# Patient Record
Sex: FEMALE | Race: WHITE | NOT HISPANIC OR LATINO | URBAN - METROPOLITAN AREA
[De-identification: names, ages, dates, MRNs, and addresses within clinical notes are randomized per-mention and may not be internally consistent; named-entity substitution may affect disease eponyms.]

---

## 2020-10-16 ENCOUNTER — EMERGENCY (EMERGENCY)
Facility: HOSPITAL | Age: 25
LOS: 1 days | Discharge: HOME | End: 2020-10-16
Attending: EMERGENCY MEDICINE | Admitting: EMERGENCY MEDICINE
Payer: MEDICAID

## 2020-10-16 VITALS
SYSTOLIC BLOOD PRESSURE: 121 MMHG | DIASTOLIC BLOOD PRESSURE: 78 MMHG | OXYGEN SATURATION: 96 % | HEART RATE: 118 BPM | RESPIRATION RATE: 18 BRPM | TEMPERATURE: 98 F

## 2020-10-16 DIAGNOSIS — U07.1 COVID-19: ICD-10-CM

## 2020-10-16 DIAGNOSIS — R45.851 SUICIDAL IDEATIONS: ICD-10-CM

## 2020-10-16 LAB — PCP SPEC-MCNC: SIGNIFICANT CHANGE UP

## 2020-10-16 PROCEDURE — 99285 EMERGENCY DEPT VISIT HI MDM: CPT

## 2020-10-16 PROCEDURE — 93010 ELECTROCARDIOGRAM REPORT: CPT

## 2020-10-16 RX ORDER — ONDANSETRON 8 MG/1
4 TABLET, FILM COATED ORAL ONCE
Refills: 0 | Status: COMPLETED | OUTPATIENT
Start: 2020-10-16 | End: 2020-10-16

## 2020-10-16 RX ORDER — SODIUM CHLORIDE 9 MG/ML
1000 INJECTION INTRAMUSCULAR; INTRAVENOUS; SUBCUTANEOUS ONCE
Refills: 0 | Status: COMPLETED | OUTPATIENT
Start: 2020-10-16 | End: 2020-10-16

## 2020-10-16 RX ORDER — KETOROLAC TROMETHAMINE 30 MG/ML
30 SYRINGE (ML) INJECTION ONCE
Refills: 0 | Status: DISCONTINUED | OUTPATIENT
Start: 2020-10-16 | End: 2020-10-16

## 2020-10-16 RX ADMIN — SODIUM CHLORIDE 1000 MILLILITER(S): 9 INJECTION INTRAMUSCULAR; INTRAVENOUS; SUBCUTANEOUS at 23:59

## 2020-10-16 NOTE — ED ADULT TRIAGE NOTE - CHIEF COMPLAINT QUOTE
pt. c/o hallucinations, suicidal thoughts and attempt. as per pt. she tried to jump under train. pt. also c/o abd pain.

## 2020-10-17 VITALS
DIASTOLIC BLOOD PRESSURE: 76 MMHG | RESPIRATION RATE: 18 BRPM | TEMPERATURE: 98 F | OXYGEN SATURATION: 98 % | SYSTOLIC BLOOD PRESSURE: 110 MMHG | HEART RATE: 73 BPM

## 2020-10-17 DIAGNOSIS — F25.0 SCHIZOAFFECTIVE DISORDER, BIPOLAR TYPE: ICD-10-CM

## 2020-10-17 LAB
ALBUMIN SERPL ELPH-MCNC: 4.2 G/DL — SIGNIFICANT CHANGE UP (ref 3.3–5)
ALP SERPL-CCNC: 97 U/L — SIGNIFICANT CHANGE UP (ref 40–120)
ALT FLD-CCNC: 28 U/L — SIGNIFICANT CHANGE UP (ref 10–45)
AMPHET UR-MCNC: NEGATIVE — SIGNIFICANT CHANGE UP
ANION GAP SERPL CALC-SCNC: 13 MMOL/L — SIGNIFICANT CHANGE UP (ref 5–17)
APAP SERPL-MCNC: <5 UG/ML — LOW (ref 10–30)
APPEARANCE UR: ABNORMAL
APTT BLD: 28.2 SEC — SIGNIFICANT CHANGE UP (ref 27.5–35.5)
AST SERPL-CCNC: 27 U/L — SIGNIFICANT CHANGE UP (ref 10–40)
BACTERIA # UR AUTO: PRESENT /HPF
BARBITURATES UR SCN-MCNC: NEGATIVE — SIGNIFICANT CHANGE UP
BASOPHILS # BLD AUTO: 0.02 K/UL — SIGNIFICANT CHANGE UP (ref 0–0.2)
BASOPHILS NFR BLD AUTO: 0.2 % — SIGNIFICANT CHANGE UP (ref 0–2)
BENZODIAZ UR-MCNC: NEGATIVE — SIGNIFICANT CHANGE UP
BILIRUB SERPL-MCNC: 0.2 MG/DL — SIGNIFICANT CHANGE UP (ref 0.2–1.2)
BILIRUB UR-MCNC: NEGATIVE — SIGNIFICANT CHANGE UP
BUN SERPL-MCNC: 18 MG/DL — SIGNIFICANT CHANGE UP (ref 7–23)
CALCIUM SERPL-MCNC: 9.3 MG/DL — SIGNIFICANT CHANGE UP (ref 8.4–10.5)
CHLORIDE SERPL-SCNC: 107 MMOL/L — SIGNIFICANT CHANGE UP (ref 96–108)
CO2 SERPL-SCNC: 22 MMOL/L — SIGNIFICANT CHANGE UP (ref 22–31)
COCAINE METAB.OTHER UR-MCNC: NEGATIVE — SIGNIFICANT CHANGE UP
COLOR SPEC: YELLOW — SIGNIFICANT CHANGE UP
CREAT SERPL-MCNC: 0.76 MG/DL — SIGNIFICANT CHANGE UP (ref 0.5–1.3)
DIFF PNL FLD: ABNORMAL
EOSINOPHIL # BLD AUTO: 0.03 K/UL — SIGNIFICANT CHANGE UP (ref 0–0.5)
EOSINOPHIL NFR BLD AUTO: 0.3 % — SIGNIFICANT CHANGE UP (ref 0–6)
EPI CELLS # UR: ABNORMAL /HPF (ref 0–5)
ETHANOL SERPL-MCNC: <10 MG/DL — SIGNIFICANT CHANGE UP (ref 0–10)
GLUCOSE SERPL-MCNC: 93 MG/DL — SIGNIFICANT CHANGE UP (ref 70–99)
GLUCOSE UR QL: NEGATIVE — SIGNIFICANT CHANGE UP
HCG SERPL-ACNC: <0 MIU/ML — SIGNIFICANT CHANGE UP
HCT VFR BLD CALC: 41.2 % — SIGNIFICANT CHANGE UP (ref 34.5–45)
HGB BLD-MCNC: 12.6 G/DL — SIGNIFICANT CHANGE UP (ref 11.5–15.5)
IMM GRANULOCYTES NFR BLD AUTO: 0.2 % — SIGNIFICANT CHANGE UP (ref 0–1.5)
INR BLD: 0.99 — SIGNIFICANT CHANGE UP (ref 0.88–1.16)
KETONES UR-MCNC: NEGATIVE — SIGNIFICANT CHANGE UP
LEUKOCYTE ESTERASE UR-ACNC: NEGATIVE — SIGNIFICANT CHANGE UP
LIDOCAIN IGE QN: 27 U/L — SIGNIFICANT CHANGE UP (ref 7–60)
LYMPHOCYTES # BLD AUTO: 2.42 K/UL — SIGNIFICANT CHANGE UP (ref 1–3.3)
LYMPHOCYTES # BLD AUTO: 26.6 % — SIGNIFICANT CHANGE UP (ref 13–44)
MCHC RBC-ENTMCNC: 25.2 PG — LOW (ref 27–34)
MCHC RBC-ENTMCNC: 30.6 GM/DL — LOW (ref 32–36)
MCV RBC AUTO: 82.4 FL — SIGNIFICANT CHANGE UP (ref 80–100)
METHADONE UR-MCNC: NEGATIVE — SIGNIFICANT CHANGE UP
MONOCYTES # BLD AUTO: 0.61 K/UL — SIGNIFICANT CHANGE UP (ref 0–0.9)
MONOCYTES NFR BLD AUTO: 6.7 % — SIGNIFICANT CHANGE UP (ref 2–14)
NEUTROPHILS # BLD AUTO: 6.01 K/UL — SIGNIFICANT CHANGE UP (ref 1.8–7.4)
NEUTROPHILS NFR BLD AUTO: 66 % — SIGNIFICANT CHANGE UP (ref 43–77)
NITRITE UR-MCNC: NEGATIVE — SIGNIFICANT CHANGE UP
NRBC # BLD: 0 /100 WBCS — SIGNIFICANT CHANGE UP (ref 0–0)
OPIATES UR-MCNC: NEGATIVE — SIGNIFICANT CHANGE UP
PCP UR-MCNC: NEGATIVE — SIGNIFICANT CHANGE UP
PH UR: 5.5 — SIGNIFICANT CHANGE UP (ref 5–8)
PLATELET # BLD AUTO: 272 K/UL — SIGNIFICANT CHANGE UP (ref 150–400)
POTASSIUM SERPL-MCNC: 4.3 MMOL/L — SIGNIFICANT CHANGE UP (ref 3.5–5.3)
POTASSIUM SERPL-SCNC: 4.3 MMOL/L — SIGNIFICANT CHANGE UP (ref 3.5–5.3)
PROT SERPL-MCNC: 7.7 G/DL — SIGNIFICANT CHANGE UP (ref 6–8.3)
PROT UR-MCNC: ABNORMAL MG/DL
PROTHROM AB SERPL-ACNC: 11.9 SEC — SIGNIFICANT CHANGE UP (ref 10.6–13.6)
RBC # BLD: 5 M/UL — SIGNIFICANT CHANGE UP (ref 3.8–5.2)
RBC # FLD: 15.2 % — HIGH (ref 10.3–14.5)
RBC CASTS # UR COMP ASSIST: ABNORMAL /HPF
SALICYLATES SERPL-MCNC: <0.3 MG/DL — LOW (ref 2.8–20)
SARS-COV-2 RNA SPEC QL NAA+PROBE: DETECTED
SODIUM SERPL-SCNC: 142 MMOL/L — SIGNIFICANT CHANGE UP (ref 135–145)
SP GR SPEC: >=1.03 — SIGNIFICANT CHANGE UP (ref 1–1.03)
THC UR QL: NEGATIVE — SIGNIFICANT CHANGE UP
UROBILINOGEN FLD QL: 0.2 E.U./DL — SIGNIFICANT CHANGE UP
WBC # BLD: 9.11 K/UL — SIGNIFICANT CHANGE UP (ref 3.8–10.5)
WBC # FLD AUTO: 9.11 K/UL — SIGNIFICANT CHANGE UP (ref 3.8–10.5)
WBC UR QL: < 5 /HPF — SIGNIFICANT CHANGE UP

## 2020-10-17 PROCEDURE — 76705 ECHO EXAM OF ABDOMEN: CPT | Mod: 26

## 2020-10-17 PROCEDURE — U0003: CPT

## 2020-10-17 PROCEDURE — 85610 PROTHROMBIN TIME: CPT

## 2020-10-17 PROCEDURE — 80307 DRUG TEST PRSMV CHEM ANLYZR: CPT

## 2020-10-17 PROCEDURE — 85730 THROMBOPLASTIN TIME PARTIAL: CPT

## 2020-10-17 PROCEDURE — 84702 CHORIONIC GONADOTROPIN TEST: CPT

## 2020-10-17 PROCEDURE — 83690 ASSAY OF LIPASE: CPT

## 2020-10-17 PROCEDURE — 96361 HYDRATE IV INFUSION ADD-ON: CPT

## 2020-10-17 PROCEDURE — 76705 ECHO EXAM OF ABDOMEN: CPT

## 2020-10-17 PROCEDURE — 74176 CT ABD & PELVIS W/O CONTRAST: CPT

## 2020-10-17 PROCEDURE — 85025 COMPLETE CBC W/AUTO DIFF WBC: CPT

## 2020-10-17 PROCEDURE — 90792 PSYCH DIAG EVAL W/MED SRVCS: CPT | Mod: 95

## 2020-10-17 PROCEDURE — 74176 CT ABD & PELVIS W/O CONTRAST: CPT | Mod: 26

## 2020-10-17 PROCEDURE — 81001 URINALYSIS AUTO W/SCOPE: CPT

## 2020-10-17 PROCEDURE — 96375 TX/PRO/DX INJ NEW DRUG ADDON: CPT

## 2020-10-17 PROCEDURE — 96374 THER/PROPH/DIAG INJ IV PUSH: CPT

## 2020-10-17 PROCEDURE — 36415 COLL VENOUS BLD VENIPUNCTURE: CPT

## 2020-10-17 PROCEDURE — 93005 ELECTROCARDIOGRAM TRACING: CPT

## 2020-10-17 PROCEDURE — 80053 COMPREHEN METABOLIC PANEL: CPT

## 2020-10-17 PROCEDURE — 99285 EMERGENCY DEPT VISIT HI MDM: CPT | Mod: 25

## 2020-10-17 RX ORDER — SERTRALINE 25 MG/1
150 TABLET, FILM COATED ORAL ONCE
Refills: 0 | Status: COMPLETED | OUTPATIENT
Start: 2020-10-17 | End: 2020-10-17

## 2020-10-17 RX ORDER — ARIPIPRAZOLE 15 MG/1
5 TABLET ORAL ONCE
Refills: 0 | Status: COMPLETED | OUTPATIENT
Start: 2020-10-17 | End: 2020-10-17

## 2020-10-17 RX ADMIN — SERTRALINE 150 MILLIGRAM(S): 25 TABLET, FILM COATED ORAL at 06:00

## 2020-10-17 RX ADMIN — ONDANSETRON 4 MILLIGRAM(S): 8 TABLET, FILM COATED ORAL at 00:01

## 2020-10-17 RX ADMIN — ARIPIPRAZOLE 5 MILLIGRAM(S): 15 TABLET ORAL at 06:00

## 2020-10-17 RX ADMIN — Medication 30 MILLIGRAM(S): at 01:28

## 2020-10-17 RX ADMIN — Medication 30 MILLIGRAM(S): at 00:01

## 2020-10-17 RX ADMIN — SODIUM CHLORIDE 1000 MILLILITER(S): 9 INJECTION INTRAMUSCULAR; INTRAVENOUS; SUBCUTANEOUS at 01:00

## 2020-10-17 NOTE — ED ADULT NURSE NOTE - OBJECTIVE STATEMENT
presents to ED w/ c/o 3-4 days constant RUQ abdominal pain associated w/ n/v/d, denies fevers/chills. pt also presenting w/ SI/AH/VH with + command hallucinations. pt reports significant hx of SI w/ attempts in the past including today prior to arrival. pt states she wanted to jump in the train tracks and attempted to do so, so a bystander brought her in. pt reports compliant w/ medications. denies HI

## 2020-10-17 NOTE — ED ADULT NURSE REASSESSMENT NOTE - NS ED NURSE REASSESS COMMENT FT1
Per case management, pt is waiting for bed to be ready at receiving facility. Pt sleeping between interactions. Pt updated on reason for wait, pt presents calm at this time. 1-1 monitoring continued.

## 2020-10-17 NOTE — ED BEHAVIORAL HEALTH NOTE - BEHAVIORAL HEALTH NOTE
===================  PRE-HOSPITAL COURSE  ===================  SOURCE:  RN and triage documentation.   DETAILS:  Patient presented self to ED; chief complaint of SA via jumping in front of train. Bystander brought her to hospital.      ============  ED COURSE   ============  SOURCE:  RN and triage documentation.   ARRIVAL:  Patient was cooperative with triage process and allowed for gowning/wanding without incident. Patient presents with fair hygiene/grooming.   BELONGINGS: No notable belongings; items stowed with security.   BEHAVIOR: Patient has been cooperative while in ED and has provided blood/urine for routine labs without incident. Patient presently endorsing SI and attempt today via jumping in front of train, as well as AH/VH of shadows telling her to hurt herself. Denies HI. Patient presents as flat.  Patient's speech is of normal rate/volume; patient is AOx4 and presents with a logical thought process. Patient has been resting in hospital bed and has received fluids.    TREATMENT:  Patient has received Zofran 4mg and Toradol 30mg IV push for abdominal pain and nausea.   VISITORS:  Patient is accompanied by mother while in ED.     Collateral contact called for Aparna, grandmother, [phone], Uncle Gerald picked up the phone. Somewhat reliable historian, unable to endorse much information. Stated grandmother was asleep and he could speak to provide some information. Endorses patient lives with himself and grandmother; patient requires some assistance in grooming/hygiene needs as she doesn't keep up with it on her own. Collateral endorses normal eating/sleeping patterns and doesn't note any recent changes. Collateral endorses patient had just gotten out of [facility] a week ago for suicidal ideation; states she has been in and out of hospitals and has been symptomatic for the past ten years. Collateral endorses she does have a psychiatrist and therapist, and that she is taking medication. Unable to endorse name of providers or name/dosages of medications. Collateral unable to endorse name of diagnosis. Collateral endorses patient has expressed SI in past when she goes to hospital, however not to him directly. Denies any known SA. Denies HI/AH/VH or any history of violence. Collateral denies medical issues, endorses allergies to pollen, denies legal issues, access to guns, family history, or developmental disabilities. Collateral also denies substance abuse. Collateral requests to be updated in regards to patient's well being.     COVID Exposure Screen- collateral (i.e. third-party, chart review, belongings, etc; include EMS and ED staff)     1.        *In the past 14 days, has the patient been around anyone with a positive COVID-19 test?*  (  ) Yes   ( X) No   (  ) Unknown- Reason (e.g. collateral uncertain, refusing to answer, etc.):  ______  IF YES PROCEED TO QUESTION #2. IF NO or UNKNOWN, PLEASE SKIP TO QUESTION #7  2.        Was the patient within 6 feet of them for at least 15 minutes? (  ) Yes   (  ) No   (  ) Unknown- Reason: ______   3.        Did the patient provide care for them? (  ) Yes   (  ) No   (  ) Unknown- Reason: ______   4.        Did the patient have direct physical contact with them (touched, hugged, or kissed them)? (  ) Yes   (  ) No    (  ) Unknown- Reason: ______   5.        Did the patient share eating or drinking utensils with them? (  ) Yes   (  ) No    (  ) Unknown- Reason: ______   6.        Have they sneezed, coughed, or somehow got respiratory droplets on the patient? (  ) Yes   (  ) No    (  ) Unknown- Reason: ______   7.        *Has the patient been out of [place] within the past 14 days?*  (  ) Yes   ( X) No   (  ) Unknown- Reason (e.g. collateral uncertain, refusing to answer, etc.): _______  IF YES PLEASE ANSWER THE FOLLOWING QUESTIONS:  8.        Which state/country has the patient been to? ______   9.        Was the patient there over 24 hours? (  ) Yes   (  ) No    (  ) Unknown- Reason: ______   10.     What date did the patient return to [facility]? ______

## 2020-10-17 NOTE — ED PROVIDER NOTE - OBJECTIVE STATEMENT
25F hx asthma, depression, walked in by a bystander.  pt states she has been feeling depressed lately and tried to jump in front of a train.  the bystander brought her here. pt states she also has been having right sided abd pain for the past 2 days.  +nausea and vomiting. some diarrhea. decreased appetite. no fevers. no recent travel. no sick contacts. pt states takes abilify and zoloft.  states has been having trouble sleeping. states she just didn't feel like she could go on.

## 2020-10-17 NOTE — ED BEHAVIORAL HEALTH ASSESSMENT NOTE - RISK ASSESSMENT
although pt is not actively suicidal now, is not abusing substances, is in outpatient tx; the pt is at elevated risk given her family h/o mental illness, her chronic mental illness, numerous inpatient admissions, multiple SA, acute medical illness, stressors at home. High Acute Suicide Risk

## 2020-10-17 NOTE — ED BEHAVIORAL HEALTH ASSESSMENT NOTE - PSYCHIATRIC ISSUES AND PLAN (INCLUDE STANDING AND PRN MEDICATION)
continue home medications. low risk violence. for anxiety can use benadryl 25mg or ativan 1-2mg IM/PO q6h PRN violence

## 2020-10-17 NOTE — ED BEHAVIORAL HEALTH ASSESSMENT NOTE - DETAILS
mother- schizoaffective. no h/o suicide in [employer] was physically bullied see HPI fatigue mild sob nausea/diarrhea to kill herself spoke with admitting- will be holding until unit is staffed self

## 2020-10-17 NOTE — ED BEHAVIORAL HEALTH ASSESSMENT NOTE - PAST PSYCHOTROPIC MEDICATION
klonopin, wellbutrin, geodon, prolixin, abilify, prozac, clonidine, lithium, cymbalta, vyvanse, risperdal, depakote, trazodone, clozaril (2019)

## 2020-10-17 NOTE — ED ADULT NURSE NOTE - NS ED NURSE LEVEL OF CONSCIOUSNESS ORIENTATION
Oriented - self; Oriented - place; Oriented - time/Hallucination - visual/Hallucination - audio/Ideation - suicidal

## 2020-10-17 NOTE — ED BEHAVIORAL HEALTH ASSESSMENT NOTE - OTHER PAST PSYCHIATRIC HISTORY (INCLUDE DETAILS REGARDING ONSET, COURSE OF ILLNESS, INPATIENT/OUTPATIENT TREATMENT)
psychiatrist- charmaine Ugarte; therapist Katelin q2 weeks   last admitted few months ago [facility]- medication non-compliance  16yo first admission; admitted about 15x total; usually for depression   dx schizoaffective disorder

## 2020-10-17 NOTE — ED BEHAVIORAL HEALTH ASSESSMENT NOTE - ADDITIONAL DETAILS ALL
SA- 4 OD on psych medications, last was 6 pills 4-5 months ago; ingested air freshener years ago. no h/o SIB see HPI. in the past she had SA- 4 OD on psych medications, last was 6 pills 4-5 months ago; ingested air freshener years ago. no h/o SIB

## 2020-10-17 NOTE — ED PROVIDER NOTE - PROGRESS NOTE DETAILS
no acute path on imaging. covid came back positive. pt states her grandparents are also sick and she lost her sense of taste. no resp distress, no hypoxia.  pt evaluated by telepsych - pt will be voluntary admit to [facility] Signed out to [place] pending psych transfer - pt with + covid no dyspnea - pending transfer to [facility].  Awaiting attending physician name.

## 2020-10-17 NOTE — ED BEHAVIORAL HEALTH ASSESSMENT NOTE - SUMMARY
24yo single black female, no dependents, domiciled with her 24yo brother, uncle, grandparents in an apartment, unemployed, completed associates degree, pphx of schizoaffective disorder, in tx at [facility] with psychiatrist and therapist, about 15 prior inpatient admissions, last several months ago, 2 prior SA via OD with last being several months ago, no h/o SIB, no violence hx, no substance abuse, h/o physical bullying, mother has a h/o schizophrenia, BIB passerby after pt was trying to jump onto the train tracks. pt found to be covid + while in the ER.  pt presenting with worsening mood and si in the context of acute medical illness, psychosocial stressor (fear of abandonment if her ill grandparents die), and chronic significant mental illness (previously on clozaril). given high lethality of her suicidal behavior this evening with reports of CAH, the pt will require admission for safety and stabilization.

## 2020-10-17 NOTE — ED BEHAVIORAL HEALTH ASSESSMENT NOTE - HPI (INCLUDE ILLNESS QUALITY, SEVERITY, DURATION, TIMING, CONTEXT, MODIFYING FACTORS, ASSOCIATED SIGNS AND SYMPTOMS)
26yo brother, uncle, grandparents in an apartment    I was having a little suicidal ideation and they swabbed     some n/d, SOB and" taste buds off" for a few days.   grandparents have been sick but never tested 24yo 24yo brother, uncle, grandparents in an apartment    I was having a little suicidal ideation and they swabbed     some n/d, SOB and" taste buds off" for a few days.   grandparents have been sick but never tested 24yo single black female, no dependents, domiciled with her 26yo brother, uncle, grandparents in an apartment, unemployed, completed associates degree, pphx of schizoaffective disorder, in tx at [facility] with psychiatrist and therapist, about 15 prior inpatient admissions, last several months ago, 2 prior SA via OD with last being several months ago, no h/o SIB, no violence hx, no substance abuse, h/o physical bullying, mother has a h/o schizophrenia, BIB passerby after pt was trying to jump onto the train tracks. pt found to be covid + while in the ER.     the pt states that over the past few days she has been feeling increasingly depressed but also physically ill at the same time. she reported having mild SOB and woke up from sleep with some cough, and her "taste buds have been off."  her grandparents who have raised her since birth (father  when pt was an infant and mother estranged because of her schizophrenia) have also been ill. her grandmother has not been eating and her grandfather has been spiking fevers. pt was very afraid that they were going to die so she left home to take a walk and was struggling with racing worried thoughts. she ended up at the train station and she suddenly felt that overwhelmed that they would die and leave her all alone. she was at the station for 30-40 mins and was trying to jump into the tracks. she went to the edge x3 but couldn't do it and instead rode the tain back and forth. another passenger asked her how she was doing and then walked her to the ER. pt still presenting depressed and worried but glad that she was able to accept the help. she is not currently actively suicidal. she endorses several days of poor sleep and appetite and fatigue. she does have chronic AH for years but this evening they were command in nature and telling her to jump off the tracks. she also had the same VH that she experiences during time of stress which is an image of her  dog. no other psychotic symptoms elicited. no current manic symptoms of decreased need for sleep, increased goal directed activity or grandiosity. no HI or aggressive thoughts. pt reports compliance with medications and denies substance abuse. she last saw her therapist a few days ago. pt is still worried about her grandparent's health.        COVID Exposure Screen- Patient  1.	*In the past 14 days, have you been around anyone with a positive COVID-19 test?*   (  ) Yes   ( x ) No   (  ) Unknown- Reason (e.g. patient uncertain, sedated, refusing to answer, etc.):  ______however pt must have because she is covid +  IF YES PROCEED TO QUESTION #2. IF NO or UNKNOWN, PLEASE SKIP TO QUESTION #7   7.	*Have you been out of [place] within the past 14 days?*  (  ) Yes   ( x ) No   (  ) Unknown- Reason (e.g. patient uncertain, sedated, refusing to answer, etc.): _______

## 2020-10-17 NOTE — ED PROVIDER NOTE - CLINICAL SUMMARY MEDICAL DECISION MAKING FREE TEXT BOX
c/o feeling depressed, thought to jump in front of train. also c/o right sided abd pain, n/v/d  -check labs, ekg  -ivf, zofran, toradol  -US  -1:1, psych c/s

## 2020-10-17 NOTE — ED BEHAVIORAL HEALTH ASSESSMENT NOTE - DESCRIPTION
asthma- controlled living with grandparents for years, dad  when pt was a parents, mother estranged, h/o associates degree, unemployed Vital Signs Last 24 Hrs  T(C): 36.6 (17 Oct 2020 06:08), Max: 36.8 (16 Oct 2020 22:39)  T(F): 97.9 (17 Oct 2020 06:08), Max: 98.3 (16 Oct 2020 22:39)  HR: 78 (17 Oct 2020 06:08) (72 - 118)  BP: 100/66 (17 Oct 2020 06:08) (100/65 - 121/78)  BP(mean): --  RR: 16 (17 Oct 2020 06:08) (16 - 18)  SpO2: 98% (17 Oct 2020 06:08) (96% - 98%)

## 2021-03-25 NOTE — ED BEHAVIORAL HEALTH ASSESSMENT NOTE - KNOWN PSYCHIATRIC ADMISSION WITHIN THE PAST 30 DAYS
Changes In Treatment Protocol: 3/23/21 new calib today. Decreased by 10%.  increase by 15mj each treatment for 2 treatments a weeks follow up in 3 months Total Body Time: 1:57 Protocol For Photochemotherapy: Triamcinolone Ointment And Nbuvb: The patient received Photochemotherapy: Triamcinolone and NBUVB (triamcinolone ointment applied to all lesions prior to phototherapy). Protocol For Bath Puva: The patient received Bath PUVA. Protocol For Photochemotherapy: Tar And Broad Band Uvb (Goeckerman Treatment): The patient received Photochemotherapy: Tar and Broad Band UVB (Goeckerman treatment). Protocol For Uva: The patient received UVA. Protocol For Photochemotherapy: Petrolatum And Nbuvb: The patient received Photochemotherapy: Petrolatum and NBUVB (petrolatum applied to all lesions prior to phototherapy). Total Treatment Time: 1:47 Protocol For Photochemotherapy For Severe Photoresponsive Dermatoses: Petrolatum And Broad Band Uvb: The patient received Photochemotherapyfor severe photoresponsive dermatoses: Petrolatum and Broad Band UVB requiring at least 4 to 8 hours of care under direct physician supervision. Name Of Supervising Technician: [facility] LPN Post-Care Instructions: I reviewed with the patient in detail post-care instructions. Patient is to wear sun protection. Patients may expect sunburn like redness, discomfort and scabbing. Protocol For Photochemotherapy: Petrolatum And Broad Band Uvb: The patient received Photochemotherapy: Petrolatum and Broad Band UVB. Irradiance (Optional- Include Units): 4.7 Protocol For Protocol For Photochemotherapy For Severe Photoresponsive Dermatoses: Bath Puva: The patient received Photochemotherapy for severe photoresponsive dermatoses: Bath PUVA requiring at least 4 to 8 hours of care under direct physician supervision. Protocol For Uva1: The patient received UVA1. Protocol For Photochemotherapy: Baby Oil And Nbuvb: The patient received Photochemotherapy: Baby Oil and NBUVB (baby oil applied to all lesions prior to phototherapy). Skin Type: III Protocol For Puva: The patient received PUVA. Protocol For Photochemotherapy For Severe Photoresponsive Dermatoses: Tar And Nbuvb (Goeckerman Treatment): The patient received Photochemotherapy for severe photoresponsive dermatoses: Tar and NBUVB (Goeckerman treatment) requiring at least 4 to 8 hours of care under direct physician supervision. Protocol For Photochemotherapy: Mineral Oil And Broad Band Uvb: The patient received Photochemotherapy: Mineral Oil and Broad Band UVB. Protocol For Photochemotherapy For Severe Photoresponsive Dermatoses: Petrolatum And Nbuvb: The patient received Photochemotherapy for severe photoresponsive dermatoses: Petrolatum and NBUVB requiring at least 4 to 8 hours of care under direct physician supervision. Treatment Number: [id number] [facility] Odalys Broussard Consent: The risks were reviewed with the patient including but not limited to: burn, pigmentary changes, pain, blistering, scabbing, redness, increased risk of skin cancers, and the remote possibility of scarring. Protocol For Nbuvb: The patient received NBUVB. Protocol For Photochemotherapy For Severe Photoresponsive Dermatoses: Puva: The patient received Photochemotherapy for severe photoresponsive dermatoses: PUVA requiring at least 4 to 8 hours of care under direct physician supervision. Detail Level: Generalized Protocol For Broad Band Uvb: The patient received Broad Band UVB. Render Post-Care In The Note: no Protocol: NBUVB Protocol For Photochemotherapy: Tar And Nbuvb (Goeckerman Treatment): The patient received Photochemotherapy: Tar and NBUVB (Goeckerman treatment). Protocol For Photochemotherapy: Mineral Oil And Nbuvb: The patient received Photochemotherapy: Mineral Oil and NBUVB (mineral oil applied to all lesions prior to phototherapy). Comments On Previous Treatment: Tolerated well Total Body Energy: [address] Protocol For Photochemotherapy For Severe Photoresponsive Dermatoses: Tar And Broad Band Uvb (Goeckerman Treatment): The patient received Photochemotherapy for severe photoresponsive dermatoses: Tar and Broad Band UVB (Goeckerman treatment) requiring at least 4 to 8 hours of care under direct physician supervision. Protocol For Nb Uva: The patient received NB UVA. No

## 2021-05-02 VITALS
WEIGHT: 289.03 LBS | TEMPERATURE: 98 F | DIASTOLIC BLOOD PRESSURE: 84 MMHG | HEART RATE: 98 BPM | SYSTOLIC BLOOD PRESSURE: 139 MMHG | HEIGHT: 62 IN | RESPIRATION RATE: 16 BRPM

## 2021-05-02 LAB
ALBUMIN SERPL ELPH-MCNC: 3.8 G/DL — SIGNIFICANT CHANGE UP (ref 3.3–5)
ALP SERPL-CCNC: 116 U/L — SIGNIFICANT CHANGE UP (ref 40–120)
ALT FLD-CCNC: 10 U/L — SIGNIFICANT CHANGE UP (ref 10–45)
ANION GAP SERPL CALC-SCNC: 12 MMOL/L — SIGNIFICANT CHANGE UP (ref 5–17)
APAP SERPL-MCNC: <5 UG/ML — LOW (ref 10–30)
AST SERPL-CCNC: 14 U/L — SIGNIFICANT CHANGE UP (ref 10–40)
BASOPHILS # BLD AUTO: 0.02 K/UL — SIGNIFICANT CHANGE UP (ref 0–0.2)
BASOPHILS NFR BLD AUTO: 0.2 % — SIGNIFICANT CHANGE UP (ref 0–2)
BILIRUB SERPL-MCNC: <0.2 MG/DL — SIGNIFICANT CHANGE UP (ref 0.2–1.2)
BUN SERPL-MCNC: 15 MG/DL — SIGNIFICANT CHANGE UP (ref 7–23)
CALCIUM SERPL-MCNC: 9.2 MG/DL — SIGNIFICANT CHANGE UP (ref 8.4–10.5)
CHLORIDE SERPL-SCNC: 104 MMOL/L — SIGNIFICANT CHANGE UP (ref 96–108)
CO2 SERPL-SCNC: 22 MMOL/L — SIGNIFICANT CHANGE UP (ref 22–31)
CREAT SERPL-MCNC: 0.82 MG/DL — SIGNIFICANT CHANGE UP (ref 0.5–1.3)
EOSINOPHIL # BLD AUTO: 0.26 K/UL — SIGNIFICANT CHANGE UP (ref 0–0.5)
EOSINOPHIL NFR BLD AUTO: 2 % — SIGNIFICANT CHANGE UP (ref 0–6)
ETHANOL SERPL-MCNC: <10 MG/DL — SIGNIFICANT CHANGE UP (ref 0–10)
GLUCOSE SERPL-MCNC: 94 MG/DL — SIGNIFICANT CHANGE UP (ref 70–99)
HCT VFR BLD CALC: 38.2 % — SIGNIFICANT CHANGE UP (ref 34.5–45)
HGB BLD-MCNC: 11.7 G/DL — SIGNIFICANT CHANGE UP (ref 11.5–15.5)
IMM GRANULOCYTES NFR BLD AUTO: 0.5 % — SIGNIFICANT CHANGE UP (ref 0–1.5)
LITHIUM SERPL-MCNC: 0.64 MMOL/L — SIGNIFICANT CHANGE UP (ref 0.6–1.2)
LYMPHOCYTES # BLD AUTO: 26.8 % — SIGNIFICANT CHANGE UP (ref 13–44)
LYMPHOCYTES # BLD AUTO: 3.41 K/UL — HIGH (ref 1–3.3)
MCHC RBC-ENTMCNC: 25.9 PG — LOW (ref 27–34)
MCHC RBC-ENTMCNC: 30.6 GM/DL — LOW (ref 32–36)
MCV RBC AUTO: 84.7 FL — SIGNIFICANT CHANGE UP (ref 80–100)
MONOCYTES # BLD AUTO: 0.63 K/UL — SIGNIFICANT CHANGE UP (ref 0–0.9)
MONOCYTES NFR BLD AUTO: 4.9 % — SIGNIFICANT CHANGE UP (ref 2–14)
NEUTROPHILS # BLD AUTO: 8.34 K/UL — HIGH (ref 1.8–7.4)
NEUTROPHILS NFR BLD AUTO: 65.6 % — SIGNIFICANT CHANGE UP (ref 43–77)
NRBC # BLD: 0 /100 WBCS — SIGNIFICANT CHANGE UP (ref 0–0)
PLATELET # BLD AUTO: 284 K/UL — SIGNIFICANT CHANGE UP (ref 150–400)
POTASSIUM SERPL-MCNC: 3.6 MMOL/L — SIGNIFICANT CHANGE UP (ref 3.5–5.3)
POTASSIUM SERPL-SCNC: 3.6 MMOL/L — SIGNIFICANT CHANGE UP (ref 3.5–5.3)
PROT SERPL-MCNC: 7.7 G/DL — SIGNIFICANT CHANGE UP (ref 6–8.3)
RBC # BLD: 4.51 M/UL — SIGNIFICANT CHANGE UP (ref 3.8–5.2)
RBC # FLD: 15.4 % — HIGH (ref 10.3–14.5)
SALICYLATES SERPL-MCNC: <0.3 MG/DL — LOW (ref 2.8–20)
SODIUM SERPL-SCNC: 138 MMOL/L — SIGNIFICANT CHANGE UP (ref 135–145)
WBC # BLD: 12.73 K/UL — HIGH (ref 3.8–10.5)
WBC # FLD AUTO: 12.73 K/UL — HIGH (ref 3.8–10.5)

## 2021-05-02 PROCEDURE — 99285 EMERGENCY DEPT VISIT HI MDM: CPT | Mod: 25

## 2021-05-02 PROCEDURE — 93010 ELECTROCARDIOGRAM REPORT: CPT

## 2021-05-02 PROCEDURE — 90792 PSYCH DIAG EVAL W/MED SRVCS: CPT

## 2021-05-02 RX ORDER — CLOZAPINE 150 MG/1
200 TABLET, ORALLY DISINTEGRATING ORAL AT BEDTIME
Refills: 0 | Status: DISCONTINUED | OUTPATIENT
Start: 2021-05-02 | End: 2021-05-02

## 2021-05-02 RX ORDER — CLOZAPINE 150 MG/1
200 TABLET, ORALLY DISINTEGRATING ORAL ONCE
Refills: 0 | Status: COMPLETED | OUTPATIENT
Start: 2021-05-02 | End: 2021-05-02

## 2021-05-02 RX ORDER — LITHIUM CARBONATE 300 MG/1
900 TABLET, EXTENDED RELEASE ORAL
Refills: 0 | Status: DISCONTINUED | OUTPATIENT
Start: 2021-05-02 | End: 2021-05-03

## 2021-05-02 NOTE — ED ADULT NURSE REASSESSMENT NOTE - NS ED NURSE REASSESS COMMENT FT1
Pt sleeping in stretcher. Pt arousable to voice and denies complaints at this time. Pending serum HCG result prior to medication administration.

## 2021-05-02 NOTE — ED BEHAVIORAL HEALTH ASSESSMENT NOTE - SUMMARY
24yo single woman, no dependents, domiciled at [facility], unemployed, had 1 year of college at [employer] where she studied music studies past hx of asthma, HTN, GERD, and hypothyroidism and schizoaffective disorder, in tx  with [facility] for the last 3 wks on clozaril (200 mg?), lithium 450 mg q12h, prozac 20 mg and abilify ESQUEDA (dose unknown which she got last week). She comes with worsening SI and CAH telling her to scratch herself and bump her head. The voices are a male and female voice that do not provide ongoing commentary or talk to each other but do put her down constantly. She has been taking her meds faithfully but lithium level 0.64. She has had about 16 prior inpatient admissions, last was in Oct. to Dec. 2020 at [facility]. She had COVID around that time, was horrified she was going to infect her grandparents and kill them, and was told by passersby at [facility] to come to hospital as she was going up to edge of [employer]. She has had 2 prior SA via OD of lithium and vistaril which she says was probably 6-7 pills and another reported suicide attempt by ingesting half a bottle of air freshener spray. No violence hx, no substance abuse, h/o physical bullying, mother has a h/o schizophrenia. She was raised by her grandparents who pt gives us permission to contact- Bacilio Calix- [phone]. Pt feels her meds have not been working well. Her move from grandparents home into [facility] was relatively recent (several ,months ag0) but she denies this being a factor in increasing depression.Calm in ED. Does not appear internally preoccupied but does look dysphoric and constricted. Spoke with [facility] who reported she has been there since 3/11/21. One issues is that she is supposed to take lithium 600 mg tid but she always misses the afternoon dose because she usually spends the whole day with her grandparents. They say she is on prozac 40 mg daily, lithium 600 mg tid , metoprolol 12.5 mg daily, omeprazole 20 mg daily, clozapine 200 mg qhs, synthroid 25mcg daily, colace 300 mg qhs, and abilify injection 662 mg (they had no record of when it was last given).    1) Admit to inpatient psych unit on voluntary status. Follow up COVID and COVID antibodies.    2)Would start lithium carbonate 900 mg q12h, clozaril 200 mg qhs, prozac 40 mg daily, metoprolol 12.5 mg daily, omeprazole 20 mg daily, colace 300 mg qhs, and synthroid 25 mcg daily.     3)Would obtain collateral info from grandparents and [place].    4)Ativan 1 and prolixin 5 mg q6h po/im/iv prn agitation

## 2021-05-02 NOTE — ED BEHAVIORAL HEALTH ASSESSMENT NOTE - HPI (INCLUDE ILLNESS QUALITY, SEVERITY, DURATION, TIMING, CONTEXT, MODIFYING FACTORS, ASSOCIATED SIGNS AND SYMPTOMS)
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 She comes with worsening SI and CAH telling her to scratch herself and bump her head. The voices are a male and female voice that do not provide ongoing commentary or talk to each other but do put her down constantly. She has been taking her meds faithfully but lithium level 0.64. She has had about 16 prior inpatient admissions, last was in Oct. to Dec. 2020 at [facility]. She had COVID around that time, was horrified she was going to infect her grandparents and kill them, and was [facility] at [facility] to come to hospital as she was going up to edge of [employer]. She has had 2 prior SA via OD of lithium and vistaril which she says was probably 6-7 pills and another reported suicide attempt by ingesting half a bottle of air freshener spray. No violence hx, no substance abuse, h/o physical bullying, mother has a h/o schizophrenia. She was raised by her grandparents who pt gives us permission to contact- Bacilio Calix- [phone]. Pt feels her meds have not been working well. Her move from grandparents home into [facility] was relatively recent (several ,months ag0) but she denies this being a factor in increasing depression.

## 2021-05-02 NOTE — ED BEHAVIORAL HEALTH ASSESSMENT NOTE - OTHER PAST PSYCHIATRIC HISTORY (INCLUDE DETAILS REGARDING ONSET, COURSE OF ILLNESS, INPATIENT/OUTPATIENT TREATMENT)
psychiatrist- charmaine Ugarte; therapist Katelin q2 weeks   last admitted few months ago [facility]- medication non-compliance  18yo first admission; admitted about 15x total; usually for depression   dx schizoaffective disorder

## 2021-05-02 NOTE — ED PROVIDER NOTE - OBJECTIVE STATEMENT
25F PMH schizoaffective, multiple prior psych admissions, hx SA (OD), p/w  COVID+ October 2020. Last [facility] ED visit at that time, admitted to psych facility. 25F PMH schizoaffective, multiple prior psych admissions, hx SA (OD), p/w SI. Pt has several days of feeling depressed, paranoid, increasing thoughts of wanting to harm herself - she has these thoughts when she walks by any knife. Symptoms worsening so came to ED. States she was last admitted to [facility] Dec 2020 and feels like she may need to go back. COVID+ October 2020. Last [facility] ED visit at that time, admitted to psych facility. Pt on lithium, prozac, abilify, clozaril - states she is adherent. Denies  any actual attempt at self harm at least w/i last few months.   Denies f/c, HA, SOB/CP, URI symptoms, vision changes, weakness/numbness, abd pain, urinary complaints. Denies SI, toxic ingestions.

## 2021-05-02 NOTE — ED PROVIDER NOTE - PROGRESS NOTE DETAILS
[place]: evaluated by psych, voluntary admit, no beds available.   will give evening meds: lithium 900mg BID, clozaril 200mg qhs, prozac 20mg qam (abilify monthly injections, last got dose ~1w ago). Director - pt received from Dr Avila.  Pt sleeping. VS, labs, imaging reviewed.  Pt pending psych dispo (no beds overnight). Director - pt w/o events overnight, cont to rest comfortably. Pt signed out to Dr Mast and KIRSTIN Gutierrez pending [facility] placement. hazel: pt received at sign out from magi parr/ director as pending psych placement [facility] is now open, pt to be admitted

## 2021-05-02 NOTE — ED BEHAVIORAL HEALTH ASSESSMENT NOTE - CURRENT MEDICATION
prozac 40 mg daily, lithium 600 mg tid , metoprolol 12.5 mg daily, omeprazole 20 mg daily, clozapine 200 mg qhs, synthroid 25mcg daily, colace 300 mg qhs, and abilify injection 662 mg (they had no record of when it was last given).

## 2021-05-02 NOTE — ED ADULT NURSE NOTE - NSIMPLEMENTINTERV_GEN_ALL_ED
Implemented All Universal Safety Interventions:  [place] to call system. Call bell, personal items and telephone within reach. Instruct patient to call for assistance. Room bathroom lighting operational. Non-slip footwear when patient is off stretcher. Physically safe environment: no spills, clutter or unnecessary equipment. Stretcher in lowest position, wheels locked, appropriate side rails in place.

## 2021-05-02 NOTE — ED BEHAVIORAL HEALTH ASSESSMENT NOTE - ADDITIONAL DETAILS ALL
see HPI. in the past she had SA- 2 OD on psych medications, last was 6 pills 4-5 months ago; ingested air freshener years ago. no h/o SIB
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
## 2021-05-02 NOTE — ED BEHAVIORAL HEALTH ASSESSMENT NOTE - PSYCHIATRIC ISSUES AND PLAN (INCLUDE STANDING AND PRN MEDICATION)
continue home medications. low risk violence. ativan 1-2mg  and prolixin 5 mg PO/IM q6h PRN violence

## 2021-05-02 NOTE — ED BEHAVIORAL HEALTH ASSESSMENT NOTE - NSPRESENTSXS_PSY_ALL_CORE
Depressed mood/Anhedonia/Psychosis/Command hallucinations to hurt self/Refusal or inability to complete safety plan

## 2021-05-02 NOTE — ED BEHAVIORAL HEALTH ASSESSMENT NOTE - RISK ASSESSMENT
High Acute Suicide Risk Pt actively suicidal, has no future-oriented thinking, CAH to harm herself, hx of multiple attempts (of low lethality), on clozapine and lithium and with [facility], just moved into psychiatric residence, can't safety plan

## 2021-05-02 NOTE — ED BEHAVIORAL HEALTH ASSESSMENT NOTE - PAST PSYCHOTROPIC MEDICATION
klonopin, wellbutrin, geodon, prolixin, abilify, prozac, clonidine, lithium, cymbalta, vyvanse, risperdal, depakote, trazodone, clozaril (2019), zoloft, vistaril

## 2021-05-02 NOTE — ED ADULT NURSE NOTE - DRUG PRE-SCREENING (DAST -1)
Physical Therapy  Cash Based Dry Needling Session    PATIENT DEMOGRAPHIC INFORMATION   Patient Name: Lizette Stearns  Date: 6/10/2019  YOB: 1975  Dry Needling Signed Consent: Yes    DESCRIPTION OF IMPAIRMENT   Diagnosis: right hip/buttock pain  Date of discharge from Physical Therapy to address this diagnosis: current patient  Goal of ongoing cash based services for this diagnosis: reduce pain and radicular symptoms    SUBJECTIVE   Patient presents today reporting: overall improved with pain and mobility    PHYSICAL EXAMINATION    Observation:   Right  glutes still mildly restricted along sacral border  Tightness noted in deep hip rotators, lateral gastroc and lateral hamstrings    TREATMENT   Dry Needling:  Patient provided a handout explaining intermuscular mobilization.  Patient has read the handout and has provided verbal agreement to proceed with the intervention.    Size of needle(s) used: 40, 50 and 60mm; Needle count prior to treatment: 10; Needle count after treatment: 10;  Needling location(s): right lateral gastroc, lateral hamstring, deep hip rotators, glut med and lateral sacral border; Duration of treatment: 25 minutes.       Patient has been made aware of the cash based cost associated with each of the above procedures: Yes    BRIEF ASSESSMENT AND FUTURE RECOMMENDATIONS    Response to treatment: excellent, improved mobility and no pain    Based on the above recommendation is to: Continue Cash Based Service    Total time spent with patient: 25 minutes  
Statement Selected

## 2021-05-02 NOTE — ED BEHAVIORAL HEALTH ASSESSMENT NOTE - DESCRIPTION
Just moved to [facility] in March, living with grandparents for years, dad  when pt was a parents, mother estranged, h/o associates degree, unemployed asthma- controlled, HTN, hypothyroidism, GERD, Constipation Calm in ED. Does not appear internally preoccupied but does look dysphoric and constricted. Spoke with Katy Pfeiffer who reported she has been there since 3/11/21. One issues is that she is supposed to take lithium 600 mg tid but she always misses the afternoon dose because she usually spends the whole day with her grandparents. They say she is on prozac 40 mg daily, lithium 600 mg tid , metoprolol 12.5 mg daily, omeprazole 20 mg daily, clozapine 200 mg qhs, synthroid 25mcg daily, colace 300 mg qhs, and abilify injection 662 mg (they had no record of when it was last given).

## 2021-05-02 NOTE — ED PROVIDER NOTE - CLINICAL SUMMARY MEDICAL DECISION MAKING FREE TEXT BOX
25F PMH schizoaffective, multiple prior psych admissions, hx SA (OD), p/w SI. Pt has several days of feeling depressed, paranoid, increasing thoughts of wanting to harm herself - she has these thoughts when she walks by any knife. Symptoms worsening so came to ED. States she was last admitted to [facility] Dec 2020 and feels like she may need to go back. COVID+ October 2020. Last [facility] ED visit at that time, admitted to psych facility. Pt on lithium, prozac, abilify, clozaril - states she is adherent. Denies  any actual attempt at self harm at least w/i last few months. Vitals wnl, exam as above.   WBC 12.7, other labs grossly wnl. Urine/COVID pending.   ddx: Likely related to chronic psych.  medically cleared, psych consulted. Will reassess.

## 2021-05-02 NOTE — ED ADULT NURSE NOTE - OBJECTIVE STATEMENT
25 y F pmhx depression, schizoaffective d/o, asthma presents to ED co SI/ AH. Pt says she's never attempted SI in the past but has a plan to hit her head into something. Pt also endorses auditory hallucinations and states, "they're telling me the world is better off without me and that i'm ugly and other self deprecating things." DEnies tactile hallucinations. Pt says she is compliant with her home medications and was last hospitalized at [facility] in december 2020. Patient placed on constant observation. Security at bedside. Pt wanded. Pt changed into gown. All belongings secured with security. ED tech at bedside. All ligature risks removed. All safety precautions maintained. EKG completed, labs collected. 25 y F pmhx depression, schizoaffective d/o, asthma presents to ED co SI/ AH. Pt says she's attempted SI once in the past, currently has a plan to hit her head into something. Pt also endorses auditory hallucinations and states, "they're telling me the world is better off without me and that i'm ugly and other self deprecating things." DEnies tactile hallucinations. Pt says she is compliant with her home medications and was last hospitalized at [facility] in december 2020. Patient placed on constant observation. Security at bedside. Pt wanded. Pt changed into gown. All belongings secured with security. ED tech at bedside. All ligature risks removed. All safety precautions maintained. EKG completed, labs collected. 25 y F pmhx depression, schizoaffective d/o, asthma presents to ED co SI/ AH. Pt says she's attempted SI once in the past, currently has a plan to hit her head into something. Pt also endorses auditory hallucinations and states, "they're telling me the world is better off without me and that i'm ugly and other self deprecating things." DEnies tactile hallucinations. Pt says she is compliant with her home medications and was last hospitalized at [facility] in december 2020. Patient placed on constant observation. Security at bedside. Pt wanded. Pt changed into gown. All belongings secured with security. ED tech at bedside. All ligature risks removed. All safety precautions maintained. EKG completed, labs collected.    Pt received Moderna COVID19 vaccine in january

## 2021-05-03 ENCOUNTER — INPATIENT (INPATIENT)
Facility: HOSPITAL | Age: 26
LOS: 2 days | Discharge: HOME | DRG: 885 | End: 2021-05-06
Attending: PSYCHIATRY & NEUROLOGY | Admitting: PSYCHIATRY & NEUROLOGY
Payer: MEDICAID

## 2021-05-03 DIAGNOSIS — R32 UNSPECIFIED URINARY INCONTINENCE: ICD-10-CM

## 2021-05-03 DIAGNOSIS — F25.9 SCHIZOAFFECTIVE DISORDER, UNSPECIFIED: ICD-10-CM

## 2021-05-03 DIAGNOSIS — Z91.5 PERSONAL HISTORY OF SELF-HARM: ICD-10-CM

## 2021-05-03 DIAGNOSIS — F60.0 PARANOID PERSONALITY DISORDER: ICD-10-CM

## 2021-05-03 DIAGNOSIS — K21.9 GASTRO-ESOPHAGEAL REFLUX DISEASE WITHOUT ESOPHAGITIS: ICD-10-CM

## 2021-05-03 DIAGNOSIS — J45.909 UNSPECIFIED ASTHMA, UNCOMPLICATED: ICD-10-CM

## 2021-05-03 DIAGNOSIS — R45.851 SUICIDAL IDEATIONS: ICD-10-CM

## 2021-05-03 DIAGNOSIS — E66.9 OBESITY, UNSPECIFIED: ICD-10-CM

## 2021-05-03 DIAGNOSIS — K59.00 CONSTIPATION, UNSPECIFIED: ICD-10-CM

## 2021-05-03 DIAGNOSIS — F32.9 MAJOR DEPRESSIVE DISORDER, SINGLE EPISODE, UNSPECIFIED: ICD-10-CM

## 2021-05-03 DIAGNOSIS — E03.9 HYPOTHYROIDISM, UNSPECIFIED: ICD-10-CM

## 2021-05-03 DIAGNOSIS — I10 ESSENTIAL (PRIMARY) HYPERTENSION: ICD-10-CM

## 2021-05-03 DIAGNOSIS — Z86.16 PERSONAL HISTORY OF COVID-19: ICD-10-CM

## 2021-05-03 DIAGNOSIS — Z56.0 UNEMPLOYMENT, UNSPECIFIED: ICD-10-CM

## 2021-05-03 LAB
APPEARANCE UR: CLEAR — SIGNIFICANT CHANGE UP
BILIRUB UR-MCNC: NEGATIVE — SIGNIFICANT CHANGE UP
COLOR SPEC: YELLOW — SIGNIFICANT CHANGE UP
COVID-19 SPIKE DOMAIN AB INTERP: POSITIVE
COVID-19 SPIKE DOMAIN ANTIBODY RESULT: >250 U/ML — HIGH
DIFF PNL FLD: NEGATIVE — SIGNIFICANT CHANGE UP
GLUCOSE UR QL: NEGATIVE — SIGNIFICANT CHANGE UP
HCG SERPL-ACNC: <0 MIU/ML — SIGNIFICANT CHANGE UP
KETONES UR-MCNC: NEGATIVE — SIGNIFICANT CHANGE UP
LEUKOCYTE ESTERASE UR-ACNC: NEGATIVE — SIGNIFICANT CHANGE UP
NITRITE UR-MCNC: NEGATIVE — SIGNIFICANT CHANGE UP
PCP SPEC-MCNC: SIGNIFICANT CHANGE UP
PH UR: 6.5 — SIGNIFICANT CHANGE UP (ref 5–8)
PROT UR-MCNC: NEGATIVE MG/DL — SIGNIFICANT CHANGE UP
SARS-COV-2 IGG+IGM SERPL QL IA: >250 U/ML — HIGH
SARS-COV-2 IGG+IGM SERPL QL IA: POSITIVE
SARS-COV-2 RNA SPEC QL NAA+PROBE: SIGNIFICANT CHANGE UP
SP GR SPEC: 1.01 — SIGNIFICANT CHANGE UP (ref 1–1.03)
UROBILINOGEN FLD QL: 0.2 E.U./DL — SIGNIFICANT CHANGE UP

## 2021-05-03 PROCEDURE — 99284 EMERGENCY DEPT VISIT MOD MDM: CPT

## 2021-05-03 RX ORDER — CLOZAPINE 150 MG/1
200 TABLET, ORALLY DISINTEGRATING ORAL AT BEDTIME
Refills: 0 | Status: DISCONTINUED | OUTPATIENT
Start: 2021-05-03 | End: 2021-05-06

## 2021-05-03 RX ORDER — FLUOXETINE HCL 10 MG
40 CAPSULE ORAL DAILY
Refills: 0 | Status: DISCONTINUED | OUTPATIENT
Start: 2021-05-03 | End: 2021-05-06

## 2021-05-03 RX ORDER — MAGNESIUM HYDROXIDE 400 MG/1
30 TABLET, CHEWABLE ORAL DAILY
Refills: 0 | Status: DISCONTINUED | OUTPATIENT
Start: 2021-05-03 | End: 2021-05-06

## 2021-05-03 RX ORDER — DOCUSATE SODIUM 100 MG
300 CAPSULE ORAL AT BEDTIME
Refills: 0 | Status: DISCONTINUED | OUTPATIENT
Start: 2021-05-03 | End: 2021-05-06

## 2021-05-03 RX ORDER — LITHIUM CARBONATE 300 MG/1
900 TABLET, EXTENDED RELEASE ORAL
Refills: 0 | Status: DISCONTINUED | OUTPATIENT
Start: 2021-05-03 | End: 2021-05-06

## 2021-05-03 RX ORDER — METOPROLOL TARTRATE 50 MG
12.5 TABLET ORAL DAILY
Refills: 0 | Status: DISCONTINUED | OUTPATIENT
Start: 2021-05-03 | End: 2021-05-06

## 2021-05-03 RX ORDER — PANTOPRAZOLE SODIUM 20 MG/1
40 TABLET, DELAYED RELEASE ORAL
Refills: 0 | Status: DISCONTINUED | OUTPATIENT
Start: 2021-05-03 | End: 2021-05-06

## 2021-05-03 RX ORDER — ALBUTEROL 90 UG/1
2 AEROSOL, METERED ORAL EVERY 6 HOURS
Refills: 0 | Status: DISCONTINUED | OUTPATIENT
Start: 2021-05-03 | End: 2021-05-06

## 2021-05-03 RX ORDER — METOPROLOL TARTRATE 50 MG
12.5 TABLET ORAL DAILY
Refills: 0 | Status: DISCONTINUED | OUTPATIENT
Start: 2021-05-03 | End: 2021-05-03

## 2021-05-03 RX ORDER — LEVOTHYROXINE SODIUM 125 MCG
25 TABLET ORAL DAILY
Refills: 0 | Status: DISCONTINUED | OUTPATIENT
Start: 2021-05-03 | End: 2021-05-06

## 2021-05-03 RX ORDER — ACETAMINOPHEN 500 MG
650 TABLET ORAL EVERY 6 HOURS
Refills: 0 | Status: DISCONTINUED | OUTPATIENT
Start: 2021-05-03 | End: 2021-05-06

## 2021-05-03 RX ADMIN — CLOZAPINE 200 MILLIGRAM(S): 150 TABLET, ORALLY DISINTEGRATING ORAL at 00:39

## 2021-05-03 RX ADMIN — CLOZAPINE 200 MILLIGRAM(S): 150 TABLET, ORALLY DISINTEGRATING ORAL at 23:13

## 2021-05-03 RX ADMIN — LITHIUM CARBONATE 900 MILLIGRAM(S): 300 TABLET, EXTENDED RELEASE ORAL at 13:52

## 2021-05-03 RX ADMIN — LITHIUM CARBONATE 900 MILLIGRAM(S): 300 TABLET, EXTENDED RELEASE ORAL at 01:33

## 2021-05-03 RX ADMIN — LITHIUM CARBONATE 900 MILLIGRAM(S): 300 TABLET, EXTENDED RELEASE ORAL at 23:13

## 2021-05-03 SDOH — ECONOMIC STABILITY - INCOME SECURITY: UNEMPLOYMENT, UNSPECIFIED: Z56.0

## 2021-05-03 NOTE — ED ADULT NURSE REASSESSMENT NOTE - NS ED NURSE REASSESS COMMENT FT1
Pt awake, sitting up, ate hot lunch. Pending availability of psych bed. VS WNL. 1-1 monitoring maintained.

## 2021-05-03 NOTE — ED ADULT NURSE REASSESSMENT NOTE - NS ED NURSE REASSESS COMMENT FT1
received pt from previous nurse Esvin. ps is sleeping in the stretcher, on 1:1 for safety. will continue monitoring.

## 2021-05-03 NOTE — ED BEHAVIORAL HEALTH NOTE - BEHAVIORAL HEALTH NOTE
Psychiatry ED Reassessment    CC: "I'm not doing well"    Per initial consult note "24yo single woman, no dependents, domiciled at [facility], unemployed, had 1 year of college at [employer] where she studied music studies past hx of asthma, HTN, GERD, and hypothyroidism and schizoaffective disorder, in tx  with [facility] for the last 3 wks on clozaril (200 mg?), lithium 450 mg q12h, prozac 20 mg and abilify ESQUEDA (dose unknown which she got last week). She comes with worsening SI and CAH telling her to scratch herself and bump her head. The voices are a male and female voice that do not provide ongoing commentary or talk to each other but do put her down constantly. She has been taking her meds faithfully but lithium level 0.64." Pt with many past psychiatric admissions and suicide attempts, last admission to [facility] 12/2020 per pt, ?more recent to [facility], recent move from grandparents home to [facility] residence in March 2021. Pt assessed to be appropriate for voluntary psychiatric admission on initial evaluation by psychiatry, with concern for dysphoric affect, decline in function over, missed midday doses of prescribed Lithium. Pt calm overnight, adherent with medications - received QHS Clozaril, Lithium 900mg. Seen this morning for re-evaluation.    On assessment, pt found asleep, easily awakened, c/o ongoing low mood and just waking up from a distressing nightmare. She endorsed ongoing AH of multiple voices making "derogatory" comments about her, not forthcoming re: details, with commands to "bang her head" that she is able to resist. She endorses vague thoughts of suicide without intent or plan. She remains interested in voluntary psychiatric admission. No other acute complaints voiced.     Outpt medications as per [facility] residence: prozac 40 mg daily, lithium 600 mg tid , metoprolol 12.5 mg daily, omeprazole 20 mg daily, clozapine 200 mg qhs, synthroid 25mcg daily, colace 300 mg qhs, and abilify injection 662 mg    MSE: Young woman dressed in hospital attire, appears stated age, obese, lying in hospital bed in ED. Good eye contact, fairly related. No psychomotor agitation/retardation. No tics/tremors appreciated. Speech clear, normal r/r/v. Mood depressed, affect congruent. Thought process linear, thought content somewhat impoverished with +derogatory AH, commands to bang head but not commands to end life, does not appear to be responding to internal stimuli. +SI without intent or plan. No HI. normal associations. Insight and judgment fair.    LABS:   CBC with ANC 8.34  Li level 0.62  EKG: NSR with QTc 480  Utox not resulted  COVID PCR neg, Ab pending    ASSESSMENT: 24yo woman, single, domiciled at [facility], with PPH of schizoaffective d/o, PMH hypothyroidism, GERD, HTN, asthma, [facility], on Clozaril, Abilify ESQUEDA (last ~1wk ago), Lithium, Prozac, approx 15 past psychiatric admissions and multiple past suicide attempts who presents with escalating depression, SI and distressing CAH to harm herself in context of incomplete adherence with outpt lithium, recent move out of grandparents home into [facility] residence, unclear if other acute stressors. Acutely worsening mood and psychotic sx causing significant distress and posing acute risk of harm to self; pt remains unable to engage in safety planning. Remains appropriate for voluntary psychiatric admission.    RECOMMENDATIONS:  1) Admit to inpatient psych unit on voluntary status. Follow up COVID antibodies.   Continue 1:1 observation for safety in ED.    2) Continue lithium carbonate 900 mg q12h, clozaril 200 mg qhs, prozac 40 mg daily, metoprolol 12.5 mg daily, omeprazole 20 mg daily, colace 300 mg qhs, and synthroid 25 mcg daily.     3)Pending collateral info from grandparents and [facility].    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 She comes with worsening SI and CAH telling her to scratch herself and bump her head. The voices are a male and female voice that do not provide ongoing commentary or talk to each other but do put her down constantly. She has been taking her meds faithfully but lithium level 0.64." Pt with many past psychiatric admissions and suicide attempts, last admission to [facility] 12/2020 per pt, ?more recent to [facility], recent move from grandparents home to [facility] residence in March 2021. Pt assessed to be appropriate for voluntary psychiatric admission on initial evaluation by psychiatry, with concern for dysphoric affect, decline in function over, missed midday doses of prescribed Lithium. Pt calm overnight, adherent with medications - received QHS Clozaril, Lithium 900mg. Seen this morning for re-evaluation.    On assessment, pt found asleep, easily awakened, c/o ongoing low mood and just waking up from a distressing nightmare. She endorsed ongoing AH of multiple voices making "derogatory" comments about her, not forthcoming re: details, with commands to "bang her head" that she is able to resist. She endorses vague thoughts of suicide without intent or plan. She remains interested in voluntary psychiatric admission. No other acute complaints voiced.     Outpt medications as per [facility] residence: prozac 40 mg daily, lithium 600 mg tid , metoprolol 12.5 mg daily, omeprazole 20 mg daily, clozapine 200 mg qhs, synthroid 25mcg daily, colace 300 mg qhs, and abilify injection 662 mg    MSE: Young woman dressed in hospital attire, appears stated age, obese, lying in hospital bed in ED. Good eye contact, fairly related. No psychomotor agitation/retardation. No tics/tremors appreciated. Speech clear, normal r/r/v. Mood depressed, affect congruent. Thought process linear, thought content somewhat impoverished with +derogatory AH, commands to bang head but not commands to end life, does not appear to be responding to internal stimuli. +SI without intent or plan. No HI. normal associations. Insight and judgment fair.    LABS:   CBC with ANC 8.34  Li level 0.62  EKG: NSR with QTc 480  Utox not resulted  Hcg neg  COVID PCR neg, Ab pending    ASSESSMENT: 24yo woman, single, domiciled at [facility], with PPH of schizoaffective d/o, PMH hypothyroidism, GERD, HTN, asthma, [facility], on Clozaril, Abilify ESQUEDA (last ~1wk ago), Lithium, Prozac, approx 15 past psychiatric admissions and multiple past suicide attempts who presents with escalating depression, SI and distressing CAH to harm herself in context of incomplete adherence with outpt lithium, recent move out of grandparents home into [facility] residence, unclear if other acute stressors. Acutely worsening mood and psychotic sx causing significant distress and posing acute risk of harm to self; pt remains unable to engage in safety planning. Remains appropriate for voluntary psychiatric admission.    RECOMMENDATIONS:  1) Admit to inpatient psych unit on voluntary status. Follow up COVID antibodies.   Continue 1:1 observation for safety in ED.    2) Continue lithium carbonate 900 mg q12h, clozaril 200 mg qhs, prozac 40 mg daily, metoprolol 12.5 mg daily, omeprazole 20 mg daily, colace 300 mg qhs, and synthroid 25 mcg daily.     3)Pending collateral info from grandparents and [facility].    4)Ativan 1mg and prolixin 5 mg q6h po/im/iv prn agitation    ADDENDUM 230PM: late entry - [facility] not yet accepting psychiatric admissions. D/w SW need to search for accepting facility for transfer.

## 2021-05-04 DIAGNOSIS — F25.9 SCHIZOAFFECTIVE DISORDER, UNSPECIFIED: ICD-10-CM

## 2021-05-04 LAB
A1C WITH ESTIMATED AVERAGE GLUCOSE RESULT: 5.4 % — SIGNIFICANT CHANGE UP (ref 4–5.6)
ALBUMIN SERPL ELPH-MCNC: 3.4 G/DL — SIGNIFICANT CHANGE UP (ref 3.3–5)
ALP SERPL-CCNC: 105 U/L — SIGNIFICANT CHANGE UP (ref 40–120)
ALT FLD-CCNC: 8 U/L — LOW (ref 10–45)
ANION GAP SERPL CALC-SCNC: 9 MMOL/L — SIGNIFICANT CHANGE UP (ref 5–17)
AST SERPL-CCNC: 12 U/L — SIGNIFICANT CHANGE UP (ref 10–40)
BILIRUB SERPL-MCNC: 0.2 MG/DL — SIGNIFICANT CHANGE UP (ref 0.2–1.2)
BUN SERPL-MCNC: 10 MG/DL — SIGNIFICANT CHANGE UP (ref 7–23)
CALCIUM SERPL-MCNC: 9.1 MG/DL — SIGNIFICANT CHANGE UP (ref 8.4–10.5)
CHLORIDE SERPL-SCNC: 105 MMOL/L — SIGNIFICANT CHANGE UP (ref 96–108)
CHOLEST SERPL-MCNC: 147 MG/DL — SIGNIFICANT CHANGE UP
CO2 SERPL-SCNC: 23 MMOL/L — SIGNIFICANT CHANGE UP (ref 22–31)
CREAT SERPL-MCNC: 0.67 MG/DL — SIGNIFICANT CHANGE UP (ref 0.5–1.3)
ESTIMATED AVERAGE GLUCOSE: 108 MG/DL — SIGNIFICANT CHANGE UP (ref 68–114)
GLUCOSE SERPL-MCNC: 89 MG/DL — SIGNIFICANT CHANGE UP (ref 70–99)
HCT VFR BLD CALC: 40.4 % — SIGNIFICANT CHANGE UP (ref 34.5–45)
HDLC SERPL-MCNC: 58 MG/DL — SIGNIFICANT CHANGE UP
HGB BLD-MCNC: 12 G/DL — SIGNIFICANT CHANGE UP (ref 11.5–15.5)
LIPID PNL WITH DIRECT LDL SERPL: 71 MG/DL — SIGNIFICANT CHANGE UP
LITHIUM SERPL-MCNC: 0.92 MMOL/L — SIGNIFICANT CHANGE UP (ref 0.6–1.2)
MCHC RBC-ENTMCNC: 25.6 PG — LOW (ref 27–34)
MCHC RBC-ENTMCNC: 29.7 GM/DL — LOW (ref 32–36)
MCV RBC AUTO: 86.1 FL — SIGNIFICANT CHANGE UP (ref 80–100)
NON HDL CHOLESTEROL: 89 MG/DL — SIGNIFICANT CHANGE UP
NRBC # BLD: 0 /100 WBCS — SIGNIFICANT CHANGE UP (ref 0–0)
PLATELET # BLD AUTO: 259 K/UL — SIGNIFICANT CHANGE UP (ref 150–400)
POTASSIUM SERPL-MCNC: 4.2 MMOL/L — SIGNIFICANT CHANGE UP (ref 3.5–5.3)
POTASSIUM SERPL-SCNC: 4.2 MMOL/L — SIGNIFICANT CHANGE UP (ref 3.5–5.3)
PROT SERPL-MCNC: 7 G/DL — SIGNIFICANT CHANGE UP (ref 6–8.3)
RBC # BLD: 4.69 M/UL — SIGNIFICANT CHANGE UP (ref 3.8–5.2)
RBC # FLD: 15.6 % — HIGH (ref 10.3–14.5)
SODIUM SERPL-SCNC: 137 MMOL/L — SIGNIFICANT CHANGE UP (ref 135–145)
TRIGL SERPL-MCNC: 89 MG/DL — SIGNIFICANT CHANGE UP
TSH SERPL-MCNC: 2.12 UIU/ML — SIGNIFICANT CHANGE UP (ref 0.27–4.2)
WBC # BLD: 8.91 K/UL — SIGNIFICANT CHANGE UP (ref 3.8–10.5)
WBC # FLD AUTO: 8.91 K/UL — SIGNIFICANT CHANGE UP (ref 3.8–10.5)

## 2021-05-04 PROCEDURE — 99223 1ST HOSP IP/OBS HIGH 75: CPT

## 2021-05-04 RX ADMIN — LITHIUM CARBONATE 900 MILLIGRAM(S): 300 TABLET, EXTENDED RELEASE ORAL at 22:00

## 2021-05-04 RX ADMIN — Medication 12.5 MILLIGRAM(S): at 10:16

## 2021-05-04 RX ADMIN — Medication 40 MILLIGRAM(S): at 10:16

## 2021-05-04 RX ADMIN — LITHIUM CARBONATE 900 MILLIGRAM(S): 300 TABLET, EXTENDED RELEASE ORAL at 10:16

## 2021-05-04 RX ADMIN — Medication 25 MICROGRAM(S): at 08:30

## 2021-05-04 RX ADMIN — PANTOPRAZOLE SODIUM 40 MILLIGRAM(S): 20 TABLET, DELAYED RELEASE ORAL at 10:16

## 2021-05-04 RX ADMIN — Medication 300 MILLIGRAM(S): at 22:00

## 2021-05-04 RX ADMIN — CLOZAPINE 200 MILLIGRAM(S): 150 TABLET, ORALLY DISINTEGRATING ORAL at 22:00
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
## 2021-05-04 NOTE — BEHAVIORAL HEALTH ASSESSMENT NOTE - SUICIDE RISK FACTORS
Hopelessness or despair/Current mood episode/Command hallucinations/Mood Disorder current/past/Psychotic disorder current/past/Family History of psychiatric diagnoses requiring hospitalization
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
## 2021-05-04 NOTE — BEHAVIORAL HEALTH ASSESSMENT NOTE - NSBHADMITCOUNSELOTHER_PSY_A_CORE FT
Discussed her psychiatric history, her theories about what might have been making her more depressed and suicidal, her medical issues, how she feels about her new ACT Team and her new residence.

## 2021-05-04 NOTE — BEHAVIORAL HEALTH ASSESSMENT NOTE - OTHER PAST PSYCHIATRIC HISTORY (INCLUDE DETAILS REGARDING ONSET, COURSE OF ILLNESS, INPATIENT/OUTPATIENT TREATMENT)
about 16 past psych hospitalizations including at [facility], [facility], and [facility]. Several past suicide attempts of low lethality (OD on 6-7 pills, trying to ingest air freshener). 1 state hospitalization. Currently [facility] and says she has had [facility] Team once in past.

## 2021-05-04 NOTE — BEHAVIORAL HEALTH ASSESSMENT NOTE - RISK ASSESSMENT
Pt recently actively suicidal, has no future-oriented thinking, CAH to harm herself, hx of multiple attempts (of low lethality), on clozapine and lithium and with [facility] Team, just moved into psychiatric residence, can't safety plan High Acute Suicide Risk

## 2021-05-04 NOTE — BEHAVIORAL HEALTH ASSESSMENT NOTE - NSBHADMITIPSTRENGTH_PSY_A_CORE
Has supportive interpersonal relationships with family, friends or peers/Has access to housing/residential stability/Knowledge of medications
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
## 2021-05-05 PROCEDURE — 99233 SBSQ HOSP IP/OBS HIGH 50: CPT

## 2021-05-05 RX ADMIN — Medication 40 MILLIGRAM(S): at 09:42

## 2021-05-05 RX ADMIN — Medication 12.5 MILLIGRAM(S): at 09:42

## 2021-05-05 RX ADMIN — LITHIUM CARBONATE 900 MILLIGRAM(S): 300 TABLET, EXTENDED RELEASE ORAL at 09:42

## 2021-05-05 RX ADMIN — CLOZAPINE 200 MILLIGRAM(S): 150 TABLET, ORALLY DISINTEGRATING ORAL at 21:59

## 2021-05-05 RX ADMIN — LITHIUM CARBONATE 900 MILLIGRAM(S): 300 TABLET, EXTENDED RELEASE ORAL at 21:59

## 2021-05-05 RX ADMIN — PANTOPRAZOLE SODIUM 40 MILLIGRAM(S): 20 TABLET, DELAYED RELEASE ORAL at 07:11

## 2021-05-05 RX ADMIN — Medication 300 MILLIGRAM(S): at 21:59

## 2021-05-05 RX ADMIN — Medication 25 MICROGRAM(S): at 07:11

## 2021-05-06 VITALS
OXYGEN SATURATION: 98 % | RESPIRATION RATE: 20 BRPM | HEART RATE: 101 BPM | SYSTOLIC BLOOD PRESSURE: 113 MMHG | TEMPERATURE: 98 F | DIASTOLIC BLOOD PRESSURE: 75 MMHG

## 2021-05-06 LAB
ANION GAP SERPL CALC-SCNC: 12 MMOL/L — SIGNIFICANT CHANGE UP (ref 5–17)
BASOPHILS # BLD AUTO: 0.06 K/UL — SIGNIFICANT CHANGE UP (ref 0–0.2)
BASOPHILS NFR BLD AUTO: 0.4 % — SIGNIFICANT CHANGE UP (ref 0–2)
BUN SERPL-MCNC: 17 MG/DL — SIGNIFICANT CHANGE UP (ref 7–23)
CALCIUM SERPL-MCNC: 9.8 MG/DL — SIGNIFICANT CHANGE UP (ref 8.4–10.5)
CHLORIDE SERPL-SCNC: 104 MMOL/L — SIGNIFICANT CHANGE UP (ref 96–108)
CO2 SERPL-SCNC: 20 MMOL/L — LOW (ref 22–31)
CREAT SERPL-MCNC: 0.75 MG/DL — SIGNIFICANT CHANGE UP (ref 0.5–1.3)
EOSINOPHIL # BLD AUTO: 0.29 K/UL — SIGNIFICANT CHANGE UP (ref 0–0.5)
EOSINOPHIL NFR BLD AUTO: 2.1 % — SIGNIFICANT CHANGE UP (ref 0–6)
GLUCOSE SERPL-MCNC: 86 MG/DL — SIGNIFICANT CHANGE UP (ref 70–99)
HCT VFR BLD CALC: 46.1 % — HIGH (ref 34.5–45)
HGB BLD-MCNC: 13.7 G/DL — SIGNIFICANT CHANGE UP (ref 11.5–15.5)
IMM GRANULOCYTES NFR BLD AUTO: 0.6 % — SIGNIFICANT CHANGE UP (ref 0–1.5)
LITHIUM SERPL-MCNC: 0.87 MMOL/L — SIGNIFICANT CHANGE UP (ref 0.6–1.2)
LYMPHOCYTES # BLD AUTO: 19.4 % — SIGNIFICANT CHANGE UP (ref 13–44)
LYMPHOCYTES # BLD AUTO: 2.69 K/UL — SIGNIFICANT CHANGE UP (ref 1–3.3)
MCHC RBC-ENTMCNC: 25.3 PG — LOW (ref 27–34)
MCHC RBC-ENTMCNC: 29.7 GM/DL — LOW (ref 32–36)
MCV RBC AUTO: 85.2 FL — SIGNIFICANT CHANGE UP (ref 80–100)
MONOCYTES # BLD AUTO: 0.85 K/UL — SIGNIFICANT CHANGE UP (ref 0–0.9)
MONOCYTES NFR BLD AUTO: 6.1 % — SIGNIFICANT CHANGE UP (ref 2–14)
NEUTROPHILS # BLD AUTO: 9.89 K/UL — HIGH (ref 1.8–7.4)
NEUTROPHILS NFR BLD AUTO: 71.4 % — SIGNIFICANT CHANGE UP (ref 43–77)
NRBC # BLD: 0 /100 WBCS — SIGNIFICANT CHANGE UP (ref 0–0)
PLATELET # BLD AUTO: 292 K/UL — SIGNIFICANT CHANGE UP (ref 150–400)
POTASSIUM SERPL-MCNC: 4.4 MMOL/L — SIGNIFICANT CHANGE UP (ref 3.5–5.3)
POTASSIUM SERPL-SCNC: 4.4 MMOL/L — SIGNIFICANT CHANGE UP (ref 3.5–5.3)
RBC # BLD: 5.41 M/UL — HIGH (ref 3.8–5.2)
RBC # FLD: 15.8 % — HIGH (ref 10.3–14.5)
SODIUM SERPL-SCNC: 136 MMOL/L — SIGNIFICANT CHANGE UP (ref 135–145)
WBC # BLD: 13.87 K/UL — HIGH (ref 3.8–10.5)
WBC # FLD AUTO: 13.87 K/UL — HIGH (ref 3.8–10.5)

## 2021-05-06 PROCEDURE — 99239 HOSP IP/OBS DSCHRG MGMT >30: CPT

## 2021-05-06 RX ORDER — LEVOTHYROXINE SODIUM 125 MCG
1 TABLET ORAL
Qty: 30 | Refills: 0
Start: 2021-05-06 | End: 2021-06-04

## 2021-05-06 RX ORDER — FLUOXETINE HCL 10 MG
1 CAPSULE ORAL
Qty: 30 | Refills: 0
Start: 2021-05-06 | End: 2021-06-04

## 2021-05-06 RX ORDER — PANTOPRAZOLE SODIUM 20 MG/1
1 TABLET, DELAYED RELEASE ORAL
Qty: 30 | Refills: 0
Start: 2021-05-06 | End: 2021-06-04

## 2021-05-06 RX ORDER — LITHIUM CARBONATE 300 MG/1
3 TABLET, EXTENDED RELEASE ORAL
Qty: 180 | Refills: 0
Start: 2021-05-06 | End: 2021-06-04

## 2021-05-06 RX ORDER — CLOZAPINE 150 MG/1
1 TABLET, ORALLY DISINTEGRATING ORAL
Qty: 7 | Refills: 0
Start: 2021-05-06 | End: 2021-05-12

## 2021-05-06 RX ORDER — METOPROLOL TARTRATE 50 MG
0.5 TABLET ORAL
Qty: 15 | Refills: 0
Start: 2021-05-06 | End: 2021-06-04

## 2021-05-06 RX ORDER — DOCUSATE SODIUM 100 MG
3 CAPSULE ORAL
Qty: 90 | Refills: 0
Start: 2021-05-06 | End: 2021-06-04

## 2021-05-06 RX ADMIN — Medication 25 MICROGRAM(S): at 07:09

## 2021-05-06 RX ADMIN — PANTOPRAZOLE SODIUM 40 MILLIGRAM(S): 20 TABLET, DELAYED RELEASE ORAL at 07:09

## 2021-05-06 RX ADMIN — LITHIUM CARBONATE 900 MILLIGRAM(S): 300 TABLET, EXTENDED RELEASE ORAL at 09:47

## 2021-05-06 RX ADMIN — Medication 40 MILLIGRAM(S): at 09:47

## 2021-05-06 RX ADMIN — Medication 12.5 MILLIGRAM(S): at 09:47
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
## 2021-05-06 NOTE — PROGRESS NOTE BEHAVIORAL HEALTH - NSBHCHARTREVIEWLAB_PSY_A_CORE FT
TSH wnl, l;ipid profile wnl, JhxH1P-2.4, WBC down to 8.91 from 12.
TSH wnl, l;ipid profile wnl, JvaD3R-0.4, WBC  up to 13 after being down to 8.91 from 12.

## 2021-05-06 NOTE — PROGRESS NOTE BEHAVIORAL HEALTH - NSBHFUPINTERVALHXFT_PSY_A_CORE
Pt doing well and looking forward to discharge. Returning to [facility] and to see ACT Team today. lithium level 0.87.
Pt no longer having SI. Reports she is feeling at baseline and requesting discharge. Team has contacted both ACT Team and pt's grandparents. Grandparents did report that in past year, schizophrenic mother had tried to reconnect with pt which stirred up feelings.
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
## 2021-05-06 NOTE — PROGRESS NOTE BEHAVIORAL HEALTH - NSBHADMITCOUNSEL_PSY_A_CORE
diagnostic results/impressions and/or recommended studies/risks and benefits of treatment options/instructions for management, treatment and follow up/importance of adherence to chosen treatment/risk factor reduction/client/family/caregiver education/prognosis/other...
diagnostic results/impressions and/or recommended studies/risks and benefits of treatment options/instructions for management, treatment and follow up/importance of adherence to chosen treatment/risk factor reduction/client/family/caregiver education/prognosis/other...

## 2021-05-06 NOTE — PROGRESS NOTE BEHAVIORAL HEALTH - NSBHADMITCOUNSELOTHER_PSY_A_CORE FT
Team spoke with ACT Team, patient, [facility], and grandparents to coordinate discharge planning. Discussed lithium level need and timing of discharge.
Coordination of discharge completed

## 2021-06-19 ENCOUNTER — EMERGENCY (EMERGENCY)
Facility: HOSPITAL | Age: 26
LOS: 1 days | Discharge: HOME | End: 2021-06-19
Attending: EMERGENCY MEDICINE | Admitting: EMERGENCY MEDICINE
Payer: MEDICAID

## 2021-06-19 VITALS
RESPIRATION RATE: 18 BRPM | DIASTOLIC BLOOD PRESSURE: 80 MMHG | OXYGEN SATURATION: 97 % | SYSTOLIC BLOOD PRESSURE: 125 MMHG | WEIGHT: 287.04 LBS | TEMPERATURE: 98 F | HEIGHT: 62 IN | HEART RATE: 100 BPM

## 2021-06-19 DIAGNOSIS — K21.9 GASTRO-ESOPHAGEAL REFLUX DISEASE WITHOUT ESOPHAGITIS: ICD-10-CM

## 2021-06-19 DIAGNOSIS — F32.9 MAJOR DEPRESSIVE DISORDER, SINGLE EPISODE, UNSPECIFIED: ICD-10-CM

## 2021-06-19 DIAGNOSIS — F25.0 SCHIZOAFFECTIVE DISORDER, BIPOLAR TYPE: ICD-10-CM

## 2021-06-19 DIAGNOSIS — Z20.822 CONTACT WITH AND (SUSPECTED) EXPOSURE TO COVID-19: ICD-10-CM

## 2021-06-19 DIAGNOSIS — R10.11 RIGHT UPPER QUADRANT PAIN: ICD-10-CM

## 2021-06-19 DIAGNOSIS — I10 ESSENTIAL (PRIMARY) HYPERTENSION: ICD-10-CM

## 2021-06-19 DIAGNOSIS — E03.9 HYPOTHYROIDISM, UNSPECIFIED: ICD-10-CM

## 2021-06-19 DIAGNOSIS — J45.909 UNSPECIFIED ASTHMA, UNCOMPLICATED: ICD-10-CM

## 2021-06-19 LAB
ALBUMIN SERPL ELPH-MCNC: 3.5 G/DL — SIGNIFICANT CHANGE UP (ref 3.3–5)
ALBUMIN SERPL ELPH-MCNC: 3.6 G/DL — SIGNIFICANT CHANGE UP (ref 3.3–5)
ALP SERPL-CCNC: 107 U/L — SIGNIFICANT CHANGE UP (ref 40–120)
ALP SERPL-CCNC: SIGNIFICANT CHANGE UP U/L (ref 40–120)
ALT FLD-CCNC: 11 U/L — SIGNIFICANT CHANGE UP (ref 10–45)
ALT FLD-CCNC: SIGNIFICANT CHANGE UP U/L (ref 10–45)
AMPHET UR-MCNC: NEGATIVE — SIGNIFICANT CHANGE UP
ANION GAP SERPL CALC-SCNC: 10 MMOL/L — SIGNIFICANT CHANGE UP (ref 5–17)
APAP SERPL-MCNC: <5 UG/ML — LOW (ref 10–30)
APPEARANCE UR: CLEAR — SIGNIFICANT CHANGE UP
APTT BLD: 29.3 SEC — SIGNIFICANT CHANGE UP (ref 27.5–35.5)
AST SERPL-CCNC: 17 U/L — SIGNIFICANT CHANGE UP (ref 10–40)
AST SERPL-CCNC: SIGNIFICANT CHANGE UP U/L (ref 10–40)
BACTERIA # UR AUTO: SIGNIFICANT CHANGE UP /HPF
BARBITURATES UR SCN-MCNC: NEGATIVE — SIGNIFICANT CHANGE UP
BASOPHILS # BLD AUTO: 0.02 K/UL — SIGNIFICANT CHANGE UP (ref 0–0.2)
BASOPHILS NFR BLD AUTO: 0.2 % — SIGNIFICANT CHANGE UP (ref 0–2)
BENZODIAZ UR-MCNC: NEGATIVE — SIGNIFICANT CHANGE UP
BILIRUB DIRECT SERPL-MCNC: <0.2 MG/DL — SIGNIFICANT CHANGE UP (ref 0–0.2)
BILIRUB INDIRECT FLD-MCNC: SIGNIFICANT CHANGE UP MG/DL (ref 0.2–1)
BILIRUB SERPL-MCNC: <0.2 MG/DL — SIGNIFICANT CHANGE UP (ref 0.2–1.2)
BILIRUB SERPL-MCNC: <0.2 MG/DL — SIGNIFICANT CHANGE UP (ref 0.2–1.2)
BILIRUB UR-MCNC: NEGATIVE — SIGNIFICANT CHANGE UP
BUN SERPL-MCNC: 10 MG/DL — SIGNIFICANT CHANGE UP (ref 7–23)
CALCIUM SERPL-MCNC: 8.7 MG/DL — SIGNIFICANT CHANGE UP (ref 8.4–10.5)
CHLORIDE SERPL-SCNC: 104 MMOL/L — SIGNIFICANT CHANGE UP (ref 96–108)
CO2 SERPL-SCNC: 23 MMOL/L — SIGNIFICANT CHANGE UP (ref 22–31)
COCAINE METAB.OTHER UR-MCNC: NEGATIVE — SIGNIFICANT CHANGE UP
COLOR SPEC: YELLOW — SIGNIFICANT CHANGE UP
CREAT SERPL-MCNC: 0.78 MG/DL — SIGNIFICANT CHANGE UP (ref 0.5–1.3)
DIFF PNL FLD: ABNORMAL
EOSINOPHIL # BLD AUTO: 0.32 K/UL — SIGNIFICANT CHANGE UP (ref 0–0.5)
EOSINOPHIL NFR BLD AUTO: 2.9 % — SIGNIFICANT CHANGE UP (ref 0–6)
EPI CELLS # UR: SIGNIFICANT CHANGE UP /HPF (ref 0–5)
ETHANOL SERPL-MCNC: <10 MG/DL — SIGNIFICANT CHANGE UP (ref 0–10)
GLUCOSE SERPL-MCNC: 93 MG/DL — SIGNIFICANT CHANGE UP (ref 70–99)
GLUCOSE UR QL: NEGATIVE — SIGNIFICANT CHANGE UP
HCT VFR BLD CALC: 36.4 % — SIGNIFICANT CHANGE UP (ref 34.5–45)
HGB BLD-MCNC: 10.9 G/DL — LOW (ref 11.5–15.5)
IMM GRANULOCYTES NFR BLD AUTO: 0.3 % — SIGNIFICANT CHANGE UP (ref 0–1.5)
INR BLD: 1.02 — SIGNIFICANT CHANGE UP (ref 0.88–1.16)
KETONES UR-MCNC: NEGATIVE — SIGNIFICANT CHANGE UP
LEUKOCYTE ESTERASE UR-ACNC: NEGATIVE — SIGNIFICANT CHANGE UP
LIDOCAIN IGE QN: 37 U/L — SIGNIFICANT CHANGE UP (ref 7–60)
LYMPHOCYTES # BLD AUTO: 27.4 % — SIGNIFICANT CHANGE UP (ref 13–44)
LYMPHOCYTES # BLD AUTO: 3.01 K/UL — SIGNIFICANT CHANGE UP (ref 1–3.3)
MCHC RBC-ENTMCNC: 25.6 PG — LOW (ref 27–34)
MCHC RBC-ENTMCNC: 29.9 GM/DL — LOW (ref 32–36)
MCV RBC AUTO: 85.6 FL — SIGNIFICANT CHANGE UP (ref 80–100)
METHADONE UR-MCNC: NEGATIVE — SIGNIFICANT CHANGE UP
MONOCYTES # BLD AUTO: 0.58 K/UL — SIGNIFICANT CHANGE UP (ref 0–0.9)
MONOCYTES NFR BLD AUTO: 5.3 % — SIGNIFICANT CHANGE UP (ref 2–14)
NEUTROPHILS # BLD AUTO: 7.01 K/UL — SIGNIFICANT CHANGE UP (ref 1.8–7.4)
NEUTROPHILS NFR BLD AUTO: 63.9 % — SIGNIFICANT CHANGE UP (ref 43–77)
NITRITE UR-MCNC: NEGATIVE — SIGNIFICANT CHANGE UP
NRBC # BLD: 0 /100 WBCS — SIGNIFICANT CHANGE UP (ref 0–0)
OPIATES UR-MCNC: NEGATIVE — SIGNIFICANT CHANGE UP
PCP SPEC-MCNC: SIGNIFICANT CHANGE UP
PCP UR-MCNC: NEGATIVE — SIGNIFICANT CHANGE UP
PH UR: 6 — SIGNIFICANT CHANGE UP (ref 5–8)
PLATELET # BLD AUTO: 311 K/UL — SIGNIFICANT CHANGE UP (ref 150–400)
POTASSIUM SERPL-MCNC: 4 MMOL/L — SIGNIFICANT CHANGE UP (ref 3.5–5.3)
POTASSIUM SERPL-MCNC: SIGNIFICANT CHANGE UP MMOL/L (ref 3.5–5.3)
POTASSIUM SERPL-SCNC: 4 MMOL/L — SIGNIFICANT CHANGE UP (ref 3.5–5.3)
POTASSIUM SERPL-SCNC: SIGNIFICANT CHANGE UP MMOL/L (ref 3.5–5.3)
PROT SERPL-MCNC: 6.9 G/DL — SIGNIFICANT CHANGE UP (ref 6–8.3)
PROT SERPL-MCNC: 7.4 G/DL — SIGNIFICANT CHANGE UP (ref 6–8.3)
PROT UR-MCNC: NEGATIVE MG/DL — SIGNIFICANT CHANGE UP
PROTHROM AB SERPL-ACNC: 12.2 SEC — SIGNIFICANT CHANGE UP (ref 10.6–13.6)
RBC # BLD: 4.25 M/UL — SIGNIFICANT CHANGE UP (ref 3.8–5.2)
RBC # FLD: 15.9 % — HIGH (ref 10.3–14.5)
RBC CASTS # UR COMP ASSIST: < 5 /HPF — SIGNIFICANT CHANGE UP
SALICYLATES SERPL-MCNC: <0.3 MG/DL — LOW (ref 2.8–20)
SARS-COV-2 RNA SPEC QL NAA+PROBE: NEGATIVE — SIGNIFICANT CHANGE UP
SODIUM SERPL-SCNC: 137 MMOL/L — SIGNIFICANT CHANGE UP (ref 135–145)
SP GR SPEC: >=1.03 — SIGNIFICANT CHANGE UP (ref 1–1.03)
THC UR QL: NEGATIVE — SIGNIFICANT CHANGE UP
UROBILINOGEN FLD QL: 0.2 E.U./DL — SIGNIFICANT CHANGE UP
WBC # BLD: 10.97 K/UL — HIGH (ref 3.8–10.5)
WBC # FLD AUTO: 10.97 K/UL — HIGH (ref 3.8–10.5)
WBC UR QL: < 5 /HPF — SIGNIFICANT CHANGE UP

## 2021-06-19 PROCEDURE — 76705 ECHO EXAM OF ABDOMEN: CPT | Mod: 26

## 2021-06-19 PROCEDURE — 81001 URINALYSIS AUTO W/SCOPE: CPT

## 2021-06-19 PROCEDURE — 99285 EMERGENCY DEPT VISIT HI MDM: CPT

## 2021-06-19 PROCEDURE — 99284 EMERGENCY DEPT VISIT MOD MDM: CPT | Mod: 25

## 2021-06-19 PROCEDURE — 81025 URINE PREGNANCY TEST: CPT

## 2021-06-19 PROCEDURE — 85730 THROMBOPLASTIN TIME PARTIAL: CPT

## 2021-06-19 PROCEDURE — 90792 PSYCH DIAG EVAL W/MED SRVCS: CPT

## 2021-06-19 PROCEDURE — 84132 ASSAY OF SERUM POTASSIUM: CPT

## 2021-06-19 PROCEDURE — 83690 ASSAY OF LIPASE: CPT

## 2021-06-19 PROCEDURE — 36415 COLL VENOUS BLD VENIPUNCTURE: CPT

## 2021-06-19 PROCEDURE — 87635 SARS-COV-2 COVID-19 AMP PRB: CPT

## 2021-06-19 PROCEDURE — 76705 ECHO EXAM OF ABDOMEN: CPT

## 2021-06-19 PROCEDURE — 80307 DRUG TEST PRSMV CHEM ANLYZR: CPT

## 2021-06-19 PROCEDURE — 86769 SARS-COV-2 COVID-19 ANTIBODY: CPT

## 2021-06-19 PROCEDURE — 80076 HEPATIC FUNCTION PANEL: CPT

## 2021-06-19 PROCEDURE — 85025 COMPLETE CBC W/AUTO DIFF WBC: CPT

## 2021-06-19 PROCEDURE — 80053 COMPREHEN METABOLIC PANEL: CPT

## 2021-06-19 PROCEDURE — 85610 PROTHROMBIN TIME: CPT

## 2021-06-19 NOTE — ED PROVIDER NOTE - NSFOLLOWUPINSTRUCTIONS_ED_ALL_ED_FT
Depression    WHAT YOU NEED TO KNOW:    Depression is a medical condition that causes feelings of sadness or hopelessness that do not go away. Depression may cause you to lose interest in things you used to enjoy. These feelings may interfere with your daily life.    DISCHARGE INSTRUCTIONS:    Call your local emergency number (911 in the ) if:   •You think about harming yourself or someone else.      •You have done something on purpose to hurt yourself.      Call your therapist or doctor if:   •Your symptoms do not improve.      •You cannot make it to your next appointment.       •You have new symptoms.      •You have questions or concerns about your condition or care.      The following resources are available at any time to help you, if needed:   •National Suicide Prevention Lifeline: 1-900.722.3386 (5-336-629-TALK)      •Suicide Hotline: 1-726.929.6689 (6-249-YHLQVGH)      •For a list of international numbers: https://[employer].org/find-help/international-resources/      Medicines:   •Antidepressants may be given to improve or balance your mood. You may need to take this medicine for several weeks before you begin to feel better.      •Take your medicine as directed. Contact your healthcare provider if you think your medicine is not helping or if you have side effects. Tell him of her if you are allergic to any medicine. Keep a list of the medicines, vitamins, and herbs you take. Include the amounts, and when and why you take them. Bring the list or the pill bottles to follow-up visits. Carry your medicine list with you in case of an emergency.      Therapy is often used together with medicine to relieve depression. Therapy is a way for you to talk about your feelings and anything that may be causing depression. Therapy can be done alone or in a group. It may also be done with family members or a significant other.    Self-care:   •Get regular physical activity. Try to be active for 30 minutes, 3 to 5 days a week. Physical activity can help relieve depression. Work with your healthcare provider to develop a plan that you enjoy. It may help to ask someone to be active with you.      •Create a regular sleep schedule. A routine can help you relax before bed. Listen to music, read, or do yoga. Try to go to bed and wake up at the same time every day. Sleep is important for emotional health.      •Eat a variety of healthy foods. Healthy foods include fruits, vegetables, whole-grain breads, low-fat dairy products, lean meats, fish, and cooked beans. A healthy meal plan is low in fat, salt, and added sugar.      •Do not drink alcohol or use drugs. Alcohol and drugs can make depression worse. Talk to your therapist or doctor if you need help quitting.      Follow up with your healthcare provider as directed: Your healthcare provider will monitor your progress at follow-up visits. He or she will also monitor your medicine if you take antidepressants. Your healthcare provider will ask if the medicine is helping. Tell him or her about any side effects or problems you may have with your medicine. The type or amount of medicine may need to be changed. Write down your questions so you remember to ask them during your visits.

## 2021-06-19 NOTE — ED ADULT NURSE NOTE - OBJECTIVE STATEMENT
pt to ED for RUQ pain and depression, wants to talk psych doctor, denies HI/SI/hallucinations. pt is A+Ox4, denies n/v/d, fever/chills/cough.

## 2021-06-19 NOTE — ED PROVIDER NOTE - OBJECTIVE STATEMENT
24 y/o F pt with PMHx schizoaffective, multiple prior psych admissions, hx SA (OD), presents to ED complaining of 1) depressive episode and 2) right upper quadrant abdominal pain. Patient denies SI or plan. She Is requesting to speak with ED psychiatry as she has been experiencing x3-4 days of depressive mood. Patient is compliant with psychiatric medications, denies any misuse or change to medication intake. Patient admits to right upper quadrant abdominal pain for the past x2 days, described as a constant, non-radiating pain. Patient denies taking any pain medications prior to arrival. Patient reports that she has experience similar abdominal pain x1 year, to which she did not seek medical care for, and had subsequently resolved on its own.  Patient denies any fever, chills, nausea, vomiting, diarrhea or any other acute medical complaints at this time. Patient has ACT team following.
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
## 2021-06-19 NOTE — ED PROVIDER NOTE - CLINICAL SUMMARY MEDICAL DECISION MAKING FREE TEXT BOX
26 y/o F pt presents to ED with complaints of 1) depressive mood [Patient denies SI or plan] and 2) right upper quadrant abdominal pain.     Plan: Will consult psychiatry; 1-to-1 not ordered, as patient is not a danger to self/other(s). On exam, abdomen is nontender, however, will order labs and CT abdomen & pelvis to r/o biliary colic, cholecystitis and gallbladder dysfunction.

## 2021-06-19 NOTE — ED BEHAVIORAL HEALTH ASSESSMENT NOTE - OTHER PAST PSYCHIATRIC HISTORY (INCLUDE DETAILS REGARDING ONSET, COURSE OF ILLNESS, INPATIENT/OUTPATIENT TREATMENT)
[facility]; therapist Katelin q2 weeks   last admitted at [facility] in May, before that [facility] and before that several months ago [facility]- medication non-compliance  16yo first admission; admitted about 15x total; usually for depression   dx schizoaffective disorder

## 2021-06-19 NOTE — ED PROVIDER NOTE - PROGRESS NOTE DETAILS
pt signed out pending chemistry panel which is unremarkable, d/c to f/u with pmd, advised to return to ED if sx worsen.

## 2021-06-19 NOTE — ED BEHAVIORAL HEALTH ASSESSMENT NOTE - RISK ASSESSMENT
No SI at this time. No acute risk but elevated chronic risk due to schizoaffective d/o and past attempts. Protective factors include ACT team, on clozapine and lithium, and seeks help when feeling worse. Low Acute Suicide Risk

## 2021-06-19 NOTE — ED BEHAVIORAL HEALTH ASSESSMENT NOTE - DESCRIPTION
Calm in ED. Does not appear internally preoccupied and seems at her baseline. She is on prozac 40 mg daily, lithium 900 mg q12h, metoprolol 12.5 mg daily, omeprazole 20 mg daily, clozapine 200 mg qhs, synthroid 25mcg daily, colace 300 mg qhs, and abilify injection 662 mg (they had no record of when it was last given). asthma- controlled, HTN, hypothyroidism, GERD, Constipation Just moved to [facility] in March, living with grandparents for years, dad  when pt was a parents, mother estranged, h/o associates degree, unemployed

## 2021-06-19 NOTE — ED PROVIDER NOTE - PATIENT PORTAL LINK FT
You can access the FollowMyHealth Patient Portal offered by [facility] by registering at the following website: [email or web address]. By joining [employer]’s FollowMyHealth portal, you will also be able to view your health information using other applications (apps) compatible with our system.

## 2021-06-19 NOTE — ED BEHAVIORAL HEALTH ASSESSMENT NOTE - MEDICATIONS (PRESCRIPTIONS, DIRECTIONS)
prozac 40 mg daily, lithium 900 mg q12h , metoprolol 12.5 mg daily, omeprazole 20 mg daily, clozapine 200 mg qhs, synthroid 25mcg daily, colace 300 mg qhs, and abilify injection 662 mg (they had no record of when it was last given).
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
## 2021-06-20 PROBLEM — F25.9 SCHIZOAFFECTIVE DISORDER, UNSPECIFIED: Chronic | Status: ACTIVE | Noted: 2021-05-02

## 2021-06-20 LAB
COVID-19 SPIKE DOMAIN AB INTERP: POSITIVE
COVID-19 SPIKE DOMAIN ANTIBODY RESULT: >250 U/ML — HIGH
SARS-COV-2 IGG+IGM SERPL QL IA: >250 U/ML — HIGH
SARS-COV-2 IGG+IGM SERPL QL IA: POSITIVE

## 2021-06-22 PROCEDURE — 80053 COMPREHEN METABOLIC PANEL: CPT

## 2021-06-22 PROCEDURE — U0005: CPT

## 2021-06-22 PROCEDURE — 85027 COMPLETE CBC AUTOMATED: CPT

## 2021-06-22 PROCEDURE — 80178 ASSAY OF LITHIUM: CPT

## 2021-06-22 PROCEDURE — 80307 DRUG TEST PRSMV CHEM ANLYZR: CPT

## 2021-06-22 PROCEDURE — 99285 EMERGENCY DEPT VISIT HI MDM: CPT

## 2021-06-22 PROCEDURE — 85025 COMPLETE CBC W/AUTO DIFF WBC: CPT

## 2021-06-22 PROCEDURE — 36415 COLL VENOUS BLD VENIPUNCTURE: CPT

## 2021-06-22 PROCEDURE — 86769 SARS-COV-2 COVID-19 ANTIBODY: CPT

## 2021-06-22 PROCEDURE — 83036 HEMOGLOBIN GLYCOSYLATED A1C: CPT

## 2021-06-22 PROCEDURE — 84702 CHORIONIC GONADOTROPIN TEST: CPT

## 2021-06-22 PROCEDURE — 81003 URINALYSIS AUTO W/O SCOPE: CPT

## 2021-06-22 PROCEDURE — 84443 ASSAY THYROID STIM HORMONE: CPT

## 2021-06-22 PROCEDURE — 80061 LIPID PANEL: CPT

## 2021-06-22 PROCEDURE — 80048 BASIC METABOLIC PNL TOTAL CA: CPT

## 2021-06-22 PROCEDURE — U0003: CPT

## 2022-06-12 NOTE — ED PROVIDER NOTE - PHYSICAL EXAMINATION
no LE edema, normal equal distal pulses, steady unassisted gait.   No clonus, rigidity, tremors, fasciculations. PERRL, EOMI, no nystagmus. Strength 5/5. Steady unassisted gait. Normal bowel sounds, skin temp/color. no

## 2022-08-31 NOTE — ED ADULT NURSE NOTE - NS ED NURSE AMBULANCES2
Detail Level: Zone Detail Level: Simple Detail Level: Detailed Detail Level: Generalized [facility]

## 2022-09-06 ENCOUNTER — EMERGENCY (EMERGENCY)
Facility: HOSPITAL | Age: 27
LOS: 1 days | Discharge: HOME | End: 2022-09-06
Attending: EMERGENCY MEDICINE
Payer: MEDICAID

## 2022-09-06 VITALS
DIASTOLIC BLOOD PRESSURE: 77 MMHG | SYSTOLIC BLOOD PRESSURE: 143 MMHG | WEIGHT: 293 LBS | OXYGEN SATURATION: 100 % | RESPIRATION RATE: 18 BRPM | HEIGHT: 63 IN | HEART RATE: 84 BPM | TEMPERATURE: 98 F

## 2022-09-06 PROCEDURE — 99285 EMERGENCY DEPT VISIT HI MDM: CPT

## 2022-09-06 PROCEDURE — 71045 X-RAY EXAM CHEST 1 VIEW: CPT | Mod: 26

## 2022-09-06 NOTE — ED PROVIDER NOTE - PATIENT PORTAL LINK FT
You can access the FollowMyHealth Patient Portal offered by [facility] by registering at the following website: [email or web address]. By joining [facility]’s FollowMyHealth portal, you will also be able to view your health information using other applications (apps) compatible with our system.

## 2022-09-06 NOTE — ED PROVIDER NOTE - OBJECTIVE STATEMENT
Chief complaint of left sternal chest pain started 2 hrs prior to arrival.  No fever, no shortness of breath, no coughing.   Pt also states she is feeling more depressed with thoughts of hurting herself for past 2 weeks. Denies any self harm.  Pt states had hx of psych admission at [facility] 2 years ago.  Meds: Clonazepam, Lithium, Zoloft, Cogentin.

## 2022-09-06 NOTE — ED PROVIDER NOTE - NSFOLLOWUPINSTRUCTIONS_ED_ALL_ED_FT
Return to ER immediately if your chest pain returns, if you have pain with radiation to arms, back or neck, if you feel short of breath, feel weak, feel fast or pounding heart beating, if you have any fever or vomiting. Return if you fell depressed, anxious.  If you need assistance with follow up appointments our Care Coordinator can be reached at [phone]. In addition the [facility] is located at [address] [place] [address], tel: [phone].

## 2022-09-06 NOTE — ED PROVIDER NOTE - CLINICAL SUMMARY MEDICAL DECISION MAKING FREE TEXT BOX
CT reported No evidence of pulmonary embolism.  Pt cleared by telepsych for discharge.  Pt is well appearing, has no new complaints and able to walk with normal gait. Pt is stable for discharge and follow up with medical doctor. Pt educated on care and need for follow up. Discussed anticipatory guidance and return precautions. Questions answered. I had a detailed discussion with the patient and/or guardian regarding the historical points, exam findings, and any diagnostic results supporting the discharge diagnosis.

## 2022-09-07 VITALS
DIASTOLIC BLOOD PRESSURE: 72 MMHG | SYSTOLIC BLOOD PRESSURE: 109 MMHG | TEMPERATURE: 98 F | HEART RATE: 88 BPM | RESPIRATION RATE: 18 BRPM | OXYGEN SATURATION: 99 %

## 2022-09-07 LAB
ALBUMIN SERPL ELPH-MCNC: 3.2 G/DL — LOW (ref 3.5–5)
ALP SERPL-CCNC: 99 U/L — SIGNIFICANT CHANGE UP (ref 40–120)
ALT FLD-CCNC: 17 U/L DA — SIGNIFICANT CHANGE UP (ref 10–60)
ANION GAP SERPL CALC-SCNC: 6 MMOL/L — SIGNIFICANT CHANGE UP (ref 5–17)
APAP SERPL-MCNC: <2 UG/ML — LOW (ref 10–30)
AST SERPL-CCNC: 10 U/L — SIGNIFICANT CHANGE UP (ref 10–40)
BASOPHILS # BLD AUTO: 0.04 K/UL — SIGNIFICANT CHANGE UP (ref 0–0.2)
BASOPHILS NFR BLD AUTO: 0.3 % — SIGNIFICANT CHANGE UP (ref 0–2)
BILIRUB SERPL-MCNC: 0.2 MG/DL — SIGNIFICANT CHANGE UP (ref 0.2–1.2)
BUN SERPL-MCNC: 12 MG/DL — SIGNIFICANT CHANGE UP (ref 7–18)
CALCIUM SERPL-MCNC: 8.5 MG/DL — SIGNIFICANT CHANGE UP (ref 8.4–10.5)
CHLORIDE SERPL-SCNC: 110 MMOL/L — HIGH (ref 96–108)
CO2 SERPL-SCNC: 24 MMOL/L — SIGNIFICANT CHANGE UP (ref 22–31)
CREAT SERPL-MCNC: 0.68 MG/DL — SIGNIFICANT CHANGE UP (ref 0.5–1.3)
D DIMER BLD IA.RAPID-MCNC: 273 NG/ML DDU — HIGH
EGFR: 124 ML/MIN/1.73M2 — SIGNIFICANT CHANGE UP
EOSINOPHIL # BLD AUTO: 0.12 K/UL — SIGNIFICANT CHANGE UP (ref 0–0.5)
EOSINOPHIL NFR BLD AUTO: 1 % — SIGNIFICANT CHANGE UP (ref 0–6)
ETHANOL SERPL-MCNC: <3 MG/DL — SIGNIFICANT CHANGE UP (ref 0–10)
GLUCOSE SERPL-MCNC: 90 MG/DL — SIGNIFICANT CHANGE UP (ref 70–99)
HCG SERPL-ACNC: <1 MIU/ML — SIGNIFICANT CHANGE UP
HCT VFR BLD CALC: 35.9 % — SIGNIFICANT CHANGE UP (ref 34.5–45)
HGB BLD-MCNC: 11.3 G/DL — LOW (ref 11.5–15.5)
IMM GRANULOCYTES NFR BLD AUTO: 0.3 % — SIGNIFICANT CHANGE UP (ref 0–1.5)
LYMPHOCYTES # BLD AUTO: 3.71 K/UL — HIGH (ref 1–3.3)
LYMPHOCYTES # BLD AUTO: 32.1 % — SIGNIFICANT CHANGE UP (ref 13–44)
MCHC RBC-ENTMCNC: 25.2 PG — LOW (ref 27–34)
MCHC RBC-ENTMCNC: 31.5 GM/DL — LOW (ref 32–36)
MCV RBC AUTO: 80.1 FL — SIGNIFICANT CHANGE UP (ref 80–100)
MONOCYTES # BLD AUTO: 0.71 K/UL — SIGNIFICANT CHANGE UP (ref 0–0.9)
MONOCYTES NFR BLD AUTO: 6.2 % — SIGNIFICANT CHANGE UP (ref 2–14)
NEUTROPHILS # BLD AUTO: 6.92 K/UL — SIGNIFICANT CHANGE UP (ref 1.8–7.4)
NEUTROPHILS NFR BLD AUTO: 60.1 % — SIGNIFICANT CHANGE UP (ref 43–77)
NRBC # BLD: 0 /100 WBCS — SIGNIFICANT CHANGE UP (ref 0–0)
PLATELET # BLD AUTO: 280 K/UL — SIGNIFICANT CHANGE UP (ref 150–400)
POTASSIUM SERPL-MCNC: 3.8 MMOL/L — SIGNIFICANT CHANGE UP (ref 3.5–5.3)
POTASSIUM SERPL-SCNC: 3.8 MMOL/L — SIGNIFICANT CHANGE UP (ref 3.5–5.3)
PROT SERPL-MCNC: 7.3 G/DL — SIGNIFICANT CHANGE UP (ref 6–8.3)
RBC # BLD: 4.48 M/UL — SIGNIFICANT CHANGE UP (ref 3.8–5.2)
RBC # FLD: 15.6 % — HIGH (ref 10.3–14.5)
SALICYLATES SERPL-MCNC: <1.7 MG/DL — LOW (ref 2.8–20)
SARS-COV-2 RNA SPEC QL NAA+PROBE: SIGNIFICANT CHANGE UP
SODIUM SERPL-SCNC: 140 MMOL/L — SIGNIFICANT CHANGE UP (ref 135–145)
TROPONIN I, HIGH SENSITIVITY RESULT: 4.3 NG/L — SIGNIFICANT CHANGE UP
WBC # BLD: 11.54 K/UL — HIGH (ref 3.8–10.5)
WBC # FLD AUTO: 11.54 K/UL — HIGH (ref 3.8–10.5)

## 2022-09-07 PROCEDURE — 71275 CT ANGIOGRAPHY CHEST: CPT | Mod: MA

## 2022-09-07 PROCEDURE — 71045 X-RAY EXAM CHEST 1 VIEW: CPT

## 2022-09-07 PROCEDURE — 36415 COLL VENOUS BLD VENIPUNCTURE: CPT

## 2022-09-07 PROCEDURE — 87635 SARS-COV-2 COVID-19 AMP PRB: CPT

## 2022-09-07 PROCEDURE — 90792 PSYCH DIAG EVAL W/MED SRVCS: CPT | Mod: 95

## 2022-09-07 PROCEDURE — 84702 CHORIONIC GONADOTROPIN TEST: CPT

## 2022-09-07 PROCEDURE — 93005 ELECTROCARDIOGRAM TRACING: CPT

## 2022-09-07 PROCEDURE — 99285 EMERGENCY DEPT VISIT HI MDM: CPT | Mod: 25

## 2022-09-07 PROCEDURE — 80053 COMPREHEN METABOLIC PANEL: CPT

## 2022-09-07 PROCEDURE — 80307 DRUG TEST PRSMV CHEM ANLYZR: CPT

## 2022-09-07 PROCEDURE — 85025 COMPLETE CBC W/AUTO DIFF WBC: CPT

## 2022-09-07 PROCEDURE — 85379 FIBRIN DEGRADATION QUANT: CPT

## 2022-09-07 PROCEDURE — 84484 ASSAY OF TROPONIN QUANT: CPT

## 2022-09-07 PROCEDURE — 71275 CT ANGIOGRAPHY CHEST: CPT | Mod: 26,MA

## 2022-09-07 NOTE — ED BEHAVIORAL HEALTH NOTE - BEHAVIORAL HEALTH NOTE
============     ED COURSE     ============     SOURCE: Nurse      ARRIVAL: BIBS walked in      BELONGINGS: Locked up with security      BEHAVIOR: Depression, passive SI statements. No plan or intent. "Doesn’t want to hurt self" Calm and cooperative     TREATMENT: No 1x1     VISITORS: Boyfriend

## 2022-09-07 NOTE — ED BEHAVIORAL HEALTH ASSESSMENT NOTE - DETAILS
feels she has many reasons to live and her grandfather would want her to do more with her life schizophrenia in mother, MDD in father d/w patient deferred self referred

## 2022-09-07 NOTE — ED BEHAVIORAL HEALTH ASSESSMENT NOTE - NSBHATTESTCOMMENTATTENDFT_PSY_A_CORE
This is a 26 yo woman with hx of depression, possible borderline personality disorder, recent admission at [facility], chronic poor coping and chronic SI, who presents primarily for evaluation of chest pain, but psychiatry was consulted to rule out potential SI. Patient reports she requested to speak to psych to be able to "vent," mentions some recent mood swings in the setting of loss of family member, but denies acute depressive symptoms, denies SI, and expresses hope and is future oriented. ACT team was reached and deny any acute safety concerns in the past month and plan to reach out to patient today for follow-up. Patient's boyfriend at bedside also denies any concerns around discharge.
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
## 2022-09-07 NOTE — ED BEHAVIORAL HEALTH ASSESSMENT NOTE - NSBHADMITCOORDWITH_PSY_A_CORE
I spent a total of 90 minutes reviewing past medical records, evaluating the patient, discussing treatment options with the patient, communicating with other healthcare professionals, coordinating care, and documenting in the EMR./medical staff/family/Caregiver

## 2022-09-07 NOTE — ED BEHAVIORAL HEALTH ASSESSMENT NOTE - SAFETY PLAN ADDT'L DETAILS
Safety plan discussed with.../Education provided regarding environmental safety / lethal means restriction/Provision of National Suicide Prevention Lifeline 8-401-723-KGAK (6783)

## 2022-09-07 NOTE — ED BEHAVIORAL HEALTH ASSESSMENT NOTE - RISK ASSESSMENT
Denies access to weapons Low Acute Suicide Risk Acute risk low, as she denies SI or acute mood disturbance, appears stable on exam.  Chronic risk elevated due to chronic poor coping skills/emotional regulation, past self-harm.

## 2022-09-07 NOTE — ED BEHAVIORAL HEALTH ASSESSMENT NOTE - NSSUICPROTFACT_PSY_ALL_CORE
wishes to go back to school to complete her degree/Responsibility to children, family, or others/Identifies reasons for living/Supportive social network of family or friends/Positive therapeutic relationships

## 2022-09-07 NOTE — ED ADULT NURSE REASSESSMENT NOTE - NS ED NURSE REASSESS COMMENT FT1
Constant observation discontinued at this time. Pt to be discharged. No acute distress noted, denies chest pain, no shortness of breath indicated.

## 2022-09-07 NOTE — ED ADULT NURSE REASSESSMENT NOTE - NS ED NURSE REASSESS COMMENT FT1
0720am-- Report received from LOUISE MANCILLA Pt resting in  bed, A&Ox3, constant observation maintained as per order. PCA at bedside. No acute distress noted, denies chest pain, no shortness of breath indicated. Safety precautions maintained.

## 2022-09-07 NOTE — ED BEHAVIORAL HEALTH ASSESSMENT NOTE - DESCRIPTION
Vital Signs Last 24 Hrs  T(C): 36.8 (07 Sep 2022 05:02), Max: 36.8 (07 Sep 2022 05:02)  T(F): 98.2 (07 Sep 2022 05:02), Max: 98.2 (07 Sep 2022 05:02)  HR: 83 (07 Sep 2022 05:02) (83 - 84)  BP: 115/80 (07 Sep 2022 05:02) (109/68 - 143/77)  BP(mean): --  RR: 18 (07 Sep 2022 05:02) (18 - 18)  SpO2: 100% (07 Sep 2022 05:02) (97% - 100%)    Parameters below as of 07 Sep 2022 05:02  Patient On (Oxygen Delivery Method): room air Asthma, controlled unemployed, former student, domiciled with

## 2022-09-07 NOTE — ED BEHAVIORAL HEALTH ASSESSMENT NOTE - NSACTIVEVENT_PSY_ALL_CORE
death of grandfather in July 2022/Triggering events leading to humiliation, shame, and/or despair (e.g., Loss of relationship, financial or health status) (real or anticipated)
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
## 2023-05-25 NOTE — ED ADULT NURSE NOTE - CHIEF COMPLAINT
Dr. Way updated on Mag level, AST and ALT.   Orders to keep Magnesium off until rounding provider sees patient.    The patient is a 27y Female complaining of pain, chest.

## 2023-09-14 ENCOUNTER — EMERGENCY (EMERGENCY)
Facility: HOSPITAL | Age: 28
LOS: 1 days | Discharge: ANOTHER HOSPITAL | End: 2023-09-14
Attending: EMERGENCY MEDICINE
Payer: MEDICAID

## 2023-09-14 VITALS
WEIGHT: 293 LBS | OXYGEN SATURATION: 98 % | RESPIRATION RATE: 18 BRPM | HEART RATE: 106 BPM | DIASTOLIC BLOOD PRESSURE: 68 MMHG | SYSTOLIC BLOOD PRESSURE: 113 MMHG | HEIGHT: 62 IN | TEMPERATURE: 99 F

## 2023-09-14 LAB
ALBUMIN SERPL ELPH-MCNC: 3.5 G/DL — SIGNIFICANT CHANGE UP (ref 3.5–5)
ALP SERPL-CCNC: 103 U/L — SIGNIFICANT CHANGE UP (ref 40–120)
ALT FLD-CCNC: 27 U/L DA — SIGNIFICANT CHANGE UP (ref 10–60)
ANION GAP SERPL CALC-SCNC: 8 MMOL/L — SIGNIFICANT CHANGE UP (ref 5–17)
APAP SERPL-MCNC: <2 UG/ML — LOW (ref 10–30)
AST SERPL-CCNC: 23 U/L — SIGNIFICANT CHANGE UP (ref 10–40)
BASOPHILS # BLD AUTO: 0.03 K/UL — SIGNIFICANT CHANGE UP (ref 0–0.2)
BASOPHILS NFR BLD AUTO: 0.2 % — SIGNIFICANT CHANGE UP (ref 0–2)
BILIRUB SERPL-MCNC: 0.3 MG/DL — SIGNIFICANT CHANGE UP (ref 0.2–1.2)
BUN SERPL-MCNC: 17 MG/DL — SIGNIFICANT CHANGE UP (ref 7–18)
CALCIUM SERPL-MCNC: 8.9 MG/DL — SIGNIFICANT CHANGE UP (ref 8.4–10.5)
CHLORIDE SERPL-SCNC: 108 MMOL/L — SIGNIFICANT CHANGE UP (ref 96–108)
CO2 SERPL-SCNC: 21 MMOL/L — LOW (ref 22–31)
CREAT SERPL-MCNC: 0.76 MG/DL — SIGNIFICANT CHANGE UP (ref 0.5–1.3)
EGFR: 111 ML/MIN/1.73M2 — SIGNIFICANT CHANGE UP
EOSINOPHIL # BLD AUTO: 0.04 K/UL — SIGNIFICANT CHANGE UP (ref 0–0.5)
EOSINOPHIL NFR BLD AUTO: 0.3 % — SIGNIFICANT CHANGE UP (ref 0–6)
ETHANOL SERPL-MCNC: <3 MG/DL — SIGNIFICANT CHANGE UP (ref 0–10)
GLUCOSE SERPL-MCNC: 81 MG/DL — SIGNIFICANT CHANGE UP (ref 70–99)
HCG SERPL-ACNC: <1 MIU/ML — SIGNIFICANT CHANGE UP
HCT VFR BLD CALC: 40.2 % — SIGNIFICANT CHANGE UP (ref 34.5–45)
HGB BLD-MCNC: 12.7 G/DL — SIGNIFICANT CHANGE UP (ref 11.5–15.5)
IMM GRANULOCYTES NFR BLD AUTO: 0.4 % — SIGNIFICANT CHANGE UP (ref 0–0.9)
LYMPHOCYTES # BLD AUTO: 18.2 % — SIGNIFICANT CHANGE UP (ref 13–44)
LYMPHOCYTES # BLD AUTO: 2.33 K/UL — SIGNIFICANT CHANGE UP (ref 1–3.3)
MCHC RBC-ENTMCNC: 25.5 PG — LOW (ref 27–34)
MCHC RBC-ENTMCNC: 31.6 GM/DL — LOW (ref 32–36)
MCV RBC AUTO: 80.7 FL — SIGNIFICANT CHANGE UP (ref 80–100)
MONOCYTES # BLD AUTO: 0.63 K/UL — SIGNIFICANT CHANGE UP (ref 0–0.9)
MONOCYTES NFR BLD AUTO: 4.9 % — SIGNIFICANT CHANGE UP (ref 2–14)
NEUTROPHILS # BLD AUTO: 9.72 K/UL — HIGH (ref 1.8–7.4)
NEUTROPHILS NFR BLD AUTO: 76 % — SIGNIFICANT CHANGE UP (ref 43–77)
NRBC # BLD: 0 /100 WBCS — SIGNIFICANT CHANGE UP (ref 0–0)
PCP SPEC-MCNC: SIGNIFICANT CHANGE UP
PLATELET # BLD AUTO: 273 K/UL — SIGNIFICANT CHANGE UP (ref 150–400)
POTASSIUM SERPL-MCNC: 4.3 MMOL/L — SIGNIFICANT CHANGE UP (ref 3.5–5.3)
POTASSIUM SERPL-SCNC: 4.3 MMOL/L — SIGNIFICANT CHANGE UP (ref 3.5–5.3)
PROT SERPL-MCNC: 7.8 G/DL — SIGNIFICANT CHANGE UP (ref 6–8.3)
RBC # BLD: 4.98 M/UL — SIGNIFICANT CHANGE UP (ref 3.8–5.2)
RBC # FLD: 15.9 % — HIGH (ref 10.3–14.5)
SALICYLATES SERPL-MCNC: <1.7 MG/DL — LOW (ref 2.8–20)
SARS-COV-2 RNA SPEC QL NAA+PROBE: SIGNIFICANT CHANGE UP
SODIUM SERPL-SCNC: 137 MMOL/L — SIGNIFICANT CHANGE UP (ref 135–145)
WBC # BLD: 12.8 K/UL — HIGH (ref 3.8–10.5)
WBC # FLD AUTO: 12.8 K/UL — HIGH (ref 3.8–10.5)

## 2023-09-14 PROCEDURE — 80307 DRUG TEST PRSMV CHEM ANLYZR: CPT

## 2023-09-14 PROCEDURE — 36415 COLL VENOUS BLD VENIPUNCTURE: CPT

## 2023-09-14 PROCEDURE — 93005 ELECTROCARDIOGRAM TRACING: CPT

## 2023-09-14 PROCEDURE — 82550 ASSAY OF CK (CPK): CPT

## 2023-09-14 PROCEDURE — 93010 ELECTROCARDIOGRAM REPORT: CPT

## 2023-09-14 PROCEDURE — 80053 COMPREHEN METABOLIC PANEL: CPT

## 2023-09-14 PROCEDURE — 84702 CHORIONIC GONADOTROPIN TEST: CPT

## 2023-09-14 PROCEDURE — 85025 COMPLETE CBC W/AUTO DIFF WBC: CPT

## 2023-09-14 PROCEDURE — 99285 EMERGENCY DEPT VISIT HI MDM: CPT | Mod: 25

## 2023-09-14 PROCEDURE — 99285 EMERGENCY DEPT VISIT HI MDM: CPT

## 2023-09-14 PROCEDURE — 87635 SARS-COV-2 COVID-19 AMP PRB: CPT

## 2023-09-14 RX ORDER — IBUPROFEN 200 MG
600 TABLET ORAL ONCE
Refills: 0 | Status: COMPLETED | OUTPATIENT
Start: 2023-09-14 | End: 2023-09-14

## 2023-09-14 RX ADMIN — Medication 600 MILLIGRAM(S): at 19:09

## 2023-09-14 RX ADMIN — Medication 600 MILLIGRAM(S): at 18:39

## 2023-09-14 NOTE — ED BEHAVIORAL HEALTH ASSESSMENT NOTE - CURRENT MEDICATION
Remeron 30mg qHS  Albuterol prn  Singulair prn Remeron 30mg qHS  Albuterol prn  Singulair prn    Previous documentation shows Xanax and other medication but patient only reports above medication when asked multiple times

## 2023-09-14 NOTE — ED BEHAVIORAL HEALTH ASSESSMENT NOTE - DESCRIPTION
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

## 2023-09-14 NOTE — ED ADULT NURSE NOTE - OBJECTIVE STATEMENT
Pt reports she had a positive pregnancy test today and taking 12 pills of 30g remeron 1 hour ago in attempt to end her life. C/o vaginal bleeding today after taking pills. LMP on 8/8/23. Pt reports she had a positive pregnancy test today and taking 12 pills of 30mg remeron 1 hour ago in attempt to end her life. C/o vaginal bleeding today after taking pills. LMP on 8/8/23.

## 2023-09-14 NOTE — ED PROVIDER NOTE - CLINICAL SUMMARY MEDICAL DECISION MAKING FREE TEXT BOX
28 year old female with suicidal attempt after concerned she could be pregnant. PE as above.  labs, ecg, tox consult, psych consult, 1:1
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
## 2023-09-14 NOTE — CONSULT NOTE ADULT - SUBJECTIVE AND OBJECTIVE BOX
MEDICAL TOXICOLOGY CONSULT    HPI: 28 Y F presenting after ingestion of mirtazepine 30 mg 12 tablets at 01:30 pm. Denies any other co-ingestants, + nausea, no vomiting, denies any agitation, tremors, rigidity, hyperreflexia, emesis, per primary team. Fever to 100.4 rectally and mildly tachycardic.      MEDICATION HISTORY:      FAMILY HISTORY:          Vital Signs Last 24 Hrs  T(C): 37.4 (14 Sep 2023 14:30), Max: 37.4 (14 Sep 2023 14:30)  T(F): 99.3 (14 Sep 2023 14:30), Max: 99.3 (14 Sep 2023 14:30)  HR: 106 (14 Sep 2023 14:30) (106 - 106)  BP: 113/68 (14 Sep 2023 14:30) (113/68 - 113/68)  BP(mean): --  RR: 18 (14 Sep 2023 14:30) (18 - 18)  SpO2: 98% (14 Sep 2023 14:30) (98% - 98%)    Parameters below as of 14 Sep 2023 14:30  Patient On (Oxygen Delivery Method): room air
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
## 2023-09-14 NOTE — ED BEHAVIORAL HEALTH ASSESSMENT NOTE - ACCOMPANIED BY
LINKS immunization registry, Care Everywhere and Health Maintenance updated.  Chart reviewed for overdue Proactive [facility] health maintenance testing.  
Self

## 2023-09-14 NOTE — CONSULT NOTE ADULT - ASSESSMENT
Assessment	  Concern for mirtazepine ingestion    Toxicologic Context: Serotonin toxicity/syndrome results from excessive serotonin pathway stimulation in the brain. Common triggers include serotonin reuptake inhibitors (SSRIs), serotonin and norepinephrine reuptake inhibitors (SNRIs), monoamine oxidase inhibitors (MAOIs), lithium, cocaine, and methylenedioxymethamphetamine (MDMA) amongst many others. Serotonin toxicity can vary with a spectrum of severity. Fulminant serotonin syndrome develop within 24 hours after acute overdose and consist of a triad of: 1) Altered mental status (confusion, agitation, delirium), 2) Autonomic instability (diaphoresis, tachycardia, hyperthermia), and 3) neuromuscular excitation (clonus, ocular clonus, hypertonicity, tremors, hyperreflexia). Sustained hyperthermia can lead to death.    Monotherapy Serotonin syndrome in rare, usually requiring 2 or more agents to induce serotonins syndrome, the primary toxicologic finding for mirtazepine is seizures.      Recommendations:  Treatment:   1)  Monitor for 6 hours for symptoms, if agitated, tremulous, or seizure treat with benzodiazepines. In setting of hyperthermia, tachycardia, could give 1 mg lorazepam for prophylaxis. If resolution of symptoms, improvement in status can consider psychiatric evaluation  2) Acquire CBC, CMP, ASA, APAP, VBG, CK,   3)  Acquire CK and rectal temperature      All plans discussed with primary managing team.    Thank you for the consultation. Please reach out via Teams with any questions.  Omar Butler MD, Medical Toxicology Fellow

## 2023-09-14 NOTE — ED PROVIDER NOTE - PROGRESS NOTE DETAILS
tox consulted- recommend observation for 6 hours since the time of ingestion. pt with no complaints. medically cleared. psych consulted. voluntary admission.

## 2023-09-14 NOTE — ED ADULT NURSE NOTE - NSFALLUNIVINTERV_ED_ALL_ED
Bed/Stretcher in lowest position, wheels locked, appropriate side rails in place/Call bell, personal items and telephone in reach/Instruct patient to call for assistance before getting out of bed/chair/stretcher/Non-slip footwear applied when patient is off stretcher/[place] to call system/Physically safe environment - no spills, clutter or unnecessary equipment/Purposeful proactive rounding/Room/bathroom lighting operational, light cord in reach
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
## 2023-09-14 NOTE — ED BEHAVIORAL HEALTH ASSESSMENT NOTE - OTHER PAST PSYCHIATRIC HISTORY (INCLUDE DETAILS REGARDING ONSET, COURSE OF ILLNESS, INPATIENT/OUTPATIENT TREATMENT)
Previous Dx: SAFD, Borderline PD, MDD, PTSD     Previous Med Trials: unk     Previous IP treatment: 30+ times (many hospitals and 2 [facility]; last May 2023 at [place])     Previous SA: 9/14/23--OD on 12 tabs of Remeron 30mg; 4 previous SA (last Summer 2022 by ingesting air freshener)     Self harming: reports hitting head and biting self (in hospital and at home at times)     Current MH treatment: [facility]     Violence/Legal: denies

## 2023-09-15 VITALS
OXYGEN SATURATION: 100 % | TEMPERATURE: 98 F | SYSTOLIC BLOOD PRESSURE: 117 MMHG | DIASTOLIC BLOOD PRESSURE: 72 MMHG | HEART RATE: 76 BPM | RESPIRATION RATE: 18 BRPM

## 2023-09-18 DIAGNOSIS — F39 UNSPECIFIED MOOD [AFFECTIVE] DISORDER: ICD-10-CM

## 2023-10-16 ENCOUNTER — INPATIENT (INPATIENT)
Facility: HOSPITAL | Age: 28
LOS: 112 days | Discharge: HOME | End: 2024-02-06
Attending: PSYCHIATRY & NEUROLOGY | Admitting: PSYCHIATRY & NEUROLOGY
Payer: MEDICAID

## 2023-10-16 VITALS
TEMPERATURE: 98 F | RESPIRATION RATE: 18 BRPM | SYSTOLIC BLOOD PRESSURE: 137 MMHG | DIASTOLIC BLOOD PRESSURE: 79 MMHG | HEART RATE: 92 BPM | OXYGEN SATURATION: 100 %

## 2023-10-16 DIAGNOSIS — F25.9 SCHIZOAFFECTIVE DISORDER, UNSPECIFIED: ICD-10-CM

## 2023-10-16 LAB
ALBUMIN SERPL ELPH-MCNC: 4.5 G/DL — SIGNIFICANT CHANGE UP (ref 3.3–5)
ALBUMIN SERPL ELPH-MCNC: 4.5 G/DL — SIGNIFICANT CHANGE UP (ref 3.3–5)
ALP SERPL-CCNC: 124 U/L — HIGH (ref 40–120)
ALP SERPL-CCNC: 124 U/L — HIGH (ref 40–120)
ALT FLD-CCNC: 20 U/L — SIGNIFICANT CHANGE UP (ref 4–33)
ALT FLD-CCNC: 20 U/L — SIGNIFICANT CHANGE UP (ref 4–33)
AMPHET UR-MCNC: NEGATIVE — SIGNIFICANT CHANGE UP
AMPHET UR-MCNC: NEGATIVE — SIGNIFICANT CHANGE UP
ANION GAP SERPL CALC-SCNC: 12 MMOL/L — SIGNIFICANT CHANGE UP (ref 7–14)
ANION GAP SERPL CALC-SCNC: 12 MMOL/L — SIGNIFICANT CHANGE UP (ref 7–14)
APAP SERPL-MCNC: <10 UG/ML — LOW (ref 15–25)
APAP SERPL-MCNC: <10 UG/ML — LOW (ref 15–25)
APPEARANCE UR: ABNORMAL
APPEARANCE UR: ABNORMAL
AST SERPL-CCNC: 19 U/L — SIGNIFICANT CHANGE UP (ref 4–32)
AST SERPL-CCNC: 19 U/L — SIGNIFICANT CHANGE UP (ref 4–32)
BACTERIA # UR AUTO: ABNORMAL /HPF
BACTERIA # UR AUTO: ABNORMAL /HPF
BARBITURATES UR SCN-MCNC: NEGATIVE — SIGNIFICANT CHANGE UP
BARBITURATES UR SCN-MCNC: NEGATIVE — SIGNIFICANT CHANGE UP
BASOPHILS # BLD AUTO: 0.03 K/UL — SIGNIFICANT CHANGE UP (ref 0–0.2)
BASOPHILS # BLD AUTO: 0.03 K/UL — SIGNIFICANT CHANGE UP (ref 0–0.2)
BASOPHILS NFR BLD AUTO: 0.3 % — SIGNIFICANT CHANGE UP (ref 0–2)
BASOPHILS NFR BLD AUTO: 0.3 % — SIGNIFICANT CHANGE UP (ref 0–2)
BENZODIAZ UR-MCNC: NEGATIVE — SIGNIFICANT CHANGE UP
BENZODIAZ UR-MCNC: NEGATIVE — SIGNIFICANT CHANGE UP
BILIRUB SERPL-MCNC: <0.2 MG/DL — SIGNIFICANT CHANGE UP (ref 0.2–1.2)
BILIRUB SERPL-MCNC: <0.2 MG/DL — SIGNIFICANT CHANGE UP (ref 0.2–1.2)
BILIRUB UR-MCNC: NEGATIVE — SIGNIFICANT CHANGE UP
BILIRUB UR-MCNC: NEGATIVE — SIGNIFICANT CHANGE UP
BUN SERPL-MCNC: 15 MG/DL — SIGNIFICANT CHANGE UP (ref 7–23)
BUN SERPL-MCNC: 15 MG/DL — SIGNIFICANT CHANGE UP (ref 7–23)
CALCIUM SERPL-MCNC: 9.6 MG/DL — SIGNIFICANT CHANGE UP (ref 8.4–10.5)
CALCIUM SERPL-MCNC: 9.6 MG/DL — SIGNIFICANT CHANGE UP (ref 8.4–10.5)
CAST: 1 /LPF — SIGNIFICANT CHANGE UP (ref 0–4)
CAST: 1 /LPF — SIGNIFICANT CHANGE UP (ref 0–4)
CHLORIDE SERPL-SCNC: 103 MMOL/L — SIGNIFICANT CHANGE UP (ref 98–107)
CHLORIDE SERPL-SCNC: 103 MMOL/L — SIGNIFICANT CHANGE UP (ref 98–107)
CO2 SERPL-SCNC: 22 MMOL/L — SIGNIFICANT CHANGE UP (ref 22–31)
CO2 SERPL-SCNC: 22 MMOL/L — SIGNIFICANT CHANGE UP (ref 22–31)
COCAINE METAB.OTHER UR-MCNC: NEGATIVE — SIGNIFICANT CHANGE UP
COCAINE METAB.OTHER UR-MCNC: NEGATIVE — SIGNIFICANT CHANGE UP
COLOR SPEC: ABNORMAL
COLOR SPEC: ABNORMAL
CREAT SERPL-MCNC: 0.67 MG/DL — SIGNIFICANT CHANGE UP (ref 0.5–1.3)
CREAT SERPL-MCNC: 0.67 MG/DL — SIGNIFICANT CHANGE UP (ref 0.5–1.3)
CREATININE URINE RESULT, DAU: 154 MG/DL — SIGNIFICANT CHANGE UP
CREATININE URINE RESULT, DAU: 154 MG/DL — SIGNIFICANT CHANGE UP
DIFF PNL FLD: ABNORMAL
DIFF PNL FLD: ABNORMAL
EGFR: 124 ML/MIN/1.73M2 — SIGNIFICANT CHANGE UP
EGFR: 124 ML/MIN/1.73M2 — SIGNIFICANT CHANGE UP
EOSINOPHIL # BLD AUTO: 0.01 K/UL — SIGNIFICANT CHANGE UP (ref 0–0.5)
EOSINOPHIL # BLD AUTO: 0.01 K/UL — SIGNIFICANT CHANGE UP (ref 0–0.5)
EOSINOPHIL NFR BLD AUTO: 0.1 % — SIGNIFICANT CHANGE UP (ref 0–6)
EOSINOPHIL NFR BLD AUTO: 0.1 % — SIGNIFICANT CHANGE UP (ref 0–6)
ETHANOL SERPL-MCNC: 12 MG/DL — HIGH
ETHANOL SERPL-MCNC: 12 MG/DL — HIGH
GLUCOSE SERPL-MCNC: 82 MG/DL — SIGNIFICANT CHANGE UP (ref 70–99)
GLUCOSE SERPL-MCNC: 82 MG/DL — SIGNIFICANT CHANGE UP (ref 70–99)
GLUCOSE UR QL: NEGATIVE MG/DL — SIGNIFICANT CHANGE UP
GLUCOSE UR QL: NEGATIVE MG/DL — SIGNIFICANT CHANGE UP
HCG SERPL-ACNC: <1 MIU/ML — SIGNIFICANT CHANGE UP
HCG SERPL-ACNC: <1 MIU/ML — SIGNIFICANT CHANGE UP
HCT VFR BLD CALC: 43 % — SIGNIFICANT CHANGE UP (ref 34.5–45)
HCT VFR BLD CALC: 43 % — SIGNIFICANT CHANGE UP (ref 34.5–45)
HGB BLD-MCNC: 13.3 G/DL — SIGNIFICANT CHANGE UP (ref 11.5–15.5)
HGB BLD-MCNC: 13.3 G/DL — SIGNIFICANT CHANGE UP (ref 11.5–15.5)
IANC: 8.2 K/UL — HIGH (ref 1.8–7.4)
IANC: 8.2 K/UL — HIGH (ref 1.8–7.4)
IMM GRANULOCYTES NFR BLD AUTO: 0.3 % — SIGNIFICANT CHANGE UP (ref 0–0.9)
IMM GRANULOCYTES NFR BLD AUTO: 0.3 % — SIGNIFICANT CHANGE UP (ref 0–0.9)
KETONES UR-MCNC: NEGATIVE MG/DL — SIGNIFICANT CHANGE UP
KETONES UR-MCNC: NEGATIVE MG/DL — SIGNIFICANT CHANGE UP
LEUKOCYTE ESTERASE UR-ACNC: ABNORMAL
LEUKOCYTE ESTERASE UR-ACNC: ABNORMAL
LYMPHOCYTES # BLD AUTO: 2.88 K/UL — SIGNIFICANT CHANGE UP (ref 1–3.3)
LYMPHOCYTES # BLD AUTO: 2.88 K/UL — SIGNIFICANT CHANGE UP (ref 1–3.3)
LYMPHOCYTES # BLD AUTO: 24.6 % — SIGNIFICANT CHANGE UP (ref 13–44)
LYMPHOCYTES # BLD AUTO: 24.6 % — SIGNIFICANT CHANGE UP (ref 13–44)
MCHC RBC-ENTMCNC: 25.3 PG — LOW (ref 27–34)
MCHC RBC-ENTMCNC: 25.3 PG — LOW (ref 27–34)
MCHC RBC-ENTMCNC: 30.9 GM/DL — LOW (ref 32–36)
MCHC RBC-ENTMCNC: 30.9 GM/DL — LOW (ref 32–36)
MCV RBC AUTO: 81.7 FL — SIGNIFICANT CHANGE UP (ref 80–100)
MCV RBC AUTO: 81.7 FL — SIGNIFICANT CHANGE UP (ref 80–100)
METHADONE UR-MCNC: NEGATIVE — SIGNIFICANT CHANGE UP
METHADONE UR-MCNC: NEGATIVE — SIGNIFICANT CHANGE UP
MONOCYTES # BLD AUTO: 0.56 K/UL — SIGNIFICANT CHANGE UP (ref 0–0.9)
MONOCYTES # BLD AUTO: 0.56 K/UL — SIGNIFICANT CHANGE UP (ref 0–0.9)
MONOCYTES NFR BLD AUTO: 4.8 % — SIGNIFICANT CHANGE UP (ref 2–14)
MONOCYTES NFR BLD AUTO: 4.8 % — SIGNIFICANT CHANGE UP (ref 2–14)
NEUTROPHILS # BLD AUTO: 8.2 K/UL — HIGH (ref 1.8–7.4)
NEUTROPHILS # BLD AUTO: 8.2 K/UL — HIGH (ref 1.8–7.4)
NEUTROPHILS NFR BLD AUTO: 69.9 % — SIGNIFICANT CHANGE UP (ref 43–77)
NEUTROPHILS NFR BLD AUTO: 69.9 % — SIGNIFICANT CHANGE UP (ref 43–77)
NITRITE UR-MCNC: NEGATIVE — SIGNIFICANT CHANGE UP
NITRITE UR-MCNC: NEGATIVE — SIGNIFICANT CHANGE UP
NRBC # BLD: 0 /100 WBCS — SIGNIFICANT CHANGE UP (ref 0–0)
NRBC # BLD: 0 /100 WBCS — SIGNIFICANT CHANGE UP (ref 0–0)
NRBC # FLD: 0 K/UL — SIGNIFICANT CHANGE UP (ref 0–0)
NRBC # FLD: 0 K/UL — SIGNIFICANT CHANGE UP (ref 0–0)
OPIATES UR-MCNC: NEGATIVE — SIGNIFICANT CHANGE UP
OPIATES UR-MCNC: NEGATIVE — SIGNIFICANT CHANGE UP
OXYCODONE UR-MCNC: NEGATIVE — SIGNIFICANT CHANGE UP
OXYCODONE UR-MCNC: NEGATIVE — SIGNIFICANT CHANGE UP
PCP SPEC-MCNC: SIGNIFICANT CHANGE UP
PCP SPEC-MCNC: SIGNIFICANT CHANGE UP
PCP UR-MCNC: NEGATIVE — SIGNIFICANT CHANGE UP
PCP UR-MCNC: NEGATIVE — SIGNIFICANT CHANGE UP
PH UR: 5 — SIGNIFICANT CHANGE UP (ref 5–8)
PH UR: 5 — SIGNIFICANT CHANGE UP (ref 5–8)
PLATELET # BLD AUTO: 296 K/UL — SIGNIFICANT CHANGE UP (ref 150–400)
PLATELET # BLD AUTO: 296 K/UL — SIGNIFICANT CHANGE UP (ref 150–400)
POTASSIUM SERPL-MCNC: 4.2 MMOL/L — SIGNIFICANT CHANGE UP (ref 3.5–5.3)
POTASSIUM SERPL-MCNC: 4.2 MMOL/L — SIGNIFICANT CHANGE UP (ref 3.5–5.3)
POTASSIUM SERPL-SCNC: 4.2 MMOL/L — SIGNIFICANT CHANGE UP (ref 3.5–5.3)
POTASSIUM SERPL-SCNC: 4.2 MMOL/L — SIGNIFICANT CHANGE UP (ref 3.5–5.3)
PROT SERPL-MCNC: 8.2 G/DL — SIGNIFICANT CHANGE UP (ref 6–8.3)
PROT SERPL-MCNC: 8.2 G/DL — SIGNIFICANT CHANGE UP (ref 6–8.3)
PROT UR-MCNC: 100 MG/DL
PROT UR-MCNC: 100 MG/DL
RBC # BLD: 5.26 M/UL — HIGH (ref 3.8–5.2)
RBC # BLD: 5.26 M/UL — HIGH (ref 3.8–5.2)
RBC # FLD: 15.8 % — HIGH (ref 10.3–14.5)
RBC # FLD: 15.8 % — HIGH (ref 10.3–14.5)
RBC CASTS # UR COMP ASSIST: 3203 /HPF — HIGH (ref 0–4)
RBC CASTS # UR COMP ASSIST: 3203 /HPF — HIGH (ref 0–4)
REVIEW: SIGNIFICANT CHANGE UP
REVIEW: SIGNIFICANT CHANGE UP
SALICYLATES SERPL-MCNC: <0.3 MG/DL — LOW (ref 15–30)
SALICYLATES SERPL-MCNC: <0.3 MG/DL — LOW (ref 15–30)
SARS-COV-2 RNA SPEC QL NAA+PROBE: SIGNIFICANT CHANGE UP
SARS-COV-2 RNA SPEC QL NAA+PROBE: SIGNIFICANT CHANGE UP
SODIUM SERPL-SCNC: 137 MMOL/L — SIGNIFICANT CHANGE UP (ref 135–145)
SODIUM SERPL-SCNC: 137 MMOL/L — SIGNIFICANT CHANGE UP (ref 135–145)
SP GR SPEC: 1.02 — SIGNIFICANT CHANGE UP (ref 1–1.03)
SP GR SPEC: 1.02 — SIGNIFICANT CHANGE UP (ref 1–1.03)
SQUAMOUS # UR AUTO: 8 /HPF — HIGH (ref 0–5)
SQUAMOUS # UR AUTO: 8 /HPF — HIGH (ref 0–5)
THC UR QL: NEGATIVE — SIGNIFICANT CHANGE UP
THC UR QL: NEGATIVE — SIGNIFICANT CHANGE UP
TOXICOLOGY SCREEN, DRUGS OF ABUSE, SERUM RESULT: SIGNIFICANT CHANGE UP
TOXICOLOGY SCREEN, DRUGS OF ABUSE, SERUM RESULT: SIGNIFICANT CHANGE UP
TSH SERPL-MCNC: 1.3 UIU/ML — SIGNIFICANT CHANGE UP (ref 0.27–4.2)
TSH SERPL-MCNC: 1.3 UIU/ML — SIGNIFICANT CHANGE UP (ref 0.27–4.2)
UROBILINOGEN FLD QL: 0.2 MG/DL — SIGNIFICANT CHANGE UP (ref 0.2–1)
UROBILINOGEN FLD QL: 0.2 MG/DL — SIGNIFICANT CHANGE UP (ref 0.2–1)
WBC # BLD: 11.72 K/UL — HIGH (ref 3.8–10.5)
WBC # BLD: 11.72 K/UL — HIGH (ref 3.8–10.5)
WBC # FLD AUTO: 11.72 K/UL — HIGH (ref 3.8–10.5)
WBC # FLD AUTO: 11.72 K/UL — HIGH (ref 3.8–10.5)
WBC UR QL: 102 /HPF — HIGH (ref 0–5)
WBC UR QL: 102 /HPF — HIGH (ref 0–5)

## 2023-10-16 PROCEDURE — 99285 EMERGENCY DEPT VISIT HI MDM: CPT

## 2023-10-16 RX ORDER — CEFPODOXIME PROXETIL 100 MG
100 TABLET ORAL EVERY 12 HOURS
Refills: 0 | Status: DISCONTINUED | OUTPATIENT
Start: 2023-10-16 | End: 2023-10-16

## 2023-10-16 RX ORDER — CEFPODOXIME PROXETIL 100 MG
100 TABLET ORAL EVERY 12 HOURS
Refills: 0 | Status: DISCONTINUED | OUTPATIENT
Start: 2023-10-16 | End: 2023-10-18

## 2023-10-16 RX ORDER — HALOPERIDOL DECANOATE 100 MG/ML
5 INJECTION INTRAMUSCULAR EVERY 8 HOURS
Refills: 0 | Status: DISCONTINUED | OUTPATIENT
Start: 2023-10-16 | End: 2023-11-04

## 2023-10-16 RX ORDER — MIRTAZAPINE 45 MG/1
7.5 TABLET, ORALLY DISINTEGRATING ORAL AT BEDTIME
Refills: 0 | Status: DISCONTINUED | OUTPATIENT
Start: 2023-10-16 | End: 2023-10-17

## 2023-10-16 RX ORDER — HALOPERIDOL DECANOATE 100 MG/ML
5 INJECTION INTRAMUSCULAR ONCE
Refills: 0 | Status: DISCONTINUED | OUTPATIENT
Start: 2023-10-16 | End: 2023-11-04

## 2023-10-16 RX ORDER — INFLUENZA VIRUS VACCINE 15; 15; 15; 15 UG/.5ML; UG/.5ML; UG/.5ML; UG/.5ML
0.5 SUSPENSION INTRAMUSCULAR ONCE
Refills: 0 | Status: DISCONTINUED | OUTPATIENT
Start: 2023-10-16 | End: 2024-02-06

## 2023-10-16 RX ORDER — LITHIUM CARBONATE 300 MG/1
0 TABLET, EXTENDED RELEASE ORAL
Qty: 0 | Refills: 0 | DISCHARGE

## 2023-10-16 RX ORDER — BUDESONIDE AND FORMOTEROL FUMARATE DIHYDRATE 160; 4.5 UG/1; UG/1
2 AEROSOL RESPIRATORY (INHALATION)
Refills: 0 | Status: DISCONTINUED | OUTPATIENT
Start: 2023-10-16 | End: 2024-02-06

## 2023-10-16 RX ORDER — ACETAMINOPHEN 500 MG
650 TABLET ORAL EVERY 6 HOURS
Refills: 0 | Status: DISCONTINUED | OUTPATIENT
Start: 2023-10-16 | End: 2024-02-06

## 2023-10-16 RX ORDER — HYDROXYZINE HCL 10 MG
50 TABLET ORAL EVERY 12 HOURS
Refills: 0 | Status: DISCONTINUED | OUTPATIENT
Start: 2023-10-16 | End: 2024-02-06

## 2023-10-16 RX ORDER — PANTOPRAZOLE SODIUM 20 MG/1
40 TABLET, DELAYED RELEASE ORAL
Refills: 0 | Status: DISCONTINUED | OUTPATIENT
Start: 2023-10-16 | End: 2024-02-06

## 2023-10-16 RX ORDER — ALBUTEROL 90 UG/1
2 AEROSOL, METERED ORAL EVERY 6 HOURS
Refills: 0 | Status: DISCONTINUED | OUTPATIENT
Start: 2023-10-16 | End: 2024-02-06

## 2023-10-16 RX ORDER — CLOZAPINE 150 MG/1
0 TABLET, ORALLY DISINTEGRATING ORAL
Qty: 0 | Refills: 0 | DISCHARGE

## 2023-10-16 RX ORDER — MONTELUKAST 4 MG/1
10 TABLET, CHEWABLE ORAL AT BEDTIME
Refills: 0 | Status: DISCONTINUED | OUTPATIENT
Start: 2023-10-16 | End: 2024-02-06

## 2023-10-16 RX ADMIN — Medication 2 MILLIGRAM(S): at 21:00

## 2023-10-16 RX ADMIN — Medication 100 MILLIGRAM(S): at 20:15

## 2023-10-16 NOTE — ED BEHAVIORAL HEALTH ASSESSMENT NOTE - NSPRESENTSXS_PSY_ALL_CORE
Depressed mood/Anhedonia/Psychosis/Command hallucinations to hurt self/Refusal or inability to complete safety plan Depressed mood/Anhedonia/Psychosis/Refusal or inability to complete safety plan

## 2023-10-16 NOTE — ED BEHAVIORAL HEALTH ASSESSMENT NOTE - OTHER PAST PSYCHIATRIC HISTORY (INCLUDE DETAILS REGARDING ONSET, COURSE OF ILLNESS, INPATIENT/OUTPATIENT TREATMENT)
psychiatrist- charmaine Ugarte; therapist Katelin q2 weeks   last admitted few months ago [facility]- medication non-compliance  18yo first admission; admitted about 15x total; usually for depression   dx schizoaffective disorder Multiple prior admissions starting at the age of 16yo, last at [facility] 9/19/2023-9/29/2023  In [facility]mt with [facility]  dx schizoaffective disorder  reports multiple suicide attempts, and h/o SIB by head banging
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
## 2023-10-16 NOTE — ED PROVIDER NOTE - OBJECTIVE STATEMENT
Patient is a 28-year-old female with a history of schizoaffective, multiple prior psych admissions, hx SA (OD), who presents to the ED for psychiatric evaluation.  Patient states that a few weeks ago she voluntarily checked herself into [facility] for depression but she felt better after 2 weeks so she checked herself out about 1 week ago.  2 days ago, she had left sided numbness and weakness so she went to [facility] where she was diagnosed with a TIA.  She was admitted for CT head scan and MRI which were reassuring.  She is back to baseline neurologically.  She was discharged today.  Her ACT team called her and told her that since she left [facility] AGAINST MEDICAL ADVICE she had to be psychiatrically cleared.  At this time she denies SI or HI but endorses depression and would like to be voluntarily admitted to [facility] again.  She denies hallucinations.  She denies any other complaints including abdominal pain, numbness, weakness, headache, chest pain, shortness of breath.
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
## 2023-10-16 NOTE — ED ADULT NURSE NOTE - OBJECTIVE STATEMENT
Break RN note- Patient brought in to the ED  to check herself in to [facility]. Patient reports she AMA'd from there last week ( admitted for depression) and was told by her ACT team to return. Patient reports was recently admitted for a TIA. Patient arrives with small ecchymotic areas to her arm, reports it is from her previous admission. Patient reports she only told the triage nurse she had abdominal pain because she has her period and does not currently have pain. Patient calm and cooperative. Denies SI/HI/AH/VH. Belongings secured.

## 2023-10-16 NOTE — ED ADULT TRIAGE NOTE - CHIEF COMPLAINT QUOTE
Pt c/o mid upper abd painx2 day associated with nausea. Denies vomiting or diarrhea. Currently menstruating. Hx of schizoaffective disorder, bipolar disorder, asthma. Calm and cooperative in triage. Denies SI/HI, visual or auditory hallucinations.

## 2023-10-16 NOTE — ED BEHAVIORAL HEALTH ASSESSMENT NOTE - NSACTIVEVENT_PSY_ALL_CORE
Triggering events leading to humiliation, shame, and/or despair (e.g., Loss of relationship, financial or health status) (real or anticipated) Perceived burden on family or others

## 2023-10-16 NOTE — ED BEHAVIORAL HEALTH NOTE - BEHAVIORAL HEALTH NOTE
COVID Exposure Screen- Patient    Have you been tested for COVID-19 in the last 90 days?  ( x ) Yes  (  ) No   (  ) Unknown- Reason: _____  IF YES: Date of test(s), type of test(s), result(s) for ALL tests in last 90 days: __negative- within [facility] 2 [facility]__    In the past 10 days, have you been around anyone with a positive COVID-19 test? (  ) Yes  (  x) No   (  ) Unknown- Reason: ____  IF YES: Were you closer than 6 feet of them for a total of 15 minutes or more in a 24 hour period? (  ) Yes   (  ) No   ( ) Unknown- Reason: _____         COVID Exposure Screen- collateral (i.e. third-party, chart review, belongings, etc; include EMS and ED staff)    Has the patient been tested for COVID-19 in the last 90 days?  (  ) Yes   (  ) No   (  ) Unknown- Reason: _____  IF YES: Date of test(s), type of test(s), result(s) for ALL tests in last 90 days: ________    In the past 10 days, has the patient been around anyone with a positive COVID-19 test? (  ) Yes   (  ) No   (  ) Unknown- Reason: ____  IF YES: Was the patient closer than 6 feet of them for a total of 15 minutes or more in a 24 hour period? (  ) Yes   (  ) No   (  ) Unknown- Reason: _____

## 2023-10-16 NOTE — ED BEHAVIORAL HEALTH ASSESSMENT NOTE - MEDICAL ISSUES AND PLAN (INCLUDE STANDING AND PRN MEDICATION)
Protonix 40mg daily; Albuterol PRN; Symbacort 2puffs bid; Singulair 10mg daily Protonix 40mg daily; Albuterol PRN; Symbacort 2puffs bid; Singulair 10mg daily; Cefpodixime 100mg bid for 7 days

## 2023-10-16 NOTE — ED PROVIDER NOTE - ATTENDING CONTRIBUTION TO CARE
DR. BLOCH, ATTENDING MD-  I performed a face to face bedside interview with patient regarding history of present illness, review of symptoms and past medical history. I completed an independent physical exam.  I have discussed patient's plan of care with the fellow.  Obese female no acute distress HEENT normal pupils equal round and reactive to light, heart sounds normal lungs clear, abdomen soft nontender extremities no edema some bruising on left arm from recent IV at [facility].  Neurological exam unremarkable motor 5/5 sensation intact no past-pointing.  Patient's affect is odd, claiming depression but refusing to discuss it in front of for friend or grandmother.

## 2023-10-16 NOTE — ED BEHAVIORAL HEALTH ASSESSMENT NOTE - RISK ASSESSMENT
Pt actively suicidal, has no future-oriented thinking, CAH to harm herself, hx of multiple attempts (of low lethality), on clozapine and lithium and with [facility], just moved into psychiatric residence, can't safety plan Risk factors: +current SI, h/o SA/SIB, h/o psych admissions, chronic pain, recent loss (1 yr ago), noncompliant with treatment,     Protective factors: no access to weapons, no active substance abuse, domiciled, social supports, help-seeking behaviors    Overall, pt is a moderate/high risk of harm to self and requires psychiatric admission for safety and stabilization.

## 2023-10-16 NOTE — ED BEHAVIORAL HEALTH ASSESSMENT NOTE - PSYCHIATRIC ISSUES AND PLAN (INCLUDE STANDING AND PRN MEDICATION)
Remeron 7.5mg hs; Abilify Maintenna 400mg IM due 10/19/2023. Remeron 7.5mg hs; Abilify Maintenna 400mg IM due 10/19/2023.; Haldol 5mg po/im prn; Ativan 2mg po/im prn

## 2023-10-16 NOTE — ED BEHAVIORAL HEALTH ASSESSMENT NOTE - NSSUICRSKFACTOR_PSY_ALL_CORE
Current and Past Psychiatric Diagnoses/Presenting Symptoms/Historical Factors/Treatment Related Factors/Activating Events/Stressors Current and Past Psychiatric Diagnoses/Presenting Symptoms/Historical Factors/Activating Events/Stressors

## 2023-10-16 NOTE — ED ADULT NURSE NOTE - NSFALLUNIVINTERV_ED_ALL_ED
Bed/Stretcher in lowest position, wheels locked, appropriate side rails in place/Call bell, personal items and telephone in reach/Instruct patient to call for assistance before getting out of bed/chair/stretcher/Non-slip footwear applied when patient is off stretcher/[place] to call system/Physically safe environment - no spills, clutter or unnecessary equipment/Purposeful proactive rounding/Room/bathroom lighting operational, light cord in reach

## 2023-10-16 NOTE — ED PROVIDER NOTE - NS ED ROS FT
GENERAL: No fever, chills  EYES: no vision changes, no discharge.   ENT: no difficulty swallowing or speaking   CARDIAC: no chest pain/pressure, SOB, lower extremity swelling  PULMONARY: no cough, SOB  GI: no abdominal pain, n/v/d  : no dysuria  SKIN: no rashes  NEURO: no headache, lightheadedness, paresthesia  MSK: No joint pain, myalgia, weakness.    psych: +depression

## 2023-10-16 NOTE — ED PROVIDER NOTE - PHYSICAL EXAMINATION
Marei Ramos MD  GENERAL: Patient awake alert NAD.  HEENT: NC/AT, Moist mucous membranes, EOMI.  LUNGS: CTAB, no wheezes or crackles.   CARDIAC: RRR, no m/r/g.    ABDOMEN: Soft, NT, ND, No rebound, guarding. No CVA tenderness.   EXT: No edema. No calf tenderness.  MSK: No pain with movement, no deformities.  NEURO: A&Ox3. Moving all extremities.  SKIN: Warm and dry. No rash.  PSYCH: Normal affect.

## 2023-10-16 NOTE — ED BEHAVIORAL HEALTH ASSESSMENT NOTE - PRIOR MEDICATION SIDE EFFECTS OR ADVERSE REACTIONS
Patient:   FLORES DC            MRN: [id number]            FIN: [id number]               Age:   57 years     Sex:  MALE     :  64   Associated Diagnoses:   None   Author:   Anita Aguilar      Hospitalist Discharge Summary    DISCHARGE DIAGNOSIS:   Acute decompensated heart failure  Non-ischemic cardiomyopathy  Flash pulmonary edema  Hypertensive emergency  Uncontrolled HTN  Acute renal failure  Chronic kidney disease stage III  Sepsis, POA  Pneumonia, HCAP/viral   BL renal artery stenosis  Anemia and thrombocytopenia  HIV infection  Hypotonic hyponatremia  Chronic respiratory failure  GERD      HOSPITAL COURSE:   Presented with shortness of breath, in flash pulmonary edema and hypertensive urgency. Aggressively diuresed with help of cardiology and nephrology. Antihypertensives adjusted with addition of lisinopril. Subsequently developed an LESA on CKD, diuretics discontinued, with permissive BPs 140/90 for renal perfusion.  Underwent renal US which showed BL renal artery stenosis, confirmed with MRA and angiography. No surgical intervention recommended at this time by vascular surgery due to chronic nature of kidney disease and risk of surgery. Discharged back to [facility] with close outpatient f/u.     Vitals:   Vitals between:   2018 15:09:13   TO   2018 15:09:13                   LAST RESULT MINIMUM MAXIMUM  Temperature 36.2 36.2 37.1  Heart Rate 85 77 87  Respiratory Rate 18 18 18  NISBP           149 149 160  NIDBP           91 90 98  NIMBP           110 110 117  SpO2                    100 97 100    General:  Alert and oriented, No acute distress, Sitting comfortably in chair.    Neck:  No jugular venous distention.    Respiratory:  Lungs are clear to auscultation, Respirations are non-labored, Breath sounds are equal.    Cardiovascular:  Normal rate, Regular rhythm, No murmur, No gallop, Good pulses equal in all extremities, Normal peripheral perfusion.    Gastrointestinal:  Soft,  Non-tender, Non-distended, No abd bruit.    Musculoskeletal:  Normal range of motion.    Integumentary:  Warm, Dry, Pink, No rash.      Labs:   Labs between:  04-JUL-2018 15:09 to 05-JUL-2018 15:09    CBC:                 WBC  HgB  Hct  Plt  MCV  RDW   05-JUL-2018 4.9  (L) 8.6  (L) 24.9  434  98.8  14.4     DIFF:                 Seg  Neutroph//ABS  Lymph//ABS  Mono//ABS  EOS/ABS   05-JUL-2018 NOT APPLICABLE  66 // 3.2 23 // 1.1 8 // 0.4 2 // 0.1    BMP:                 Na  Cl  BUN  Glu   05-JUL-2018 138  106  (H) 22  88                              K  CO2  Cr  Ca                              3.8  24  (H) 1.22  (L) 7.8     Other Chem:             Mg  Phos  Triglycerides  GGTP  DirectBili                           2.1             POC GLU:                 Latest Result  Latest Date  Minimum  Min Date  Maximum  Max Date                             (H) 142  05-JUL-2018 (H) 142  05-JUL-2018 (H) 105  04-JUL-2018                   Radiology results:     XR CHEST PORTABLE 1V  June 22, 2018 09:57   IMPRESSION: Extensive bilateral symmetric alveolar infiltrates.  Pneumonitis most likely, etiology uncertain.  Limited exam.    XR CHEST 1V  June 23, 2018 08:08   IMPRESSION: Portable semisupine AP view of the chest at 0713 hours demonstrates definite improved aeration although there is persistent severe diffuse airspace disease throughout the lungs; the size of the cardiac silhouette is within normal limits and the remainder is unchanged.    VASC EXT LOWR VENOUS DPLX ALYSE  June 22, 2018 15:46   IMPRESSION:  Normal bilateral lower extremity venous duplex.    VASC RENAL DPLX ALYSE  June 24, 2018 09:30   IMPRESSION:  The peak systolic velocities are modestly increased bilaterally and are suspicious for mild to moderate degree of stenosis in the renal arteries bilaterally at the origin or in the proximal segment. The oral flow within the left renal parenchymal is decreased significantly. I would recommend further evaluation with MRA.      CD STRESS TEST NUC PHARM ADULT  June 25, 2018 15:02   IMPRESSION:  1.  Unremarkable Lexiscan stress test  2.  Radionuclide to be reported separately    NM MYOCARDIAL REST/STRESS SPECT  June 25, 2018 16:56   IMPRESSION: Normal myocardial perfusion study. Attenuation artifact was seen along the inferior wall.    MRA ABDOMEN WO/W CON  June 30, 2018 21:38   IMPRESSION: Both renal arteries are not visualized beyond their origins.  The findings suggest bilateral contusions.  There is mild renal atrophy.     Echocardiogram Results  June 22  STUDY CONCLUSIONS SUMMARY:1. Procedure narrative: Transthoracic echocardiography was performed.   Image quality was fair. The study was technically limited due to   restricted patient mobility.2. Left ventricle: The cavity size is at the upper limits of normal. Wall   thickness is normal. Systolic function is severely reduced. The   estimated ejection fraction is 25-30%, by visual assessment. Although   no diagnostic regional wall motion abnormality is identified, this   possibility cannot be completely excluded on the basis of this study.   There is no evidence of elevated ventricular filling pressure by   Doppler parameters.3. Aortic valve: Unable to determine morphology; mildly thickened   leaflets.4. Mitral valve: The leaflets are mildly thickened. Mild to moderate   regurgitation directed centrally, posteriorly, and toward the free   wall.5. Right ventricle: Systolic function is normal.6. Tricuspid valve: Mild regurgitation.7. Pericardium, extracardiac: A pericardial effusion versus left pleural   effusion is identified posterior to the heart. The fluid has no   internal echoes.    PROCEDURE HISTORY:  No qualifying data available.    Pending results: _      DISCHARGE MEDICATION LIST   Allergies: No known allergies     MEDICATION  DOSE  ROUTE  FREQUENCY  SPECIAL INSTRUCTIONS   abacavir-lamivudine (abacavir-lamivudine oral 600-300 mg tablet (Epzicom).)  1 tab  Oral  Daily      albuterol-ipratropium (albuterol-ipratropium inhalation 2.5-0.5 mg/3 mL solution (DuoNeb).)  3 mL  Nebulizer  Every 4 hours As Needed: as needed for shortness of breath or wheezing     aspirin (aspirin oral 81 mg chewable tablet)  81 mg=1 tab  Chewed  Daily     atazanavir (atazanavir oral 300 mg capsule)  300 mg=1 cap  Oral  Daily     atovaquone (Mepron oral 750 mg/5 mL suspension)  1,500 mg=10 mL  Oral  Daily, with morning meal     azithromycin (azithromycin oral 600 mg tablet)  1,200 mg=2 tab  Oral  Every Saturday     carvedilol (carvedilol oral 12.5 mg tablet)    Oral  Every 12 hours     efavirenz (efavirenz oral 600 mg tablet)  600 mg=1 tab  Oral  Daily     fluticasone nasal (Flonase nasal 50 mcg/spray)  50 mcg=1 spray  Each Nostril  Daily     heparin (heparin 5000 units/mL injectable solution)  5,000 unit  Subcutaneous  Every 12 hours     hydrALAZINE (hydrALAZINE oral 100 mg tablet (Apresoline))  100 mg=1 tab  Oral  Every 8 hours     insulin aspart (NovoLOG (insulin aspart))  sliding scale  Subcutaneous  Four times daily, before meals and at bedtime  - 150-199, 1 [id number], 2 [id number], 3 [id number], 4 ejit770-253, 5 unit>400, 6 unit and call MD   isosorbide dinitrate (isosorbide dinitrate oral 20 mg tablet)  40 mg=2 tab  Oral  Three times daily     pravastatin (pravastatin oral 20 mg tablet (Pravachol))  20 mg=1 tab  Oral  Nightly at bedtime     raNITIdine (raNITIdine 150 mg oral tablet)  150 mg=1 tab  Oral  Twice daily     ritonavir (ritonavir oral 100 mg tablet)  100 mg=1 tab  Oral  Daily       MEDICATION CHANGES  carvedilol 25 mg, Oral, Q12H changed to carvedilol 12.5 mg, Oral, Q12H  discontinue Lasix oral 20 mg tablet 20 mg = 1 tab, Oral, Daily  discontinue potassium CHLORIDE oral 20 mEq ER tablet (K-Dur) 20 mEq = 1 tab, Oral, Dailly  discontinue predniSONE oral 20 mg tablet See Instructions  discontinue predniSONE oral 20 mg tablet 20 mg = 1 tab, Oral, Daily  discontinue Zosyn 3 g-0.375 g/50 mL  intravenous solution 3.375 gm, IV, Q12H   start aspirin 81 mg qd  start pravastatin 20 mg QHS    Primary Care Physician      Physician Name:  VIRGILIO CONNORS  Specialty :  GERIATRIC MEDICINE    Consulting Physicians     Physician Name:  JASBIR CLEVELAND Speciality:  NEPHROLOGY Consult Reason:  LESA on CKD, HTN urgency, renal artery stenosis   Physician Name:  MITCH GAFFNEY Speciality:  CARDIOLOGY Consult Reason:  HFrEF, HIV   Physician Name:  GARRET SINGH Speciality:  INFECTIOUS DISEASE Consult Reason:  HIV, pneumonia vs CHF     Follow-up instructions:  CHF clinic   PCP Dr. Mackey within 1 week  Nephrology Dr. Fisher or Dr. Appiah 1-2 weeks  Infectious Disease Dr. Sexton 1-2 weeks  Cardiology Dr. dye 2-3 weeks    I spent 45 minutes completing this patient's discharge.                  Electronically Signed On 07/05/2018 16:25  __________________________________________________   Anita Aguilar      Electronically Signed On 07/16/2018 09:37  __________________________________________________   PADMINI FOWLER     None known

## 2023-10-16 NOTE — ED BEHAVIORAL HEALTH ASSESSMENT NOTE - CURRENT MEDICATION
prozac 40 mg daily, lithium 600 mg tid , metoprolol 12.5 mg daily, omeprazole 20 mg daily, clozapine 200 mg qhs, synthroid 25mcg daily, colace 300 mg qhs, and abilify injection 662 mg (they had no record of when it was last given). Ability Maintenna 400mg IM q 4 weeks due 10/19/2023  Remeron 15mg hs- pt noncompliant

## 2023-10-16 NOTE — ED PROVIDER NOTE - CLINICAL SUMMARY MEDICAL DECISION MAKING FREE TEXT BOX
Gorgens - Patient is a 28-year-old female with a history of schizoaffective, multiple prior psych admissions, hx SA (OD), who presents to the ED for psychiatric evaluation. will have psych eval. no concern for any acute medical conditions at this time.

## 2023-10-16 NOTE — ED BEHAVIORAL HEALTH ASSESSMENT NOTE - DESCRIPTION
Calm in ED. Does not appear internally preoccupied but does look dysphoric and constricted. Spoke with Katy Pfeiffer who reported she has been there since 3/11/21. One issues is that she is supposed to take lithium 600 mg tid but she always misses the afternoon dose because she usually spends the whole day with her grandparents. They say she is on prozac 40 mg daily, lithium 600 mg tid , metoprolol 12.5 mg daily, omeprazole 20 mg daily, clozapine 200 mg qhs, synthroid 25mcg daily, colace 300 mg qhs, and abilify injection 662 mg (they had no record of when it was last given). asthma- controlled, HTN, hypothyroidism, GERD, Constipation Just moved to [facility] in March, living with grandparents for years, dad  when pt was a parents, mother estranged, h/o associates degree, unemployed Calm and cooperative in the ED  Vital Signs Last 24 Hrs  T(C): 36.4 (16 Oct 2023 15:37), Max: 36.4 (16 Oct 2023 15:37)  T(F): 97.6 (16 Oct 2023 15:37), Max: 97.6 (16 Oct 2023 15:37)  HR: 92 (16 Oct 2023 15:37) (92 - 92)  BP: 137/79 (16 Oct 2023 15:37) (137/79 - 137/79)  BP(mean): --  RR: 18 (16 Oct 2023 15:37) (18 - 18)  SpO2: 100% (16 Oct 2023 15:37) (100% - 100%)    Parameters below as of 16 Oct 2023 15:37  Patient On (Oxygen Delivery Method): room air resides with her grandmother; unemployed

## 2023-10-16 NOTE — ED BEHAVIORAL HEALTH NOTE - BEHAVIORAL HEALTH NOTE
As per provider, worker called patient’s grandmother Aparna ([phone]) for collateral information. Worker called Aparna and phone continuously rang. No vm.    Worker then called the bridge ([phone]) and automotive recording states program is closed. Worker left a message for call back. Worker then called [phone] and spoke to Lacie Au  of the bridge- act team:     Lacie states that the patient was last admitted at [facility] 9/18-9/29 For inpatient psychiatry. She states that at that time the patient was citing that she was depressed and thought she was pregnant and then was overwhelmed about her grandmother finding out. Patient is not pregnant. She states that the patient at that time stated that she was having suicidal ideations from this. She states that the patient was admitted to [facility] prior to that admission from 5/26-6/15 and (was transferred from [facility]). She states that at that time the patient was endorsing SI. She states that the patient has her injection of Abilify 400 mg every four weeks and is due for this on 10/19. She states that the patient is also prescribed Remeron 15 mg once in the night. She states that the patient saw her psychiatrist Dr. Louis from the act team on 10/3 and was discontinued on her Lithium at that time. She denies that the patient has actual SA but states that the patient tends to spray air freshener in her mouth and has stated that she took more medication than she is prescribed. She states that the patient has never been in the ICU while on their act team. Patient has been on their act team since April 2021. She states that the patient is supposed to see the act team 6 times a month. She states that the patient last seen her psychiatrist on 10/3 and at that time patient was stable and was not in acute danger. She states that the act team tried to see her on 10/10 but pt’s grandmother reported that the patient was not feeling well. Patient recently went to [facility], and it was recommended that the patient have surgery form edema – lower extremity. Patient reported past SA of ingesting half a bottle of air freshener and injected 6 pills of lithium in 2020. Lacie denies that the act team sent the patient to the ed. She states that the patient usually self presents to the ed. As per provider, worker called patient’s grandmother Aparna ([phone]) for collateral information. Worker called Aparna and phone continuously rang. No vm.    Worker then called the bridge ([phone]) and automotive recording states program is closed. Worker left a message for call back. Worker then called [phone] and spoke to Lacie Au  of the bridge- act team:     Lacie states that the patient was last admitted at [facility] 9/18-9/29 For inpatient psychiatry. She states that at that time the patient was citing that she was depressed and thought she was pregnant and then was overwhelmed about her grandmother finding out. Patient is not pregnant. She states that the patient at that time stated that she was having suicidal ideations from this. She states that the patient was admitted to [facility] prior to that admission from 5/26-6/15 and (was transferred from [facility]). She states that at that time the patient was endorsing SI. She states that the patient has her injection of Abilify 400 mg every four weeks and is due for this on 10/19. She states that the patient is also prescribed Remeron 15 mg once in the night. She states that the patient saw her psychiatrist Dr. Louis from the act team on 10/3 and was discontinued on her Lithium at that time. She denies that the patient has actual SA but states that the patient tends to spray air freshener in her mouth and has stated that she took more medication than she is prescribed. She states that the patient has never been in the ICU while on their act team. Patient has been on their act team since April 2021. She states that the patient is supposed to see the act team 6 times a month. She states that the patient last seen her psychiatrist on 10/3 and at that time patient was stable and was not in acute danger. She states that the act team tried to see her on 10/10 but pt’s grandmother reported that the patient was not feeling well. Patient recently went to [facility], and it was recommended that the patient have surgery form edema – lower extremity. Patient reported past SA of ingesting half a bottle of air freshener and injected 6 pills of lithium in 2020. Lacie denies that the act team sent the patient to the ed. She states that the patient usually self presents to the ed.    worker called  [facility] to inquire about admission. worker then called the bridge [phone] and spoke to Lacie and informed of patient admission to .

## 2023-10-16 NOTE — BH PATIENT PROFILE - NSVRISKLVLACTUAL_PSY_ALL_CORE
SW attempted to reach pt by phone today to follow-up on MD referral. Pt was seen by SW in the past, but decided to stop appointments until delivery. Pt decided this, as she was unable to get transportation twice a week. There was no answer when phone call was made. SW left a message and requested return call. Awaiting response. If pt does not call back by 05/09, ZAINAB will reach out again.        Minimal (Score 0-3)

## 2023-10-16 NOTE — ED PROVIDER NOTE - PROGRESS NOTE DETAILS
DILMA Reyes- pt reports she was talking to her ACT team today and after her insurance company was calling her, about her living soh. Pt reports she left the facility on th 72 hr note. Reprots at times feels like hurting herself, but would not do it because she lives and loves her grandmother.   Labs, psych - recommendation inpatient tx. Pt denies any abd pain, cramping , n and vomiting, ecchymosis area to left forearm. DILMA Reyes- pt currently on her period, ua with elevated leuk, bacteria, culture sent, and cefpodoxime 100 mg bid x 7 days.

## 2023-10-16 NOTE — ED BEHAVIORAL HEALTH ASSESSMENT NOTE - NSCURPASTPSYDX_PSY_ALL_CORE
Mood disorder/Psychotic disorder Mood disorder/Psychotic disorder/Cluster B Personality disorder/traits

## 2023-10-16 NOTE — ED PROVIDER NOTE - CARE PLAN
1 Principal Discharge DX:	Schizoaffective disorder  Secondary Diagnosis:	UTI (urinary tract infection)

## 2023-10-17 PROCEDURE — 90853 GROUP PSYCHOTHERAPY: CPT

## 2023-10-17 RX ORDER — DIPHENHYDRAMINE HCL 50 MG
50 CAPSULE ORAL EVERY 6 HOURS
Refills: 0 | Status: DISCONTINUED | OUTPATIENT
Start: 2023-10-17 | End: 2023-11-04

## 2023-10-17 RX ORDER — DIPHENHYDRAMINE HCL 50 MG
50 CAPSULE ORAL ONCE
Refills: 0 | Status: DISCONTINUED | OUTPATIENT
Start: 2023-10-17 | End: 2023-11-04

## 2023-10-17 RX ADMIN — MONTELUKAST 10 MILLIGRAM(S): 4 TABLET, CHEWABLE ORAL at 21:48

## 2023-10-17 RX ADMIN — Medication 2 MILLIGRAM(S): at 14:01

## 2023-10-17 RX ADMIN — BUDESONIDE AND FORMOTEROL FUMARATE DIHYDRATE 2 PUFF(S): 160; 4.5 AEROSOL RESPIRATORY (INHALATION) at 21:49

## 2023-10-17 RX ADMIN — Medication 100 MILLIGRAM(S): at 21:48

## 2023-10-17 RX ADMIN — PANTOPRAZOLE SODIUM 40 MILLIGRAM(S): 20 TABLET, DELAYED RELEASE ORAL at 07:10

## 2023-10-17 RX ADMIN — BUDESONIDE AND FORMOTEROL FUMARATE DIHYDRATE 2 PUFF(S): 160; 4.5 AEROSOL RESPIRATORY (INHALATION) at 11:04

## 2023-10-17 RX ADMIN — Medication 50 MILLIGRAM(S): at 14:01

## 2023-10-17 RX ADMIN — HALOPERIDOL DECANOATE 5 MILLIGRAM(S): 100 INJECTION INTRAMUSCULAR at 14:01

## 2023-10-17 RX ADMIN — Medication 100 MILLIGRAM(S): at 11:04
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
## 2023-10-17 NOTE — BH INPATIENT PSYCHIATRY ASSESSMENT NOTE - NSBHMETABOLIC_PSY_ALL_CORE_FT
BMI:   HbA1c:   Glucose:   BP: 137/79 (10-16-23 @ 15:37) (137/79 - 137/79)  Lipid Panel:  BMI: BMI (kg/m2): 57.8 (09-14-23 @ 14:30)  HbA1c:   Glucose:   BP: 137/79 (10-16-23 @ 15:37) (137/79 - 137/79)  Lipid Panel:

## 2023-10-17 NOTE — BH CHART NOTE - NSEVENTNOTEFT_PSY_ALL_CORE
Patient's ACT team ([facility]) contacted at [phone] in attempt to obtain collateral and recommendations regarding medications and state hospitalization. Patient's psychiatrist Dr. Louis was out of office. A voicemail was left at Dr. Louis's office telephone, and a second voicemail left at Dr. Louis's cellphone.

## 2023-10-17 NOTE — BH INPATIENT PSYCHIATRY ASSESSMENT NOTE - NSICDXPASTMEDICALHX_GEN_ALL_CORE_FT
PAST MEDICAL HISTORY:  Asthma     Depression     Schizoaffective disorder      PAST MEDICAL HISTORY:  Asthma     PTSD (post-traumatic stress disorder)     Schizoaffective disorder

## 2023-10-17 NOTE — BH INPATIENT PSYCHIATRY ASSESSMENT NOTE - NSBHCRANIAL_PSY_ALL_CORE
Smiles, shows teeth, opens mouth, sticks out tongue (V, VII, XI)/Normal speech (IX, X, XII)/Extraocular Eye Movement Intact  (III, IV, VI)/Shoulder shrug (XI)/Hearing intact (VIII)
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
## 2023-10-17 NOTE — PSYCHIATRIC REHAB INITIAL EVALUATION - NSBHALCSUBTREAT_PSY_ALL_CORE
-Multiple prior admissions starting at the age of 16yo, last at [facility] 9/19/2023-9/29/2023/[facility]
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
## 2023-10-17 NOTE — BH SOCIAL WORK INITIAL PSYCHOSOCIAL EVALUATION - NSBHHOUSECOMMENTFT_PSY_ALL_CORE
Pt resides with her grandmother and brother. Is currently requesting to go to [facility].

## 2023-10-17 NOTE — BH INPATIENT PSYCHIATRY ASSESSMENT NOTE - NSBHATTESTCOMMENTATTENDFT_PSY_A_CORE
Pt is 28F who lives with grandmother and brother, pphx of schizoaffective disorder, PTSD, admitted with worsening depression, AVH, worsening of chronic SI, in context of chronic trauma and psychosocial stressors (breakup, caretaker for grandmother with cognitive decline).  Etiology of presentation unclear, likely multifactorial, with differential including major depressive episode/schizoaffective, persistent depressive disorder, BPD, grief, adjustment.  Pt has chronic SIB of pinching self, also has chronic SI with loose plans but no real intent, that ebbs and exacerbates with emotional dysregulation.  Continue meds for now, will require collateral from ACT.

## 2023-10-17 NOTE — BH SOCIAL WORK INITIAL PSYCHOSOCIAL EVALUATION - BILL PAYMENT
Discharged from [facility].  Pt managed by cardiology  Arline Lynn, Clinical Pharmacist, CDE, CACP         no

## 2023-10-17 NOTE — BH PSYCHOLOGY - CLINICIAN PSYCHOTHERAPY NOTE - NSBHPSYCHOLNARRATIVE_PSY_A_CORE FT
Writer met with Pt for initial 30-minute individual therapy session. Pt was alert and presented with adequate hygiene and grooming. Writer discussed and reviewed limits of confidentiality with Pt. Pt reported sad mood, affect full and congruent. Pt denied experiencing any A/V hallucinations. Her thought process was linear and goal directed. Pts’ speech was WNL, content was relevant to discussion. Pt reported passive and active SI and NSSI, denied HI. See notes below. Oriented x3. Limited insight and judgement demonstrated.      Session focused on orienting pt to unit, exploring current and past symptoms as well as providing safe space for pt to discuss thoughts and feelings. Did risk assessment, with pt stating that she feels suicidal "all the time", denied plan and intend but then shared she thought about drinking soap, also shared that she engages in self injurious behaviors such as pinching skin and head banging.feels she has a separate personality, the suicidal personality, that "emerges" after she starts zoning out, becoming sleepy stating that she will not remember any suicidal or self injurious behavior because of it, and will not be aware of engaging in it. Did some grounding, and explored DBT coping skills as well as safety plan for the day. Pt discussed upbringing and goals for the future. Pt shared that she feels lonely, wants to "have someone to listen" and hopes she can start a residential program to not be alone. Validated pts feelings and provided space for pt to talk. Pt discussed past SI attempts and methods (ingesting air freshener and overdosing), as well as past hospitalizations, sharing that it was helpful to not be and feel alone. Introduced ACT concepts, did defusion exercise regarding rumination, and handed pt the ACT workbook. Writer met with Pt for initial 30-minute individual therapy session. Pt was alert and presented with adequate hygiene and grooming. Writer discussed and reviewed limits of confidentiality with Pt. Pt reported sad mood, affect full and congruent. Pt denied experiencing any A/V hallucinations. Her thought process was linear and goal directed. Pts’ speech was WNL, content was relevant to discussion. Pt reported passive and active SI and NSSI (team made aware), Pt was future oriented and denied HI. See notes below for risk assessment. Oriented x3. Limited insight and judgement demonstrated.      Session focused on orienting pt to unit, exploring current and past symptoms as well as providing safe space for pt to discuss thoughts and feelings. Did risk assessment, with pt stating that she feels suicidal "all the time", denied plan and intend but then shared she thought about drinking soap, also shared that she engages in self injurious behaviors such as pinching skin and head banging. feels she has a separate personality, the suicidal personality, that "emerges" after she starts zoning out, becoming sleepy stating that she will not remember any suicidal or self injurious behavior because of it, and will not be aware of engaging in it. Did some grounding, and explored DBT coping skills as well as safety plan for the day. Pt discussed upbringing and goals for the future. Pt shared that she feels lonely, wants to "have someone to listen" and hopes she can start a residential program to not be alone. Validated pts feelings and provided space for pt to talk. Pt discussed past SI attempts and methods (ingesting air freshener and overdosing), as well as past hospitalizations, sharing that it was helpful to not be and feel alone. Introduced ACT concepts, did defusion exercise regarding rumination, and handed pt the ACT workbook.

## 2023-10-17 NOTE — BH SOCIAL WORK INITIAL PSYCHOSOCIAL EVALUATION - TRANSPORTATION
Telephone Encounter by Camille Vanegas CMA at 01/21/17 11:21 AM     Author:  Camille Vanegas CMA Service:  (none) Author Type:  EMR TEAM MEMBER     Filed:  01/21/17 11:21 AM Encounter Date:  1/20/2017 Status:  Signed     :  Camille Vanegas CMA (Certified Medical Assistant)       From: Juan Francisco Walls  To: Nikos Becerra MD  Sent: 1/20/2017  6:34 PM CST  Subject: Medication Questions    Tried to req refill for norco but it's not on my list. Can you pls refill   norco?       Revision History        Date/Time User Provider Type Action    > 01/21/17 11:21 AM Camille Vanegas CMA Certified Medical Assistant Sign    Attribution information within the note text is not available.             no

## 2023-10-17 NOTE — BH SOCIAL WORK INITIAL PSYCHOSOCIAL EVALUATION - DETAILS
Mother - paranoid schizophrenia; father - MDD and PTSD; grandfather - alzheimer's and panic disorder.

## 2023-10-17 NOTE — BH SOCIAL WORK INITIAL PSYCHOSOCIAL EVALUATION - OTHER PAST PSYCHIATRIC HISTORY (INCLUDE DETAILS REGARDING ONSET, COURSE OF ILLNESS, INPATIENT/OUTPATIENT TREATMENT)
As per ED Behavioral Health Assessment on 10/16/23: "29yo single woman, no dependents, domiciled with her Grandmother, unemployed, medical hx of asthma, HTN, GERD, and hypothyroidism and schizoaffective disorder, in tx  with [facility], who self presented to the ED per triage with abdominal pain, but per ED Attending, pt requested to be re-admitted to [facility]. Pt was there from 9/18-9/29."

## 2023-10-17 NOTE — BH PSYCHOLOGY - CLINICIAN PSYCHOTHERAPY NOTE - NSBHPSYCHOLINT_PSY_A_CORE
Cognitive/behavioral therapy/Dialectical  Behavioral Therapy (DBT)/Dynamic issues addressed/Interpersonal Therapy (IPT)/Reality testing/Supported coping skills/Supportive therapy/Treatment compliance encouraged/other...

## 2023-10-17 NOTE — BH INPATIENT PSYCHIATRY ASSESSMENT NOTE - OTHER PAST PSYCHIATRIC HISTORY (INCLUDE DETAILS REGARDING ONSET, COURSE OF ILLNESS, INPATIENT/OUTPATIENT TREATMENT)
Multiple prior admissions starting at the age of 18yo, last at [facility] 9/19/2023-9/29/2023  In tmt with [facility]  [facility] schizoaffective disorder  reports multiple suicide attempts, and h/o SIB by head banging
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
## 2023-10-17 NOTE — BH INPATIENT PSYCHIATRY ASSESSMENT NOTE - NSBHCHARTREVIEWVS_PSY_A_CORE FT
Vital Signs Last 24 Hrs  T(C): 36.4 (10-16-23 @ 15:37), Max: 36.4 (10-16-23 @ 15:37)  T(F): 97.6 (10-16-23 @ 15:37), Max: 97.6 (10-16-23 @ 15:37)  HR: 92 (10-16-23 @ 15:37) (92 - 92)  BP: 137/79 (10-16-23 @ 15:37) (137/79 - 137/79)  BP(mean): --  RR: 18 (10-16-23 @ 15:37) (18 - 18)  SpO2: 100% (10-16-23 @ 15:37) (100% - 100%)     Vital Signs Last 24 Hrs  T(C): 37.1 (10-18-23 @ 08:09), Max: 37.1 (10-18-23 @ 08:09)  T(F): 98.7 (10-18-23 @ 08:09), Max: 98.7 (10-18-23 @ 08:09)  HR: --  BP: --  BP(mean): --  RR: --  SpO2: --    Orthostatic VS  10-18-23 @ 08:09  Lying BP: --/-- HR: --  Sitting BP: 119/79 HR: 74  Standing BP: 99/74 HR: 96  Site: --  Mode: --

## 2023-10-17 NOTE — BH INPATIENT PSYCHIATRY ASSESSMENT NOTE - RISK ASSESSMENT
Risk factors: +current SI, h/o SA/SIB, h/o psych admissions, chronic pain, recent loss (1 yr ago), noncompliant with treatment,     Protective factors: no access to weapons, no active substance abuse, domiciled, social supports, help-seeking behaviors    Overall, pt is a moderate/high risk of harm to self and requires psychiatric admission for safety and stabilization.

## 2023-10-17 NOTE — BH PSYCHOLOGY - GROUP THERAPY NOTE - NSBHPSYCHOLRESPCOMMENT_PSY_A_CORE FT
The patient appeared adequately groomed and casually dressed. Pt appeared engaged in the group as evidenced by their willingness to independently verbally communicate during the discussion. Pt discussed difficulties in forming connections, as well as asking for help due to feeling rejected in the past, feeling lonely as well as difficulties in validating herself. Pt was oriented X3. Pt was appropriate with peers.

## 2023-10-18 LAB
CULTURE RESULTS: SIGNIFICANT CHANGE UP
CULTURE RESULTS: SIGNIFICANT CHANGE UP
SPECIMEN SOURCE: SIGNIFICANT CHANGE UP
SPECIMEN SOURCE: SIGNIFICANT CHANGE UP

## 2023-10-18 PROCEDURE — 99232 SBSQ HOSP IP/OBS MODERATE 35: CPT | Mod: GC

## 2023-10-18 RX ORDER — ONDANSETRON 8 MG/1
4 TABLET, FILM COATED ORAL ONCE
Refills: 0 | Status: COMPLETED | OUTPATIENT
Start: 2023-10-18 | End: 2023-10-18

## 2023-10-18 RX ORDER — ARIPIPRAZOLE 15 MG/1
400 TABLET ORAL ONCE
Refills: 0 | Status: COMPLETED | OUTPATIENT
Start: 2023-10-19 | End: 2023-10-19

## 2023-10-18 RX ADMIN — Medication 50 MILLIGRAM(S): at 14:06

## 2023-10-18 RX ADMIN — Medication 100 MILLIGRAM(S): at 09:08

## 2023-10-18 RX ADMIN — ONDANSETRON 4 MILLIGRAM(S): 8 TABLET, FILM COATED ORAL at 21:46

## 2023-10-18 RX ADMIN — Medication 2 MILLIGRAM(S): at 19:54

## 2023-10-18 RX ADMIN — PANTOPRAZOLE SODIUM 40 MILLIGRAM(S): 20 TABLET, DELAYED RELEASE ORAL at 09:08
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
## 2023-10-18 NOTE — BH INPATIENT PSYCHIATRY PROGRESS NOTE - NSBHCHARTREVIEWVS_PSY_A_CORE FT
Vital Signs Last 24 Hrs  T(C): 37.1 (10-18-23 @ 08:09), Max: 37.1 (10-18-23 @ 08:09)  T(F): 98.7 (10-18-23 @ 08:09), Max: 98.7 (10-18-23 @ 08:09)  HR: --  BP: --  BP(mean): --  RR: --  SpO2: --    Orthostatic VS  10-18-23 @ 08:09  Lying BP: --/-- HR: --  Sitting BP: 119/79 HR: 74  Standing BP: 99/74 HR: 96  Site: --  Mode: --

## 2023-10-18 NOTE — BH PSYCHOLOGY - CLINICIAN PSYCHOTHERAPY NOTE - NSBHPSYCHOLGOALS_PSY_A_CORE
Decrease symptoms/Assessment/Improve level of independent functioning/Improve social/vocational/coping skills/Psychoeducation

## 2023-10-18 NOTE — BH INPATIENT PSYCHIATRY PROGRESS NOTE - NSBHASSESSSUMMFT_PSY_ALL_CORE
ASSESSMENT  Patient is 27yo single woman, no dependents, domiciled with her Grandmother, unemployed on disability/SSI, medical hx of asthma, HTN, GERD, and hypothyroidism and schizoaffective disorder, in tx  with [facility], who self presented to the ED under the guise of abdominal pain, but per ED Attending, pt requested to be re-admitted to [facility]. On exam, pt is a poor and unreliable historian who per the ACT team tends to visit EDs frequently with various complaints (of her own accord, pt did admit to multiple ED visits for back pain this week). Pt has been unavailable to the ACT team, either on her own accord, or her Grandmother averting attempts. Pt reports feeling depressed, with AH, IOR, SI and aborted attempts. She has not taken her Remeron since she was admitted to [facility] for SA via OD with Remeron, which may be additionally be affecting her mood. Pt is depressed, psychotic, illogical, requesting inpt level of care. Pt is appropriate, will admit pending medical clearance. No acute neurological concerns.    Depression and psychosis likely multifactorial at this time, MDD with psychosis vs. schizoaffective vs. borderline personality disorder vs. complex grief vs. adjustment disorder. On evaluation today the patient continues to be depressed and suicidal.    PLAN  Legal  - patient admitted on 9.13 voluntary    Safety  - patient not at acute risk, CO not indicated at this time    Psychiatric  - continue to hold mirtazapine 7.5mg PO qHS until collateral from ACT team is obtained   - patient scheduled for next dose of Abilify Maintenna 400mg IM on 10/19, hold until collateral from ACT team is obtained  - PRN: haloperidol 5mg PO/IM q8hrs PRN for agitation, diphenhydramine 50mg PO/IM q6hrs PRN for EPS PPx, hydroxyzine 50mg PO q12hrs PRN for anxiety, lorazepam 2mg PO q8hrs PRN for anxiety or 2mg IM for assaultive behavior    Medical  - cefpoxidime 100mg PO q12 hrs for UTI, start on 10/16 and end after 7 days   - pantoprazole 40mg PO before breakfast for GERD  - montelukast 10mg PO qHS for asthma  - budesonide 80mcg/formoterol 4.5mcg 2 puffs BID for asthma  - PRN: acetaminophen 650mg PO q6hrs PRN for pain, albuterol 90mcg 2puffs q6hrs PRN for dyspnea    Other  - I/G/M  - obtain collateral from [facility] (Dr. Louis)    Dispo  - pending clinical improvement  - consider state hospitalization pending ACT team collateral ASSESSMENT  Patient is 29yo single woman, no dependents, domiciled with her Grandmother, unemployed on disability/SSI, medical hx of asthma, HTN, GERD, and hypothyroidism and schizoaffective disorder, in tx  with [facility], who self presented to the ED under the guise of abdominal pain, but per ED Attending, pt requested to be re-admitted to [facility]. On exam, pt is a poor and unreliable historian who per the ACT team tends to visit EDs frequently with various complaints (of her own accord, pt did admit to multiple ED visits for back pain this week). Pt has been unavailable to the ACT team, either on her own accord, or her Grandmother averting attempts. Pt reports feeling depressed, with AH, IOR, SI and aborted attempts. She has not taken her Remeron since she was admitted to [facility] for SA via OD with Remeron, which may be additionally be affecting her mood. Pt is depressed, psychotic, illogical, requesting inpt level of care. Pt is appropriate, will admit pending medical clearance. No acute neurological concerns.    Depression and psychosis likely multifactorial at this time, MDD with psychosis vs. schizoaffective vs. borderline personality disorder vs. complex grief vs. adjustment disorder. On evaluation today the patient continues to be depressed and suicidal.    PLAN  Legal  - patient admitted on 9.13 voluntary    Safety  - patient not at acute risk, CO not indicated at this time    Psychiatric  - continue to hold mirtazapine 7.5mg PO qHS until collateral from ACT team is obtained   - patient scheduled for next dose of Abilify Maintenna 400mg IM on 10/19, hold until collateral from ACT team is obtained  - PRN: haloperidol 5mg PO/IM q8hrs PRN for agitation, diphenhydramine 50mg PO/IM q6hrs PRN for EPS PPx, hydroxyzine 50mg PO q12hrs PRN for anxiety, lorazepam 2mg PO q8hrs PRN for anxiety or 2mg IM for assaultive behavior    Medical  - discontinue cefpoxidime 100mg PO q12 hrs for UTI as patient expresses resolution of symptoms  - pantoprazole 40mg PO before breakfast for GERD  - montelukast 10mg PO qHS for asthma  - budesonide 80mcg/formoterol 4.5mcg 2 puffs BID for asthma  - PRN: acetaminophen 650mg PO q6hrs PRN for pain, albuterol 90mcg 2puffs q6hrs PRN for dyspnea    Other  - I/G/M  - obtain collateral from [facility] (Dr. Louis)    Dispo  - pending clinical improvement  - consider state hospitalization pending ACT team collateral ASSESSMENT  Patient is 29yo single woman, no dependents, domiciled with her Grandmother, unemployed on disability/SSI, medical hx of asthma, HTN, GERD, and hypothyroidism and schizoaffective disorder, in tx  with [facility], who self presented to the ED under the guise of abdominal pain, but per ED Attending, pt requested to be re-admitted to [facility]. On exam, pt is a poor and unreliable historian who per the ACT team tends to visit EDs frequently with various complaints (of her own accord, pt did admit to multiple ED visits for back pain this week). Pt has been unavailable to the ACT team, either on her own accord, or her Grandmother averting attempts. Pt reports feeling depressed, with AH, IOR, SI and aborted attempts. She has not taken her Remeron since she was admitted to [facility] for SA via OD with Remeron, which may be additionally be affecting her mood. Pt is depressed, psychotic, illogical, requesting inpt level of care. Pt is appropriate, will admit pending medical clearance. No acute neurological concerns.    Depression and psychosis likely multifactorial at this time, MDD with psychosis vs. schizoaffective vs. borderline personality disorder vs. complex grief vs. adjustment disorder. On evaluation today the patient continues to be depressed and suicidal.    PLAN  Legal  - patient admitted on 9.13 voluntary    Safety  - patient not at acute risk, CO not indicated at this time    Psychiatric  - next dose tomorrow (10/19) of Abilify Maintenna 400mg IM q3 weeks rather than 4, per collateral from ACT team  - discontinue mirtazapine 7.5mg PO qHS, per collateral from ACT team  - PRN: haloperidol 5mg PO/IM q8hrs PRN for agitation, diphenhydramine 50mg PO/IM q6hrs PRN for EPS PPx, hydroxyzine 50mg PO q12hrs PRN for anxiety, lorazepam 2mg PO q8hrs PRN for anxiety or 2mg IM for assaultive behavior    Medical  - discontinue cefpoxidime 100mg PO q12 hrs for UTI as patient expresses resolution of symptoms  - pantoprazole 40mg PO before breakfast for GERD  - montelukast 10mg PO qHS for asthma  - budesonide 80mcg/formoterol 4.5mcg 2 puffs BID for asthma  - PRN: acetaminophen 650mg PO q6hrs PRN for pain, albuterol 90mcg 2puffs q6hrs PRN for dyspnea    Other  - I/G/M  - obtain collateral from [facility] (Dr. Louis)    Dispo  - pending clinical improvement  - consider state hospitalization pending ACT team collateral

## 2023-10-18 NOTE — BH INPATIENT PSYCHIATRY PROGRESS NOTE - PRN MEDS
MEDICATIONS  (PRN):  acetaminophen     Tablet .. 650 milliGRAM(s) Oral every 6 hours PRN Moderate Pain (4 - 6)  albuterol    90 MICROgram(s) HFA Inhaler 2 Puff(s) Inhalation every 6 hours PRN Shortness of Breath and/or Wheezing  diphenhydrAMINE 50 milliGRAM(s) Oral every 6 hours PRN eps ppx  diphenhydrAMINE Injectable 50 milliGRAM(s) IntraMuscular once PRN agitation / eps ppx  haloperidol     Tablet 5 milliGRAM(s) Oral every 8 hours PRN agitation  haloperidol    Injectable 5 milliGRAM(s) IntraMuscular once PRN Agitation  hydrOXYzine hydrochloride 50 milliGRAM(s) Oral every 12 hours PRN Anxiety  LORazepam     Tablet 2 milliGRAM(s) Oral every 8 hours PRN Anxiety  LORazepam   Injectable 2 milliGRAM(s) IntraMuscular once PRN Assaultive behavior

## 2023-10-18 NOTE — BH CHART NOTE - NSEVENTNOTEFT_PSY_ALL_CORE
Patient's ACT team psychiatrist Dr. Louis from [facility] returned a phone call from yesterday's voice mail.    Shared the patient's current state with Dr. Louis, who states this is often her at baseline. Often inconsistent and frequently suicidal, the patient requires frequent hospitalization. Recommends discontinuing mirtazapine for now given her recent overdose on it, but continuing the patient's Abilify Maintenna, scheduled for tomorrow; recommends increasing frequency of ESQUEDA injections to every 3 weeks rather than 4. Also recommends state hospitalization after observing the patient's response to her next Abilify Maintenna injection.

## 2023-10-18 NOTE — BH INPATIENT PSYCHIATRY PROGRESS NOTE - NSBHFUPINTERVALHXFT_PSY_A_CORE
Chart and vitals reviewed. Case discussed with treatment team. No psych PO PRNs overnight aside from PRNs yesterday afternoon for reported distress over patient's continued suicidal ideation. Patient seen and evaluated this morning, states that she slept well and felt rested, but had a difficult yesterday as she was overwhelmed by all her emotions and felt herself "disconnecting." The PRNs reportedly helped a little, but mostly just made her drowsy. States that today she feels "distressed, suicidal, I feel everything." Continues to endorse suicidal ideation, with loose plans including eating soap, drowning self in the shower. Also continues to endorse auditory hallucinations in the form of voices telling her to kill self; reports visual hallucination in the form of her grandfather appearing in her room this morning trying to shake her hand. Patient expresses desire to be admitted to a state hospital, claiming she's been admitted there 2x and always felt like it was helpful.

## 2023-10-18 NOTE — BH PSYCHOLOGY - CLINICIAN PSYCHOTHERAPY NOTE - NSBHPSYCHOLINT_PSY_A_CORE
Cognitive/behavioral therapy/Dialectical  Behavioral Therapy (DBT)/Dynamic issues addressed/Interpersonal Therapy (IPT)/Reality testing/Supported coping skills/Supportive therapy

## 2023-10-18 NOTE — BH INPATIENT PSYCHIATRY PROGRESS NOTE - CURRENT MEDICATION
MEDICATIONS  (STANDING):  budesonide  80 MICROgram(s)/formoterol 4.5 MICROgram(s) Inhaler 2 Puff(s) Inhalation two times a day  cefpodoxime 100 milliGRAM(s) Oral every 12 hours  influenza   Vaccine 0.5 milliLiter(s) IntraMuscular once  montelukast 10 milliGRAM(s) Oral at bedtime  pantoprazole    Tablet 40 milliGRAM(s) Oral before breakfast    MEDICATIONS  (PRN):  acetaminophen     Tablet .. 650 milliGRAM(s) Oral every 6 hours PRN Moderate Pain (4 - 6)  albuterol    90 MICROgram(s) HFA Inhaler 2 Puff(s) Inhalation every 6 hours PRN Shortness of Breath and/or Wheezing  diphenhydrAMINE 50 milliGRAM(s) Oral every 6 hours PRN eps ppx  diphenhydrAMINE Injectable 50 milliGRAM(s) IntraMuscular once PRN agitation / eps ppx  haloperidol     Tablet 5 milliGRAM(s) Oral every 8 hours PRN agitation  haloperidol    Injectable 5 milliGRAM(s) IntraMuscular once PRN Agitation  hydrOXYzine hydrochloride 50 milliGRAM(s) Oral every 12 hours PRN Anxiety  LORazepam     Tablet 2 milliGRAM(s) Oral every 8 hours PRN Anxiety  LORazepam   Injectable 2 milliGRAM(s) IntraMuscular once PRN Assaultive behavior   MEDICATIONS  (STANDING):  budesonide  80 MICROgram(s)/formoterol 4.5 MICROgram(s) Inhaler 2 Puff(s) Inhalation two times a day  influenza   Vaccine 0.5 milliLiter(s) IntraMuscular once  montelukast 10 milliGRAM(s) Oral at bedtime  pantoprazole    Tablet 40 milliGRAM(s) Oral before breakfast    MEDICATIONS  (PRN):  acetaminophen     Tablet .. 650 milliGRAM(s) Oral every 6 hours PRN Moderate Pain (4 - 6)  albuterol    90 MICROgram(s) HFA Inhaler 2 Puff(s) Inhalation every 6 hours PRN Shortness of Breath and/or Wheezing  diphenhydrAMINE 50 milliGRAM(s) Oral every 6 hours PRN eps ppx  diphenhydrAMINE Injectable 50 milliGRAM(s) IntraMuscular once PRN agitation / eps ppx  haloperidol     Tablet 5 milliGRAM(s) Oral every 8 hours PRN agitation  haloperidol    Injectable 5 milliGRAM(s) IntraMuscular once PRN Agitation  hydrOXYzine hydrochloride 50 milliGRAM(s) Oral every 12 hours PRN Anxiety  LORazepam     Tablet 2 milliGRAM(s) Oral every 8 hours PRN Anxiety  LORazepam   Injectable 2 milliGRAM(s) IntraMuscular once PRN Assaultive behavior

## 2023-10-18 NOTE — BH INPATIENT PSYCHIATRY PROGRESS NOTE - OTHER
Hopelessness, ideas of loss, fixations on trauma and abandonment auditory hallucinations to harm self, visual hallucinations of recently  grandfather

## 2023-10-18 NOTE — BH INPATIENT PSYCHIATRY PROGRESS NOTE - NSBHMETABOLIC_PSY_ALL_CORE_FT
BMI: BMI (kg/m2): 57.8 (09-14-23 @ 14:30)  HbA1c:   Glucose:   BP: 137/79 (10-16-23 @ 15:37) (137/79 - 137/79)  Lipid Panel:

## 2023-10-19 PROCEDURE — 90853 GROUP PSYCHOTHERAPY: CPT

## 2023-10-19 PROCEDURE — 99232 SBSQ HOSP IP/OBS MODERATE 35: CPT | Mod: GC

## 2023-10-19 RX ORDER — ONDANSETRON 8 MG/1
4 TABLET, FILM COATED ORAL EVERY 6 HOURS
Refills: 0 | Status: DISCONTINUED | OUTPATIENT
Start: 2023-10-19 | End: 2024-02-06

## 2023-10-19 RX ADMIN — Medication 50 MILLIGRAM(S): at 15:23

## 2023-10-19 RX ADMIN — PANTOPRAZOLE SODIUM 40 MILLIGRAM(S): 20 TABLET, DELAYED RELEASE ORAL at 08:10

## 2023-10-19 RX ADMIN — MONTELUKAST 10 MILLIGRAM(S): 4 TABLET, CHEWABLE ORAL at 20:48

## 2023-10-19 RX ADMIN — ARIPIPRAZOLE 400 MILLIGRAM(S): 15 TABLET ORAL at 13:31

## 2023-10-19 RX ADMIN — BUDESONIDE AND FORMOTEROL FUMARATE DIHYDRATE 2 PUFF(S): 160; 4.5 AEROSOL RESPIRATORY (INHALATION) at 20:48

## 2023-10-19 NOTE — BH TREATMENT PLAN - NSTXDCOTHRINTERSW_PSY_ALL_CORE
Writer will work to educate the pt on the state referral process and explore any other aftercare options that may be an appropriate alternative to [facility] hospital.

## 2023-10-19 NOTE — BH INPATIENT PSYCHIATRY PROGRESS NOTE - PRN MEDS
MEDICATIONS  (PRN):  acetaminophen     Tablet .. 650 milliGRAM(s) Oral every 6 hours PRN Moderate Pain (4 - 6)  albuterol    90 MICROgram(s) HFA Inhaler 2 Puff(s) Inhalation every 6 hours PRN Shortness of Breath and/or Wheezing  diphenhydrAMINE 50 milliGRAM(s) Oral every 6 hours PRN eps ppx  diphenhydrAMINE Injectable 50 milliGRAM(s) IntraMuscular once PRN agitation / eps ppx  haloperidol     Tablet 5 milliGRAM(s) Oral every 8 hours PRN agitation  haloperidol    Injectable 5 milliGRAM(s) IntraMuscular once PRN Agitation  hydrOXYzine hydrochloride 50 milliGRAM(s) Oral every 12 hours PRN Anxiety  LORazepam     Tablet 2 milliGRAM(s) Oral every 8 hours PRN Anxiety  LORazepam   Injectable 2 milliGRAM(s) IntraMuscular once PRN Assaultive behavior  ondansetron    Tablet 4 milliGRAM(s) Oral every 6 hours PRN nausea

## 2023-10-19 NOTE — BH PSYCHOLOGY - GROUP THERAPY NOTE - NSBHPSYCHOLRESPCOMMENT_PSY_A_CORE FT
The patient appeared adequately groomed and casually dressed. Pt appeared engaged in the group as evidenced by their willingness to independently verbally communicate during the discussion. Pt shared trying different distraction techniques during ACT exercise, as well as identifying with unwanted thoughts and feelings often being more pronounced before bedtime. Pt left group early to use restroom. Pt was oriented X3. Pt was appropriate with peers.

## 2023-10-19 NOTE — BH INPATIENT PSYCHIATRY PROGRESS NOTE - NSTXDCOTHRPROGRES_PSY_ALL_CORE
No Change 63M presents to the ED c/o unwell feeling upon awakening this morning. Increased belching and nauseated feeling. He says he felt 63M presents to the ED c/o unwell feeling upon awakening this morning. Increased belching and nauseated feeling. He says he felt like he was gassy. He had breakfast and then went to 63M presents to the ED c/o unwell feeling upon awakening this morning. Increased belching and nauseated feeling. He says he felt like he was gassy. He had breakfast and then went to work out/exercise. 10 minutes in, he had onset of palpitations. He checked his pulse and it was "jumping around" between 70s and went as high as 130s. He presented to the ED where in triage, his BP was 220/120 and his HR was 140. Pt was very anxious, he says. Patient denies chest pain. He has a known arrhythmia, APCs. He sees Dr Alvarez. He had echo/stress about 3 years ago. He was on losartan but since taking on a healthy diet and lifestyle has stopped it (losartan 50/12.5mg).   No fever. No diaphorsis. No addt'l complaints.

## 2023-10-19 NOTE — BH INPATIENT PSYCHIATRY PROGRESS NOTE - CURRENT MEDICATION
MEDICATIONS  (STANDING):  ARIPiprazole Injectable, Long Acting. (ABILIFY MAINTENA) 400 milliGRAM(s) IntraMuscular once  budesonide  80 MICROgram(s)/formoterol 4.5 MICROgram(s) Inhaler 2 Puff(s) Inhalation two times a day  influenza   Vaccine 0.5 milliLiter(s) IntraMuscular once  montelukast 10 milliGRAM(s) Oral at bedtime  pantoprazole    Tablet 40 milliGRAM(s) Oral before breakfast    MEDICATIONS  (PRN):  acetaminophen     Tablet .. 650 milliGRAM(s) Oral every 6 hours PRN Moderate Pain (4 - 6)  albuterol    90 MICROgram(s) HFA Inhaler 2 Puff(s) Inhalation every 6 hours PRN Shortness of Breath and/or Wheezing  diphenhydrAMINE 50 milliGRAM(s) Oral every 6 hours PRN eps ppx  diphenhydrAMINE Injectable 50 milliGRAM(s) IntraMuscular once PRN agitation / eps ppx  haloperidol     Tablet 5 milliGRAM(s) Oral every 8 hours PRN agitation  haloperidol    Injectable 5 milliGRAM(s) IntraMuscular once PRN Agitation  hydrOXYzine hydrochloride 50 milliGRAM(s) Oral every 12 hours PRN Anxiety  LORazepam     Tablet 2 milliGRAM(s) Oral every 8 hours PRN Anxiety  LORazepam   Injectable 2 milliGRAM(s) IntraMuscular once PRN Assaultive behavior  ondansetron    Tablet 4 milliGRAM(s) Oral every 6 hours PRN nausea

## 2023-10-19 NOTE — BH INPATIENT PSYCHIATRY PROGRESS NOTE - NSBHFUPINTERVALHXFT_PSY_A_CORE
Chart and vitals reviewed. Case discussed with treatment team. Ativan PO PRN given last night at 8pm because she was seeing nonstop hallucinations of her  grandfather accusing her of abandoning her at the end of his life, which was causing her significant distress. Explains this is a recurring theme and regret because he passed away while she was still in a state hospital at the time and unable to visit him. Explained that the distress did not stop with the PRN but it made her so drowsy she had no choice but to fall asleep. Reports vomiting 3x yesterday, which has since resolved; patient attributes it to GERD symptoms acting up. Describes mood as "not that great" but is looking forward to her boyfriend visiting today, who she has rekindled a relationship with. Explains that he is now supportive of her mental health journey. Endorses continued SI with CAH and VH, denies any HI.

## 2023-10-19 NOTE — BH INPATIENT PSYCHIATRY PROGRESS NOTE - NSBHASSESSSUMMFT_PSY_ALL_CORE
ASSESSMENT  Patient is 29yo single woman, no dependents, domiciled with her Grandmother, unemployed on disability/SSI, medical hx of asthma, HTN, GERD, and hypothyroidism and schizoaffective disorder, in tx  with [facility], who self presented to the ED under the guise of abdominal pain, but per ED Attending, pt requested to be re-admitted to [facility]. On exam, pt is a poor and unreliable historian who per the ACT team tends to visit EDs frequently with various complaints (of her own accord, pt did admit to multiple ED visits for back pain this week). Pt has been unavailable to the ACT team, either on her own accord, or her Grandmother averting attempts. Pt reports feeling depressed, with AH, IOR, SI and aborted attempts. She has not taken her Remeron since she was admitted to [facility] for SA via OD with Remeron, which may be additionally be affecting her mood. Pt is depressed, psychotic, illogical, requesting inpt level of care. Pt is appropriate, will admit pending medical clearance. No acute neurological concerns.    Depression and psychosis likely multifactorial at this time, MDD with psychosis vs. schizoaffective vs. borderline personality disorder vs. complex grief vs. adjustment disorder. On evaluation today the patient is depressed and suicidal with significant distress over her continued VH and CAH.     PLAN  Legal  - patient admitted on 9.13 voluntary    Safety  - patient not at acute risk, CO not indicated at this time    Psychiatric  - next dose today 10/19 for Abilify Maintenna 400mg IM q3 weeks rather than 4, per collateral from ACT team  - discontinue mirtazapine 7.5mg PO qHS, per collateral from ACT team  - PRN: haloperidol 5mg PO/IM q8hrs PRN for agitation, diphenhydramine 50mg PO/IM q6hrs PRN for EPS PPx, hydroxyzine 50mg PO q12hrs PRN for anxiety, lorazepam 2mg PO q8hrs PRN for anxiety or 2mg IM for assaultive behavior    Medical  - discontinue cefpoxidime 100mg PO q12 hrs for UTI as patient expresses resolution of symptoms  - pantoprazole 40mg PO before breakfast for GERD  - montelukast 10mg PO qHS for asthma  - budesonide 80mcg/formoterol 4.5mcg 2 puffs BID for asthma  - PRN: acetaminophen 650mg PO q6hrs PRN for pain, albuterol 90mcg 2puffs q6hrs PRN for dyspnea, ondansetron 4mg q6hrs PRN for nausea    Other  - I/G/M    Dispo  - pending clinical improvement  - application for state hospitalization initiated ASSESSMENT  Patient is 27yo single woman, no dependents, domiciled with her Grandmother, unemployed on disability/SSI, pphx of schizoaffective disorder, multiple past psychiatric admissions including state and recent discharge from [facility], in [facility], with pmhx of asthma, HTN, GERD, and hypothyroidism and schizoaffective disorder, in tx  with [facility], who self presented to the ED reporting abdominal pain and requesting readmission to psychiatric unit, reporting CAH, depressive symptoms, IOR, suicidality, admitted to [facility] for psychiatric care.  Depression and psychosis likely multifactorial at this time, schizoaffective vs. borderline personality disorder vs. complex grief vs. adjustment disorder.      On evaluation today the patient is depressed and suicidal with significant distress over her continued VH and CAH.     PLAN  Legal  - patient admitted on 9.13 voluntary    Safety  - patient not at acute risk, CO not indicated at this time    Psychiatric  - next dose today 10/19 for Abilify Maintenna 400mg IM q3 weeks rather than 4, per collateral from ACT team  - discontinue mirtazapine 7.5mg PO qHS, per collateral from ACT team  - PRN: haloperidol 5mg PO/IM q8hrs PRN for agitation, diphenhydramine 50mg PO/IM q6hrs PRN for EPS PPx, hydroxyzine 50mg PO q12hrs PRN for anxiety, lorazepam 2mg PO q8hrs PRN for anxiety or 2mg IM for assaultive behavior    Medical  - discontinue cefpoxidime 100mg PO q12 hrs for UTI as patient expresses resolution of symptoms  - pantoprazole 40mg PO before breakfast for GERD  - montelukast 10mg PO qHS for asthma  - budesonide 80mcg/formoterol 4.5mcg 2 puffs BID for asthma  - PRN: acetaminophen 650mg PO q6hrs PRN for pain, albuterol 90mcg 2puffs q6hrs PRN for dyspnea, ondansetron 4mg q6hrs PRN for nausea    Other  - I/G/M    Dispo  - pending clinical improvement  - application for state hospitalization initiated

## 2023-10-19 NOTE — BH INPATIENT PSYCHIATRY PROGRESS NOTE - OTHER
Hopelessness, ideas of loss, fixations on trauma and abandonment auditory hallucinations to harm self, visual hallucinations of  grandfather Perseverative on contents of AVH

## 2023-10-19 NOTE — BH INPATIENT PSYCHIATRY PROGRESS NOTE - NSBHCHARTREVIEWVS_PSY_A_CORE FT
Vital Signs Last 24 Hrs  T(C): 36.7 (10-19-23 @ 07:53), Max: 36.7 (10-19-23 @ 07:53)  T(F): 98.1 (10-19-23 @ 07:53), Max: 98.1 (10-19-23 @ 07:53)  HR: --  BP: --  BP(mean): --  RR: --  SpO2: --    Orthostatic VS  10-19-23 @ 07:53  Lying BP: --/-- HR: --  Sitting BP: 138/73 HR: 80  Standing BP: 143/79 HR: 92  Site: --  Mode: --  Orthostatic VS  10-18-23 @ 08:09  Lying BP: --/-- HR: --  Sitting BP: 119/79 HR: 74  Standing BP: 99/74 HR: 96  Site: --  Mode: --

## 2023-10-20 PROCEDURE — 99232 SBSQ HOSP IP/OBS MODERATE 35: CPT | Mod: GC

## 2023-10-20 PROCEDURE — 90853 GROUP PSYCHOTHERAPY: CPT

## 2023-10-20 RX ADMIN — MONTELUKAST 10 MILLIGRAM(S): 4 TABLET, CHEWABLE ORAL at 20:19

## 2023-10-20 RX ADMIN — Medication 650 MILLIGRAM(S): at 20:20

## 2023-10-20 RX ADMIN — BUDESONIDE AND FORMOTEROL FUMARATE DIHYDRATE 2 PUFF(S): 160; 4.5 AEROSOL RESPIRATORY (INHALATION) at 08:30

## 2023-10-20 RX ADMIN — PANTOPRAZOLE SODIUM 40 MILLIGRAM(S): 20 TABLET, DELAYED RELEASE ORAL at 06:57

## 2023-10-20 RX ADMIN — Medication 2 MILLIGRAM(S): at 20:19

## 2023-10-20 NOTE — BH INPATIENT PSYCHIATRY PROGRESS NOTE - CURRENT MEDICATION
MEDICATIONS  (STANDING):  budesonide  80 MICROgram(s)/formoterol 4.5 MICROgram(s) Inhaler 2 Puff(s) Inhalation two times a day  influenza   Vaccine 0.5 milliLiter(s) IntraMuscular once  montelukast 10 milliGRAM(s) Oral at bedtime  pantoprazole    Tablet 40 milliGRAM(s) Oral before breakfast    MEDICATIONS  (PRN):  acetaminophen     Tablet .. 650 milliGRAM(s) Oral every 6 hours PRN Moderate Pain (4 - 6)  albuterol    90 MICROgram(s) HFA Inhaler 2 Puff(s) Inhalation every 6 hours PRN Shortness of Breath and/or Wheezing  diphenhydrAMINE 50 milliGRAM(s) Oral every 6 hours PRN eps ppx  diphenhydrAMINE Injectable 50 milliGRAM(s) IntraMuscular once PRN agitation / eps ppx  haloperidol     Tablet 5 milliGRAM(s) Oral every 8 hours PRN agitation  haloperidol    Injectable 5 milliGRAM(s) IntraMuscular once PRN Agitation  hydrOXYzine hydrochloride 50 milliGRAM(s) Oral every 12 hours PRN Anxiety  LORazepam     Tablet 2 milliGRAM(s) Oral every 8 hours PRN Anxiety  LORazepam   Injectable 2 milliGRAM(s) IntraMuscular once PRN Assaultive behavior  ondansetron    Tablet 4 milliGRAM(s) Oral every 6 hours PRN nausea

## 2023-10-20 NOTE — BH CHART NOTE - NSEVENTNOTEFT_PSY_ALL_CORE
Notified by staff that pt had submitted letter with suicidal content to  and then indicated that she had ingested soap to harm self.  Pt notably visible on unit, socializing with peers all morning, both prior to submission of this letter and also after.  There was some mild emotional dysregulation this morning as pt reported she had received news that her brother was in a car accident.  Pt has been making provocative statements about suicide and self harm since admission, usually when emotionally dysregulated, despite being future oriented (there was initial concern that pt might be CO seeking).  Suicidality was self described as chronic during initial interview.  Given history and observation on unit to date, reported ingestion of soap this morning is most consistent with suicidal gesture at most, and attributable to cluster B traits vs full PD.  Will place on CO for now and continue monitoring.  Soap removed from pt's bathroom to optimize safe environment.

## 2023-10-20 NOTE — BH PSYCHOLOGY - GROUP THERAPY NOTE - NSBHPSYCHOLRESPCOMMENT_PSY_A_CORE FT
The patient appeared adequately groomed and casually dressed. Pt appeared very engaged in the group as evidenced by her willingness to independently verbally communicate during the discussion. Pt volunteered to read from worksheet. Pt shared the times others have invalidated her and made her believe that being emotional means that she is “not able to control [herself].” Pt agreed with other members that sharing emotions is a strength. Speech WNL. PT was oriented X3. Pt was appropriate and supportive with peers.

## 2023-10-20 NOTE — BH INPATIENT PSYCHIATRY PROGRESS NOTE - NSBHASSESSSUMMFT_PSY_ALL_CORE
ASSESSMENT  Patient is 29yo single woman, no dependents, domiciled with her Grandmother, unemployed on disability/SSI, pphx of schizoaffective disorder, multiple past psychiatric admissions including state and recent discharge from [facility], [facility], with pmhx of asthma, HTN, GERD, and hypothyroidism and schizoaffective disorder, in tx  with [facility], who self presented to the ED reporting abdominal pain and requesting readmission to psychiatric unit, reporting CAH, depressive symptoms, IOR, suicidality, admitted to [facility] for psychiatric care.  Depression and psychosis likely multifactorial at this time, schizoaffective vs. borderline personality disorder vs. complex grief vs. adjustment disorder.      On evaluation today the patient is tearful and visibly distressed, reporting significant suicidal ideation and hopelessness.     PLAN  Legal  - patient admitted on 9.13 voluntary    Safety  - CO initiated today for suicidality, per events in Chart Note    Psychiatric  -  Abilify Maintenna 400mg IM q3 weeks rather than 4, per collateral from ACT team. Last dose on 10/19  - discontinue mirtazapine 7.5mg PO qHS, per collateral from ACT team  - PRN: haloperidol 5mg PO/IM q8hrs PRN for agitation, diphenhydramine 50mg PO/IM q6hrs PRN for EPS PPx, hydroxyzine 50mg PO q12hrs PRN for anxiety, lorazepam 2mg PO q8hrs PRN for anxiety or 2mg IM for assaultive behavior    Medical  - discontinue cefpoxidime 100mg PO q12 hrs for UTI as patient expresses resolution of symptoms  - pantoprazole 40mg PO before breakfast for GERD  - montelukast 10mg PO qHS for asthma  - budesonide 80mcg/formoterol 4.5mcg 2 puffs BID for asthma  - PRN: acetaminophen 650mg PO q6hrs PRN for pain, albuterol 90mcg 2puffs q6hrs PRN for dyspnea, ondansetron 4mg q6hrs PRN for nausea    Other  - I/G/M    Dispo  - pending clinical improvement  - application for state hospitalization initiated

## 2023-10-20 NOTE — BH INPATIENT PSYCHIATRY PROGRESS NOTE - NSBHCHARTREVIEWVS_PSY_A_CORE FT
Vital Signs Last 24 Hrs  T(C): 36.7 (10-20-23 @ 08:28), Max: 36.7 (10-20-23 @ 08:28)  T(F): 98 (10-20-23 @ 08:28), Max: 98 (10-20-23 @ 08:28)  HR: --  BP: --  BP(mean): --  RR: --  SpO2: --    Orthostatic VS  10-20-23 @ 08:28  Lying BP: --/-- HR: --  Sitting BP: 110/80 HR: 84  Standing BP: 117/73 HR: 91  Site: --  Mode: --  Orthostatic VS  10-19-23 @ 07:53  Lying BP: --/-- HR: --  Sitting BP: 138/73 HR: 80  Standing BP: 143/79 HR: 92  Site: --  Mode: --

## 2023-10-20 NOTE — BH INPATIENT PSYCHIATRY PROGRESS NOTE - NSDCCRITERIA_PSY_ALL_CORE
CGI < 3 When pt is no longer an acute or imminent risk of harm to self or others, and is able to care for self safely, pt may then be discharged.

## 2023-10-20 NOTE — BH INPATIENT PSYCHIATRY PROGRESS NOTE - NSBHFUPINTERVALHXFT_PSY_A_CORE
Chart and vitals reviewed. Case discussed with treatment team. Ativan PO PRN given yesterday at 3pm for distress over suicidal thoughts. Patient seen this morning, reports that she feels like "crap" and that mood is "incredibly bad." Describes feeling like a "bad thing is going to happen today" and that she knows because no medications or coping skills have been working lately. States that she has been crying nonstop all night and this morning, and is "losing control" of herself. Patient describes recent stressor in the form of learning that her biological brother, who lives in [place], was in a car accident this morning. She continues to endorse seeing hallucinations of her grandfather this morning, accusing her of abandoning her when he , as well as auditory hallucinations of voices telling her to suffocate herself with her pillow.

## 2023-10-20 NOTE — BH INPATIENT PSYCHIATRY PROGRESS NOTE - OTHER
auditory hallucinations to harm self, visual hallucinations of  grandfather tearful, visibly distressed Perseverative on contents of AVH Hopelessness, "something bad is going to happen"

## 2023-10-21 PROCEDURE — 99231 SBSQ HOSP IP/OBS SF/LOW 25: CPT

## 2023-10-21 RX ORDER — LIDOCAINE 4 G/100G
1 CREAM TOPICAL DAILY
Refills: 0 | Status: DISCONTINUED | OUTPATIENT
Start: 2023-10-21 | End: 2023-11-24

## 2023-10-21 RX ORDER — IBUPROFEN 200 MG
400 TABLET ORAL ONCE
Refills: 0 | Status: COMPLETED | OUTPATIENT
Start: 2023-10-21 | End: 2023-10-21

## 2023-10-21 RX ORDER — CYCLOBENZAPRINE HYDROCHLORIDE 10 MG/1
5 TABLET, FILM COATED ORAL ONCE
Refills: 0 | Status: COMPLETED | OUTPATIENT
Start: 2023-10-21 | End: 2023-10-21

## 2023-10-21 RX ORDER — IBUPROFEN 200 MG
600 TABLET ORAL ONCE
Refills: 0 | Status: COMPLETED | OUTPATIENT
Start: 2023-10-21 | End: 2023-10-21

## 2023-10-21 RX ADMIN — MONTELUKAST 10 MILLIGRAM(S): 4 TABLET, CHEWABLE ORAL at 20:51

## 2023-10-21 RX ADMIN — Medication 600 MILLIGRAM(S): at 14:04

## 2023-10-21 RX ADMIN — BUDESONIDE AND FORMOTEROL FUMARATE DIHYDRATE 2 PUFF(S): 160; 4.5 AEROSOL RESPIRATORY (INHALATION) at 20:51

## 2023-10-21 RX ADMIN — BUDESONIDE AND FORMOTEROL FUMARATE DIHYDRATE 2 PUFF(S): 160; 4.5 AEROSOL RESPIRATORY (INHALATION) at 09:20

## 2023-10-21 RX ADMIN — HALOPERIDOL DECANOATE 5 MILLIGRAM(S): 100 INJECTION INTRAMUSCULAR at 21:42

## 2023-10-21 RX ADMIN — Medication 2 MILLIGRAM(S): at 21:42

## 2023-10-21 RX ADMIN — PANTOPRAZOLE SODIUM 40 MILLIGRAM(S): 20 TABLET, DELAYED RELEASE ORAL at 06:28

## 2023-10-21 RX ADMIN — Medication 650 MILLIGRAM(S): at 09:31

## 2023-10-21 NOTE — BH INPATIENT PSYCHIATRY PROGRESS NOTE - NSBHFUPINTERVALHXFT_PSY_A_CORE
chart reviewed including pertinent labs. Case discussed with nursing staff. patient on CO due to concerns for SI. this AM, patient notes feeling more anxious and inquires about medications that might be helpful. she explains having fleeting thoughts of suicide most recently occurred this AM. at the time of interview, she denied having SI. she is unable to isolate a trigger for such fleeting thoughts. she inquires about state admin hearing reports feeling nervous about it. denies AVH

## 2023-10-21 NOTE — BH SAFETY PLAN - SUICIDE PREVENTION LIFELINE PHONES
Suicide Prevention Lifeline Phone: [phone] [phone] ([phone] Suicide Prevention Lifeline Phone: [phone] TALK ([phone] Suicide Prevention Lifeline Phone: [phone] TALK ([phone])

## 2023-10-21 NOTE — BH INPATIENT PSYCHIATRY PROGRESS NOTE - NSBHMETABOLIC_PSY_ALL_CORE_FT
BMI: BMI (kg/m2): 57.8 (09-14-23 @ 14:30)  HbA1c:   Glucose:   BP: 112/72 (10-21-23 @ 14:12) (112/72 - 112/72)  Lipid Panel:

## 2023-10-21 NOTE — BH SAFETY PLAN - DISTRACTION PLACE 2
[place]'[facility] [place]'[facility]

## 2023-10-21 NOTE — CHART NOTE - NSCHARTNOTEFT_GEN_A_CORE
Pt with complaint of atraumatic, low back pain with radiation to L LE, causing numbness, HA, and dizziness. Say symptoms began earlier in the day around 12pm and feel similar to a TIA she had 1 week ago, when she was treated at [facility] in the [place], stayed for 3 days, had a negative MRI, did not have MRA H/N,  discharged with no neurology/ cardiology f/u or plan,. Pt denies facial droop, blurry vision, tinnitus, slurred speech, SOB, CP, nausea, vomit, dysuria, decreased strength to L LE or any place on her body, Pt points to the low back pain across her low back.   General : NAD, obese, currently ambulating with no limp.    VSS  Lungs : CTA b/l. No R/R/W.  CV: S1--> S2, RRR  Abd: Positive BS, obesely distended.   Ext: No b/l LE edema.   Neuro: A & O X 4, articulates well, no facial droop, sensation intact throughout.   MS: 4/5 strength to L LE with low back pain.  A/P  28F admitted to [facility] for Schizoaffective disorder, with HA, LBP with L LE radiculopathy. Pt says sx simial to her TIA 1 week ago, when she was treated at [facility] in the [place], stayed for 3 days, had a negative MRI, did not have MRA H/N, discharged with no neurology/ cardiology f/u or plan,  Symptoms less likely TIA due to onset 11 hours ago and no neurologic deficits.   - Low back pain with L LE radiculopathy: Fall precautions, PT eval, BMP ordered for AM, Motrin 400 mg po X 1, Lido patch to low back,

## 2023-10-21 NOTE — BH SAFETY PLAN - SUICIDE AND CRISIS LIFELINE, CALL [phone]
Suicide and Crisis Lifeline, call [phone] Suicide and Crisis Lifeline, call [phone] Suicide and Crisis Lifeline, call [phone]

## 2023-10-21 NOTE — BH INPATIENT PSYCHIATRY PROGRESS NOTE - NSBHASSESSSUMMFT_PSY_ALL_CORE
ASSESSMENT  Patient is 29yo single woman, no dependents, domiciled with her Grandmother, unemployed on disability/SSI, pphx of schizoaffective disorder, multiple past psychiatric admissions including state and recent discharge from [facility], in tx [facility], with pmhx of asthma, HTN, GERD, and hypothyroidism and schizoaffective disorder, in tx  with [facility], who self presented to the ED reporting abdominal pain and requesting readmission to psychiatric unit, reporting CAH, depressive symptoms, IOR, suicidality, admitted to [facility] for psychiatric care.  Depression and psychosis likely multifactorial at this time, schizoaffective vs. borderline personality disorder vs. complex grief vs. adjustment disorder.      pt reports feeling anxious, has fleeting waxing and waning suicidality none reported during interview. will continue CO     PLAN  Legal  - patient admitted on 9.13 voluntary    Safety  - CO initiated today for suicidality, per events in Chart Note    Psychiatric  -  Abilify Maintenna 400mg IM q3 weeks rather than 4, per collateral from ACT team. Last dose on 10/19  - discontinue mirtazapine 7.5mg PO qHS, per collateral from ACT team  - PRN: haloperidol 5mg PO/IM q8hrs PRN for agitation, diphenhydramine 50mg PO/IM q6hrs PRN for EPS PPx, hydroxyzine 50mg PO q12hrs PRN for anxiety, lorazepam 2mg PO q8hrs PRN for anxiety or 2mg IM for assaultive behavior    Medical  - discontinue cefpoxidime 100mg PO q12 hrs for UTI as patient expresses resolution of symptoms  - pantoprazole 40mg PO before breakfast for GERD  - montelukast 10mg PO qHS for asthma  - budesonide 80mcg/formoterol 4.5mcg 2 puffs BID for asthma  - PRN: acetaminophen 650mg PO q6hrs PRN for pain, albuterol 90mcg 2puffs q6hrs PRN for dyspnea, ondansetron 4mg q6hrs PRN for nausea    Other  - I/G/M    Dispo  - pending clinical improvement  - application for state hospitalization initiated

## 2023-10-21 NOTE — BH CHART NOTE - NSEVENTNOTEFT_PSY_ALL_CORE
SANKET paged after patient complaining of 30 minutes of lower back pain and left leg paresthesias while sitting. Patient has a hx of chronic lower back pain. Denies CP, SOB, ab pain, n/v, bowel incontinence, recent injuries.  No hx of DM. TSH wnl.     Vital Signs Last 24 Hrs  T(C): 36.8 (21 Oct 2023 14:12), Max: 36.8 (21 Oct 2023 08:16)  T(F): 98.3 (21 Oct 2023 14:12), Max: 98.3 (21 Oct 2023 08:16)  HR: 63 (21 Oct 2023 14:12) (63 - 63)  BP: 112/72 (21 Oct 2023 14:12) (112/72 - 112/72)  BP(mean): --  RR: --  SpO2: --    PE     Weight 262 lbs  HEENT: Atraumatic, sitting in the common area  Resp: CTA b/l. No wheezes, ronchi, or crackles.    CV: pulses 2+ b/l, RRR, no murmurs.    MSK: tenderness to palpation over thoracic/lumbar spine   Ext: BL nonpitting edema, no gross deformities or bruising.? ROM intact.  Neuro: awake, alert, grossly oriented. CN II-XII intact. Slightly decreased sensation and strength of left leg and foot, but patient was able to ambulate to her room.        A/P   Patient's left leg was warm, well-perfused, ROM intact. Able to ambulate. No concerns for stroke, DVT, cauda equina. Given patient's obesity, likely compressed nerve. Patient likely also has venous insufficiency given her BL nonpitting edema. Recommend supportive management (tylenol/ ibuprofen, compression stockings, elevating legs). Hospitalist and primary team to follow up.    SANKET paged after patient complaining of 30 minutes of lower back pain and left leg paresthesias while sitting. Patient has a hx of chronic lower back pain. Denies CP, SOB, ab pain, n/v, bowel incontinence, recent injuries.  No hx of DM. TSH wnl.     Vital Signs Last 24 Hrs  T(C): 36.8 (21 Oct 2023 14:12), Max: 36.8 (21 Oct 2023 08:16)  T(F): 98.3 (21 Oct 2023 14:12), Max: 98.3 (21 Oct 2023 08:16)  HR: 63 (21 Oct 2023 14:12) (63 - 63)  BP: 112/72 (21 Oct 2023 14:12) (112/72 - 112/72)  BP(mean): --  RR: --  SpO2: --    PE     Weight 262 lbs  HEENT: Atraumatic, sitting in the common area  Resp: CTA b/l. No wheezes, ronchi, or crackles.    CV: pulses 2+ b/l, RRR, no murmurs.    MSK: tenderness to palpation over thoracic/lumbar spine   Ext: No gross deformities or bruising. ROM intact.  Neuro: awake, alert, grossly oriented. CN II-XII intact. Slightly decreased sensation and strength of left leg and foot, but patient was able to ambulate to her room.        A/P   Patient's left leg was warm, well-perfused, ROM intact. Able to ambulate. No concerns for stroke, DVT, cauda equina. Given patient's obesity, likely compressed nerve. Recommend supportive management (tylenol/ ibuprofen). Hospitalist and primary team to follow up.

## 2023-10-21 NOTE — BH INPATIENT PSYCHIATRY PROGRESS NOTE - NSBHCHARTREVIEWVS_PSY_A_CORE FT
Vital Signs Last 24 Hrs  T(C): 36.8 (10-21-23 @ 14:12), Max: 36.8 (10-21-23 @ 08:16)  T(F): 98.3 (10-21-23 @ 14:12), Max: 98.3 (10-21-23 @ 08:16)  HR: 63 (10-21-23 @ 14:12) (63 - 63)  BP: 112/72 (10-21-23 @ 14:12) (112/72 - 112/72)  BP(mean): --  RR: --  SpO2: --    Orthostatic VS  10-21-23 @ 08:16  Lying BP: --/-- HR: --  Sitting BP: 132/83 HR: 83  Standing BP: 116/70 HR: 100  Site: --  Mode: --  Orthostatic VS  10-20-23 @ 08:28  Lying BP: --/-- HR: --  Sitting BP: 110/80 HR: 84  Standing BP: 117/73 HR: 91  Site: --  Mode: --

## 2023-10-21 NOTE — BH INPATIENT PSYCHIATRY PROGRESS NOTE - MSE UNSTRUCTURED FT
Appearance: Dressed appropriately.  Attitude / Behavior: cooperative.   Relatedness: fair   Eye contact: appropriate   Motor: No abnormal movements, no psychomotor slowing or activation.  Speech: Regular rate. spontaneous   Mood: "anxious"   Affect: anxious; mood-congruent. there is reactivity   Thought Process: organized.   Thought Content: currently denies SI and HI but reports having passive thoughts earlier this AM   Perceptual: denies AVH   Insight: limited   Judgment: limited   Impulse control: fair over this interval   Cognition: grossly intact  Gait: Intact.

## 2023-10-22 LAB
ANION GAP SERPL CALC-SCNC: 13 MMOL/L — SIGNIFICANT CHANGE UP (ref 7–14)
ANION GAP SERPL CALC-SCNC: 13 MMOL/L — SIGNIFICANT CHANGE UP (ref 7–14)
BUN SERPL-MCNC: 16 MG/DL — SIGNIFICANT CHANGE UP (ref 7–23)
BUN SERPL-MCNC: 16 MG/DL — SIGNIFICANT CHANGE UP (ref 7–23)
CALCIUM SERPL-MCNC: 9.2 MG/DL — SIGNIFICANT CHANGE UP (ref 8.4–10.5)
CALCIUM SERPL-MCNC: 9.2 MG/DL — SIGNIFICANT CHANGE UP (ref 8.4–10.5)
CHLORIDE SERPL-SCNC: 103 MMOL/L — SIGNIFICANT CHANGE UP (ref 98–107)
CHLORIDE SERPL-SCNC: 103 MMOL/L — SIGNIFICANT CHANGE UP (ref 98–107)
CO2 SERPL-SCNC: 23 MMOL/L — SIGNIFICANT CHANGE UP (ref 22–31)
CO2 SERPL-SCNC: 23 MMOL/L — SIGNIFICANT CHANGE UP (ref 22–31)
CREAT SERPL-MCNC: 0.69 MG/DL — SIGNIFICANT CHANGE UP (ref 0.5–1.3)
CREAT SERPL-MCNC: 0.69 MG/DL — SIGNIFICANT CHANGE UP (ref 0.5–1.3)
EGFR: 123 ML/MIN/1.73M2 — SIGNIFICANT CHANGE UP
EGFR: 123 ML/MIN/1.73M2 — SIGNIFICANT CHANGE UP
GLUCOSE SERPL-MCNC: 83 MG/DL — SIGNIFICANT CHANGE UP (ref 70–99)
GLUCOSE SERPL-MCNC: 83 MG/DL — SIGNIFICANT CHANGE UP (ref 70–99)
POTASSIUM SERPL-MCNC: 4.2 MMOL/L — SIGNIFICANT CHANGE UP (ref 3.5–5.3)
POTASSIUM SERPL-MCNC: 4.2 MMOL/L — SIGNIFICANT CHANGE UP (ref 3.5–5.3)
POTASSIUM SERPL-SCNC: 4.2 MMOL/L — SIGNIFICANT CHANGE UP (ref 3.5–5.3)
POTASSIUM SERPL-SCNC: 4.2 MMOL/L — SIGNIFICANT CHANGE UP (ref 3.5–5.3)
SODIUM SERPL-SCNC: 139 MMOL/L — SIGNIFICANT CHANGE UP (ref 135–145)
SODIUM SERPL-SCNC: 139 MMOL/L — SIGNIFICANT CHANGE UP (ref 135–145)

## 2023-10-22 PROCEDURE — 99231 SBSQ HOSP IP/OBS SF/LOW 25: CPT

## 2023-10-22 RX ORDER — BENZOCAINE 10 %
1 GEL (GRAM) MUCOUS MEMBRANE EVERY 6 HOURS
Refills: 0 | Status: DISCONTINUED | OUTPATIENT
Start: 2023-10-22 | End: 2023-12-06

## 2023-10-22 RX ORDER — IBUPROFEN 200 MG
600 TABLET ORAL ONCE
Refills: 0 | Status: COMPLETED | OUTPATIENT
Start: 2023-10-22 | End: 2023-10-22

## 2023-10-22 RX ADMIN — BUDESONIDE AND FORMOTEROL FUMARATE DIHYDRATE 2 PUFF(S): 160; 4.5 AEROSOL RESPIRATORY (INHALATION) at 08:37

## 2023-10-22 RX ADMIN — Medication 650 MILLIGRAM(S): at 06:58

## 2023-10-22 RX ADMIN — PANTOPRAZOLE SODIUM 40 MILLIGRAM(S): 20 TABLET, DELAYED RELEASE ORAL at 08:36

## 2023-10-22 RX ADMIN — Medication 650 MILLIGRAM(S): at 14:51

## 2023-10-22 RX ADMIN — MONTELUKAST 10 MILLIGRAM(S): 4 TABLET, CHEWABLE ORAL at 20:28

## 2023-10-22 RX ADMIN — Medication 600 MILLIGRAM(S): at 14:51

## 2023-10-22 RX ADMIN — BUDESONIDE AND FORMOTEROL FUMARATE DIHYDRATE 2 PUFF(S): 160; 4.5 AEROSOL RESPIRATORY (INHALATION) at 20:28

## 2023-10-22 RX ADMIN — Medication 2 MILLIGRAM(S): at 23:05

## 2023-10-22 RX ADMIN — Medication 650 MILLIGRAM(S): at 23:05

## 2023-10-22 RX ADMIN — LIDOCAINE 1 PATCH: 4 CREAM TOPICAL at 08:36

## 2023-10-22 RX ADMIN — HALOPERIDOL DECANOATE 5 MILLIGRAM(S): 100 INJECTION INTRAMUSCULAR at 23:05

## 2023-10-22 NOTE — BH INPATIENT PSYCHIATRY PROGRESS NOTE - MSE UNSTRUCTURED FT
Appearance: Dressed appropriately.  Attitude / Behavior: cooperative.   Relatedness: fair   Eye contact: appropriate   Motor: No abnormal movements, no psychomotor slowing or activation.  Speech: Regular rate. spontaneous   Mood: "fine"   Affect: anxious; mood-congruent. there is reactivity   Thought Process: organized.   Thought Content: currently denies SI and HI but reports having passive thoughts earlier this AM   Perceptual: denies AVH   Insight: limited   Judgment: limited   Impulse control: fair over this interval   Cognition: grossly intact  Gait: Intact.

## 2023-10-22 NOTE — BH INPATIENT PSYCHIATRY PROGRESS NOTE - CURRENT MEDICATION
MEDICATIONS  (STANDING):  budesonide  80 MICROgram(s)/formoterol 4.5 MICROgram(s) Inhaler 2 Puff(s) Inhalation two times a day  influenza   Vaccine 0.5 milliLiter(s) IntraMuscular once  lidocaine   4% Patch 1 Patch Transdermal daily  montelukast 10 milliGRAM(s) Oral at bedtime  pantoprazole    Tablet 40 milliGRAM(s) Oral before breakfast    MEDICATIONS  (PRN):  acetaminophen     Tablet .. 650 milliGRAM(s) Oral every 6 hours PRN Moderate Pain (4 - 6)  albuterol    90 MICROgram(s) HFA Inhaler 2 Puff(s) Inhalation every 6 hours PRN Shortness of Breath and/or Wheezing  benzocaine 20% Spray 1 Spray(s) Topical every 6 hours PRN tooth pain  diphenhydrAMINE 50 milliGRAM(s) Oral every 6 hours PRN eps ppx  diphenhydrAMINE Injectable 50 milliGRAM(s) IntraMuscular once PRN agitation / eps ppx  haloperidol     Tablet 5 milliGRAM(s) Oral every 8 hours PRN agitation  haloperidol    Injectable 5 milliGRAM(s) IntraMuscular once PRN Agitation  hydrOXYzine hydrochloride 50 milliGRAM(s) Oral every 12 hours PRN Anxiety  LORazepam     Tablet 2 milliGRAM(s) Oral every 8 hours PRN Anxiety  LORazepam   Injectable 2 milliGRAM(s) IntraMuscular once PRN Assaultive behavior  ondansetron    Tablet 4 milliGRAM(s) Oral every 6 hours PRN nausea

## 2023-10-22 NOTE — BH INPATIENT PSYCHIATRY PROGRESS NOTE - NSBHFUPINTERVALHXFT_PSY_A_CORE
chart reviewed including pertinent labs. Case discussed with nursing staff. patient notes no changes to their mood and feels "fine." they expressed R. sided mouth pain this AM upon awakening, describes it as moderate, unable to identify any triggering event and feels it may be wisdom teeth related. Pt able to finish their meal this AM, states they were chewing mostly on their left side. no bleeding. pt offered PRN Benzocaine spray and agrees to try. Pt denies AVH. pt reports no SI over this interval and states last time they've have fleeting thoughts were yesterday AM. She is in good behavioral control

## 2023-10-22 NOTE — BH INPATIENT PSYCHIATRY PROGRESS NOTE - NSBHCHARTREVIEWVS_PSY_A_CORE FT
Vital Signs Last 24 Hrs  T(C): 36.4 (10-22-23 @ 08:20), Max: 36.8 (10-21-23 @ 14:12)  T(F): 97.6 (10-22-23 @ 08:20), Max: 98.3 (10-21-23 @ 14:12)  HR: 63 (10-21-23 @ 14:12) (63 - 63)  BP: 112/72 (10-21-23 @ 14:12) (112/72 - 112/72)  BP(mean): --  RR: --  SpO2: --    Orthostatic VS  10-22-23 @ 08:20  Lying BP: --/-- HR: --  Sitting BP: 129/92 HR: 89  Standing BP: 132/99 HR: 98  Site: --  Mode: --  Orthostatic VS  10-21-23 @ 08:16  Lying BP: --/-- HR: --  Sitting BP: 132/83 HR: 83  Standing BP: 116/70 HR: 100  Site: --  Mode: --

## 2023-10-22 NOTE — BH INPATIENT PSYCHIATRY PROGRESS NOTE - NSBHASSESSSUMMFT_PSY_ALL_CORE
ASSESSMENT  Patient is 27yo single woman, no dependents, domiciled with her Grandmother, unemployed on disability/SSI, pphx of schizoaffective disorder, multiple past psychiatric admissions including state and recent discharge from [facility], in tx [facility], with pmhx of asthma, HTN, GERD, and hypothyroidism and schizoaffective disorder, in tx  with [facility], who self presented to the ED reporting abdominal pain and requesting readmission to psychiatric unit, reporting CAH, depressive symptoms, IOR, suicidality, admitted to [facility] for psychiatric care.  Depression and psychosis likely multifactorial at this time, schizoaffective vs. borderline personality disorder vs. complex grief vs. adjustment disorder.      subjective mood largely unchanged. No SI over this interval. pt complains of mouth pain. benzocaine spray prn ordered. will renew CO due to concerns in relation to recent SI     PLAN  Legal  - patient admitted on 9.13 voluntary    Safety  - CO initiated for suicidality, per events in Chart Note    Psychiatric  -  Abilify Maintenna 400mg IM q3 weeks rather than 4, per collateral from ACT team. Last dose on 10/19  - discontinue mirtazapine 7.5mg PO qHS, per collateral from ACT team  - PRN: haloperidol 5mg PO/IM q8hrs PRN for agitation, diphenhydramine 50mg PO/IM q6hrs PRN for EPS PPx, hydroxyzine 50mg PO q12hrs PRN for anxiety, lorazepam 2mg PO q8hrs PRN for anxiety or 2mg IM for assaultive behavior    Medical    - pantoprazole 40mg PO before breakfast for GERD  - montelukast 10mg PO qHS for asthma  - budesonide 80mcg/formoterol 4.5mcg 2 puffs BID for asthma  - PRN: acetaminophen 650mg PO q6hrs PRN for pain, albuterol 90mcg 2puffs q6hrs PRN for dyspnea, ondansetron 4mg q6hrs PRN for nausea    Other  - I/G/M    Dispo  - pending clinical improvement  - application for state hospitalization initiated

## 2023-10-22 NOTE — BH CHART NOTE - NSEVENTNOTEFT_PSY_ALL_CORE
Interval History:  SANKET paged at 14:08 to evaluate c/o LBP and LLE pain. Per patient, onset of symptoms started 1 week ago. Reports intermittent radicular pain in LLE, which has been constant for the last 30 minutes. Patient endorses subjective episode of bowel incontinence, which she describes as "diarrhea" (although RN who was present states no evidence of bowel incontinence observed when assisted in restroom). Patient endorses numbness in left leg, ROS negative otherwise. Pt denies chest pain, SOB, or edema. Denies headache, visual changes, confusion, or n/v.       T(C): 36.4 (10-22-23 @ 08:20), Max: 36.4 (10-22-23 @ 08:20)  HR: --  BP: --  RR: --  SpO2: --    Physical Exam:  Gen: Patient sitting on hospital bed, NAD   HEENT: NC/AT,  EOMI.    Resp: CTA b/l. No wheezes, ronchi, or crackles.   CV: Radial pulses 2+ b/l, RRR, no murmurs.   Abd: soft, NTND, no guarding or rigidity. NABS.    Ext: ROM intact, no clubbing, edema, or cyanosis.   Neuro: awake, alert, grossly oriented, +numbness in L1-S1 dermatomes    Assessment:  SANKET called for [incident]. Pt clinically stable with exam otherwise unremarkable.     Plan:  1. no further medical intervention necessary at this time.   2. will continue to monitor routinely.   3. d/w RN staff    Interval History:  SANKET paged at 14:08 to evaluate c/o LBP and LLE pain. Per patient, onset of symptoms started 1 week ago. Reports intermittent radicular pain and numbness in LLE, which has been constant for the last 30 minutes prior to assessment. Patient endorses subjective episode of bowel incontinence, which she describes as "diarrhea" (although RN who was present states no evidence of true bowel incontinence observed when she assisted patient in restroom). Patient endorses numbness in left leg, ROS negative otherwise. Patient reports Motrin 400mg x 1 which was ordered last night was helpful for the pain. Pt denies chest pain, SOB, or edema. Denies headache, visual changes, confusion, or n/v.     T(C): 36.4 (10-22-23 @ 08:20), Max: 36.4 (10-22-23 @ 08:20)  HR: --  BP: --  RR: --  SpO2: --    Physical Exam:  Gen: Patient sitting on hospital bed, NAD   HEENT: NC/AT,  EOMI.    Resp: CTA b/l. No wheezes, ronchi, or crackles.   CV: Radial pulses 2+ b/l, RRR, no murmurs.   Abd: soft, NTND, no guarding or rigidity. NABS.    Ext: ROM intact, no clubbing, edema, or cyanosis.   Neuro: awake, alert, grossly oriented, +slight decrease in sensation in L1-S1 dermatomes in left leg. Patient observed ambulating in the hallway, s/p assessment    Assessment:  SANKET called for c/o LBP and LLE pain. Symptoms likely secondary to lumbar radiculopathy, given reported improvement in severity of pain with supportive measures and low suspicion for true bowel incontinence. Pt clinically stable with exam otherwise unremarkable.     Plan:  1. Order Motrin 600mg PO x 1, advised RN staff to administer Tylenol PRN simultaneously   2. will continue to monitor routinely.   3. d/w RN staff to notify SANKET of true bowel incontinence or worsening symptoms    Interval History:  SANKET paged at 14:08 to evaluate c/o LBP and LLE pain. Per patient, onset of symptoms started 1 week ago. Reports intermittent radicular pain and numbness in LLE, which has been constant for the last 30 minutes prior to assessment. Patient endorses subjective episode of bowel incontinence, which she describes as "diarrhea" (although RN who was present states no evidence of true bowel incontinence observed when she assisted patient in restroom). Patient endorses numbness in left leg, ROS negative otherwise. Patient reports Motrin 400mg x 1 which was ordered last night was helpful for the pain. Pt denies chest pain, SOB, or edema. Denies headache, visual changes, confusion, or n/v.     T(C): 36.4 (10-22-23 @ 08:20), Max: 36.4 (10-22-23 @ 08:20)  HR: --  BP: --  RR: --  SpO2: --    Physical Exam:  Gen: Patient sitting on hospital bed, NAD   HEENT: NC/AT,  EOMI.    Resp: CTA b/l. No wheezes, ronchi, or crackles.   CV: Radial pulses 2+ b/l, RRR, no murmurs.   Abd: soft, NTND, no guarding or rigidity. NABS.    Ext: ROM intact, no clubbing, edema, or cyanosis.   Neuro: awake, alert, grossly oriented, +slight decrease in sensation in L1-S1 dermatomes in left leg. Patient observed ambulating in the hallway, s/p assessment  MS: 4/5 strength in left leg    Assessment:  SANKET called for c/o LBP and LLE pain. Symptoms likely secondary to lumbar radiculopathy, given reported improvement in severity of pain with supportive measures and low suspicion for true bowel incontinence. Pt clinically stable with exam otherwise unremarkable.     Plan:  1. Order Motrin 600mg PO x 1, advised RN staff to administer Tylenol PRN simultaneously   2. will continue to monitor routinely.   3. d/w RN staff to notify SANKET of true bowel incontinence or worsening symptoms    Interval History:  SANKET paged at 14:08 to evaluate c/o LBP and LLE pain. Per patient, onset of symptoms started 1 week ago. Reports intermittent radicular pain and numbness in LLE, which has been constant for the last 30 minutes prior to assessment. Patient endorses subjective episode of bowel incontinence, which she describes as "diarrhea" (although RN who was present states no evidence of true bowel incontinence observed when she assisted patient in restroom). Patient reports Motrin 400mg x 1 which was ordered last night was helpful for the pain. ROS negative otherwise. Pt denies chest pain, SOB, or edema. Denies headache, visual changes, confusion, or n/v.     T(C): 36.4 (10-22-23 @ 08:20), Max: 36.4 (10-22-23 @ 08:20)  HR: --  BP: --  RR: --  SpO2: --    Physical Exam:  Gen: Patient sitting on hospital bed, NAD   HEENT: NC/AT,  EOMI.    Resp: CTA b/l. No wheezes, ronchi, or crackles.   CV: Radial pulses 2+ b/l, RRR, no murmurs.   Abd: soft, NTND, no guarding or rigidity. NABS.    Ext: ROM intact, no clubbing, edema, or cyanosis.   Neuro: awake, alert, grossly oriented, +slight decrease in sensation in L1-S1 dermatomes in left leg. Patient observed ambulating in the hallway, s/p assessment  MS: 4/5 strength in left leg    Assessment:  SANKET called for c/o LBP and LLE pain. Symptoms likely secondary to lumbar radiculopathy, given reported improvement in severity of pain with supportive measures and low suspicion for true bowel incontinence. Pt clinically stable with exam otherwise unremarkable.     Plan:  1. Order Motrin 600mg PO x 1, advised RN staff to administer Tylenol PRN simultaneously   2. will continue to monitor routinely.   3. d/w RN staff to notify SANKET of true bowel incontinence or worsening symptoms    Interval History:  SANKET paged at 14:08 to evaluate c/o LBP and LLE pain. Per patient, onset of symptoms started 1 week ago. Reports intermittent radicular pain and numbness in LLE, which has been constant for the last 30 minutes prior to assessment. Patient endorses subjective episode of bowel incontinence, which she describes as "diarrhea" (although RN who was present states no evidence of true bowel incontinence observed when she assisted patient in restroom). Patient reports Motrin 400mg x 1 which was ordered last night was helpful for the pain. ROS negative otherwise. Pt denies chest pain, SOB, or edema. Denies headache, visual changes, confusion, or n/v.     T(C): 36.4 (10-22-23 @ 08:20), Max: 36.4 (10-22-23 @ 08:20)  HR: --  BP: --  RR: --  SpO2: --    Physical Exam:  Gen: Patient sitting on hospital bed, NAD   HEENT: NC/AT,  EOMI.    Resp: CTA b/l. No wheezes, ronchi, or crackles.   CV: Radial pulses 2+ b/l, RRR, no murmurs.   Abd: soft, NTND, no guarding or rigidity. NABS.    Ext: ROM intact, no clubbing, edema, or cyanosis.   Neuro: awake, alert, grossly oriented, +slight decrease in strength and sensation in L1-S1 dermatomes of left leg. Patient observed ambulating in the hallway, s/p assessment      Assessment:  SANKET called for c/o LBP and LLE pain. Symptoms likely secondary to lumbar radiculopathy, given reported improvement in severity of pain with supportive measures and low suspicion for true bowel incontinence. Pt clinically stable with exam otherwise unremarkable.     Plan:  1. Order Motrin 600mg PO x 1, advised RN staff to administer Tylenol PRN simultaneously   2. will continue to monitor routinely.   3. d/w RN staff to notify SANKET of true bowel incontinence or worsening symptoms    Interval History:  SANKET paged at 14:08 to evaluate c/o LBP and LLE pain. Per patient, onset of symptoms started 1 week ago. Reports intermittent radicular pain and numbness in LLE, which has been constant for the last 30 minutes, prior to assessment. Patient endorses subjective episode of bowel incontinence, which she describes as "diarrhea" (although RN who was present states no evidence of true bowel incontinence observed when she assisted patient in restroom). Patient reports Motrin 400mg x 1 which was ordered last night was helpful for the pain. ROS negative otherwise. Pt denies chest pain, SOB, or edema. Denies headache, visual changes, confusion, or n/v.     T(C): 36.4 (10-22-23 @ 08:20), Max: 36.4 (10-22-23 @ 08:20)  HR: --  BP: --  RR: --  SpO2: --    Physical Exam:  Gen: Patient sitting on hospital bed, NAD   HEENT: NC/AT,  EOMI.    Resp: CTA b/l. No wheezes, ronchi, or crackles.   CV: Radial pulses 2+ b/l, RRR, no murmurs.   Abd: soft, NTND, no guarding or rigidity. NABS.    Ext: ROM intact, no clubbing, edema, or cyanosis.   Neuro: awake, alert, grossly oriented, +slight decrease in strength and sensation in L1-S1 dermatomes of left leg. Patient observed ambulating in the hallway, s/p assessment      Assessment:  SANKET called for c/o LBP and LLE pain. Symptoms likely secondary to lumbar radiculopathy, given reported improvement in severity of pain with supportive measures and low suspicion for true bowel incontinence. Pt clinically stable with exam otherwise unremarkable.     Plan:  1. Order Motrin 600mg PO x 1, advised RN staff to administer Tylenol PRN simultaneously   2. will continue to monitor routinely.   3. d/w RN staff to notify SANKTE of true bowel incontinence or worsening symptoms
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
## 2023-10-23 LAB
BASOPHILS # BLD AUTO: 0.02 K/UL — SIGNIFICANT CHANGE UP (ref 0–0.2)
BASOPHILS # BLD AUTO: 0.02 K/UL — SIGNIFICANT CHANGE UP (ref 0–0.2)
BASOPHILS NFR BLD AUTO: 0.2 % — SIGNIFICANT CHANGE UP (ref 0–2)
BASOPHILS NFR BLD AUTO: 0.2 % — SIGNIFICANT CHANGE UP (ref 0–2)
CRP SERPL-MCNC: 15.8 MG/L — HIGH
CRP SERPL-MCNC: 15.8 MG/L — HIGH
EOSINOPHIL # BLD AUTO: 0 K/UL — SIGNIFICANT CHANGE UP (ref 0–0.5)
EOSINOPHIL # BLD AUTO: 0 K/UL — SIGNIFICANT CHANGE UP (ref 0–0.5)
EOSINOPHIL NFR BLD AUTO: 0 % — SIGNIFICANT CHANGE UP (ref 0–6)
EOSINOPHIL NFR BLD AUTO: 0 % — SIGNIFICANT CHANGE UP (ref 0–6)
HCT VFR BLD CALC: 39.2 % — SIGNIFICANT CHANGE UP (ref 34.5–45)
HCT VFR BLD CALC: 39.2 % — SIGNIFICANT CHANGE UP (ref 34.5–45)
HGB BLD-MCNC: 12.5 G/DL — SIGNIFICANT CHANGE UP (ref 11.5–15.5)
HGB BLD-MCNC: 12.5 G/DL — SIGNIFICANT CHANGE UP (ref 11.5–15.5)
IANC: 6.68 K/UL — SIGNIFICANT CHANGE UP (ref 1.8–7.4)
IANC: 6.68 K/UL — SIGNIFICANT CHANGE UP (ref 1.8–7.4)
IMM GRANULOCYTES NFR BLD AUTO: 0.2 % — SIGNIFICANT CHANGE UP (ref 0–0.9)
IMM GRANULOCYTES NFR BLD AUTO: 0.2 % — SIGNIFICANT CHANGE UP (ref 0–0.9)
LYMPHOCYTES # BLD AUTO: 2.88 K/UL — SIGNIFICANT CHANGE UP (ref 1–3.3)
LYMPHOCYTES # BLD AUTO: 2.88 K/UL — SIGNIFICANT CHANGE UP (ref 1–3.3)
LYMPHOCYTES # BLD AUTO: 28.2 % — SIGNIFICANT CHANGE UP (ref 13–44)
LYMPHOCYTES # BLD AUTO: 28.2 % — SIGNIFICANT CHANGE UP (ref 13–44)
MCHC RBC-ENTMCNC: 25.5 PG — LOW (ref 27–34)
MCHC RBC-ENTMCNC: 25.5 PG — LOW (ref 27–34)
MCHC RBC-ENTMCNC: 31.9 GM/DL — LOW (ref 32–36)
MCHC RBC-ENTMCNC: 31.9 GM/DL — LOW (ref 32–36)
MCV RBC AUTO: 80 FL — SIGNIFICANT CHANGE UP (ref 80–100)
MCV RBC AUTO: 80 FL — SIGNIFICANT CHANGE UP (ref 80–100)
MONOCYTES # BLD AUTO: 0.62 K/UL — SIGNIFICANT CHANGE UP (ref 0–0.9)
MONOCYTES # BLD AUTO: 0.62 K/UL — SIGNIFICANT CHANGE UP (ref 0–0.9)
MONOCYTES NFR BLD AUTO: 6.1 % — SIGNIFICANT CHANGE UP (ref 2–14)
MONOCYTES NFR BLD AUTO: 6.1 % — SIGNIFICANT CHANGE UP (ref 2–14)
NEUTROPHILS # BLD AUTO: 6.68 K/UL — SIGNIFICANT CHANGE UP (ref 1.8–7.4)
NEUTROPHILS # BLD AUTO: 6.68 K/UL — SIGNIFICANT CHANGE UP (ref 1.8–7.4)
NEUTROPHILS NFR BLD AUTO: 65.3 % — SIGNIFICANT CHANGE UP (ref 43–77)
NEUTROPHILS NFR BLD AUTO: 65.3 % — SIGNIFICANT CHANGE UP (ref 43–77)
NRBC # BLD: 0 /100 WBCS — SIGNIFICANT CHANGE UP (ref 0–0)
NRBC # BLD: 0 /100 WBCS — SIGNIFICANT CHANGE UP (ref 0–0)
NRBC # FLD: 0 K/UL — SIGNIFICANT CHANGE UP (ref 0–0)
NRBC # FLD: 0 K/UL — SIGNIFICANT CHANGE UP (ref 0–0)
PLATELET # BLD AUTO: 273 K/UL — SIGNIFICANT CHANGE UP (ref 150–400)
PLATELET # BLD AUTO: 273 K/UL — SIGNIFICANT CHANGE UP (ref 150–400)
RBC # BLD: 4.9 M/UL — SIGNIFICANT CHANGE UP (ref 3.8–5.2)
RBC # BLD: 4.9 M/UL — SIGNIFICANT CHANGE UP (ref 3.8–5.2)
RBC # FLD: 15.8 % — HIGH (ref 10.3–14.5)
RBC # FLD: 15.8 % — HIGH (ref 10.3–14.5)
TROPONIN T, HIGH SENSITIVITY RESULT: <6 NG/L — SIGNIFICANT CHANGE UP
TROPONIN T, HIGH SENSITIVITY RESULT: <6 NG/L — SIGNIFICANT CHANGE UP
WBC # BLD: 10.22 K/UL — SIGNIFICANT CHANGE UP (ref 3.8–10.5)
WBC # BLD: 10.22 K/UL — SIGNIFICANT CHANGE UP (ref 3.8–10.5)
WBC # FLD AUTO: 10.22 K/UL — SIGNIFICANT CHANGE UP (ref 3.8–10.5)
WBC # FLD AUTO: 10.22 K/UL — SIGNIFICANT CHANGE UP (ref 3.8–10.5)

## 2023-10-23 PROCEDURE — 99232 SBSQ HOSP IP/OBS MODERATE 35: CPT | Mod: GC

## 2023-10-23 RX ORDER — LANOLIN ALCOHOL/MO/W.PET/CERES
5 CREAM (GRAM) TOPICAL AT BEDTIME
Refills: 0 | Status: DISCONTINUED | OUTPATIENT
Start: 2023-10-23 | End: 2024-02-06

## 2023-10-23 RX ORDER — CLOZAPINE 150 MG/1
25 TABLET, ORALLY DISINTEGRATING ORAL AT BEDTIME
Refills: 0 | Status: DISCONTINUED | OUTPATIENT
Start: 2023-10-23 | End: 2023-10-25

## 2023-10-23 RX ADMIN — Medication 650 MILLIGRAM(S): at 21:11

## 2023-10-23 RX ADMIN — LIDOCAINE 1 PATCH: 4 CREAM TOPICAL at 21:10

## 2023-10-23 RX ADMIN — Medication 650 MILLIGRAM(S): at 23:05

## 2023-10-23 RX ADMIN — BUDESONIDE AND FORMOTEROL FUMARATE DIHYDRATE 2 PUFF(S): 160; 4.5 AEROSOL RESPIRATORY (INHALATION) at 09:42

## 2023-10-23 RX ADMIN — PANTOPRAZOLE SODIUM 40 MILLIGRAM(S): 20 TABLET, DELAYED RELEASE ORAL at 09:43

## 2023-10-23 RX ADMIN — LIDOCAINE 1 PATCH: 4 CREAM TOPICAL at 09:43

## 2023-10-23 RX ADMIN — MONTELUKAST 10 MILLIGRAM(S): 4 TABLET, CHEWABLE ORAL at 21:10

## 2023-10-23 RX ADMIN — CLOZAPINE 25 MILLIGRAM(S): 150 TABLET, ORALLY DISINTEGRATING ORAL at 21:08

## 2023-10-23 NOTE — PHYSICAL THERAPY INITIAL EVALUATION ADULT - RANGE OF MOTION EXAMINATION, REHAB EVAL
bilateral lower extremity ROM was WFL (within functional limits)/trunk was WFL (within functional limits)

## 2023-10-23 NOTE — BH INPATIENT PSYCHIATRY PROGRESS NOTE - NSBHCHARTREVIEWVS_PSY_A_CORE FT
Vital Signs Last 24 Hrs  T(C): 36.4 (10-23-23 @ 08:29), Max: 36.4 (10-23-23 @ 08:29)  T(F): 97.5 (10-23-23 @ 08:29), Max: 97.5 (10-23-23 @ 08:29)  HR: --  BP: --  BP(mean): --  RR: --  SpO2: --    Orthostatic VS  10-23-23 @ 08:29  Lying BP: --/-- HR: --  Sitting BP: 151/67 HR: 90  Standing BP: 133/94 HR: 100  Site: --  Mode: --  Orthostatic VS  10-22-23 @ 08:20  Lying BP: --/-- HR: --  Sitting BP: 129/92 HR: 89  Standing BP: 132/99 HR: 98  Site: --  Mode: --
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
## 2023-10-23 NOTE — PHYSICAL THERAPY INITIAL EVALUATION ADULT - PERTINENT HX OF CURRENT PROBLEM, REHAB EVAL
Patient is a 27yo single woman, no dependents, domiciled with her Grandmother, unemployed, medical hx of asthma, HTN, GERD, and hypothyroidism and schizoaffective disorder, in tx  with [facility], who self presented to the ED per triage with abdominal pain, Patient referred to Physical therapy secondary to LBP
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
## 2023-10-23 NOTE — PHYSICAL THERAPY INITIAL EVALUATION ADULT - NS ASR WT BEARING DETAIL LLE
July 13, 2022    Pravin Chowdary  [address]  [place] [address]             [facility]  Pediatrics  [address]  [place] [address]  Phone: [phone]  Fax: [phone]   July 13, 2022     Patient: Pravin Chowdary   YOB: 2018   Date of Visit: 7/13/2022       To Whom it May Concern:    Pravin may not have scrambled eggs or boiled eggs as he has an allergic reaction to them.  He does tolerate baked goods containing eggs, such as cakes and breads.    If you have any questions or concerns, please don't hesitate to call.    Sincerely,           Sravanthi Guzman MD      weight-bearing as tolerated
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
## 2023-10-24 PROBLEM — F43.10 POST-TRAUMATIC STRESS DISORDER, UNSPECIFIED: Chronic | Status: ACTIVE | Noted: 2022-09-07

## 2023-10-24 PROCEDURE — 99232 SBSQ HOSP IP/OBS MODERATE 35: CPT | Mod: GC

## 2023-10-24 RX ADMIN — MONTELUKAST 10 MILLIGRAM(S): 4 TABLET, CHEWABLE ORAL at 20:55

## 2023-10-24 RX ADMIN — Medication 650 MILLIGRAM(S): at 21:18

## 2023-10-24 RX ADMIN — BUDESONIDE AND FORMOTEROL FUMARATE DIHYDRATE 2 PUFF(S): 160; 4.5 AEROSOL RESPIRATORY (INHALATION) at 08:55

## 2023-10-24 RX ADMIN — PANTOPRAZOLE SODIUM 40 MILLIGRAM(S): 20 TABLET, DELAYED RELEASE ORAL at 08:56

## 2023-10-24 RX ADMIN — LIDOCAINE 1 PATCH: 4 CREAM TOPICAL at 21:18

## 2023-10-24 RX ADMIN — Medication 650 MILLIGRAM(S): at 20:55

## 2023-10-24 RX ADMIN — LIDOCAINE 1 PATCH: 4 CREAM TOPICAL at 08:55

## 2023-10-24 RX ADMIN — BUDESONIDE AND FORMOTEROL FUMARATE DIHYDRATE 2 PUFF(S): 160; 4.5 AEROSOL RESPIRATORY (INHALATION) at 21:19

## 2023-10-24 RX ADMIN — Medication 5 MILLIGRAM(S): at 20:55

## 2023-10-24 RX ADMIN — CLOZAPINE 25 MILLIGRAM(S): 150 TABLET, ORALLY DISINTEGRATING ORAL at 20:55

## 2023-10-24 NOTE — BH PSYCHOLOGY - CLINICIAN PSYCHOTHERAPY NOTE - NSTXDCOTHRDATETRGT_PSY_ALL_CORE
26-Oct-2023 Alert-The patient is alert, awake and responds to voice. The patient is oriented to time, place, and person. The triage nurse is able to obtain subjective information.

## 2023-10-24 NOTE — BH DISCHARGE NOTE NURSING/SOCIAL WORK/PSYCH REHAB - NSDCPRGOAL_PSY_ALL_CORE
Patient has demonstrated progress towards psychiatric rehabilitation goals during current hospitalization. Patient has demonstrated daily medication compliance and is tolerating medications well. Patient reports improvement in mood compared to admission. Patient denies SI/HI/AVH. Patient was able to identify listening to music, talking to social supports, using positive affirmations, and engaging in various distractions (making bracelets, etc.) as effective coping skills to manage symptoms. Patient reports feeling confident in ability to utilize coping skills post discharge. Patient has attended approximately 75 percent of psychiatric rehabilitation groups during current hospitalization. Patient was verbal and engaged in group discussions and activities. Patient was able to tolerate group structure and did not require redirection. Patient was visible in the milieu. Patient increasingly socialized with select peers appropriately. Patient was able to make needs known to staff. Patient has demonstrated improved insight into the current episode and was able to identify worsening mood, over thinking, increased crying and various physical symptoms (increased heart rate, etc.) as warning signs that a crisis may be developing. Patient expressed readiness for discharge. Patient and staff collaborated on safety planning prior to discharge.

## 2023-10-24 NOTE — BH PSYCHOLOGY - CLINICIAN PSYCHOTHERAPY NOTE - NSBHPSYCHOLINT_PSY_A_CORE
Cognitive/behavioral therapy/Dynamic issues addressed/Interpersonal Therapy (IPT)/Reality testing/Supported coping skills/Supportive therapy/Treatment compliance encouraged/other...

## 2023-10-24 NOTE — BH PSYCHOLOGY - CLINICIAN PSYCHOTHERAPY NOTE - NSBHPSYCHOLNARRATIVE_PSY_A_CORE FT
Writer met with Pt for 30-minute individual therapy session. Pt was alert and presented with adequate hygiene and grooming. Pt denied experiencing any A/V hallucinations. Pt reported nervous mood, full affect. Her thought process was linear and goal directed. Pts’ speech was WNL, content was relevant to discussion. Pt denied passive or active SI, HI or NSSI. Oriented x3. Limited insight and judgement demonstrated.      Pt shared that she ingested soap on Friday and was put on CO. Explored with pt preceding events, feelings and use of coping skills. Pt shared feeling too overwhelmed, attempting to shower and walk with no success. Explored needs pt may have gotten met by engaging in self injurious behavior and now having CO with pt stating that talking to them does help in feeling less alone. Explored more adaptive coping. Pt shared feeling nervous about upcoming state hearing, due to being worried they will say something that is hurtful, as well as nervous about certain questions she has (where she will go etc.). Explored with pt feelings of invalidation and rejection and how to cope with that using different tools (naming the feeling, interpersonal kills, making space for the emotion, urge surfing). Wr and Pt identified questions pt has and wrote them down for pt to take to meeting.

## 2023-10-24 NOTE — BH INPATIENT PSYCHIATRY PROGRESS NOTE - MSE UNSTRUCTURED FT
Appearance: Dressed appropriately. Well groomed.   Attitude / Behavior: cooperative.   Relatedness: good   Eye contact: good  Motor: No abnormal movements, no psychomotor slowing or activation.  Speech: Regular rate, volume, tone. Spontaneous speech  Mood: "iffy"   Affect: calm   Thought Process: linear, goal directed.  Thought Content: continued passive suicidal ideation, with continued AVH. No HI.  Insight: limited   Judgment: limited   Impulse control: fair  Cognition: grossly intact  Gait: Intact.  Appearance: Dressed appropriately. Well groomed.   Attitude / Behavior: cooperative.   Relatedness: good   Eye contact: good  Motor: No abnormal movements, no psychomotor slowing or activation.  Speech: Regular rate, volume, tone. Spontaneous speech  Mood: "iffy"   Affect: calm   Thought Process: linear, goal directed.  Thought Content: continued passive thoughts of death, with continued AVH. No HI.  Insight: limited   Judgment: limited   Impulse control: fair  Attention: fair  Language fluent.  Gait: Intact.

## 2023-10-24 NOTE — BH INPATIENT PSYCHIATRY PROGRESS NOTE - NSBHMETABOLIC_PSY_ALL_CORE_FT
BMI: BMI (kg/m2): 57.8 (09-14-23 @ 14:30)  HbA1c:   Glucose:   BP: 112/72 (10-21-23 @ 14:12) (112/72 - 112/72)  Lipid Panel:  BMI: BMI (kg/m2): 57.8 (09-14-23 @ 14:30)  HbA1c:   Glucose:   BP: --  Lipid Panel:

## 2023-10-24 NOTE — BH DISCHARGE NOTE NURSING/SOCIAL WORK/PSYCH REHAB - DISCHARGE INSTRUCTIONS AFTERCARE APPOINTMENTS
In order to check the location, date, or time of your aftercare appointment, please refer to your Discharge Instructions Document given to you upon leaving the hospital.  If you have lost the instructions please call [phone]

## 2023-10-24 NOTE — BH DISCHARGE NOTE NURSING/SOCIAL WORK/PSYCH REHAB - MODE OF TRANSPORTATION
MAS scheduled trip with [facility] [employer] for 11:30AM on 2/6, confirmation #[id number]./Medicaid Transportation

## 2023-10-24 NOTE — BH DISCHARGE NOTE NURSING/SOCIAL WORK/PSYCH REHAB - PATIENT PORTAL LINK FT
You can access the FollowMyHealth Patient Portal offered by [facility] by registering at the following website: [email or web address]. By joining [employer]’s FollowMyHealth portal, you will also be able to view your health information using other applications (apps) compatible with our system.

## 2023-10-24 NOTE — BH INPATIENT PSYCHIATRY PROGRESS NOTE - NSBHFUPINTERVALHXFT_PSY_A_CORE
Chart and vitals reviewed. Case discussed with treatment team. No events or PRNs required overnight. States that her mood is "iffy" because of "good things and bad things." Describes good things including her boyfriend coming to visit yesterday; states that he has been a good source of support lately for her mental health. Also describes her grandmother being discharged from the hospital in stable condition today and that she is alright. Stressors include seeing a hallucination of her grandfather this morning upon waking up, as well as hearing hallucinations of her mother's voice making disparaging remarks about her. Endorses continued passive suicidal ideation without intent or plan. Patient informed that benzocaine spray and melatonin have been added PRN, for her to request as needed. States that her lower back pain comes and goes, and that she has had minimal bowel incontinence. Patient encouraged to notify staff if changes arise.  Chart and vitals reviewed. Case discussed with treatment team. No events or PRNs required overnight. States that her mood is "iffy" because of "good things and bad things." Describes good things including her boyfriend coming to visit yesterday; states that he has been a good source of support lately for her mental health. Also describes her grandmother being discharged from the hospital in stable condition today and that she is alright. Stressors include seeing a hallucination of her grandfather this morning upon waking up, as well as hearing hallucinations of her mother's voice making disparaging remarks about her. Endorses continued passive thoughts of death without ideation intent or plan. Patient informed that benzocaine spray and melatonin have been added PRN, for her to request as needed. States that her lower back pain comes and goes, and that she has had minimal bowel incontinence. Patient encouraged to notify staff if changes arise.

## 2023-10-24 NOTE — BH INPATIENT PSYCHIATRY PROGRESS NOTE - NSBHCHARTREVIEWVS_PSY_A_CORE FT
Vital Signs Last 24 Hrs  T(C): 36.8 (10-24-23 @ 08:23), Max: 36.8 (10-23-23 @ 20:43)  T(F): 98.3 (10-24-23 @ 08:23), Max: 98.3 (10-23-23 @ 20:43)  HR: --  BP: --  BP(mean): --  RR: --  SpO2: --    Orthostatic VS  10-24-23 @ 08:23  Lying BP: --/-- HR: --  Sitting BP: 133/81 HR: 89  Standing BP: 118/65 HR: 95  Site: --  Mode: --  Orthostatic VS  10-23-23 @ 20:43  Lying BP: --/-- HR: --  Sitting BP: 136/89 HR: 89  Standing BP: 130/92 HR: 86  Site: --  Mode: --  Orthostatic VS  10-23-23 @ 08:29  Lying BP: --/-- HR: --  Sitting BP: 151/67 HR: 90  Standing BP: 133/94 HR: 100  Site: --  Mode: --

## 2023-10-24 NOTE — BH INPATIENT PSYCHIATRY PROGRESS NOTE - NSBHASSESSSUMMFT_PSY_ALL_CORE
ASSESSMENT  Patient is 29yo single woman, no dependents, domiciled with her Grandmother, unemployed on disability/SSI, pphx of schizoaffective disorder, multiple past psychiatric admissions including state and recent discharge from [facility], in tx with [facility], with pmhx of asthma, HTN, GERD, and hypothyroidism and schizoaffective disorder, in tx  with [facility], who self presented to the ED reporting abdominal pain and requesting readmission to psychiatric unit, reporting CAH, depressive symptoms, IOR, suicidality, admitted to [facility] for psychiatric care.  Depression and psychosis likely multifactorial at this time, schizoaffective vs. borderline personality disorder vs. complex grief vs. adjustment disorder.      On evaluation today the patient continues to endorse ambivalent mood with continued passive SI, as well as continued psychotic symptoms in the form of visual and auditory hallucinations.     PLAN  Legal  - patient admitted on 9.13 voluntary    Safety  - CO 1:1 for suicidal behavior    Psychiatric  - continue clozapine 25mg qHS for psychosis ([id number])  - Abilify Maintenna 400mg IM q3 weeks rather than 4, per collateral from ACT team. Last dose on 10/19  - discontinue mirtazapine 7.5mg PO qHS, per collateral from ACT team  - PRN: haloperidol 5mg PO/IM q8hrs PRN for agitation, diphenhydramine 50mg PO/IM q6hrs PRN for EPS PPx, hydroxyzine 50mg PO q12hrs PRN for anxiety, lorazepam 2mg PO q8hrs PRN for anxiety or 2mg IM for assaultive behavior. Melatonin 5mg qHS PRN for insomnia    Medical  - last clozapine labs on 10/23 (CBC with diff, troponins, CRP); CRP elevated at 15.8 likely due to dental infection/wisdom tooth issues  - pantoprazole 40mg PO before breakfast for GERD  - montelukast 10mg PO qHS for asthma  - budesonide 80mcg/formoterol 4.5mcg 2 puffs BID for asthma  - PRN: acetaminophen 650mg PO q6hrs PRN for pain, albuterol 90mcg 2puffs q6hrs PRN for dyspnea, ondansetron 4mg q6hrs PRN for nausea    Other  - I/G/M    Dispo  - pending clinical improvement  - meeting with hospital leadership for state hospitalization scheduled for tomorrow  ASSESSMENT  Patient is 29yo single woman, no dependents, domiciled with her Grandmother, unemployed on disability/SSI, pphx of schizoaffective disorder, multiple past psychiatric admissions including state and recent discharge from [facility], in tx with [facility], with pmhx of asthma, HTN, GERD, and hypothyroidism and schizoaffective disorder, in tx  with [facility], who self presented to the ED reporting abdominal pain and requesting readmission to psychiatric unit, reporting CAH, depressive symptoms, IOR, suicidality, admitted to [facility] for psychiatric care.  Depression and psychosis likely multifactorial at this time, schizoaffective vs. borderline personality disorder vs. complex grief vs. adjustment disorder.      On evaluation today the patient continues to endorse ambivalent mood with continued passive SI, as well as continued psychotic symptoms in the form of visual and auditory hallucinations.     PLAN  Legal  - patient admitted on 9.13 voluntary    Safety  - CO 1:1 for suicidal behavior    Psychiatric  - continue clozapine 25mg qHS for psychosis ([id number])  - Abilify Maintenna 400mg IM q3 weeks rather than 4, per collateral from ACT team. Last dose on 10/19  - discontinue mirtazapine 7.5mg PO qHS, per collateral from ACT team  - PRN: haloperidol 5mg PO/IM q8hrs PRN for agitation, diphenhydramine 50mg PO/IM q6hrs PRN for EPS PPx, hydroxyzine 50mg PO q12hrs PRN for anxiety, lorazepam 2mg PO q8hrs PRN for anxiety or 2mg IM for assaultive behavior. Melatonin 5mg qHS PRN for insomnia    Medical  - dental consult placed 10/24 for cavity and wisdom tooth pain  - last clozapine labs on 10/23 (CBC with diff, troponins, CRP); CRP elevated at 15.8 likely due to dental infection/wisdom tooth issues  - pantoprazole 40mg PO before breakfast for GERD  - montelukast 10mg PO qHS for asthma  - budesonide 80mcg/formoterol 4.5mcg 2 puffs BID for asthma  - PRN: acetaminophen 650mg PO q6hrs PRN for pain, albuterol 90mcg 2puffs q6hrs PRN for dyspnea, ondansetron 4mg q6hrs PRN for nausea    Other  - I/G/M    Dispo  - pending clinical improvement  - meeting with hospital leadership for state hospitalization scheduled for tomorrow  ASSESSMENT  Patient is 27yo single woman, no dependents, domiciled with her Grandmother, unemployed on disability/SSI, pphx of schizoaffective disorder, multiple past psychiatric admissions including state and recent discharge from [facility], in tx [facility], with pmhx of asthma, HTN, GERD, and hypothyroidism and schizoaffective disorder, in tx  with [facility], who self presented to the ED reporting abdominal pain and requesting readmission to psychiatric unit, reporting CAH, depressive symptoms, IOR, suicidality, admitted to [facility] for psychiatric care.  Depression and psychosis likely multifactorial at this time, schizoaffective vs. borderline personality disorder vs. complex grief vs. adjustment disorder.      On evaluation today the patient continues to endorse ambivalent mood with continued passive thoughts of death, as well as continued psychotic symptoms in the form of visual and auditory hallucinations.     PLAN  Legal  - patient admitted on 9.13 voluntary    Safety  - CO 1:1 for suicidal behavior    Psychiatric  - continue clozapine 25mg qHS for psychosis ([id number])  - Abilify Maintenna 400mg IM q3 weeks rather than 4, per collateral from ACT team. Last dose on 10/19  - discontinue mirtazapine 7.5mg PO qHS, per collateral from ACT team  - PRN: haloperidol 5mg PO/IM q8hrs PRN for agitation, diphenhydramine 50mg PO/IM q6hrs PRN for EPS PPx, hydroxyzine 50mg PO q12hrs PRN for anxiety, lorazepam 2mg PO q8hrs PRN for anxiety or 2mg IM for assaultive behavior. Melatonin 5mg qHS PRN for insomnia    Medical  - dental consult placed 10/24 for cavity and wisdom tooth pain  - last clozapine labs on 10/23 (CBC with diff, troponins, CRP); CRP elevated at 15.8 likely due to dental infection/wisdom tooth issues  - pantoprazole 40mg PO before breakfast for GERD  - montelukast 10mg PO qHS for asthma  - budesonide 80mcg/formoterol 4.5mcg 2 puffs BID for asthma  - PRN: acetaminophen 650mg PO q6hrs PRN for pain, albuterol 90mcg 2puffs q6hrs PRN for dyspnea, ondansetron 4mg q6hrs PRN for nausea    Other  - I/G/M    Dispo  - pending clinical improvement  - meeting with hospital leadership for [facility] hospitalization scheduled for tomorrow

## 2023-10-24 NOTE — BH DISCHARGE NOTE NURSING/SOCIAL WORK/PSYCH REHAB - NSCDUDCCRISIS_PSY_A_CORE
[facility] Well  1 [phone]) [facility]-WELL ([phone])  Text "WELL" to [phone]  Website: www.QFPay/.[employer] 1 (258) 621-XMDG (1918) Website: www.safehorizon.org/.National Suicide Prevention Lifeline 1 (855) 328-7413/.  [employer]  1 (634) LIFENET (009-5095)/.  [facility]  [address] [place] [address] [phone]   Hours:  Monday through Friday from 9 AM to 3 PM/988 Suicide and Crisis Lifeline

## 2023-10-25 PROCEDURE — 99232 SBSQ HOSP IP/OBS MODERATE 35: CPT | Mod: GC

## 2023-10-25 RX ORDER — CLOZAPINE 150 MG/1
50 TABLET, ORALLY DISINTEGRATING ORAL AT BEDTIME
Refills: 0 | Status: DISCONTINUED | OUTPATIENT
Start: 2023-10-25 | End: 2023-10-31

## 2023-10-25 RX ADMIN — CLOZAPINE 50 MILLIGRAM(S): 150 TABLET, ORALLY DISINTEGRATING ORAL at 20:37

## 2023-10-25 RX ADMIN — Medication 50 MILLIGRAM(S): at 11:38

## 2023-10-25 RX ADMIN — BUDESONIDE AND FORMOTEROL FUMARATE DIHYDRATE 2 PUFF(S): 160; 4.5 AEROSOL RESPIRATORY (INHALATION) at 08:34

## 2023-10-25 RX ADMIN — LIDOCAINE 1 PATCH: 4 CREAM TOPICAL at 08:33

## 2023-10-25 RX ADMIN — PANTOPRAZOLE SODIUM 40 MILLIGRAM(S): 20 TABLET, DELAYED RELEASE ORAL at 08:33

## 2023-10-25 RX ADMIN — HALOPERIDOL DECANOATE 5 MILLIGRAM(S): 100 INJECTION INTRAMUSCULAR at 11:38

## 2023-10-25 RX ADMIN — MONTELUKAST 10 MILLIGRAM(S): 4 TABLET, CHEWABLE ORAL at 20:37

## 2023-10-25 RX ADMIN — Medication 2 MILLIGRAM(S): at 11:38

## 2023-10-25 RX ADMIN — BUDESONIDE AND FORMOTEROL FUMARATE DIHYDRATE 2 PUFF(S): 160; 4.5 AEROSOL RESPIRATORY (INHALATION) at 20:37

## 2023-10-25 NOTE — BH INPATIENT PSYCHIATRY PROGRESS NOTE - NSBHCHARTREVIEWVS_PSY_A_CORE FT
Vital Signs Last 24 Hrs  T(C): 36.2 (10-25-23 @ 08:27), Max: 36.6 (10-24-23 @ 20:52)  T(F): 97.1 (10-25-23 @ 08:27), Max: 97.9 (10-24-23 @ 20:52)  HR: --  BP: --  BP(mean): --  RR: --  SpO2: --    Orthostatic VS  10-25-23 @ 08:27  Lying BP: --/-- HR: --  Sitting BP: 145/90 HR: 95  Standing BP: 140/88 HR: 101  Site: --  Mode: --  Orthostatic VS  10-24-23 @ 20:52  Lying BP: --/-- HR: --  Sitting BP: 142/77 HR: 86  Standing BP: 144/98 HR: 93  Site: --  Mode: --  Orthostatic VS  10-24-23 @ 08:23  Lying BP: --/-- HR: --  Sitting BP: 133/81 HR: 89  Standing BP: 118/65 HR: 95  Site: --  Mode: --  Orthostatic VS  10-23-23 @ 20:43  Lying BP: --/-- HR: --  Sitting BP: 136/89 HR: 89  Standing BP: 130/92 HR: 86  Site: --  Mode: --

## 2023-10-25 NOTE — BH INPATIENT PSYCHIATRY PROGRESS NOTE - NSBHFUPINTERVALHXFT_PSY_A_CORE
Chart and vitals reviewed. Case discussed with treatment team. No events or PRNs required overnight. Patient seen this morning with Dr. Horowitz, during meeting for state hospitalization application. Denies any issues at the time of interview but endorses continued symptoms (specified as auditory and visual hallucinations) and desire for continued treatment. States she was at [facility] last time which was very helpful. Currently on regimen of Abilify Maintenna 400mg (last does 10/19) and clozapine 25mg qHS without issue. ACT team, inpatient team, and patient herself all agreeable with potential transfer to [facility].

## 2023-10-25 NOTE — BH INPATIENT PSYCHIATRY PROGRESS NOTE - NSBHASSESSSUMMFT_PSY_ALL_CORE
ASSESSMENT  Patient is 29yo single woman, no dependents, domiciled with her Grandmother, unemployed on disability/SSI, pphx of schizoaffective disorder, multiple past psychiatric admissions including state and recent discharge from [facility], in tx with [facility], with pmhx of asthma, HTN, GERD, and hypothyroidism and schizoaffective disorder, in tx  with [facility], who self presented to the ED reporting abdominal pain and requesting readmission to psychiatric unit, reporting CAH, depressive symptoms, IOR, suicidality, admitted to [facility] for psychiatric care.  Depression and psychosis likely multifactorial at this time, schizoaffective vs. borderline personality disorder vs. complex grief vs. adjustment disorder.      On evaluation today the patient continues to endorse continued depression with ongoing AVH, thus continuing to request transfer to [facility].     PLAN  Legal  - patient admitted on 9.13 voluntary    Safety  - CO 1:1 for suicidal behavior    Psychiatric  - continue clozapine 25mg qHS for psychosis ([id number])  - Abilify Maintenna 400mg IM q3 weeks rather than 4, per collateral from ACT team. Last dose on 10/19  - discontinue mirtazapine 7.5mg PO qHS, per collateral from ACT team  - PRN: haloperidol 5mg PO/IM q8hrs PRN for agitation, diphenhydramine 50mg PO/IM q6hrs PRN for EPS PPx, hydroxyzine 50mg PO q12hrs PRN for anxiety, lorazepam 2mg PO q8hrs PRN for anxiety or 2mg IM for assaultive behavior. Melatonin 5mg qHS PRN for insomnia    Medical  - dental consult placed 10/24 for cavity and wisdom tooth pain  - last clozapine labs on 10/23 (CBC with diff, troponins, CRP); CRP elevated at 15.8 likely due to dental infection/wisdom tooth issues  - pantoprazole 40mg PO before breakfast for GERD  - montelukast 10mg PO qHS for asthma  - budesonide 80mcg/formoterol 4.5mcg 2 puffs BID for asthma  - PRN: acetaminophen 650mg PO q6hrs PRN for pain, albuterol 90mcg 2puffs q6hrs PRN for dyspnea, ondansetron 4mg q6hrs PRN for nausea    Other  - I/G/M    Dispo  - pending clinical improvement  - meeting with hospital leadership for state hospitalization scheduled for tomorrow  ASSESSMENT  Patient is 27yo single woman, no dependents, domiciled with her Grandmother, unemployed on disability/SSI, pphx of schizoaffective disorder, multiple past psychiatric admissions including state and recent discharge from [facility], in tx with [facility], with pmhx of asthma, HTN, GERD, and hypothyroidism and schizoaffective disorder, in tx  with [facility], who self presented to the ED reporting abdominal pain and requesting readmission to psychiatric unit, reporting CAH, depressive symptoms, IOR, suicidality, admitted to [facility] for psychiatric care.  Depression and psychosis likely multifactorial at this time, schizoaffective vs. borderline personality disorder vs. complex grief vs. adjustment disorder.      On evaluation today the patient continues to endorse continued depression with ongoing AVH, thus continuing to request transfer to [facility].     PLAN  Legal  - status changed to 9.27 2PC today    Safety  - CO 1:1 for suicidal behavior    Psychiatric  - continue clozapine 25mg qHS for psychosis ([id number])  - Abilify Maintenna 400mg IM q3 weeks rather than 4, per collateral from ACT team. Last dose on 10/19  - discontinue mirtazapine 7.5mg PO qHS, per collateral from ACT team  - PRN: haloperidol 5mg PO/IM q8hrs PRN for agitation, diphenhydramine 50mg PO/IM q6hrs PRN for EPS PPx, hydroxyzine 50mg PO q12hrs PRN for anxiety, lorazepam 2mg PO q8hrs PRN for anxiety or 2mg IM for assaultive behavior. Melatonin 5mg qHS PRN for insomnia    Medical  - dental consult placed 10/24 for cavity and wisdom tooth pain  - last clozapine labs on 10/23 (CBC with diff, troponins, CRP); CRP elevated at 15.8 likely due to dental infection/wisdom tooth issues  - pantoprazole 40mg PO before breakfast for GERD  - montelukast 10mg PO qHS for asthma  - budesonide 80mcg/formoterol 4.5mcg 2 puffs BID for asthma  - PRN: acetaminophen 650mg PO q6hrs PRN for pain, albuterol 90mcg 2puffs q6hrs PRN for dyspnea, ondansetron 4mg q6hrs PRN for nausea    Other  - I/G/M    Dispo  - status updated to 9.27 2PC for state hospitalization process  - pending clinical improvement  - meeting with hospital leadership for state hospitalization scheduled for tomorrow  ASSESSMENT  Patient is 27yo single woman, no dependents, domiciled with her Grandmother, unemployed on disability/SSI, pphx of schizoaffective disorder, multiple past psychiatric admissions including state and recent discharge from [facility], in tx with [facility], with pmhx of asthma, HTN, GERD, and hypothyroidism and schizoaffective disorder, in tx  with [facility], who self presented to the ED reporting abdominal pain and requesting readmission to psychiatric unit, reporting CAH, depressive symptoms, IOR, suicidality, admitted to [facility] for psychiatric care.  Depression and psychosis likely multifactorial at this time, schizoaffective vs. borderline personality disorder vs. complex grief vs. adjustment disorder.      On evaluation today the patient continues to endorse continued depression with ongoing AVH, thus continuing to request transfer to [facility].     PLAN  Legal  - status changed to 9.27 2PC today    Safety  - CO 1:1 for suicidal behavior    Psychiatric  - Increase clozapine to 50mg qHS for psychosis ([id number])  - Abilify Maintenna 400mg IM q3 weeks rather than 4, per collateral from ACT team. Last dose on 10/19  - discontinue mirtazapine 7.5mg PO qHS, per collateral from ACT team  - PRN: haloperidol 5mg PO/IM q8hrs PRN for agitation, diphenhydramine 50mg PO/IM q6hrs PRN for EPS PPx, hydroxyzine 50mg PO q12hrs PRN for anxiety, lorazepam 2mg PO q8hrs PRN for anxiety or 2mg IM for assaultive behavior. Melatonin 5mg qHS PRN for insomnia    Medical  - dental consult placed 10/24 for cavity and wisdom tooth pain  - last clozapine labs on 10/23 (CBC with diff, troponins, CRP); CRP elevated at 15.8 likely due to dental infection/wisdom tooth issues  - pantoprazole 40mg PO before breakfast for GERD  - montelukast 10mg PO qHS for asthma  - budesonide 80mcg/formoterol 4.5mcg 2 puffs BID for asthma  - PRN: acetaminophen 650mg PO q6hrs PRN for pain, albuterol 90mcg 2puffs q6hrs PRN for dyspnea, ondansetron 4mg q6hrs PRN for nausea    Other  - I/G/M    Dispo  - status updated to 9.27 2PC for state hospitalization process  - pending clinical improvement  - application to state pending.

## 2023-10-25 NOTE — BH INPATIENT PSYCHIATRY PROGRESS NOTE - MSE UNSTRUCTURED FT
Appearance: Dressed appropriately. Well groomed.   Attitude / Behavior: cooperative.   Relatedness: good   Eye contact: good  Motor: No abnormal movements, no psychomotor slowing or activation.  Speech: Regular rate, volume, tone. Spontaneous speech  Mood: neutral   Affect: calm   Thought Process: linear, goal directed.  Thought Content: no changes reported to passive SI or AVH symptoms. Devoid of HI.  Insight: limited   Judgment: limited   Impulse control: fair  Attention: fair  Language fluent.  Gait: Intact.  Appearance: Dressed appropriately. Well groomed.   Attitude / Behavior: cooperative.   Relatedness: good   Eye contact: good  Motor: No abnormal movements, no psychomotor slowing or activation.  Speech: Regular rate, volume, tone. Spontaneous speech  Mood: neutral   Affect: calm   Thought Process: linear, goal directed.  Thought Content: no changes reported to passive thoughts of death or AVH symptoms. Devoid of HI.  Insight: limited   Judgment: limited   Impulse control: fair  Attention: fair  Language fluent.  Gait: Intact.

## 2023-10-25 NOTE — CHART NOTE - NSCHARTNOTEFT_GEN_A_CORE
MEDICAL DIRECTOR OF INPATIENT PSYCHIATRY NOTE:     An appeal process was conducted today to assess this patient's potential transfer to a state hospital in the presence of the [facility]  (Keven Boston), the patient, and the attending physician, Dr. Arias.      Briefly, 29yo single woman, no dependents, domiciled with her Grandmother, unemployed on disability/SSI, pphx of schizoaffective disorder, multiple past psychiatric admissions including state and recent discharge from [facility], in tx with [facility], with pmhx of asthma, HTN, GERD, and hypothyroidism and schizoaffective disorder, in tx  with [facility], who self presented to the ED reporting abdominal pain and requesting readmission to psychiatric unit, reporting CAH, depressive symptoms, IOR, suicidality, admitted to [facility] for psychiatric care.  Patient was on Abilify Maintenna and clozapine was added for augmentation.  Despite treatment patient continues to endorse sad mood with thoughts of suicide as well as psychotic symptoms in the form of visual and auditory hallucinations.  Patient’s ACT team also advocating for readmission to a state facility.   Patient is also advocating for her own transfer to state hospital and feels that she needs more treatment at a state hospital prior to returning to the community.    On examination, Patient dressed appropriately and is well groomed.   She is calm and cooperative. Her speech has a regular rate, volume, tone. Spontaneous speech.  Her thought Process is linear.  She reports suciidal ideations without intent or plan.  She reports visual and auditory hallucinations.  She has limited insight and judgement.        Based on my review of the medical record and my interview with the patient today, I concur with the treatment team's recommendation that this patient requires additional hospitalization for stabilization and that a transfer to a state facility is indicated.

## 2023-10-26 ENCOUNTER — APPOINTMENT (OUTPATIENT)
Age: 28
End: 2023-10-26
Payer: SELF-PAY

## 2023-10-26 PROCEDURE — 99232 SBSQ HOSP IP/OBS MODERATE 35: CPT | Mod: GC

## 2023-10-26 PROCEDURE — D0140: CPT

## 2023-10-26 PROCEDURE — 90853 GROUP PSYCHOTHERAPY: CPT

## 2023-10-26 PROCEDURE — D0330 PANORAMIC RADIOGRAPHIC IMAGE: CPT

## 2023-10-26 RX ORDER — IBUPROFEN 200 MG
400 TABLET ORAL EVERY 6 HOURS
Refills: 0 | Status: DISCONTINUED | OUTPATIENT
Start: 2023-10-26 | End: 2023-11-17

## 2023-10-26 RX ADMIN — Medication 5 MILLIGRAM(S): at 22:29

## 2023-10-26 RX ADMIN — CLOZAPINE 50 MILLIGRAM(S): 150 TABLET, ORALLY DISINTEGRATING ORAL at 21:01

## 2023-10-26 RX ADMIN — Medication 50 MILLIGRAM(S): at 22:29

## 2023-10-26 RX ADMIN — BUDESONIDE AND FORMOTEROL FUMARATE DIHYDRATE 2 PUFF(S): 160; 4.5 AEROSOL RESPIRATORY (INHALATION) at 08:29

## 2023-10-26 RX ADMIN — Medication 400 MILLIGRAM(S): at 18:24

## 2023-10-26 RX ADMIN — Medication 650 MILLIGRAM(S): at 05:10

## 2023-10-26 RX ADMIN — PANTOPRAZOLE SODIUM 40 MILLIGRAM(S): 20 TABLET, DELAYED RELEASE ORAL at 07:12

## 2023-10-26 RX ADMIN — Medication 650 MILLIGRAM(S): at 18:23

## 2023-10-26 RX ADMIN — MONTELUKAST 10 MILLIGRAM(S): 4 TABLET, CHEWABLE ORAL at 21:01

## 2023-10-26 RX ADMIN — BUDESONIDE AND FORMOTEROL FUMARATE DIHYDRATE 2 PUFF(S): 160; 4.5 AEROSOL RESPIRATORY (INHALATION) at 21:00

## 2023-10-26 RX ADMIN — LIDOCAINE 1 PATCH: 4 CREAM TOPICAL at 08:29

## 2023-10-26 NOTE — BH INPATIENT PSYCHIATRY PROGRESS NOTE - NSBHCHARTREVIEWVS_PSY_A_CORE FT
Vital Signs Last 24 Hrs  T(C): 36.2 (10-26-23 @ 08:29), Max: 36.5 (10-25-23 @ 20:56)  T(F): 97.1 (10-26-23 @ 08:29), Max: 97.7 (10-25-23 @ 20:56)  HR: --  BP: --  BP(mean): --  RR: --  SpO2: --    Orthostatic VS  10-26-23 @ 08:29  Lying BP: --/-- HR: --  Sitting BP: 132/79 HR: 76  Standing BP: 121/64 HR: 90  Site: --  Mode: --  Orthostatic VS  10-25-23 @ 20:56  Lying BP: --/-- HR: --  Sitting BP: 121/75 HR: 96  Standing BP: 110/74 HR: 99  Site: --  Mode: --  Orthostatic VS  10-25-23 @ 08:27  Lying BP: --/-- HR: --  Sitting BP: 145/90 HR: 95  Standing BP: 140/88 HR: 101  Site: --  Mode: --  Orthostatic VS  10-24-23 @ 20:52  Lying BP: --/-- HR: --  Sitting BP: 142/77 HR: 86  Standing BP: 144/98 HR: 93  Site: --  Mode: --

## 2023-10-26 NOTE — BH INPATIENT PSYCHIATRY PROGRESS NOTE - CURRENT MEDICATION
MEDICATIONS  (STANDING):  budesonide  80 MICROgram(s)/formoterol 4.5 MICROgram(s) Inhaler 2 Puff(s) Inhalation two times a day  cloZAPine 50 milliGRAM(s) Oral at bedtime  influenza   Vaccine 0.5 milliLiter(s) IntraMuscular once  lidocaine   4% Patch 1 Patch Transdermal daily  montelukast 10 milliGRAM(s) Oral at bedtime  pantoprazole    Tablet 40 milliGRAM(s) Oral before breakfast    MEDICATIONS  (PRN):  acetaminophen     Tablet .. 650 milliGRAM(s) Oral every 6 hours PRN Moderate Pain (4 - 6)  albuterol    90 MICROgram(s) HFA Inhaler 2 Puff(s) Inhalation every 6 hours PRN Shortness of Breath and/or Wheezing  benzocaine 20% Spray 1 Spray(s) Topical every 6 hours PRN tooth pain  diphenhydrAMINE 50 milliGRAM(s) Oral every 6 hours PRN eps ppx  diphenhydrAMINE Injectable 50 milliGRAM(s) IntraMuscular once PRN agitation / eps ppx  haloperidol     Tablet 5 milliGRAM(s) Oral every 8 hours PRN agitation  haloperidol    Injectable 5 milliGRAM(s) IntraMuscular once PRN Agitation  hydrOXYzine hydrochloride 50 milliGRAM(s) Oral every 12 hours PRN Anxiety  LORazepam     Tablet 2 milliGRAM(s) Oral every 8 hours PRN Anxiety  LORazepam   Injectable 2 milliGRAM(s) IntraMuscular once PRN Assaultive behavior  melatonin. 5 milliGRAM(s) Oral at bedtime PRN Insomnia  ondansetron    Tablet 4 milliGRAM(s) Oral every 6 hours PRN nausea

## 2023-10-26 NOTE — BH INPATIENT PSYCHIATRY PROGRESS NOTE - NSBHASSESSSUMMFT_PSY_ALL_CORE
ASSESSMENT  Patient is 27yo single woman, no dependents, domiciled with her Grandmother, unemployed on disability/SSI, pphx of schizoaffective disorder, multiple past psychiatric admissions including state and recent discharge from [facility], in tx with [facility], with pmhx of asthma, HTN, GERD, and hypothyroidism and schizoaffective disorder, in tx  with [facility], who self presented to the ED reporting abdominal pain and requesting readmission to psychiatric unit, reporting CAH, depressive symptoms, IOR, suicidality, admitted to [facility] for psychiatric care.  Depression and psychosis likely multifactorial at this time, schizoaffective vs. borderline personality disorder vs. complex grief vs. adjustment disorder.      On evaluation today the patient continues to endorse continued depressed mood and continued psychotic symptoms in the form of auditory and visual hallucinations.     PLAN  Legal  - status changed to 9.27 2PC today    Safety  - CO 1:1 for suicidal behavior    Psychiatric  - continue clozapine 50mg qHS for psychosis ([id number])  - Abilify Maintenna 400mg IM q3 weeks rather than 4, per collateral from ACT team. Last dose on 10/19  - discontinue mirtazapine 7.5mg PO qHS, per collateral from ACT team  - PRN: haloperidol 5mg PO/IM q8hrs PRN for agitation, diphenhydramine 50mg PO/IM q6hrs PRN for EPS PPx, hydroxyzine 50mg PO q12hrs PRN for anxiety, lorazepam 2mg PO q8hrs PRN for anxiety or 2mg IM for assaultive behavior. Melatonin 5mg qHS PRN for insomnia    Medical  - dental consult placed 10/24 for cavity and wisdom tooth pain  - last clozapine labs on 10/23 (CBC with diff, troponins, CRP); CRP elevated at 15.8 likely due to dental infection/wisdom tooth issues  - pantoprazole 40mg PO before breakfast for GERD  - montelukast 10mg PO qHS for asthma  - budesonide 80mcg/formoterol 4.5mcg 2 puffs BID for asthma  - PRN: acetaminophen 650mg PO q6hrs PRN for pain, albuterol 90mcg 2puffs q6hrs PRN for dyspnea, ondansetron 4mg q6hrs PRN for nausea    Other  - I/G/M    Dispo  - status updated to 9.27 2PC for state hospitalization process  - pending clinical improvement  - application to state pending.

## 2023-10-26 NOTE — BH PSYCHOLOGY - CLINICIAN PSYCHOTHERAPY NOTE - NSBHPSYCHOLNARRATIVE_PSY_A_CORE FT
Writer met with Pt for 30-minute individual therapy session. Pt was alert and presented with adequate hygiene and grooming. Pt denied experiencing any A/V hallucinations. Pt shared feeling overwhelmed, affect neutral. Her thought process was linear and goal directed. Pts’ speech was WNL, content was relevant to discussion. Pt denied passive or active SI, HI or NSSI. Oriented x3. Limited insight and judgement demonstrated.      Pt and Wr explored needs pt is getting met by being on CO, explored the difference in that relationship vs peers/friends etc. Pt shared that she finds having someone to talk to "whenever she wants" helpful as well as not trusting herself to not self harm otherwise. Pt shared having felt overwhelmed after a group on grief and loss yesterday, then trying to distract herself but not being able to tolerate the emotion of sadness. Explored emotion further, and explored with pt difference between distraction as a distress tolerance skill vs trying to push away the emotion. Practiced what coping while making space for emotion would look like. Did some psychoed on avoidance and safety behaviors. Pt shared that she is learning how to share vs not "overshare", explored with pt what that would look and feel like and did some psychoed on boundaries.

## 2023-10-26 NOTE — BH PSYCHOLOGY - CLINICIAN PSYCHOTHERAPY NOTE - NSBHPSYCHOLINT_PSY_A_CORE
Cognitive/behavioral therapy/Dialectical  Behavioral Therapy (DBT)/Dynamic issues addressed/Interpersonal Therapy (IPT)/Supported coping skills/Supportive therapy/other...

## 2023-10-26 NOTE — BH TREATMENT PLAN - NSTXDCOTHRINTERRN_PSY_ALL_CORE
Discuss the discharge planning process with patient and family. Ensure patient understands the discharge process and identifies strategies to help prevent relapse.
Discuss the discharge planning process with patient and family. Ensure patient understands the discharge process and identifies strategies to help prevent relapse.

## 2023-10-26 NOTE — BH INPATIENT PSYCHIATRY PROGRESS NOTE - MSE UNSTRUCTURED FT
Appearance: Dressed appropriately. Well groomed.   Attitude / Behavior: cooperative.   Relatedness: good   Eye contact: consistent   Motor: Mild psychomotor agitation, restlessness  Speech: Regular rate, volume, tone. Spontaneous speech  Mood: "low"  Affect: congruent    Thought Process: linear, goal directed.  Thought Content: continued passive SI without intent or plan, and continued AVH. Devoid of HI.   Insight: limited   Judgment: limited   Impulse control: fair  Attention: fair  Language fluent.  Gait: Intact.  Appearance: Dressed appropriately. Well groomed.   Attitude / Behavior: cooperative.   Relatedness: good   Eye contact: consistent   Motor: Mild psychomotor agitation, restlessness  Speech: Regular rate, volume, tone. Spontaneous speech  Mood: "low"  Affect: congruent    Thought Process: linear, goal directed.  Thought Content: continued passive thoughts of death without ideation intent or plan, and continued AVH. Devoid of HI.   Insight: limited   Judgment: limited   Impulse control: fair  Attention: fair  Language fluent.  Gait: Intact.

## 2023-10-26 NOTE — BH PSYCHOLOGY - GROUP THERAPY NOTE - NSBHPSYCHOLRESPCOMMENT_PSY_A_CORE FT
The patient appeared adequately groomed and casually dressed. Pt appeared engaged in the group as evidenced by their willingness to independently verbally communicate during the discussion. Pt talked about her struggles with recognizing emotions, as well as how expressing emotions to others has resulted in invalidation. Pt was oriented X3. Pt was appropriate with peers.

## 2023-10-26 NOTE — BH TREATMENT PLAN - NSTXDCOTHRINTERSW_PSY_ALL_CORE
Writer will work to educate the pt on the state referral process and explore any other aftercare options that may be an appropriate alternative to [facility] hospital.

## 2023-10-26 NOTE — BH INPATIENT PSYCHIATRY PROGRESS NOTE - NSBHFUPINTERVALHXFT_PSY_A_CORE
Chart and vitals reviewed. Case discussed with treatment team. Haldol, ativan, atarax, and benadryl PRNs given yesterday at noon following a group session in which grief and loss were discussed, thus triggering distress and painful memories. Reports numerous nightmares last night regarding her grandfather's passing and lack of relationship with her mother. Patient woke up this morning and saw a hallucination of her grandfather standing in the room, asking why she was not able to be with him when she passed. Reports continued auditory hallucinations in the form of a male voice saying disparaging things about her. Endorses continued passive SI without intent or plan. Describes mood as "low" due to distress from yesterday, nightmares from last night, and hallucination from this morning.  Chart and vitals reviewed. Case discussed with treatment team. Haldol, ativan, atarax, and benadryl PRNs given yesterday at noon following a group session in which grief and loss were discussed, thus triggering distress and painful memories. Reports numerous nightmares last night regarding her grandfather's passing and lack of relationship with her mother. Patient woke up this morning and saw a hallucination of her grandfather standing in the room, asking why she was not able to be with him when she passed. Reports continued auditory hallucinations in the form of a male voice saying disparaging things about her. Endorses continued passive thoughts of death without ideation intent or plan. Describes mood as "low" due to distress from yesterday, nightmares from last night, and hallucination from this morning.

## 2023-10-27 PROCEDURE — 99232 SBSQ HOSP IP/OBS MODERATE 35: CPT

## 2023-10-27 PROCEDURE — 90853 GROUP PSYCHOTHERAPY: CPT

## 2023-10-27 RX ADMIN — LIDOCAINE 1 PATCH: 4 CREAM TOPICAL at 08:44

## 2023-10-27 RX ADMIN — BUDESONIDE AND FORMOTEROL FUMARATE DIHYDRATE 2 PUFF(S): 160; 4.5 AEROSOL RESPIRATORY (INHALATION) at 21:12

## 2023-10-27 RX ADMIN — MONTELUKAST 10 MILLIGRAM(S): 4 TABLET, CHEWABLE ORAL at 21:12

## 2023-10-27 RX ADMIN — BUDESONIDE AND FORMOTEROL FUMARATE DIHYDRATE 2 PUFF(S): 160; 4.5 AEROSOL RESPIRATORY (INHALATION) at 08:44

## 2023-10-27 RX ADMIN — Medication 400 MILLIGRAM(S): at 18:51

## 2023-10-27 RX ADMIN — CLOZAPINE 50 MILLIGRAM(S): 150 TABLET, ORALLY DISINTEGRATING ORAL at 21:12

## 2023-10-27 RX ADMIN — PANTOPRAZOLE SODIUM 40 MILLIGRAM(S): 20 TABLET, DELAYED RELEASE ORAL at 08:44

## 2023-10-27 RX ADMIN — Medication 50 MILLIGRAM(S): at 14:47

## 2023-10-27 RX ADMIN — Medication 650 MILLIGRAM(S): at 18:51

## 2023-10-27 NOTE — BH INPATIENT PSYCHIATRY PROGRESS NOTE - MSE UNSTRUCTURED FT
Appearance: Dressed appropriately. Well groomed.   Attitude / Behavior: cooperative.   Relatedness: good   Eye contact: consistent   Motor: Psychomotor slowed.   Speech: Regular rate, soft volume.   Mood: "Sad"  Affect: Sad.  Thought Process: linear, goal directed.  Thought Content: Grief.  Insight: limited   Judgment: limited   Impulse control: fair  Attention: fair  Language fluent.  Gait: Intact.

## 2023-10-27 NOTE — BH INPATIENT PSYCHIATRY PROGRESS NOTE - NSBHASSESSSUMMFT_PSY_ALL_CORE
Patient is 27yo single woman, no dependents, domiciled with her Grandmother, unemployed on disability/SSI, pphx of schizoaffective disorder, multiple past psychiatric admissions including state and recent discharge from [facility], [facility], with pmhx of asthma, HTN, GERD, and hypothyroidism and schizoaffective disorder, in tx  with [facility], who self presented to the ED reporting abdominal pain and requesting readmission to psychiatric unit, reporting CAH, depressive symptoms, IOR, suicidality, admitted to [facility] for psychiatric care.  Depression and psychosis likely multifactorial at this time, schizoaffective vs. borderline personality disorder vs. complex grief vs. adjustment disorder.      More depression today.     1.) Obs: Continue CO 1:1.   2.) Psychiatric medication management:   - continue clozapine 50mg qHS for psychosis ([id number])  - Abilify Maintenna 400mg IM q3 weeks rather than 4, per collateral from ACT team. Last dose on 10/19  - discontinue mirtazapine 7.5mg PO qHS, per collateral from ACT team  - PRN: haloperidol 5mg PO/IM q8hrs PRN for agitation, diphenhydramine 50mg PO/IM q6hrs PRN for EPS PPx, hydroxyzine 50mg PO q12hrs PRN for anxiety, lorazepam 2mg PO q8hrs PRN for anxiety or 2mg IM for assaultive behavior. Melatonin 5mg qHS PRN for insomnia  3.) Medical:   - Pt scheduled for dental extractions 11/1.  - last clozapine labs on 10/23 (CBC with diff, troponins, CRP); CRP elevated at 15.8 likely due to dental infection/wisdom tooth issues  - pantoprazole 40mg PO before breakfast for GERD  - montelukast 10mg PO qHS for asthma  - budesonide 80mcg/formoterol 4.5mcg 2 puffs BID for asthma  - PRN: acetaminophen 650mg PO q6hrs PRN for pain, albuterol 90mcg 2puffs q6hrs PRN for dyspnea, ondansetron 4mg q6hrs PRN for nausea  4.) Dispo planning: State application to be submitted next week.

## 2023-10-27 NOTE — BH INPATIENT PSYCHIATRY PROGRESS NOTE - PRN MEDS
MEDICATIONS  (PRN):  acetaminophen     Tablet .. 650 milliGRAM(s) Oral every 6 hours PRN Moderate Pain (4 - 6)  albuterol    90 MICROgram(s) HFA Inhaler 2 Puff(s) Inhalation every 6 hours PRN Shortness of Breath and/or Wheezing  benzocaine 20% Spray 1 Spray(s) Topical every 6 hours PRN tooth pain  diphenhydrAMINE 50 milliGRAM(s) Oral every 6 hours PRN eps ppx  diphenhydrAMINE Injectable 50 milliGRAM(s) IntraMuscular once PRN agitation / eps ppx  haloperidol     Tablet 5 milliGRAM(s) Oral every 8 hours PRN agitation  haloperidol    Injectable 5 milliGRAM(s) IntraMuscular once PRN Agitation  hydrOXYzine hydrochloride 50 milliGRAM(s) Oral every 12 hours PRN Anxiety  ibuprofen  Tablet. 400 milliGRAM(s) Oral every 6 hours PRN Moderate Pain (4 - 6), Severe Pain (7 - 10)  LORazepam     Tablet 2 milliGRAM(s) Oral every 8 hours PRN Anxiety  LORazepam   Injectable 2 milliGRAM(s) IntraMuscular once PRN Assaultive behavior  melatonin. 5 milliGRAM(s) Oral at bedtime PRN Insomnia  ondansetron    Tablet 4 milliGRAM(s) Oral every 6 hours PRN nausea

## 2023-10-27 NOTE — BH INPATIENT PSYCHIATRY PROGRESS NOTE - CURRENT MEDICATION
MEDICATIONS  (STANDING):  budesonide  80 MICROgram(s)/formoterol 4.5 MICROgram(s) Inhaler 2 Puff(s) Inhalation two times a day  cloZAPine 50 milliGRAM(s) Oral at bedtime  influenza   Vaccine 0.5 milliLiter(s) IntraMuscular once  lidocaine   4% Patch 1 Patch Transdermal daily  montelukast 10 milliGRAM(s) Oral at bedtime  pantoprazole    Tablet 40 milliGRAM(s) Oral before breakfast    MEDICATIONS  (PRN):  acetaminophen     Tablet .. 650 milliGRAM(s) Oral every 6 hours PRN Moderate Pain (4 - 6)  albuterol    90 MICROgram(s) HFA Inhaler 2 Puff(s) Inhalation every 6 hours PRN Shortness of Breath and/or Wheezing  benzocaine 20% Spray 1 Spray(s) Topical every 6 hours PRN tooth pain  diphenhydrAMINE 50 milliGRAM(s) Oral every 6 hours PRN eps ppx  diphenhydrAMINE Injectable 50 milliGRAM(s) IntraMuscular once PRN agitation / eps ppx  haloperidol     Tablet 5 milliGRAM(s) Oral every 8 hours PRN agitation  haloperidol    Injectable 5 milliGRAM(s) IntraMuscular once PRN Agitation  hydrOXYzine hydrochloride 50 milliGRAM(s) Oral every 12 hours PRN Anxiety  ibuprofen  Tablet. 400 milliGRAM(s) Oral every 6 hours PRN Moderate Pain (4 - 6), Severe Pain (7 - 10)  LORazepam     Tablet 2 milliGRAM(s) Oral every 8 hours PRN Anxiety  LORazepam   Injectable 2 milliGRAM(s) IntraMuscular once PRN Assaultive behavior  melatonin. 5 milliGRAM(s) Oral at bedtime PRN Insomnia  ondansetron    Tablet 4 milliGRAM(s) Oral every 6 hours PRN nausea

## 2023-10-27 NOTE — BH INPATIENT PSYCHIATRY PROGRESS NOTE - NSBHCHARTREVIEWVS_PSY_A_CORE FT
Vital Signs Last 24 Hrs  T(C): 36.6 (10-27-23 @ 08:40), Max: 36.6 (10-26-23 @ 20:54)  T(F): 97.8 (10-27-23 @ 08:40), Max: 97.8 (10-26-23 @ 20:54)  HR: --  BP: --  BP(mean): --  RR: --  SpO2: 98% (10-26-23 @ 20:54) (98% - 98%)    Orthostatic VS  10-27-23 @ 08:40  Lying BP: --/-- HR: --  Sitting BP: 135/86 HR: 79  Standing BP: 130/106 HR: 105  Site: upper left arm  Mode: electronic  Orthostatic VS  10-26-23 @ 20:54  Lying BP: --/-- HR: --  Sitting BP: 116/82 HR: 87  Standing BP: 109/60 HR: 100  Site: --  Mode: --  Orthostatic VS  10-26-23 @ 08:29  Lying BP: --/-- HR: --  Sitting BP: 132/79 HR: 76  Standing BP: 121/64 HR: 90  Site: --  Mode: --  Orthostatic VS  10-25-23 @ 20:56  Lying BP: --/-- HR: --  Sitting BP: 121/75 HR: 96  Standing BP: 110/74 HR: 99  Site: --  Mode: --

## 2023-10-27 NOTE — BH INPATIENT PSYCHIATRY PROGRESS NOTE - NSBHFUPINTERVALHXFT_PSY_A_CORE
Pt today stated that recalling happy memory of her grandfather sometimes works at addressing some of her grief, but other times she remains sad.  Pt states grandmother will bring in a kelechi bear for her that grandfather bought on one of her previous birthdays, hopes to rely on this memento to remind self of happy times.  Pt asks about her clozapine dosing, bloodwork, and state application.

## 2023-10-27 NOTE — BH PSYCHOLOGY - GROUP THERAPY NOTE - NSBHPSYCHOLRESPCOMMENT_PSY_A_CORE FT
The patient appeared adequately groomed and casually dressed. Pt appeared very engaged in the group as evidenced by her willingness to independently verbally communicate during the discussion. Pt disclosed the importance of only offering as many resources/time/energy as available to avoid burnout, reflecting on anger when others “take advantage.” Pt expressed agreement with much of what other members shared. Speech WNL. PT was oriented X3. Pt was appropriate and supportive with peers.

## 2023-10-28 PROCEDURE — 99231 SBSQ HOSP IP/OBS SF/LOW 25: CPT

## 2023-10-28 RX ADMIN — PANTOPRAZOLE SODIUM 40 MILLIGRAM(S): 20 TABLET, DELAYED RELEASE ORAL at 08:14

## 2023-10-28 RX ADMIN — BUDESONIDE AND FORMOTEROL FUMARATE DIHYDRATE 2 PUFF(S): 160; 4.5 AEROSOL RESPIRATORY (INHALATION) at 08:14

## 2023-10-28 RX ADMIN — Medication 5 MILLIGRAM(S): at 21:15

## 2023-10-28 RX ADMIN — BUDESONIDE AND FORMOTEROL FUMARATE DIHYDRATE 2 PUFF(S): 160; 4.5 AEROSOL RESPIRATORY (INHALATION) at 21:44

## 2023-10-28 RX ADMIN — MONTELUKAST 10 MILLIGRAM(S): 4 TABLET, CHEWABLE ORAL at 21:14

## 2023-10-28 RX ADMIN — CLOZAPINE 50 MILLIGRAM(S): 150 TABLET, ORALLY DISINTEGRATING ORAL at 21:14

## 2023-10-28 RX ADMIN — Medication 400 MILLIGRAM(S): at 17:10

## 2023-10-28 RX ADMIN — Medication 400 MILLIGRAM(S): at 16:44

## 2023-10-28 NOTE — BH INPATIENT PSYCHIATRY PROGRESS NOTE - NSBHCHARTREVIEWVS_PSY_A_CORE FT
Vital Signs Last 24 Hrs  T(C): 36.3 (10-28-23 @ 08:35), Max: 36.4 (10-27-23 @ 21:04)  T(F): 97.4 (10-28-23 @ 08:35), Max: 97.5 (10-27-23 @ 21:04)  HR: --  BP: --  BP(mean): --  RR: --  SpO2: --    Orthostatic VS  10-28-23 @ 08:35  Lying BP: --/-- HR: --  Sitting BP: 143/94 HR: 90  Standing BP: 138/55 HR: 119  Site: --  Mode: --  Orthostatic VS  10-27-23 @ 21:04  Lying BP: --/-- HR: --  Sitting BP: 140/67 HR: 101  Standing BP: 120/78 HR: 105  Site: --  Mode: --  Orthostatic VS  10-27-23 @ 08:40  Lying BP: --/-- HR: --  Sitting BP: 135/86 HR: 79  Standing BP: 130/106 HR: 105  Site: upper left arm  Mode: electronic  Orthostatic VS  10-26-23 @ 20:54  Lying BP: --/-- HR: --  Sitting BP: 116/82 HR: 87  Standing BP: 109/60 HR: 100  Site: --  Mode: --

## 2023-10-28 NOTE — BH INPATIENT PSYCHIATRY PROGRESS NOTE - NSBHASSESSSUMMFT_PSY_ALL_CORE
Patient is 27yo single woman, no dependents, domiciled with her Grandmother, unemployed on disability/SSI, pphx of schizoaffective disorder, multiple past psychiatric admissions including state and recent discharge from [facility], [facility], with pmhx of asthma, HTN, GERD, and hypothyroidism and schizoaffective disorder, in tx  with [facility], who self presented to the ED reporting abdominal pain and requesting readmission to psychiatric unit, reporting CAH, depressive symptoms, IOR, suicidality, admitted to [facility] for psychiatric care.  Depression and psychosis likely multifactorial at this time, schizoaffective vs. borderline personality disorder vs. complex grief vs. adjustment disorder.      Pt depressed and anxious with passive SI    1.) Obs: Continue CO 1:1.   2.) Psychiatric medication management:   - continue clozapine 50mg qHS for psychosis ([id number])  - Abilify Maintenna 400mg IM q3 weeks rather than 4, per collateral from ACT team. Last dose on 10/19  - discontinue mirtazapine 7.5mg PO qHS, per collateral from ACT team  - PRN: haloperidol 5mg PO/IM q8hrs PRN for agitation, diphenhydramine 50mg PO/IM q6hrs PRN for EPS PPx, hydroxyzine 50mg PO q12hrs PRN for anxiety, lorazepam 2mg PO q8hrs PRN for anxiety or 2mg IM for assaultive behavior. Melatonin 5mg qHS PRN for insomnia  3.) Medical:   - Pt scheduled for dental extractions 11/1.  - last clozapine labs on 10/23 (CBC with diff, troponins, CRP); CRP elevated at 15.8 likely due to dental infection/wisdom tooth issues  - pantoprazole 40mg PO before breakfast for GERD  - montelukast 10mg PO qHS for asthma  - budesonide 80mcg/formoterol 4.5mcg 2 puffs BID for asthma  - PRN: acetaminophen 650mg PO q6hrs PRN for pain, albuterol 90mcg 2puffs q6hrs PRN for dyspnea, ondansetron 4mg q6hrs PRN for nausea  4.) Dispo planning: State application to be submitted next week.

## 2023-10-28 NOTE — BH INPATIENT PSYCHIATRY PROGRESS NOTE - NSBHFUPINTERVALHXFT_PSY_A_CORE
Pt compliant with medication and tolerating it well.  Chart reviewed and case discussed with nursing.  No events reported overnight.  Pt maintained on CO 1:1 for suicidal and SIB.  Pt eating and sleeping well.  Pt denies active SI/I/P or thoughts of SIB today, reports the last time she had those thoughts was yesterday due to her family putting pressure on her.  Pt reports she has SI and thoughts of SIB at certain times of the day such as after lunch or before bedtime.  Pt reports passive SI no I/P at time of interview.  Pt reports feeling depressed and anxious.  Pt denies HI/I/P or AH/VH or paranoia. Pt reports she is unsure if she wants to go to the dentist to get her wisdom tooth pulled as she is scared of tools and will likely have a panic attack.

## 2023-10-29 RX ADMIN — BUDESONIDE AND FORMOTEROL FUMARATE DIHYDRATE 2 PUFF(S): 160; 4.5 AEROSOL RESPIRATORY (INHALATION) at 08:44

## 2023-10-29 RX ADMIN — BUDESONIDE AND FORMOTEROL FUMARATE DIHYDRATE 2 PUFF(S): 160; 4.5 AEROSOL RESPIRATORY (INHALATION) at 21:42

## 2023-10-29 RX ADMIN — Medication 5 MILLIGRAM(S): at 20:21

## 2023-10-29 RX ADMIN — CLOZAPINE 50 MILLIGRAM(S): 150 TABLET, ORALLY DISINTEGRATING ORAL at 20:22

## 2023-10-29 RX ADMIN — MONTELUKAST 10 MILLIGRAM(S): 4 TABLET, CHEWABLE ORAL at 20:21

## 2023-10-29 RX ADMIN — PANTOPRAZOLE SODIUM 40 MILLIGRAM(S): 20 TABLET, DELAYED RELEASE ORAL at 08:44

## 2023-10-29 NOTE — BH INPATIENT PSYCHIATRY PROGRESS NOTE - NSBHMSETHTCONTENT_PSY_A_CORE
Hopelessness/Suicidality/Other
Ruminations/Suicidality/Other
Hopelessness/Suicidality/Other

## 2023-10-29 NOTE — BH INPATIENT PSYCHIATRY PROGRESS NOTE - NSBHFUPINTERVALHXFT_PSY_A_CORE
Pt compliant with medication and tolerating it well.  Chart reviewed and case discussed with nursing.  No events reported overnight.  Pt maintained on CO 1:1 for suicidal and SIB.  Pt continues to feel depressed and anxious.  Pt reports she has SI an thoughts of SIB today with no plan and feels safe with her CO 1:1 staff.  Pt reports sitting with peers helps with negative thoughts.  Pt continues to feel depressed and anxious.  Pt denies AH/VH or paranoia.  Pt reports good sleep last night, but she had a nightmare that she was being attacked and woke up in a panic.  Pt continues to not want to go to dental to get her wisdom teeth pulled.

## 2023-10-29 NOTE — BH INPATIENT PSYCHIATRY PROGRESS NOTE - NSBHMSEPERCEPT_PSY_A_CORE
No abnormalities
Auditory hallucinations/Visual hallucinations
No abnormalities
Auditory hallucinations/Visual hallucinations
Auditory hallucinations/Visual hallucinations

## 2023-10-29 NOTE — BH INPATIENT PSYCHIATRY PROGRESS NOTE - NSBHASSESSSUMMFT_PSY_ALL_CORE
Patient is 29yo single woman, no dependents, domiciled with her Grandmother, unemployed on disability/SSI, pphx of schizoaffective disorder, multiple past psychiatric admissions including state and recent discharge from [facility], in tx [facility], with pmhx of asthma, HTN, GERD, and hypothyroidism and schizoaffective disorder, in tx  with [facility], who self presented to the ED reporting abdominal pain and requesting readmission to psychiatric unit, reporting CAH, depressive symptoms, IOR, suicidality, admitted to [facility] for psychiatric care.  Depression and psychosis likely multifactorial at this time, schizoaffective vs. borderline personality disorder vs. complex grief vs. adjustment disorder.      Pt depressed and anxious with SI and thoughts of SIB with no plan    1.) Obs: Continue CO 1:1.   2.) Psychiatric medication management:   - continue clozapine 50mg qHS for psychosis ([id number])  - Abilify Maintenna 400mg IM q3 weeks rather than 4, per collateral from ACT team. Last dose on 10/19  - discontinue mirtazapine 7.5mg PO qHS, per collateral from ACT team  - PRN: haloperidol 5mg PO/IM q8hrs PRN for agitation, diphenhydramine 50mg PO/IM q6hrs PRN for EPS PPx, hydroxyzine 50mg PO q12hrs PRN for anxiety, lorazepam 2mg PO q8hrs PRN for anxiety or 2mg IM for assaultive behavior. Melatonin 5mg qHS PRN for insomnia  3.) Medical:   - Pt scheduled for dental extractions 11/1.  - last clozapine labs on 10/23 (CBC with diff, troponins, CRP); CRP elevated at 15.8 likely due to dental infection/wisdom tooth issues  - pantoprazole 40mg PO before breakfast for GERD  - montelukast 10mg PO qHS for asthma  - budesonide 80mcg/formoterol 4.5mcg 2 puffs BID for asthma  - PRN: acetaminophen 650mg PO q6hrs PRN for pain, albuterol 90mcg 2puffs q6hrs PRN for dyspnea, ondansetron 4mg q6hrs PRN for nausea  4.) Dispo planning: State application to be submitted next week.

## 2023-10-29 NOTE — BH INPATIENT PSYCHIATRY PROGRESS NOTE - NSBHCHARTREVIEWVS_PSY_A_CORE FT
Vital Signs Last 24 Hrs  T(C): 36.7 (10-28-23 @ 20:41), Max: 36.7 (10-28-23 @ 20:41)  T(F): 98 (10-28-23 @ 20:41), Max: 98 (10-28-23 @ 20:41)  HR: --  BP: --  BP(mean): --  RR: --  SpO2: --    Orthostatic VS  10-28-23 @ 20:41  Lying BP: --/-- HR: --  Sitting BP: 102/81 HR: 86  Standing BP: 101/50 HR: 104  Site: --  Mode: --  Orthostatic VS  10-28-23 @ 08:35  Lying BP: --/-- HR: --  Sitting BP: 143/94 HR: 90  Standing BP: 138/55 HR: 119  Site: --  Mode: --  Orthostatic VS  10-27-23 @ 21:04  Lying BP: --/-- HR: --  Sitting BP: 140/67 HR: 101  Standing BP: 120/78 HR: 105  Site: --  Mode: --

## 2023-10-30 PROCEDURE — 90853 GROUP PSYCHOTHERAPY: CPT

## 2023-10-30 PROCEDURE — 99232 SBSQ HOSP IP/OBS MODERATE 35: CPT

## 2023-10-30 RX ORDER — TUBERCULIN PURIFIED PROTEIN DERIVATIVE 5 [IU]/.1ML
5 INJECTION, SOLUTION INTRADERMAL ONCE
Refills: 0 | Status: COMPLETED | OUTPATIENT
Start: 2023-10-30 | End: 2023-10-30

## 2023-10-30 RX ORDER — PRAZOSIN HCL 2 MG
1 CAPSULE ORAL AT BEDTIME
Refills: 0 | Status: DISCONTINUED | OUTPATIENT
Start: 2023-10-30 | End: 2023-11-03

## 2023-10-30 RX ADMIN — Medication 650 MILLIGRAM(S): at 17:44

## 2023-10-30 RX ADMIN — PANTOPRAZOLE SODIUM 40 MILLIGRAM(S): 20 TABLET, DELAYED RELEASE ORAL at 08:56

## 2023-10-30 RX ADMIN — Medication 50 MILLIGRAM(S): at 14:35

## 2023-10-30 RX ADMIN — BUDESONIDE AND FORMOTEROL FUMARATE DIHYDRATE 2 PUFF(S): 160; 4.5 AEROSOL RESPIRATORY (INHALATION) at 20:35

## 2023-10-30 RX ADMIN — MONTELUKAST 10 MILLIGRAM(S): 4 TABLET, CHEWABLE ORAL at 20:36

## 2023-10-30 RX ADMIN — CLOZAPINE 50 MILLIGRAM(S): 150 TABLET, ORALLY DISINTEGRATING ORAL at 20:36

## 2023-10-30 RX ADMIN — Medication 1 MILLIGRAM(S): at 20:36

## 2023-10-30 RX ADMIN — TUBERCULIN PURIFIED PROTEIN DERIVATIVE 5 UNIT(S): 5 INJECTION, SOLUTION INTRADERMAL at 19:03

## 2023-10-30 NOTE — BH INPATIENT PSYCHIATRY PROGRESS NOTE - NSBHASSESSSUMMFT_PSY_ALL_CORE
Patient is 27yo single woman, no dependents, domiciled with her Grandmother, unemployed on disability/SSI, pphx of schizoaffective disorder, multiple past psychiatric admissions including state and recent discharge from [facility], in tx [facility], with pmhx of asthma, HTN, GERD, and hypothyroidism and schizoaffective disorder, in tx  with [facility], who self presented to the ED reporting abdominal pain and requesting readmission to psychiatric unit, reporting CAH, depressive symptoms, IOR, suicidality, admitted to [facility] for psychiatric care.  Depression and psychosis likely multifactorial at this time, schizoaffective vs. borderline personality disorder vs. complex grief vs. adjustment disorder.      Continues to report symptoms.    1.) Obs: Continue CO 1:1.   2.) Psychiatric medication management:   - continue clozapine 50mg qHS for psychosis ([id number])  - Add prazosin 1 mg qhs, monitor BP carefully.  - Abilify Maintenna 400mg IM q3 weeks rather than 4, per collateral from ACT team. Last dose on 10/19  - discontinue mirtazapine 7.5mg PO qHS, per collateral from ACT team  - PRN: haloperidol 5mg PO/IM q8hrs PRN for agitation, diphenhydramine 50mg PO/IM q6hrs PRN for EPS PPx, hydroxyzine 50mg PO q12hrs PRN for anxiety, lorazepam 2mg PO q8hrs PRN for anxiety or 2mg IM for assaultive behavior. Melatonin 5mg qHS PRN for insomnia  3.) Medical:   - Pt scheduled for dental extractions 11/1.  - last clozapine labs on 10/23 (CBC with diff, troponins, CRP); CRP elevated at 15.8 likely due to dental infection/wisdom tooth issues  - pantoprazole 40mg PO before breakfast for GERD  - montelukast 10mg PO qHS for asthma  - budesonide 80mcg/formoterol 4.5mcg 2 puffs BID for asthma  - PRN: acetaminophen 650mg PO q6hrs PRN for pain, albuterol 90mcg 2puffs q6hrs PRN for dyspnea, ondansetron 4mg q6hrs PRN for nausea  4.) Dispo planning: State application to be submitted next week.

## 2023-10-30 NOTE — BH INPATIENT PSYCHIATRY PROGRESS NOTE - MSE UNSTRUCTURED FT
Appearance: Dressed appropriately.  Behavior: Cooperative.    Motor: No abnormal movements, no psychomotor slowing or activation.  Speech: Regular rate.  Mood: "Upset."  Affect: Frustrated.  Thought Process: Linear.  Associations: Fair.  Thought Content: Continues to report AH and VH.  Still has periodic thoughts of harming self.  Insight: Limited.  Judgment: Fair on interview.  Attention: Fair.  Language: Fluent.  Gait: Intact.

## 2023-10-30 NOTE — BH PSYCHOLOGY - GROUP THERAPY NOTE - NSBHPSYCHOLRESPCOMMENT_PSY_A_CORE FT
The patient appeared adequately groomed and casually dressed. Pt appeared very engaged in the group as evidenced by her willingness to independently verbally communicate during the discussion. Pt noted the importance of taking a break before experiencing a crisis. Pt also reacted to times when she struggles to identify if her hurt feelings are related to the present moment or experiences from the past. Pt expressed agreement with much of what other members shared. Speech WNL. PT was oriented X3. Pt was appropriate and supportive with peers.

## 2023-10-30 NOTE — BH PATIENT CHARACTERISTICS SURVEY - NSPCSCOVID6_PSY_ALL_CORE
CHIEF COMPLAINT:  1.  A 38 and 6/7th week gestation.  2.  Labor.  3.  Meconium.    HISTORY OF PRESENT ILLNESS:  This 30-year-old lady is .  She has an LB   of 2019 making her EGA 38-6/7th weeks.  She presented to the hospital   this morning with complaints of contractions for over 24 hours.  Her cervix  reportedly   changed from 2 to 4 cm dilatation during observation, so she was admitted.    She has been ryan all day long, but has had minimal progress, presently   4 cm dilated, 90% effaced with the baby's head at -1 station.  She has   received epidural anesthesia.  I just did amniotomy revealing   moderate-to-thick meconium.  I placed an intrauterine pressure catheter as   well as a fetal scalp electrode.  Fetal heart rate tracing was reassuring   earlier in the day.  A Couple hours ago, we tried very low-dose oxytocin   augmentation and there were several late decelerations.  The oxytocin has been   discontinued.  Oxygen mask is in place.  Presently, the fetal heart rate   tracing is category 1 and I was able to elicit a fetal heart rate acceleration   with scalp stimulation.  I told the patient I think it is okay to cautiously   continue pursuing vaginal delivery.  She does understand that she is at risk   for requiring a  for fetal intolerance of labor, but at this point, I   think it is safe to continue with labor.  Estimated fetal weight clinically   is approximately 3800 g.    Her prenatal care was remarkable for maternal obesity, but she did keep her   weight gain under good control.  Her blood type is O negative.  She did   receive RhoGAM.  She is immune to rubella.  Her group B strep test was   negative.  Her gestational diabetes screen was normal.  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  The father of the baby refused hemoglobin   electrophoresis.  Ultrasound at 21 weeks revealed normal fetal  anatomy.    Ultrasound at 32 weeks revealed appropriate growth,  Estimated fetal weight   2093 grams, which was 57th percentile.    OBSTETRIC HISTORY:  She has had 4 elective ABs; 2 medical and 2 surgical   terminations.    PAST MEDICAL HISTORY:  Positive for obesity, polycystic ovary syndrome, and   recent diagnosis of hemoglobin C heterozygote.    PAST SURGICAL HISTORY:  D and E in 2013 and 2015.    ALLERGIES:  No known drug allergies.    MEDICATIONS:  Prenatal vitamin daily.    PHYSICAL EXAMINATION:  VITAL SIGNS:  Temperature 96.5, pulse 93, and BP is 123/71.  HEENT:  Normal.  LUNGS:  Clear to auscultation.  HEART:  Sounds normal.  ABDOMEN:  Obese.  No hepatosplenomegaly.  Fundal height is appropriate.  EXTREMITIES:  1+ edema.  Homans negative.  DTRs are normal.  PELVIC:  Cervix 4-5 cm dilated, 90% effaced with the baby's head at -1   station, probable right occiput transverse position.  Meconium is noted.    DIAGNOSES:  1.  A 38 and 6/7th week gestation.  2.  Labor.  3.  Meconium.  4.  Slow progress.  5.  Group B streptococcus negative.  6.  Obesity.  7.  Hemoglobin C heterozygote.  8.  Intermittent worrisome fetal heart rate tracing.    PLAN:  Cautious continuation of labor with continuous fetal heart rate   monitoring.  Oxygen mask is in place.  I am signing out to my colleague, Dr. Ignacia Gonzalez, who is aware of all pertinent clinical data.       ____________________________________     MD BEBETO MUNIZ / RUCHI    DD:  11/11/2019 19:10:35  DT:  11/11/2019 21:03:15    D#:  [id number]  Job#:  [id number]     Unknown

## 2023-10-30 NOTE — BH INPATIENT PSYCHIATRY PROGRESS NOTE - NSBHCHARTREVIEWVS_PSY_A_CORE FT
Vital Signs Last 24 Hrs  T(C): 36.4 (10-30-23 @ 08:41), Max: 36.7 (10-29-23 @ 20:32)  T(F): 97.5 (10-30-23 @ 08:41), Max: 98.1 (10-29-23 @ 20:32)  HR: --  BP: --  BP(mean): --  RR: --  SpO2: --    Orthostatic VS  10-30-23 @ 08:41  Lying BP: --/-- HR: --  Sitting BP: 140/88 HR: 91  Standing BP: 138/90 HR: 104  Site: --  Mode: --  Orthostatic VS  10-29-23 @ 20:32  Lying BP: --/-- HR: --  Sitting BP: 128/82 HR: 94  Standing BP: 138/87 HR: 103  Site: --  Mode: --  Orthostatic VS  10-28-23 @ 20:41  Lying BP: --/-- HR: --  Sitting BP: 102/81 HR: 86  Standing BP: 101/50 HR: 104  Site: --  Mode: --

## 2023-10-30 NOTE — BH INPATIENT PSYCHIATRY PROGRESS NOTE - NSBHFUPINTERVALHXFT_PSY_A_CORE
Pt states today that she is upset that some patients are aware of her drinking soap.  Pt also states that she continues to have nightmares.  States she was on Minipress previously and found it helpful.

## 2023-10-30 NOTE — BH INPATIENT PSYCHIATRY PROGRESS NOTE - CURRENT MEDICATION
MEDICATIONS  (STANDING):  budesonide  80 MICROgram(s)/formoterol 4.5 MICROgram(s) Inhaler 2 Puff(s) Inhalation two times a day  cloZAPine 50 milliGRAM(s) Oral at bedtime  influenza   Vaccine 0.5 milliLiter(s) IntraMuscular once  lidocaine   4% Patch 1 Patch Transdermal daily  montelukast 10 milliGRAM(s) Oral at bedtime  pantoprazole    Tablet 40 milliGRAM(s) Oral before breakfast  PPD  5 Tuberculin Unit(s) Injectable 5 Unit(s) IntraDermal once    MEDICATIONS  (PRN):  acetaminophen     Tablet .. 650 milliGRAM(s) Oral every 6 hours PRN Moderate Pain (4 - 6)  albuterol    90 MICROgram(s) HFA Inhaler 2 Puff(s) Inhalation every 6 hours PRN Shortness of Breath and/or Wheezing  benzocaine 20% Spray 1 Spray(s) Topical every 6 hours PRN tooth pain  diphenhydrAMINE 50 milliGRAM(s) Oral every 6 hours PRN eps ppx  diphenhydrAMINE Injectable 50 milliGRAM(s) IntraMuscular once PRN agitation / eps ppx  haloperidol     Tablet 5 milliGRAM(s) Oral every 8 hours PRN agitation  haloperidol    Injectable 5 milliGRAM(s) IntraMuscular once PRN Agitation  hydrOXYzine hydrochloride 50 milliGRAM(s) Oral every 12 hours PRN Anxiety  ibuprofen  Tablet. 400 milliGRAM(s) Oral every 6 hours PRN Moderate Pain (4 - 6), Severe Pain (7 - 10)  LORazepam     Tablet 2 milliGRAM(s) Oral every 8 hours PRN Anxiety  LORazepam   Injectable 2 milliGRAM(s) IntraMuscular once PRN Assaultive behavior  melatonin. 5 milliGRAM(s) Oral at bedtime PRN Insomnia  ondansetron    Tablet 4 milliGRAM(s) Oral every 6 hours PRN nausea

## 2023-10-31 PROCEDURE — 99232 SBSQ HOSP IP/OBS MODERATE 35: CPT

## 2023-10-31 RX ORDER — CLOZAPINE 150 MG/1
75 TABLET, ORALLY DISINTEGRATING ORAL AT BEDTIME
Refills: 0 | Status: DISCONTINUED | OUTPATIENT
Start: 2023-10-31 | End: 2023-11-06

## 2023-10-31 RX ADMIN — MONTELUKAST 10 MILLIGRAM(S): 4 TABLET, CHEWABLE ORAL at 21:25

## 2023-10-31 RX ADMIN — BUDESONIDE AND FORMOTEROL FUMARATE DIHYDRATE 2 PUFF(S): 160; 4.5 AEROSOL RESPIRATORY (INHALATION) at 08:11

## 2023-10-31 RX ADMIN — LIDOCAINE 1 PATCH: 4 CREAM TOPICAL at 08:11

## 2023-10-31 RX ADMIN — PANTOPRAZOLE SODIUM 40 MILLIGRAM(S): 20 TABLET, DELAYED RELEASE ORAL at 06:51

## 2023-10-31 RX ADMIN — Medication 5 MILLIGRAM(S): at 21:25

## 2023-10-31 RX ADMIN — Medication 1 MILLIGRAM(S): at 21:25

## 2023-10-31 RX ADMIN — CLOZAPINE 75 MILLIGRAM(S): 150 TABLET, ORALLY DISINTEGRATING ORAL at 21:25

## 2023-10-31 NOTE — BH INPATIENT PSYCHIATRY PROGRESS NOTE - NSBHASSESSSUMMFT_PSY_ALL_CORE
Patient is 27yo single woman, no dependents, domiciled with her Grandmother, unemployed on disability/SSI, pphx of schizoaffective disorder, multiple past psychiatric admissions including state and recent discharge from [facility], in [facility], with pmhx of asthma, HTN, GERD, and hypothyroidism and schizoaffective disorder, in tx  with [facility], who self presented to the ED reporting abdominal pain and requesting readmission to psychiatric unit, reporting CAH, depressive symptoms, IOR, suicidality, admitted to [facility] for psychiatric care.  Depression and psychosis likely multifactorial at this time, schizoaffective vs. borderline personality disorder vs. complex grief vs. adjustment disorder.      Continues to report symptoms.    1.) Obs: Continue CO 1:1.   2.) Psychiatric medication management:   - Increase clozapine to 75mg qHS for psychosis ([id number])  - Prazosin 1 mg qhs, monitor BP carefully.  - Abilify Maintenna 400mg IM q3 weeks rather than 4, per collateral from ACT team. Last dose on 10/19  - discontinue mirtazapine 7.5mg PO qHS, per collateral from ACT team  - PRN: haloperidol 5mg PO/IM q8hrs PRN for agitation, diphenhydramine 50mg PO/IM q6hrs PRN for EPS PPx, hydroxyzine 50mg PO q12hrs PRN for anxiety, lorazepam 2mg PO q8hrs PRN for anxiety or 2mg IM for assaultive behavior. Melatonin 5mg qHS PRN for insomnia  3.) Medical:   - Pt scheduled for dental extractions 11/1.  - last clozapine labs on 10/23 (CBC with diff, troponins, CRP); CRP elevated at 15.8 likely due to dental infection/wisdom tooth issues  - pantoprazole 40mg PO before breakfast for GERD  - montelukast 10mg PO qHS for asthma  - budesonide 80mcg/formoterol 4.5mcg 2 puffs BID for asthma  - PRN: acetaminophen 650mg PO q6hrs PRN for pain, albuterol 90mcg 2puffs q6hrs PRN for dyspnea, ondansetron 4mg q6hrs PRN for nausea  4.) Dispo planning: State application to be submitted next week.

## 2023-10-31 NOTE — BH INPATIENT PSYCHIATRY PROGRESS NOTE - CURRENT MEDICATION
MEDICATIONS  (STANDING):  budesonide  80 MICROgram(s)/formoterol 4.5 MICROgram(s) Inhaler 2 Puff(s) Inhalation two times a day  cloZAPine 75 milliGRAM(s) Oral at bedtime  influenza   Vaccine 0.5 milliLiter(s) IntraMuscular once  lidocaine   4% Patch 1 Patch Transdermal daily  montelukast 10 milliGRAM(s) Oral at bedtime  pantoprazole    Tablet 40 milliGRAM(s) Oral before breakfast  prazosin. 1 milliGRAM(s) Oral at bedtime    MEDICATIONS  (PRN):  acetaminophen     Tablet .. 650 milliGRAM(s) Oral every 6 hours PRN Moderate Pain (4 - 6)  albuterol    90 MICROgram(s) HFA Inhaler 2 Puff(s) Inhalation every 6 hours PRN Shortness of Breath and/or Wheezing  benzocaine 20% Spray 1 Spray(s) Topical every 6 hours PRN tooth pain  diphenhydrAMINE 50 milliGRAM(s) Oral every 6 hours PRN eps ppx  diphenhydrAMINE Injectable 50 milliGRAM(s) IntraMuscular once PRN agitation / eps ppx  haloperidol     Tablet 5 milliGRAM(s) Oral every 8 hours PRN agitation  haloperidol    Injectable 5 milliGRAM(s) IntraMuscular once PRN Agitation  hydrOXYzine hydrochloride 50 milliGRAM(s) Oral every 12 hours PRN Anxiety  ibuprofen  Tablet. 400 milliGRAM(s) Oral every 6 hours PRN Moderate Pain (4 - 6), Severe Pain (7 - 10)  LORazepam     Tablet 2 milliGRAM(s) Oral every 8 hours PRN Anxiety  LORazepam   Injectable 2 milliGRAM(s) IntraMuscular once PRN Assaultive behavior  melatonin. 5 milliGRAM(s) Oral at bedtime PRN Insomnia  ondansetron    Tablet 4 milliGRAM(s) Oral every 6 hours PRN nausea

## 2023-10-31 NOTE — BH INPATIENT PSYCHIATRY PROGRESS NOTE - NSBHFUPINTERVALHXFT_PSY_A_CORE
Pt today states she is still upset about peers on unit knowing she drank soap.  She still had 1 nightmare last night.  States she still has VH.  Reports ongoing thoughts of harming self, states it is linked to her nightmares of trauma.

## 2023-10-31 NOTE — BH INPATIENT PSYCHIATRY PROGRESS NOTE - NSBHCHARTREVIEWVS_PSY_A_CORE FT
Vital Signs Last 24 Hrs  T(C): 36.5 (10-31-23 @ 08:17), Max: 36.5 (10-31-23 @ 08:17)  T(F): 97.7 (10-31-23 @ 08:17), Max: 97.7 (10-31-23 @ 08:17)  HR: --  BP: --  BP(mean): --  RR: --  SpO2: --    Orthostatic VS  10-31-23 @ 08:17  Lying BP: --/-- HR: --  Sitting BP: 135/86 HR: 87  Standing BP: 135/76 HR: 99  Site: --  Mode: --  Orthostatic VS  10-30-23 @ 21:02  Lying BP: --/-- HR: --  Sitting BP: 114/81 HR: 94  Standing BP: 132/75 HR: 102  Site: --  Mode: --  Orthostatic VS  10-30-23 @ 08:41  Lying BP: --/-- HR: --  Sitting BP: 140/88 HR: 91  Standing BP: 138/90 HR: 104  Site: --  Mode: --  Orthostatic VS  10-29-23 @ 20:32  Lying BP: --/-- HR: --  Sitting BP: 128/82 HR: 94  Standing BP: 138/87 HR: 103  Site: --  Mode: --

## 2023-10-31 NOTE — BH INPATIENT PSYCHIATRY PROGRESS NOTE - MSE UNSTRUCTURED FT
Appearance: Dressed appropriately.  Behavior: Cooperative.    Motor: No abnormal movements, no psychomotor slowing or activation.  Speech: Regular rate.  Mood: "Still upset."  Affect: Neutral today  Thought Process: Linear.  Associations: Fair.  Thought Content: Continues to report VH.  Continues to have thoughts of harming self.  Insight: Limited.  Judgment: Fair on interview.  Attention: Fair.  Language: Fluent.  Gait: Intact.

## 2023-11-01 ENCOUNTER — APPOINTMENT (OUTPATIENT)
Age: 28
End: 2023-11-01

## 2023-11-01 LAB
A1C WITH ESTIMATED AVERAGE GLUCOSE RESULT: 5.2 % — SIGNIFICANT CHANGE UP (ref 4–5.6)
A1C WITH ESTIMATED AVERAGE GLUCOSE RESULT: 5.2 % — SIGNIFICANT CHANGE UP (ref 4–5.6)
ALBUMIN SERPL ELPH-MCNC: 4.1 G/DL — SIGNIFICANT CHANGE UP (ref 3.3–5)
ALBUMIN SERPL ELPH-MCNC: 4.1 G/DL — SIGNIFICANT CHANGE UP (ref 3.3–5)
ALP SERPL-CCNC: 106 U/L — SIGNIFICANT CHANGE UP (ref 40–120)
ALP SERPL-CCNC: 106 U/L — SIGNIFICANT CHANGE UP (ref 40–120)
ALT FLD-CCNC: 28 U/L — SIGNIFICANT CHANGE UP (ref 4–33)
ALT FLD-CCNC: 28 U/L — SIGNIFICANT CHANGE UP (ref 4–33)
ANION GAP SERPL CALC-SCNC: 12 MMOL/L — SIGNIFICANT CHANGE UP (ref 7–14)
ANION GAP SERPL CALC-SCNC: 12 MMOL/L — SIGNIFICANT CHANGE UP (ref 7–14)
AST SERPL-CCNC: 17 U/L — SIGNIFICANT CHANGE UP (ref 4–32)
AST SERPL-CCNC: 17 U/L — SIGNIFICANT CHANGE UP (ref 4–32)
BILIRUB SERPL-MCNC: <0.2 MG/DL — SIGNIFICANT CHANGE UP (ref 0.2–1.2)
BILIRUB SERPL-MCNC: <0.2 MG/DL — SIGNIFICANT CHANGE UP (ref 0.2–1.2)
BUN SERPL-MCNC: 22 MG/DL — SIGNIFICANT CHANGE UP (ref 7–23)
BUN SERPL-MCNC: 22 MG/DL — SIGNIFICANT CHANGE UP (ref 7–23)
CALCIUM SERPL-MCNC: 9.2 MG/DL — SIGNIFICANT CHANGE UP (ref 8.4–10.5)
CALCIUM SERPL-MCNC: 9.2 MG/DL — SIGNIFICANT CHANGE UP (ref 8.4–10.5)
CHLORIDE SERPL-SCNC: 105 MMOL/L — SIGNIFICANT CHANGE UP (ref 98–107)
CHLORIDE SERPL-SCNC: 105 MMOL/L — SIGNIFICANT CHANGE UP (ref 98–107)
CHOLEST SERPL-MCNC: 156 MG/DL — SIGNIFICANT CHANGE UP
CHOLEST SERPL-MCNC: 156 MG/DL — SIGNIFICANT CHANGE UP
CO2 SERPL-SCNC: 23 MMOL/L — SIGNIFICANT CHANGE UP (ref 22–31)
CO2 SERPL-SCNC: 23 MMOL/L — SIGNIFICANT CHANGE UP (ref 22–31)
CREAT SERPL-MCNC: 0.62 MG/DL — SIGNIFICANT CHANGE UP (ref 0.5–1.3)
CREAT SERPL-MCNC: 0.62 MG/DL — SIGNIFICANT CHANGE UP (ref 0.5–1.3)
CRP SERPL-MCNC: 25.2 MG/L — HIGH
CRP SERPL-MCNC: 25.2 MG/L — HIGH
EGFR: 126 ML/MIN/1.73M2 — SIGNIFICANT CHANGE UP
EGFR: 126 ML/MIN/1.73M2 — SIGNIFICANT CHANGE UP
ESTIMATED AVERAGE GLUCOSE: 103 — SIGNIFICANT CHANGE UP
ESTIMATED AVERAGE GLUCOSE: 103 — SIGNIFICANT CHANGE UP
GLUCOSE SERPL-MCNC: 107 MG/DL — HIGH (ref 70–99)
GLUCOSE SERPL-MCNC: 107 MG/DL — HIGH (ref 70–99)
HAV IGM SER-ACNC: SIGNIFICANT CHANGE UP
HAV IGM SER-ACNC: SIGNIFICANT CHANGE UP
HBV CORE IGM SER-ACNC: SIGNIFICANT CHANGE UP
HBV CORE IGM SER-ACNC: SIGNIFICANT CHANGE UP
HBV SURFACE AG SER-ACNC: SIGNIFICANT CHANGE UP
HBV SURFACE AG SER-ACNC: SIGNIFICANT CHANGE UP
HCT VFR BLD CALC: 39.2 % — SIGNIFICANT CHANGE UP (ref 34.5–45)
HCT VFR BLD CALC: 39.2 % — SIGNIFICANT CHANGE UP (ref 34.5–45)
HCV AB S/CO SERPL IA: 0.12 S/CO — SIGNIFICANT CHANGE UP (ref 0–0.99)
HCV AB S/CO SERPL IA: 0.12 S/CO — SIGNIFICANT CHANGE UP (ref 0–0.99)
HCV AB SERPL-IMP: SIGNIFICANT CHANGE UP
HCV AB SERPL-IMP: SIGNIFICANT CHANGE UP
HDLC SERPL-MCNC: 58 MG/DL — SIGNIFICANT CHANGE UP
HDLC SERPL-MCNC: 58 MG/DL — SIGNIFICANT CHANGE UP
HGB BLD-MCNC: 12.1 G/DL — SIGNIFICANT CHANGE UP (ref 11.5–15.5)
HGB BLD-MCNC: 12.1 G/DL — SIGNIFICANT CHANGE UP (ref 11.5–15.5)
HIV 1+2 AB+HIV1 P24 AG SERPL QL IA: SIGNIFICANT CHANGE UP
HIV 1+2 AB+HIV1 P24 AG SERPL QL IA: SIGNIFICANT CHANGE UP
LIPID PNL WITH DIRECT LDL SERPL: 82 MG/DL — SIGNIFICANT CHANGE UP
LIPID PNL WITH DIRECT LDL SERPL: 82 MG/DL — SIGNIFICANT CHANGE UP
MCHC RBC-ENTMCNC: 24.9 PG — LOW (ref 27–34)
MCHC RBC-ENTMCNC: 24.9 PG — LOW (ref 27–34)
MCHC RBC-ENTMCNC: 30.9 GM/DL — LOW (ref 32–36)
MCHC RBC-ENTMCNC: 30.9 GM/DL — LOW (ref 32–36)
MCV RBC AUTO: 80.8 FL — SIGNIFICANT CHANGE UP (ref 80–100)
MCV RBC AUTO: 80.8 FL — SIGNIFICANT CHANGE UP (ref 80–100)
NON HDL CHOLESTEROL: 98 MG/DL — SIGNIFICANT CHANGE UP
NON HDL CHOLESTEROL: 98 MG/DL — SIGNIFICANT CHANGE UP
NRBC # BLD: 0 /100 WBCS — SIGNIFICANT CHANGE UP (ref 0–0)
NRBC # BLD: 0 /100 WBCS — SIGNIFICANT CHANGE UP (ref 0–0)
NRBC # FLD: 0 K/UL — SIGNIFICANT CHANGE UP (ref 0–0)
NRBC # FLD: 0 K/UL — SIGNIFICANT CHANGE UP (ref 0–0)
PLATELET # BLD AUTO: 296 K/UL — SIGNIFICANT CHANGE UP (ref 150–400)
PLATELET # BLD AUTO: 296 K/UL — SIGNIFICANT CHANGE UP (ref 150–400)
POTASSIUM SERPL-MCNC: 4.1 MMOL/L — SIGNIFICANT CHANGE UP (ref 3.5–5.3)
POTASSIUM SERPL-MCNC: 4.1 MMOL/L — SIGNIFICANT CHANGE UP (ref 3.5–5.3)
POTASSIUM SERPL-SCNC: 4.1 MMOL/L — SIGNIFICANT CHANGE UP (ref 3.5–5.3)
POTASSIUM SERPL-SCNC: 4.1 MMOL/L — SIGNIFICANT CHANGE UP (ref 3.5–5.3)
PROT SERPL-MCNC: 7.3 G/DL — SIGNIFICANT CHANGE UP (ref 6–8.3)
PROT SERPL-MCNC: 7.3 G/DL — SIGNIFICANT CHANGE UP (ref 6–8.3)
RBC # BLD: 4.85 M/UL — SIGNIFICANT CHANGE UP (ref 3.8–5.2)
RBC # BLD: 4.85 M/UL — SIGNIFICANT CHANGE UP (ref 3.8–5.2)
RBC # FLD: 15.8 % — HIGH (ref 10.3–14.5)
RBC # FLD: 15.8 % — HIGH (ref 10.3–14.5)
SODIUM SERPL-SCNC: 140 MMOL/L — SIGNIFICANT CHANGE UP (ref 135–145)
SODIUM SERPL-SCNC: 140 MMOL/L — SIGNIFICANT CHANGE UP (ref 135–145)
T PALLIDUM AB TITR SER: NEGATIVE — SIGNIFICANT CHANGE UP
T PALLIDUM AB TITR SER: NEGATIVE — SIGNIFICANT CHANGE UP
TRIGL SERPL-MCNC: 79 MG/DL — SIGNIFICANT CHANGE UP
TRIGL SERPL-MCNC: 79 MG/DL — SIGNIFICANT CHANGE UP
TROPONIN T, HIGH SENSITIVITY RESULT: 7 NG/L — SIGNIFICANT CHANGE UP
TROPONIN T, HIGH SENSITIVITY RESULT: 7 NG/L — SIGNIFICANT CHANGE UP
WBC # BLD: 9.02 K/UL — SIGNIFICANT CHANGE UP (ref 3.8–10.5)
WBC # BLD: 9.02 K/UL — SIGNIFICANT CHANGE UP (ref 3.8–10.5)
WBC # FLD AUTO: 9.02 K/UL — SIGNIFICANT CHANGE UP (ref 3.8–10.5)
WBC # FLD AUTO: 9.02 K/UL — SIGNIFICANT CHANGE UP (ref 3.8–10.5)

## 2023-11-01 PROCEDURE — 99232 SBSQ HOSP IP/OBS MODERATE 35: CPT

## 2023-11-01 PROCEDURE — 90853 GROUP PSYCHOTHERAPY: CPT

## 2023-11-01 RX ADMIN — Medication 5 MILLIGRAM(S): at 21:24

## 2023-11-01 RX ADMIN — PANTOPRAZOLE SODIUM 40 MILLIGRAM(S): 20 TABLET, DELAYED RELEASE ORAL at 10:29

## 2023-11-01 RX ADMIN — MONTELUKAST 10 MILLIGRAM(S): 4 TABLET, CHEWABLE ORAL at 21:23

## 2023-11-01 RX ADMIN — TUBERCULIN PURIFIED PROTEIN DERIVATIVE 5 UNIT(S): 5 INJECTION, SOLUTION INTRADERMAL at 19:25

## 2023-11-01 RX ADMIN — Medication 1 MILLIGRAM(S): at 21:23

## 2023-11-01 RX ADMIN — CLOZAPINE 75 MILLIGRAM(S): 150 TABLET, ORALLY DISINTEGRATING ORAL at 21:23

## 2023-11-01 NOTE — BH INPATIENT PSYCHIATRY PROGRESS NOTE - NSBHFUPINTERVALHXFT_PSY_A_CORE
Pt states she did not have nightmares last night.  Still continues to hear voices calling her derogatory names.  Pt states she still has thoughts of harming self.

## 2023-11-01 NOTE — BH INPATIENT PSYCHIATRY PROGRESS NOTE - MSE UNSTRUCTURED FT
Appearance: Dressed appropriately.  Behavior: Cooperative.    Motor: No abnormal movements, no psychomotor slowing or activation.  Speech: Regular rate.  Mood: "Same."  Affect: Neutral today  Thought Process: Linear.  Associations: Fair.  Thought Content: Continues to report AH/VH.  Continues to have thoughts of harming self.  Insight: Limited.  Judgment: Fair on interview.  Attention: Fair.  Language: Fluent.  Gait: Intact.

## 2023-11-01 NOTE — DIETITIAN INITIAL EVALUATION ADULT - OTHER INFO
Pt is a 29 y/o female with PPHx of Schizoaffective disorder. PMHx of Asthma, HTN, GERD and Hypothyroidism. Presents to ED for abdominal pain and CAH, depressive symptoms and SI. Admitted to [facility] for psychiatric care. Reports good appetite/po intake. No GI distress noted. NKFA. Food preferences explored and implemented. UBW: 315 lbs. Weight on 10/21 262 lbs (118.8 kg) Noted weight loss of 53 lbs within 2 months. Pt states " I stopped having sugary drinks" Provided verbal education re: Healthy eating and weight management. Pt verbalized understanding.

## 2023-11-01 NOTE — BH INPATIENT PSYCHIATRY PROGRESS NOTE - NSBHMETABOLIC_PSY_ALL_CORE_FT
BMI: BMI (kg/m2): 57.8 (09-14-23 @ 14:30)  HbA1c: A1C with Estimated Average Glucose Result: 5.2 % (11-01-23 @ 09:04)    Glucose:   BP: --  Lipid Panel: Date/Time: 11-01-23 @ 09:04  Cholesterol, Serum: 156  Direct LDL: --  HDL Cholesterol, Serum: 58  Total Cholesterol/HDL Ration Measurement: --  Triglycerides, Serum: 79

## 2023-11-01 NOTE — BH INPATIENT PSYCHIATRY PROGRESS NOTE - NSBHASSESSSUMMFT_PSY_ALL_CORE
Patient is 29yo single woman, no dependents, domiciled with her Grandmother, unemployed on disability/SSI, pphx of schizoaffective disorder, multiple past psychiatric admissions including state and recent discharge from [facility], [facility], with pmhx of asthma, HTN, GERD, and hypothyroidism and schizoaffective disorder, in tx  with [facility], who self presented to the ED reporting abdominal pain and requesting readmission to psychiatric unit, reporting CAH, depressive symptoms, IOR, suicidality, admitted to [facility] for psychiatric care.  Depression and psychosis likely multifactorial at this time, schizoaffective vs. borderline personality disorder vs. complex grief vs. adjustment disorder.      Continues to report psychosis and thoughts of harming self.    1.) Obs: Continue CO 1:1.   2.) Psychiatric medication management:   - Increase clozapine to 75mg qHS for psychosis ([id number])  - Prazosin 1 mg qhs, monitor BP carefully.  - Abilify Maintenna 400mg IM q3 weeks rather than 4, per collateral from ACT team. Last dose on 10/19  - discontinue mirtazapine 7.5mg PO qHS, per collateral from ACT team  - PRN: haloperidol 5mg PO/IM q8hrs PRN for agitation, diphenhydramine 50mg PO/IM q6hrs PRN for EPS PPx, hydroxyzine 50mg PO q12hrs PRN for anxiety, lorazepam 2mg PO q8hrs PRN for anxiety or 2mg IM for assaultive behavior. Melatonin 5mg qHS PRN for insomnia  3.) Medical:   - Pt scheduled for dental extractions 11/1.  - last clozapine labs on 10/23 (CBC with diff, troponins, CRP); CRP elevated at 15.8 likely due to dental infection/wisdom tooth issues  - pantoprazole 40mg PO before breakfast for GERD  - montelukast 10mg PO qHS for asthma  - budesonide 80mcg/formoterol 4.5mcg 2 puffs BID for asthma  - PRN: acetaminophen 650mg PO q6hrs PRN for pain, albuterol 90mcg 2puffs q6hrs PRN for dyspnea, ondansetron 4mg q6hrs PRN for nausea  4.) Dispo planning: State application to be submitted next week.

## 2023-11-01 NOTE — BH PSYCHOLOGY - GROUP THERAPY NOTE - NSBHPSYCHOLRESPCOMMENT_PSY_A_CORE FT
The patient appeared adequately groomed and casually dressed. Pt appeared engaged in the group as evidenced by their willingness to independently verbally communicate during the discussion. Pt discussed the lack of social support she experiences, as well as how she was bullied and invalidated which makes it hard for her to validate herself and which has impacted her self esteem. Pt was oriented X3. Pt was appropriate with peers.  The patient appeared adequately groomed and casually dressed. Pt appeared engaged in the group as evidenced by their willingness to independently verbally communicate during the discussion. Pt discussed the lack of social support she experiences, as well as how she was bullied and invalidated which makes it hard for her to validate herself, which has impacted her self esteem. Pt was oriented X3. Pt was appropriate with peers.

## 2023-11-01 NOTE — BH INPATIENT PSYCHIATRY PROGRESS NOTE - NSBHCHARTREVIEWVS_PSY_A_CORE FT
Vital Signs Last 24 Hrs  T(C): 36.7 (11-01-23 @ 20:20), Max: 36.7 (11-01-23 @ 20:20)  T(F): 98.1 (11-01-23 @ 20:20), Max: 98.1 (11-01-23 @ 20:20)  HR: --  BP: --  BP(mean): --  RR: --  SpO2: --    Orthostatic VS  11-01-23 @ 20:20  Lying BP: --/-- HR: --  Sitting BP: 131/90 HR: 113  Standing BP: 142/78 HR: 111  Site: --  Mode: --  Orthostatic VS  11-01-23 @ 08:32  Lying BP: --/-- HR: --  Sitting BP: 128/90 HR: 98  Standing BP: 134/91 HR: 100  Site: --  Mode: --  Orthostatic VS  10-31-23 @ 20:42  Lying BP: --/-- HR: --  Sitting BP: 112/61 HR: 100  Standing BP: 105/66 HR: 105  Site: --  Mode: --  Orthostatic VS  10-31-23 @ 08:17  Lying BP: --/-- HR: --  Sitting BP: 135/86 HR: 87  Standing BP: 135/76 HR: 99  Site: --  Mode: --

## 2023-11-02 PROCEDURE — 99232 SBSQ HOSP IP/OBS MODERATE 35: CPT

## 2023-11-02 RX ADMIN — Medication 1 MILLIGRAM(S): at 20:34

## 2023-11-02 RX ADMIN — BUDESONIDE AND FORMOTEROL FUMARATE DIHYDRATE 2 PUFF(S): 160; 4.5 AEROSOL RESPIRATORY (INHALATION) at 20:34

## 2023-11-02 RX ADMIN — PANTOPRAZOLE SODIUM 40 MILLIGRAM(S): 20 TABLET, DELAYED RELEASE ORAL at 08:34

## 2023-11-02 RX ADMIN — MONTELUKAST 10 MILLIGRAM(S): 4 TABLET, CHEWABLE ORAL at 20:34

## 2023-11-02 RX ADMIN — BUDESONIDE AND FORMOTEROL FUMARATE DIHYDRATE 2 PUFF(S): 160; 4.5 AEROSOL RESPIRATORY (INHALATION) at 08:41

## 2023-11-02 RX ADMIN — CLOZAPINE 75 MILLIGRAM(S): 150 TABLET, ORALLY DISINTEGRATING ORAL at 20:34

## 2023-11-02 NOTE — BH PSYCHOLOGY - CLINICIAN PSYCHOTHERAPY NOTE - NSBHPSYCHOLNARRATIVE_PSY_A_CORE FT
Writer met with Pt for 30-minute therapy session. Pt was alert and presented with adequate hygiene and grooming. Pt denied experiencing any A/V hallucinations. Her thought process was linear and goal directed. Pts’ speech was WNL, content was relevant to discussion. Pt denied passive or active SI, HI or NSSI. Oriented x3. Limited insight and judgement demonstrated.     Pt shared that she had been able to sit with the feelings of "sadness, restlessness and anxiety" and not ask for a PRN since discussing in last session. Validated pts efforts and explored what helped her cope with pt stating that she is able to recognize she can tolerate these feelings as well as feeling connected to her peers who she can ask for support and utilize other coping skills (splashing water on face, using her stress ball etc). Explored difference between asking for support on unit vs outside, with pt stating that she is less afraid of feeling judged because others struggle similarly. Normalized feelings and explored how pt could expand her social support system outside a hospital system with pt stating she'd like to seek out groups after state hospitalization. Pt discussed grandmother and uncle visiting in the evening, as well as being worried about feeling triggered due to grandmothers dementia. Explored thoughts and feelings, practiced coping ahead and normalized feelings.

## 2023-11-02 NOTE — BH INPATIENT PSYCHIATRY PROGRESS NOTE - MSE UNSTRUCTURED FT
Appearance: Dressed appropriately.  Behavior: Cooperative.    Motor: No abnormal movements, no psychomotor slowing or activation.  Speech: Regular rate.  Mood: "Ok."  Affect: Neutral today  Thought Process: Linear.  Associations: Fair.  Thought Content: AH, SI.  Insight: Limited.  Judgment: Fair on interview.  Attention: Fair.  Language: Fluent.  Gait: Intact.

## 2023-11-02 NOTE — BH INPATIENT PSYCHIATRY PROGRESS NOTE - NSBHCHARTREVIEWVS_PSY_A_CORE FT
Vital Signs Last 24 Hrs  T(C): 36.6 (11-02-23 @ 08:08), Max: 36.7 (11-01-23 @ 20:20)  T(F): 97.8 (11-02-23 @ 08:08), Max: 98.1 (11-01-23 @ 20:20)  HR: --  BP: --  BP(mean): --  RR: --  SpO2: --    Orthostatic VS  11-02-23 @ 08:08  Lying BP: --/-- HR: --  Sitting BP: 135/84 HR: 92  Standing BP: 138/88 HR: 100  Site: --  Mode: --  Orthostatic VS  11-01-23 @ 20:20  Lying BP: --/-- HR: --  Sitting BP: 131/90 HR: 113  Standing BP: 142/78 HR: 111  Site: --  Mode: --  Orthostatic VS  11-01-23 @ 08:32  Lying BP: --/-- HR: --  Sitting BP: 128/90 HR: 98  Standing BP: 134/91 HR: 100  Site: --  Mode: --  Orthostatic VS  10-31-23 @ 20:42  Lying BP: --/-- HR: --  Sitting BP: 112/61 HR: 100  Standing BP: 105/66 HR: 105  Site: --  Mode: --

## 2023-11-02 NOTE — BH INPATIENT PSYCHIATRY PROGRESS NOTE - NSBHFUPINTERVALHXFT_PSY_A_CORE
Pt states she continues to hear voices and has thoughts of suicide (eating soap).  States her grandmother and uncle are visiting today with food, clothing, and other personal items.

## 2023-11-02 NOTE — BH INPATIENT PSYCHIATRY PROGRESS NOTE - NSBHASSESSSUMMFT_PSY_ALL_CORE
Patient is 29yo single woman, no dependents, domiciled with her Grandmother, unemployed on disability/SSI, pphx of schizoaffective disorder, multiple past psychiatric admissions including state and recent discharge from [facility], in [facility], with pmhx of asthma, HTN, GERD, and hypothyroidism and schizoaffective disorder, in tx  with [facility], who self presented to the ED reporting abdominal pain and requesting readmission to psychiatric unit, reporting CAH, depressive symptoms, IOR, suicidality, admitted to [facility] for psychiatric care.  Depression and psychosis likely multifactorial at this time, schizoaffective vs. borderline personality disorder vs. complex grief vs. adjustment disorder.      Continues to report psychosis and SI.    1.) Obs: Continue CO 1:1.   2.) Psychiatric medication management:   - Increase clozapine to 75mg qHS for psychosis ([id number])  - Prazosin 1 mg qhs, monitor BP carefully.  - Abilify Maintena 400mg IM q3 weeks rather than 4, per collateral from ACT team. Last dose on 10/19  - discontinue mirtazapine 7.5mg PO qHS, per collateral from ACT team  - PRN: haloperidol 5mg PO/IM q8hrs PRN for agitation, diphenhydramine 50mg PO/IM q6hrs PRN for EPS PPx, hydroxyzine 50mg PO q12hrs PRN for anxiety, lorazepam 2mg PO q8hrs PRN for anxiety or 2mg IM for assaultive behavior. Melatonin 5mg qHS PRN for insomnia  3.) Medical:   - Pt scheduled for dental extractions 11/1.  - last clozapine labs on 10/23 (CBC with diff, troponins, CRP); CRP elevated at 15.8 likely due to dental infection/wisdom tooth issues  - pantoprazole 40mg PO before breakfast for GERD  - montelukast 10mg PO qHS for asthma  - budesonide 80mcg/formoterol 4.5mcg 2 puffs BID for asthma  - PRN: acetaminophen 650mg PO q6hrs PRN for pain, albuterol 90mcg 2puffs q6hrs PRN for dyspnea, ondansetron 4mg q6hrs PRN for nausea  - Discussed CRP with medicine service, given asymptomatic pt and normal troponin, no additional intervention/diagnostics at this time.  4.) Dispo planning: State application to be submitted next week.

## 2023-11-03 PROCEDURE — 99232 SBSQ HOSP IP/OBS MODERATE 35: CPT

## 2023-11-03 RX ORDER — PRAZOSIN HCL 2 MG
2 CAPSULE ORAL AT BEDTIME
Refills: 0 | Status: DISCONTINUED | OUTPATIENT
Start: 2023-11-03 | End: 2024-02-06

## 2023-11-03 RX ADMIN — BUDESONIDE AND FORMOTEROL FUMARATE DIHYDRATE 2 PUFF(S): 160; 4.5 AEROSOL RESPIRATORY (INHALATION) at 09:59

## 2023-11-03 RX ADMIN — Medication 2 MILLIGRAM(S): at 21:19

## 2023-11-03 RX ADMIN — MONTELUKAST 10 MILLIGRAM(S): 4 TABLET, CHEWABLE ORAL at 21:20

## 2023-11-03 RX ADMIN — CLOZAPINE 75 MILLIGRAM(S): 150 TABLET, ORALLY DISINTEGRATING ORAL at 21:20

## 2023-11-03 RX ADMIN — Medication 30 MILLILITER(S): at 16:12

## 2023-11-03 RX ADMIN — PANTOPRAZOLE SODIUM 40 MILLIGRAM(S): 20 TABLET, DELAYED RELEASE ORAL at 08:00

## 2023-11-03 NOTE — BH TREATMENT PLAN - NSTXDCOTHRINTERSW_PSY_ALL_CORE
Writer will work to educate the pt on the state referral process and explore any other aftercare options that may be an appropriate alternative to [facility] hospital.

## 2023-11-03 NOTE — BH INPATIENT PSYCHIATRY PROGRESS NOTE - NSBHFUPINTERVALHXFT_PSY_A_CORE
Pt states she had visit with grandmother last night and it went ok.  Pt states she heard voices three times today.  States she has been feeling suicidal because of this.  Continues to have nightmares.

## 2023-11-03 NOTE — BH INPATIENT PSYCHIATRY PROGRESS NOTE - NSBHCHARTREVIEWVS_PSY_A_CORE FT
Vital Signs Last 24 Hrs  T(C): 36.6 (11-03-23 @ 08:00), Max: 36.9 (11-02-23 @ 20:24)  T(F): 97.9 (11-03-23 @ 08:00), Max: 98.4 (11-02-23 @ 20:24)  HR: --  BP: --  BP(mean): --  RR: 20 (11-02-23 @ 20:24) (20 - 20)  SpO2: 100% (11-02-23 @ 20:24) (100% - 100%)    Orthostatic VS  11-03-23 @ 08:00  Lying BP: --/-- HR: --  Sitting BP: 128/74 HR: 96  Standing BP: 116/73 HR: 111  Site: --  Mode: --  Orthostatic VS  11-02-23 @ 20:24  Lying BP: --/-- HR: --  Sitting BP: 143/87 HR: 104  Standing BP: 138/78 HR: 109  Site: --  Mode: --  Orthostatic VS  11-02-23 @ 08:08  Lying BP: --/-- HR: --  Sitting BP: 135/84 HR: 92  Standing BP: 138/88 HR: 100  Site: --  Mode: --  Orthostatic VS  11-01-23 @ 20:20  Lying BP: --/-- HR: --  Sitting BP: 131/90 HR: 113  Standing BP: 142/78 HR: 111  Site: --  Mode: --   Statement Selected

## 2023-11-03 NOTE — BH INPATIENT PSYCHIATRY PROGRESS NOTE - MSE UNSTRUCTURED FT
Appearance: Dressed appropriately.  Behavior: Cooperative.  Calm.  Motor: No abnormal movements, no psychomotor slowing or activation.  Speech: Regular rate.  Mood: "Ok."  Affect: Pleasant.   Thought Process: Linear.  Associations: Fair.  Thought Content: AH, SI.  Insight: Limited.  Judgment: Fair on interview.  Attention: Fair.  Language: Fluent.  Gait: Intact.

## 2023-11-03 NOTE — BH INPATIENT PSYCHIATRY PROGRESS NOTE - NSBHASSESSSUMMFT_PSY_ALL_CORE
Patient is 29yo single woman, no dependents, domiciled with her Grandmother, unemployed on disability/SSI, pphx of schizoaffective disorder, multiple past psychiatric admissions including state and recent discharge from [facility], in tx [facility], with pmhx of asthma, HTN, GERD, and hypothyroidism and schizoaffective disorder, in tx  with [facility], who self presented to the ED reporting abdominal pain and requesting readmission to psychiatric unit, reporting CAH, depressive symptoms, IOR, suicidality, admitted to [facility] for psychiatric care.  Depression and psychosis likely multifactorial at this time, schizoaffective vs. borderline personality disorder vs. complex grief vs. adjustment disorder.      Continues to report psychosis and SI.    1.) Obs: Continue CO 1:1.   2.) Psychiatric medication management:   - Clozapine 75mg qHS for psychosis ([id number])  - Increase Prazosin to 2 mg qhs, monitor BP carefully.  - Abilify Maintena 400mg IM q3 weeks rather than 4, per collateral from ACT team. Last dose on 10/19  - discontinue mirtazapine 7.5mg PO qHS, per collateral from ACT team  - PRN: haloperidol 5mg PO/IM q8hrs PRN for agitation, diphenhydramine 50mg PO/IM q6hrs PRN for EPS PPx, hydroxyzine 50mg PO q12hrs PRN for anxiety, lorazepam 2mg PO q8hrs PRN for anxiety or 2mg IM for assaultive behavior. Melatonin 5mg qHS PRN for insomnia  3.) Medical:   - Pt scheduled for dental extractions 11/1.  - last clozapine labs on 10/23 (CBC with diff, troponins, CRP); CRP elevated at 15.8 likely due to dental infection/wisdom tooth issues  - pantoprazole 40mg PO before breakfast for GERD  - montelukast 10mg PO qHS for asthma  - budesonide 80mcg/formoterol 4.5mcg 2 puffs BID for asthma  - PRN: acetaminophen 650mg PO q6hrs PRN for pain, albuterol 90mcg 2puffs q6hrs PRN for dyspnea, ondansetron 4mg q6hrs PRN for nausea  - Discussed CRP with medicine service, given asymptomatic pt and normal troponin, no additional intervention/diagnostics at this time.  4.) Dispo planning: State application to be submitted next week.  Patient is 27yo single woman, no dependents, domiciled with her Grandmother, unemployed on disability/SSI, pphx of schizoaffective disorder, multiple past psychiatric admissions including state and recent discharge from [facility], in tx with [facility], with pmhx of asthma, HTN, GERD, and hypothyroidism and schizoaffective disorder, in tx  with [facility], who self presented to the ED reporting abdominal pain and requesting readmission to psychiatric unit, reporting CAH, depressive symptoms, IOR, suicidality, admitted to [facility] for psychiatric care.  Depression and psychosis likely multifactorial at this time, schizoaffective vs. borderline personality disorder vs. complex grief vs. adjustment disorder.      Continues to report psychosis and SI.    1.) Obs: Continue CO 1:1.   2.) Psychiatric medication management:   - Clozapine 75mg qHS for psychosis ([id number])  - Increase Prazosin to 2 mg qhs, monitor BP carefully.  - Abilify Maintena 400mg IM q3 weeks rather than 4, per collateral from ACT team. Last dose on 10/19  - discontinue mirtazapine 7.5mg PO qHS, per collateral from ACT team  - PRN: haloperidol 5mg PO/IM q8hrs PRN for agitation, diphenhydramine 50mg PO/IM q6hrs PRN for EPS PPx, hydroxyzine 50mg PO q12hrs PRN for anxiety, lorazepam 2mg PO q8hrs PRN for anxiety or 2mg IM for assaultive behavior. Melatonin 5mg qHS PRN for insomnia  - Discussed ECT with pt who states she has been told in two hospitals in past that due to her obesity and asthma, she is not an ideal candidate.  Pt states that due to this she is not interested in receiving ECT at this time.  3.) Medical:   - Pt scheduled for dental extractions 11/1.  - last clozapine labs on 10/23 (CBC with diff, troponins, CRP); CRP elevated at 15.8 likely due to dental infection/wisdom tooth issues  - pantoprazole 40mg PO before breakfast for GERD  - montelukast 10mg PO qHS for asthma  - budesonide 80mcg/formoterol 4.5mcg 2 puffs BID for asthma  - PRN: acetaminophen 650mg PO q6hrs PRN for pain, albuterol 90mcg 2puffs q6hrs PRN for dyspnea, ondansetron 4mg q6hrs PRN for nausea  - Discussed CRP with medicine service, given asymptomatic pt and normal troponin, no additional intervention/diagnostics at this time.  4.) Dispo planning: State application to be submitted next week.

## 2023-11-04 PROCEDURE — 99232 SBSQ HOSP IP/OBS MODERATE 35: CPT

## 2023-11-04 RX ORDER — CHLORPROMAZINE HCL 10 MG
50 TABLET ORAL EVERY 6 HOURS
Refills: 0 | Status: DISCONTINUED | OUTPATIENT
Start: 2023-11-04 | End: 2023-11-26

## 2023-11-04 RX ORDER — CHLORPROMAZINE HCL 10 MG
50 TABLET ORAL ONCE
Refills: 0 | Status: DISCONTINUED | OUTPATIENT
Start: 2023-11-04 | End: 2024-02-06

## 2023-11-04 RX ADMIN — Medication 2 MILLIGRAM(S): at 20:54

## 2023-11-04 RX ADMIN — LIDOCAINE 1 PATCH: 4 CREAM TOPICAL at 09:14

## 2023-11-04 RX ADMIN — BUDESONIDE AND FORMOTEROL FUMARATE DIHYDRATE 2 PUFF(S): 160; 4.5 AEROSOL RESPIRATORY (INHALATION) at 09:14

## 2023-11-04 RX ADMIN — MONTELUKAST 10 MILLIGRAM(S): 4 TABLET, CHEWABLE ORAL at 20:53

## 2023-11-04 RX ADMIN — CLOZAPINE 75 MILLIGRAM(S): 150 TABLET, ORALLY DISINTEGRATING ORAL at 20:54

## 2023-11-04 RX ADMIN — BUDESONIDE AND FORMOTEROL FUMARATE DIHYDRATE 2 PUFF(S): 160; 4.5 AEROSOL RESPIRATORY (INHALATION) at 20:55

## 2023-11-04 RX ADMIN — PANTOPRAZOLE SODIUM 40 MILLIGRAM(S): 20 TABLET, DELAYED RELEASE ORAL at 09:14

## 2023-11-04 NOTE — BH INPATIENT PSYCHIATRY PROGRESS NOTE - NSBHASSESSSUMMFT_PSY_ALL_CORE
Patient is 29yo single woman, no dependents, domiciled with her Grandmother, unemployed on disability/SSI, pphx of schizoaffective disorder, multiple past psychiatric admissions including state and recent discharge from [facility], in tx [facility], with pmhx of asthma, HTN, GERD, and hypothyroidism and schizoaffective disorder, in tx  with [facility], who self presented to the ED reporting abdominal pain and requesting readmission to psychiatric unit, reporting CAH, depressive symptoms, IOR, suicidality, admitted to [facility] for psychiatric care.  Depression and psychosis likely multifactorial at this time, schizoaffective vs. borderline personality disorder vs. complex grief vs. adjustment disorder.      Continues to report psychosis and SI.    1.) Obs: Continue CO 1:1.   2.) Psychiatric medication management:   - Clozapine 75mg qHS for psychosis ([id number])  - Increase Prazosin to 2 mg qhs, monitor BP carefully.  - Abilify Maintena 400mg IM q3 weeks rather than 4, per collateral from ACT team. Last dose on 10/19  - discontinue mirtazapine 7.5mg PO qHS, per collateral from ACT team  - PRN: haloperidol 5mg PO/IM q8hrs PRN for agitation, diphenhydramine 50mg PO/IM q6hrs PRN for EPS PPx, hydroxyzine 50mg PO q12hrs PRN for anxiety, lorazepam 2mg PO q8hrs PRN for anxiety or 2mg IM for assaultive behavior. Melatonin 5mg qHS PRN for insomnia  - Discussed ECT with pt who states she has been told in two hospitals in past that due to her obesity and asthma, she is not an ideal candidate.  Pt states that due to this she is not interested in receiving ECT at this time.  3.) Medical:   - Pt scheduled for dental extractions 11/1.  - last clozapine labs on 10/23 (CBC with diff, troponins, CRP); CRP elevated at 15.8 likely due to dental infection/wisdom tooth issues  - pantoprazole 40mg PO before breakfast for GERD  - montelukast 10mg PO qHS for asthma  - budesonide 80mcg/formoterol 4.5mcg 2 puffs BID for asthma  - PRN: acetaminophen 650mg PO q6hrs PRN for pain, albuterol 90mcg 2puffs q6hrs PRN for dyspnea, ondansetron 4mg q6hrs PRN for nausea  - Discussed CRP with medicine service, given asymptomatic pt and normal troponin, no additional intervention/diagnostics at this time.  4.) Dispo planning: State application to be submitted next week.

## 2023-11-04 NOTE — BH INPATIENT PSYCHIATRY PROGRESS NOTE - PRN MEDS
MEDICATIONS  (PRN):  acetaminophen     Tablet .. 650 milliGRAM(s) Oral every 6 hours PRN Moderate Pain (4 - 6)  albuterol    90 MICROgram(s) HFA Inhaler 2 Puff(s) Inhalation every 6 hours PRN Shortness of Breath and/or Wheezing  aluminum hydroxide/magnesium hydroxide/simethicone Suspension 30 milliLiter(s) Oral every 4 hours PRN Dyspepsia  benzocaine 20% Spray 1 Spray(s) Topical every 6 hours PRN tooth pain  chlorproMAZINE    Injectable 50 milliGRAM(s) IntraMuscular once PRN severe agitation  chlorproMAZINE    Tablet 50 milliGRAM(s) Oral every 6 hours PRN agitation  hydrOXYzine hydrochloride 50 milliGRAM(s) Oral every 12 hours PRN Anxiety  ibuprofen  Tablet. 400 milliGRAM(s) Oral every 6 hours PRN Moderate Pain (4 - 6), Severe Pain (7 - 10)  LORazepam     Tablet 2 milliGRAM(s) Oral every 8 hours PRN Anxiety  LORazepam   Injectable 2 milliGRAM(s) IntraMuscular once PRN Assaultive behavior  melatonin. 5 milliGRAM(s) Oral at bedtime PRN Insomnia  ondansetron    Tablet 4 milliGRAM(s) Oral every 6 hours PRN nausea

## 2023-11-04 NOTE — BH INPATIENT PSYCHIATRY PROGRESS NOTE - NSBHFUPINTERVALHXFT_PSY_A_CORE
Today reports feeling "kind of mendiola". States she's feeling more stressed with anxiety but can't identify why she feels this way. She is requesting a change in PRNS and therefore will switch both haldol and benadryl to Thorazine 50mg po/im q6hrs. Admitted to having thoughts to bang her head but was able to cope and use her coping skills to maintain her safety. Denies any active SI/I/P at the time of the interview.

## 2023-11-04 NOTE — BH INPATIENT PSYCHIATRY PROGRESS NOTE - CURRENT MEDICATION
MEDICATIONS  (STANDING):  budesonide  80 MICROgram(s)/formoterol 4.5 MICROgram(s) Inhaler 2 Puff(s) Inhalation two times a day  cloZAPine 75 milliGRAM(s) Oral at bedtime  influenza   Vaccine 0.5 milliLiter(s) IntraMuscular once  lidocaine   4% Patch 1 Patch Transdermal daily  montelukast 10 milliGRAM(s) Oral at bedtime  pantoprazole    Tablet 40 milliGRAM(s) Oral before breakfast  prazosin. 2 milliGRAM(s) Oral at bedtime    MEDICATIONS  (PRN):  acetaminophen     Tablet .. 650 milliGRAM(s) Oral every 6 hours PRN Moderate Pain (4 - 6)  albuterol    90 MICROgram(s) HFA Inhaler 2 Puff(s) Inhalation every 6 hours PRN Shortness of Breath and/or Wheezing  aluminum hydroxide/magnesium hydroxide/simethicone Suspension 30 milliLiter(s) Oral every 4 hours PRN Dyspepsia  benzocaine 20% Spray 1 Spray(s) Topical every 6 hours PRN tooth pain  chlorproMAZINE    Injectable 50 milliGRAM(s) IntraMuscular once PRN severe agitation  chlorproMAZINE    Tablet 50 milliGRAM(s) Oral every 6 hours PRN agitation  hydrOXYzine hydrochloride 50 milliGRAM(s) Oral every 12 hours PRN Anxiety  ibuprofen  Tablet. 400 milliGRAM(s) Oral every 6 hours PRN Moderate Pain (4 - 6), Severe Pain (7 - 10)  LORazepam     Tablet 2 milliGRAM(s) Oral every 8 hours PRN Anxiety  LORazepam   Injectable 2 milliGRAM(s) IntraMuscular once PRN Assaultive behavior  melatonin. 5 milliGRAM(s) Oral at bedtime PRN Insomnia  ondansetron    Tablet 4 milliGRAM(s) Oral every 6 hours PRN nausea

## 2023-11-04 NOTE — BH INPATIENT PSYCHIATRY PROGRESS NOTE - MSE UNSTRUCTURED FT
Appearance: Dressed appropriately.  Behavior: Cooperative.  Calm.  Motor: No abnormal movements, no psychomotor slowing or activation.  Speech: Regular rate.  Mood: "Ok."  Affect: Pleasant.   Thought Process: Linear.  Associations: Fair.  Thought Content: AH, SI. recent urges to engage in self harm  Insight: Limited.  Judgment: Fair on interview.  Attention: Fair.  Language: Fluent.  Gait: Intact.

## 2023-11-04 NOTE — BH INPATIENT PSYCHIATRY PROGRESS NOTE - NSBHMETABOLIC_PSY_ALL_CORE_FT
BMI: BMI (kg/m2): 57.8 (09-14-23 @ 14:30)  HbA1c: A1C with Estimated Average Glucose Result: 5.2 % (11-01-23 @ 09:04)    Glucose:   BP: 120/69 (11-03-23 @ 21:08) (120/69 - 120/69)  Lipid Panel: Date/Time: 11-01-23 @ 09:04  Cholesterol, Serum: 156  Direct LDL: --  HDL Cholesterol, Serum: 58  Total Cholesterol/HDL Ration Measurement: --  Triglycerides, Serum: 79

## 2023-11-04 NOTE — BH INPATIENT PSYCHIATRY PROGRESS NOTE - NSBHCHARTREVIEWVS_PSY_A_CORE FT
Vital Signs Last 24 Hrs  T(C): 36.6 (11-04-23 @ 08:18), Max: 36.7 (11-03-23 @ 21:08)  T(F): 97.8 (11-04-23 @ 08:18), Max: 98.1 (11-03-23 @ 21:08)  HR: 94 (11-03-23 @ 21:08) (94 - 94)  BP: 120/69 (11-03-23 @ 21:08) (120/69 - 120/69)  BP(mean): --  RR: --  SpO2: --    Orthostatic VS  11-04-23 @ 08:18  Lying BP: 125/94 HR: 119  Sitting BP: 130/85 HR: 115  Standing BP: --/-- HR: --  Site: --  Mode: --  Orthostatic VS  11-03-23 @ 08:00  Lying BP: --/-- HR: --  Sitting BP: 128/74 HR: 96  Standing BP: 116/73 HR: 111  Site: --  Mode: --  Orthostatic VS  11-02-23 @ 20:24  Lying BP: --/-- HR: --  Sitting BP: 143/87 HR: 104  Standing BP: 138/78 HR: 109  Site: --  Mode: --

## 2023-11-05 PROCEDURE — 99232 SBSQ HOSP IP/OBS MODERATE 35: CPT

## 2023-11-05 RX ADMIN — Medication 5 MILLIGRAM(S): at 00:05

## 2023-11-05 RX ADMIN — BUDESONIDE AND FORMOTEROL FUMARATE DIHYDRATE 2 PUFF(S): 160; 4.5 AEROSOL RESPIRATORY (INHALATION) at 21:29

## 2023-11-05 RX ADMIN — Medication 50 MILLIGRAM(S): at 18:11

## 2023-11-05 RX ADMIN — PANTOPRAZOLE SODIUM 40 MILLIGRAM(S): 20 TABLET, DELAYED RELEASE ORAL at 08:20

## 2023-11-05 RX ADMIN — BUDESONIDE AND FORMOTEROL FUMARATE DIHYDRATE 2 PUFF(S): 160; 4.5 AEROSOL RESPIRATORY (INHALATION) at 08:20

## 2023-11-05 RX ADMIN — Medication 2 MILLIGRAM(S): at 20:52

## 2023-11-05 RX ADMIN — CLOZAPINE 75 MILLIGRAM(S): 150 TABLET, ORALLY DISINTEGRATING ORAL at 20:52

## 2023-11-05 RX ADMIN — MONTELUKAST 10 MILLIGRAM(S): 4 TABLET, CHEWABLE ORAL at 20:53

## 2023-11-05 NOTE — BH INPATIENT PSYCHIATRY PROGRESS NOTE - MSE UNSTRUCTURED FT
Appearance: Dressed appropriately.  Behavior: Cooperative.  Calm.  Motor: No abnormal movements, no psychomotor slowing or activation.  Speech: Regular rate.  Mood: "alright - still stressed."  Affect: Pleasant.   Thought Process: Linear.  Associations: Fair.  Thought Content: AH, SI. recent urges to engage in self harm by banging  Insight: Limited.  Judgment: Fair on interview.  Attention: Fair.  Language: Fluent.  Gait: Intact.

## 2023-11-05 NOTE — BH INPATIENT PSYCHIATRY PROGRESS NOTE - NSBHFUPINTERVALHXFT_PSY_A_CORE
Today reports still feeling stressed out and states that yesterday she "had a meltdown and cried on the floor". She did not require any PRNs at the time and was able to utilize her coping skills and just got up and walked around the unit. She admits that she becomes more irritable and emotional prior to her period which she's anticipating.    Admitted to having thoughts to hurt herself yesterday but did not engage in any self injury.  Denies any active SI/I/P at the time of the interview.  Today reports still feeling stressed out and states that yesterday she "had a meltdown and cried on the floor". She reports that during this stressful time she heard her grandfathers voice talking to her.   She did not require any PRNs at the time and was able to utilize her coping skills and just got up and walked around the unit. She admits that she becomes more irritable and emotional prior to her period which she's anticipating.    Admitted to having thoughts to hurt herself yesterday but did not engage in any self injury.  Denies any active SI/I/P at the time of the interview.

## 2023-11-05 NOTE — BH INPATIENT PSYCHIATRY PROGRESS NOTE - NSBHASSESSSUMMFT_PSY_ALL_CORE
Patient is 29yo single woman, no dependents, domiciled with her Grandmother, unemployed on disability/SSI, pphx of schizoaffective disorder, multiple past psychiatric admissions including state and recent discharge from [facility], [facility], with pmhx of asthma, HTN, GERD, and hypothyroidism and schizoaffective disorder, in tx  with [facility], who self presented to the ED reporting abdominal pain and requesting readmission to psychiatric unit, reporting CAH, depressive symptoms, IOR, suicidality, admitted to [facility] for psychiatric care.  Depression and psychosis likely multifactorial at this time, schizoaffective vs. borderline personality disorder vs. complex grief vs. adjustment disorder.      Continues to report psychosis and SI.    1.) Obs: Continue CO 1:1.   2.) Psychiatric medication management:   - Clozapine 75mg qHS for psychosis ([id number])  - Increase Prazosin to 2 mg qhs, monitor BP carefully.  - Abilify Maintena 400mg IM q3 weeks rather than 4, per collateral from ACT team. Last dose on 10/19  - discontinue mirtazapine 7.5mg PO qHS, per collateral from ACT team  - PRN: haloperidol 5mg PO/IM q8hrs PRN for agitation, diphenhydramine 50mg PO/IM q6hrs PRN for EPS PPx, hydroxyzine 50mg PO q12hrs PRN for anxiety, lorazepam 2mg PO q8hrs PRN for anxiety or 2mg IM for assaultive behavior. Melatonin 5mg qHS PRN for insomnia  - Discussed ECT with pt who states she has been told in two hospitals in past that due to her obesity and asthma, she is not an ideal candidate.  Pt states that due to this she is not interested in receiving ECT at this time.  3.) Medical:   - Pt scheduled for dental extractions 11/1.  - last clozapine labs on 10/23 (CBC with diff, troponins, CRP); CRP elevated at 15.8 likely due to dental infection/wisdom tooth issues  - pantoprazole 40mg PO before breakfast for GERD  - montelukast 10mg PO qHS for asthma  - budesonide 80mcg/formoterol 4.5mcg 2 puffs BID for asthma  - PRN: acetaminophen 650mg PO q6hrs PRN for pain, albuterol 90mcg 2puffs q6hrs PRN for dyspnea, ondansetron 4mg q6hrs PRN for nausea  - Discussed CRP with medicine service, given asymptomatic pt and normal troponin, no additional intervention/diagnostics at this time.  4.) Dispo planning: State application to be submitted next week.

## 2023-11-05 NOTE — BH INPATIENT PSYCHIATRY PROGRESS NOTE - NSBHMETABOLIC_PSY_ALL_CORE_FT
BMI: BMI (kg/m2): 57.8 (09-14-23 @ 14:30)  HbA1c: A1C with Estimated Average Glucose Result: 5.2 % (11-01-23 @ 09:04)    Glucose:   BP: 114/81 (11-05-23 @ 08:16) (114/81 - 120/69)  Lipid Panel: Date/Time: 11-01-23 @ 09:04  Cholesterol, Serum: 156  Direct LDL: --  HDL Cholesterol, Serum: 58  Total Cholesterol/HDL Ration Measurement: --  Triglycerides, Serum: 79

## 2023-11-05 NOTE — BH INPATIENT PSYCHIATRY PROGRESS NOTE - NSBHCHARTREVIEWVS_PSY_A_CORE FT
Vital Signs Last 24 Hrs  T(C): 36.5 (11-05-23 @ 08:16), Max: 36.5 (11-05-23 @ 08:16)  T(F): 97.7 (11-05-23 @ 08:16), Max: 97.7 (11-05-23 @ 08:16)  HR: 113 (11-05-23 @ 08:16) (113 - 113)  BP: 114/81 (11-05-23 @ 08:16) (114/81 - 114/81)  BP(mean): --  RR: --  SpO2: --    Orthostatic VS  11-04-23 @ 20:30  Lying BP: --/-- HR: --  Sitting BP: 144/85 HR: 104  Standing BP: 137/84 HR: 104  Site: --  Mode: --  Orthostatic VS  11-04-23 @ 08:18  Lying BP: 125/94 HR: 119  Sitting BP: 130/85 HR: 115  Standing BP: --/-- HR: --  Site: --  Mode: --

## 2023-11-06 PROCEDURE — 99232 SBSQ HOSP IP/OBS MODERATE 35: CPT

## 2023-11-06 RX ORDER — CLOZAPINE 150 MG/1
100 TABLET, ORALLY DISINTEGRATING ORAL AT BEDTIME
Refills: 0 | Status: DISCONTINUED | OUTPATIENT
Start: 2023-11-06 | End: 2023-11-08

## 2023-11-06 RX ORDER — HYDROCORTISONE 1 %
1 OINTMENT (GRAM) TOPICAL
Refills: 0 | Status: DISCONTINUED | OUTPATIENT
Start: 2023-11-06 | End: 2023-11-08

## 2023-11-06 RX ADMIN — PANTOPRAZOLE SODIUM 40 MILLIGRAM(S): 20 TABLET, DELAYED RELEASE ORAL at 08:10

## 2023-11-06 RX ADMIN — CLOZAPINE 100 MILLIGRAM(S): 150 TABLET, ORALLY DISINTEGRATING ORAL at 20:33

## 2023-11-06 RX ADMIN — ALBUTEROL 2 PUFF(S): 90 AEROSOL, METERED ORAL at 15:31

## 2023-11-06 RX ADMIN — Medication 2 MILLIGRAM(S): at 20:33

## 2023-11-06 RX ADMIN — MONTELUKAST 10 MILLIGRAM(S): 4 TABLET, CHEWABLE ORAL at 20:33

## 2023-11-06 RX ADMIN — Medication 1 APPLICATION(S): at 20:34

## 2023-11-06 RX ADMIN — Medication 5 MILLIGRAM(S): at 20:33

## 2023-11-06 RX ADMIN — BUDESONIDE AND FORMOTEROL FUMARATE DIHYDRATE 2 PUFF(S): 160; 4.5 AEROSOL RESPIRATORY (INHALATION) at 21:06

## 2023-11-06 NOTE — BH INPATIENT PSYCHIATRY PROGRESS NOTE - MSE UNSTRUCTURED FT
Appearance: Dressed appropriately.  Behavior: Cooperative.  Calm.  Motor: No abnormal movements, no psychomotor slowing or activation.  Speech: Regular rate.  Mood: "distressed."  Affect: Neutral  Thought Process: Linear.  Associations: Fair.  Thought Content: AH, SI via soap ingestion.  Insight: Limited.  Judgment: Fair on interview.  Attention: Fair.  Language: Fluent.  Gait: Intact.

## 2023-11-06 NOTE — BH INPATIENT PSYCHIATRY PROGRESS NOTE - CURRENT MEDICATION
MEDICATIONS  (STANDING):  budesonide  80 MICROgram(s)/formoterol 4.5 MICROgram(s) Inhaler 2 Puff(s) Inhalation two times a day  cloZAPine 75 milliGRAM(s) Oral at bedtime  hydrocortisone 1% Cream 1 Application(s) Topical two times a day  influenza   Vaccine 0.5 milliLiter(s) IntraMuscular once  lidocaine   4% Patch 1 Patch Transdermal daily  montelukast 10 milliGRAM(s) Oral at bedtime  pantoprazole    Tablet 40 milliGRAM(s) Oral before breakfast  prazosin. 2 milliGRAM(s) Oral at bedtime    MEDICATIONS  (PRN):  acetaminophen     Tablet .. 650 milliGRAM(s) Oral every 6 hours PRN Moderate Pain (4 - 6)  albuterol    90 MICROgram(s) HFA Inhaler 2 Puff(s) Inhalation every 6 hours PRN Shortness of Breath and/or Wheezing  aluminum hydroxide/magnesium hydroxide/simethicone Suspension 30 milliLiter(s) Oral every 4 hours PRN Dyspepsia  benzocaine 20% Spray 1 Spray(s) Topical every 6 hours PRN tooth pain  chlorproMAZINE    Injectable 50 milliGRAM(s) IntraMuscular once PRN severe agitation  chlorproMAZINE    Tablet 50 milliGRAM(s) Oral every 6 hours PRN agitation  hydrOXYzine hydrochloride 50 milliGRAM(s) Oral every 12 hours PRN Anxiety  ibuprofen  Tablet. 400 milliGRAM(s) Oral every 6 hours PRN Moderate Pain (4 - 6), Severe Pain (7 - 10)  LORazepam     Tablet 2 milliGRAM(s) Oral every 8 hours PRN Anxiety  LORazepam   Injectable 2 milliGRAM(s) IntraMuscular once PRN Assaultive behavior  melatonin. 5 milliGRAM(s) Oral at bedtime PRN Insomnia  ondansetron    Tablet 4 milliGRAM(s) Oral every 6 hours PRN nausea

## 2023-11-06 NOTE — BH INPATIENT PSYCHIATRY PROGRESS NOTE - NSBHMETABOLIC_PSY_ALL_CORE_FT
BMI: BMI (kg/m2): 57.8 (09-14-23 @ 14:30)  HbA1c: A1C with Estimated Average Glucose Result: 5.2 % (11-01-23 @ 09:04)    Glucose:   BP: 114/81 (11-06-23 @ 12:33) (114/81 - 120/69)  Lipid Panel: Date/Time: 11-01-23 @ 09:04  Cholesterol, Serum: 156  Direct LDL: --  HDL Cholesterol, Serum: 58  Total Cholesterol/HDL Ration Measurement: --  Triglycerides, Serum: 79

## 2023-11-06 NOTE — BH INPATIENT PSYCHIATRY PROGRESS NOTE - NSBHFUPINTERVALHXFT_PSY_A_CORE
Pt states that she had episode of distress yesterday that is continuing this morning, but unable to determine cause.  States that she heard voices yesterday afternoon when it happened, and states she needed Thorazine PRN.  States it was the voice of her mother saying derogatory things.  Continues to have intermittent thoughts of harming self by drinking soap.

## 2023-11-06 NOTE — BH INPATIENT PSYCHIATRY PROGRESS NOTE - NSBHASSESSSUMMFT_PSY_ALL_CORE
Patient is 29yo single woman, no dependents, domiciled with her Grandmother, unemployed on disability/SSI, pphx of schizoaffective disorder, multiple past psychiatric admissions including state and recent discharge from [facility], in tx with [facility], with pmhx of asthma, HTN, GERD, and hypothyroidism and schizoaffective disorder, in tx  with [facility], who self presented to the ED reporting abdominal pain and requesting readmission to psychiatric unit, reporting CAH, depressive symptoms, IOR, suicidality, admitted to [facility] for psychiatric care.  Depression and psychosis likely multifactorial at this time, schizoaffective vs. borderline personality disorder vs. complex grief vs. adjustment disorder.      Continues to report psychosis and SI.    1.) Obs: Continue CO 1:1.   2.) Psychiatric medication management:   - Clozapine increase to 100mg qHS for psychosis ([id number])  - Increase Prazosin to 2 mg qhs, monitor BP carefully.  - Abilify Maintena 400mg IM q3 weeks rather than 4, per collateral from ACT team. Last dose on 10/19  - discontinue mirtazapine 7.5mg PO qHS, per collateral from ACT team  - PRN: haloperidol 5mg PO/IM q8hrs PRN for agitation, diphenhydramine 50mg PO/IM q6hrs PRN for EPS PPx, hydroxyzine 50mg PO q12hrs PRN for anxiety, lorazepam 2mg PO q8hrs PRN for anxiety or 2mg IM for assaultive behavior. Melatonin 5mg qHS PRN for insomnia  - Discussed ECT with pt who states she has been told in two hospitals in past that due to her obesity and asthma, she is not an ideal candidate.  Pt states that due to this she is not interested in receiving ECT at this time.  3.) Medical:   - Pt scheduled for dental extractions 11/1.  - last clozapine labs on 10/23 (CBC with diff, troponins, CRP); CRP elevated at 15.8 likely due to dental infection/wisdom tooth issues  - pantoprazole 40mg PO before breakfast for GERD  - montelukast 10mg PO qHS for asthma  - budesonide 80mcg/formoterol 4.5mcg 2 puffs BID for asthma  - PRN: acetaminophen 650mg PO q6hrs PRN for pain, albuterol 90mcg 2puffs q6hrs PRN for dyspnea, ondansetron 4mg q6hrs PRN for nausea  - Discussed CRP with medicine service, given asymptomatic pt and normal troponin, no additional intervention/diagnostics at this time.  4.) Dispo planning: State application to be submitted next week.  Patient is 27yo single woman, no dependents, domiciled with her Grandmother, unemployed on disability/SSI, pphx of schizoaffective disorder, multiple past psychiatric admissions including state and recent discharge from [facility], in tx with [facility], with pmhx of asthma, HTN, GERD, and hypothyroidism and schizoaffective disorder, in tx  with [facility], who self presented to the ED reporting abdominal pain and requesting readmission to psychiatric unit, reporting CAH, depressive symptoms, IOR, suicidality, admitted to [facility] for psychiatric care.  Depression and psychosis likely multifactorial at this time, schizoaffective vs. borderline personality disorder vs. complex grief vs. adjustment disorder.      Continues to report psychosis and SI.    1.) Obs: Continue CO 1:1.   2.) Psychiatric medication management:   - Clozapine increase to 100mg qHS for psychosis ([id number])  - Increase Prazosin to 2mg qhs, monitor BP carefully.  - Abilify Maintena 400mg IM q3 weeks rather than 4, per collateral from ACT team. Last dose on 10/19  - discontinue mirtazapine 7.5mg PO qHS, per collateral from ACT team  - PRN: haloperidol 5mg PO/IM q8hrs PRN for agitation, diphenhydramine 50mg PO/IM q6hrs PRN for EPS PPx, hydroxyzine 50mg PO q12hrs PRN for anxiety, lorazepam 2mg PO q8hrs PRN for anxiety or 2mg IM for assaultive behavior. Melatonin 5mg qHS PRN for insomnia  - Discussed ECT with pt who states she has been told in two hospitals in past that due to her obesity and asthma, she is not an ideal candidate.  Pt states that due to this she is not interested in receiving ECT at this time.  3.) Medical:   - Pt scheduled for dental extractions 11/1.  - last clozapine labs on 10/23 (CBC with diff, troponins, CRP); CRP elevated at 15.8 likely due to dental infection/wisdom tooth issues  - pantoprazole 40mg PO before breakfast for GERD  - montelukast 10mg PO qHS for asthma  - budesonide 80mcg/formoterol 4.5mcg 2 puffs BID for asthma  - PRN: acetaminophen 650mg PO q6hrs PRN for pain, albuterol 90mcg 2puffs q6hrs PRN for dyspnea, ondansetron 4mg q6hrs PRN for nausea  - Discussed CRP with medicine service, given asymptomatic pt and normal troponin, no additional intervention/diagnostics at this time.  - Ordered hydrocortizone cream for rash.  4.) Dispo planning: State application to be submitted next week.

## 2023-11-06 NOTE — BH INPATIENT PSYCHIATRY PROGRESS NOTE - NSBHCHARTREVIEWVS_PSY_A_CORE FT
Vital Signs Last 24 Hrs  T(C): 36.5 (11-06-23 @ 12:33), Max: 36.6 (11-05-23 @ 21:01)  T(F): 97.7 (11-06-23 @ 12:33), Max: 97.9 (11-05-23 @ 21:01)  HR: 113 (11-06-23 @ 12:33) (113 - 113)  BP: 114/81 (11-06-23 @ 12:33) (114/81 - 114/81)  BP(mean): --  RR: --  SpO2: --    Orthostatic VS  11-05-23 @ 21:01  Lying BP: --/-- HR: --  Sitting BP: 138/97 HR: 122  Standing BP: 131/91 HR: 115  Site: --  Mode: --  Orthostatic VS  11-04-23 @ 20:30  Lying BP: --/-- HR: --  Sitting BP: 144/85 HR: 104  Standing BP: 137/84 HR: 104  Site: --  Mode: --

## 2023-11-07 PROCEDURE — 99232 SBSQ HOSP IP/OBS MODERATE 35: CPT

## 2023-11-07 RX ORDER — ESCITALOPRAM OXALATE 10 MG/1
5 TABLET, FILM COATED ORAL ONCE
Refills: 0 | Status: COMPLETED | OUTPATIENT
Start: 2023-11-07 | End: 2023-11-07

## 2023-11-07 RX ORDER — ESCITALOPRAM OXALATE 10 MG/1
10 TABLET, FILM COATED ORAL DAILY
Refills: 0 | Status: DISCONTINUED | OUTPATIENT
Start: 2023-11-08 | End: 2023-11-17

## 2023-11-07 RX ADMIN — Medication 2 MILLIGRAM(S): at 21:13

## 2023-11-07 RX ADMIN — CLOZAPINE 100 MILLIGRAM(S): 150 TABLET, ORALLY DISINTEGRATING ORAL at 21:13

## 2023-11-07 RX ADMIN — Medication 1 APPLICATION(S): at 21:15

## 2023-11-07 RX ADMIN — PANTOPRAZOLE SODIUM 40 MILLIGRAM(S): 20 TABLET, DELAYED RELEASE ORAL at 08:44

## 2023-11-07 RX ADMIN — MONTELUKAST 10 MILLIGRAM(S): 4 TABLET, CHEWABLE ORAL at 21:13

## 2023-11-07 RX ADMIN — BUDESONIDE AND FORMOTEROL FUMARATE DIHYDRATE 2 PUFF(S): 160; 4.5 AEROSOL RESPIRATORY (INHALATION) at 08:45

## 2023-11-07 RX ADMIN — BUDESONIDE AND FORMOTEROL FUMARATE DIHYDRATE 2 PUFF(S): 160; 4.5 AEROSOL RESPIRATORY (INHALATION) at 21:16

## 2023-11-07 RX ADMIN — ESCITALOPRAM OXALATE 5 MILLIGRAM(S): 10 TABLET, FILM COATED ORAL at 15:58

## 2023-11-07 RX ADMIN — Medication 1 APPLICATION(S): at 08:45

## 2023-11-07 RX ADMIN — LIDOCAINE 1 PATCH: 4 CREAM TOPICAL at 08:45

## 2023-11-07 NOTE — BH PSYCHOLOGY - CLINICIAN PSYCHOTHERAPY NOTE - NSBHPSYCHOLNARRATIVE_PSY_A_CORE FT
Writer met with Pt for 30-minute individual therapy session. Pt was alert and presented with adequate hygiene and grooming. Pt denied experiencing any A/V hallucinations. Pt reported depressed mood, affect full. Her thought process was linear and goal directed. Pts’ speech was WNL, content was relevant to discussion. Pt denied passive or active SI, HI or NSSI. Oriented x3. Limited insight and judgement demonstrated.      Pt shared that she felt depressed during the weekend, which caused her to have increased urges to self harm. Pt shared that she was able to delay PRN by a day, but then felt her coping skills were not sufficient to manage distress. Explored coping skills used, validated pt delaying. Explored with pt how being on CO was fulfilling her need of closeness and connection in an unrealistic way, and reinforced unhealthy coping. Pt shared that she recognizes how it is a way for her to meet those needs, but not trusting herself to not utilize different ways to manage distress. Pt and Wr did some role play to practice asking peers for support when she needs it and processed after. Practiced coping ahead and assertive communication doing role play related to conversation with boyfriend.

## 2023-11-07 NOTE — BH INPATIENT PSYCHIATRY PROGRESS NOTE - CURRENT MEDICATION
MEDICATIONS  (STANDING):  budesonide  80 MICROgram(s)/formoterol 4.5 MICROgram(s) Inhaler 2 Puff(s) Inhalation two times a day  cloZAPine 100 milliGRAM(s) Oral at bedtime  escitalopram 5 milliGRAM(s) Oral once  hydrocortisone 1% Cream 1 Application(s) Topical two times a day  influenza   Vaccine 0.5 milliLiter(s) IntraMuscular once  lidocaine   4% Patch 1 Patch Transdermal daily  montelukast 10 milliGRAM(s) Oral at bedtime  pantoprazole    Tablet 40 milliGRAM(s) Oral before breakfast  prazosin. 2 milliGRAM(s) Oral at bedtime    MEDICATIONS  (PRN):  acetaminophen     Tablet .. 650 milliGRAM(s) Oral every 6 hours PRN Moderate Pain (4 - 6)  albuterol    90 MICROgram(s) HFA Inhaler 2 Puff(s) Inhalation every 6 hours PRN Shortness of Breath and/or Wheezing  aluminum hydroxide/magnesium hydroxide/simethicone Suspension 30 milliLiter(s) Oral every 4 hours PRN Dyspepsia  benzocaine 20% Spray 1 Spray(s) Topical every 6 hours PRN tooth pain  chlorproMAZINE    Injectable 50 milliGRAM(s) IntraMuscular once PRN severe agitation  chlorproMAZINE    Tablet 50 milliGRAM(s) Oral every 6 hours PRN agitation  hydrOXYzine hydrochloride 50 milliGRAM(s) Oral every 12 hours PRN Anxiety  ibuprofen  Tablet. 400 milliGRAM(s) Oral every 6 hours PRN Moderate Pain (4 - 6), Severe Pain (7 - 10)  LORazepam     Tablet 2 milliGRAM(s) Oral every 8 hours PRN Anxiety  LORazepam   Injectable 2 milliGRAM(s) IntraMuscular once PRN Assaultive behavior  melatonin. 5 milliGRAM(s) Oral at bedtime PRN Insomnia  ondansetron    Tablet 4 milliGRAM(s) Oral every 6 hours PRN nausea

## 2023-11-07 NOTE — BH INPATIENT PSYCHIATRY PROGRESS NOTE - MSE UNSTRUCTURED FT
Appearance: Dressed appropriately.  Behavior: Cooperative.  Calm.  Motor: No abnormal movements, no psychomotor slowing or activation.  Speech: Regular rate.  Mood: "Depressed."  Affect: Neutral  Thought Process: Linear.  Associations: Fair.  Thought Content: AH, SI.  Insight: Limited.  Judgment: Fair on interview.  Attention: Fair.  Language: Fluent.  Gait: Intact.

## 2023-11-07 NOTE — BH PSYCHOLOGY - CLINICIAN PSYCHOTHERAPY NOTE - NSBHPSYCHOLINT_PSY_A_CORE
Cognitive/behavioral therapy/Dynamic issues addressed/Supported coping skills/Supportive therapy/other...

## 2023-11-07 NOTE — BH INPATIENT PSYCHIATRY PROGRESS NOTE - NSBHFUPINTERVALHXFT_PSY_A_CORE
Pt states that she feels depressed when she gets closer to her period, and this has been happening for years.  Continues to hear voices and has thoughts of suicide.

## 2023-11-07 NOTE — BH INPATIENT PSYCHIATRY PROGRESS NOTE - NSBHASSESSSUMMFT_PSY_ALL_CORE
Patient is 29yo single woman, no dependents, domiciled with her Grandmother, unemployed on disability/SSI, pphx of schizoaffective disorder, multiple past psychiatric admissions including state and recent discharge from [facility], in tx with [facility], with pmhx of asthma, HTN, GERD, and hypothyroidism and schizoaffective disorder, in tx  with [facility], who self presented to the ED reporting abdominal pain and requesting readmission to psychiatric unit, reporting CAH, depressive symptoms, IOR, suicidality, admitted to [facility] for psychiatric care.  Depression and psychosis likely multifactorial at this time, schizoaffective vs. borderline personality disorder vs. complex grief vs. adjustment disorder.      Continues to report depression, psychosis, and SI.    1.) Obs: Continue CO 1:1.   2.) Psychiatric medication management:   - Clozapine increase to 100mg qHS for psychosis ([id number])  - Increase Prazosin to 2mg qhs, monitor BP carefully.  - Abilify Maintena 400mg IM q3 weeks rather than 4, per collateral from ACT team. Last dose on 10/19  - discontinue mirtazapine 7.5mg PO qHS, per collateral from ACT team  - Add escitalopram 5 mg daily and increase to 10 mg tomorrow.  - PRN: haloperidol 5mg PO/IM q8hrs PRN for agitation, diphenhydramine 50mg PO/IM q6hrs PRN for EPS PPx, hydroxyzine 50mg PO q12hrs PRN for anxiety, lorazepam 2mg PO q8hrs PRN for anxiety or 2mg IM for assaultive behavior. Melatonin 5mg qHS PRN for insomnia  - Discussed ECT with pt who states she has been told in two hospitals in past that due to her obesity and asthma, she is not an ideal candidate.  Pt states that due to this she is not interested in receiving ECT at this time.  3.) Medical:   - Pt scheduled for dental extractions 11/1.  - last clozapine labs on 10/23 (CBC with diff, troponins, CRP); CRP elevated at 15.8 likely due to dental infection/wisdom tooth issues  - pantoprazole 40mg PO before breakfast for GERD  - montelukast 10mg PO qHS for asthma  - budesonide 80mcg/formoterol 4.5mcg 2 puffs BID for asthma  - PRN: acetaminophen 650mg PO q6hrs PRN for pain, albuterol 90mcg 2puffs q6hrs PRN for dyspnea, ondansetron 4mg q6hrs PRN for nausea  - Discussed CRP with medicine service, given asymptomatic pt and normal troponin, no additional intervention/diagnostics at this time.  - Ordered hydrocortizone cream for rash.  4.) Dispo planning: State application to be submitted next week.

## 2023-11-07 NOTE — BH INPATIENT PSYCHIATRY PROGRESS NOTE - NSBHMETABOLIC_PSY_ALL_CORE_FT
BMI: BMI (kg/m2): 57.8 (09-14-23 @ 14:30)  HbA1c: A1C with Estimated Average Glucose Result: 5.2 % (11-01-23 @ 09:04)    Glucose:   BP: 114/81 (11-06-23 @ 12:33) (114/81 - 114/81)  Lipid Panel: Date/Time: 11-01-23 @ 09:04  Cholesterol, Serum: 156  Direct LDL: --  HDL Cholesterol, Serum: 58  Total Cholesterol/HDL Ration Measurement: --  Triglycerides, Serum: 79

## 2023-11-07 NOTE — BH INPATIENT PSYCHIATRY PROGRESS NOTE - NSBHCHARTREVIEWVS_PSY_A_CORE FT
Vital Signs Last 24 Hrs  T(C): 36.8 (11-07-23 @ 08:21), Max: 36.8 (11-06-23 @ 20:43)  T(F): 98.3 (11-07-23 @ 08:21), Max: 98.3 (11-06-23 @ 20:43)  HR: --  BP: --  BP(mean): --  RR: --  SpO2: --    Orthostatic VS  11-07-23 @ 08:21  Lying BP: --/-- HR: --  Sitting BP: 121/61 HR: 91  Standing BP: 132/87 HR: 112  Site: --  Mode: --  Orthostatic VS  11-06-23 @ 20:43  Lying BP: --/-- HR: --  Sitting BP: 109/70 HR: 111  Standing BP: 119/74 HR: 115  Site: --  Mode: --  Orthostatic VS  11-05-23 @ 21:01  Lying BP: --/-- HR: --  Sitting BP: 138/97 HR: 122  Standing BP: 131/91 HR: 115  Site: --  Mode: --

## 2023-11-08 LAB
BASOPHILS # BLD AUTO: 0.02 K/UL — SIGNIFICANT CHANGE UP (ref 0–0.2)
BASOPHILS # BLD AUTO: 0.02 K/UL — SIGNIFICANT CHANGE UP (ref 0–0.2)
BASOPHILS NFR BLD AUTO: 0.2 % — SIGNIFICANT CHANGE UP (ref 0–2)
BASOPHILS NFR BLD AUTO: 0.2 % — SIGNIFICANT CHANGE UP (ref 0–2)
CRP SERPL-MCNC: 21.3 MG/L — HIGH
CRP SERPL-MCNC: 21.3 MG/L — HIGH
EOSINOPHIL # BLD AUTO: 0.01 K/UL — SIGNIFICANT CHANGE UP (ref 0–0.5)
EOSINOPHIL # BLD AUTO: 0.01 K/UL — SIGNIFICANT CHANGE UP (ref 0–0.5)
EOSINOPHIL NFR BLD AUTO: 0.1 % — SIGNIFICANT CHANGE UP (ref 0–6)
EOSINOPHIL NFR BLD AUTO: 0.1 % — SIGNIFICANT CHANGE UP (ref 0–6)
HCT VFR BLD CALC: 37.4 % — SIGNIFICANT CHANGE UP (ref 34.5–45)
HCT VFR BLD CALC: 37.4 % — SIGNIFICANT CHANGE UP (ref 34.5–45)
HGB BLD-MCNC: 11.7 G/DL — SIGNIFICANT CHANGE UP (ref 11.5–15.5)
HGB BLD-MCNC: 11.7 G/DL — SIGNIFICANT CHANGE UP (ref 11.5–15.5)
IANC: 7.38 K/UL — SIGNIFICANT CHANGE UP (ref 1.8–7.4)
IANC: 7.38 K/UL — SIGNIFICANT CHANGE UP (ref 1.8–7.4)
IMM GRANULOCYTES NFR BLD AUTO: 0.6 % — SIGNIFICANT CHANGE UP (ref 0–0.9)
IMM GRANULOCYTES NFR BLD AUTO: 0.6 % — SIGNIFICANT CHANGE UP (ref 0–0.9)
LYMPHOCYTES # BLD AUTO: 2.69 K/UL — SIGNIFICANT CHANGE UP (ref 1–3.3)
LYMPHOCYTES # BLD AUTO: 2.69 K/UL — SIGNIFICANT CHANGE UP (ref 1–3.3)
LYMPHOCYTES # BLD AUTO: 24.8 % — SIGNIFICANT CHANGE UP (ref 13–44)
LYMPHOCYTES # BLD AUTO: 24.8 % — SIGNIFICANT CHANGE UP (ref 13–44)
MCHC RBC-ENTMCNC: 24.6 PG — LOW (ref 27–34)
MCHC RBC-ENTMCNC: 24.6 PG — LOW (ref 27–34)
MCHC RBC-ENTMCNC: 31.3 GM/DL — LOW (ref 32–36)
MCHC RBC-ENTMCNC: 31.3 GM/DL — LOW (ref 32–36)
MCV RBC AUTO: 78.7 FL — LOW (ref 80–100)
MCV RBC AUTO: 78.7 FL — LOW (ref 80–100)
MONOCYTES # BLD AUTO: 0.67 K/UL — SIGNIFICANT CHANGE UP (ref 0–0.9)
MONOCYTES # BLD AUTO: 0.67 K/UL — SIGNIFICANT CHANGE UP (ref 0–0.9)
MONOCYTES NFR BLD AUTO: 6.2 % — SIGNIFICANT CHANGE UP (ref 2–14)
MONOCYTES NFR BLD AUTO: 6.2 % — SIGNIFICANT CHANGE UP (ref 2–14)
NEUTROPHILS # BLD AUTO: 7.38 K/UL — SIGNIFICANT CHANGE UP (ref 1.8–7.4)
NEUTROPHILS # BLD AUTO: 7.38 K/UL — SIGNIFICANT CHANGE UP (ref 1.8–7.4)
NEUTROPHILS NFR BLD AUTO: 68.1 % — SIGNIFICANT CHANGE UP (ref 43–77)
NEUTROPHILS NFR BLD AUTO: 68.1 % — SIGNIFICANT CHANGE UP (ref 43–77)
NRBC # BLD: 0 /100 WBCS — SIGNIFICANT CHANGE UP (ref 0–0)
NRBC # BLD: 0 /100 WBCS — SIGNIFICANT CHANGE UP (ref 0–0)
NRBC # FLD: 0 K/UL — SIGNIFICANT CHANGE UP (ref 0–0)
NRBC # FLD: 0 K/UL — SIGNIFICANT CHANGE UP (ref 0–0)
PLATELET # BLD AUTO: 346 K/UL — SIGNIFICANT CHANGE UP (ref 150–400)
PLATELET # BLD AUTO: 346 K/UL — SIGNIFICANT CHANGE UP (ref 150–400)
RBC # BLD: 4.75 M/UL — SIGNIFICANT CHANGE UP (ref 3.8–5.2)
RBC # BLD: 4.75 M/UL — SIGNIFICANT CHANGE UP (ref 3.8–5.2)
RBC # FLD: 15.5 % — HIGH (ref 10.3–14.5)
RBC # FLD: 15.5 % — HIGH (ref 10.3–14.5)
TROPONIN T, HIGH SENSITIVITY RESULT: <6 NG/L — SIGNIFICANT CHANGE UP
TROPONIN T, HIGH SENSITIVITY RESULT: <6 NG/L — SIGNIFICANT CHANGE UP
WBC # BLD: 10.84 K/UL — HIGH (ref 3.8–10.5)
WBC # BLD: 10.84 K/UL — HIGH (ref 3.8–10.5)
WBC # FLD AUTO: 10.84 K/UL — HIGH (ref 3.8–10.5)
WBC # FLD AUTO: 10.84 K/UL — HIGH (ref 3.8–10.5)

## 2023-11-08 PROCEDURE — 90853 GROUP PSYCHOTHERAPY: CPT

## 2023-11-08 PROCEDURE — 99232 SBSQ HOSP IP/OBS MODERATE 35: CPT

## 2023-11-08 RX ORDER — NYSTATIN CREAM 100000 [USP'U]/G
1 CREAM TOPICAL ONCE
Refills: 0 | Status: DISCONTINUED | OUTPATIENT
Start: 2023-11-08 | End: 2023-11-09

## 2023-11-08 RX ORDER — CLOZAPINE 150 MG/1
125 TABLET, ORALLY DISINTEGRATING ORAL AT BEDTIME
Refills: 0 | Status: DISCONTINUED | OUTPATIENT
Start: 2023-11-08 | End: 2023-11-10

## 2023-11-08 RX ORDER — NYSTATIN CREAM 100000 [USP'U]/G
1 CREAM TOPICAL
Refills: 0 | Status: DISCONTINUED | OUTPATIENT
Start: 2023-11-08 | End: 2023-11-09

## 2023-11-08 RX ADMIN — ESCITALOPRAM OXALATE 10 MILLIGRAM(S): 10 TABLET, FILM COATED ORAL at 09:08

## 2023-11-08 RX ADMIN — NYSTATIN CREAM 1 APPLICATION(S): 100000 CREAM TOPICAL at 21:06

## 2023-11-08 RX ADMIN — Medication 2 MILLIGRAM(S): at 20:43

## 2023-11-08 RX ADMIN — BUDESONIDE AND FORMOTEROL FUMARATE DIHYDRATE 2 PUFF(S): 160; 4.5 AEROSOL RESPIRATORY (INHALATION) at 09:08

## 2023-11-08 RX ADMIN — Medication 1 APPLICATION(S): at 09:08

## 2023-11-08 RX ADMIN — MONTELUKAST 10 MILLIGRAM(S): 4 TABLET, CHEWABLE ORAL at 20:43

## 2023-11-08 RX ADMIN — CLOZAPINE 125 MILLIGRAM(S): 150 TABLET, ORALLY DISINTEGRATING ORAL at 20:43

## 2023-11-08 RX ADMIN — PANTOPRAZOLE SODIUM 40 MILLIGRAM(S): 20 TABLET, DELAYED RELEASE ORAL at 09:07

## 2023-11-08 RX ADMIN — BUDESONIDE AND FORMOTEROL FUMARATE DIHYDRATE 2 PUFF(S): 160; 4.5 AEROSOL RESPIRATORY (INHALATION) at 20:43

## 2023-11-08 NOTE — BH INPATIENT PSYCHIATRY PROGRESS NOTE - NSBHASSESSSUMMFT_PSY_ALL_CORE
Patient is 29yo single woman, no dependents, domiciled with her Grandmother, unemployed on disability/SSI, pphx of schizoaffective disorder, multiple past psychiatric admissions including state and recent discharge from [facility], in tx with [facility], with pmhx of asthma, HTN, GERD, and hypothyroidism and schizoaffective disorder, in tx  with [facility], who self presented to the ED reporting abdominal pain and requesting readmission to psychiatric unit, reporting CAH, depressive symptoms, IOR, suicidality, admitted to [facility] for psychiatric care.  Depression and psychosis likely multifactorial at this time, schizoaffective vs. borderline personality disorder vs. complex grief vs. adjustment disorder.      Continues to report depression, psychosis, and SI.    1.) Obs: Continue CO 1:1.   2.) Psychiatric medication management:   - Clozapine increase to 125mg qHS for psychosis ([id number])  - Increase Prazosin to 2mg qhs, monitor BP carefully.  - Abilify Maintena 400mg IM q3 weeks rather than 4, per collateral from ACT team. Last dose on 10/19, next due tomorrow.  - discontinue mirtazapine 7.5mg PO qHS, per collateral from ACT team  - Escitalopram 10 mg daily today.  - PRN: haloperidol 5mg PO/IM q8hrs PRN for agitation, diphenhydramine 50mg PO/IM q6hrs PRN for EPS PPx, hydroxyzine 50mg PO q12hrs PRN for anxiety, lorazepam 2mg PO q8hrs PRN for anxiety or 2mg IM for assaultive behavior. Melatonin 5mg qHS PRN for insomnia  - Discussed ECT with pt who states she has been told in two hospitals in past that due to her obesity and asthma, she is not an ideal candidate.  Pt states that due to this she is not interested in receiving ECT at this time.  3.) Medical:   - Pt scheduled for dental extractions 11/1.  - last clozapine labs on 10/23 (CBC with diff, troponins, CRP); CRP elevated at 15.8 likely due to dental infection/wisdom tooth issues  - pantoprazole 40mg PO before breakfast for GERD  - montelukast 10mg PO qHS for asthma  - budesonide 80mcg/formoterol 4.5mcg 2 puffs BID for asthma  - PRN: acetaminophen 650mg PO q6hrs PRN for pain, albuterol 90mcg 2puffs q6hrs PRN for dyspnea, ondansetron 4mg q6hrs PRN for nausea  - Discussed CRP with medicine service, given asymptomatic pt and normal troponin, no additional intervention/diagnostics at this time.  - Discontinued hydrocortizone cream for rash due to inefficacy.  Concern for fungal rash, nystatin powder ordered.  4.) Dispo planning: State application submitted.

## 2023-11-08 NOTE — BH INPATIENT PSYCHIATRY PROGRESS NOTE - NSBHFUPINTERVALHXFT_PSY_A_CORE
Pt reports today that she has been very stressed out, and that voices have been much more intense, states that she could not concentrate in group earlier due to voices.  Pt states she still has suicidal thoughts.  Pt states nightmares have decreased.

## 2023-11-08 NOTE — BH INPATIENT PSYCHIATRY PROGRESS NOTE - CURRENT MEDICATION
MEDICATIONS  (STANDING):  budesonide  80 MICROgram(s)/formoterol 4.5 MICROgram(s) Inhaler 2 Puff(s) Inhalation two times a day  cloZAPine 125 milliGRAM(s) Oral at bedtime  escitalopram 10 milliGRAM(s) Oral daily  influenza   Vaccine 0.5 milliLiter(s) IntraMuscular once  lidocaine   4% Patch 1 Patch Transdermal daily  montelukast 10 milliGRAM(s) Oral at bedtime  nystatin Powder 1 Application(s) Topical once  nystatin Powder 1 Application(s) Topical two times a day  pantoprazole    Tablet 40 milliGRAM(s) Oral before breakfast  prazosin. 2 milliGRAM(s) Oral at bedtime    MEDICATIONS  (PRN):  acetaminophen     Tablet .. 650 milliGRAM(s) Oral every 6 hours PRN Moderate Pain (4 - 6)  albuterol    90 MICROgram(s) HFA Inhaler 2 Puff(s) Inhalation every 6 hours PRN Shortness of Breath and/or Wheezing  aluminum hydroxide/magnesium hydroxide/simethicone Suspension 30 milliLiter(s) Oral every 4 hours PRN Dyspepsia  benzocaine 20% Spray 1 Spray(s) Topical every 6 hours PRN tooth pain  chlorproMAZINE    Injectable 50 milliGRAM(s) IntraMuscular once PRN severe agitation  chlorproMAZINE    Tablet 50 milliGRAM(s) Oral every 6 hours PRN agitation  hydrOXYzine hydrochloride 50 milliGRAM(s) Oral every 12 hours PRN Anxiety  ibuprofen  Tablet. 400 milliGRAM(s) Oral every 6 hours PRN Moderate Pain (4 - 6), Severe Pain (7 - 10)  LORazepam     Tablet 2 milliGRAM(s) Oral every 8 hours PRN Anxiety  LORazepam   Injectable 2 milliGRAM(s) IntraMuscular once PRN Assaultive behavior  melatonin. 5 milliGRAM(s) Oral at bedtime PRN Insomnia  ondansetron    Tablet 4 milliGRAM(s) Oral every 6 hours PRN nausea

## 2023-11-08 NOTE — BH INPATIENT PSYCHIATRY PROGRESS NOTE - MSE UNSTRUCTURED FT
Appearance: Dressed appropriately.  Behavior: Cooperative.  Calm.  Motor: No abnormal movements, no psychomotor slowing or activation.  Speech: Regular rate.  Mood: "Stressed out."  Affect: Irritable.   Thought Process: Linear.  Associations: Fair.  Thought Content: Worsening AH, SI.  Insight: Limited.  Judgment: Fair on interview.  Attention: Fair.  Language: Fluent.  Gait: Intact.     PHYSICAL EXAM  Skin: Dry scaling erythmatous rash on chest

## 2023-11-08 NOTE — BH INPATIENT PSYCHIATRY PROGRESS NOTE - NSBHCHARTREVIEWVS_PSY_A_CORE FT
Vital Signs Last 24 Hrs  T(C): 36.7 (11-08-23 @ 08:17), Max: 36.7 (11-08-23 @ 08:17)  T(F): 98 (11-08-23 @ 08:17), Max: 98 (11-08-23 @ 08:17)  HR: --  BP: --  BP(mean): --  RR: --  SpO2: --    Orthostatic VS  11-08-23 @ 08:17  Lying BP: --/-- HR: --  Sitting BP: 143/79 HR: 117  Standing BP: 133/75 HR: 120  Site: --  Mode: --  Orthostatic VS  11-07-23 @ 20:29  Lying BP: --/-- HR: --  Sitting BP: 106/58 HR: 99  Standing BP: 103/65 HR: 111  Site: --  Mode: --  Orthostatic VS  11-07-23 @ 08:21  Lying BP: --/-- HR: --  Sitting BP: 121/61 HR: 91  Standing BP: 132/87 HR: 112  Site: --  Mode: --  Orthostatic VS  11-06-23 @ 20:43  Lying BP: --/-- HR: --  Sitting BP: 109/70 HR: 111  Standing BP: 119/74 HR: 115  Site: --  Mode: --

## 2023-11-08 NOTE — BH PSYCHOLOGY - GROUP THERAPY NOTE - NSBHPSYCHOLRESPCOMMENT_PSY_A_CORE FT
The patient appeared adequately groomed and casually dressed. Pt appeared very engaged in the group as evidenced by her willingness to independently verbally communicate during the discussion. Pt volunteered to read from worksheet. Pt shared that she hopes to work on prioritizing her own goals and intentions for the future rather than “putting everyone’s needs above [hers].” Speech WNL. PT was oriented X3. Pt was appropriate and supportive with peers.

## 2023-11-09 PROCEDURE — 99222 1ST HOSP IP/OBS MODERATE 55: CPT

## 2023-11-09 PROCEDURE — 99232 SBSQ HOSP IP/OBS MODERATE 35: CPT

## 2023-11-09 RX ORDER — CLOTRIMAZOLE AND BETAMETHASONE DIPROPIONATE 10; .5 MG/G; MG/G
1 CREAM TOPICAL
Refills: 0 | Status: DISCONTINUED | OUTPATIENT
Start: 2023-11-09 | End: 2023-11-20

## 2023-11-09 RX ADMIN — PANTOPRAZOLE SODIUM 40 MILLIGRAM(S): 20 TABLET, DELAYED RELEASE ORAL at 08:20

## 2023-11-09 RX ADMIN — MONTELUKAST 10 MILLIGRAM(S): 4 TABLET, CHEWABLE ORAL at 20:10

## 2023-11-09 RX ADMIN — ESCITALOPRAM OXALATE 10 MILLIGRAM(S): 10 TABLET, FILM COATED ORAL at 08:20

## 2023-11-09 RX ADMIN — NYSTATIN CREAM 1 APPLICATION(S): 100000 CREAM TOPICAL at 08:20

## 2023-11-09 RX ADMIN — BUDESONIDE AND FORMOTEROL FUMARATE DIHYDRATE 2 PUFF(S): 160; 4.5 AEROSOL RESPIRATORY (INHALATION) at 20:34

## 2023-11-09 RX ADMIN — CLOTRIMAZOLE AND BETAMETHASONE DIPROPIONATE 1 APPLICATION(S): 10; .5 CREAM TOPICAL at 20:32

## 2023-11-09 RX ADMIN — BUDESONIDE AND FORMOTEROL FUMARATE DIHYDRATE 2 PUFF(S): 160; 4.5 AEROSOL RESPIRATORY (INHALATION) at 08:20

## 2023-11-09 RX ADMIN — LIDOCAINE 1 PATCH: 4 CREAM TOPICAL at 19:33

## 2023-11-09 RX ADMIN — Medication 2 MILLIGRAM(S): at 20:10

## 2023-11-09 RX ADMIN — CLOZAPINE 125 MILLIGRAM(S): 150 TABLET, ORALLY DISINTEGRATING ORAL at 20:10

## 2023-11-09 RX ADMIN — LIDOCAINE 1 PATCH: 4 CREAM TOPICAL at 08:35

## 2023-11-09 NOTE — BH INPATIENT PSYCHIATRY PROGRESS NOTE - MSE UNSTRUCTURED FT
Appearance: Dressed appropriately.  Behavior: Cooperative.  Calm.  Motor: No abnormal movements, no psychomotor slowing or activation.  Speech: Regular rate.  Mood: "Stressed out."  Affect: Irritable.   Thought Process: Linear.  Associations: Fair.  Thought Content: AH, SI.  Insight: Limited.  Judgment: Fair on interview.  Attention: Fair.  Language: Fluent.  Gait: Intact.     PHYSICAL EXAM  Skin: Dry scaling erythmatous rash on chest and under axilla.

## 2023-11-09 NOTE — BH INPATIENT PSYCHIATRY PROGRESS NOTE - NSBHASSESSSUMMFT_PSY_ALL_CORE
Patient is 29yo single woman, no dependents, domiciled with her Grandmother, unemployed on disability/SSI, pphx of schizoaffective disorder, multiple past psychiatric admissions including state and recent discharge from [facility], in tx with [facility], with pmhx of asthma, HTN, GERD, and hypothyroidism and schizoaffective disorder, in tx  with [facility], who self presented to the ED reporting abdominal pain and requesting readmission to psychiatric unit, reporting CAH, depressive symptoms, IOR, suicidality, admitted to [facility] for psychiatric care.  Depression and psychosis likely multifactorial at this time, schizoaffective vs. borderline personality disorder vs. complex grief vs. adjustment disorder.      Continues to report depression, psychosis, and SI.    1.) Obs: Continue CO 1:1.   2.) Psychiatric medication management:   - Clozapine increase to 125mg qHS for psychosis ([id number])  - Increase Prazosin to 2mg qhs, monitor BP carefully.  - Abilify Maintena 400mg IM q3 weeks rather than 4, per collateral from ACT team. Last dose on 10/19, next due tomorrow.  - discontinue mirtazapine 7.5mg PO qHS, per collateral from ACT team  - Escitalopram 10 mg daily today.  - PRN: haloperidol 5mg PO/IM q8hrs PRN for agitation, diphenhydramine 50mg PO/IM q6hrs PRN for EPS PPx, hydroxyzine 50mg PO q12hrs PRN for anxiety, lorazepam 2mg PO q8hrs PRN for anxiety or 2mg IM for assaultive behavior. Melatonin 5mg qHS PRN for insomnia  - Discussed ECT with pt who states she has been told in two hospitals in past that due to her obesity and asthma, she is not an ideal candidate.  Pt states that due to this she is not interested in receiving ECT at this time.  3.) Medical:   - Pt scheduled for dental extractions 11/1, however declined to attend on day prior when informed it would not be under general anesthesia.  - last clozapine labs on 10/23 (CBC with diff, troponins, CRP); CRP elevated at 15.8 likely due to dental infection/wisdom tooth issues  - pantoprazole 40mg PO before breakfast for GERD  - montelukast 10mg PO qHS for asthma  - budesonide 80mcg/formoterol 4.5mcg 2 puffs BID for asthma  - PRN: acetaminophen 650mg PO q6hrs PRN for pain, albuterol 90mcg 2puffs q6hrs PRN for dyspnea, ondansetron 4mg q6hrs PRN for nausea  - Discussed CRP with medicine service, given asymptomatic pt and normal troponin, no additional intervention/diagnostics at this time.  - Appreciate medicine reccs for rash.  4.) Dispo planning: State application submitted.

## 2023-11-09 NOTE — BH PSYCHOLOGY - GROUP THERAPY NOTE - NSBHPSYCHOLRESPCOMMENT_PSY_A_CORE FT
The patient appeared adequately groomed and casually dressed. Pt appeared engaged in the group as evidenced by their willingness to independently verbally communicate during the discussion. Pt shared that she finds sadness a difficult feeling to sit with, as well as that her pets give her hope. Pt was oriented X3. Pt was appropriate with peers.

## 2023-11-09 NOTE — BH INPATIENT PSYCHIATRY PROGRESS NOTE - NSBHCHARTREVIEWVS_PSY_A_CORE FT
Vital Signs Last 24 Hrs  T(C): 37.1 (11-08-23 @ 20:23), Max: 37.1 (11-08-23 @ 20:23)  T(F): 98.7 (11-08-23 @ 20:23), Max: 98.7 (11-08-23 @ 20:23)  HR: --  BP: --  BP(mean): --  RR: 16 (11-08-23 @ 20:23) (16 - 16)  SpO2: 100% (11-08-23 @ 20:23) (100% - 100%)    Orthostatic VS  11-08-23 @ 20:23  Lying BP: --/-- HR: --  Sitting BP: 117/67 HR: 100  Standing BP: 113/63 HR: 98  Site: --  Mode: --  Orthostatic VS  11-08-23 @ 08:17  Lying BP: --/-- HR: --  Sitting BP: 143/79 HR: 117  Standing BP: 133/75 HR: 120  Site: --  Mode: --  Orthostatic VS  11-07-23 @ 20:29  Lying BP: --/-- HR: --  Sitting BP: 106/58 HR: 99  Standing BP: 103/65 HR: 111  Site: --  Mode: --

## 2023-11-09 NOTE — CONSULT NOTE ADULT - ASSESSMENT
28F admitted for depression    Plan:  Rash: Consistent with candidal intertrigo.  Lostrisone BID, also apply moisturizer. If doesn't improve consider dermatology evaluation.    Depression: Management per primary team

## 2023-11-09 NOTE — CONSULT NOTE ADULT - SUBJECTIVE AND OBJECTIVE BOX
HPI: Pt is 28F admitted to [facility] 10/16/23.  She had developed an itchy red and peeling rash under her arms and between and below her breasts. Clotrimazole and nystatin powder burned.    PAST MEDICAL & SURGICAL HISTORY:  Asthma      Schizoaffective disorder      PTSD (post-traumatic stress disorder)      No significant past surgical history          Review of Systems:   CONSTITUTIONAL: No fever, weight loss, or fatigue  EYES: No eye pain, visual disturbances, or discharge  ENMT:  No difficulty hearing, tinnitus, vertigo; No sinus or throat pain  NECK: No pain or stiffness  RESPIRATORY: No cough, wheezing, chills or hemoptysis; No shortness of breath  CARDIOVASCULAR: No chest pain, palpitations, dizziness, or leg swelling  GASTROINTESTINAL: No abdominal or epigastric pain. No nausea, vomiting, or hematemesis; No diarrhea or constipation. No melena or hematochezia.  GENITOURINARY: No dysuria, frequency, hematuria, or incontinence  NEUROLOGICAL: No headaches, memory loss, loss of strength, numbness, or tremors  SKIN: see HPI  LYMPH NODES: No enlarged glands  ENDOCRINE: No heat or cold intolerance; No hair loss  MUSCULOSKELETAL: No joint pain or swelling; No muscle, back, or extremity pain  HEME/LYMPH: No easy bruising, or bleeding gums  ALLERY AND IMMUNOLOGIC: No hives or eczema    Allergies    No Known Allergies    Intolerances        Social History: no etoh tob idu    FAMILY HISTORY: Noncontributory      MEDICATIONS  (STANDING):  budesonide  80 MICROgram(s)/formoterol 4.5 MICROgram(s) Inhaler 2 Puff(s) Inhalation two times a day  clotrimazole/betamethasone Cream 1 Application(s) Topical two times a day  cloZAPine 125 milliGRAM(s) Oral at bedtime  escitalopram 10 milliGRAM(s) Oral daily  influenza   Vaccine 0.5 milliLiter(s) IntraMuscular once  lidocaine   4% Patch 1 Patch Transdermal daily  montelukast 10 milliGRAM(s) Oral at bedtime  pantoprazole    Tablet 40 milliGRAM(s) Oral before breakfast  prazosin. 2 milliGRAM(s) Oral at bedtime    MEDICATIONS  (PRN):  acetaminophen     Tablet .. 650 milliGRAM(s) Oral every 6 hours PRN Moderate Pain (4 - 6)  albuterol    90 MICROgram(s) HFA Inhaler 2 Puff(s) Inhalation every 6 hours PRN Shortness of Breath and/or Wheezing  aluminum hydroxide/magnesium hydroxide/simethicone Suspension 30 milliLiter(s) Oral every 4 hours PRN Dyspepsia  benzocaine 20% Spray 1 Spray(s) Topical every 6 hours PRN tooth pain  chlorproMAZINE    Injectable 50 milliGRAM(s) IntraMuscular once PRN severe agitation  chlorproMAZINE    Tablet 50 milliGRAM(s) Oral every 6 hours PRN agitation  hydrOXYzine hydrochloride 50 milliGRAM(s) Oral every 12 hours PRN Anxiety  ibuprofen  Tablet. 400 milliGRAM(s) Oral every 6 hours PRN Moderate Pain (4 - 6), Severe Pain (7 - 10)  LORazepam     Tablet 2 milliGRAM(s) Oral every 8 hours PRN Anxiety  LORazepam   Injectable 2 milliGRAM(s) IntraMuscular once PRN Assaultive behavior  melatonin. 5 milliGRAM(s) Oral at bedtime PRN Insomnia  ondansetron    Tablet 4 milliGRAM(s) Oral every 6 hours PRN nausea      Vital Signs Last 24 Hrs  T(C): 37.1 (08 Nov 2023 20:23), Max: 37.1 (08 Nov 2023 20:23)  T(F): 98.7 (08 Nov 2023 20:23), Max: 98.7 (08 Nov 2023 20:23)  HR: 100  BP: 117/67  BP(mean): --  RR: 16 (08 Nov 2023 20:23) (16 - 16)  SpO2: 100% (08 Nov 2023 20:23) (100% - 100%)      CAPILLARY BLOOD GLUCOSE            PHYSICAL EXAM:  GENERAL: NAD  HEAD:  Atraumatic, Normocephalic  EYES: EOMI, conjunctiva and sclera clear  NECK: Supple, No JVD  CHEST/LUNG: Clear to auscultation bilaterally; No wheeze  HEART: Regular rate and rhythm; No murmurs, rubs, or gallops  ABDOMEN: Soft, Nontender, Nondistended; Bowel sounds present  EXTREMITIES:  2+ Peripheral Pulses, No clubbing, cyanosis, or edema  NEUROLOGY: non-focal  SKIN: erythematous peeling inflamed patches extensively under armpits and between breasts    LABS:                        11.7   10.84 )-----------( 346      ( 08 Nov 2023 15:32 )             37.4               Care Discussed with Consultants/Other Providers: Dr Arias

## 2023-11-09 NOTE — BH TREATMENT PLAN - NSTXDCOTHRINTERSW_PSY_ALL_CORE
Writer will work to educate the pt on the state referral process and explore any other aftercare options that may be an appropriate alternative to [facility] hospital.

## 2023-11-09 NOTE — BH INPATIENT PSYCHIATRY PROGRESS NOTE - CURRENT MEDICATION
MEDICATIONS  (STANDING):  budesonide  80 MICROgram(s)/formoterol 4.5 MICROgram(s) Inhaler 2 Puff(s) Inhalation two times a day  clotrimazole/betamethasone Cream 1 Application(s) Topical two times a day  cloZAPine 125 milliGRAM(s) Oral at bedtime  escitalopram 10 milliGRAM(s) Oral daily  influenza   Vaccine 0.5 milliLiter(s) IntraMuscular once  lidocaine   4% Patch 1 Patch Transdermal daily  montelukast 10 milliGRAM(s) Oral at bedtime  pantoprazole    Tablet 40 milliGRAM(s) Oral before breakfast  prazosin. 2 milliGRAM(s) Oral at bedtime    MEDICATIONS  (PRN):  acetaminophen     Tablet .. 650 milliGRAM(s) Oral every 6 hours PRN Moderate Pain (4 - 6)  albuterol    90 MICROgram(s) HFA Inhaler 2 Puff(s) Inhalation every 6 hours PRN Shortness of Breath and/or Wheezing  aluminum hydroxide/magnesium hydroxide/simethicone Suspension 30 milliLiter(s) Oral every 4 hours PRN Dyspepsia  benzocaine 20% Spray 1 Spray(s) Topical every 6 hours PRN tooth pain  chlorproMAZINE    Injectable 50 milliGRAM(s) IntraMuscular once PRN severe agitation  chlorproMAZINE    Tablet 50 milliGRAM(s) Oral every 6 hours PRN agitation  hydrOXYzine hydrochloride 50 milliGRAM(s) Oral every 12 hours PRN Anxiety  ibuprofen  Tablet. 400 milliGRAM(s) Oral every 6 hours PRN Moderate Pain (4 - 6), Severe Pain (7 - 10)  LORazepam     Tablet 2 milliGRAM(s) Oral every 8 hours PRN Anxiety  LORazepam   Injectable 2 milliGRAM(s) IntraMuscular once PRN Assaultive behavior  melatonin. 5 milliGRAM(s) Oral at bedtime PRN Insomnia  ondansetron    Tablet 4 milliGRAM(s) Oral every 6 hours PRN nausea

## 2023-11-10 PROCEDURE — 99232 SBSQ HOSP IP/OBS MODERATE 35: CPT

## 2023-11-10 RX ORDER — CLOZAPINE 150 MG/1
150 TABLET, ORALLY DISINTEGRATING ORAL AT BEDTIME
Refills: 0 | Status: DISCONTINUED | OUTPATIENT
Start: 2023-11-10 | End: 2023-11-13

## 2023-11-10 RX ADMIN — BUDESONIDE AND FORMOTEROL FUMARATE DIHYDRATE 2 PUFF(S): 160; 4.5 AEROSOL RESPIRATORY (INHALATION) at 21:16

## 2023-11-10 RX ADMIN — CLOZAPINE 150 MILLIGRAM(S): 150 TABLET, ORALLY DISINTEGRATING ORAL at 21:14

## 2023-11-10 RX ADMIN — Medication 2 MILLIGRAM(S): at 21:15

## 2023-11-10 RX ADMIN — BUDESONIDE AND FORMOTEROL FUMARATE DIHYDRATE 2 PUFF(S): 160; 4.5 AEROSOL RESPIRATORY (INHALATION) at 08:34

## 2023-11-10 RX ADMIN — Medication 50 MILLIGRAM(S): at 18:50

## 2023-11-10 RX ADMIN — CLOTRIMAZOLE AND BETAMETHASONE DIPROPIONATE 1 APPLICATION(S): 10; .5 CREAM TOPICAL at 08:34

## 2023-11-10 RX ADMIN — CLOTRIMAZOLE AND BETAMETHASONE DIPROPIONATE 1 APPLICATION(S): 10; .5 CREAM TOPICAL at 21:15

## 2023-11-10 RX ADMIN — PANTOPRAZOLE SODIUM 40 MILLIGRAM(S): 20 TABLET, DELAYED RELEASE ORAL at 08:33

## 2023-11-10 RX ADMIN — MONTELUKAST 10 MILLIGRAM(S): 4 TABLET, CHEWABLE ORAL at 21:14

## 2023-11-10 RX ADMIN — ESCITALOPRAM OXALATE 10 MILLIGRAM(S): 10 TABLET, FILM COATED ORAL at 08:33

## 2023-11-10 RX ADMIN — Medication 2 MILLIGRAM(S): at 13:47

## 2023-11-10 RX ADMIN — LIDOCAINE 1 PATCH: 4 CREAM TOPICAL at 08:34

## 2023-11-10 NOTE — BH INPATIENT PSYCHIATRY PROGRESS NOTE - MSE UNSTRUCTURED FT
Appearance: Dressed appropriately.  Behavior: Cooperative.  Calm.  Motor: No abnormal movements, no psychomotor slowing or activation.  Speech: Regular rate.  Mood: "ok."  Affect: Neutral.   Thought Process: Linear.  Associations: Fair.  Thought Content: AH, SI.  Insight: Limited.  Judgment: Fair on interview.  Attention: Fair.  Language: Fluent.  Gait: Intact.     PHYSICAL EXAM  Skin: Dry scaling erythmatous rash on chest and under axilla.

## 2023-11-10 NOTE — BH INPATIENT PSYCHIATRY PROGRESS NOTE - NSBHFUPINTERVALHXFT_PSY_A_CORE
Pt reports she had nightmare last night.  Still hearing voices.  Has suicidal thoughts, states of these three symptoms, suicidality is the worse of the three.  Pt states rash is starting to improve.

## 2023-11-10 NOTE — BH INPATIENT PSYCHIATRY PROGRESS NOTE - NSBHASSESSSUMMFT_PSY_ALL_CORE
Patient is 27yo single woman, no dependents, domiciled with her Grandmother, unemployed on disability/SSI, pphx of schizoaffective disorder, multiple past psychiatric admissions including state and recent discharge from [facility], in [facility], with pmhx of asthma, HTN, GERD, and hypothyroidism and schizoaffective disorder, in tx  with [facility], who self presented to the ED reporting abdominal pain and requesting readmission to psychiatric unit, reporting CAH, depressive symptoms, IOR, suicidality, admitted to [facility] for psychiatric care.  Depression and psychosis likely multifactorial at this time, schizoaffective vs. borderline personality disorder vs. complex grief vs. adjustment disorder.      Continues to have psychosis and SI.    1.) Obs: Continue CO 1:1.   2.) Psychiatric medication management:   - Clozapine increase to 150mg qHS for psychosis ([id number])  - Increase Prazosin to 2mg qhs, monitor BP carefully.  - Abilify Maintena 400mg IM q3 weeks rather than 4, per collateral from ACT team. Last dose on 10/19, next due tomorrow.  - discontinue mirtazapine 7.5mg PO qHS, per collateral from ACT team  - Escitalopram 10 mg daily today.  - PRN: haloperidol 5mg PO/IM q8hrs PRN for agitation, diphenhydramine 50mg PO/IM q6hrs PRN for EPS PPx, hydroxyzine 50mg PO q12hrs PRN for anxiety, lorazepam 2mg PO q8hrs PRN for anxiety or 2mg IM for assaultive behavior. Melatonin 5mg qHS PRN for insomnia  - Discussed ECT with pt who states she has been told in two hospitals in past that due to her obesity and asthma, she is not an ideal candidate.  Pt states that due to this she is not interested in receiving ECT at this time.  3.) Medical:   - Pt scheduled for dental extractions 11/1, however declined to attend on day prior when informed it would not be under general anesthesia.  - last clozapine labs on 10/23 (CBC with diff, troponins, CRP); CRP elevated at 15.8 likely due to dental infection/wisdom tooth issues  - pantoprazole 40mg PO before breakfast for GERD  - montelukast 10mg PO qHS for asthma  - budesonide 80mcg/formoterol 4.5mcg 2 puffs BID for asthma  - PRN: acetaminophen 650mg PO q6hrs PRN for pain, albuterol 90mcg 2puffs q6hrs PRN for dyspnea, ondansetron 4mg q6hrs PRN for nausea  - Discussed CRP with medicine service, given asymptomatic pt and normal troponin, no additional intervention/diagnostics at this time.  - Appreciate medicine reccs for rash.  4.) Dispo planning: State application submitted.

## 2023-11-10 NOTE — BH INPATIENT PSYCHIATRY PROGRESS NOTE - NSBHCHARTREVIEWVS_PSY_A_CORE FT
Vital Signs Last 24 Hrs  T(C): 36.8 (11-10-23 @ 08:34), Max: 36.8 (11-09-23 @ 20:49)  T(F): 98.3 (11-10-23 @ 08:34), Max: 98.3 (11-10-23 @ 08:34)  HR: --  BP: --  BP(mean): --  RR: --  SpO2: --    Orthostatic VS  11-10-23 @ 08:34  Lying BP: --/-- HR: --  Sitting BP: 134/79 HR: 96  Standing BP: 140/87 HR: 119  Site: --  Mode: --  Orthostatic VS  11-09-23 @ 20:49  Lying BP: --/-- HR: --  Sitting BP: 133/88 HR: 95  Standing BP: --/-- HR: --  Site: --  Mode: --  Orthostatic VS  11-08-23 @ 20:23  Lying BP: --/-- HR: --  Sitting BP: 117/67 HR: 100  Standing BP: 113/63 HR: 98  Site: --  Mode: --

## 2023-11-11 PROCEDURE — 99232 SBSQ HOSP IP/OBS MODERATE 35: CPT

## 2023-11-11 RX ADMIN — CLOTRIMAZOLE AND BETAMETHASONE DIPROPIONATE 1 APPLICATION(S): 10; .5 CREAM TOPICAL at 09:07

## 2023-11-11 RX ADMIN — Medication 50 MILLIGRAM(S): at 21:01

## 2023-11-11 RX ADMIN — CLOTRIMAZOLE AND BETAMETHASONE DIPROPIONATE 1 APPLICATION(S): 10; .5 CREAM TOPICAL at 21:05

## 2023-11-11 RX ADMIN — ESCITALOPRAM OXALATE 10 MILLIGRAM(S): 10 TABLET, FILM COATED ORAL at 08:24

## 2023-11-11 RX ADMIN — PANTOPRAZOLE SODIUM 40 MILLIGRAM(S): 20 TABLET, DELAYED RELEASE ORAL at 08:24

## 2023-11-11 RX ADMIN — Medication 2 MILLIGRAM(S): at 21:01

## 2023-11-11 RX ADMIN — CLOZAPINE 150 MILLIGRAM(S): 150 TABLET, ORALLY DISINTEGRATING ORAL at 21:01

## 2023-11-11 RX ADMIN — MONTELUKAST 10 MILLIGRAM(S): 4 TABLET, CHEWABLE ORAL at 21:01

## 2023-11-11 RX ADMIN — Medication 50 MILLIGRAM(S): at 15:16

## 2023-11-11 RX ADMIN — BUDESONIDE AND FORMOTEROL FUMARATE DIHYDRATE 2 PUFF(S): 160; 4.5 AEROSOL RESPIRATORY (INHALATION) at 21:05

## 2023-11-11 NOTE — BH INPATIENT PSYCHIATRY PROGRESS NOTE - NSBHCHARTREVIEWVS_PSY_A_CORE FT
Vital Signs Last 24 Hrs  T(C): 36.3 (11-11-23 @ 08:13), Max: 36.6 (11-10-23 @ 21:02)  T(F): 97.3 (11-11-23 @ 08:13), Max: 97.9 (11-10-23 @ 21:02)  HR: --  BP: --  BP(mean): --  RR: --  SpO2: --    Orthostatic VS  11-11-23 @ 08:13  Lying BP: --/-- HR: --  Sitting BP: 113/62 HR: 83  Standing BP: --/-- HR: --  Site: --  Mode: --  Orthostatic VS  11-10-23 @ 21:02  Lying BP: --/-- HR: --  Sitting BP: 147/87 HR: 98  Standing BP: 130/91 HR: 106  Site: --  Mode: --  Orthostatic VS  11-10-23 @ 08:34  Lying BP: --/-- HR: --  Sitting BP: 134/79 HR: 96  Standing BP: 140/87 HR: 119  Site: --  Mode: --  Orthostatic VS  11-09-23 @ 20:49  Lying BP: --/-- HR: --  Sitting BP: 133/88 HR: 95  Standing BP: --/-- HR: --  Site: --  Mode: --

## 2023-11-11 NOTE — BH INPATIENT PSYCHIATRY PROGRESS NOTE - MSE UNSTRUCTURED FT
Appearance: Dressed appropriately.  Behavior: Cooperative.  Calm.  Motor: No abnormal movements, no psychomotor slowing or activation.  Speech: Regular rate.  Mood: "anxious"   Affect: Neutral.   Thought Process: Linear.  Associations: Fair.  Thought Content: AH, SI.  Insight: Limited.  Judgment: Fair on interview.  Attention: Fair.  Language: Fluent.  Gait: Intact.

## 2023-11-11 NOTE — BH INPATIENT PSYCHIATRY PROGRESS NOTE - NSBHASSESSSUMMFT_PSY_ALL_CORE
Patient is 27yo single woman, no dependents, domiciled with her Grandmother, unemployed on disability/SSI, pphx of schizoaffective disorder, multiple past psychiatric admissions including state and recent discharge from [facility], in tx [facility], with pmhx of asthma, HTN, GERD, and hypothyroidism and schizoaffective disorder, in tx  with [facility], who self presented to the ED reporting abdominal pain and requesting readmission to psychiatric unit, reporting CAH, depressive symptoms, IOR, suicidality, admitted to [facility] for psychiatric care.  Depression and psychosis likely multifactorial at this time, schizoaffective vs. borderline personality disorder vs. complex grief vs. adjustment disorder.      ongoing AH and SI. she remains on CO. plan per primary team as below     1.) Obs: Continue CO 1:1.   2.) Psychiatric medication management:   - Clozapine increase to 150mg qHS for psychosis ([id number])  - Increase Prazosin to 2mg qhs, monitor BP carefully.  - Abilify Maintena 400mg IM q3 weeks rather than 4, per collateral from ACT team. Last dose on 10/19, next due tomorrow.  - discontinue mirtazapine 7.5mg PO qHS, per collateral from ACT team  - Escitalopram 10 mg daily today.  - PRN: haloperidol 5mg PO/IM q8hrs PRN for agitation, diphenhydramine 50mg PO/IM q6hrs PRN for EPS PPx, hydroxyzine 50mg PO q12hrs PRN for anxiety, lorazepam 2mg PO q8hrs PRN for anxiety or 2mg IM for assaultive behavior. Melatonin 5mg qHS PRN for insomnia  - Discussed ECT with pt who states she has been told in two hospitals in past that due to her obesity and asthma, she is not an ideal candidate.  Pt states that due to this she is not interested in receiving ECT at this time.  3.) Medical:   - Pt scheduled for dental extractions 11/1, however declined to attend on day prior when informed it would not be under general anesthesia.  - last clozapine labs on 10/23 (CBC with diff, troponins, CRP); CRP elevated at 15.8 likely due to dental infection/wisdom tooth issues  - pantoprazole 40mg PO before breakfast for GERD  - montelukast 10mg PO qHS for asthma  - budesonide 80mcg/formoterol 4.5mcg 2 puffs BID for asthma  - PRN: acetaminophen 650mg PO q6hrs PRN for pain, albuterol 90mcg 2puffs q6hrs PRN for dyspnea, ondansetron 4mg q6hrs PRN for nausea  - Discussed CRP with medicine service, given asymptomatic pt and normal troponin, no additional intervention/diagnostics at this time.  - Appreciate medicine reccs for rash.  4.) Dispo planning: State application submitted.

## 2023-11-11 NOTE — BH INPATIENT PSYCHIATRY PROGRESS NOTE - NSBHFUPINTERVALHXFT_PSY_A_CORE
chart reviewed including pertinent labs. Case discussed with nursing staff. patient adherent to medications. she reports feeling more tired than usual this AM. she continues to endorse AH and SI. she remains on CO. no a/e to medications. no overt behavioral issues over this interval

## 2023-11-11 NOTE — BH INPATIENT PSYCHIATRY PROGRESS NOTE - CURRENT MEDICATION
MEDICATIONS  (STANDING):  budesonide  80 MICROgram(s)/formoterol 4.5 MICROgram(s) Inhaler 2 Puff(s) Inhalation two times a day  clotrimazole/betamethasone Cream 1 Application(s) Topical two times a day  cloZAPine 150 milliGRAM(s) Oral at bedtime  escitalopram 10 milliGRAM(s) Oral daily  influenza   Vaccine 0.5 milliLiter(s) IntraMuscular once  lidocaine   4% Patch 1 Patch Transdermal daily  montelukast 10 milliGRAM(s) Oral at bedtime  pantoprazole    Tablet 40 milliGRAM(s) Oral before breakfast  prazosin. 2 milliGRAM(s) Oral at bedtime    MEDICATIONS  (PRN):  acetaminophen     Tablet .. 650 milliGRAM(s) Oral every 6 hours PRN Moderate Pain (4 - 6)  albuterol    90 MICROgram(s) HFA Inhaler 2 Puff(s) Inhalation every 6 hours PRN Shortness of Breath and/or Wheezing  aluminum hydroxide/magnesium hydroxide/simethicone Suspension 30 milliLiter(s) Oral every 4 hours PRN Dyspepsia  benzocaine 20% Spray 1 Spray(s) Topical every 6 hours PRN tooth pain  chlorproMAZINE    Injectable 50 milliGRAM(s) IntraMuscular once PRN severe agitation  chlorproMAZINE    Tablet 50 milliGRAM(s) Oral every 6 hours PRN agitation  hydrOXYzine hydrochloride 50 milliGRAM(s) Oral every 12 hours PRN Anxiety  ibuprofen  Tablet. 400 milliGRAM(s) Oral every 6 hours PRN Moderate Pain (4 - 6), Severe Pain (7 - 10)  LORazepam     Tablet 2 milliGRAM(s) Oral every 8 hours PRN Anxiety  LORazepam   Injectable 2 milliGRAM(s) IntraMuscular once PRN Assaultive behavior  melatonin. 5 milliGRAM(s) Oral at bedtime PRN Insomnia  ondansetron    Tablet 4 milliGRAM(s) Oral every 6 hours PRN nausea

## 2023-11-12 PROCEDURE — 99231 SBSQ HOSP IP/OBS SF/LOW 25: CPT

## 2023-11-12 RX ADMIN — MONTELUKAST 10 MILLIGRAM(S): 4 TABLET, CHEWABLE ORAL at 21:03

## 2023-11-12 RX ADMIN — CLOZAPINE 150 MILLIGRAM(S): 150 TABLET, ORALLY DISINTEGRATING ORAL at 21:03

## 2023-11-12 RX ADMIN — PANTOPRAZOLE SODIUM 40 MILLIGRAM(S): 20 TABLET, DELAYED RELEASE ORAL at 09:22

## 2023-11-12 RX ADMIN — BUDESONIDE AND FORMOTEROL FUMARATE DIHYDRATE 2 PUFF(S): 160; 4.5 AEROSOL RESPIRATORY (INHALATION) at 09:22

## 2023-11-12 RX ADMIN — CLOTRIMAZOLE AND BETAMETHASONE DIPROPIONATE 1 APPLICATION(S): 10; .5 CREAM TOPICAL at 09:23

## 2023-11-12 RX ADMIN — BUDESONIDE AND FORMOTEROL FUMARATE DIHYDRATE 2 PUFF(S): 160; 4.5 AEROSOL RESPIRATORY (INHALATION) at 21:03

## 2023-11-12 RX ADMIN — CLOTRIMAZOLE AND BETAMETHASONE DIPROPIONATE 1 APPLICATION(S): 10; .5 CREAM TOPICAL at 21:03

## 2023-11-12 RX ADMIN — Medication 2 MILLIGRAM(S): at 21:03

## 2023-11-12 RX ADMIN — ESCITALOPRAM OXALATE 10 MILLIGRAM(S): 10 TABLET, FILM COATED ORAL at 09:23

## 2023-11-12 NOTE — BH INPATIENT PSYCHIATRY PROGRESS NOTE - MSE UNSTRUCTURED FT
Appearance: Dressed appropriately.  Behavior: Cooperative.  Calm.  Motor: No abnormal movements, no psychomotor slowing or activation.  Speech: Regular rate.  Mood: "tired and anxious"  Affect: Neutral.   Thought Process: Linear.  Associations: Fair.  Thought Content: denies AH and SI during interview today.   Insight: Limited.  Judgment: Fair on interview.  Attention: Fair.  Language: Fluent.  Gait: Intact.

## 2023-11-12 NOTE — BH INPATIENT PSYCHIATRY PROGRESS NOTE - NSBHASSESSSUMMFT_PSY_ALL_CORE
Patient is 29yo single woman, no dependents, domiciled with her Grandmother, unemployed on disability/SSI, pphx of schizoaffective disorder, multiple past psychiatric admissions including state and recent discharge from [facility], [facility], with pmhx of asthma, HTN, GERD, and hypothyroidism and schizoaffective disorder, in tx  with [facility], who self presented to the ED reporting abdominal pain and requesting readmission to psychiatric unit, reporting CAH, depressive symptoms, IOR, suicidality, admitted to [facility] for psychiatric care.  Depression and psychosis likely multifactorial at this time, schizoaffective vs. borderline personality disorder vs. complex grief vs. adjustment disorder.      pt reports feeling anxious and tired this AM. no AH or SI today and most recently experienced these yesterday. will c/w CO and plan as below     1.) Obs: Continue CO 1:1.   2.) Psychiatric medication management:   - Clozapine increase to 150mg qHS for psychosis ([id number])  - Increase Prazosin to 2mg qhs, monitor BP carefully.  - Abilify Maintena 400mg IM q3 weeks rather than 4, per collateral from ACT team. Last dose on 10/19, next due tomorrow.  - discontinue mirtazapine 7.5mg PO qHS, per collateral from ACT team  - Escitalopram 10 mg daily today.  - PRN: haloperidol 5mg PO/IM q8hrs PRN for agitation, diphenhydramine 50mg PO/IM q6hrs PRN for EPS PPx, hydroxyzine 50mg PO q12hrs PRN for anxiety, lorazepam 2mg PO q8hrs PRN for anxiety or 2mg IM for assaultive behavior. Melatonin 5mg qHS PRN for insomnia  - Discussed ECT with pt who states she has been told in two hospitals in past that due to her obesity and asthma, she is not an ideal candidate.  Pt states that due to this she is not interested in receiving ECT at this time.  3.) Medical:   - Pt scheduled for dental extractions 11/1, however declined to attend on day prior when informed it would not be under general anesthesia.  - last clozapine labs on 10/23 (CBC with diff, troponins, CRP); CRP elevated at 15.8 likely due to dental infection/wisdom tooth issues  - pantoprazole 40mg PO before breakfast for GERD  - montelukast 10mg PO qHS for asthma  - budesonide 80mcg/formoterol 4.5mcg 2 puffs BID for asthma  - PRN: acetaminophen 650mg PO q6hrs PRN for pain, albuterol 90mcg 2puffs q6hrs PRN for dyspnea, ondansetron 4mg q6hrs PRN for nausea  - Discussed CRP with medicine service, given asymptomatic pt and normal troponin, no additional intervention/diagnostics at this time.  - Appreciate medicine reccs for rash.  4.) Dispo planning: State application submitted.

## 2023-11-12 NOTE — BH INPATIENT PSYCHIATRY PROGRESS NOTE - NSBHFUPINTERVALHXFT_PSY_A_CORE
chart reviewed including pertinent labs. Case discussed with nursing staff. pt reports feeling more anxious today. she also reports feeling more tired. she is adherent to medications. she states going to bed around 830 pm last night and slept through the night. she reports last AH was yesterday. no SI today.

## 2023-11-12 NOTE — BH INPATIENT PSYCHIATRY PROGRESS NOTE - NSBHCHARTREVIEWVS_PSY_A_CORE FT
Vital Signs Last 24 Hrs  T(C): 36.9 (11-12-23 @ 08:10), Max: 36.9 (11-12-23 @ 08:10)  T(F): 98.4 (11-12-23 @ 08:10), Max: 98.4 (11-12-23 @ 08:10)  HR: --  BP: --  BP(mean): --  RR: --  SpO2: --    Orthostatic VS  11-12-23 @ 08:10  Lying BP: 118/58 HR: 83  Sitting BP: 131/72 HR: 90  Standing BP: --/-- HR: --  Site: --  Mode: --  Orthostatic VS  11-11-23 @ 20:58  Lying BP: --/-- HR: --  Sitting BP: 127/84 HR: 104  Standing BP: --/-- HR: --  Site: --  Mode: --  Orthostatic VS  11-11-23 @ 08:13  Lying BP: --/-- HR: --  Sitting BP: 113/62 HR: 83  Standing BP: --/-- HR: --  Site: --  Mode: --  Orthostatic VS  11-10-23 @ 21:02  Lying BP: --/-- HR: --  Sitting BP: 147/87 HR: 98  Standing BP: 130/91 HR: 106  Site: --  Mode: --

## 2023-11-13 PROCEDURE — 99232 SBSQ HOSP IP/OBS MODERATE 35: CPT

## 2023-11-13 RX ORDER — CLOZAPINE 150 MG/1
175 TABLET, ORALLY DISINTEGRATING ORAL AT BEDTIME
Refills: 0 | Status: ACTIVE | OUTPATIENT
Start: 2023-11-13 | End: 2024-10-11

## 2023-11-13 RX ORDER — POLYETHYLENE GLYCOL 3350 17 G/17G
17 POWDER, FOR SOLUTION ORAL DAILY
Refills: 0 | Status: ACTIVE | OUTPATIENT
Start: 2023-11-13 | End: 2024-10-11

## 2023-11-13 RX ADMIN — CLOZAPINE 175 MILLIGRAM(S): 150 TABLET, ORALLY DISINTEGRATING ORAL at 21:27

## 2023-11-13 RX ADMIN — Medication 2 MILLIGRAM(S): at 21:27

## 2023-11-13 RX ADMIN — CLOTRIMAZOLE AND BETAMETHASONE DIPROPIONATE 1 APPLICATION(S): 10; .5 CREAM TOPICAL at 21:35

## 2023-11-13 RX ADMIN — MONTELUKAST 10 MILLIGRAM(S): 4 TABLET, CHEWABLE ORAL at 21:28

## 2023-11-13 RX ADMIN — PANTOPRAZOLE SODIUM 40 MILLIGRAM(S): 20 TABLET, DELAYED RELEASE ORAL at 09:58

## 2023-11-13 RX ADMIN — ESCITALOPRAM OXALATE 10 MILLIGRAM(S): 10 TABLET, FILM COATED ORAL at 09:58

## 2023-11-13 RX ADMIN — BUDESONIDE AND FORMOTEROL FUMARATE DIHYDRATE 2 PUFF(S): 160; 4.5 AEROSOL RESPIRATORY (INHALATION) at 09:58

## 2023-11-13 RX ADMIN — BUDESONIDE AND FORMOTEROL FUMARATE DIHYDRATE 2 PUFF(S): 160; 4.5 AEROSOL RESPIRATORY (INHALATION) at 21:35

## 2023-11-13 RX ADMIN — Medication 5 MILLIGRAM(S): at 21:28

## 2023-11-13 NOTE — BH INPATIENT PSYCHIATRY PROGRESS NOTE - NSBHFUPINTERVALHXFT_PSY_A_CORE
Patient seen for follow up of psychosis, depression, SI, and case discussed with treatment team.  No events reported overnight.  The patient states she feels stressed, worried about the future.  She continues to complain of depression, with AHs, and intermittent SI.  She feels the symptoms are largely unchanged.  The patient has been visible on the unit, social with peers, and attending groups.  The patient reports eating and sleeping well.  She has been compliant with medications, tolerating them well.

## 2023-11-13 NOTE — BH INPATIENT PSYCHIATRY PROGRESS NOTE - NSBHCHARTREVIEWVS_PSY_A_CORE FT
Vital Signs Last 24 Hrs  T(C): 36.2 (11-13-23 @ 08:10), Max: 36.8 (11-12-23 @ 20:38)  T(F): 97.2 (11-13-23 @ 08:10), Max: 98.2 (11-12-23 @ 20:38)  HR: --  BP: --  BP(mean): --  RR: --  SpO2: --    Orthostatic VS  11-13-23 @ 08:10  Lying BP: --/-- HR: --  Sitting BP: 126/64 HR: 87  Standing BP: 118/70 HR: 72  Site: --  Mode: --  Orthostatic VS  11-12-23 @ 20:38  Lying BP: --/-- HR: --  Sitting BP: 125/81 HR: 82  Standing BP: 119/74 HR: 95  Site: --  Mode: --  Orthostatic VS  11-12-23 @ 08:10  Lying BP: 118/58 HR: 83  Sitting BP: 131/72 HR: 90  Standing BP: --/-- HR: --  Site: --  Mode: --  Orthostatic VS  11-11-23 @ 20:58  Lying BP: --/-- HR: --  Sitting BP: 127/84 HR: 104  Standing BP: --/-- HR: --  Site: --  Mode: --

## 2023-11-13 NOTE — BH INPATIENT PSYCHIATRY PROGRESS NOTE - CURRENT MEDICATION
MEDICATIONS  (STANDING):  budesonide  80 MICROgram(s)/formoterol 4.5 MICROgram(s) Inhaler 2 Puff(s) Inhalation two times a day  clotrimazole/betamethasone Cream 1 Application(s) Topical two times a day  cloZAPine 175 milliGRAM(s) Oral at bedtime  escitalopram 10 milliGRAM(s) Oral daily  influenza   Vaccine 0.5 milliLiter(s) IntraMuscular once  lidocaine   4% Patch 1 Patch Transdermal daily  montelukast 10 milliGRAM(s) Oral at bedtime  pantoprazole    Tablet 40 milliGRAM(s) Oral before breakfast  polyethylene glycol 3350 17 Gram(s) Oral daily  prazosin. 2 milliGRAM(s) Oral at bedtime    MEDICATIONS  (PRN):  acetaminophen     Tablet .. 650 milliGRAM(s) Oral every 6 hours PRN Moderate Pain (4 - 6)  albuterol    90 MICROgram(s) HFA Inhaler 2 Puff(s) Inhalation every 6 hours PRN Shortness of Breath and/or Wheezing  aluminum hydroxide/magnesium hydroxide/simethicone Suspension 30 milliLiter(s) Oral every 4 hours PRN Dyspepsia  benzocaine 20% Spray 1 Spray(s) Topical every 6 hours PRN tooth pain  chlorproMAZINE    Injectable 50 milliGRAM(s) IntraMuscular once PRN severe agitation  chlorproMAZINE    Tablet 50 milliGRAM(s) Oral every 6 hours PRN agitation  hydrOXYzine hydrochloride 50 milliGRAM(s) Oral every 12 hours PRN Anxiety  ibuprofen  Tablet. 400 milliGRAM(s) Oral every 6 hours PRN Moderate Pain (4 - 6), Severe Pain (7 - 10)  LORazepam     Tablet 2 milliGRAM(s) Oral every 8 hours PRN Anxiety  LORazepam   Injectable 2 milliGRAM(s) IntraMuscular once PRN Assaultive behavior  melatonin. 5 milliGRAM(s) Oral at bedtime PRN Insomnia  ondansetron    Tablet 4 milliGRAM(s) Oral every 6 hours PRN nausea

## 2023-11-13 NOTE — BH INPATIENT PSYCHIATRY PROGRESS NOTE - MSE UNSTRUCTURED FT
Appearance: Dressed appropriately.  Behavior: Cooperative.  Calm.  Motor: No abnormal movements, no psychomotor slowing or activation.  Speech: Regular rate.  Mood: "Stressed"  Affect: Neutral.   Thought Process: Linear.  Associations: Fair.  Thought Content: Admits to intermittent AH and SI.   Insight: Limited.  Judgment: Fair on interview.  Attention: Fair.  Language: Fluent.  Gait: Intact.

## 2023-11-13 NOTE — BH INPATIENT PSYCHIATRY PROGRESS NOTE - NSBHASSESSSUMMFT_PSY_ALL_CORE
Patient is 29yo single woman, no dependents, domiciled with her Grandmother, unemployed on disability/SSI, pphx of schizoaffective disorder, multiple past psychiatric admissions including state and recent discharge from [facility], [facility], with pmhx of asthma, HTN, GERD, and hypothyroidism and schizoaffective disorder, in tx  with [facility], who self presented to the ED reporting abdominal pain and requesting readmission to psychiatric unit, reporting CAH, depressive symptoms, IOR, suicidality, admitted to [facility] for psychiatric care.  Depression and psychosis likely multifactorial at this time, schizoaffective vs. borderline personality disorder vs. complex grief vs. adjustment disorder.      pt continues to report depression, anxiety, with intermittent AH and SI.   Increase clozapine to 175mg hs, tolerating it well thus far.  Start Miralax daily for constipation.  Continue CO and plan as below     1.) Obs: Continue CO 1:1.   2.) Psychiatric medication management:   - Clozapine increase to 150mg qHS for psychosis ([id number])  - Increase Prazosin to 2mg qhs, monitor BP carefully.  - Abilify Maintena 400mg IM q3 weeks rather than 4, per collateral from ACT team. Last dose on 10/19, next due tomorrow.  - discontinue mirtazapine 7.5mg PO qHS, per collateral from ACT team  - Escitalopram 10 mg daily today.  - PRN: haloperidol 5mg PO/IM q8hrs PRN for agitation, diphenhydramine 50mg PO/IM q6hrs PRN for EPS PPx, hydroxyzine 50mg PO q12hrs PRN for anxiety, lorazepam 2mg PO q8hrs PRN for anxiety or 2mg IM for assaultive behavior. Melatonin 5mg qHS PRN for insomnia  - Discussed ECT with pt who states she has been told in two hospitals in past that due to her obesity and asthma, she is not an ideal candidate.  Pt states that due to this she is not interested in receiving ECT at this time.  3.) Medical:   - Pt scheduled for dental extractions 11/1, however declined to attend on day prior when informed it would not be under general anesthesia.  - last clozapine labs on 10/23 (CBC with diff, troponins, CRP); CRP elevated at 15.8 likely due to dental infection/wisdom tooth issues  - pantoprazole 40mg PO before breakfast for GERD  - montelukast 10mg PO qHS for asthma  - budesonide 80mcg/formoterol 4.5mcg 2 puffs BID for asthma  - PRN: acetaminophen 650mg PO q6hrs PRN for pain, albuterol 90mcg 2puffs q6hrs PRN for dyspnea, ondansetron 4mg q6hrs PRN for nausea  - Discussed CRP with medicine service, given asymptomatic pt and normal troponin, no additional intervention/diagnostics at this time.  - Appreciate medicine reccs for rash.  4.) Dispo planning: State application submitted.

## 2023-11-14 PROCEDURE — 99232 SBSQ HOSP IP/OBS MODERATE 35: CPT

## 2023-11-14 PROCEDURE — 90853 GROUP PSYCHOTHERAPY: CPT

## 2023-11-14 RX ORDER — GABAPENTIN 400 MG/1
300 CAPSULE ORAL ONCE
Refills: 0 | Status: COMPLETED | OUTPATIENT
Start: 2023-11-14 | End: 2023-11-14

## 2023-11-14 RX ORDER — GABAPENTIN 400 MG/1
300 CAPSULE ORAL
Refills: 0 | Status: DISCONTINUED | OUTPATIENT
Start: 2023-11-14 | End: 2024-02-06

## 2023-11-14 RX ADMIN — BUDESONIDE AND FORMOTEROL FUMARATE DIHYDRATE 2 PUFF(S): 160; 4.5 AEROSOL RESPIRATORY (INHALATION) at 08:22

## 2023-11-14 RX ADMIN — GABAPENTIN 300 MILLIGRAM(S): 400 CAPSULE ORAL at 11:59

## 2023-11-14 RX ADMIN — BUDESONIDE AND FORMOTEROL FUMARATE DIHYDRATE 2 PUFF(S): 160; 4.5 AEROSOL RESPIRATORY (INHALATION) at 21:24

## 2023-11-14 RX ADMIN — Medication 650 MILLIGRAM(S): at 19:44

## 2023-11-14 RX ADMIN — GABAPENTIN 300 MILLIGRAM(S): 400 CAPSULE ORAL at 21:31

## 2023-11-14 RX ADMIN — MONTELUKAST 10 MILLIGRAM(S): 4 TABLET, CHEWABLE ORAL at 21:31

## 2023-11-14 RX ADMIN — PANTOPRAZOLE SODIUM 40 MILLIGRAM(S): 20 TABLET, DELAYED RELEASE ORAL at 08:21

## 2023-11-14 RX ADMIN — CLOZAPINE 175 MILLIGRAM(S): 150 TABLET, ORALLY DISINTEGRATING ORAL at 21:31

## 2023-11-14 RX ADMIN — CLOTRIMAZOLE AND BETAMETHASONE DIPROPIONATE 1 APPLICATION(S): 10; .5 CREAM TOPICAL at 21:32

## 2023-11-14 RX ADMIN — Medication 2 MILLIGRAM(S): at 21:31

## 2023-11-14 RX ADMIN — CLOTRIMAZOLE AND BETAMETHASONE DIPROPIONATE 1 APPLICATION(S): 10; .5 CREAM TOPICAL at 16:43

## 2023-11-14 RX ADMIN — ESCITALOPRAM OXALATE 10 MILLIGRAM(S): 10 TABLET, FILM COATED ORAL at 08:21

## 2023-11-14 RX ADMIN — Medication 5 MILLIGRAM(S): at 21:31

## 2023-11-14 NOTE — BH PSYCHOLOGY - GROUP THERAPY NOTE - NSBHPSYCHOLRESPCOMMENT_PSY_A_CORE FT
The patient appeared adequately groomed and casually dressed. Pt appeared engaged in the group as evidenced by their willingness to independently verbally communicate during the discussion. Pt talked about she differentiates between feelings of anger and irritability, as well as how she has learned certain patterns of managing anger from her grandmother, and finds she is often patient until people "push her". Pt had trouble identifying coping skills for anger. Pt was oriented X3. Pt was appropriate with peers.

## 2023-11-14 NOTE — BH INPATIENT PSYCHIATRY PROGRESS NOTE - NSBHFUPINTERVALHXFT_PSY_A_CORE
Pt states her boyfriend visited yesterday.  Pt states she continues to hear noncommand but derogatory voices.  Still has suicidal thoughts.  Last nightmare was two nights ago.  Pt states she used to be on gabapentin for anxiety and chronic pain, 300 mg BID, asks for it to be restarted.  States her teeth are doing ok.  Denies any nocturia.

## 2023-11-14 NOTE — BH PSYCHOLOGY - CLINICIAN PSYCHOTHERAPY NOTE - NSBHPSYCHOLINT_PSY_A_CORE
Dialectical  Behavioral Therapy (DBT)/Dynamic issues addressed/Interpersonal Therapy (IPT)/Reality testing/Supported coping skills/other...

## 2023-11-14 NOTE — BH INPATIENT PSYCHIATRY PROGRESS NOTE - NSBHASSESSSUMMFT_PSY_ALL_CORE
Patient is 29yo single woman, no dependents, domiciled with her Grandmother, unemployed on disability/SSI, pphx of schizoaffective disorder, multiple past psychiatric admissions including state and recent discharge from [facility], in tx [facility], with pmhx of asthma, HTN, GERD, and hypothyroidism and schizoaffective disorder, in tx  with [facility], who self presented to the ED reporting abdominal pain and requesting readmission to psychiatric unit, reporting CAH, depressive symptoms, IOR, suicidality, admitted to [facility] for psychiatric care.  Depression and psychosis likely multifactorial at this time, schizoaffective vs. borderline personality disorder vs. complex grief vs. adjustment disorder.      Continues to report symptoms.    1.) Obs: Continue CO 1:1.  2.) Psychiatric medication management:   - Clozapine 175mg qHS for psychosis ([id number])  - Prazosin to 2mg qhs, monitor BP carefully.  - Abilify Maintena 400mg IM q3 weeks rather than 4, per collateral from ACT team. Last dose on 10/19, next due tomorrow.  - discontinue mirtazapine 7.5mg PO qHS, per collateral from ACT team  - Escitalopram 10 mg daily today.  - Added gabapentin 300 mg BID for anxiety/chronic pain.  - PRN: haloperidol 5mg PO/IM q8hrs PRN for agitation, diphenhydramine 50mg PO/IM q6hrs PRN for EPS PPx, hydroxyzine 50mg PO q12hrs PRN for anxiety, lorazepam 2mg PO q8hrs PRN for anxiety or 2mg IM for assaultive behavior. Melatonin 5mg qHS PRN for insomnia  - Discussed ECT several times with pt who states she has been told in two hospitals in past that due to her obesity and asthma, she is not an ideal candidate.  Pt states that due to this she is not interested in receiving ECT at this time.  3.) Medical:   - Pt scheduled for dental extractions 11/1, however declined to attend on day prior when informed it would not be under general anesthesia.  - last clozapine labs on 10/23 (CBC with diff, troponins, CRP); CRP elevated at 15.8 likely due to dental infection/wisdom tooth issues  - pantoprazole 40mg PO before breakfast for GERD  - montelukast 10mg PO qHS for asthma  - budesonide 80mcg/formoterol 4.5mcg 2 puffs BID for asthma  - PRN: acetaminophen 650mg PO q6hrs PRN for pain, albuterol 90mcg 2puffs q6hrs PRN for dyspnea, ondansetron 4mg q6hrs PRN for nausea  - Discussed CRP with medicine service, given asymptomatic pt and normal troponin, no additional intervention/diagnostics at this time.  - Appreciate medicine reccs for rash.  4.) Dispo planning: State application submitted.

## 2023-11-14 NOTE — BH INPATIENT PSYCHIATRY PROGRESS NOTE - NSBHCHARTREVIEWVS_PSY_A_CORE FT
Vital Signs Last 24 Hrs  T(C): 36.9 (11-14-23 @ 08:00), Max: 36.9 (11-14-23 @ 08:00)  T(F): 98.5 (11-14-23 @ 08:00), Max: 98.5 (11-14-23 @ 08:00)  HR: --  BP: --  BP(mean): --  RR: --  SpO2: --    Orthostatic VS  11-14-23 @ 08:26  Lying BP: --/-- HR: --  Sitting BP: 133/66 HR: 102  Standing BP: 131/67 HR: 113  Site: --  Mode: --  Orthostatic VS  11-14-23 @ 08:00  Lying BP: --/-- HR: --  Sitting BP: 143/64 HR: 122  Standing BP: --/-- HR: --  Site: --  Mode: --  Orthostatic VS  11-13-23 @ 20:52  Lying BP: --/-- HR: --  Sitting BP: 134/68 HR: 104  Standing BP: 129/60 HR: 115  Site: --  Mode: --  Orthostatic VS  11-13-23 @ 08:10  Lying BP: --/-- HR: --  Sitting BP: 126/64 HR: 87  Standing BP: 118/70 HR: 72  Site: --  Mode: --  Orthostatic VS  11-12-23 @ 20:38  Lying BP: --/-- HR: --  Sitting BP: 125/81 HR: 82  Standing BP: 119/74 HR: 95  Site: --  Mode: --

## 2023-11-14 NOTE — BH INPATIENT PSYCHIATRY PROGRESS NOTE - MSE UNSTRUCTURED FT
Appearance: Dressed appropriately.  Behavior: Cooperative.  Calm.  Motor: No abnormal movements, no psychomotor slowing or activation.  Speech: Regular rate.  Mood: "Ok"  Affect: Neutral.   Thought Process: Linear.  Associations: Fair.  Thought Content: AH and SI.   Insight: Limited.  Judgment: Fair on interview.  Attention: Fair.  Language: Fluent.  Gait: Intact.

## 2023-11-14 NOTE — BH PSYCHOLOGY - CLINICIAN PSYCHOTHERAPY NOTE - NSBHPSYCHOLNARRATIVE_PSY_A_CORE FT
Writer met with Pt for 30-minute individual therapy session. Pt was alert and presented with adequate hygiene and grooming. Pt denied experiencing any A/V hallucinations. Pt reported "irritated" mood, affect full. Her thought process was linear and goal directed. Pts’ speech was WNL, content was relevant to discussion. Pt denied active SI but shared feeling passive SI in the mornings after waking up, HI or NSSI. Oriented x3. Limited insight and judgement demonstrated.      Pt shared feeling anxious regarding uncertainty about state hospital, normalized anxiety and explored avoidance. Pt shared that she has been feeling more irritated on unit, as well as feeling hurt when someone made an inappropriate comment about her weight while she was eating. Explored pts thoughts and feelings with pt sharing it reminded her of when she was bullied growing up. Made space for pt to share and validated pts feelings. Pt shared that she did not engage with other pt, and left to take a shower and process her feelings. Validate pts effort to engage in coping skills. Discussed and normalized feelings of uncertainty and explored ACT skills to utilize when pt would feel more anxious. Explored and discussed what it would feel like for pt to be off CO, with pt sharing she would discuss with   Writer met with Pt for 25-minute individual therapy session. Pt was alert and presented with adequate hygiene and grooming. Pt denied experiencing any A/V hallucinations. Pt reported "irritated" mood, affect full. Her thought process was linear and goal directed. Pts’ speech was WNL, content was relevant to discussion. Pt denied active SI but shared feeling passive SI in the mornings after waking up, denied HI or NSSI. Oriented x3. Limited insight and judgement demonstrated.      Pt shared feeling anxious regarding uncertainty about state hospital, normalized anxiety and explored avoidance. Pt shared that she has been feeling more irritated on unit, as well as feeling hurt when someone made an inappropriate comment about her weight while she was eating. Explored pts thoughts and feelings with pt sharing it reminded her of when she was bullied growing up. Made space for pt to share and validated pts feelings. Pt shared that she did not engage with other pt, and left to take a shower and process her feelings. Validate pts effort to engage in coping skills. Discussed and normalized feelings of uncertainty and explored ACT skills to utilize when pt would feel more anxious. Explored and discussed what it would feel like for pt to be off CO, with pt sharing she would discuss with

## 2023-11-14 NOTE — BH INPATIENT PSYCHIATRY PROGRESS NOTE - CURRENT MEDICATION
MEDICATIONS  (STANDING):  budesonide  80 MICROgram(s)/formoterol 4.5 MICROgram(s) Inhaler 2 Puff(s) Inhalation two times a day  clotrimazole/betamethasone Cream 1 Application(s) Topical two times a day  cloZAPine 175 milliGRAM(s) Oral at bedtime  escitalopram 10 milliGRAM(s) Oral daily  gabapentin 300 milliGRAM(s) Oral two times a day  gabapentin 300 milliGRAM(s) Oral once  influenza   Vaccine 0.5 milliLiter(s) IntraMuscular once  lidocaine   4% Patch 1 Patch Transdermal daily  montelukast 10 milliGRAM(s) Oral at bedtime  pantoprazole    Tablet 40 milliGRAM(s) Oral before breakfast  polyethylene glycol 3350 17 Gram(s) Oral daily  prazosin. 2 milliGRAM(s) Oral at bedtime    MEDICATIONS  (PRN):  acetaminophen     Tablet .. 650 milliGRAM(s) Oral every 6 hours PRN Moderate Pain (4 - 6)  albuterol    90 MICROgram(s) HFA Inhaler 2 Puff(s) Inhalation every 6 hours PRN Shortness of Breath and/or Wheezing  aluminum hydroxide/magnesium hydroxide/simethicone Suspension 30 milliLiter(s) Oral every 4 hours PRN Dyspepsia  benzocaine 20% Spray 1 Spray(s) Topical every 6 hours PRN tooth pain  chlorproMAZINE    Injectable 50 milliGRAM(s) IntraMuscular once PRN severe agitation  chlorproMAZINE    Tablet 50 milliGRAM(s) Oral every 6 hours PRN agitation  hydrOXYzine hydrochloride 50 milliGRAM(s) Oral every 12 hours PRN Anxiety  ibuprofen  Tablet. 400 milliGRAM(s) Oral every 6 hours PRN Moderate Pain (4 - 6), Severe Pain (7 - 10)  LORazepam     Tablet 2 milliGRAM(s) Oral every 8 hours PRN Anxiety  LORazepam   Injectable 2 milliGRAM(s) IntraMuscular once PRN Assaultive behavior  melatonin. 5 milliGRAM(s) Oral at bedtime PRN Insomnia  ondansetron    Tablet 4 milliGRAM(s) Oral every 6 hours PRN nausea

## 2023-11-15 LAB
BASOPHILS # BLD AUTO: 0.02 K/UL — SIGNIFICANT CHANGE UP (ref 0–0.2)
BASOPHILS # BLD AUTO: 0.02 K/UL — SIGNIFICANT CHANGE UP (ref 0–0.2)
BASOPHILS NFR BLD AUTO: 0.2 % — SIGNIFICANT CHANGE UP (ref 0–2)
BASOPHILS NFR BLD AUTO: 0.2 % — SIGNIFICANT CHANGE UP (ref 0–2)
CRP SERPL-MCNC: 24.1 MG/L — HIGH
CRP SERPL-MCNC: 24.1 MG/L — HIGH
EOSINOPHIL # BLD AUTO: 0.02 K/UL — SIGNIFICANT CHANGE UP (ref 0–0.5)
EOSINOPHIL # BLD AUTO: 0.02 K/UL — SIGNIFICANT CHANGE UP (ref 0–0.5)
EOSINOPHIL NFR BLD AUTO: 0.2 % — SIGNIFICANT CHANGE UP (ref 0–6)
EOSINOPHIL NFR BLD AUTO: 0.2 % — SIGNIFICANT CHANGE UP (ref 0–6)
HCT VFR BLD CALC: 38.8 % — SIGNIFICANT CHANGE UP (ref 34.5–45)
HCT VFR BLD CALC: 38.8 % — SIGNIFICANT CHANGE UP (ref 34.5–45)
HGB BLD-MCNC: 11.8 G/DL — SIGNIFICANT CHANGE UP (ref 11.5–15.5)
HGB BLD-MCNC: 11.8 G/DL — SIGNIFICANT CHANGE UP (ref 11.5–15.5)
IANC: 6.47 K/UL — SIGNIFICANT CHANGE UP (ref 1.8–7.4)
IANC: 6.47 K/UL — SIGNIFICANT CHANGE UP (ref 1.8–7.4)
IMM GRANULOCYTES NFR BLD AUTO: 0.5 % — SIGNIFICANT CHANGE UP (ref 0–0.9)
IMM GRANULOCYTES NFR BLD AUTO: 0.5 % — SIGNIFICANT CHANGE UP (ref 0–0.9)
LYMPHOCYTES # BLD AUTO: 3.27 K/UL — SIGNIFICANT CHANGE UP (ref 1–3.3)
LYMPHOCYTES # BLD AUTO: 3.27 K/UL — SIGNIFICANT CHANGE UP (ref 1–3.3)
LYMPHOCYTES # BLD AUTO: 31.6 % — SIGNIFICANT CHANGE UP (ref 13–44)
LYMPHOCYTES # BLD AUTO: 31.6 % — SIGNIFICANT CHANGE UP (ref 13–44)
MCHC RBC-ENTMCNC: 24.5 PG — LOW (ref 27–34)
MCHC RBC-ENTMCNC: 24.5 PG — LOW (ref 27–34)
MCHC RBC-ENTMCNC: 30.4 GM/DL — LOW (ref 32–36)
MCHC RBC-ENTMCNC: 30.4 GM/DL — LOW (ref 32–36)
MCV RBC AUTO: 80.7 FL — SIGNIFICANT CHANGE UP (ref 80–100)
MCV RBC AUTO: 80.7 FL — SIGNIFICANT CHANGE UP (ref 80–100)
MONOCYTES # BLD AUTO: 0.52 K/UL — SIGNIFICANT CHANGE UP (ref 0–0.9)
MONOCYTES # BLD AUTO: 0.52 K/UL — SIGNIFICANT CHANGE UP (ref 0–0.9)
MONOCYTES NFR BLD AUTO: 5 % — SIGNIFICANT CHANGE UP (ref 2–14)
MONOCYTES NFR BLD AUTO: 5 % — SIGNIFICANT CHANGE UP (ref 2–14)
NEUTROPHILS # BLD AUTO: 6.47 K/UL — SIGNIFICANT CHANGE UP (ref 1.8–7.4)
NEUTROPHILS # BLD AUTO: 6.47 K/UL — SIGNIFICANT CHANGE UP (ref 1.8–7.4)
NEUTROPHILS NFR BLD AUTO: 62.5 % — SIGNIFICANT CHANGE UP (ref 43–77)
NEUTROPHILS NFR BLD AUTO: 62.5 % — SIGNIFICANT CHANGE UP (ref 43–77)
NRBC # BLD: 0 /100 WBCS — SIGNIFICANT CHANGE UP (ref 0–0)
NRBC # BLD: 0 /100 WBCS — SIGNIFICANT CHANGE UP (ref 0–0)
NRBC # FLD: 0 K/UL — SIGNIFICANT CHANGE UP (ref 0–0)
NRBC # FLD: 0 K/UL — SIGNIFICANT CHANGE UP (ref 0–0)
PLATELET # BLD AUTO: 282 K/UL — SIGNIFICANT CHANGE UP (ref 150–400)
PLATELET # BLD AUTO: 282 K/UL — SIGNIFICANT CHANGE UP (ref 150–400)
RBC # BLD: 4.81 M/UL — SIGNIFICANT CHANGE UP (ref 3.8–5.2)
RBC # BLD: 4.81 M/UL — SIGNIFICANT CHANGE UP (ref 3.8–5.2)
RBC # FLD: 15.9 % — HIGH (ref 10.3–14.5)
RBC # FLD: 15.9 % — HIGH (ref 10.3–14.5)
TROPONIN T, HIGH SENSITIVITY RESULT: <6 NG/L — SIGNIFICANT CHANGE UP
TROPONIN T, HIGH SENSITIVITY RESULT: <6 NG/L — SIGNIFICANT CHANGE UP
WBC # BLD: 10.35 K/UL — SIGNIFICANT CHANGE UP (ref 3.8–10.5)
WBC # BLD: 10.35 K/UL — SIGNIFICANT CHANGE UP (ref 3.8–10.5)
WBC # FLD AUTO: 10.35 K/UL — SIGNIFICANT CHANGE UP (ref 3.8–10.5)
WBC # FLD AUTO: 10.35 K/UL — SIGNIFICANT CHANGE UP (ref 3.8–10.5)

## 2023-11-15 PROCEDURE — 90853 GROUP PSYCHOTHERAPY: CPT

## 2023-11-15 PROCEDURE — 99232 SBSQ HOSP IP/OBS MODERATE 35: CPT

## 2023-11-15 RX ADMIN — PANTOPRAZOLE SODIUM 40 MILLIGRAM(S): 20 TABLET, DELAYED RELEASE ORAL at 08:36

## 2023-11-15 RX ADMIN — GABAPENTIN 300 MILLIGRAM(S): 400 CAPSULE ORAL at 08:36

## 2023-11-15 RX ADMIN — BUDESONIDE AND FORMOTEROL FUMARATE DIHYDRATE 2 PUFF(S): 160; 4.5 AEROSOL RESPIRATORY (INHALATION) at 08:36

## 2023-11-15 RX ADMIN — Medication 2 MILLIGRAM(S): at 20:32

## 2023-11-15 RX ADMIN — BUDESONIDE AND FORMOTEROL FUMARATE DIHYDRATE 2 PUFF(S): 160; 4.5 AEROSOL RESPIRATORY (INHALATION) at 20:31

## 2023-11-15 RX ADMIN — CLOZAPINE 175 MILLIGRAM(S): 150 TABLET, ORALLY DISINTEGRATING ORAL at 20:32

## 2023-11-15 RX ADMIN — GABAPENTIN 300 MILLIGRAM(S): 400 CAPSULE ORAL at 20:31

## 2023-11-15 RX ADMIN — ESCITALOPRAM OXALATE 10 MILLIGRAM(S): 10 TABLET, FILM COATED ORAL at 08:36

## 2023-11-15 RX ADMIN — MONTELUKAST 10 MILLIGRAM(S): 4 TABLET, CHEWABLE ORAL at 20:32

## 2023-11-15 NOTE — BH INPATIENT PSYCHIATRY PROGRESS NOTE - CURRENT MEDICATION
MEDICATIONS  (STANDING):  budesonide  80 MICROgram(s)/formoterol 4.5 MICROgram(s) Inhaler 2 Puff(s) Inhalation two times a day  clotrimazole/betamethasone Cream 1 Application(s) Topical two times a day  cloZAPine 175 milliGRAM(s) Oral at bedtime  escitalopram 10 milliGRAM(s) Oral daily  gabapentin 300 milliGRAM(s) Oral two times a day  influenza   Vaccine 0.5 milliLiter(s) IntraMuscular once  lidocaine   4% Patch 1 Patch Transdermal daily  montelukast 10 milliGRAM(s) Oral at bedtime  pantoprazole    Tablet 40 milliGRAM(s) Oral before breakfast  polyethylene glycol 3350 17 Gram(s) Oral daily  prazosin. 2 milliGRAM(s) Oral at bedtime    MEDICATIONS  (PRN):  acetaminophen     Tablet .. 650 milliGRAM(s) Oral every 6 hours PRN Moderate Pain (4 - 6)  albuterol    90 MICROgram(s) HFA Inhaler 2 Puff(s) Inhalation every 6 hours PRN Shortness of Breath and/or Wheezing  aluminum hydroxide/magnesium hydroxide/simethicone Suspension 30 milliLiter(s) Oral every 4 hours PRN Dyspepsia  benzocaine 20% Spray 1 Spray(s) Topical every 6 hours PRN tooth pain  chlorproMAZINE    Injectable 50 milliGRAM(s) IntraMuscular once PRN severe agitation  chlorproMAZINE    Tablet 50 milliGRAM(s) Oral every 6 hours PRN agitation  hydrOXYzine hydrochloride 50 milliGRAM(s) Oral every 12 hours PRN Anxiety  ibuprofen  Tablet. 400 milliGRAM(s) Oral every 6 hours PRN Moderate Pain (4 - 6), Severe Pain (7 - 10)  LORazepam     Tablet 2 milliGRAM(s) Oral every 8 hours PRN Anxiety  LORazepam   Injectable 2 milliGRAM(s) IntraMuscular once PRN Assaultive behavior  melatonin. 5 milliGRAM(s) Oral at bedtime PRN Insomnia  ondansetron    Tablet 4 milliGRAM(s) Oral every 6 hours PRN nausea

## 2023-11-15 NOTE — BH INPATIENT PSYCHIATRY PROGRESS NOTE - MSE UNSTRUCTURED FT
Appearance: Dressed appropriately.  Behavior: Cooperative.  Calm.  Motor: No abnormal movements, no psychomotor slowing or activation.  Speech: Laconic, regular rate.  Mood: "Anxious"  Affect: Depressed.  Thought Process: Linear.  Associations: Fair.  Thought Content: AH and SI.   Insight: Limited.  Judgment: Fair on interview.  Attention: Fair.  Language: Fluent.  Gait: Intact.

## 2023-11-15 NOTE — BH INPATIENT PSYCHIATRY PROGRESS NOTE - NSBHCHARTREVIEWVS_PSY_A_CORE FT
Vital Signs Last 24 Hrs  T(C): 36.7 (11-15-23 @ 08:38), Max: 36.7 (11-15-23 @ 08:38)  T(F): 98.1 (11-15-23 @ 08:38), Max: 98.1 (11-15-23 @ 08:38)  HR: --  BP: --  BP(mean): --  RR: --  SpO2: --    Orthostatic VS  11-15-23 @ 08:38  Lying BP: --/-- HR: --  Sitting BP: 129/80 HR: 89  Standing BP: 117/71 HR: 105  Site: --  Mode: --  Orthostatic VS  11-14-23 @ 21:00  Lying BP: --/-- HR: --  Sitting BP: 130/67 HR: 88  Standing BP: 136/89 HR: 98  Site: --  Mode: --  Orthostatic VS  11-14-23 @ 08:26  Lying BP: --/-- HR: --  Sitting BP: 133/66 HR: 102  Standing BP: 131/67 HR: 113  Site: --  Mode: --  Orthostatic VS  11-14-23 @ 08:00  Lying BP: --/-- HR: --  Sitting BP: 143/64 HR: 122  Standing BP: --/-- HR: --  Site: --  Mode: --  Orthostatic VS  11-13-23 @ 20:52  Lying BP: --/-- HR: --  Sitting BP: 134/68 HR: 104  Standing BP: 129/60 HR: 115  Site: --  Mode: --

## 2023-11-15 NOTE — BH PSYCHOLOGY - GROUP THERAPY NOTE - NSBHPSYCHOLRESPCOMMENT_PSY_A_CORE FT
The patient appeared adequately groomed and casually dressed. Pt appeared engaged in the group as evidenced by their willingness to independently verbally communicate during the discussion. Pt shared that she is often harsh on herself when making mistakes. Pt was oriented X3. Pt was appropriate with peers.

## 2023-11-15 NOTE — BH INPATIENT PSYCHIATRY PROGRESS NOTE - NSBHASSESSSUMMFT_PSY_ALL_CORE
Patient is 27yo single woman, no dependents, domiciled with her Grandmother, unemployed on disability/SSI, pphx of schizoaffective disorder, multiple past psychiatric admissions including state and recent discharge from [facility], in tx [facility], with pmhx of asthma, HTN, GERD, and hypothyroidism and schizoaffective disorder, in tx  with [facility], who self presented to the ED reporting abdominal pain and requesting readmission to psychiatric unit, reporting CAH, depressive symptoms, IOR, suicidality, admitted to [facility] for psychiatric care.  Depression and psychosis likely multifactorial at this time, schizoaffective vs. borderline personality disorder vs. complex grief vs. adjustment disorder.      Anxiety and SI in context of on unit stressors.    1.) Obs: Continue CO 1:1.  2.) Psychiatric medication management:   - Clozapine 175mg qHS for psychosis ([id number])  - Prazosin 2mg qhs, monitor BP carefully.  - Abilify Maintena 400mg IM q3 weeks rather than 4, per collateral from ACT team. Last dose on 10/19, next due tomorrow.  - discontinue mirtazapine 7.5mg PO qHS, per collateral from ACT team  - Escitalopram 10 mg daily.  - Gabapentin 300 mg BID for anxiety/chronic pain.  - PRN: haloperidol 5mg PO/IM q8hrs PRN for agitation, diphenhydramine 50mg PO/IM q6hrs PRN for EPS PPx, hydroxyzine 50mg PO q12hrs PRN for anxiety, lorazepam 2mg PO q8hrs PRN for anxiety or 2mg IM for assaultive behavior. Melatonin 5mg qHS PRN for insomnia  - Discussed ECT several times with pt who states she has been told in two hospitals in past that due to her obesity and asthma, she is not an ideal candidate.  Pt states that due to this she is not interested in receiving ECT at this time.  3.) Medical:   - Pt scheduled for dental extractions 11/1, however declined to attend on day prior when informed it would not be under general anesthesia.  - last clozapine labs on 10/23 (CBC with diff, troponins, CRP); CRP elevated at 15.8 likely due to dental infection/wisdom tooth issues  - pantoprazole 40mg PO before breakfast for GERD  - montelukast 10mg PO qHS for asthma  - budesonide 80mcg/formoterol 4.5mcg 2 puffs BID for asthma  - PRN: acetaminophen 650mg PO q6hrs PRN for pain, albuterol 90mcg 2puffs q6hrs PRN for dyspnea, ondansetron 4mg q6hrs PRN for nausea  - Discussed CRP with medicine service, given asymptomatic pt and normal troponin, no additional intervention/diagnostics at this time.  - Appreciate medicine reccs for rash.  4.) Dispo planning: State application submitted.

## 2023-11-15 NOTE — BH INPATIENT PSYCHIATRY PROGRESS NOTE - NSBHFUPINTERVALHXFT_PSY_A_CORE
Pt reports she feels anxious and has suicidal thoughts today.  Possible stressors include disruptive agitated peer on unit, also pt states she and her boyfriend have decided to remain platonic during yesterday's visiting hours.

## 2023-11-16 PROCEDURE — 90853 GROUP PSYCHOTHERAPY: CPT

## 2023-11-16 PROCEDURE — 99232 SBSQ HOSP IP/OBS MODERATE 35: CPT

## 2023-11-16 RX ORDER — POLYETHYLENE GLYCOL 3350 17 G/17G
17 POWDER, FOR SOLUTION ORAL DAILY
Refills: 0 | Status: DISCONTINUED | OUTPATIENT
Start: 2023-11-16 | End: 2024-02-06

## 2023-11-16 RX ORDER — CLOZAPINE 150 MG/1
200 TABLET, ORALLY DISINTEGRATING ORAL AT BEDTIME
Refills: 0 | Status: DISCONTINUED | OUTPATIENT
Start: 2023-11-16 | End: 2023-12-07

## 2023-11-16 RX ADMIN — BUDESONIDE AND FORMOTEROL FUMARATE DIHYDRATE 2 PUFF(S): 160; 4.5 AEROSOL RESPIRATORY (INHALATION) at 21:12

## 2023-11-16 RX ADMIN — BUDESONIDE AND FORMOTEROL FUMARATE DIHYDRATE 2 PUFF(S): 160; 4.5 AEROSOL RESPIRATORY (INHALATION) at 08:36

## 2023-11-16 RX ADMIN — Medication 2 MILLIGRAM(S): at 21:12

## 2023-11-16 RX ADMIN — GABAPENTIN 300 MILLIGRAM(S): 400 CAPSULE ORAL at 21:12

## 2023-11-16 RX ADMIN — GABAPENTIN 300 MILLIGRAM(S): 400 CAPSULE ORAL at 08:35

## 2023-11-16 RX ADMIN — CLOZAPINE 200 MILLIGRAM(S): 150 TABLET, ORALLY DISINTEGRATING ORAL at 21:12

## 2023-11-16 RX ADMIN — ESCITALOPRAM OXALATE 10 MILLIGRAM(S): 10 TABLET, FILM COATED ORAL at 08:36

## 2023-11-16 RX ADMIN — PANTOPRAZOLE SODIUM 40 MILLIGRAM(S): 20 TABLET, DELAYED RELEASE ORAL at 08:36

## 2023-11-16 RX ADMIN — MONTELUKAST 10 MILLIGRAM(S): 4 TABLET, CHEWABLE ORAL at 21:12

## 2023-11-16 NOTE — BH TREATMENT PLAN - NSTXDCOTHRINTERSW_PSY_ALL_CORE
Writer will work to educate the pt on the state referral process and explore any other aftercare options that may be an appropriate alternative to [facility].

## 2023-11-16 NOTE — BH INPATIENT PSYCHIATRY PROGRESS NOTE - NSBHCHARTREVIEWVS_PSY_A_CORE FT
Vital Signs Last 24 Hrs  T(C): 36.7 (11-16-23 @ 08:23), Max: 37.1 (11-15-23 @ 20:25)  T(F): 98.1 (11-16-23 @ 08:23), Max: 98.7 (11-15-23 @ 20:25)  HR: --  BP: --  BP(mean): --  RR: 18 (11-15-23 @ 20:25) (18 - 18)  SpO2: 100% (11-15-23 @ 20:25) (100% - 100%)    Orthostatic VS  11-16-23 @ 08:23  Lying BP: --/-- HR: --  Sitting BP: 117/56 HR: 89  Standing BP: 111/66 HR: 100  Site: --  Mode: --  Orthostatic VS  11-15-23 @ 20:25  Lying BP: --/-- HR: --  Sitting BP: 136/82 HR: 91  Standing BP: 141/80 HR: 98  Site: --  Mode: --  Orthostatic VS  11-15-23 @ 08:38  Lying BP: --/-- HR: --  Sitting BP: 129/80 HR: 89  Standing BP: 117/71 HR: 105  Site: --  Mode: --  Orthostatic VS  11-14-23 @ 21:00  Lying BP: --/-- HR: --  Sitting BP: 130/67 HR: 88  Standing BP: 136/89 HR: 98  Site: --  Mode: --

## 2023-11-16 NOTE — BH INPATIENT PSYCHIATRY PROGRESS NOTE - CURRENT MEDICATION
MEDICATIONS  (STANDING):  budesonide  80 MICROgram(s)/formoterol 4.5 MICROgram(s) Inhaler 2 Puff(s) Inhalation two times a day  clotrimazole/betamethasone Cream 1 Application(s) Topical two times a day  cloZAPine 200 milliGRAM(s) Oral at bedtime  escitalopram 10 milliGRAM(s) Oral daily  gabapentin 300 milliGRAM(s) Oral two times a day  influenza   Vaccine 0.5 milliLiter(s) IntraMuscular once  lidocaine   4% Patch 1 Patch Transdermal daily  montelukast 10 milliGRAM(s) Oral at bedtime  pantoprazole    Tablet 40 milliGRAM(s) Oral before breakfast  prazosin. 2 milliGRAM(s) Oral at bedtime    MEDICATIONS  (PRN):  acetaminophen     Tablet .. 650 milliGRAM(s) Oral every 6 hours PRN Moderate Pain (4 - 6)  albuterol    90 MICROgram(s) HFA Inhaler 2 Puff(s) Inhalation every 6 hours PRN Shortness of Breath and/or Wheezing  aluminum hydroxide/magnesium hydroxide/simethicone Suspension 30 milliLiter(s) Oral every 4 hours PRN Dyspepsia  benzocaine 20% Spray 1 Spray(s) Topical every 6 hours PRN tooth pain  chlorproMAZINE    Injectable 50 milliGRAM(s) IntraMuscular once PRN severe agitation  chlorproMAZINE    Tablet 50 milliGRAM(s) Oral every 6 hours PRN agitation  hydrOXYzine hydrochloride 50 milliGRAM(s) Oral every 12 hours PRN Anxiety  ibuprofen  Tablet. 400 milliGRAM(s) Oral every 6 hours PRN Moderate Pain (4 - 6), Severe Pain (7 - 10)  LORazepam     Tablet 2 milliGRAM(s) Oral every 8 hours PRN Anxiety  LORazepam   Injectable 2 milliGRAM(s) IntraMuscular once PRN Assaultive behavior  melatonin. 5 milliGRAM(s) Oral at bedtime PRN Insomnia  ondansetron    Tablet 4 milliGRAM(s) Oral every 6 hours PRN nausea  polyethylene glycol 3350 17 Gram(s) Oral daily PRN Constipation

## 2023-11-16 NOTE — BH PSYCHOLOGY - CLINICIAN PSYCHOTHERAPY NOTE - NSBHPSYCHOLGOALS_PSY_A_CORE
Decrease symptoms/Assessment/Improve social/vocational/coping skills/Prevent relapse/Psychoeducation/Treatment compliance

## 2023-11-16 NOTE — BH PSYCHOLOGY - CLINICIAN PSYCHOTHERAPY NOTE - NSBHPSYCHOLNARRATIVE_PSY_A_CORE FT
Writer met with Pt for 20-minute individual therapy session. Pt was alert and presented with adequate hygiene but unkempt grooming. Pt denied experiencing any A/V hallucinations at time of session, but shared that she has visual hallucinations of her grandfather every morning. She reported irritated mood, affect full. Her thought process was linear and goal directed. Pts’ speech was WNL, content was relevant to discussion. Pt denied passive or active SI, HI, but shared that she still has thoughts of ingesting soap to harm herself. Oriented x3. Limited insight and judgement demonstrated.      Pt talked about feeling irritated on the unit due to other pts being intrusive and making hurtful comments. Explored thoughts and feeling, as well as in vs out of control [place], validated pt in coping in a healthy way and not engaging in conflict. Normalized acuity of an inpatient unit, as compared to community setting and validated pts feelings. Talked about pts thoughts about wanting to ingest soap and explored difference between suicidal and self injurious thoughts, pointed out discrepancy in what pt says in some sessions in relation to getting certain needs met by being on CO, with pt stating that she does not trust herself to not hurt herself. Explored feelings and pointed out how pt has been able to engage in coping when feeling anxious, depressed or triggered.

## 2023-11-16 NOTE — BH INPATIENT PSYCHIATRY PROGRESS NOTE - NSBHASSESSSUMMFT_PSY_ALL_CORE
Patient is 29yo single woman, no dependents, domiciled with her Grandmother, unemployed on disability/SSI, pphx of schizoaffective disorder, multiple past psychiatric admissions including state and recent discharge from [facility], [facility] tx [facility], with pmhx of asthma, HTN, GERD, and hypothyroidism and schizoaffective disorder, in tx  with [facility], who self presented to the ED reporting abdominal pain and requesting readmission to psychiatric unit, reporting CAH, depressive symptoms, IOR, suicidality, admitted to [facility] for psychiatric care.  Depression and psychosis likely multifactorial at this time, schizoaffective vs. borderline personality disorder vs. complex grief vs. adjustment disorder.      Continues to report depression, anxiety, psychosis, and SI.    1.) Obs: Continue CO 1:1.  2.) Psychiatric medication management:   - Increase clozapine to 200mg qHS for psychosis ([id number])  - Prazosin 2mg qhs, monitor BP carefully.  - Abilify Maintena 400mg IM q3 weeks rather than 4, per collateral from ACT team. Last dose on 10/19, next due tomorrow.  - discontinue mirtazapine 7.5mg PO qHS, per collateral from ACT team  - Escitalopram 10 mg daily.  - Gabapentin 300 mg BID for anxiety/chronic pain.  - PRN: haloperidol 5mg PO/IM q8hrs PRN for agitation, diphenhydramine 50mg PO/IM q6hrs PRN for EPS PPx, hydroxyzine 50mg PO q12hrs PRN for anxiety, lorazepam 2mg PO q8hrs PRN for anxiety or 2mg IM for assaultive behavior. Melatonin 5mg qHS PRN for insomnia  - Discussed ECT several times with pt who states she has been told in two hospitals in past that due to her obesity and asthma, she is not an ideal candidate.  Pt states that due to this she is not interested in receiving ECT at this time.  3.) Medical:   - Pt scheduled for dental extractions 11/1, however declined to attend on day prior when informed it would not be under general anesthesia.  - last clozapine labs on 10/23 (CBC with diff, troponins, CRP); CRP elevated at 15.8 likely due to dental infection/wisdom tooth issues  - pantoprazole 40mg PO before breakfast for GERD  - montelukast 10mg PO qHS for asthma  - budesonide 80mcg/formoterol 4.5mcg 2 puffs BID for asthma  - PRN: acetaminophen 650mg PO q6hrs PRN for pain, albuterol 90mcg 2puffs q6hrs PRN for dyspnea, ondansetron 4mg q6hrs PRN for nausea  - Discussed CRP with medicine service, given asymptomatic pt and normal troponin, no additional intervention/diagnostics at this time.  - Appreciate medicine reccs for rash.  4.) Dispo planning: State application submitted.

## 2023-11-16 NOTE — BH INPATIENT PSYCHIATRY PROGRESS NOTE - MSE UNSTRUCTURED FT
Appearance: Dressed appropriately.  Behavior: Cooperative.  Calm.  Motor: No abnormal movements, no psychomotor slowing or activation.  Speech: Regular rate.  Mood: "Alone and isolated."  Affect: Depressed.  Thought Process: Linear.  Associations: Fair.  Thought Content: AH and SI.   Insight: Limited.  Judgment: Fair on interview.  Attention: Fair.  Language: Fluent.  Gait: Intact.

## 2023-11-16 NOTE — BH INPATIENT PSYCHIATRY PROGRESS NOTE - NSBHFUPINTERVALHXFT_PSY_A_CORE
Pt reports she continues to have low mood, states she feels alone and isolated.  Last had suicidal thoughts yesterday evening.  Reports she still hears voices for several hours a day.  States the gabapentin is working for her chronic pain.

## 2023-11-16 NOTE — BH PSYCHOLOGY - CLINICIAN PSYCHOTHERAPY NOTE - NSBHPSYCHOLINT_PSY_A_CORE
Cognitive/behavioral therapy/Dialectical  Behavioral Therapy (DBT)/Dynamic issues addressed/Reality testing/Supported coping skills/Supportive therapy

## 2023-11-16 NOTE — BH PSYCHOLOGY - GROUP THERAPY NOTE - NSBHPSYCHOLRESPCOMMENT_PSY_A_CORE FT
The patient appeared adequately groomed and casually dressed. Pt appeared engaged in the group as evidenced by their willingness to independently verbally communicate during the discussion. Pt talked about different coping skills she utilizes, as well as how her three dogs provide comfort to her. Pt was oriented X3. Pt was appropriate with peers.

## 2023-11-16 NOTE — BH INPATIENT PSYCHIATRY PROGRESS NOTE - PRN MEDS
MEDICATIONS  (PRN):  acetaminophen     Tablet .. 650 milliGRAM(s) Oral every 6 hours PRN Moderate Pain (4 - 6)  albuterol    90 MICROgram(s) HFA Inhaler 2 Puff(s) Inhalation every 6 hours PRN Shortness of Breath and/or Wheezing  aluminum hydroxide/magnesium hydroxide/simethicone Suspension 30 milliLiter(s) Oral every 4 hours PRN Dyspepsia  benzocaine 20% Spray 1 Spray(s) Topical every 6 hours PRN tooth pain  chlorproMAZINE    Injectable 50 milliGRAM(s) IntraMuscular once PRN severe agitation  chlorproMAZINE    Tablet 50 milliGRAM(s) Oral every 6 hours PRN agitation  hydrOXYzine hydrochloride 50 milliGRAM(s) Oral every 12 hours PRN Anxiety  ibuprofen  Tablet. 400 milliGRAM(s) Oral every 6 hours PRN Moderate Pain (4 - 6), Severe Pain (7 - 10)  LORazepam     Tablet 2 milliGRAM(s) Oral every 8 hours PRN Anxiety  LORazepam   Injectable 2 milliGRAM(s) IntraMuscular once PRN Assaultive behavior  melatonin. 5 milliGRAM(s) Oral at bedtime PRN Insomnia  ondansetron    Tablet 4 milliGRAM(s) Oral every 6 hours PRN nausea  polyethylene glycol 3350 17 Gram(s) Oral daily PRN Constipation

## 2023-11-17 LAB
HCG SERPL-ACNC: <1 MIU/ML — SIGNIFICANT CHANGE UP
HCG SERPL-ACNC: <1 MIU/ML — SIGNIFICANT CHANGE UP

## 2023-11-17 PROCEDURE — 99232 SBSQ HOSP IP/OBS MODERATE 35: CPT

## 2023-11-17 PROCEDURE — 90853 GROUP PSYCHOTHERAPY: CPT

## 2023-11-17 RX ORDER — LITHIUM CARBONATE 300 MG/1
300 TABLET, EXTENDED RELEASE ORAL AT BEDTIME
Refills: 0 | Status: DISCONTINUED | OUTPATIENT
Start: 2023-11-17 | End: 2023-11-20

## 2023-11-17 RX ORDER — ESCITALOPRAM OXALATE 10 MG/1
15 TABLET, FILM COATED ORAL DAILY
Refills: 0 | Status: DISCONTINUED | OUTPATIENT
Start: 2023-11-18 | End: 2023-11-22

## 2023-11-17 RX ORDER — ARIPIPRAZOLE 15 MG/1
400 TABLET ORAL ONCE
Refills: 0 | Status: COMPLETED | OUTPATIENT
Start: 2023-11-17 | End: 2023-11-17

## 2023-11-17 RX ADMIN — CLOZAPINE 200 MILLIGRAM(S): 150 TABLET, ORALLY DISINTEGRATING ORAL at 22:18

## 2023-11-17 RX ADMIN — GABAPENTIN 300 MILLIGRAM(S): 400 CAPSULE ORAL at 08:30

## 2023-11-17 RX ADMIN — ESCITALOPRAM OXALATE 10 MILLIGRAM(S): 10 TABLET, FILM COATED ORAL at 08:30

## 2023-11-17 RX ADMIN — MONTELUKAST 10 MILLIGRAM(S): 4 TABLET, CHEWABLE ORAL at 21:17

## 2023-11-17 RX ADMIN — ARIPIPRAZOLE 400 MILLIGRAM(S): 15 TABLET ORAL at 12:51

## 2023-11-17 RX ADMIN — PANTOPRAZOLE SODIUM 40 MILLIGRAM(S): 20 TABLET, DELAYED RELEASE ORAL at 08:30

## 2023-11-17 RX ADMIN — LITHIUM CARBONATE 300 MILLIGRAM(S): 300 TABLET, EXTENDED RELEASE ORAL at 21:17

## 2023-11-17 RX ADMIN — GABAPENTIN 300 MILLIGRAM(S): 400 CAPSULE ORAL at 21:17

## 2023-11-17 RX ADMIN — Medication 2 MILLIGRAM(S): at 21:17

## 2023-11-17 NOTE — BH INPATIENT PSYCHIATRY PROGRESS NOTE - NSBHMETABOLIC_PSY_ALL_CORE_FT
BMI: BMI (kg/m2): 57.8 (09-14-23 @ 14:30)  HbA1c: A1C with Estimated Average Glucose Result: 5.2 % (11-01-23 @ 09:04)    Glucose:   BP: 103/87 (11-16-23 @ 19:33) (103/87 - 103/87)  Lipid Panel: Date/Time: 11-01-23 @ 09:04  Cholesterol, Serum: 156  Direct LDL: --  HDL Cholesterol, Serum: 58  Total Cholesterol/HDL Ration Measurement: --  Triglycerides, Serum: 79

## 2023-11-17 NOTE — BH INPATIENT PSYCHIATRY PROGRESS NOTE - NSBHCHARTREVIEWVS_PSY_A_CORE FT
Vital Signs Last 24 Hrs  T(C): 36.2 (11-17-23 @ 08:17), Max: 36.2 (11-17-23 @ 08:17)  T(F): 97.1 (11-17-23 @ 08:17), Max: 97.1 (11-17-23 @ 08:17)  HR: 107 (11-16-23 @ 19:33) (107 - 107)  BP: 103/87 (11-16-23 @ 19:33) (103/87 - 103/87)  BP(mean): --  RR: --  SpO2: --    Orthostatic VS  11-17-23 @ 08:17  Lying BP: --/-- HR: --  Sitting BP: 137/84 HR: 100  Standing BP: 128/85 HR: 120  Site: --  Mode: --  Orthostatic VS  11-16-23 @ 08:23  Lying BP: --/-- HR: --  Sitting BP: 117/56 HR: 89  Standing BP: 111/66 HR: 100  Site: --  Mode: --  Orthostatic VS  11-15-23 @ 20:25  Lying BP: --/-- HR: --  Sitting BP: 136/82 HR: 91  Standing BP: 141/80 HR: 98  Site: --  Mode: --

## 2023-11-17 NOTE — BH INPATIENT PSYCHIATRY PROGRESS NOTE - MSE UNSTRUCTURED FT
Appearance: Dressed appropriately.  Behavior: Cooperative.  Calm.  Motor: No abnormal movements, no psychomotor slowing or activation.  Speech: Regular rate.  Mood: Low  Affect: Depressed.  Thought Process: Linear.  Associations: Fair.  Thought Content: AH and SI.   Insight: Limited.  Judgment: Fair on interview.  Attention: Fair.  Language: Fluent.  Gait: Intact.

## 2023-11-17 NOTE — BH INPATIENT PSYCHIATRY PROGRESS NOTE - NSBHASSESSSUMMFT_PSY_ALL_CORE
Patient is 27yo single woman, no dependents, domiciled with her Grandmother, unemployed on disability/SSI, pphx of schizoaffective disorder, multiple past psychiatric admissions including state and recent discharge from [facility], in [facility], with pmhx of asthma, HTN, GERD, and hypothyroidism and schizoaffective disorder, in tx  with [facility], who self presented to the ED reporting abdominal pain and requesting readmission to psychiatric unit, reporting CAH, depressive symptoms, IOR, suicidality, admitted to [facility] for psychiatric care.  Depression and psychosis likely multifactorial at this time, schizoaffective vs. borderline personality disorder vs. complex grief vs. adjustment disorder.      Continues to report depression, anxiety, psychosis, and SI.    1.) Obs: Continue CO 1:1.  2.) Psychiatric medication management:   - Clozapine 200mg qHS for psychosis ([id number])  - Prazosin 2mg qhs, monitor BP carefully.  - Abilify Maintena 400mg IM q3 weeks rather than 4, per collateral from ACT team. Last dose on 10/19, is overdue, will give today.  - Discontinue mirtazapine 7.5mg PO qHS, per collateral from ACT team  - Increase escitalopram to 15 mg daily tomorow.  - Gabapentin 300 mg BID for anxiety/chronic pain.  - PRN: haloperidol 5mg PO/IM q8hrs PRN for agitation, diphenhydramine 50mg PO/IM q6hrs PRN for EPS PPx, hydroxyzine 50mg PO q12hrs PRN for anxiety, lorazepam 2mg PO q8hrs PRN for anxiety or 2mg IM for assaultive behavior. Melatonin 5mg qHS PRN for insomnia  - Discussed ECT several times with pt who states she has been told in two hospitals in past that due to her obesity and asthma, she is not an ideal candidate.  Pt states that due to this she is not interested in receiving ECT at this time.  3.) Medical:   - Pt scheduled for dental extractions 11/1, however declined to attend on day prior when informed it would not be under general anesthesia.  - last clozapine labs on 10/23 (CBC with diff, troponins, CRP); CRP elevated at 15.8 likely due to dental infection/wisdom tooth issues  - pantoprazole 40mg PO before breakfast for GERD  - montelukast 10mg PO qHS for asthma  - budesonide 80mcg/formoterol 4.5mcg 2 puffs BID for asthma  - PRN: acetaminophen 650mg PO q6hrs PRN for pain, albuterol 90mcg 2puffs q6hrs PRN for dyspnea, ondansetron 4mg q6hrs PRN for nausea  - Discussed CRP with medicine service, given asymptomatic pt and normal troponin, no additional intervention/diagnostics at this time.  - Appreciate medicine reccs for rash.  4.) Dispo planning: State application submitted.

## 2023-11-17 NOTE — ED BEHAVIORAL HEALTH ASSESSMENT NOTE - FAMILY DETAILS
Behavioral Health Transition Record to Provider    Patient Name: Leonard Granados  YOB: 1979  Medical Record Number: [id number]  Date of Admission: 11/12/2023  Date of Discharge: 11/17/2023    Attending Provider: Cheryle Record, MD  Discharging Provider: Britni Conklin MD  To contact this individual call [phone] and ask the  to page. If unavailable, ask to be transferred to [facility] Provider on call. [facility] Provider will be available on call 24/7 and during holidays.     Primary Care Provider: None, None    Allergies   Allergen Reactions    Metoclopramide Palpitations, Hives, Itching and Shortness Of Breath     \"Panic attacks\"  reglan  \"Panic attacks\"  reglan      Penicillins Rash, Hives, Itching and Other (See Comments)     Other reaction(s): Unknown (comments)       Reason for Admission: See H&P Note     Admission Diagnosis: Paranoid schizophrenia ([address]) [F20.0]  MDD (major depressive disorder), severe ([address]) [F32.2]  Psychosis, unspecified psychosis type ([address]) [F29]    * No surgery found *    Results for orders placed or performed during the hospital encounter of 11/12/23   COVID-19 & Influenza Combo    Specimen: Nasopharyngeal   Result Value Ref Range    SARS-CoV-2, PCR Not Detected Not Detected      Rapid Influenza A By PCR Not Detected Not Detected      Rapid Influenza B By PCR Not Detected Not Detected     Urinalysis with Microscopic   Result Value Ref Range    Color, UA Yellow/Straw      Appearance Clear Clear      Specific Gravity, UA 1.025 1.003 - 1.030      pH, Urine 6.0 5.0 - 8.0      Protein, UA 30 (A) Negative mg/dL    Glucose, UA Negative Negative mg/dL    Ketones, Urine >80 (A) Negative mg/dL    Bilirubin Urine Positive (A) Negative      Blood, Urine Moderate (A) Negative      Urobilinogen, Urine 1.0 0.2 - 1.0 EU/dL    Nitrite, Urine Negative Negative      Leukocyte Esterase, Urine Trace (A) Negative      Bilirubin Confirmation, UA Instructions: Please continue all medications until otherwise directed by physician. Yes    Tobacco Cessation Discharge Plan:   Is the patient a smoker and needs referral for smoking cessation? Yes Pt declined  Patient referred to the following for smoking cessation with an appointment? no  Patient was offered medication to assist with smoking cessation at discharge? YES  Was education for smoking cessation added to the discharge instructions? YES    Alcohol/Substance Abuse Discharge Plan:   Does the patient have a history of substance/alcohol abuse and requires a referral for treatment? No  Patient referred to the following for substance/alcohol abuse treatment with an appointment? N/A  Patient was offered medication to assist with alcohol cessation at discharge? no  Was education for substance/alcohol abuse added to discharge instructions? Yes Pt already on Methadone for opiate cessation and is being followed. Patient Transition Record Discussed with Patient and copy given to patient? Yes    Patient discharged to Home, pt is discharge to her sister and brother in law in good condition. Denies suicidal and homicidal thoughts. Discharge paperwork faxed to [facility] with return confirmation received. Pt understands to pickup meds from [facility]. Declined flu shot states she does not take this vaccine. States she will continue follow up [facility] for Methadone treatment. Grandmother

## 2023-11-17 NOTE — BH INPATIENT PSYCHIATRY PROGRESS NOTE - NSBHFUPINTERVALHXFT_PSY_A_CORE
Pt states that she felt dizzy last night.  Pt also reports voices and suicidal thoughts continue and are unchanged.  Mood is still low.  No nightmares last night.

## 2023-11-17 NOTE — BH PSYCHOLOGY - GROUP THERAPY NOTE - NSBHPSYCHOLRESPCOMMENT_PSY_A_CORE FT
The patient appeared adequately groomed and casually dressed. Pt appeared very engaged in the group as evidenced by her willingness to independently verbally communicate during the discussion. Pt volunteered to read aloud from worksheet. Pt reflected on jumping to conclusions often, “making assumptions” about what others think of her based on past experiences. Pt shared that these conclusions can be wrong and that asking what others are thinking/feeling directly is more helpful. Pt expressed agreement with much of what other members shared. Speech WNL. PT was oriented X3. Pt was appropriate and supportive with peers.

## 2023-11-17 NOTE — BH INPATIENT PSYCHIATRY PROGRESS NOTE - CURRENT MEDICATION
MEDICATIONS  (STANDING):  budesonide  80 MICROgram(s)/formoterol 4.5 MICROgram(s) Inhaler 2 Puff(s) Inhalation two times a day  clotrimazole/betamethasone Cream 1 Application(s) Topical two times a day  cloZAPine 200 milliGRAM(s) Oral at bedtime  gabapentin 300 milliGRAM(s) Oral two times a day  influenza   Vaccine 0.5 milliLiter(s) IntraMuscular once  lidocaine   4% Patch 1 Patch Transdermal daily  montelukast 10 milliGRAM(s) Oral at bedtime  pantoprazole    Tablet 40 milliGRAM(s) Oral before breakfast  prazosin. 2 milliGRAM(s) Oral at bedtime    MEDICATIONS  (PRN):  acetaminophen     Tablet .. 650 milliGRAM(s) Oral every 6 hours PRN Moderate Pain (4 - 6)  albuterol    90 MICROgram(s) HFA Inhaler 2 Puff(s) Inhalation every 6 hours PRN Shortness of Breath and/or Wheezing  aluminum hydroxide/magnesium hydroxide/simethicone Suspension 30 milliLiter(s) Oral every 4 hours PRN Dyspepsia  benzocaine 20% Spray 1 Spray(s) Topical every 6 hours PRN tooth pain  chlorproMAZINE    Injectable 50 milliGRAM(s) IntraMuscular once PRN severe agitation  chlorproMAZINE    Tablet 50 milliGRAM(s) Oral every 6 hours PRN agitation  hydrOXYzine hydrochloride 50 milliGRAM(s) Oral every 12 hours PRN Anxiety  ibuprofen  Tablet. 400 milliGRAM(s) Oral every 6 hours PRN Moderate Pain (4 - 6), Severe Pain (7 - 10)  LORazepam     Tablet 2 milliGRAM(s) Oral every 8 hours PRN Anxiety  LORazepam   Injectable 2 milliGRAM(s) IntraMuscular once PRN Assaultive behavior  melatonin. 5 milliGRAM(s) Oral at bedtime PRN Insomnia  ondansetron    Tablet 4 milliGRAM(s) Oral every 6 hours PRN nausea  polyethylene glycol 3350 17 Gram(s) Oral daily PRN Constipation

## 2023-11-18 RX ADMIN — ESCITALOPRAM OXALATE 15 MILLIGRAM(S): 10 TABLET, FILM COATED ORAL at 08:39

## 2023-11-18 RX ADMIN — CLOZAPINE 200 MILLIGRAM(S): 150 TABLET, ORALLY DISINTEGRATING ORAL at 20:50

## 2023-11-18 RX ADMIN — Medication 2 MILLIGRAM(S): at 20:50

## 2023-11-18 RX ADMIN — PANTOPRAZOLE SODIUM 40 MILLIGRAM(S): 20 TABLET, DELAYED RELEASE ORAL at 08:39

## 2023-11-18 RX ADMIN — GABAPENTIN 300 MILLIGRAM(S): 400 CAPSULE ORAL at 20:50

## 2023-11-18 RX ADMIN — BUDESONIDE AND FORMOTEROL FUMARATE DIHYDRATE 2 PUFF(S): 160; 4.5 AEROSOL RESPIRATORY (INHALATION) at 08:39

## 2023-11-18 RX ADMIN — GABAPENTIN 300 MILLIGRAM(S): 400 CAPSULE ORAL at 08:39

## 2023-11-18 RX ADMIN — Medication 50 MILLIGRAM(S): at 14:13

## 2023-11-18 RX ADMIN — LITHIUM CARBONATE 300 MILLIGRAM(S): 300 TABLET, EXTENDED RELEASE ORAL at 20:50

## 2023-11-18 RX ADMIN — MONTELUKAST 10 MILLIGRAM(S): 4 TABLET, CHEWABLE ORAL at 20:50

## 2023-11-19 PROCEDURE — 99232 SBSQ HOSP IP/OBS MODERATE 35: CPT

## 2023-11-19 RX ADMIN — Medication 50 MILLIGRAM(S): at 14:11

## 2023-11-19 RX ADMIN — BUDESONIDE AND FORMOTEROL FUMARATE DIHYDRATE 2 PUFF(S): 160; 4.5 AEROSOL RESPIRATORY (INHALATION) at 20:42

## 2023-11-19 RX ADMIN — CLOZAPINE 200 MILLIGRAM(S): 150 TABLET, ORALLY DISINTEGRATING ORAL at 20:42

## 2023-11-19 RX ADMIN — GABAPENTIN 300 MILLIGRAM(S): 400 CAPSULE ORAL at 20:42

## 2023-11-19 RX ADMIN — LITHIUM CARBONATE 300 MILLIGRAM(S): 300 TABLET, EXTENDED RELEASE ORAL at 20:42

## 2023-11-19 RX ADMIN — GABAPENTIN 300 MILLIGRAM(S): 400 CAPSULE ORAL at 09:06

## 2023-11-19 RX ADMIN — Medication 2 MILLIGRAM(S): at 20:42

## 2023-11-19 RX ADMIN — Medication 5 MILLIGRAM(S): at 21:04

## 2023-11-19 RX ADMIN — MONTELUKAST 10 MILLIGRAM(S): 4 TABLET, CHEWABLE ORAL at 20:42

## 2023-11-19 RX ADMIN — ESCITALOPRAM OXALATE 15 MILLIGRAM(S): 10 TABLET, FILM COATED ORAL at 09:06

## 2023-11-19 RX ADMIN — PANTOPRAZOLE SODIUM 40 MILLIGRAM(S): 20 TABLET, DELAYED RELEASE ORAL at 09:06

## 2023-11-19 NOTE — BH INPATIENT PSYCHIATRY PROGRESS NOTE - NSBHMETABOLIC_PSY_ALL_CORE_FT
BMI: BMI (kg/m2): 57.8 (09-14-23 @ 14:30)  HbA1c: A1C with Estimated Average Glucose Result: 5.2 % (11-01-23 @ 09:04)    Glucose:   BP: 113/61 (11-18-23 @ 08:00) (103/87 - 113/61)  Lipid Panel: Date/Time: 11-01-23 @ 09:04  Cholesterol, Serum: 156  Direct LDL: --  HDL Cholesterol, Serum: 58  Total Cholesterol/HDL Ration Measurement: --  Triglycerides, Serum: 79

## 2023-11-19 NOTE — BH INPATIENT PSYCHIATRY PROGRESS NOTE - NSBHASSESSSUMMFT_PSY_ALL_CORE
Patient is 27yo single woman, no dependents, domiciled with her Grandmother, unemployed on disability/SSI, pphx of schizoaffective disorder, multiple past psychiatric admissions including state and recent discharge from [facility], [facility], with pmhx of asthma, HTN, GERD, and hypothyroidism and schizoaffective disorder, in tx  with [facility], who self presented to the ED reporting abdominal pain and requesting readmission to psychiatric unit, reporting CAH, depressive symptoms, IOR, suicidality, admitted to [facility] for psychiatric care.  Depression and psychosis likely multifactorial at this time, schizoaffective vs. borderline personality disorder vs. complex grief vs. adjustment disorder.      Continues to report depression, anxiety, psychosis, and SI.    1.) Obs: Continue CO 1:1.  2.) Psychiatric medication management:   - Clozapine 200mg qHS for psychosis ([id number])  - Prazosin 2mg qhs, monitor BP carefully.  - Abilify Maintena 400mg IM q3 weeks rather than 4, per collateral from ACT team. Last dose on 10/19, is overdue, will give today.  - Discontinue mirtazapine 7.5mg PO qHS, per collateral from ACT team  - Increase escitalopram to 15 mg daily tomorow.  - Gabapentin 300 mg BID for anxiety/chronic pain.  - PRN: haloperidol 5mg PO/IM q8hrs PRN for agitation, diphenhydramine 50mg PO/IM q6hrs PRN for EPS PPx, hydroxyzine 50mg PO q12hrs PRN for anxiety, lorazepam 2mg PO q8hrs PRN for anxiety or 2mg IM for assaultive behavior. Melatonin 5mg qHS PRN for insomnia  - Discussed ECT several times with pt who states she has been told in two hospitals in past that due to her obesity and asthma, she is not an ideal candidate.  Pt states that due to this she is not interested in receiving ECT at this time.  3.) Medical:   - Pt scheduled for dental extractions 11/1, however declined to attend on day prior when informed it would not be under general anesthesia.  - last clozapine labs on 10/23 (CBC with diff, troponins, CRP); CRP elevated at 15.8 likely due to dental infection/wisdom tooth issues  - pantoprazole 40mg PO before breakfast for GERD  - montelukast 10mg PO qHS for asthma  - budesonide 80mcg/formoterol 4.5mcg 2 puffs BID for asthma  - PRN: acetaminophen 650mg PO q6hrs PRN for pain, albuterol 90mcg 2puffs q6hrs PRN for dyspnea, ondansetron 4mg q6hrs PRN for nausea  - Discussed CRP with medicine service, given asymptomatic pt and normal troponin, no additional intervention/diagnostics at this time.  - Appreciate medicine reccs for rash.  4.) Dispo planning: State application submitted.

## 2023-11-19 NOTE — BH INPATIENT PSYCHIATRY PROGRESS NOTE - PRN MEDS
MEDICATIONS  (PRN):  acetaminophen     Tablet .. 650 milliGRAM(s) Oral every 6 hours PRN Moderate Pain (4 - 6)  albuterol    90 MICROgram(s) HFA Inhaler 2 Puff(s) Inhalation every 6 hours PRN Shortness of Breath and/or Wheezing  aluminum hydroxide/magnesium hydroxide/simethicone Suspension 30 milliLiter(s) Oral every 4 hours PRN Dyspepsia  benzocaine 20% Spray 1 Spray(s) Topical every 6 hours PRN tooth pain  chlorproMAZINE    Injectable 50 milliGRAM(s) IntraMuscular once PRN severe agitation  chlorproMAZINE    Tablet 50 milliGRAM(s) Oral every 6 hours PRN agitation  hydrOXYzine hydrochloride 50 milliGRAM(s) Oral every 12 hours PRN Anxiety  LORazepam     Tablet 2 milliGRAM(s) Oral every 8 hours PRN Anxiety  LORazepam   Injectable 2 milliGRAM(s) IntraMuscular once PRN Assaultive behavior  melatonin. 5 milliGRAM(s) Oral at bedtime PRN Insomnia  ondansetron    Tablet 4 milliGRAM(s) Oral every 6 hours PRN nausea  polyethylene glycol 3350 17 Gram(s) Oral daily PRN Constipation

## 2023-11-19 NOTE — BH INPATIENT PSYCHIATRY PROGRESS NOTE - NSBHCHARTREVIEWVS_PSY_A_CORE FT
Vital Signs Last 24 Hrs  T(C): 36.8 (11-19-23 @ 08:59), Max: 36.9 (11-18-23 @ 20:38)  T(F): 98.2 (11-19-23 @ 08:59), Max: 98.4 (11-18-23 @ 20:38)  HR: --  BP: --  BP(mean): --  RR: 20 (11-18-23 @ 20:38) (20 - 20)  SpO2: 100% (11-18-23 @ 20:38) (100% - 100%)    Orthostatic VS  11-19-23 @ 08:59  Lying BP: --/-- HR: --  Sitting BP: 114/75 HR: 80  Standing BP: 131/81 HR: 71  Site: --  Mode: --  Orthostatic VS  11-18-23 @ 20:38  Lying BP: --/-- HR: --  Sitting BP: 133/82 HR: 95  Standing BP: 135/86 HR: 100  Site: --  Mode: --

## 2023-11-19 NOTE — BH INPATIENT PSYCHIATRY PROGRESS NOTE - NSBHFUPINTERVALHXFT_PSY_A_CORE
Pt states that she continues to have thoughts about wanting to drink soap to harm herself.  She says that she is accepting of going to [facility] in the [place] because she believes that this is best for her.  She is feeling very depressed and trying to use distraction as a way to help herself but not usually able to do this.   Pt also reports voices and suicidal thoughts continue and are unchanged.  Mood is still low.  No nightmares last night.

## 2023-11-19 NOTE — BH INPATIENT PSYCHIATRY PROGRESS NOTE - CURRENT MEDICATION
MEDICATIONS  (STANDING):  budesonide  80 MICROgram(s)/formoterol 4.5 MICROgram(s) Inhaler 2 Puff(s) Inhalation two times a day  clotrimazole/betamethasone Cream 1 Application(s) Topical two times a day  cloZAPine 200 milliGRAM(s) Oral at bedtime  escitalopram 15 milliGRAM(s) Oral daily  gabapentin 300 milliGRAM(s) Oral two times a day  influenza   Vaccine 0.5 milliLiter(s) IntraMuscular once  lidocaine   4% Patch 1 Patch Transdermal daily  lithium SR (LITHOBID) 300 milliGRAM(s) Oral at bedtime  montelukast 10 milliGRAM(s) Oral at bedtime  pantoprazole    Tablet 40 milliGRAM(s) Oral before breakfast  prazosin. 2 milliGRAM(s) Oral at bedtime    MEDICATIONS  (PRN):  acetaminophen     Tablet .. 650 milliGRAM(s) Oral every 6 hours PRN Moderate Pain (4 - 6)  albuterol    90 MICROgram(s) HFA Inhaler 2 Puff(s) Inhalation every 6 hours PRN Shortness of Breath and/or Wheezing  aluminum hydroxide/magnesium hydroxide/simethicone Suspension 30 milliLiter(s) Oral every 4 hours PRN Dyspepsia  benzocaine 20% Spray 1 Spray(s) Topical every 6 hours PRN tooth pain  chlorproMAZINE    Injectable 50 milliGRAM(s) IntraMuscular once PRN severe agitation  chlorproMAZINE    Tablet 50 milliGRAM(s) Oral every 6 hours PRN agitation  hydrOXYzine hydrochloride 50 milliGRAM(s) Oral every 12 hours PRN Anxiety  LORazepam     Tablet 2 milliGRAM(s) Oral every 8 hours PRN Anxiety  LORazepam   Injectable 2 milliGRAM(s) IntraMuscular once PRN Assaultive behavior  melatonin. 5 milliGRAM(s) Oral at bedtime PRN Insomnia  ondansetron    Tablet 4 milliGRAM(s) Oral every 6 hours PRN nausea  polyethylene glycol 3350 17 Gram(s) Oral daily PRN Constipation

## 2023-11-20 PROCEDURE — 99232 SBSQ HOSP IP/OBS MODERATE 35: CPT

## 2023-11-20 RX ORDER — LITHIUM CARBONATE 300 MG/1
600 TABLET, EXTENDED RELEASE ORAL AT BEDTIME
Refills: 0 | Status: DISCONTINUED | OUTPATIENT
Start: 2023-11-20 | End: 2023-11-21

## 2023-11-20 RX ADMIN — MONTELUKAST 10 MILLIGRAM(S): 4 TABLET, CHEWABLE ORAL at 20:49

## 2023-11-20 RX ADMIN — PANTOPRAZOLE SODIUM 40 MILLIGRAM(S): 20 TABLET, DELAYED RELEASE ORAL at 08:20

## 2023-11-20 RX ADMIN — GABAPENTIN 300 MILLIGRAM(S): 400 CAPSULE ORAL at 08:20

## 2023-11-20 RX ADMIN — Medication 2 MILLIGRAM(S): at 20:49

## 2023-11-20 RX ADMIN — BUDESONIDE AND FORMOTEROL FUMARATE DIHYDRATE 2 PUFF(S): 160; 4.5 AEROSOL RESPIRATORY (INHALATION) at 20:49

## 2023-11-20 RX ADMIN — LITHIUM CARBONATE 600 MILLIGRAM(S): 300 TABLET, EXTENDED RELEASE ORAL at 20:50

## 2023-11-20 RX ADMIN — Medication 5 MILLIGRAM(S): at 20:49

## 2023-11-20 RX ADMIN — CLOZAPINE 200 MILLIGRAM(S): 150 TABLET, ORALLY DISINTEGRATING ORAL at 20:49

## 2023-11-20 RX ADMIN — GABAPENTIN 300 MILLIGRAM(S): 400 CAPSULE ORAL at 20:49

## 2023-11-20 RX ADMIN — ESCITALOPRAM OXALATE 15 MILLIGRAM(S): 10 TABLET, FILM COATED ORAL at 08:20

## 2023-11-20 NOTE — BH INPATIENT PSYCHIATRY PROGRESS NOTE - MSE UNSTRUCTURED FT
Appearance: Dressed appropriately.  Behavior: Cooperative.  Calm.  Motor: No abnormal movements, no psychomotor slowing or activation.  Speech: Regular rate.  Mood: Mood is worse  Affect: Depressed.  Thought Process: Linear.  Associations: Fair.  Thought Content: AH and SI.   Insight: Limited.  Judgment: Fair on interview.  Attention: Fair.  Language: Fluent.  Gait: Intact.

## 2023-11-20 NOTE — BH INPATIENT PSYCHIATRY PROGRESS NOTE - NSBHFUPINTERVALHXFT_PSY_A_CORE
Pt states she continues to have suicidal thoughts and hearing voices, it is worse because she is having her period.

## 2023-11-20 NOTE — BH INPATIENT PSYCHIATRY PROGRESS NOTE - NSBHASSESSSUMMFT_PSY_ALL_CORE
Patient is 27yo single woman, no dependents, domiciled with her Grandmother, unemployed on disability/SSI, pphx of schizoaffective disorder, multiple past psychiatric admissions including state and recent discharge from [facility], [facility], with pmhx of asthma, HTN, GERD, and hypothyroidism and schizoaffective disorder, in tx  with [facility], who self presented to the ED reporting abdominal pain and requesting readmission to psychiatric unit, reporting CAH, depressive symptoms, IOR, suicidality, admitted to [facility] for psychiatric care.  Depression and psychosis likely multifactorial at this time, schizoaffective vs. borderline personality disorder vs. complex grief vs. adjustment disorder.      Continues to report depression, anxiety, psychosis, and SI.    1.) Obs: Continue CO 1:1.  2.) Psychiatric medication management:  - Lithobid increase to 600 mg QHS.  Pt aware that she needs to stay well hydrated and avoid diuretics and NSAIDs.   - Clozapine 200mg qHS for psychosis ([id number])  - Prazosin 2mg qhs, monitor BP carefully.  - Abilify Maintena 400mg IM q3 weeks rather than 4, per collateral from ACT team.  Doses given 10/19, 11/17.  - Mirtazapine was discontinued, Escitalopram started and titrated to 15 mg daily.  - Gabapentin 300 mg BID for anxiety/chronic pain.  - PRN: haloperidol 5mg PO/IM q8hrs PRN for agitation, diphenhydramine 50mg PO/IM q6hrs PRN for EPS PPx, hydroxyzine 50mg PO q12hrs PRN for anxiety, lorazepam 2mg PO q8hrs PRN for anxiety or 2mg IM for assaultive behavior. Melatonin 5mg qHS PRN for insomnia  - Discussed ECT several times with pt who states she has been told in two hospitals in past that due to her obesity and asthma, she is not an ideal candidate.  Pt states that due to this she is not interested in receiving ECT at this time.  3.) Medical:   - Pt scheduled for dental extractions 11/1, however declined to attend on day prior when informed it would not be under general anesthesia.  - last clozapine labs on 10/23 (CBC with diff, troponins, CRP); CRP elevated at 15.8 likely due to dental infection/wisdom tooth issues  - pantoprazole 40mg PO before breakfast for GERD  - montelukast 10mg PO qHS for asthma  - budesonide 80mcg/formoterol 4.5mcg 2 puffs BID for asthma  - PRN: acetaminophen 650mg PO q6hrs PRN for pain, albuterol 90mcg 2puffs q6hrs PRN for dyspnea, ondansetron 4mg q6hrs PRN for nausea  - Discussed CRP with medicine service, given asymptomatic pt and normal troponin, no additional intervention/diagnostics at this time.  - Discontinued clobetasol as rash has resolved.  4.) Dispo planning: State application submitted.

## 2023-11-20 NOTE — BH INPATIENT PSYCHIATRY PROGRESS NOTE - NSBHCHARTREVIEWVS_PSY_A_CORE FT
Vital Signs Last 24 Hrs  T(C): 36.8 (11-20-23 @ 09:18), Max: 36.8 (11-20-23 @ 09:18)  T(F): 98.2 (11-20-23 @ 09:18), Max: 98.2 (11-20-23 @ 09:18)  HR: --  BP: --  BP(mean): --  RR: --  SpO2: --    Orthostatic VS  11-20-23 @ 09:18  Lying BP: --/-- HR: --  Sitting BP: 150/78 HR: 91  Standing BP: 128/83 HR: 114  Site: --  Mode: --  Orthostatic VS  11-19-23 @ 20:54  Lying BP: --/-- HR: --  Sitting BP: 134/65 HR: 80  Standing BP: --/-- HR: --  Site: --  Mode: --  Orthostatic VS  11-19-23 @ 08:59  Lying BP: --/-- HR: --  Sitting BP: 114/75 HR: 80  Standing BP: 131/81 HR: 71  Site: --  Mode: --  Orthostatic VS  11-18-23 @ 20:38  Lying BP: --/-- HR: --  Sitting BP: 133/82 HR: 95  Standing BP: 135/86 HR: 100  Site: --  Mode: --

## 2023-11-20 NOTE — BH INPATIENT PSYCHIATRY PROGRESS NOTE - NSBHMETABOLIC_PSY_ALL_CORE_FT
BMI: BMI (kg/m2): 57.8 (09-14-23 @ 14:30)  HbA1c: A1C with Estimated Average Glucose Result: 5.2 % (11-01-23 @ 09:04)    Glucose:   BP: 113/61 (11-18-23 @ 08:00) (113/61 - 113/61)  Lipid Panel: Date/Time: 11-01-23 @ 09:04  Cholesterol, Serum: 156  Direct LDL: --  HDL Cholesterol, Serum: 58  Total Cholesterol/HDL Ration Measurement: --  Triglycerides, Serum: 79

## 2023-11-20 NOTE — BH INPATIENT PSYCHIATRY PROGRESS NOTE - CURRENT MEDICATION
MEDICATIONS  (STANDING):  budesonide  80 MICROgram(s)/formoterol 4.5 MICROgram(s) Inhaler 2 Puff(s) Inhalation two times a day  cloZAPine 200 milliGRAM(s) Oral at bedtime  escitalopram 15 milliGRAM(s) Oral daily  gabapentin 300 milliGRAM(s) Oral two times a day  influenza   Vaccine 0.5 milliLiter(s) IntraMuscular once  lidocaine   4% Patch 1 Patch Transdermal daily  lithium SR (LITHOBID) 600 milliGRAM(s) Oral at bedtime  montelukast 10 milliGRAM(s) Oral at bedtime  pantoprazole    Tablet 40 milliGRAM(s) Oral before breakfast  prazosin. 2 milliGRAM(s) Oral at bedtime    MEDICATIONS  (PRN):  acetaminophen     Tablet .. 650 milliGRAM(s) Oral every 6 hours PRN Moderate Pain (4 - 6)  albuterol    90 MICROgram(s) HFA Inhaler 2 Puff(s) Inhalation every 6 hours PRN Shortness of Breath and/or Wheezing  aluminum hydroxide/magnesium hydroxide/simethicone Suspension 30 milliLiter(s) Oral every 4 hours PRN Dyspepsia  benzocaine 20% Spray 1 Spray(s) Topical every 6 hours PRN tooth pain  chlorproMAZINE    Injectable 50 milliGRAM(s) IntraMuscular once PRN severe agitation  chlorproMAZINE    Tablet 50 milliGRAM(s) Oral every 6 hours PRN agitation  hydrOXYzine hydrochloride 50 milliGRAM(s) Oral every 12 hours PRN Anxiety  LORazepam     Tablet 2 milliGRAM(s) Oral every 8 hours PRN Anxiety  LORazepam   Injectable 2 milliGRAM(s) IntraMuscular once PRN Assaultive behavior  melatonin. 5 milliGRAM(s) Oral at bedtime PRN Insomnia  ondansetron    Tablet 4 milliGRAM(s) Oral every 6 hours PRN nausea  polyethylene glycol 3350 17 Gram(s) Oral daily PRN Constipation

## 2023-11-21 PROCEDURE — 90853 GROUP PSYCHOTHERAPY: CPT

## 2023-11-21 PROCEDURE — 99232 SBSQ HOSP IP/OBS MODERATE 35: CPT

## 2023-11-21 RX ORDER — LITHIUM CARBONATE 300 MG/1
900 TABLET, EXTENDED RELEASE ORAL AT BEDTIME
Refills: 0 | Status: DISCONTINUED | OUTPATIENT
Start: 2023-11-21 | End: 2023-11-27

## 2023-11-21 RX ADMIN — GABAPENTIN 300 MILLIGRAM(S): 400 CAPSULE ORAL at 20:32

## 2023-11-21 RX ADMIN — GABAPENTIN 300 MILLIGRAM(S): 400 CAPSULE ORAL at 09:20

## 2023-11-21 RX ADMIN — PANTOPRAZOLE SODIUM 40 MILLIGRAM(S): 20 TABLET, DELAYED RELEASE ORAL at 09:21

## 2023-11-21 RX ADMIN — BUDESONIDE AND FORMOTEROL FUMARATE DIHYDRATE 2 PUFF(S): 160; 4.5 AEROSOL RESPIRATORY (INHALATION) at 09:28

## 2023-11-21 RX ADMIN — CLOZAPINE 200 MILLIGRAM(S): 150 TABLET, ORALLY DISINTEGRATING ORAL at 20:32

## 2023-11-21 RX ADMIN — LITHIUM CARBONATE 900 MILLIGRAM(S): 300 TABLET, EXTENDED RELEASE ORAL at 20:33

## 2023-11-21 RX ADMIN — Medication 2 MILLIGRAM(S): at 20:33

## 2023-11-21 RX ADMIN — MONTELUKAST 10 MILLIGRAM(S): 4 TABLET, CHEWABLE ORAL at 20:33

## 2023-11-21 RX ADMIN — ESCITALOPRAM OXALATE 15 MILLIGRAM(S): 10 TABLET, FILM COATED ORAL at 09:20

## 2023-11-21 NOTE — BH INPATIENT PSYCHIATRY PROGRESS NOTE - NSBHCHARTREVIEWVS_PSY_A_CORE FT
Vital Signs Last 24 Hrs  T(C): 36.7 (11-21-23 @ 08:15), Max: 36.7 (11-21-23 @ 08:15)  T(F): 98 (11-21-23 @ 08:15), Max: 98 (11-21-23 @ 08:15)  HR: --  BP: --  BP(mean): --  RR: --  SpO2: --    Orthostatic VS  11-21-23 @ 08:15  Lying BP: --/-- HR: --  Sitting BP: 120/74 HR: 82  Standing BP: 131/65 HR: 73  Site: --  Mode: --  Orthostatic VS  11-20-23 @ 21:18  Lying BP: --/-- HR: --  Sitting BP: 115/76 HR: 89  Standing BP: 107/59 HR: 114  Site: --  Mode: --  Orthostatic VS  11-20-23 @ 09:18  Lying BP: --/-- HR: --  Sitting BP: 150/78 HR: 91  Standing BP: 128/83 HR: 114  Site: --  Mode: --  Orthostatic VS  11-19-23 @ 20:54  Lying BP: --/-- HR: --  Sitting BP: 134/65 HR: 80  Standing BP: --/-- HR: --  Site: --  Mode: --

## 2023-11-21 NOTE — BH PSYCHOLOGY - CLINICIAN PSYCHOTHERAPY NOTE - NSBHPSYCHOLINT_PSY_A_CORE
Cognitive/behavioral therapy/Dialectical  Behavioral Therapy (DBT)/Interpersonal Therapy (IPT)/Supported coping skills/Supportive therapy

## 2023-11-21 NOTE — BH INPATIENT PSYCHIATRY PROGRESS NOTE - NSBHASSESSSUMMFT_PSY_ALL_CORE
Patient is 27yo single woman, no dependents, domiciled with her Grandmother, unemployed on disability/SSI, pphx of schizoaffective disorder, multiple past psychiatric admissions including state and recent discharge from [facility], in [facility], with pmhx of asthma, HTN, GERD, and hypothyroidism and schizoaffective disorder, in tx  with [facility], who self presented to the ED reporting abdominal pain and requesting readmission to psychiatric unit, reporting CAH, depressive symptoms, IOR, suicidality, admitted to [facility] for psychiatric care.  Depression and psychosis likely multifactorial at this time, schizoaffective vs. borderline personality disorder vs. complex grief vs. adjustment disorder.      Continues to report depression, anxiety, psychosis, and SI.    1.) Obs: Continue CO 1:1.  2.) Psychiatric medication management:  - Lithobid increase to 900 mg QHS.  Pt aware that she needs to stay well hydrated and avoid diuretics and NSAIDs.   - Clozapine 200mg qHS for psychosis ([id number])  - Prazosin 2mg qhs, monitor BP carefully.  - Abilify Maintena 400mg IM q3 weeks rather than 4, per collateral from ACT team.  Doses given 10/19, 11/17.  - Mirtazapine was discontinued, Escitalopram started and titrated to 15 mg daily.  - Gabapentin 300 mg BID for anxiety/chronic pain.  - PRN: haloperidol 5mg PO/IM q8hrs PRN for agitation, diphenhydramine 50mg PO/IM q6hrs PRN for EPS PPx, hydroxyzine 50mg PO q12hrs PRN for anxiety, lorazepam 2mg PO q8hrs PRN for anxiety or 2mg IM for assaultive behavior. Melatonin 5mg qHS PRN for insomnia  - Discussed ECT several times with pt who states she has been told in two hospitals in past that due to her obesity and asthma, she is not an ideal candidate.  Pt states that due to this she is not interested in receiving ECT at this time.  3.) Medical:   - Pt scheduled for dental extractions 11/1, however declined to attend on day prior when informed it would not be under general anesthesia.  - last clozapine labs on 10/23 (CBC with diff, troponins, CRP); CRP elevated at 15.8 likely due to dental infection/wisdom tooth issues  - pantoprazole 40mg PO before breakfast for GERD  - montelukast 10mg PO qHS for asthma  - budesonide 80mcg/formoterol 4.5mcg 2 puffs BID for asthma  - PRN: acetaminophen 650mg PO q6hrs PRN for pain, albuterol 90mcg 2puffs q6hrs PRN for dyspnea, ondansetron 4mg q6hrs PRN for nausea  - Discussed CRP with medicine service, given asymptomatic pt and normal troponin, no additional intervention/diagnostics at this time.  - Discontinued clobetasol as rash has resolved.  4.) Dispo planning: State application submitted.

## 2023-11-21 NOTE — BH INPATIENT PSYCHIATRY PROGRESS NOTE - MSE UNSTRUCTURED FT
Appearance: Dressed appropriately.  Behavior: Cooperative.  Calm.  Motor: No abnormal movements, no psychomotor slowing or activation.  Speech: Regular rate.  Mood: Not good  Affect: Depressed.  Thought Process: Linear.  Associations: Fair.  Thought Content: AH and SI.   Insight: Limited.  Judgment: Fair on interview.  Attention: Fair.  Language: Fluent.  Gait: Intact.

## 2023-11-21 NOTE — BH INPATIENT PSYCHIATRY PROGRESS NOTE - NSBHMETABOLIC_PSY_ALL_CORE_FT
BMI: BMI (kg/m2): 57.8 (09-14-23 @ 14:30)  HbA1c: A1C with Estimated Average Glucose Result: 5.2 % (11-01-23 @ 09:04)    Glucose:   BP: --Vital Signs Last 24 Hrs  T(C): 36.7 (11-21-23 @ 08:15), Max: 36.7 (11-21-23 @ 08:15)  T(F): 98 (11-21-23 @ 08:15), Max: 98 (11-21-23 @ 08:15)  HR: --  BP: --  BP(mean): --  RR: --  SpO2: --    Orthostatic VS  11-21-23 @ 08:15  Lying BP: --/-- HR: --  Sitting BP: 120/74 HR: 82  Standing BP: 131/65 HR: 73  Site: --  Mode: --  Orthostatic VS  11-20-23 @ 21:18  Lying BP: --/-- HR: --  Sitting BP: 115/76 HR: 89  Standing BP: 107/59 HR: 114  Site: --  Mode: --  Orthostatic VS  11-20-23 @ 09:18  Lying BP: --/-- HR: --  Sitting BP: 150/78 HR: 91  Standing BP: 128/83 HR: 114  Site: --  Mode: --  Orthostatic VS  11-19-23 @ 20:54  Lying BP: --/-- HR: --  Sitting BP: 134/65 HR: 80  Standing BP: --/-- HR: --  Site: --  Mode: --    Lipid Panel: Date/Time: 11-01-23 @ 09:04  Cholesterol, Serum: 156  LDL Cholesterol Calculated: 82  HDL Cholesterol, Serum: 58  Total Cholesterol/HDL Ration Measurement: --  Triglycerides, Serum: 79

## 2023-11-21 NOTE — BH PSYCHOLOGY - GROUP THERAPY NOTE - NSBHPSYCHOLRESPCOMMENT_PSY_A_CORE FT
The patient appeared adequately groomed and casually dressed. Pt appeared engaged in the group as evidenced by their willingness to independently verbally communicate during the discussion, although often needed prompting. Pt shared that she typically sleeps a good enough amount of time, but sometimes struggles with being on her phone before falling asleep. Pt was oriented X3. Pt was appropriate with peers.

## 2023-11-21 NOTE — BH PSYCHOLOGY - CLINICIAN PSYCHOTHERAPY NOTE - NSBHPSYCHOLNARRATIVE_PSY_A_CORE FT
Writer met with Pt for 30-minute individual therapy session. Pt was alert and presented with adequate hygiene and somewhat unkempt grooming. Pt reported depressed mood, affect congruent. Pt denied experiencing any A/V hallucinations. Her thought process was linear and goal directed. Pts’ speech was WNL, content was relevant to discussion. Pt denied passive or active SI, HI or NSSI. Oriented x3. Limited insight and judgement demonstrated.      Pt discussed feeling more anxious, as well as depressed due to her menstrual period as well as feeling anxious about waiting for a response regarding state. Pt also discussed starting Lithium and her experience with being on medication, sharing it makes her feel tired but otherwise "more stable". Explored what it felt like for pt to be on and off medication. Pt and Wr explored anxiety further and did ACT expansion exercise. Pt talked grief related to her grandfather, made space for pt to process feelings and reflect on the day she found out, what made the relationship to grandfather special to her as well as what feeling talking about her childhood brought up. Did psychoed on grief, normalized and validated pts feelings. Writer met with Pt for 30-minute individual therapy session. Pt was alert and presented with adequate hygiene and somewhat unkempt grooming. Pt reported depressed mood, affect congruent. Pt denied experiencing any A/V hallucinations. Her thought process was linear and goal directed. Pts’ speech was WNL, content was relevant to discussion. Pt denied passive or active SI, HI or NSSI. Oriented x3. Limited insight and judgement demonstrated.      Pt discussed feeling more anxious, as well as depressed due to her menstrual period as well as feeling anxious about waiting for a response regarding state. Pt also discussed starting Lithium and her experience with being on medication, sharing that it makes her feel tired but otherwise "more stable". Explored what it felt like for pt to be on and off medication. Pt and Wr explored anxiety further and did ACT expansion exercise. Pt talked about grief related to her grandfather, made space for pt to process feelings and reflect on the day she found out, what made the relationship to grandfather special to her as well as what feelings talking about her childhood brought up. Provided psychoed on grief, normalized and validated pts feelings.

## 2023-11-21 NOTE — BH INPATIENT PSYCHIATRY PROGRESS NOTE - CURRENT MEDICATION
MEDICATIONS  (STANDING):  budesonide  80 MICROgram(s)/formoterol 4.5 MICROgram(s) Inhaler 2 Puff(s) Inhalation two times a day  cloZAPine 200 milliGRAM(s) Oral at bedtime  escitalopram 15 milliGRAM(s) Oral daily  gabapentin 300 milliGRAM(s) Oral two times a day  influenza   Vaccine 0.5 milliLiter(s) IntraMuscular once  lidocaine   4% Patch 1 Patch Transdermal daily  lithium SR (LITHOBID) 900 milliGRAM(s) Oral at bedtime  montelukast 10 milliGRAM(s) Oral at bedtime  pantoprazole    Tablet 40 milliGRAM(s) Oral before breakfast  prazosin. 2 milliGRAM(s) Oral at bedtime    MEDICATIONS  (PRN):  acetaminophen     Tablet .. 650 milliGRAM(s) Oral every 6 hours PRN Moderate Pain (4 - 6)  albuterol    90 MICROgram(s) HFA Inhaler 2 Puff(s) Inhalation every 6 hours PRN Shortness of Breath and/or Wheezing  aluminum hydroxide/magnesium hydroxide/simethicone Suspension 30 milliLiter(s) Oral every 4 hours PRN Dyspepsia  benzocaine 20% Spray 1 Spray(s) Topical every 6 hours PRN tooth pain  chlorproMAZINE    Injectable 50 milliGRAM(s) IntraMuscular once PRN severe agitation  chlorproMAZINE    Tablet 50 milliGRAM(s) Oral every 6 hours PRN agitation  hydrOXYzine hydrochloride 50 milliGRAM(s) Oral every 12 hours PRN Anxiety  LORazepam     Tablet 2 milliGRAM(s) Oral every 8 hours PRN Anxiety  LORazepam   Injectable 2 milliGRAM(s) IntraMuscular once PRN Assaultive behavior  melatonin. 5 milliGRAM(s) Oral at bedtime PRN Insomnia  ondansetron    Tablet 4 milliGRAM(s) Oral every 6 hours PRN nausea  polyethylene glycol 3350 17 Gram(s) Oral daily PRN Constipation

## 2023-11-21 NOTE — BH INPATIENT PSYCHIATRY PROGRESS NOTE - NSBHFUPINTERVALHXFT_PSY_A_CORE
Pt continues to have poor mood due to mixed signals from her boyfriend about their relationship.  Pt states voices and suicidality are strong this morning.

## 2023-11-22 LAB
BASOPHILS # BLD AUTO: 0.01 K/UL — SIGNIFICANT CHANGE UP (ref 0–0.2)
BASOPHILS # BLD AUTO: 0.01 K/UL — SIGNIFICANT CHANGE UP (ref 0–0.2)
BASOPHILS NFR BLD AUTO: 0.1 % — SIGNIFICANT CHANGE UP (ref 0–2)
BASOPHILS NFR BLD AUTO: 0.1 % — SIGNIFICANT CHANGE UP (ref 0–2)
CRP SERPL-MCNC: 19.8 MG/L — HIGH
CRP SERPL-MCNC: 19.8 MG/L — HIGH
EOSINOPHIL # BLD AUTO: 0 K/UL — SIGNIFICANT CHANGE UP (ref 0–0.5)
EOSINOPHIL # BLD AUTO: 0 K/UL — SIGNIFICANT CHANGE UP (ref 0–0.5)
EOSINOPHIL NFR BLD AUTO: 0 % — SIGNIFICANT CHANGE UP (ref 0–6)
EOSINOPHIL NFR BLD AUTO: 0 % — SIGNIFICANT CHANGE UP (ref 0–6)
HCT VFR BLD CALC: 41.9 % — SIGNIFICANT CHANGE UP (ref 34.5–45)
HCT VFR BLD CALC: 41.9 % — SIGNIFICANT CHANGE UP (ref 34.5–45)
HGB BLD-MCNC: 12.6 G/DL — SIGNIFICANT CHANGE UP (ref 11.5–15.5)
HGB BLD-MCNC: 12.6 G/DL — SIGNIFICANT CHANGE UP (ref 11.5–15.5)
IANC: 4.78 K/UL — SIGNIFICANT CHANGE UP (ref 1.8–7.4)
IANC: 4.78 K/UL — SIGNIFICANT CHANGE UP (ref 1.8–7.4)
IMM GRANULOCYTES NFR BLD AUTO: 0.3 % — SIGNIFICANT CHANGE UP (ref 0–0.9)
IMM GRANULOCYTES NFR BLD AUTO: 0.3 % — SIGNIFICANT CHANGE UP (ref 0–0.9)
LYMPHOCYTES # BLD AUTO: 2.07 K/UL — SIGNIFICANT CHANGE UP (ref 1–3.3)
LYMPHOCYTES # BLD AUTO: 2.07 K/UL — SIGNIFICANT CHANGE UP (ref 1–3.3)
LYMPHOCYTES # BLD AUTO: 28.1 % — SIGNIFICANT CHANGE UP (ref 13–44)
LYMPHOCYTES # BLD AUTO: 28.1 % — SIGNIFICANT CHANGE UP (ref 13–44)
MCHC RBC-ENTMCNC: 24.1 PG — LOW (ref 27–34)
MCHC RBC-ENTMCNC: 24.1 PG — LOW (ref 27–34)
MCHC RBC-ENTMCNC: 30.1 GM/DL — LOW (ref 32–36)
MCHC RBC-ENTMCNC: 30.1 GM/DL — LOW (ref 32–36)
MCV RBC AUTO: 80.3 FL — SIGNIFICANT CHANGE UP (ref 80–100)
MCV RBC AUTO: 80.3 FL — SIGNIFICANT CHANGE UP (ref 80–100)
MONOCYTES # BLD AUTO: 0.48 K/UL — SIGNIFICANT CHANGE UP (ref 0–0.9)
MONOCYTES # BLD AUTO: 0.48 K/UL — SIGNIFICANT CHANGE UP (ref 0–0.9)
MONOCYTES NFR BLD AUTO: 6.5 % — SIGNIFICANT CHANGE UP (ref 2–14)
MONOCYTES NFR BLD AUTO: 6.5 % — SIGNIFICANT CHANGE UP (ref 2–14)
NEUTROPHILS # BLD AUTO: 4.78 K/UL — SIGNIFICANT CHANGE UP (ref 1.8–7.4)
NEUTROPHILS # BLD AUTO: 4.78 K/UL — SIGNIFICANT CHANGE UP (ref 1.8–7.4)
NEUTROPHILS NFR BLD AUTO: 65 % — SIGNIFICANT CHANGE UP (ref 43–77)
NEUTROPHILS NFR BLD AUTO: 65 % — SIGNIFICANT CHANGE UP (ref 43–77)
NRBC # BLD: 0 /100 WBCS — SIGNIFICANT CHANGE UP (ref 0–0)
NRBC # BLD: 0 /100 WBCS — SIGNIFICANT CHANGE UP (ref 0–0)
NRBC # FLD: 0 K/UL — SIGNIFICANT CHANGE UP (ref 0–0)
NRBC # FLD: 0 K/UL — SIGNIFICANT CHANGE UP (ref 0–0)
PLATELET # BLD AUTO: 237 K/UL — SIGNIFICANT CHANGE UP (ref 150–400)
PLATELET # BLD AUTO: 237 K/UL — SIGNIFICANT CHANGE UP (ref 150–400)
RBC # BLD: 5.22 M/UL — HIGH (ref 3.8–5.2)
RBC # BLD: 5.22 M/UL — HIGH (ref 3.8–5.2)
RBC # FLD: 15.4 % — HIGH (ref 10.3–14.5)
RBC # FLD: 15.4 % — HIGH (ref 10.3–14.5)
TROPONIN T, HIGH SENSITIVITY RESULT: 8 NG/L — SIGNIFICANT CHANGE UP
TROPONIN T, HIGH SENSITIVITY RESULT: 8 NG/L — SIGNIFICANT CHANGE UP
WBC # BLD: 7.36 K/UL — SIGNIFICANT CHANGE UP (ref 3.8–10.5)
WBC # BLD: 7.36 K/UL — SIGNIFICANT CHANGE UP (ref 3.8–10.5)
WBC # FLD AUTO: 7.36 K/UL — SIGNIFICANT CHANGE UP (ref 3.8–10.5)
WBC # FLD AUTO: 7.36 K/UL — SIGNIFICANT CHANGE UP (ref 3.8–10.5)

## 2023-11-22 PROCEDURE — 99232 SBSQ HOSP IP/OBS MODERATE 35: CPT

## 2023-11-22 RX ORDER — ESCITALOPRAM OXALATE 10 MG/1
20 TABLET, FILM COATED ORAL DAILY
Refills: 0 | Status: DISCONTINUED | OUTPATIENT
Start: 2023-11-23 | End: 2024-02-06

## 2023-11-22 RX ADMIN — GABAPENTIN 300 MILLIGRAM(S): 400 CAPSULE ORAL at 20:23

## 2023-11-22 RX ADMIN — BUDESONIDE AND FORMOTEROL FUMARATE DIHYDRATE 2 PUFF(S): 160; 4.5 AEROSOL RESPIRATORY (INHALATION) at 08:38

## 2023-11-22 RX ADMIN — PANTOPRAZOLE SODIUM 40 MILLIGRAM(S): 20 TABLET, DELAYED RELEASE ORAL at 06:31

## 2023-11-22 RX ADMIN — BUDESONIDE AND FORMOTEROL FUMARATE DIHYDRATE 2 PUFF(S): 160; 4.5 AEROSOL RESPIRATORY (INHALATION) at 20:24

## 2023-11-22 RX ADMIN — CLOZAPINE 200 MILLIGRAM(S): 150 TABLET, ORALLY DISINTEGRATING ORAL at 20:23

## 2023-11-22 RX ADMIN — MONTELUKAST 10 MILLIGRAM(S): 4 TABLET, CHEWABLE ORAL at 20:23

## 2023-11-22 RX ADMIN — LITHIUM CARBONATE 900 MILLIGRAM(S): 300 TABLET, EXTENDED RELEASE ORAL at 20:23

## 2023-11-22 RX ADMIN — GABAPENTIN 300 MILLIGRAM(S): 400 CAPSULE ORAL at 08:37

## 2023-11-22 RX ADMIN — ESCITALOPRAM OXALATE 15 MILLIGRAM(S): 10 TABLET, FILM COATED ORAL at 08:37

## 2023-11-22 RX ADMIN — Medication 2 MILLIGRAM(S): at 20:23

## 2023-11-22 NOTE — BH INPATIENT PSYCHIATRY PROGRESS NOTE - MSE UNSTRUCTURED FT
Appearance: Dressed appropriately.  Behavior: Cooperative.  Calm.  Motor: No abnormal movements, no psychomotor slowing or activation.  Speech: Regular rate.  Mood: Depressed, irritable.  Affect: Depressed.  Thought Process: Linear.  Associations: Fair.  Thought Content: AH and SI.   Insight: Limited.  Judgment: Fair on interview.  Attention: Fair.  Language: Fluent.  Gait: Intact.

## 2023-11-22 NOTE — BH INPATIENT PSYCHIATRY PROGRESS NOTE - NSBHCHARTREVIEWVS_PSY_A_CORE FT
Vital Signs Last 24 Hrs  T(C): 36.6 (11-22-23 @ 08:53), Max: 37 (11-21-23 @ 20:46)  T(F): 97.8 (11-22-23 @ 08:53), Max: 98.6 (11-21-23 @ 20:46)  HR: --  BP: --  BP(mean): --  RR: --  SpO2: 98% (11-21-23 @ 20:46) (98% - 98%)    Orthostatic VS  11-22-23 @ 08:53  Lying BP: --/-- HR: --  Sitting BP: 141/93 HR: 89  Standing BP: 136/80 HR: 104  Site: --  Mode: --  Orthostatic VS  11-21-23 @ 20:46  Lying BP: --/-- HR: --  Sitting BP: 134/75 HR: 98  Standing BP: 135/78 HR: 107  Site: --  Mode: --  Orthostatic VS  11-21-23 @ 08:15  Lying BP: --/-- HR: --  Sitting BP: 120/74 HR: 82  Standing BP: 131/65 HR: 73  Site: --  Mode: --  Orthostatic VS  11-20-23 @ 21:18  Lying BP: --/-- HR: --  Sitting BP: 115/76 HR: 89  Standing BP: 107/59 HR: 114  Site: --  Mode: --

## 2023-11-22 NOTE — BH INPATIENT PSYCHIATRY PROGRESS NOTE - NSBHASSESSSUMMFT_PSY_ALL_CORE
Patient is 27yo single woman, no dependents, domiciled with her Grandmother, unemployed on disability/SSI, pphx of schizoaffective disorder, multiple past psychiatric admissions including state and recent discharge from [facility], [facility], with pmhx of asthma, HTN, GERD, and hypothyroidism and schizoaffective disorder, in tx  with [facility], who self presented to the ED reporting abdominal pain and requesting readmission to psychiatric unit, reporting CAH, depressive symptoms, IOR, suicidality, admitted to [facility] for psychiatric care.  Depression and psychosis likely multifactorial at this time, schizoaffective vs. borderline personality disorder vs. complex grief vs. adjustment disorder.      Continues to report depression, anxiety, psychosis, and SI.    1.) Obs: Continue CO 1:1.  2.) Psychiatric medication management:  - Lithobid 900 mg QHS.  Pt aware that she needs to stay well hydrated and avoid diuretics and NSAIDs.   - Clozapine 200mg qHS for psychosis ([id number])  - Prazosin 2mg qhs, monitor BP carefully.  - Abilify Maintena 400mg IM q3 weeks rather than 4, per collateral from ACT team.  Doses given 10/19, 11/17.  - Mirtazapine was switched to escitalopram, will increase to 20 mg daily tomorrow.  - Gabapentin 300 mg BID for anxiety/chronic pain.  - PRN: haloperidol 5mg PO/IM q8hrs PRN for agitation, diphenhydramine 50mg PO/IM q6hrs PRN for EPS PPx, hydroxyzine 50mg PO q12hrs PRN for anxiety, lorazepam 2mg PO q8hrs PRN for anxiety or 2mg IM for assaultive behavior. Melatonin 5mg qHS PRN for insomnia  - Discussed ECT several times with pt who states she has been told in two hospitals in past that due to her obesity and asthma, she is not an ideal candidate.  Pt states that due to this she is not interested in receiving ECT at this time.  3.) Medical:   - Pt scheduled for dental extractions 11/1, however declined to attend on day prior when informed it would not be under general anesthesia.  - last clozapine labs on 10/23 (CBC with diff, troponins, CRP); CRP elevated at 15.8 likely due to dental infection/wisdom tooth issues  - pantoprazole 40mg PO before breakfast for GERD  - montelukast 10mg PO qHS for asthma  - budesonide 80mcg/formoterol 4.5mcg 2 puffs BID for asthma  - PRN: acetaminophen 650mg PO q6hrs PRN for pain, albuterol 90mcg 2puffs q6hrs PRN for dyspnea, ondansetron 4mg q6hrs PRN for nausea  - Discussed CRP with medicine service, given asymptomatic pt and normal troponin, no additional intervention/diagnostics at this time.  - Discontinued clobetasol as rash has resolved.  4.) Dispo planning: State application submitted.

## 2023-11-22 NOTE — BH INPATIENT PSYCHIATRY PROGRESS NOTE - NSBHFUPINTERVALHXFT_PSY_A_CORE
Pt reports that she is feeling irritable because peer was laughing at her.  States this brought back memories of being bullied, which is making her more depressed.  States she still hears voices and has thoughts of suicide.

## 2023-11-22 NOTE — BH INPATIENT PSYCHIATRY PROGRESS NOTE - NSBHMETABOLIC_PSY_ALL_CORE_FT
BMI: BMI (kg/m2): 57.8 (09-14-23 @ 14:30)  HbA1c: A1C with Estimated Average Glucose Result: 5.2 % (11-01-23 @ 09:04)    Glucose:   BP: --Vital Signs Last 24 Hrs  T(C): 36.6 (11-22-23 @ 08:53), Max: 37 (11-21-23 @ 20:46)  T(F): 97.8 (11-22-23 @ 08:53), Max: 98.6 (11-21-23 @ 20:46)  HR: --  BP: --  BP(mean): --  RR: --  SpO2: 98% (11-21-23 @ 20:46) (98% - 98%)    Orthostatic VS  11-22-23 @ 08:53  Lying BP: --/-- HR: --  Sitting BP: 141/93 HR: 89  Standing BP: 136/80 HR: 104  Site: --  Mode: --  Orthostatic VS  11-21-23 @ 20:46  Lying BP: --/-- HR: --  Sitting BP: 134/75 HR: 98  Standing BP: 135/78 HR: 107  Site: --  Mode: --  Orthostatic VS  11-21-23 @ 08:15  Lying BP: --/-- HR: --  Sitting BP: 120/74 HR: 82  Standing BP: 131/65 HR: 73  Site: --  Mode: --  Orthostatic VS  11-20-23 @ 21:18  Lying BP: --/-- HR: --  Sitting BP: 115/76 HR: 89  Standing BP: 107/59 HR: 114  Site: --  Mode: --    Lipid Panel: Date/Time: 11-01-23 @ 09:04  Cholesterol, Serum: 156  LDL Cholesterol Calculated: 82  HDL Cholesterol, Serum: 58  Total Cholesterol/HDL Ration Measurement: --  Triglycerides, Serum: 79

## 2023-11-23 PROCEDURE — 99231 SBSQ HOSP IP/OBS SF/LOW 25: CPT

## 2023-11-23 RX ADMIN — Medication 2 MILLIGRAM(S): at 20:53

## 2023-11-23 RX ADMIN — ESCITALOPRAM OXALATE 20 MILLIGRAM(S): 10 TABLET, FILM COATED ORAL at 08:59

## 2023-11-23 RX ADMIN — GABAPENTIN 300 MILLIGRAM(S): 400 CAPSULE ORAL at 20:52

## 2023-11-23 RX ADMIN — BUDESONIDE AND FORMOTEROL FUMARATE DIHYDRATE 2 PUFF(S): 160; 4.5 AEROSOL RESPIRATORY (INHALATION) at 20:52

## 2023-11-23 RX ADMIN — MONTELUKAST 10 MILLIGRAM(S): 4 TABLET, CHEWABLE ORAL at 20:52

## 2023-11-23 RX ADMIN — LITHIUM CARBONATE 900 MILLIGRAM(S): 300 TABLET, EXTENDED RELEASE ORAL at 20:53

## 2023-11-23 RX ADMIN — BUDESONIDE AND FORMOTEROL FUMARATE DIHYDRATE 2 PUFF(S): 160; 4.5 AEROSOL RESPIRATORY (INHALATION) at 08:59

## 2023-11-23 RX ADMIN — PANTOPRAZOLE SODIUM 40 MILLIGRAM(S): 20 TABLET, DELAYED RELEASE ORAL at 08:59

## 2023-11-23 RX ADMIN — GABAPENTIN 300 MILLIGRAM(S): 400 CAPSULE ORAL at 08:59

## 2023-11-23 RX ADMIN — CLOZAPINE 200 MILLIGRAM(S): 150 TABLET, ORALLY DISINTEGRATING ORAL at 20:52

## 2023-11-23 NOTE — BH INPATIENT PSYCHIATRY PROGRESS NOTE - NSBHMETABOLIC_PSY_ALL_CORE_FT
BMI: BMI (kg/m2): 57.8 (09-14-23 @ 14:30)  HbA1c: A1C with Estimated Average Glucose Result: 5.2 % (11-01-23 @ 09:04)    Glucose:   BP: --Vital Signs Last 24 Hrs  T(C): 36.5 (11-23-23 @ 07:49), Max: 36.7 (11-22-23 @ 20:47)  T(F): 97.7 (11-23-23 @ 07:49), Max: 98 (11-22-23 @ 20:47)  HR: --  BP: --  BP(mean): --  RR: --  SpO2: --    Orthostatic VS  11-23-23 @ 07:49  Lying BP: --/-- HR: --  Sitting BP: 107/77 HR: 83  Standing BP: 110/80 HR: 89  Site: --  Mode: --  Orthostatic VS  11-22-23 @ 20:47  Lying BP: --/-- HR: --  Sitting BP: 103/50 HR: 120  Standing BP: 107/74 HR: 120  Site: --  Mode: --  Orthostatic VS  11-22-23 @ 08:53  Lying BP: --/-- HR: --  Sitting BP: 141/93 HR: 89  Standing BP: 136/80 HR: 104  Site: --  Mode: --  Orthostatic VS  11-21-23 @ 20:46  Lying BP: --/-- HR: --  Sitting BP: 134/75 HR: 98  Standing BP: 135/78 HR: 107  Site: --  Mode: --    Lipid Panel: Date/Time: 11-01-23 @ 09:04  Cholesterol, Serum: 156  LDL Cholesterol Calculated: 82  HDL Cholesterol, Serum: 58  Total Cholesterol/HDL Ration Measurement: --  Triglycerides, Serum: 79

## 2023-11-23 NOTE — BH INPATIENT PSYCHIATRY PROGRESS NOTE - NSBHASSESSSUMMFT_PSY_ALL_CORE
Continue below plan as per primary treatment team. CO renewed. Pt to restrict fluids this evening, but if this is ineffective for enuresis, may need to consider desmopressin.     Patient is 27yo single woman, no dependents, domiciled with her Grandmother, unemployed on disability/SSI, pphx of schizoaffective disorder, multiple past psychiatric admissions including state and recent discharge from [facility], [facility], with pmhx of asthma, HTN, GERD, and hypothyroidism and schizoaffective disorder, in tx  with [facility], who self presented to the ED reporting abdominal pain and requesting readmission to psychiatric unit, reporting CAH, depressive symptoms, IOR, suicidality, admitted to [facility] for psychiatric care.  Depression and psychosis likely multifactorial at this time, schizoaffective vs. borderline personality disorder vs. complex grief vs. adjustment disorder.      Continues to report depression, anxiety, psychosis, and SI.    1.) Obs: Continue CO 1:1.  2.) Psychiatric medication management:  - Lithobid 900 mg QHS.  Pt aware that she needs to stay well hydrated and avoid diuretics and NSAIDs.   - Clozapine 200mg qHS for psychosis ([id number])  - Prazosin 2mg qhs, monitor BP carefully.  - Abilify Maintena 400mg IM q3 weeks rather than 4, per collateral from ACT team.  Doses given 10/19, 11/17.  - Mirtazapine was switched to escitalopram, will increase to 20 mg daily tomorrow.  - Gabapentin 300 mg BID for anxiety/chronic pain.  - PRN: haloperidol 5mg PO/IM q8hrs PRN for agitation, diphenhydramine 50mg PO/IM q6hrs PRN for EPS PPx, hydroxyzine 50mg PO q12hrs PRN for anxiety, lorazepam 2mg PO q8hrs PRN for anxiety or 2mg IM for assaultive behavior. Melatonin 5mg qHS PRN for insomnia  - Discussed ECT several times with pt who states she has been told in two hospitals in past that due to her obesity and asthma, she is not an ideal candidate.  Pt states that due to this she is not interested in receiving ECT at this time.  3.) Medical:   - Pt scheduled for dental extractions 11/1, however declined to attend on day prior when informed it would not be under general anesthesia.  - last clozapine labs on 10/23 (CBC with diff, troponins, CRP); CRP elevated at 15.8 likely due to dental infection/wisdom tooth issues  - pantoprazole 40mg PO before breakfast for GERD  - montelukast 10mg PO qHS for asthma  - budesonide 80mcg/formoterol 4.5mcg 2 puffs BID for asthma  - PRN: acetaminophen 650mg PO q6hrs PRN for pain, albuterol 90mcg 2puffs q6hrs PRN for dyspnea, ondansetron 4mg q6hrs PRN for nausea  - Discussed CRP with medicine service, given asymptomatic pt and normal troponin, no additional intervention/diagnostics at this time.  - Discontinued clobetasol as rash has resolved.  4.) Dispo planning: State application submitted.

## 2023-11-23 NOTE — BH INPATIENT PSYCHIATRY PROGRESS NOTE - CURRENT MEDICATION
MEDICATIONS  (STANDING):  budesonide  80 MICROgram(s)/formoterol 4.5 MICROgram(s) Inhaler 2 Puff(s) Inhalation two times a day  cloZAPine 200 milliGRAM(s) Oral at bedtime  escitalopram 20 milliGRAM(s) Oral daily  gabapentin 300 milliGRAM(s) Oral two times a day  influenza   Vaccine 0.5 milliLiter(s) IntraMuscular once  lidocaine   4% Patch 1 Patch Transdermal daily  lithium SR (LITHOBID) 900 milliGRAM(s) Oral at bedtime  montelukast 10 milliGRAM(s) Oral at bedtime  pantoprazole    Tablet 40 milliGRAM(s) Oral before breakfast  prazosin. 2 milliGRAM(s) Oral at bedtime    MEDICATIONS  (PRN):  acetaminophen     Tablet .. 650 milliGRAM(s) Oral every 6 hours PRN Moderate Pain (4 - 6)  albuterol    90 MICROgram(s) HFA Inhaler 2 Puff(s) Inhalation every 6 hours PRN Shortness of Breath and/or Wheezing  aluminum hydroxide/magnesium hydroxide/simethicone Suspension 30 milliLiter(s) Oral every 4 hours PRN Dyspepsia  benzocaine 20% Spray 1 Spray(s) Topical every 6 hours PRN tooth pain  chlorproMAZINE    Injectable 50 milliGRAM(s) IntraMuscular once PRN severe agitation  chlorproMAZINE    Tablet 50 milliGRAM(s) Oral every 6 hours PRN agitation  hydrOXYzine hydrochloride 50 milliGRAM(s) Oral every 12 hours PRN Anxiety  LORazepam     Tablet 2 milliGRAM(s) Oral every 8 hours PRN Anxiety  LORazepam   Injectable 2 milliGRAM(s) IntraMuscular once PRN Assaultive behavior  melatonin. 5 milliGRAM(s) Oral at bedtime PRN Insomnia  ondansetron    Tablet 4 milliGRAM(s) Oral every 6 hours PRN nausea  polyethylene glycol 3350 17 Gram(s) Oral daily PRN Constipation

## 2023-11-23 NOTE — BH INPATIENT PSYCHIATRY PROGRESS NOTE - NSBHCHARTREVIEWVS_PSY_A_CORE FT
Vital Signs Last 24 Hrs  T(C): 36.5 (11-23-23 @ 07:49), Max: 36.7 (11-22-23 @ 20:47)  T(F): 97.7 (11-23-23 @ 07:49), Max: 98 (11-22-23 @ 20:47)  HR: --  BP: --  BP(mean): --  RR: --  SpO2: --    Orthostatic VS  11-23-23 @ 07:49  Lying BP: --/-- HR: --  Sitting BP: 107/77 HR: 83  Standing BP: 110/80 HR: 89  Site: --  Mode: --  Orthostatic VS  11-22-23 @ 20:47  Lying BP: --/-- HR: --  Sitting BP: 103/50 HR: 120  Standing BP: 107/74 HR: 120  Site: --  Mode: --  Orthostatic VS  11-22-23 @ 08:53  Lying BP: --/-- HR: --  Sitting BP: 141/93 HR: 89  Standing BP: 136/80 HR: 104  Site: --  Mode: --  Orthostatic VS  11-21-23 @ 20:46  Lying BP: --/-- HR: --  Sitting BP: 134/75 HR: 98  Standing BP: 135/78 HR: 107  Site: --  Mode: --

## 2023-11-23 NOTE — BH INPATIENT PSYCHIATRY PROGRESS NOTE - MSE UNSTRUCTURED FT
Appearance: Dressed appropriately.  Behavior: Cooperative.  Calm.  Motor: No abnormal movements, no psychomotor slowing or activation.  Speech: Regular rate.  Mood: Depressed  Affect: Depressed.  Thought Process: Linear.  Associations: Fair.  Thought Content: AH and SI.   Insight: Limited.  Judgment: Fair on interview.  Attention: Fair.  Language: Fluent.  Gait: Intact.

## 2023-11-23 NOTE — BH INPATIENT PSYCHIATRY PROGRESS NOTE - NSBHFUPINTERVALHXFT_PSY_A_CORE
Pt seen, chart reviewed, discussed with nursing staff. CO with patient.  Pt seen in the dayrooom, says that she feels "sad" today but feels safe on the unit. She slept well last night and says that the SI is "off and on," but she feels comfortable talking with the CO when it comes. Pt complains of one episode of enuresis last night. No problems with her meds otherwise.

## 2023-11-24 PROCEDURE — 99232 SBSQ HOSP IP/OBS MODERATE 35: CPT

## 2023-11-24 RX ORDER — DESMOPRESSIN ACETATE 0.1 MG/1
0.1 TABLET ORAL AT BEDTIME
Refills: 0 | Status: DISCONTINUED | OUTPATIENT
Start: 2023-11-24 | End: 2024-02-06

## 2023-11-24 RX ORDER — LIDOCAINE 4 G/100G
1 CREAM TOPICAL DAILY
Refills: 0 | Status: DISCONTINUED | OUTPATIENT
Start: 2023-11-24 | End: 2024-02-06

## 2023-11-24 RX ADMIN — Medication 50 MILLIGRAM(S): at 21:48

## 2023-11-24 RX ADMIN — GABAPENTIN 300 MILLIGRAM(S): 400 CAPSULE ORAL at 08:34

## 2023-11-24 RX ADMIN — ESCITALOPRAM OXALATE 20 MILLIGRAM(S): 10 TABLET, FILM COATED ORAL at 08:35

## 2023-11-24 RX ADMIN — MONTELUKAST 10 MILLIGRAM(S): 4 TABLET, CHEWABLE ORAL at 21:44

## 2023-11-24 RX ADMIN — BUDESONIDE AND FORMOTEROL FUMARATE DIHYDRATE 2 PUFF(S): 160; 4.5 AEROSOL RESPIRATORY (INHALATION) at 08:35

## 2023-11-24 RX ADMIN — Medication 50 MILLIGRAM(S): at 11:51

## 2023-11-24 RX ADMIN — Medication 5 MILLIGRAM(S): at 21:44

## 2023-11-24 RX ADMIN — LITHIUM CARBONATE 900 MILLIGRAM(S): 300 TABLET, EXTENDED RELEASE ORAL at 21:44

## 2023-11-24 RX ADMIN — PANTOPRAZOLE SODIUM 40 MILLIGRAM(S): 20 TABLET, DELAYED RELEASE ORAL at 08:34

## 2023-11-24 RX ADMIN — Medication 2 MILLIGRAM(S): at 21:44

## 2023-11-24 RX ADMIN — DESMOPRESSIN ACETATE 0.1 MILLIGRAM(S): 0.1 TABLET ORAL at 21:44

## 2023-11-24 RX ADMIN — CLOZAPINE 200 MILLIGRAM(S): 150 TABLET, ORALLY DISINTEGRATING ORAL at 21:44

## 2023-11-24 RX ADMIN — BUDESONIDE AND FORMOTEROL FUMARATE DIHYDRATE 2 PUFF(S): 160; 4.5 AEROSOL RESPIRATORY (INHALATION) at 21:49

## 2023-11-24 RX ADMIN — GABAPENTIN 300 MILLIGRAM(S): 400 CAPSULE ORAL at 21:44

## 2023-11-24 NOTE — BH INPATIENT PSYCHIATRY PROGRESS NOTE - NSBHCHARTREVIEWVS_PSY_A_CORE FT
Vital Signs Last 24 Hrs  T(C): 37.1 (11-24-23 @ 08:10), Max: 37.1 (11-24-23 @ 08:10)  T(F): 98.7 (11-24-23 @ 08:10), Max: 98.7 (11-24-23 @ 08:10)  HR: --  BP: --  BP(mean): --  RR: --  SpO2: --    Orthostatic VS  11-24-23 @ 08:10  Lying BP: --/-- HR: --  Sitting BP: 117/69 HR: 106  Standing BP: 102/68 HR: 120  Site: --  Mode: --  Orthostatic VS  11-23-23 @ 21:13  Lying BP: --/-- HR: --  Sitting BP: 109/72 HR: 92  Standing BP: 116/54 HR: 116  Site: --  Mode: --  Orthostatic VS  11-23-23 @ 07:49  Lying BP: --/-- HR: --  Sitting BP: 107/77 HR: 83  Standing BP: 110/80 HR: 89  Site: --  Mode: --  Orthostatic VS  11-22-23 @ 20:47  Lying BP: --/-- HR: --  Sitting BP: 103/50 HR: 120  Standing BP: 107/74 HR: 120  Site: --  Mode: --

## 2023-11-24 NOTE — BH INPATIENT PSYCHIATRY PROGRESS NOTE - NSBHASSESSSUMMFT_PSY_ALL_CORE
Continue below plan as per primary treatment team. CO renewed. Pt to restrict fluids this evening, but if this is ineffective for enuresis, may need to consider desmopressin.     Patient is 27yo single woman, no dependents, domiciled with her Grandmother, unemployed on disability/SSI, pphx of schizoaffective disorder, multiple past psychiatric admissions including state and recent discharge from [facility], in [facility] with [facility], with pmhx of asthma, HTN, GERD, and hypothyroidism and schizoaffective disorder, in tx  with [facility], who self presented to the ED reporting abdominal pain and requesting readmission to psychiatric unit, reporting CAH, depressive symptoms, IOR, suicidality, admitted to [facility] for psychiatric care.  Depression and psychosis likely multifactorial at this time, schizoaffective vs. borderline personality disorder vs. complex grief vs. adjustment disorder.      Continues to report depression and psychosis, with suicidality.  Now with nocturnal enuresis, likely clozapine induced.    1.) Obs: Continue CO 1:1.  2.) Psychiatric medication management:  - Reviewed options for addressing nocturnal enuresis including reducing clozapine vs reducing lithium vs adding desmopressin.  Pt decided upon desmopressin as she continues to have depression and psychosis.  Will add desmopressin 0.1 mg QHS.  - Lithobid 900 mg QHS.  Pt aware that she needs to stay well hydrated and avoid diuretics and NSAIDs.   - Clozapine 200mg qHS for psychosis ([id number])  - Prazosin 2mg qhs, monitor BP carefully.  - Abilify Maintena 400mg IM q3 weeks rather than 4, per collateral from ACT team.  Doses given 10/19, 11/17.  - Mirtazapine was switched to escitalopram, will increase to 20 mg daily tomorrow.  - Gabapentin 300 mg BID for anxiety/chronic pain.  - PRN: haloperidol 5mg PO/IM q8hrs PRN for agitation, diphenhydramine 50mg PO/IM q6hrs PRN for EPS PPx, hydroxyzine 50mg PO q12hrs PRN for anxiety, lorazepam 2mg PO q8hrs PRN for anxiety or 2mg IM for assaultive behavior. Melatonin 5mg qHS PRN for insomnia  - Discussed ECT several times with pt who states she has been told in two hospitals in past that due to her obesity and asthma, she is not an ideal candidate.  Pt states that due to this she is not interested in receiving ECT at this time.  3.) Medical:   - Pt scheduled for dental extractions 11/1, however declined to attend on day prior when informed it would not be under general anesthesia.  - last clozapine labs on 10/23 (CBC with diff, troponins, CRP); CRP elevated at 15.8 likely due to dental infection/wisdom tooth issues  - pantoprazole 40mg PO before breakfast for GERD  - montelukast 10mg PO qHS for asthma  - budesonide 80mcg/formoterol 4.5mcg 2 puffs BID for asthma  - PRN: acetaminophen 650mg PO q6hrs PRN for pain, albuterol 90mcg 2puffs q6hrs PRN for dyspnea, ondansetron 4mg q6hrs PRN for nausea  - Discussed CRP with medicine service, given asymptomatic pt and normal troponin, no additional intervention/diagnostics at this time.  - Discontinued clobetasol as rash has resolved.  4.) Dispo planning: State application submitted.

## 2023-11-24 NOTE — BH INPATIENT PSYCHIATRY PROGRESS NOTE - NSBHFUPINTERVALHXFT_PSY_A_CORE
Pt states visit on Wednesday night with grandmother went ok.  Pt reports Thanksgiving was difficult due to missing her grandfather.  Pt states she did not have nightmares, but on night before last, had bedwetting overnight.  States voices are still very intense.  Pt states suicidal thoughts are still present at night and in the morning.

## 2023-11-24 NOTE — BH TREATMENT PLAN - NSTXDCOTHRINTERSW_PSY_ALL_CORE
Writer will work to educate the pt on the state referral process and explore any other aftercare options that may be an appropriate alternative to [facility] hospital.

## 2023-11-24 NOTE — BH INPATIENT PSYCHIATRY PROGRESS NOTE - MSE UNSTRUCTURED FT
Appearance: Dressed appropriately.  Behavior: Cooperative.  Calm.  Motor: No abnormal movements, no psychomotor slowing or activation.  Speech: Regular rate.  Mood: Does not give specific mood  Affect: Depressed, withdrawn.  Thought Process: Linear.  Associations: Fair.  Thought Content: AH and SI.   Insight: Limited.  Judgment: Fair on interview.  Attention: Fair.  Language: Fluent.  Gait: Intact.

## 2023-11-24 NOTE — BH INPATIENT PSYCHIATRY PROGRESS NOTE - NSBHMETABOLIC_PSY_ALL_CORE_FT
BMI: BMI (kg/m2): 57.8 (09-14-23 @ 14:30)  HbA1c: A1C with Estimated Average Glucose Result: 5.2 % (11-01-23 @ 09:04)    Glucose:   BP: --Vital Signs Last 24 Hrs  T(C): 37.1 (11-24-23 @ 08:10), Max: 37.1 (11-24-23 @ 08:10)  T(F): 98.7 (11-24-23 @ 08:10), Max: 98.7 (11-24-23 @ 08:10)  HR: --  BP: --  BP(mean): --  RR: --  SpO2: --    Orthostatic VS  11-24-23 @ 08:10  Lying BP: --/-- HR: --  Sitting BP: 117/69 HR: 106  Standing BP: 102/68 HR: 120  Site: --  Mode: --  Orthostatic VS  11-23-23 @ 21:13  Lying BP: --/-- HR: --  Sitting BP: 109/72 HR: 92  Standing BP: 116/54 HR: 116  Site: --  Mode: --  Orthostatic VS  11-23-23 @ 07:49  Lying BP: --/-- HR: --  Sitting BP: 107/77 HR: 83  Standing BP: 110/80 HR: 89  Site: --  Mode: --  Orthostatic VS  11-22-23 @ 20:47  Lying BP: --/-- HR: --  Sitting BP: 103/50 HR: 120  Standing BP: 107/74 HR: 120  Site: --  Mode: --    Lipid Panel: Date/Time: 11-01-23 @ 09:04  Cholesterol, Serum: 156  LDL Cholesterol Calculated: 82  HDL Cholesterol, Serum: 58  Total Cholesterol/HDL Ration Measurement: --  Triglycerides, Serum: 79

## 2023-11-24 NOTE — BH INPATIENT PSYCHIATRY PROGRESS NOTE - CURRENT MEDICATION
MEDICATIONS  (STANDING):  budesonide  80 MICROgram(s)/formoterol 4.5 MICROgram(s) Inhaler 2 Puff(s) Inhalation two times a day  cloZAPine 200 milliGRAM(s) Oral at bedtime  desmopressin 0.1 milliGRAM(s) Oral at bedtime  escitalopram 20 milliGRAM(s) Oral daily  gabapentin 300 milliGRAM(s) Oral two times a day  influenza   Vaccine 0.5 milliLiter(s) IntraMuscular once  lidocaine   4% Patch 1 Patch Transdermal daily  lithium SR (LITHOBID) 900 milliGRAM(s) Oral at bedtime  montelukast 10 milliGRAM(s) Oral at bedtime  pantoprazole    Tablet 40 milliGRAM(s) Oral before breakfast  prazosin. 2 milliGRAM(s) Oral at bedtime    MEDICATIONS  (PRN):  acetaminophen     Tablet .. 650 milliGRAM(s) Oral every 6 hours PRN Moderate Pain (4 - 6)  albuterol    90 MICROgram(s) HFA Inhaler 2 Puff(s) Inhalation every 6 hours PRN Shortness of Breath and/or Wheezing  aluminum hydroxide/magnesium hydroxide/simethicone Suspension 30 milliLiter(s) Oral every 4 hours PRN Dyspepsia  benzocaine 20% Spray 1 Spray(s) Topical every 6 hours PRN tooth pain  chlorproMAZINE    Injectable 50 milliGRAM(s) IntraMuscular once PRN severe agitation  chlorproMAZINE    Tablet 50 milliGRAM(s) Oral every 6 hours PRN agitation  hydrOXYzine hydrochloride 50 milliGRAM(s) Oral every 12 hours PRN Anxiety  LORazepam     Tablet 2 milliGRAM(s) Oral every 8 hours PRN Anxiety  LORazepam   Injectable 2 milliGRAM(s) IntraMuscular once PRN Assaultive behavior  melatonin. 5 milliGRAM(s) Oral at bedtime PRN Insomnia  ondansetron    Tablet 4 milliGRAM(s) Oral every 6 hours PRN nausea  polyethylene glycol 3350 17 Gram(s) Oral daily PRN Constipation

## 2023-11-25 PROCEDURE — 99231 SBSQ HOSP IP/OBS SF/LOW 25: CPT

## 2023-11-25 RX ADMIN — CLOZAPINE 200 MILLIGRAM(S): 150 TABLET, ORALLY DISINTEGRATING ORAL at 20:55

## 2023-11-25 RX ADMIN — MONTELUKAST 10 MILLIGRAM(S): 4 TABLET, CHEWABLE ORAL at 20:56

## 2023-11-25 RX ADMIN — Medication 2 MILLIGRAM(S): at 20:56

## 2023-11-25 RX ADMIN — PANTOPRAZOLE SODIUM 40 MILLIGRAM(S): 20 TABLET, DELAYED RELEASE ORAL at 06:31

## 2023-11-25 RX ADMIN — BUDESONIDE AND FORMOTEROL FUMARATE DIHYDRATE 2 PUFF(S): 160; 4.5 AEROSOL RESPIRATORY (INHALATION) at 21:39

## 2023-11-25 RX ADMIN — GABAPENTIN 300 MILLIGRAM(S): 400 CAPSULE ORAL at 20:55

## 2023-11-25 RX ADMIN — ESCITALOPRAM OXALATE 20 MILLIGRAM(S): 10 TABLET, FILM COATED ORAL at 08:37

## 2023-11-25 RX ADMIN — DESMOPRESSIN ACETATE 0.1 MILLIGRAM(S): 0.1 TABLET ORAL at 20:56

## 2023-11-25 RX ADMIN — LITHIUM CARBONATE 900 MILLIGRAM(S): 300 TABLET, EXTENDED RELEASE ORAL at 20:56

## 2023-11-25 RX ADMIN — GABAPENTIN 300 MILLIGRAM(S): 400 CAPSULE ORAL at 08:37

## 2023-11-25 RX ADMIN — Medication 50 MILLIGRAM(S): at 14:01

## 2023-11-25 NOTE — BH INPATIENT PSYCHIATRY PROGRESS NOTE - CURRENT MEDICATION
MEDICATIONS  (STANDING):  budesonide  80 MICROgram(s)/formoterol 4.5 MICROgram(s) Inhaler 2 Puff(s) Inhalation two times a day  cloZAPine 200 milliGRAM(s) Oral at bedtime  desmopressin 0.1 milliGRAM(s) Oral at bedtime  escitalopram 20 milliGRAM(s) Oral daily  gabapentin 300 milliGRAM(s) Oral two times a day  influenza   Vaccine 0.5 milliLiter(s) IntraMuscular once  lithium SR (LITHOBID) 900 milliGRAM(s) Oral at bedtime  montelukast 10 milliGRAM(s) Oral at bedtime  pantoprazole    Tablet 40 milliGRAM(s) Oral before breakfast  prazosin. 2 milliGRAM(s) Oral at bedtime    MEDICATIONS  (PRN):  acetaminophen     Tablet .. 650 milliGRAM(s) Oral every 6 hours PRN Moderate Pain (4 - 6)  albuterol    90 MICROgram(s) HFA Inhaler 2 Puff(s) Inhalation every 6 hours PRN Shortness of Breath and/or Wheezing  aluminum hydroxide/magnesium hydroxide/simethicone Suspension 30 milliLiter(s) Oral every 4 hours PRN Dyspepsia  benzocaine 20% Spray 1 Spray(s) Topical every 6 hours PRN tooth pain  chlorproMAZINE    Injectable 50 milliGRAM(s) IntraMuscular once PRN severe agitation  chlorproMAZINE    Tablet 50 milliGRAM(s) Oral every 6 hours PRN agitation  hydrOXYzine hydrochloride 50 milliGRAM(s) Oral every 12 hours PRN Anxiety  lidocaine   4% Patch 1 Patch Transdermal daily PRN LBP  LORazepam     Tablet 2 milliGRAM(s) Oral every 8 hours PRN Anxiety  LORazepam   Injectable 2 milliGRAM(s) IntraMuscular once PRN Assaultive behavior  melatonin. 5 milliGRAM(s) Oral at bedtime PRN Insomnia  ondansetron    Tablet 4 milliGRAM(s) Oral every 6 hours PRN nausea  polyethylene glycol 3350 17 Gram(s) Oral daily PRN Constipation

## 2023-11-25 NOTE — BH INPATIENT PSYCHIATRY PROGRESS NOTE - NSBHMETABOLIC_PSY_ALL_CORE_FT
BMI: BMI (kg/m2): 57.8 (09-14-23 @ 14:30)  HbA1c: A1C with Estimated Average Glucose Result: 5.2 % (11-01-23 @ 09:04)    Glucose:   BP: --Vital Signs Last 24 Hrs  T(C): 36.7 (11-25-23 @ 08:00), Max: 36.7 (11-25-23 @ 08:00)  T(F): 98.1 (11-25-23 @ 08:00), Max: 98.1 (11-25-23 @ 08:00)  HR: --  BP: --  BP(mean): --  RR: --  SpO2: --    Orthostatic VS  11-25-23 @ 08:00  Lying BP: --/-- HR: --  Sitting BP: 122/69 HR: 83  Standing BP: 108/71 HR: 73  Site: --  Mode: --  Orthostatic VS  11-24-23 @ 20:55  Lying BP: --/-- HR: --  Sitting BP: 106/74 HR: 92  Standing BP: 109/76 HR: 94  Site: --  Mode: --  Orthostatic VS  11-24-23 @ 08:10  Lying BP: --/-- HR: --  Sitting BP: 117/69 HR: 106  Standing BP: 102/68 HR: 120  Site: --  Mode: --  Orthostatic VS  11-23-23 @ 21:13  Lying BP: --/-- HR: --  Sitting BP: 109/72 HR: 92  Standing BP: 116/54 HR: 116  Site: --  Mode: --    Lipid Panel: Date/Time: 11-01-23 @ 09:04  Cholesterol, Serum: 156  LDL Cholesterol Calculated: 82  HDL Cholesterol, Serum: 58  Total Cholesterol/HDL Ration Measurement: --  Triglycerides, Serum: 79

## 2023-11-25 NOTE — BH INPATIENT PSYCHIATRY PROGRESS NOTE - NSBHFUPINTERVALHXFT_PSY_A_CORE
Pt seen, chart reviewed, discussed with nursing team.   Pt says that she had SI earlier today which passed. Pt says that her back is hurting today though she slept well. She feels that she's still "fully of anxiety." She's also worried about the state hospital referral. No current active SI. No problems with her meds.

## 2023-11-25 NOTE — BH INPATIENT PSYCHIATRY PROGRESS NOTE - NSBHASSESSSUMMFT_PSY_ALL_CORE
Continue below plan as per primary treatment team. CO renewed.     Patient is 29yo single woman, no dependents, domiciled with her Grandmother, unemployed on disability/SSI, pphx of schizoaffective disorder, multiple past psychiatric admissions including state and recent discharge from [facility], in tx [facility], with pmhx of asthma, HTN, GERD, and hypothyroidism and schizoaffective disorder, in tx  with [facility], who self presented to the ED reporting abdominal pain and requesting readmission to psychiatric unit, reporting CAH, depressive symptoms, IOR, suicidality, admitted to [facility] for psychiatric care.  Depression and psychosis likely multifactorial at this time, schizoaffective vs. borderline personality disorder vs. complex grief vs. adjustment disorder.      Continues to report depression and psychosis, with suicidality.  Now with nocturnal enuresis, likely clozapine induced.    1.) Obs: Continue CO 1:1.  2.) Psychiatric medication management:  - Reviewed options for addressing nocturnal enuresis including reducing clozapine vs reducing lithium vs adding desmopressin.  Pt decided upon desmopressin as she continues to have depression and psychosis.  Will add desmopressin 0.1 mg QHS.  - Lithobid 900 mg QHS.  Pt aware that she needs to stay well hydrated and avoid diuretics and NSAIDs.   - Clozapine 200mg qHS for psychosis ([id number])  - Prazosin 2mg qhs, monitor BP carefully.  - Abilify Maintena 400mg IM q3 weeks rather than 4, per collateral from ACT team.  Doses given 10/19, 11/17.  - Mirtazapine was switched to escitalopram, will increase to 20 mg daily tomorrow.  - Gabapentin 300 mg BID for anxiety/chronic pain.  - PRN: haloperidol 5mg PO/IM q8hrs PRN for agitation, diphenhydramine 50mg PO/IM q6hrs PRN for EPS PPx, hydroxyzine 50mg PO q12hrs PRN for anxiety, lorazepam 2mg PO q8hrs PRN for anxiety or 2mg IM for assaultive behavior. Melatonin 5mg qHS PRN for insomnia  - Discussed ECT several times with pt who states she has been told in two hospitals in past that due to her obesity and asthma, she is not an ideal candidate.  Pt states that due to this she is not interested in receiving ECT at this time.  3.) Medical:   - Pt scheduled for dental extractions 11/1, however declined to attend on day prior when informed it would not be under general anesthesia.  - last clozapine labs on 10/23 (CBC with diff, troponins, CRP); CRP elevated at 15.8 likely due to dental infection/wisdom tooth issues  - pantoprazole 40mg PO before breakfast for GERD  - montelukast 10mg PO qHS for asthma  - budesonide 80mcg/formoterol 4.5mcg 2 puffs BID for asthma  - PRN: acetaminophen 650mg PO q6hrs PRN for pain, albuterol 90mcg 2puffs q6hrs PRN for dyspnea, ondansetron 4mg q6hrs PRN for nausea  - Discussed CRP with medicine service, given asymptomatic pt and normal troponin, no additional intervention/diagnostics at this time.  - Discontinued clobetasol as rash has resolved.  4.) Dispo planning: State application submitted.

## 2023-11-25 NOTE — BH INPATIENT PSYCHIATRY PROGRESS NOTE - NSBHCHARTREVIEWVS_PSY_A_CORE FT
Vital Signs Last 24 Hrs  T(C): 36.7 (11-25-23 @ 08:00), Max: 36.7 (11-25-23 @ 08:00)  T(F): 98.1 (11-25-23 @ 08:00), Max: 98.1 (11-25-23 @ 08:00)  HR: --  BP: --  BP(mean): --  RR: --  SpO2: --    Orthostatic VS  11-25-23 @ 08:00  Lying BP: --/-- HR: --  Sitting BP: 122/69 HR: 83  Standing BP: 108/71 HR: 73  Site: --  Mode: --  Orthostatic VS  11-24-23 @ 20:55  Lying BP: --/-- HR: --  Sitting BP: 106/74 HR: 92  Standing BP: 109/76 HR: 94  Site: --  Mode: --  Orthostatic VS  11-24-23 @ 08:10  Lying BP: --/-- HR: --  Sitting BP: 117/69 HR: 106  Standing BP: 102/68 HR: 120  Site: --  Mode: --  Orthostatic VS  11-23-23 @ 21:13  Lying BP: --/-- HR: --  Sitting BP: 109/72 HR: 92  Standing BP: 116/54 HR: 116  Site: --  Mode: --

## 2023-11-26 PROCEDURE — 99231 SBSQ HOSP IP/OBS SF/LOW 25: CPT

## 2023-11-26 RX ORDER — CHLORPROMAZINE HCL 10 MG
50 TABLET ORAL THREE TIMES A DAY
Refills: 0 | Status: DISCONTINUED | OUTPATIENT
Start: 2023-11-26 | End: 2023-11-28

## 2023-11-26 RX ADMIN — Medication 50 MILLIGRAM(S): at 14:50

## 2023-11-26 RX ADMIN — ESCITALOPRAM OXALATE 20 MILLIGRAM(S): 10 TABLET, FILM COATED ORAL at 09:09

## 2023-11-26 RX ADMIN — MONTELUKAST 10 MILLIGRAM(S): 4 TABLET, CHEWABLE ORAL at 20:53

## 2023-11-26 RX ADMIN — GABAPENTIN 300 MILLIGRAM(S): 400 CAPSULE ORAL at 09:08

## 2023-11-26 RX ADMIN — Medication 50 MILLIGRAM(S): at 20:52

## 2023-11-26 RX ADMIN — PANTOPRAZOLE SODIUM 40 MILLIGRAM(S): 20 TABLET, DELAYED RELEASE ORAL at 09:08

## 2023-11-26 RX ADMIN — CLOZAPINE 200 MILLIGRAM(S): 150 TABLET, ORALLY DISINTEGRATING ORAL at 20:53

## 2023-11-26 RX ADMIN — GABAPENTIN 300 MILLIGRAM(S): 400 CAPSULE ORAL at 20:53

## 2023-11-26 RX ADMIN — DESMOPRESSIN ACETATE 0.1 MILLIGRAM(S): 0.1 TABLET ORAL at 20:53

## 2023-11-26 RX ADMIN — BUDESONIDE AND FORMOTEROL FUMARATE DIHYDRATE 2 PUFF(S): 160; 4.5 AEROSOL RESPIRATORY (INHALATION) at 09:20

## 2023-11-26 RX ADMIN — LITHIUM CARBONATE 900 MILLIGRAM(S): 300 TABLET, EXTENDED RELEASE ORAL at 20:53

## 2023-11-26 RX ADMIN — BUDESONIDE AND FORMOTEROL FUMARATE DIHYDRATE 2 PUFF(S): 160; 4.5 AEROSOL RESPIRATORY (INHALATION) at 21:24

## 2023-11-26 RX ADMIN — Medication 2 MILLIGRAM(S): at 20:53

## 2023-11-26 NOTE — BH INPATIENT PSYCHIATRY PROGRESS NOTE - PRN MEDS
MEDICATIONS  (PRN):  acetaminophen     Tablet .. 650 milliGRAM(s) Oral every 6 hours PRN Moderate Pain (4 - 6)  albuterol    90 MICROgram(s) HFA Inhaler 2 Puff(s) Inhalation every 6 hours PRN Shortness of Breath and/or Wheezing  aluminum hydroxide/magnesium hydroxide/simethicone Suspension 30 milliLiter(s) Oral every 4 hours PRN Dyspepsia  benzocaine 20% Spray 1 Spray(s) Topical every 6 hours PRN tooth pain  chlorproMAZINE    Injectable 50 milliGRAM(s) IntraMuscular once PRN severe agitation  hydrOXYzine hydrochloride 50 milliGRAM(s) Oral every 12 hours PRN Anxiety  lidocaine   4% Patch 1 Patch Transdermal daily PRN LBP  LORazepam     Tablet 2 milliGRAM(s) Oral every 8 hours PRN Anxiety  LORazepam   Injectable 2 milliGRAM(s) IntraMuscular once PRN Assaultive behavior  melatonin. 5 milliGRAM(s) Oral at bedtime PRN Insomnia  ondansetron    Tablet 4 milliGRAM(s) Oral every 6 hours PRN nausea  polyethylene glycol 3350 17 Gram(s) Oral daily PRN Constipation

## 2023-11-26 NOTE — BH INPATIENT PSYCHIATRY PROGRESS NOTE - NSBHFUPINTERVALHXFT_PSY_A_CORE
Pt seen, chart reviewed, discussed with nursing team.   Pt describes feeling "guilt and shame" because she didn't get to see her grandfather before he  a year ago. She says that she's had no more nocturnal enuresis and finds the medication to be helpful. Pt complains of AH earlier today and says that the thorazine is very helpful. She asks for it as a standing med.  No current active SI. No problems with her meds.

## 2023-11-26 NOTE — BH INPATIENT PSYCHIATRY PROGRESS NOTE - CURRENT MEDICATION
MEDICATIONS  (STANDING):  budesonide  80 MICROgram(s)/formoterol 4.5 MICROgram(s) Inhaler 2 Puff(s) Inhalation two times a day  chlorproMAZINE    Tablet 50 milliGRAM(s) Oral three times a day  cloZAPine 200 milliGRAM(s) Oral at bedtime  desmopressin 0.1 milliGRAM(s) Oral at bedtime  escitalopram 20 milliGRAM(s) Oral daily  gabapentin 300 milliGRAM(s) Oral two times a day  influenza   Vaccine 0.5 milliLiter(s) IntraMuscular once  lithium SR (LITHOBID) 900 milliGRAM(s) Oral at bedtime  montelukast 10 milliGRAM(s) Oral at bedtime  pantoprazole    Tablet 40 milliGRAM(s) Oral before breakfast  prazosin. 2 milliGRAM(s) Oral at bedtime    MEDICATIONS  (PRN):  acetaminophen     Tablet .. 650 milliGRAM(s) Oral every 6 hours PRN Moderate Pain (4 - 6)  albuterol    90 MICROgram(s) HFA Inhaler 2 Puff(s) Inhalation every 6 hours PRN Shortness of Breath and/or Wheezing  aluminum hydroxide/magnesium hydroxide/simethicone Suspension 30 milliLiter(s) Oral every 4 hours PRN Dyspepsia  benzocaine 20% Spray 1 Spray(s) Topical every 6 hours PRN tooth pain  chlorproMAZINE    Injectable 50 milliGRAM(s) IntraMuscular once PRN severe agitation  hydrOXYzine hydrochloride 50 milliGRAM(s) Oral every 12 hours PRN Anxiety  lidocaine   4% Patch 1 Patch Transdermal daily PRN LBP  LORazepam     Tablet 2 milliGRAM(s) Oral every 8 hours PRN Anxiety  LORazepam   Injectable 2 milliGRAM(s) IntraMuscular once PRN Assaultive behavior  melatonin. 5 milliGRAM(s) Oral at bedtime PRN Insomnia  ondansetron    Tablet 4 milliGRAM(s) Oral every 6 hours PRN nausea  polyethylene glycol 3350 17 Gram(s) Oral daily PRN Constipation

## 2023-11-26 NOTE — BH INPATIENT PSYCHIATRY PROGRESS NOTE - NSBHCHARTREVIEWVS_PSY_A_CORE FT
Vital Signs Last 24 Hrs  T(C): 36.7 (11-25-23 @ 20:41), Max: 36.7 (11-25-23 @ 20:41)  T(F): 98.1 (11-25-23 @ 20:41), Max: 98.1 (11-25-23 @ 20:41)  HR: --  BP: --  BP(mean): --  RR: --  SpO2: --    Orthostatic VS  11-25-23 @ 20:41  Lying BP: --/-- HR: --  Sitting BP: 128/77 HR: 96  Standing BP: 141/75 HR: 115  Site: --  Mode: --  Orthostatic VS  11-25-23 @ 08:00  Lying BP: --/-- HR: --  Sitting BP: 122/69 HR: 83  Standing BP: 108/71 HR: 73  Site: --  Mode: --  Orthostatic VS  11-24-23 @ 20:55  Lying BP: --/-- HR: --  Sitting BP: 106/74 HR: 92  Standing BP: 109/76 HR: 94  Site: --  Mode: --

## 2023-11-26 NOTE — BH INPATIENT PSYCHIATRY PROGRESS NOTE - NSBHASSESSSUMMFT_PSY_ALL_CORE
Continue below plan as per primary treatment team. CO renewed. As per pt request, will have thorazine as standing med for now.     Patient is 27yo single woman, no dependents, domiciled with her Grandmother, unemployed on disability/SSI, pphx of schizoaffective disorder, multiple past psychiatric admissions including state and recent discharge from [facility], [facility], with pmhx of asthma, HTN, GERD, and hypothyroidism and schizoaffective disorder, in tx  with [facility], who self presented to the ED reporting abdominal pain and requesting readmission to psychiatric unit, reporting CAH, depressive symptoms, IOR, suicidality, admitted to [facility] for psychiatric care.  Depression and psychosis likely multifactorial at this time, schizoaffective vs. borderline personality disorder vs. complex grief vs. adjustment disorder.      Continues to report depression and psychosis, with suicidality.  Now with nocturnal enuresis, likely clozapine induced.    1.) Obs: Continue CO 1:1.  2.) Psychiatric medication management:  - Reviewed options for addressing nocturnal enuresis including reducing clozapine vs reducing lithium vs adding desmopressin.  Pt decided upon desmopressin as she continues to have depression and psychosis.  Will add desmopressin 0.1 mg QHS.  - Lithobid 900 mg QHS.  Pt aware that she needs to stay well hydrated and avoid diuretics and NSAIDs.   - Clozapine 200mg qHS for psychosis ([id number])  - Prazosin 2mg qhs, monitor BP carefully.  - Abilify Maintena 400mg IM q3 weeks rather than 4, per collateral from ACT team.  Doses given 10/19, 11/17.  - Mirtazapine was switched to escitalopram, will increase to 20 mg daily tomorrow.  - Gabapentin 300 mg BID for anxiety/chronic pain.  - PRN: haloperidol 5mg PO/IM q8hrs PRN for agitation, diphenhydramine 50mg PO/IM q6hrs PRN for EPS PPx, hydroxyzine 50mg PO q12hrs PRN for anxiety, lorazepam 2mg PO q8hrs PRN for anxiety or 2mg IM for assaultive behavior. Melatonin 5mg qHS PRN for insomnia  - Discussed ECT several times with pt who states she has been told in two hospitals in past that due to her obesity and asthma, she is not an ideal candidate.  Pt states that due to this she is not interested in receiving ECT at this time.  3.) Medical:   - Pt scheduled for dental extractions 11/1, however declined to attend on day prior when informed it would not be under general anesthesia.  - last clozapine labs on 10/23 (CBC with diff, troponins, CRP); CRP elevated at 15.8 likely due to dental infection/wisdom tooth issues  - pantoprazole 40mg PO before breakfast for GERD  - montelukast 10mg PO qHS for asthma  - budesonide 80mcg/formoterol 4.5mcg 2 puffs BID for asthma  - PRN: acetaminophen 650mg PO q6hrs PRN for pain, albuterol 90mcg 2puffs q6hrs PRN for dyspnea, ondansetron 4mg q6hrs PRN for nausea  - Discussed CRP with medicine service, given asymptomatic pt and normal troponin, no additional intervention/diagnostics at this time.  - Discontinued clobetasol as rash has resolved.  4.) Dispo planning: State application submitted.

## 2023-11-26 NOTE — BH INPATIENT PSYCHIATRY PROGRESS NOTE - NSBHMETABOLIC_PSY_ALL_CORE_FT
BMI: BMI (kg/m2): 57.8 (09-14-23 @ 14:30)  HbA1c: A1C with Estimated Average Glucose Result: 5.2 % (11-01-23 @ 09:04)    Glucose:   BP: --Vital Signs Last 24 Hrs  T(C): 36.7 (11-25-23 @ 20:41), Max: 36.7 (11-25-23 @ 20:41)  T(F): 98.1 (11-25-23 @ 20:41), Max: 98.1 (11-25-23 @ 20:41)  HR: --  BP: --  BP(mean): --  RR: --  SpO2: --    Orthostatic VS  11-25-23 @ 20:41  Lying BP: --/-- HR: --  Sitting BP: 128/77 HR: 96  Standing BP: 141/75 HR: 115  Site: --  Mode: --  Orthostatic VS  11-25-23 @ 08:00  Lying BP: --/-- HR: --  Sitting BP: 122/69 HR: 83  Standing BP: 108/71 HR: 73  Site: --  Mode: --  Orthostatic VS  11-24-23 @ 20:55  Lying BP: --/-- HR: --  Sitting BP: 106/74 HR: 92  Standing BP: 109/76 HR: 94  Site: --  Mode: --    Lipid Panel: Date/Time: 11-01-23 @ 09:04  Cholesterol, Serum: 156  LDL Cholesterol Calculated: 82  HDL Cholesterol, Serum: 58  Total Cholesterol/HDL Ration Measurement: --  Triglycerides, Serum: 79

## 2023-11-27 PROCEDURE — 99232 SBSQ HOSP IP/OBS MODERATE 35: CPT

## 2023-11-27 RX ORDER — LITHIUM CARBONATE 300 MG/1
1200 TABLET, EXTENDED RELEASE ORAL AT BEDTIME
Refills: 0 | Status: DISCONTINUED | OUTPATIENT
Start: 2023-11-27 | End: 2024-02-06

## 2023-11-27 RX ADMIN — Medication 50 MILLIGRAM(S): at 20:23

## 2023-11-27 RX ADMIN — BUDESONIDE AND FORMOTEROL FUMARATE DIHYDRATE 2 PUFF(S): 160; 4.5 AEROSOL RESPIRATORY (INHALATION) at 08:36

## 2023-11-27 RX ADMIN — CLOZAPINE 200 MILLIGRAM(S): 150 TABLET, ORALLY DISINTEGRATING ORAL at 20:23

## 2023-11-27 RX ADMIN — LITHIUM CARBONATE 1200 MILLIGRAM(S): 300 TABLET, EXTENDED RELEASE ORAL at 20:23

## 2023-11-27 RX ADMIN — DESMOPRESSIN ACETATE 0.1 MILLIGRAM(S): 0.1 TABLET ORAL at 20:23

## 2023-11-27 RX ADMIN — ESCITALOPRAM OXALATE 20 MILLIGRAM(S): 10 TABLET, FILM COATED ORAL at 08:35

## 2023-11-27 RX ADMIN — Medication 50 MILLIGRAM(S): at 15:29

## 2023-11-27 RX ADMIN — BUDESONIDE AND FORMOTEROL FUMARATE DIHYDRATE 2 PUFF(S): 160; 4.5 AEROSOL RESPIRATORY (INHALATION) at 20:23

## 2023-11-27 RX ADMIN — GABAPENTIN 300 MILLIGRAM(S): 400 CAPSULE ORAL at 20:23

## 2023-11-27 RX ADMIN — PANTOPRAZOLE SODIUM 40 MILLIGRAM(S): 20 TABLET, DELAYED RELEASE ORAL at 08:35

## 2023-11-27 RX ADMIN — GABAPENTIN 300 MILLIGRAM(S): 400 CAPSULE ORAL at 08:35

## 2023-11-27 RX ADMIN — Medication 2 MILLIGRAM(S): at 20:23

## 2023-11-27 RX ADMIN — Medication 50 MILLIGRAM(S): at 08:36

## 2023-11-27 RX ADMIN — MONTELUKAST 10 MILLIGRAM(S): 4 TABLET, CHEWABLE ORAL at 20:23

## 2023-11-27 NOTE — BH INPATIENT PSYCHIATRY PROGRESS NOTE - MSE UNSTRUCTURED FT
Appearance: Dressed appropriately.  Behavior: Cooperative.  Calm.  Motor: No abnormal movements, no psychomotor slowing or activation.  Speech: Regular rate.  Mood: Does not give specific mood  Affect: Depressed.  Thought Process: Linear.  Associations: Fair.  Thought Content: AH and SI.   Insight: Limited.  Judgment: Fair on interview.  Attention: Fair.  Language: Fluent.  Gait: Intact.

## 2023-11-27 NOTE — BH INPATIENT PSYCHIATRY PROGRESS NOTE - NSBHFUPINTERVALHXFT_PSY_A_CORE
As per staff report, pt continues to give contradicting reports regarding suicidality, and has otherwise been in good behavioral control.  Pt today reports she is still hearing voices telling her to give up.  She is also having suicidal thoughts but does not give a clear plan for unit.  Pt states she has not had noctural enuresis since starting DDAVP.  Pt states her exboyfriend came to visit over weekend.

## 2023-11-27 NOTE — BH INPATIENT PSYCHIATRY PROGRESS NOTE - NSBHMETABOLIC_PSY_ALL_CORE_FT
BMI: BMI (kg/m2): 57.8 (09-14-23 @ 14:30)  HbA1c: A1C with Estimated Average Glucose Result: 5.2 % (11-01-23 @ 09:04)    Glucose:   BP: 119/74 (11-27-23 @ 08:26) (119/74 - 119/74)Vital Signs Last 24 Hrs  T(C): 37.1 (11-27-23 @ 08:26), Max: 37.1 (11-27-23 @ 08:26)  T(F): 98.7 (11-27-23 @ 08:26), Max: 98.7 (11-27-23 @ 08:26)  HR: 100 (11-27-23 @ 08:26) (100 - 100)  BP: 119/74 (11-27-23 @ 08:26) (119/74 - 119/74)  BP(mean): --  RR: --  SpO2: --    Orthostatic VS  11-26-23 @ 20:37  Lying BP: --/-- HR: --  Sitting BP: 145/83 HR: 110  Standing BP: 137/88 HR: 105  Site: --  Mode: --  Orthostatic VS  11-25-23 @ 20:41  Lying BP: --/-- HR: --  Sitting BP: 128/77 HR: 96  Standing BP: 141/75 HR: 115  Site: --  Mode: --    Lipid Panel: Date/Time: 11-01-23 @ 09:04  Cholesterol, Serum: 156  LDL Cholesterol Calculated: 82  HDL Cholesterol, Serum: 58  Total Cholesterol/HDL Ration Measurement: --  Triglycerides, Serum: 79

## 2023-11-27 NOTE — BH INPATIENT PSYCHIATRY PROGRESS NOTE - NSBHCHARTREVIEWVS_PSY_A_CORE FT
Vital Signs Last 24 Hrs  T(C): 37.1 (11-27-23 @ 08:26), Max: 37.1 (11-27-23 @ 08:26)  T(F): 98.7 (11-27-23 @ 08:26), Max: 98.7 (11-27-23 @ 08:26)  HR: 100 (11-27-23 @ 08:26) (100 - 100)  BP: 119/74 (11-27-23 @ 08:26) (119/74 - 119/74)  BP(mean): --  RR: --  SpO2: --    Orthostatic VS  11-26-23 @ 20:37  Lying BP: --/-- HR: --  Sitting BP: 145/83 HR: 110  Standing BP: 137/88 HR: 105  Site: --  Mode: --  Orthostatic VS  11-25-23 @ 20:41  Lying BP: --/-- HR: --  Sitting BP: 128/77 HR: 96  Standing BP: 141/75 HR: 115  Site: --  Mode: --

## 2023-11-27 NOTE — BH INPATIENT PSYCHIATRY PROGRESS NOTE - CURRENT MEDICATION
MEDICATIONS  (STANDING):  budesonide  80 MICROgram(s)/formoterol 4.5 MICROgram(s) Inhaler 2 Puff(s) Inhalation two times a day  chlorproMAZINE    Tablet 50 milliGRAM(s) Oral three times a day  cloZAPine 200 milliGRAM(s) Oral at bedtime  desmopressin 0.1 milliGRAM(s) Oral at bedtime  escitalopram 20 milliGRAM(s) Oral daily  gabapentin 300 milliGRAM(s) Oral two times a day  influenza   Vaccine 0.5 milliLiter(s) IntraMuscular once  lithium SR (LITHOBID) 1200 milliGRAM(s) Oral at bedtime  montelukast 10 milliGRAM(s) Oral at bedtime  pantoprazole    Tablet 40 milliGRAM(s) Oral before breakfast  prazosin. 2 milliGRAM(s) Oral at bedtime    MEDICATIONS  (PRN):  acetaminophen     Tablet .. 650 milliGRAM(s) Oral every 6 hours PRN Moderate Pain (4 - 6)  albuterol    90 MICROgram(s) HFA Inhaler 2 Puff(s) Inhalation every 6 hours PRN Shortness of Breath and/or Wheezing  aluminum hydroxide/magnesium hydroxide/simethicone Suspension 30 milliLiter(s) Oral every 4 hours PRN Dyspepsia  benzocaine 20% Spray 1 Spray(s) Topical every 6 hours PRN tooth pain  chlorproMAZINE    Injectable 50 milliGRAM(s) IntraMuscular once PRN severe agitation  hydrOXYzine hydrochloride 50 milliGRAM(s) Oral every 12 hours PRN Anxiety  lidocaine   4% Patch 1 Patch Transdermal daily PRN LBP  LORazepam     Tablet 2 milliGRAM(s) Oral every 8 hours PRN Anxiety  LORazepam   Injectable 2 milliGRAM(s) IntraMuscular once PRN Assaultive behavior  melatonin. 5 milliGRAM(s) Oral at bedtime PRN Insomnia  ondansetron    Tablet 4 milliGRAM(s) Oral every 6 hours PRN nausea  polyethylene glycol 3350 17 Gram(s) Oral daily PRN Constipation

## 2023-11-27 NOTE — BH INPATIENT PSYCHIATRY PROGRESS NOTE - NSBHASSESSSUMMFT_PSY_ALL_CORE
Continue below plan as per primary treatment team. CO renewed. As per pt request, will have thorazine as standing med for now.     Patient is 29yo single woman, no dependents, domiciled with her Grandmother, unemployed on disability/SSI, pphx of schizoaffective disorder, multiple past psychiatric admissions including state and recent discharge from [facility], in [facility], with pmhx of asthma, HTN, GERD, and hypothyroidism and schizoaffective disorder, in tx  with [facility], who self presented to the ED reporting abdominal pain and requesting readmission to psychiatric unit, reporting CAH, depressive symptoms, IOR, suicidality, admitted to [facility] for psychiatric care.  Depression and psychosis likely multifactorial at this time, schizoaffective vs. borderline personality disorder vs. complex grief vs. adjustment disorder.      Continues to report symptoms.    1.) Obs: Refer to chart note filed today for update.  Routine checks, no CO.  2.) Psychiatric medication management:  - Reviewed options for addressing nocturnal enuresis including reducing clozapine vs reducing lithium vs adding desmopressin.  Pt decided upon desmopressin as she continues to have depression and psychosis.  Desmopressin 0.1 mg QHS.  - Lithobid increase to 1200 mg QHS.  Pt aware that she needs to stay well hydrated and avoid diuretics and NSAIDs.   - Clozapine 200mg qHS for psychosis ([id number])  - Prazosin 2mg qhs, monitor BP carefully.  - Abilify Maintena 400mg IM q3 weeks rather than 4, per collateral from ACT team.  Doses given 10/19, 11/17.  - Mirtazapine was switched to escitalopram, will increase to 20 mg daily tomorrow.  - Gabapentin 300 mg BID for anxiety/chronic pain.  - PRN: haloperidol 5mg PO/IM q8hrs PRN for agitation, diphenhydramine 50mg PO/IM q6hrs PRN for EPS PPx, hydroxyzine 50mg PO q12hrs PRN for anxiety, lorazepam 2mg PO q8hrs PRN for anxiety or 2mg IM for assaultive behavior. Melatonin 5mg qHS PRN for insomnia  - Discussed ECT several times with pt who states she has been told in two hospitals in past that due to her obesity and asthma, she is not an ideal candidate.  Pt states that due to this she is not interested in receiving ECT at this time.  3.) Medical:   - Pt scheduled for dental extractions 11/1, however declined to attend on day prior when informed it would not be under general anesthesia.  - last clozapine labs on 10/23 (CBC with diff, troponins, CRP); CRP elevated at 15.8 likely due to dental infection/wisdom tooth issues  - pantoprazole 40mg PO before breakfast for GERD  - montelukast 10mg PO qHS for asthma  - budesonide 80mcg/formoterol 4.5mcg 2 puffs BID for asthma  - PRN: acetaminophen 650mg PO q6hrs PRN for pain, albuterol 90mcg 2puffs q6hrs PRN for dyspnea, ondansetron 4mg q6hrs PRN for nausea  - Discussed CRP with medicine service, given asymptomatic pt and normal troponin, no additional intervention/diagnostics at this time.  - Discontinued clobetasol as rash has resolved.  4.) Dispo planning: State application submitted.

## 2023-11-28 PROCEDURE — 99232 SBSQ HOSP IP/OBS MODERATE 35: CPT

## 2023-11-28 RX ORDER — OLANZAPINE 15 MG/1
2.5 TABLET, FILM COATED ORAL THREE TIMES A DAY
Refills: 0 | Status: DISCONTINUED | OUTPATIENT
Start: 2023-11-28 | End: 2023-12-14

## 2023-11-28 RX ADMIN — MONTELUKAST 10 MILLIGRAM(S): 4 TABLET, CHEWABLE ORAL at 20:16

## 2023-11-28 RX ADMIN — GABAPENTIN 300 MILLIGRAM(S): 400 CAPSULE ORAL at 20:15

## 2023-11-28 RX ADMIN — BUDESONIDE AND FORMOTEROL FUMARATE DIHYDRATE 2 PUFF(S): 160; 4.5 AEROSOL RESPIRATORY (INHALATION) at 08:45

## 2023-11-28 RX ADMIN — ESCITALOPRAM OXALATE 20 MILLIGRAM(S): 10 TABLET, FILM COATED ORAL at 08:44

## 2023-11-28 RX ADMIN — CLOZAPINE 200 MILLIGRAM(S): 150 TABLET, ORALLY DISINTEGRATING ORAL at 20:16

## 2023-11-28 RX ADMIN — LITHIUM CARBONATE 1200 MILLIGRAM(S): 300 TABLET, EXTENDED RELEASE ORAL at 20:16

## 2023-11-28 RX ADMIN — OLANZAPINE 2.5 MILLIGRAM(S): 15 TABLET, FILM COATED ORAL at 13:38

## 2023-11-28 RX ADMIN — OLANZAPINE 2.5 MILLIGRAM(S): 15 TABLET, FILM COATED ORAL at 20:16

## 2023-11-28 RX ADMIN — Medication 2 MILLIGRAM(S): at 20:16

## 2023-11-28 RX ADMIN — PANTOPRAZOLE SODIUM 40 MILLIGRAM(S): 20 TABLET, DELAYED RELEASE ORAL at 08:44

## 2023-11-28 RX ADMIN — Medication 5 MILLIGRAM(S): at 20:15

## 2023-11-28 RX ADMIN — GABAPENTIN 300 MILLIGRAM(S): 400 CAPSULE ORAL at 08:44

## 2023-11-28 RX ADMIN — DESMOPRESSIN ACETATE 0.1 MILLIGRAM(S): 0.1 TABLET ORAL at 21:34

## 2023-11-28 RX ADMIN — Medication 50 MILLIGRAM(S): at 08:45

## 2023-11-28 NOTE — BH INPATIENT PSYCHIATRY PROGRESS NOTE - NSBHCHARTREVIEWVS_PSY_A_CORE FT
Vital Signs Last 24 Hrs  T(C): 36.7 (11-28-23 @ 07:57), Max: 36.7 (11-28-23 @ 07:57)  T(F): 98.1 (11-28-23 @ 07:57), Max: 98.1 (11-28-23 @ 07:57)  HR: --  BP: --  BP(mean): --  RR: 18 (11-28-23 @ 07:57) (18 - 18)  SpO2: --    Orthostatic VS  11-28-23 @ 07:57  Lying BP: --/-- HR: --  Sitting BP: 133/76 HR: 76  Standing BP: 140/60 HR: 90  Site: upper right arm  Mode: electronic  Orthostatic VS  11-27-23 @ 20:29  Lying BP: --/-- HR: --  Sitting BP: 118/90 HR: 96  Standing BP: 123/87 HR: 102  Site: --  Mode: --  Orthostatic VS  11-26-23 @ 20:37  Lying BP: --/-- HR: --  Sitting BP: 145/83 HR: 110  Standing BP: 137/88 HR: 105  Site: --  Mode: --

## 2023-11-28 NOTE — BH PSYCHOLOGY - CLINICIAN PSYCHOTHERAPY NOTE - NSBHPSYCHOLINT_PSY_A_CORE FT
ACT
Acceptance and Commitment Therapy
ACT
Acceptance and Commitment Therapy
ACT

## 2023-11-28 NOTE — BH PSYCHOLOGY - CLINICIAN PSYCHOTHERAPY NOTE - NSBHPSYCHOLGOALS_PSY_A_CORE
Decrease symptoms/Assessment/Improve level of independent functioning/Improve social/vocational/coping skills/Psychoeducation H Plasty Text: Given the location of the defect, shape of the defect and the proximity to free margins a H-plasty was deemed most appropriate for repair.  Using a sterile surgical marker, the appropriate advancement arms of the H-plasty were drawn incorporating the defect and placing the expected incisions within the relaxed skin tension lines where possible. The area thus outlined was incised deep to adipose tissue with a #15 scalpel blade. The skin margins were undermined to an appropriate distance in all directions utilizing iris scissors.  The opposing advancement arms were then advanced into place in opposite direction and anchored with interrupted buried subcutaneous sutures.

## 2023-11-28 NOTE — BH INPATIENT PSYCHIATRY PROGRESS NOTE - NSBHATTESTTYPEVISIT_PSY_A_CORE
Contact Name: Stacie  Relation to Patient: Self  Phone Number: [phone]  Reason for call: Patient called and states that she was seen in urgent care on 06/27/2021 and was diagnosed with sinus infection and bronchitis. Patient states that she was prescribed zpap, steroid, and an inhaler at that time. Patient states that she feels she has gotten worse. Patient states that she is coughing constantly and is losing her voice. She also feels pressure in her ears and states \"I feel like there is phlegm in my chest that I can't get out.\"  Convey Results: No  Request Refill: No  Pharmacy Loaded: No  Return Call: Yes     Attending Only

## 2023-11-28 NOTE — BH PSYCHOLOGY - CLINICIAN PSYCHOTHERAPY NOTE - NSBHPSYCHOLPROBSFT_PSY_ALL_CORE
Let pt know lab did all screens for vag infections.  NO TRICH antigen.  They did see yeast on their slide that I did not find on my slide here if the dept.  We can give Diflucan if she wishes. Discharge Issue - Other, Coping - Ineffective

## 2023-11-28 NOTE — BH PSYCHOLOGY - CLINICIAN PSYCHOTHERAPY NOTE - NSBHPSYCHOLINT_PSY_A_CORE
Cognitive/behavioral therapy/Dialectical  Behavioral Therapy (DBT)/Dynamic issues addressed/Reality testing/Supported coping skills/Supportive therapy/other...

## 2023-11-28 NOTE — BH INPATIENT PSYCHIATRY PROGRESS NOTE - NSBHFUPINTERVALHXFT_PSY_A_CORE
Pt reports that voices only bothered her twice this morning.  States she continues to have suicidal thoughts but is managing on own with coping skills.  Denies any nocturnal enuresis.

## 2023-11-28 NOTE — BH INPATIENT PSYCHIATRY PROGRESS NOTE - NSBHASSESSSUMMFT_PSY_ALL_CORE
Patient is 27yo single woman, no dependents, domiciled with her Grandmother, unemployed on disability/SSI, pphx of schizoaffective disorder, multiple past psychiatric admissions including state and recent discharge from [facility], in tx [facility], with pmhx of asthma, HTN, GERD, and hypothyroidism and schizoaffective disorder, in tx  with [facility], who self presented to the ED reporting abdominal pain and requesting readmission to psychiatric unit, reporting CAH, depressive symptoms, IOR, suicidality, admitted to [facility] for psychiatric care.  Depression and psychosis likely multifactorial at this time, schizoaffective vs. borderline personality disorder vs. complex grief vs. adjustment disorder.      Continues to report symptoms.    1.) Obs: Refer to chart note filed today for update.  Routine checks, no CO.  2.) Psychiatric medication management:  - Reviewed options for addressing nocturnal enuresis including reducing clozapine vs reducing lithium vs adding desmopressin.  Pt decided upon desmopressin as she continues to have depression and psychosis.  Desmopressin 0.1 mg QHS.  - Lithobid increase to 1200 mg QHS.  Pt aware that she needs to stay well hydrated and avoid diuretics and NSAIDs.   - Weekend team had started chlorpromazine standing at pt request.  Discussed concerns with lowering seizure threshold.  Reviewed alternatives.  Will utilize olanzapine 2.5 mg TID for now.  - Clozapine 200mg qHS for psychosis ([id number])  - Prazosin 2mg qhs, monitor BP carefully.  - Abilify Maintena 400mg IM q3 weeks rather than 4, per collateral from ACT team.  Doses given 10/19, 11/17.  - Mirtazapine was switched to escitalopram, will increase to 20 mg daily tomorrow.  - Gabapentin 300 mg BID for anxiety/chronic pain.  - PRN: haloperidol 5mg PO/IM q8hrs PRN for agitation, diphenhydramine 50mg PO/IM q6hrs PRN for EPS PPx, hydroxyzine 50mg PO q12hrs PRN for anxiety, lorazepam 2mg PO q8hrs PRN for anxiety or 2mg IM for assaultive behavior. Melatonin 5mg qHS PRN for insomnia  - Discussed ECT several times with pt who states she has been told in two hospitals in past that due to her obesity and asthma, she is not an ideal candidate.  Pt states that due to this she is not interested in receiving ECT at this time.  3.) Medical:   - Pt scheduled for dental extractions 11/1, however declined to attend on day prior when informed it would not be under general anesthesia.  - last clozapine labs on 10/23 (CBC with diff, troponins, CRP); CRP elevated at 15.8 likely due to dental infection/wisdom tooth issues  - pantoprazole 40mg PO before breakfast for GERD  - montelukast 10mg PO qHS for asthma  - budesonide 80mcg/formoterol 4.5mcg 2 puffs BID for asthma  - PRN: acetaminophen 650mg PO q6hrs PRN for pain, albuterol 90mcg 2puffs q6hrs PRN for dyspnea, ondansetron 4mg q6hrs PRN for nausea  - Discussed CRP with medicine service, given asymptomatic pt and normal troponin, no additional intervention/diagnostics at this time.  - Discontinued clobetasol as rash has resolved.  4.) Dispo planning: State application submitted.

## 2023-11-28 NOTE — BH INPATIENT PSYCHIATRY PROGRESS NOTE - NSBHMETABOLIC_PSY_ALL_CORE_FT
BMI: BMI (kg/m2): 57.8 (09-14-23 @ 14:30)  HbA1c: A1C with Estimated Average Glucose Result: 5.2 % (11-01-23 @ 09:04)    Glucose:   BP: 119/74 (11-27-23 @ 08:26) (119/74 - 119/74)Vital Signs Last 24 Hrs  T(C): 36.7 (11-28-23 @ 07:57), Max: 36.7 (11-28-23 @ 07:57)  T(F): 98.1 (11-28-23 @ 07:57), Max: 98.1 (11-28-23 @ 07:57)  HR: --  BP: --  BP(mean): --  RR: 18 (11-28-23 @ 07:57) (18 - 18)  SpO2: --    Orthostatic VS  11-28-23 @ 07:57  Lying BP: --/-- HR: --  Sitting BP: 133/76 HR: 76  Standing BP: 140/60 HR: 90  Site: upper right arm  Mode: electronic  Orthostatic VS  11-27-23 @ 20:29  Lying BP: --/-- HR: --  Sitting BP: 118/90 HR: 96  Standing BP: 123/87 HR: 102  Site: --  Mode: --  Orthostatic VS  11-26-23 @ 20:37  Lying BP: --/-- HR: --  Sitting BP: 145/83 HR: 110  Standing BP: 137/88 HR: 105  Site: --  Mode: --    Lipid Panel: Date/Time: 11-01-23 @ 09:04  Cholesterol, Serum: 156  LDL Cholesterol Calculated: 82  HDL Cholesterol, Serum: 58  Total Cholesterol/HDL Ration Measurement: --  Triglycerides, Serum: 79

## 2023-11-28 NOTE — BH PSYCHOLOGY - CLINICIAN PSYCHOTHERAPY NOTE - NSBHPSYCHOLNARRATIVE_PSY_A_CORE FT
Writer met with Pt for 30-minute individual therapy session. Pt was alert and presented with adequate hygiene but unkempt grooming. Pt reported "ok" mood, affect full. Pt denied experiencing any A/V hallucinations. Her thought process was linear and goal directed. Pts’ speech was WNL, content was relevant to discussion. Pt denied passive or active SI, HI or NSSI. Oriented x3. Limited insight and judgement demonstrated.      Pt talked about thoughts and feelings she experiences when thinking about her grandfather, as well as feeling triggered by a peers comments about her body weight and eating on the unit. Explored thoughts and feelings, and provided space for pt to process. Explored how pt responds internally and externally, validating pts coping and boundary setting. Did DBT check the facts worksheet and pt reflected on difficulties recognizing thoughts and interpretations of situations. Normalized experience and explored ways pt could practice thought awareness (journaling, taking notes, checking in). Explored how pt was feeling being off CO, with pt denying any passive or active SI as well as feeling "neutral" about it. Stated she felt "somewhat impulsive and overwhelmed" this morning, but was able to utilize a coping skill (showering). Validated pts coping and normalized emotions, pointing out pts decrease in avoidance by asking for PRN.

## 2023-11-28 NOTE — BH INPATIENT PSYCHIATRY PROGRESS NOTE - MSE UNSTRUCTURED FT
Appearance: Dressed appropriately.  Behavior: Cooperative.  Calm.  Motor: No abnormal movements, no psychomotor slowing or activation.  Speech: Regular rate.  Mood: Ok  Affect: Depressed.  Thought Process: Linear.  Associations: Fair.  Thought Content: AH and SI.   Insight: Limited.  Judgment: Fair on interview.  Attention: Fair.  Language: Fluent.  Gait: Intact.

## 2023-11-29 LAB
ALBUMIN SERPL ELPH-MCNC: 3.8 G/DL — SIGNIFICANT CHANGE UP (ref 3.3–5)
ALBUMIN SERPL ELPH-MCNC: 3.8 G/DL — SIGNIFICANT CHANGE UP (ref 3.3–5)
ALP SERPL-CCNC: 122 U/L — HIGH (ref 40–120)
ALP SERPL-CCNC: 122 U/L — HIGH (ref 40–120)
ALT FLD-CCNC: 38 U/L — HIGH (ref 4–33)
ALT FLD-CCNC: 38 U/L — HIGH (ref 4–33)
ANION GAP SERPL CALC-SCNC: 10 MMOL/L — SIGNIFICANT CHANGE UP (ref 7–14)
ANION GAP SERPL CALC-SCNC: 10 MMOL/L — SIGNIFICANT CHANGE UP (ref 7–14)
AST SERPL-CCNC: 36 U/L — HIGH (ref 4–32)
AST SERPL-CCNC: 36 U/L — HIGH (ref 4–32)
BASOPHILS # BLD AUTO: 0.03 K/UL — SIGNIFICANT CHANGE UP (ref 0–0.2)
BASOPHILS # BLD AUTO: 0.03 K/UL — SIGNIFICANT CHANGE UP (ref 0–0.2)
BASOPHILS NFR BLD AUTO: 0.3 % — SIGNIFICANT CHANGE UP (ref 0–2)
BASOPHILS NFR BLD AUTO: 0.3 % — SIGNIFICANT CHANGE UP (ref 0–2)
BILIRUB SERPL-MCNC: <0.2 MG/DL — SIGNIFICANT CHANGE UP (ref 0.2–1.2)
BILIRUB SERPL-MCNC: <0.2 MG/DL — SIGNIFICANT CHANGE UP (ref 0.2–1.2)
BUN SERPL-MCNC: 20 MG/DL — SIGNIFICANT CHANGE UP (ref 7–23)
BUN SERPL-MCNC: 20 MG/DL — SIGNIFICANT CHANGE UP (ref 7–23)
CALCIUM SERPL-MCNC: 9.2 MG/DL — SIGNIFICANT CHANGE UP (ref 8.4–10.5)
CALCIUM SERPL-MCNC: 9.2 MG/DL — SIGNIFICANT CHANGE UP (ref 8.4–10.5)
CHLORIDE SERPL-SCNC: 105 MMOL/L — SIGNIFICANT CHANGE UP (ref 98–107)
CHLORIDE SERPL-SCNC: 105 MMOL/L — SIGNIFICANT CHANGE UP (ref 98–107)
CO2 SERPL-SCNC: 24 MMOL/L — SIGNIFICANT CHANGE UP (ref 22–31)
CO2 SERPL-SCNC: 24 MMOL/L — SIGNIFICANT CHANGE UP (ref 22–31)
CREAT SERPL-MCNC: 0.65 MG/DL — SIGNIFICANT CHANGE UP (ref 0.5–1.3)
CREAT SERPL-MCNC: 0.65 MG/DL — SIGNIFICANT CHANGE UP (ref 0.5–1.3)
CRP SERPL-MCNC: 21.6 MG/L — HIGH
CRP SERPL-MCNC: 21.6 MG/L — HIGH
EGFR: 124 ML/MIN/1.73M2 — SIGNIFICANT CHANGE UP
EGFR: 124 ML/MIN/1.73M2 — SIGNIFICANT CHANGE UP
EOSINOPHIL # BLD AUTO: 0 K/UL — SIGNIFICANT CHANGE UP (ref 0–0.5)
EOSINOPHIL # BLD AUTO: 0 K/UL — SIGNIFICANT CHANGE UP (ref 0–0.5)
EOSINOPHIL NFR BLD AUTO: 0 % — SIGNIFICANT CHANGE UP (ref 0–6)
EOSINOPHIL NFR BLD AUTO: 0 % — SIGNIFICANT CHANGE UP (ref 0–6)
GLUCOSE SERPL-MCNC: 98 MG/DL — SIGNIFICANT CHANGE UP (ref 70–99)
GLUCOSE SERPL-MCNC: 98 MG/DL — SIGNIFICANT CHANGE UP (ref 70–99)
HCT VFR BLD CALC: 41.2 % — SIGNIFICANT CHANGE UP (ref 34.5–45)
HCT VFR BLD CALC: 41.2 % — SIGNIFICANT CHANGE UP (ref 34.5–45)
HGB BLD-MCNC: 12.7 G/DL — SIGNIFICANT CHANGE UP (ref 11.5–15.5)
HGB BLD-MCNC: 12.7 G/DL — SIGNIFICANT CHANGE UP (ref 11.5–15.5)
IANC: 5.52 K/UL — SIGNIFICANT CHANGE UP (ref 1.8–7.4)
IANC: 5.52 K/UL — SIGNIFICANT CHANGE UP (ref 1.8–7.4)
IMM GRANULOCYTES NFR BLD AUTO: 0.3 % — SIGNIFICANT CHANGE UP (ref 0–0.9)
IMM GRANULOCYTES NFR BLD AUTO: 0.3 % — SIGNIFICANT CHANGE UP (ref 0–0.9)
LYMPHOCYTES # BLD AUTO: 3.02 K/UL — SIGNIFICANT CHANGE UP (ref 1–3.3)
LYMPHOCYTES # BLD AUTO: 3.02 K/UL — SIGNIFICANT CHANGE UP (ref 1–3.3)
LYMPHOCYTES # BLD AUTO: 33.3 % — SIGNIFICANT CHANGE UP (ref 13–44)
LYMPHOCYTES # BLD AUTO: 33.3 % — SIGNIFICANT CHANGE UP (ref 13–44)
MCHC RBC-ENTMCNC: 24.4 PG — LOW (ref 27–34)
MCHC RBC-ENTMCNC: 24.4 PG — LOW (ref 27–34)
MCHC RBC-ENTMCNC: 30.8 GM/DL — LOW (ref 32–36)
MCHC RBC-ENTMCNC: 30.8 GM/DL — LOW (ref 32–36)
MCV RBC AUTO: 79.2 FL — LOW (ref 80–100)
MCV RBC AUTO: 79.2 FL — LOW (ref 80–100)
MONOCYTES # BLD AUTO: 0.47 K/UL — SIGNIFICANT CHANGE UP (ref 0–0.9)
MONOCYTES # BLD AUTO: 0.47 K/UL — SIGNIFICANT CHANGE UP (ref 0–0.9)
MONOCYTES NFR BLD AUTO: 5.2 % — SIGNIFICANT CHANGE UP (ref 2–14)
MONOCYTES NFR BLD AUTO: 5.2 % — SIGNIFICANT CHANGE UP (ref 2–14)
NEUTROPHILS # BLD AUTO: 5.52 K/UL — SIGNIFICANT CHANGE UP (ref 1.8–7.4)
NEUTROPHILS # BLD AUTO: 5.52 K/UL — SIGNIFICANT CHANGE UP (ref 1.8–7.4)
NEUTROPHILS NFR BLD AUTO: 60.9 % — SIGNIFICANT CHANGE UP (ref 43–77)
NEUTROPHILS NFR BLD AUTO: 60.9 % — SIGNIFICANT CHANGE UP (ref 43–77)
NRBC # BLD: 0 /100 WBCS — SIGNIFICANT CHANGE UP (ref 0–0)
NRBC # BLD: 0 /100 WBCS — SIGNIFICANT CHANGE UP (ref 0–0)
NRBC # FLD: 0 K/UL — SIGNIFICANT CHANGE UP (ref 0–0)
NRBC # FLD: 0 K/UL — SIGNIFICANT CHANGE UP (ref 0–0)
PLATELET # BLD AUTO: 282 K/UL — SIGNIFICANT CHANGE UP (ref 150–400)
PLATELET # BLD AUTO: 282 K/UL — SIGNIFICANT CHANGE UP (ref 150–400)
POTASSIUM SERPL-MCNC: 4.6 MMOL/L — SIGNIFICANT CHANGE UP (ref 3.5–5.3)
POTASSIUM SERPL-MCNC: 4.6 MMOL/L — SIGNIFICANT CHANGE UP (ref 3.5–5.3)
POTASSIUM SERPL-SCNC: 4.6 MMOL/L — SIGNIFICANT CHANGE UP (ref 3.5–5.3)
POTASSIUM SERPL-SCNC: 4.6 MMOL/L — SIGNIFICANT CHANGE UP (ref 3.5–5.3)
PROT SERPL-MCNC: 7.4 G/DL — SIGNIFICANT CHANGE UP (ref 6–8.3)
PROT SERPL-MCNC: 7.4 G/DL — SIGNIFICANT CHANGE UP (ref 6–8.3)
RBC # BLD: 5.2 M/UL — SIGNIFICANT CHANGE UP (ref 3.8–5.2)
RBC # BLD: 5.2 M/UL — SIGNIFICANT CHANGE UP (ref 3.8–5.2)
RBC # FLD: 15.2 % — HIGH (ref 10.3–14.5)
RBC # FLD: 15.2 % — HIGH (ref 10.3–14.5)
SODIUM SERPL-SCNC: 139 MMOL/L — SIGNIFICANT CHANGE UP (ref 135–145)
SODIUM SERPL-SCNC: 139 MMOL/L — SIGNIFICANT CHANGE UP (ref 135–145)
TROPONIN T, HIGH SENSITIVITY RESULT: 6 NG/L — SIGNIFICANT CHANGE UP
TROPONIN T, HIGH SENSITIVITY RESULT: 6 NG/L — SIGNIFICANT CHANGE UP
WBC # BLD: 9.07 K/UL — SIGNIFICANT CHANGE UP (ref 3.8–10.5)
WBC # BLD: 9.07 K/UL — SIGNIFICANT CHANGE UP (ref 3.8–10.5)
WBC # FLD AUTO: 9.07 K/UL — SIGNIFICANT CHANGE UP (ref 3.8–10.5)
WBC # FLD AUTO: 9.07 K/UL — SIGNIFICANT CHANGE UP (ref 3.8–10.5)

## 2023-11-29 PROCEDURE — 99232 SBSQ HOSP IP/OBS MODERATE 35: CPT

## 2023-11-29 RX ADMIN — Medication 2 MILLIGRAM(S): at 21:33

## 2023-11-29 RX ADMIN — OLANZAPINE 2.5 MILLIGRAM(S): 15 TABLET, FILM COATED ORAL at 08:23

## 2023-11-29 RX ADMIN — PANTOPRAZOLE SODIUM 40 MILLIGRAM(S): 20 TABLET, DELAYED RELEASE ORAL at 08:23

## 2023-11-29 RX ADMIN — ESCITALOPRAM OXALATE 20 MILLIGRAM(S): 10 TABLET, FILM COATED ORAL at 08:23

## 2023-11-29 RX ADMIN — GABAPENTIN 300 MILLIGRAM(S): 400 CAPSULE ORAL at 08:23

## 2023-11-29 RX ADMIN — OLANZAPINE 2.5 MILLIGRAM(S): 15 TABLET, FILM COATED ORAL at 21:34

## 2023-11-29 RX ADMIN — CLOZAPINE 200 MILLIGRAM(S): 150 TABLET, ORALLY DISINTEGRATING ORAL at 21:34

## 2023-11-29 RX ADMIN — LITHIUM CARBONATE 1200 MILLIGRAM(S): 300 TABLET, EXTENDED RELEASE ORAL at 21:33

## 2023-11-29 RX ADMIN — GABAPENTIN 300 MILLIGRAM(S): 400 CAPSULE ORAL at 21:34

## 2023-11-29 RX ADMIN — BUDESONIDE AND FORMOTEROL FUMARATE DIHYDRATE 2 PUFF(S): 160; 4.5 AEROSOL RESPIRATORY (INHALATION) at 08:32

## 2023-11-29 RX ADMIN — Medication 50 MILLIGRAM(S): at 15:25

## 2023-11-29 RX ADMIN — OLANZAPINE 2.5 MILLIGRAM(S): 15 TABLET, FILM COATED ORAL at 12:31

## 2023-11-29 RX ADMIN — MONTELUKAST 10 MILLIGRAM(S): 4 TABLET, CHEWABLE ORAL at 21:34

## 2023-11-29 RX ADMIN — DESMOPRESSIN ACETATE 0.1 MILLIGRAM(S): 0.1 TABLET ORAL at 21:33

## 2023-11-29 NOTE — BH INPATIENT PSYCHIATRY PROGRESS NOTE - NSBHFUPINTERVALHXFT_PSY_A_CORE
No overnight events.  Pt states that she is not having nightmares.  Pt continues to report hearing voices, tries to cope by taking naps.  Pt also reports that she continues to have thoughts of harming self, but has been able to manage and cope with them.  Denies any nocturnal enuresis or any other side effects.

## 2023-11-29 NOTE — BH INPATIENT PSYCHIATRY PROGRESS NOTE - CURRENT MEDICATION
MEDICATIONS  (STANDING):  budesonide  80 MICROgram(s)/formoterol 4.5 MICROgram(s) Inhaler 2 Puff(s) Inhalation two times a day  cloZAPine 200 milliGRAM(s) Oral at bedtime  desmopressin 0.1 milliGRAM(s) Oral at bedtime  escitalopram 20 milliGRAM(s) Oral daily  gabapentin 300 milliGRAM(s) Oral two times a day  influenza   Vaccine 0.5 milliLiter(s) IntraMuscular once  lithium SR (LITHOBID) 1200 milliGRAM(s) Oral at bedtime  montelukast 10 milliGRAM(s) Oral at bedtime  OLANZapine 2.5 milliGRAM(s) Oral three times a day  pantoprazole    Tablet 40 milliGRAM(s) Oral before breakfast  prazosin. 2 milliGRAM(s) Oral at bedtime    MEDICATIONS  (PRN):  acetaminophen     Tablet .. 650 milliGRAM(s) Oral every 6 hours PRN Moderate Pain (4 - 6)  albuterol    90 MICROgram(s) HFA Inhaler 2 Puff(s) Inhalation every 6 hours PRN Shortness of Breath and/or Wheezing  aluminum hydroxide/magnesium hydroxide/simethicone Suspension 30 milliLiter(s) Oral every 4 hours PRN Dyspepsia  benzocaine 20% Spray 1 Spray(s) Topical every 6 hours PRN tooth pain  chlorproMAZINE    Injectable 50 milliGRAM(s) IntraMuscular once PRN severe agitation  hydrOXYzine hydrochloride 50 milliGRAM(s) Oral every 12 hours PRN Anxiety  lidocaine   4% Patch 1 Patch Transdermal daily PRN LBP  LORazepam     Tablet 2 milliGRAM(s) Oral every 8 hours PRN Anxiety  LORazepam   Injectable 2 milliGRAM(s) IntraMuscular once PRN Assaultive behavior  melatonin. 5 milliGRAM(s) Oral at bedtime PRN Insomnia  ondansetron    Tablet 4 milliGRAM(s) Oral every 6 hours PRN nausea  polyethylene glycol 3350 17 Gram(s) Oral daily PRN Constipation

## 2023-11-29 NOTE — BH INPATIENT PSYCHIATRY PROGRESS NOTE - MSE UNSTRUCTURED FT
Appearance: Dressed appropriately.  Behavior: Cooperative.  Calm.  Motor: No abnormal movements, no psychomotor slowing or activation.  Speech: Regular rate.  Mood: Sleepy.  Affect: Depressed.  Thought Process: Linear.  Associations: Fair.  Thought Content: AH and SI.   Insight: Limited.  Judgment: Fair on interview.  Attention: Fair.  Language: Fluent.  Gait: Intact.

## 2023-11-29 NOTE — BH INPATIENT PSYCHIATRY PROGRESS NOTE - NSBHASSESSSUMMFT_PSY_ALL_CORE
Patient is 29yo single woman, no dependents, domiciled with her Grandmother, unemployed on disability/SSI, pphx of schizoaffective disorder, multiple past psychiatric admissions including state and recent discharge from [facility], in [facility], with pmhx of asthma, HTN, GERD, and hypothyroidism and schizoaffective disorder, in tx  with [facility], who self presented to the ED reporting abdominal pain and requesting readmission to psychiatric unit, reporting CAH, depressive symptoms, IOR, suicidality, admitted to [facility] for psychiatric care.  Depression and psychosis likely multifactorial at this time, schizoaffective vs. borderline personality disorder vs. complex grief vs. adjustment disorder.      Depression, psychosis, SI.    1.) Obs: Refer to chart note filed today for update.  Routine checks, no CO.  2.) Psychiatric medication management:  - Reviewed options for addressing nocturnal enuresis including reducing clozapine vs reducing lithium vs adding desmopressin.  Pt decided upon desmopressin as she continues to have depression and psychosis.  Desmopressin 0.1 mg QHS.  - Lithobid increase to 1200 mg QHS.  Pt aware that she needs to stay well hydrated and avoid diuretics and NSAIDs.   - Weekend team had started chlorpromazine standing at pt request.  Discussed concerns with lowering seizure threshold.  Reviewed alternatives.  Will utilize olanzapine 2.5 mg TID for now.  - Clozapine 200mg qHS for psychosis ([id number])  - Prazosin 2mg qhs, monitor BP carefully.  - Abilify Maintena 400mg IM q3 weeks rather than 4, per collateral from ACT team.  Doses given 10/19, 11/17.  - Mirtazapine was switched to escitalopram, will increase to 20 mg daily tomorrow.  - Gabapentin 300 mg BID for anxiety/chronic pain.  - PRN: haloperidol 5mg PO/IM q8hrs PRN for agitation, diphenhydramine 50mg PO/IM q6hrs PRN for EPS PPx, hydroxyzine 50mg PO q12hrs PRN for anxiety, lorazepam 2mg PO q8hrs PRN for anxiety or 2mg IM for assaultive behavior. Melatonin 5mg qHS PRN for insomnia  - Discussed ECT several times with pt who states she has been told in two hospitals in past that due to her obesity and asthma, she is not an ideal candidate.  Pt states that due to this she is not interested in receiving ECT at this time.  3.) Medical:   - Pt scheduled for dental extractions 11/1, however declined to attend on day prior when informed it would not be under general anesthesia.  - last clozapine labs on 10/23 (CBC with diff, troponins, CRP); CRP elevated at 15.8 likely due to dental infection/wisdom tooth issues  - pantoprazole 40mg PO before breakfast for GERD  - montelukast 10mg PO qHS for asthma  - budesonide 80mcg/formoterol 4.5mcg 2 puffs BID for asthma  - PRN: acetaminophen 650mg PO q6hrs PRN for pain, albuterol 90mcg 2puffs q6hrs PRN for dyspnea, ondansetron 4mg q6hrs PRN for nausea  - Discussed CRP with medicine service, given asymptomatic pt and normal troponin, no additional intervention/diagnostics at this time.  - Discontinued clobetasol as rash has resolved.  4.) Dispo planning: State application submitted.

## 2023-11-29 NOTE — BH INPATIENT PSYCHIATRY PROGRESS NOTE - NSBHCHARTREVIEWVS_PSY_A_CORE FT
Vital Signs Last 24 Hrs  T(C): 36.4 (11-29-23 @ 08:11), Max: 36.4 (11-28-23 @ 20:40)  T(F): 97.6 (11-29-23 @ 08:11), Max: 97.6 (11-29-23 @ 08:11)  HR: --  BP: --  BP(mean): --  RR: --  SpO2: --    Orthostatic VS  11-29-23 @ 08:11  Lying BP: --/-- HR: --  Sitting BP: 109/70 HR: 83  Standing BP: 121/69 HR: 71  Site: --  Mode: --  Orthostatic VS  11-28-23 @ 20:40  Lying BP: --/-- HR: --  Sitting BP: 149/75 HR: 93  Standing BP: 148/77 HR: 109  Site: --  Mode: --  Orthostatic VS  11-28-23 @ 07:57  Lying BP: --/-- HR: --  Sitting BP: 133/76 HR: 76  Standing BP: 140/60 HR: 90  Site: upper right arm  Mode: electronic  Orthostatic VS  11-27-23 @ 20:29  Lying BP: --/-- HR: --  Sitting BP: 118/90 HR: 96  Standing BP: 123/87 HR: 102  Site: --  Mode: --

## 2023-11-29 NOTE — BH INPATIENT PSYCHIATRY PROGRESS NOTE - NSBHMETABOLIC_PSY_ALL_CORE_FT
BMI: BMI (kg/m2): 57.8 (09-14-23 @ 14:30)  HbA1c: A1C with Estimated Average Glucose Result: 5.2 % (11-01-23 @ 09:04)    Glucose:   BP: 119/74 (11-27-23 @ 08:26) (119/74 - 119/74)Vital Signs Last 24 Hrs  T(C): 36.4 (11-29-23 @ 08:11), Max: 36.4 (11-28-23 @ 20:40)  T(F): 97.6 (11-29-23 @ 08:11), Max: 97.6 (11-29-23 @ 08:11)  HR: --  BP: --  BP(mean): --  RR: --  SpO2: --    Orthostatic VS  11-29-23 @ 08:11  Lying BP: --/-- HR: --  Sitting BP: 109/70 HR: 83  Standing BP: 121/69 HR: 71  Site: --  Mode: --  Orthostatic VS  11-28-23 @ 20:40  Lying BP: --/-- HR: --  Sitting BP: 149/75 HR: 93  Standing BP: 148/77 HR: 109  Site: --  Mode: --  Orthostatic VS  11-28-23 @ 07:57  Lying BP: --/-- HR: --  Sitting BP: 133/76 HR: 76  Standing BP: 140/60 HR: 90  Site: upper right arm  Mode: electronic  Orthostatic VS  11-27-23 @ 20:29  Lying BP: --/-- HR: --  Sitting BP: 118/90 HR: 96  Standing BP: 123/87 HR: 102  Site: --  Mode: --    Lipid Panel: Date/Time: 11-01-23 @ 09:04  Cholesterol, Serum: 156  LDL Cholesterol Calculated: 82  HDL Cholesterol, Serum: 58  Total Cholesterol/HDL Ration Measurement: --  Triglycerides, Serum: 79

## 2023-11-30 ENCOUNTER — EMERGENCY (EMERGENCY)
Facility: HOSPITAL | Age: 28
LOS: 1 days | Discharge: HOME | End: 2023-11-30
Admitting: EMERGENCY MEDICINE
Payer: MEDICAID

## 2023-11-30 VITALS
OXYGEN SATURATION: 96 % | DIASTOLIC BLOOD PRESSURE: 83 MMHG | TEMPERATURE: 98 F | HEART RATE: 105 BPM | RESPIRATION RATE: 17 BRPM | SYSTOLIC BLOOD PRESSURE: 123 MMHG

## 2023-11-30 PROCEDURE — 99285 EMERGENCY DEPT VISIT HI MDM: CPT | Mod: 25

## 2023-11-30 PROCEDURE — 99232 SBSQ HOSP IP/OBS MODERATE 35: CPT

## 2023-11-30 RX ORDER — SODIUM CHLORIDE 9 MG/ML
1000 INJECTION INTRAMUSCULAR; INTRAVENOUS; SUBCUTANEOUS ONCE
Refills: 0 | Status: COMPLETED | OUTPATIENT
Start: 2023-11-30 | End: 2023-11-30

## 2023-11-30 RX ORDER — ONDANSETRON 8 MG/1
4 TABLET, FILM COATED ORAL ONCE
Refills: 0 | Status: COMPLETED | OUTPATIENT
Start: 2023-11-30 | End: 2023-11-30

## 2023-11-30 RX ADMIN — SODIUM CHLORIDE 1000 MILLILITER(S): 9 INJECTION INTRAMUSCULAR; INTRAVENOUS; SUBCUTANEOUS at 23:33

## 2023-11-30 RX ADMIN — BUDESONIDE AND FORMOTEROL FUMARATE DIHYDRATE 2 PUFF(S): 160; 4.5 AEROSOL RESPIRATORY (INHALATION) at 08:17

## 2023-11-30 RX ADMIN — ONDANSETRON 4 MILLIGRAM(S): 8 TABLET, FILM COATED ORAL at 23:32

## 2023-11-30 RX ADMIN — OLANZAPINE 2.5 MILLIGRAM(S): 15 TABLET, FILM COATED ORAL at 08:16

## 2023-11-30 RX ADMIN — OLANZAPINE 2.5 MILLIGRAM(S): 15 TABLET, FILM COATED ORAL at 12:14

## 2023-11-30 RX ADMIN — GABAPENTIN 300 MILLIGRAM(S): 400 CAPSULE ORAL at 08:17

## 2023-11-30 RX ADMIN — ONDANSETRON 4 MILLIGRAM(S): 8 TABLET, FILM COATED ORAL at 19:27

## 2023-11-30 RX ADMIN — PANTOPRAZOLE SODIUM 40 MILLIGRAM(S): 20 TABLET, DELAYED RELEASE ORAL at 06:40

## 2023-11-30 RX ADMIN — ESCITALOPRAM OXALATE 20 MILLIGRAM(S): 10 TABLET, FILM COATED ORAL at 08:17

## 2023-11-30 NOTE — BH PSYCHOLOGY - CLINICIAN PSYCHOTHERAPY NOTE - NSBHPSYCHOLNARRATIVE_PSY_A_CORE FT
Writer met with Pt for 30-minute individual therapy session. Pt was alert and presented with adequate hygiene and grooming. Pt reported feeling "irritated", affect congruent. Pt denied experiencing any A/V hallucinations. Her thought process was linear and goal directed. Pts’ speech was WNL, content was relevant to discussion. Pt denied passive or active SI, HI or NSSI. Oriented x3. Limited insight and judgement demonstrated.      Pt shared feeling anxious and irritated about the possibility of having to go home to her grandmother, and not going to state. Pt shared that she still does not feel better "at all", and doesn't feel she is her true self. Explored triggers outside of hospital, as well as times during which she managed her mental health well for longer periods, and explored with patient what her "true self" would look like, with pt stating she does "not wear a mask, is happier". Used examples during which pt has shown her feelings, or used different statements, and explored with pt what felt difficult about hearing she has made progress in certain areas. Explored why patient believes state and haven housing would be "the only thing that will help", and explored what about it felt helpful, with pt being unable to verbalize.  Writer met with Pt for 30-minute individual therapy session. Pt was alert and presented with adequate hygiene and grooming. Pt reported feeling "irritated", affect congruent. Pt denied experiencing any A/V hallucinations. Her thought process was linear and goal directed. Pts’ speech was WNL, content was relevant to discussion. Pt denied passive or active SI, HI or NSSI. Oriented x3. Limited insight and judgement demonstrated.      Pt shared feeling anxious and irritated about the possibility of having to go home to her grandmother, and not going to state. Pt shared that she still does not feel better "at all", and doesn't feel she is her true self. Explored triggers outside of hospital, as well as times during which she managed her mental health well for longer periods, and explored with patient what her "true self" would look like, with pt stating when she does "not wear a mask, [she] is happier". Used examples during which pt has shown her feelings, or used different statements, and explored with pt what felt difficult about hearing she has made progress in certain areas. Explored why patient believes state and haven housing would be "the only thing that will help", and explored what about it felt helpful, with pt being unable to verbalize.

## 2023-11-30 NOTE — BH INPATIENT PSYCHIATRY PROGRESS NOTE - NSBHFUPINTERVALHXFT_PSY_A_CORE
No overnight events.  Pt states she had a panic attack yesterday when male peer was being disruptive and agitated, pt states she does not like loud noises.  Pt states she is also anxious awaiting [facility] and whether or not they will accept her.  States she continues to have SI and voices.  Pt states if she is rejected by [facility], she may want to try [facility].

## 2023-11-30 NOTE — BH TREATMENT PLAN - NSTXDCOTHRINTERSW_PSY_ALL_CORE
Writer will work to educate the pt on the state referral process and explore any other aftercare options that may be an appropriate alternative to [facility] hospital.

## 2023-11-30 NOTE — ED ADULT NURSE NOTE - OBJECTIVE STATEMENT
pt presents to ED A&04 ambulatory at baseline coming in complaining of Right sided abdominal pain a/w n/x3 episodes of nonbloody vomiting. Respirations even and unlabored. Lung sounds clear with equal chest rise bilaterally. ABD is soft, non tender, non distended with normal active bowel sounds No complaints of chest pain, headache, dizziness, SOB, fever, chills, hematuria, dysuria verbalized. 18GRAC in place, awiating CT

## 2023-11-30 NOTE — BH INPATIENT PSYCHIATRY PROGRESS NOTE - NSBHMETABOLIC_PSY_ALL_CORE_FT
BMI: BMI (kg/m2): 57.8 (09-14-23 @ 14:30)  HbA1c: A1C with Estimated Average Glucose Result: 5.2 % (11-01-23 @ 09:04)    Glucose:   BP: --Vital Signs Last 24 Hrs  T(C): 36.5 (11-30-23 @ 08:23), Max: 36.9 (11-29-23 @ 20:39)  T(F): 97.7 (11-30-23 @ 08:23), Max: 98.4 (11-29-23 @ 20:39)  HR: --  BP: --  BP(mean): --  RR: --  SpO2: --    Orthostatic VS  11-30-23 @ 08:23  Lying BP: --/-- HR: --  Sitting BP: 120/75 HR: 97  Standing BP: 108/60 HR: 111  Site: upper left arm  Mode: electronic  Orthostatic VS  11-29-23 @ 20:39  Lying BP: --/-- HR: --  Sitting BP: 113/78 HR: 85  Standing BP: 116/80 HR: 90  Site: --  Mode: --  Orthostatic VS  11-29-23 @ 08:11  Lying BP: --/-- HR: --  Sitting BP: 109/70 HR: 83  Standing BP: 121/69 HR: 71  Site: --  Mode: --  Orthostatic VS  11-28-23 @ 20:40  Lying BP: --/-- HR: --  Sitting BP: 149/75 HR: 93  Standing BP: 148/77 HR: 109  Site: --  Mode: --    Lipid Panel: Date/Time: 11-01-23 @ 09:04  Cholesterol, Serum: 156  LDL Cholesterol Calculated: 82  HDL Cholesterol, Serum: 58  Total Cholesterol/HDL Ration Measurement: --  Triglycerides, Serum: 79

## 2023-11-30 NOTE — BH INPATIENT PSYCHIATRY PROGRESS NOTE - NSBHCHARTREVIEWVS_PSY_A_CORE FT
Vital Signs Last 24 Hrs  T(C): 36.5 (11-30-23 @ 08:23), Max: 36.9 (11-29-23 @ 20:39)  T(F): 97.7 (11-30-23 @ 08:23), Max: 98.4 (11-29-23 @ 20:39)  HR: --  BP: --  BP(mean): --  RR: --  SpO2: --    Orthostatic VS  11-30-23 @ 08:23  Lying BP: --/-- HR: --  Sitting BP: 120/75 HR: 97  Standing BP: 108/60 HR: 111  Site: upper left arm  Mode: electronic  Orthostatic VS  11-29-23 @ 20:39  Lying BP: --/-- HR: --  Sitting BP: 113/78 HR: 85  Standing BP: 116/80 HR: 90  Site: --  Mode: --  Orthostatic VS  11-29-23 @ 08:11  Lying BP: --/-- HR: --  Sitting BP: 109/70 HR: 83  Standing BP: 121/69 HR: 71  Site: --  Mode: --  Orthostatic VS  11-28-23 @ 20:40  Lying BP: --/-- HR: --  Sitting BP: 149/75 HR: 93  Standing BP: 148/77 HR: 109  Site: --  Mode: --

## 2023-11-30 NOTE — BH PSYCHOLOGY - CLINICIAN PSYCHOTHERAPY NOTE - NSBHPSYCHOLINT_PSY_A_CORE
Cognitive/behavioral therapy/Dynamic issues addressed/Interpersonal Therapy (IPT)/Reality testing/Supported coping skills/Supportive therapy/Treatment compliance encouraged

## 2023-11-30 NOTE — BH INPATIENT PSYCHIATRY PROGRESS NOTE - NSBHASSESSSUMMFT_PSY_ALL_CORE
Patient is 27yo single woman, no dependents, domiciled with her Grandmother, unemployed on disability/SSI, pphx of schizoaffective disorder, multiple past psychiatric admissions including state and recent discharge from [facility], in tx with [facility], with pmhx of asthma, HTN, GERD, and hypothyroidism and schizoaffective disorder, in tx  with [facility], who self presented to the ED reporting abdominal pain and requesting readmission to psychiatric unit, reporting CAH, depressive symptoms, IOR, suicidality, admitted to [facility] for psychiatric care.  Depression and psychosis likely multifactorial at this time, schizoaffective vs. borderline personality disorder vs. complex grief vs. adjustment disorder.      Depressed, anxious, reporting psychosis and SI.    1.) Obs: Refer to chart note filed today for update.  Routine checks, no CO.  2.) Psychiatric medication management:  - Reviewed options for addressing nocturnal enuresis including reducing clozapine vs reducing lithium vs adding desmopressin.  Pt decided upon desmopressin as she continues to have depression and psychosis.  Desmopressin 0.1 mg QHS.  - Lithobid 1200 mg QHS.  Pt aware that she needs to stay well hydrated and avoid diuretics and NSAIDs.   - Weekend team had started chlorpromazine standing at pt request.  Discussed concerns with lowering seizure threshold.  Reviewed alternatives.  Will utilize olanzapine 2.5 mg TID for now.  - Clozapine 200mg qHS for psychosis ([id number])  - Prazosin 2mg qhs, monitor BP carefully.  - Abilify Maintena 400mg IM q3 weeks rather than 4, per collateral from ACT team.  Doses given 10/19, 11/17.  - Mirtazapine was switched to escitalopram, will increase to 20 mg daily tomorrow.  - Gabapentin 300 mg BID for anxiety/chronic pain.  - PRN: haloperidol 5mg PO/IM q8hrs PRN for agitation, diphenhydramine 50mg PO/IM q6hrs PRN for EPS PPx, hydroxyzine 50mg PO q12hrs PRN for anxiety, lorazepam 2mg PO q8hrs PRN for anxiety or 2mg IM for assaultive behavior. Melatonin 5mg qHS PRN for insomnia  - Discussed ECT several times with pt who states she has been told in two hospitals in past that due to her obesity and asthma, she is not an ideal candidate.  Pt states that due to this she is not interested in receiving ECT at this time.  3.) Medical:   - Pt scheduled for dental extractions 11/1, however declined to attend on day prior when informed it would not be under general anesthesia.  - last clozapine labs on 10/23 (CBC with diff, troponins, CRP); CRP elevated at 15.8 likely due to dental infection/wisdom tooth issues  - pantoprazole 40mg PO before breakfast for GERD  - montelukast 10mg PO qHS for asthma  - budesonide 80mcg/formoterol 4.5mcg 2 puffs BID for asthma  - PRN: acetaminophen 650mg PO q6hrs PRN for pain, albuterol 90mcg 2puffs q6hrs PRN for dyspnea, ondansetron 4mg q6hrs PRN for nausea  - Discussed CRP with medicine service, given asymptomatic pt and normal troponin, no additional intervention/diagnostics at this time.  - Discontinued clobetasol as rash has resolved.  4.) Dispo planning: State application submitted.

## 2023-11-30 NOTE — BH CHART NOTE - NSEVENTNOTEFT_PSY_ALL_CORE
[facility] Inpatient to ED Transfer Summary    Reason for Transfer/Medical Summary:  Patient reports abdominal pain for few hours. Reports initial pain was periumbilical and now has migrated to the right side. Patient reports 9/10 constant with loss of appetite, nausea and vomiting. Patient has had 3 vomiting episodes despite Zofran PRN and can not tolerate PO. Patient denies any fever, chills, chest pain, blurry vision but reports feeling dizzy.     PAST MEDICAL & SURGICAL HISTORY:  Asthma      Schizoaffective disorder      PTSD (post-traumatic stress disorder)      No significant past surgical history          Allergies    No Known Allergies    Intolerances        MEDICATIONS  (STANDING):  budesonide  80 MICROgram(s)/formoterol 4.5 MICROgram(s) Inhaler 2 Puff(s) Inhalation two times a day  cloZAPine 200 milliGRAM(s) Oral at bedtime  desmopressin 0.1 milliGRAM(s) Oral at bedtime  escitalopram 20 milliGRAM(s) Oral daily  gabapentin 300 milliGRAM(s) Oral two times a day  influenza   Vaccine 0.5 milliLiter(s) IntraMuscular once  lithium SR (LITHOBID) 1200 milliGRAM(s) Oral at bedtime  montelukast 10 milliGRAM(s) Oral at bedtime  OLANZapine 2.5 milliGRAM(s) Oral three times a day  pantoprazole    Tablet 40 milliGRAM(s) Oral before breakfast  prazosin. 2 milliGRAM(s) Oral at bedtime    MEDICATIONS  (PRN):  acetaminophen     Tablet .. 650 milliGRAM(s) Oral every 6 hours PRN Moderate Pain (4 - 6)  albuterol    90 MICROgram(s) HFA Inhaler 2 Puff(s) Inhalation every 6 hours PRN Shortness of Breath and/or Wheezing  aluminum hydroxide/magnesium hydroxide/simethicone Suspension 30 milliLiter(s) Oral every 4 hours PRN Dyspepsia  benzocaine 20% Spray 1 Spray(s) Topical every 6 hours PRN tooth pain  chlorproMAZINE    Injectable 50 milliGRAM(s) IntraMuscular once PRN severe agitation  hydrOXYzine hydrochloride 50 milliGRAM(s) Oral every 12 hours PRN Anxiety  lidocaine   4% Patch 1 Patch Transdermal daily PRN LBP  LORazepam     Tablet 2 milliGRAM(s) Oral every 8 hours PRN Anxiety  LORazepam   Injectable 2 milliGRAM(s) IntraMuscular once PRN Assaultive behavior  melatonin. 5 milliGRAM(s) Oral at bedtime PRN Insomnia  ondansetron    Tablet 4 milliGRAM(s) Oral every 6 hours PRN nausea  polyethylene glycol 3350 17 Gram(s) Oral daily PRN Constipation      Vital Signs Last 24 Hrs  T(C): 36.8 (30 Nov 2023 20:28), Max: 36.8 (30 Nov 2023 20:28)  T(F): 98.3 (30 Nov 2023 20:28), Max: 98.3 (30 Nov 2023 20:28)  HR: --  BP: --  BP(mean): --  RR: --  SpO2: --      CAPILLARY BLOOD GLUCOSE            PHYSICAL EXAM:  GENERAL:  well-developed.   HEAD:  Atraumatic, Normocephalic  EYES: EOMI, PERRLA, conjunctiva and sclera clear  NECK: Supple, No JVD  CHEST/LUNG: Clear to auscultation bilaterally; No wheeze  HEART: Regular rate and rhythm; No murmurs, rubs, or gallops  ABDOMEN: limited due to pain right side.  positive McBurney.  EXTREMITIES:  2+ Peripheral Pulses, No clubbing, cyanosis, or edema  PSYCH: AAOx3  NEUROLOGY: non-focal, mild b/l upper tremor (states not new)  SKIN: No rashes or lesions    LABS:                        12.7   9.07  )-----------( 282      ( 29 Nov 2023 08:41 )             41.2     11-29    139  |  105  |  20  ----------------------------<  98  4.6   |  24  |  0.65    Ca    9.2      29 Nov 2023 08:41    TPro  7.4  /  Alb  3.8  /  TBili  <0.2  /  DBili  x   /  AST  36<H>  /  ALT  38<H>  /  AlkPhos  122<H>  11-29          Urinalysis Basic - ( 29 Nov 2023 08:41 )    Color: x / Appearance: x / SG: x / pH: x  Gluc: 98 mg/dL / Ketone: x  / Bili: x / Urobili: x   Blood: x / Protein: x / Nitrite: x   Leuk Esterase: x / RBC: x / WBC x   Sq Epi: x / Non Sq Epi: x / Bacteria: x        Psychiatry Section:  Psychiatric Summary/[facility] admitting diagnosis:    Patient is 27yo single woman, no dependents, domiciled with her Grandmother, unemployed on disability/SSI, pphx of schizoaffective disorder, multiple past psychiatric admissions including state and recent discharge from [facility], in tx with [facility], with pmhx of asthma, HTN, GERD, and hypothyroidism and schizoaffective disorder, in tx  with [facility], who self presented to the ED reporting abdominal pain and requesting readmission to psychiatric unit, reporting CAH, depressive symptoms, IOR, suicidality, admitted to [facility] for psychiatric care.  Depression and psychosis likely multifactorial at this time, schizoaffective vs. borderline personality disorder vs. complex grief vs. adjustment disorder.  Patient on Lithium and Clozapine.     Psychiatric Recommendations:    PRN: haloperidol 5mg PO/IM q8hrs PRN for agitation, diphenhydramine 50mg PO/IM q6hrs PRN for EPS PPx, hydroxyzine 50mg PO q12hrs PRN for anxiety, lorazepam 2mg PO q8hrs PRN for anxiety or 2mg IM for assaultive behavior. Melatonin 5mg qHS PRN for insomnia      Observation status (check one):   (x ) Constant Observation  ( ) Enhanced care  ( ) Routine checks    Risk Status (check all that apply if present):  ( ) at risk for suicide/self-injury  ( ) at risk for aggressive behavior  ( ) at risk for elopement  (x) other risk: [facility] patient    [facility] Inpatient to ED Transfer Summary    Reason for Transfer/Medical Summary:  Patient reports abdominal pain for few hours. Reports initial pain was periumbilical and now has migrated to the right side. Patient reports 9/10 constant pain with loss of appetite, nausea and vomiting. Patient has had 3 vomiting episodes despite Zofran PRN and can not tolerate PO. Patient denies any fever, chills, chest pain, blurry vision but reports feeling dizzy.     PAST MEDICAL & SURGICAL HISTORY:  Asthma      Schizoaffective disorder      PTSD (post-traumatic stress disorder)      No significant past surgical history          Allergies    No Known Allergies    Intolerances        MEDICATIONS  (STANDING):  budesonide  80 MICROgram(s)/formoterol 4.5 MICROgram(s) Inhaler 2 Puff(s) Inhalation two times a day  cloZAPine 200 milliGRAM(s) Oral at bedtime  desmopressin 0.1 milliGRAM(s) Oral at bedtime  escitalopram 20 milliGRAM(s) Oral daily  gabapentin 300 milliGRAM(s) Oral two times a day  influenza   Vaccine 0.5 milliLiter(s) IntraMuscular once  lithium SR (LITHOBID) 1200 milliGRAM(s) Oral at bedtime  montelukast 10 milliGRAM(s) Oral at bedtime  OLANZapine 2.5 milliGRAM(s) Oral three times a day  pantoprazole    Tablet 40 milliGRAM(s) Oral before breakfast  prazosin. 2 milliGRAM(s) Oral at bedtime    MEDICATIONS  (PRN):  acetaminophen     Tablet .. 650 milliGRAM(s) Oral every 6 hours PRN Moderate Pain (4 - 6)  albuterol    90 MICROgram(s) HFA Inhaler 2 Puff(s) Inhalation every 6 hours PRN Shortness of Breath and/or Wheezing  aluminum hydroxide/magnesium hydroxide/simethicone Suspension 30 milliLiter(s) Oral every 4 hours PRN Dyspepsia  benzocaine 20% Spray 1 Spray(s) Topical every 6 hours PRN tooth pain  chlorproMAZINE    Injectable 50 milliGRAM(s) IntraMuscular once PRN severe agitation  hydrOXYzine hydrochloride 50 milliGRAM(s) Oral every 12 hours PRN Anxiety  lidocaine   4% Patch 1 Patch Transdermal daily PRN LBP  LORazepam     Tablet 2 milliGRAM(s) Oral every 8 hours PRN Anxiety  LORazepam   Injectable 2 milliGRAM(s) IntraMuscular once PRN Assaultive behavior  melatonin. 5 milliGRAM(s) Oral at bedtime PRN Insomnia  ondansetron    Tablet 4 milliGRAM(s) Oral every 6 hours PRN nausea  polyethylene glycol 3350 17 Gram(s) Oral daily PRN Constipation      Vital Signs Last 24 Hrs  T(C): 36.8 (30 Nov 2023 20:28), Max: 36.8 (30 Nov 2023 20:28)  T(F): 98.3 (30 Nov 2023 20:28), Max: 98.3 (30 Nov 2023 20:28)  HR: --  BP: --  BP(mean): --  RR: --  SpO2: --      CAPILLARY BLOOD GLUCOSE            PHYSICAL EXAM:  GENERAL:  well-developed.   HEAD:  Atraumatic, Normocephalic  EYES: EOMI, PERRLA, conjunctiva and sclera clear  NECK: Supple, No JVD  CHEST/LUNG: Clear to auscultation bilaterally; No wheeze  HEART: Regular rate and rhythm; No murmurs, rubs, or gallops  ABDOMEN: limited due to pain right side.  positive McBurney.  EXTREMITIES:  2+ Peripheral Pulses, No clubbing, cyanosis, or edema  PSYCH: AAOx3  NEUROLOGY: non-focal, mild b/l upper tremor (states not new)  SKIN: No rashes or lesions    LABS:                        12.7   9.07  )-----------( 282      ( 29 Nov 2023 08:41 )             41.2     11-29    139  |  105  |  20  ----------------------------<  98  4.6   |  24  |  0.65    Ca    9.2      29 Nov 2023 08:41    TPro  7.4  /  Alb  3.8  /  TBili  <0.2  /  DBili  x   /  AST  36<H>  /  ALT  38<H>  /  AlkPhos  122<H>  11-29          Urinalysis Basic - ( 29 Nov 2023 08:41 )    Color: x / Appearance: x / SG: x / pH: x  Gluc: 98 mg/dL / Ketone: x  / Bili: x / Urobili: x   Blood: x / Protein: x / Nitrite: x   Leuk Esterase: x / RBC: x / WBC x   Sq Epi: x / Non Sq Epi: x / Bacteria: x        Psychiatry Section:  Psychiatric Summary/[facility] admitting diagnosis:    Patient is 29yo single woman, no dependents, domiciled with her Grandmother, unemployed on disability/SSI, pphx of schizoaffective disorder, multiple past psychiatric admissions including state and recent discharge from [facility], in tx with [facility], with pmhx of asthma, HTN, GERD, and hypothyroidism and schizoaffective disorder, in tx  with [facility], who self presented to the ED reporting abdominal pain and requesting readmission to psychiatric unit, reporting CAH, depressive symptoms, IOR, suicidality, admitted to [facility] for psychiatric care.  Depression and psychosis likely multifactorial at this time, schizoaffective vs. borderline personality disorder vs. complex grief vs. adjustment disorder.  Patient on Lithium and Clozapine.     Psychiatric Recommendations:    PRN: haloperidol 5mg PO/IM q8hrs PRN for agitation, diphenhydramine 50mg PO/IM q6hrs PRN for EPS PPx, hydroxyzine 50mg PO q12hrs PRN for anxiety, lorazepam 2mg PO q8hrs PRN for anxiety or 2mg IM for assaultive behavior. Melatonin 5mg qHS PRN for insomnia      Observation status (check one):   (x ) Constant Observation  ( ) Enhanced care  ( ) Routine checks    Risk Status (check all that apply if present):  ( ) at risk for suicide/self-injury  ( ) at risk for aggressive behavior  ( ) at risk for elopement  (x) other risk: [facility] patient

## 2023-11-30 NOTE — BH INPATIENT PSYCHIATRY PROGRESS NOTE - MSE UNSTRUCTURED FT
Appearance: Dressed appropriately.  Behavior: Cooperative.  Calm.  Motor: No abnormal movements, no psychomotor slowing or activation.  Speech: Regular rate.  Mood: Anxious.   Affect: Depressed and anxious.  Thought Process: Linear.  Associations: Fair.  Thought Content: AH and SI.   Insight: Limited.  Judgment: Fair on interview.  Attention: Fair.  Language: Fluent.  Gait: Intact.

## 2023-12-01 VITALS
TEMPERATURE: 98 F | DIASTOLIC BLOOD PRESSURE: 67 MMHG | SYSTOLIC BLOOD PRESSURE: 110 MMHG | OXYGEN SATURATION: 100 % | HEART RATE: 99 BPM | RESPIRATION RATE: 17 BRPM

## 2023-12-01 LAB
ALBUMIN SERPL ELPH-MCNC: 3.8 G/DL — SIGNIFICANT CHANGE UP (ref 3.3–5)
ALP SERPL-CCNC: 112 U/L — SIGNIFICANT CHANGE UP (ref 40–120)
ALP SERPL-CCNC: 112 U/L — SIGNIFICANT CHANGE UP (ref 40–120)
ALP SERPL-CCNC: 118 U/L — SIGNIFICANT CHANGE UP (ref 40–120)
ALP SERPL-CCNC: 118 U/L — SIGNIFICANT CHANGE UP (ref 40–120)
ALT FLD-CCNC: 29 U/L — SIGNIFICANT CHANGE UP (ref 4–33)
ALT FLD-CCNC: 29 U/L — SIGNIFICANT CHANGE UP (ref 4–33)
ALT FLD-CCNC: 39 U/L — HIGH (ref 4–33)
ALT FLD-CCNC: 39 U/L — HIGH (ref 4–33)
ANION GAP SERPL CALC-SCNC: 13 MMOL/L — SIGNIFICANT CHANGE UP (ref 7–14)
ANION GAP SERPL CALC-SCNC: 13 MMOL/L — SIGNIFICANT CHANGE UP (ref 7–14)
ANION GAP SERPL CALC-SCNC: 8 MMOL/L — SIGNIFICANT CHANGE UP (ref 7–14)
ANION GAP SERPL CALC-SCNC: 8 MMOL/L — SIGNIFICANT CHANGE UP (ref 7–14)
APTT BLD: 30.4 SEC — SIGNIFICANT CHANGE UP (ref 24.5–35.6)
APTT BLD: 30.4 SEC — SIGNIFICANT CHANGE UP (ref 24.5–35.6)
AST SERPL-CCNC: 20 U/L — SIGNIFICANT CHANGE UP (ref 4–32)
AST SERPL-CCNC: 20 U/L — SIGNIFICANT CHANGE UP (ref 4–32)
AST SERPL-CCNC: 37 U/L — HIGH (ref 4–32)
AST SERPL-CCNC: 37 U/L — HIGH (ref 4–32)
BASOPHILS # BLD AUTO: 0.03 K/UL — SIGNIFICANT CHANGE UP (ref 0–0.2)
BASOPHILS # BLD AUTO: 0.03 K/UL — SIGNIFICANT CHANGE UP (ref 0–0.2)
BASOPHILS NFR BLD AUTO: 0.2 % — SIGNIFICANT CHANGE UP (ref 0–2)
BASOPHILS NFR BLD AUTO: 0.2 % — SIGNIFICANT CHANGE UP (ref 0–2)
BILIRUB SERPL-MCNC: <0.2 MG/DL — SIGNIFICANT CHANGE UP (ref 0.2–1.2)
BLD GP AB SCN SERPL QL: NEGATIVE — SIGNIFICANT CHANGE UP
BLD GP AB SCN SERPL QL: NEGATIVE — SIGNIFICANT CHANGE UP
BUN SERPL-MCNC: 21 MG/DL — SIGNIFICANT CHANGE UP (ref 7–23)
BUN SERPL-MCNC: 21 MG/DL — SIGNIFICANT CHANGE UP (ref 7–23)
BUN SERPL-MCNC: 25 MG/DL — HIGH (ref 7–23)
BUN SERPL-MCNC: 25 MG/DL — HIGH (ref 7–23)
CALCIUM SERPL-MCNC: 8.5 MG/DL — SIGNIFICANT CHANGE UP (ref 8.4–10.5)
CALCIUM SERPL-MCNC: 8.5 MG/DL — SIGNIFICANT CHANGE UP (ref 8.4–10.5)
CALCIUM SERPL-MCNC: 8.7 MG/DL — SIGNIFICANT CHANGE UP (ref 8.4–10.5)
CALCIUM SERPL-MCNC: 8.7 MG/DL — SIGNIFICANT CHANGE UP (ref 8.4–10.5)
CHLORIDE SERPL-SCNC: 104 MMOL/L — SIGNIFICANT CHANGE UP (ref 98–107)
CHLORIDE SERPL-SCNC: 104 MMOL/L — SIGNIFICANT CHANGE UP (ref 98–107)
CHLORIDE SERPL-SCNC: 106 MMOL/L — SIGNIFICANT CHANGE UP (ref 98–107)
CHLORIDE SERPL-SCNC: 106 MMOL/L — SIGNIFICANT CHANGE UP (ref 98–107)
CO2 SERPL-SCNC: 19 MMOL/L — LOW (ref 22–31)
CO2 SERPL-SCNC: 19 MMOL/L — LOW (ref 22–31)
CO2 SERPL-SCNC: 23 MMOL/L — SIGNIFICANT CHANGE UP (ref 22–31)
CO2 SERPL-SCNC: 23 MMOL/L — SIGNIFICANT CHANGE UP (ref 22–31)
CREAT SERPL-MCNC: 0.65 MG/DL — SIGNIFICANT CHANGE UP (ref 0.5–1.3)
CREAT SERPL-MCNC: 0.65 MG/DL — SIGNIFICANT CHANGE UP (ref 0.5–1.3)
CREAT SERPL-MCNC: 0.74 MG/DL — SIGNIFICANT CHANGE UP (ref 0.5–1.3)
CREAT SERPL-MCNC: 0.74 MG/DL — SIGNIFICANT CHANGE UP (ref 0.5–1.3)
EGFR: 114 ML/MIN/1.73M2 — SIGNIFICANT CHANGE UP
EGFR: 114 ML/MIN/1.73M2 — SIGNIFICANT CHANGE UP
EGFR: 124 ML/MIN/1.73M2 — SIGNIFICANT CHANGE UP
EGFR: 124 ML/MIN/1.73M2 — SIGNIFICANT CHANGE UP
EOSINOPHIL # BLD AUTO: 0 K/UL — SIGNIFICANT CHANGE UP (ref 0–0.5)
EOSINOPHIL # BLD AUTO: 0 K/UL — SIGNIFICANT CHANGE UP (ref 0–0.5)
EOSINOPHIL NFR BLD AUTO: 0 % — SIGNIFICANT CHANGE UP (ref 0–6)
EOSINOPHIL NFR BLD AUTO: 0 % — SIGNIFICANT CHANGE UP (ref 0–6)
GLUCOSE SERPL-MCNC: 90 MG/DL — SIGNIFICANT CHANGE UP (ref 70–99)
GLUCOSE SERPL-MCNC: 90 MG/DL — SIGNIFICANT CHANGE UP (ref 70–99)
GLUCOSE SERPL-MCNC: 92 MG/DL — SIGNIFICANT CHANGE UP (ref 70–99)
GLUCOSE SERPL-MCNC: 92 MG/DL — SIGNIFICANT CHANGE UP (ref 70–99)
HCG SERPL-ACNC: <1 MIU/ML — SIGNIFICANT CHANGE UP
HCG SERPL-ACNC: <1 MIU/ML — SIGNIFICANT CHANGE UP
HCT VFR BLD CALC: 38.4 % — SIGNIFICANT CHANGE UP (ref 34.5–45)
HCT VFR BLD CALC: 38.4 % — SIGNIFICANT CHANGE UP (ref 34.5–45)
HGB BLD-MCNC: 11.9 G/DL — SIGNIFICANT CHANGE UP (ref 11.5–15.5)
HGB BLD-MCNC: 11.9 G/DL — SIGNIFICANT CHANGE UP (ref 11.5–15.5)
IANC: 10.84 K/UL — HIGH (ref 1.8–7.4)
IANC: 10.84 K/UL — HIGH (ref 1.8–7.4)
IMM GRANULOCYTES NFR BLD AUTO: 0.5 % — SIGNIFICANT CHANGE UP (ref 0–0.9)
IMM GRANULOCYTES NFR BLD AUTO: 0.5 % — SIGNIFICANT CHANGE UP (ref 0–0.9)
INR BLD: 0.99 RATIO — SIGNIFICANT CHANGE UP (ref 0.85–1.18)
INR BLD: 0.99 RATIO — SIGNIFICANT CHANGE UP (ref 0.85–1.18)
LIDOCAIN IGE QN: 21 U/L — SIGNIFICANT CHANGE UP (ref 7–60)
LIDOCAIN IGE QN: 21 U/L — SIGNIFICANT CHANGE UP (ref 7–60)
LITHIUM SERPL-MCNC: 0.2 MMOL/L — LOW (ref 0.6–1.2)
LITHIUM SERPL-MCNC: 0.2 MMOL/L — LOW (ref 0.6–1.2)
LYMPHOCYTES # BLD AUTO: 16.1 % — SIGNIFICANT CHANGE UP (ref 13–44)
LYMPHOCYTES # BLD AUTO: 16.1 % — SIGNIFICANT CHANGE UP (ref 13–44)
LYMPHOCYTES # BLD AUTO: 2.26 K/UL — SIGNIFICANT CHANGE UP (ref 1–3.3)
LYMPHOCYTES # BLD AUTO: 2.26 K/UL — SIGNIFICANT CHANGE UP (ref 1–3.3)
MCHC RBC-ENTMCNC: 24.4 PG — LOW (ref 27–34)
MCHC RBC-ENTMCNC: 24.4 PG — LOW (ref 27–34)
MCHC RBC-ENTMCNC: 31 GM/DL — LOW (ref 32–36)
MCHC RBC-ENTMCNC: 31 GM/DL — LOW (ref 32–36)
MCV RBC AUTO: 78.9 FL — LOW (ref 80–100)
MCV RBC AUTO: 78.9 FL — LOW (ref 80–100)
MONOCYTES # BLD AUTO: 0.8 K/UL — SIGNIFICANT CHANGE UP (ref 0–0.9)
MONOCYTES # BLD AUTO: 0.8 K/UL — SIGNIFICANT CHANGE UP (ref 0–0.9)
MONOCYTES NFR BLD AUTO: 5.7 % — SIGNIFICANT CHANGE UP (ref 2–14)
MONOCYTES NFR BLD AUTO: 5.7 % — SIGNIFICANT CHANGE UP (ref 2–14)
NEUTROPHILS # BLD AUTO: 10.84 K/UL — HIGH (ref 1.8–7.4)
NEUTROPHILS # BLD AUTO: 10.84 K/UL — HIGH (ref 1.8–7.4)
NEUTROPHILS NFR BLD AUTO: 77.5 % — HIGH (ref 43–77)
NEUTROPHILS NFR BLD AUTO: 77.5 % — HIGH (ref 43–77)
NRBC # BLD: 0 /100 WBCS — SIGNIFICANT CHANGE UP (ref 0–0)
NRBC # BLD: 0 /100 WBCS — SIGNIFICANT CHANGE UP (ref 0–0)
NRBC # FLD: 0 K/UL — SIGNIFICANT CHANGE UP (ref 0–0)
NRBC # FLD: 0 K/UL — SIGNIFICANT CHANGE UP (ref 0–0)
PLATELET # BLD AUTO: 294 K/UL — SIGNIFICANT CHANGE UP (ref 150–400)
PLATELET # BLD AUTO: 294 K/UL — SIGNIFICANT CHANGE UP (ref 150–400)
POTASSIUM SERPL-MCNC: 4.3 MMOL/L — SIGNIFICANT CHANGE UP (ref 3.5–5.3)
POTASSIUM SERPL-MCNC: 4.3 MMOL/L — SIGNIFICANT CHANGE UP (ref 3.5–5.3)
POTASSIUM SERPL-MCNC: 4.8 MMOL/L — SIGNIFICANT CHANGE UP (ref 3.5–5.3)
POTASSIUM SERPL-MCNC: 4.8 MMOL/L — SIGNIFICANT CHANGE UP (ref 3.5–5.3)
POTASSIUM SERPL-SCNC: 4.3 MMOL/L — SIGNIFICANT CHANGE UP (ref 3.5–5.3)
POTASSIUM SERPL-SCNC: 4.3 MMOL/L — SIGNIFICANT CHANGE UP (ref 3.5–5.3)
POTASSIUM SERPL-SCNC: 4.8 MMOL/L — SIGNIFICANT CHANGE UP (ref 3.5–5.3)
POTASSIUM SERPL-SCNC: 4.8 MMOL/L — SIGNIFICANT CHANGE UP (ref 3.5–5.3)
PROT SERPL-MCNC: 7 G/DL — SIGNIFICANT CHANGE UP (ref 6–8.3)
PROT SERPL-MCNC: 7 G/DL — SIGNIFICANT CHANGE UP (ref 6–8.3)
PROT SERPL-MCNC: 7.1 G/DL — SIGNIFICANT CHANGE UP (ref 6–8.3)
PROT SERPL-MCNC: 7.1 G/DL — SIGNIFICANT CHANGE UP (ref 6–8.3)
PROTHROM AB SERPL-ACNC: 11.2 SEC — SIGNIFICANT CHANGE UP (ref 9.5–13)
PROTHROM AB SERPL-ACNC: 11.2 SEC — SIGNIFICANT CHANGE UP (ref 9.5–13)
RBC # BLD: 4.87 M/UL — SIGNIFICANT CHANGE UP (ref 3.8–5.2)
RBC # BLD: 4.87 M/UL — SIGNIFICANT CHANGE UP (ref 3.8–5.2)
RBC # FLD: 15.2 % — HIGH (ref 10.3–14.5)
RBC # FLD: 15.2 % — HIGH (ref 10.3–14.5)
RH IG SCN BLD-IMP: POSITIVE — SIGNIFICANT CHANGE UP
RH IG SCN BLD-IMP: POSITIVE — SIGNIFICANT CHANGE UP
SODIUM SERPL-SCNC: 136 MMOL/L — SIGNIFICANT CHANGE UP (ref 135–145)
SODIUM SERPL-SCNC: 136 MMOL/L — SIGNIFICANT CHANGE UP (ref 135–145)
SODIUM SERPL-SCNC: 137 MMOL/L — SIGNIFICANT CHANGE UP (ref 135–145)
SODIUM SERPL-SCNC: 137 MMOL/L — SIGNIFICANT CHANGE UP (ref 135–145)
WBC # BLD: 14 K/UL — HIGH (ref 3.8–10.5)
WBC # BLD: 14 K/UL — HIGH (ref 3.8–10.5)
WBC # FLD AUTO: 14 K/UL — HIGH (ref 3.8–10.5)
WBC # FLD AUTO: 14 K/UL — HIGH (ref 3.8–10.5)

## 2023-12-01 PROCEDURE — 74177 CT ABD & PELVIS W/CONTRAST: CPT | Mod: 26,MA

## 2023-12-01 PROCEDURE — 99232 SBSQ HOSP IP/OBS MODERATE 35: CPT

## 2023-12-01 RX ADMIN — Medication 2 MILLIGRAM(S): at 21:20

## 2023-12-01 RX ADMIN — OLANZAPINE 2.5 MILLIGRAM(S): 15 TABLET, FILM COATED ORAL at 14:08

## 2023-12-01 RX ADMIN — CLOZAPINE 200 MILLIGRAM(S): 150 TABLET, ORALLY DISINTEGRATING ORAL at 21:21

## 2023-12-01 RX ADMIN — GABAPENTIN 300 MILLIGRAM(S): 400 CAPSULE ORAL at 21:22

## 2023-12-01 RX ADMIN — DESMOPRESSIN ACETATE 0.1 MILLIGRAM(S): 0.1 TABLET ORAL at 21:21

## 2023-12-01 RX ADMIN — MONTELUKAST 10 MILLIGRAM(S): 4 TABLET, CHEWABLE ORAL at 21:21

## 2023-12-01 RX ADMIN — ESCITALOPRAM OXALATE 20 MILLIGRAM(S): 10 TABLET, FILM COATED ORAL at 14:07

## 2023-12-01 RX ADMIN — GABAPENTIN 300 MILLIGRAM(S): 400 CAPSULE ORAL at 14:07

## 2023-12-01 RX ADMIN — OLANZAPINE 2.5 MILLIGRAM(S): 15 TABLET, FILM COATED ORAL at 21:21

## 2023-12-01 RX ADMIN — LITHIUM CARBONATE 1200 MILLIGRAM(S): 300 TABLET, EXTENDED RELEASE ORAL at 21:20

## 2023-12-01 NOTE — ED PROVIDER NOTE - CLINICAL SUMMARY MEDICAL DECISION MAKING FREE TEXT BOX
Patient is a 28-year-old female, past medical history of asthma, schizoaffective disorder currently admitted to [facility] referred to the ED with complaint of right lower quadrant pain associated with nausea and 3 episodes of vomiting. on presentation, patient well appearing, vitals stable. RLQ tenderness noted. symptoms concerning for appendicitis. plan routine labs, analgesic, CT a/p

## 2023-12-01 NOTE — BH INPATIENT PSYCHIATRY PROGRESS NOTE - NSBHMETABOLIC_PSY_ALL_CORE_FT
BMI: BMI (kg/m2): 57.8 (09-14-23 @ 14:30)  HbA1c: A1C with Estimated Average Glucose Result: 5.2 % (11-01-23 @ 09:04)    Glucose:   BP: --Vital Signs Last 24 Hrs  T(C): 36.3 (12-01-23 @ 07:40), Max: 36.8 (11-30-23 @ 20:28)  T(F): 97.4 (12-01-23 @ 07:40), Max: 98.3 (11-30-23 @ 20:28)  HR: 99 (12-01-23 @ 02:16) (99 - 105)  BP: 110/67 (12-01-23 @ 02:16) (110/67 - 123/83)  BP(mean): --  RR: 17 (12-01-23 @ 02:16) (17 - 17)  SpO2: 100% (12-01-23 @ 02:16) (96% - 100%)    Orthostatic VS  12-01-23 @ 07:40  Lying BP: --/-- HR: --  Sitting BP: 133/80 HR: 96  Standing BP: 128/88 HR: 104  Site: --  Mode: --  Orthostatic VS  11-30-23 @ 20:28  Lying BP: --/-- HR: --  Sitting BP: 130/69 HR: 90  Standing BP: 125/67 HR: 87  Site: --  Mode: --  Orthostatic VS  11-30-23 @ 08:23  Lying BP: --/-- HR: --  Sitting BP: 120/75 HR: 97  Standing BP: 108/60 HR: 111  Site: upper left arm  Mode: electronic  Orthostatic VS  11-29-23 @ 20:39  Lying BP: --/-- HR: --  Sitting BP: 113/78 HR: 85  Standing BP: 116/80 HR: 90  Site: --  Mode: --    Lipid Panel: Date/Time: 11-01-23 @ 09:04  Cholesterol, Serum: 156  LDL Cholesterol Calculated: 82  HDL Cholesterol, Serum: 58  Total Cholesterol/HDL Ration Measurement: --  Triglycerides, Serum: 79

## 2023-12-01 NOTE — ED PROVIDER NOTE - OBJECTIVE STATEMENT
Patient is a 28-year-old female, past medical history of asthma, schizoaffective disorder currently admitted to [facility] referred to the ED with complaint of right lower quadrant pain associated with nausea and 3 episodes of vomiting times today.  Pain is a pressure sensation nonradiating no associated fever, chills, urinary symptoms, diarrhea, vaginal complaints, dysuria, hematuria.

## 2023-12-01 NOTE — BH INPATIENT PSYCHIATRY PROGRESS NOTE - NSBHASSESSSUMMFT_PSY_ALL_CORE
Patient is 29yo single woman, no dependents, domiciled with her Grandmother, unemployed on disability/SSI, pphx of schizoaffective disorder, multiple past psychiatric admissions including state and recent discharge from [facility], in tx with [facility], with pmhx of asthma, HTN, GERD, and hypothyroidism and schizoaffective disorder, in tx with [facility], who self presented to the ED reporting abdominal pain and requesting readmission to psychiatric unit, reporting CAH, depressive symptoms, IOR, suicidality, admitted to [facility] for psychiatric care.  Depression and psychosis likely multifactorial at this time, schizoaffective vs. borderline personality disorder vs. complex grief vs. adjustment disorder.      ER visit required yesterday for emesis.  Unclear etiology.  Will order additional labs for today - serum Li and CMP.  Remains depressed, anxious, reporting psychosis and SI.    1.) Obs: Refer to chart note filed today for update.  Routine checks, no CO.  2.) Psychiatric medication management:  - Reviewed options for addressing nocturnal enuresis including reducing clozapine vs reducing lithium vs adding desmopressin.  Pt decided upon desmopressin as she continues to have depression and psychosis.  Desmopressin 0.1 mg QHS.  - Lithobid 1200 mg QHS.  Pt aware that she needs to stay well hydrated and avoid diuretics and NSAIDs.   - Weekend team had started chlorpromazine standing at pt request.  Discussed concerns with lowering seizure threshold.  Reviewed alternatives.  Will utilize olanzapine 2.5 mg TID for now.  - Clozapine 200mg qHS for psychosis ([id number])  - Prazosin 2mg qhs, monitor BP carefully.  - Abilify Maintena 400mg IM q3 weeks rather than 4, per collateral from ACT team.  Doses given 10/19, 11/17.  - Mirtazapine was switched to escitalopram, will increase to 20 mg daily tomorrow.  - Gabapentin 300 mg BID for anxiety/chronic pain.  - PRN: haloperidol 5mg PO/IM q8hrs PRN for agitation, diphenhydramine 50mg PO/IM q6hrs PRN for EPS PPx, hydroxyzine 50mg PO q12hrs PRN for anxiety, lorazepam 2mg PO q8hrs PRN for anxiety or 2mg IM for assaultive behavior. Melatonin 5mg qHS PRN for insomnia  - Discussed ECT several times with pt who states she has been told in two hospitals in past that due to her obesity and asthma, she is not an ideal candidate.  Pt states that due to this she is not interested in receiving ECT at this time.  3.) Medical:   - Pt scheduled for dental extractions 11/1, however declined to attend on day prior when informed it would not be under general anesthesia.  - last clozapine labs on 10/23 (CBC with diff, troponins, CRP); CRP elevated at 15.8 likely due to dental infection/wisdom tooth issues  - pantoprazole 40mg PO before breakfast for GERD  - montelukast 10mg PO qHS for asthma  - budesonide 80mcg/formoterol 4.5mcg 2 puffs BID for asthma  - PRN: acetaminophen 650mg PO q6hrs PRN for pain, albuterol 90mcg 2puffs q6hrs PRN for dyspnea, ondansetron 4mg q6hrs PRN for nausea  - Discussed CRP with medicine service, given asymptomatic pt and normal troponin, no additional intervention/diagnostics at this time.  - Discontinued clobetasol as rash has resolved.  4.) Dispo planning: State application submitted.

## 2023-12-01 NOTE — ED ADULT NURSE REASSESSMENT NOTE - NS ED NURSE REASSESS COMMENT FT1
Pt discharged, IV access removed, dc papers given. [facility] staff remains at bedside, transportation here to take pt back to [facility] .

## 2023-12-01 NOTE — ED PROVIDER NOTE - NSICDXPASTMEDICALHX_GEN_ALL_CORE_FT
PAST MEDICAL HISTORY:  Asthma     PTSD (post-traumatic stress disorder)     Schizoaffective disorder

## 2023-12-01 NOTE — BH INPATIENT PSYCHIATRY PROGRESS NOTE - NSBHCHARTREVIEWVS_PSY_A_CORE FT
Vital Signs Last 24 Hrs  T(C): 36.3 (12-01-23 @ 07:40), Max: 36.8 (11-30-23 @ 20:28)  T(F): 97.4 (12-01-23 @ 07:40), Max: 98.3 (11-30-23 @ 20:28)  HR: 99 (12-01-23 @ 02:16) (99 - 105)  BP: 110/67 (12-01-23 @ 02:16) (110/67 - 123/83)  BP(mean): --  RR: 17 (12-01-23 @ 02:16) (17 - 17)  SpO2: 100% (12-01-23 @ 02:16) (96% - 100%)    Orthostatic VS  12-01-23 @ 07:40  Lying BP: --/-- HR: --  Sitting BP: 133/80 HR: 96  Standing BP: 128/88 HR: 104  Site: --  Mode: --  Orthostatic VS  11-30-23 @ 20:28  Lying BP: --/-- HR: --  Sitting BP: 130/69 HR: 90  Standing BP: 125/67 HR: 87  Site: --  Mode: --  Orthostatic VS  11-30-23 @ 08:23  Lying BP: --/-- HR: --  Sitting BP: 120/75 HR: 97  Standing BP: 108/60 HR: 111  Site: upper left arm  Mode: electronic  Orthostatic VS  11-29-23 @ 20:39  Lying BP: --/-- HR: --  Sitting BP: 113/78 HR: 85  Standing BP: 116/80 HR: 90  Site: --  Mode: --

## 2023-12-01 NOTE — BH INPATIENT PSYCHIATRY PROGRESS NOTE - MSE UNSTRUCTURED FT
Appearance: Dressed appropriately.  Behavior: Cooperative.  Calm.  Motor: No abnormal movements, no psychomotor slowing or activation.  Speech: Regular rate.  Mood: Ok  Affect: Depressed and anxious.  Thought Process: Linear.  Associations: Fair.  Thought Content: AH and SI.   Insight: Limited.  Judgment: Fair on interview.  Attention: Fair.  Language: Fluent.  Gait: Intact.

## 2023-12-01 NOTE — BH INPATIENT PSYCHIATRY PROGRESS NOTE - NSBHFUPINTERVALHXFT_PSY_A_CORE
Pt last night sent to ER for evaluation of emesis, was cleared and returned to unit.  On interview today, pt states she did not have any nightmares, but voices and suicidal thoughts remain the same.  Pt wonders if emesis is related to lithium levels.  States she has not been drinking or eating out of concern for nausea.

## 2023-12-01 NOTE — ED PROVIDER NOTE - PATIENT PORTAL LINK FT
You can access the FollowMyHealth Patient Portal offered by [facility] by registering at the following website: [email or web address]. By joining [employer]’s FollowMyHealth portal, you will also be able to view your health information using other applications (apps) compatible with our system.

## 2023-12-02 RX ADMIN — LITHIUM CARBONATE 1200 MILLIGRAM(S): 300 TABLET, EXTENDED RELEASE ORAL at 20:43

## 2023-12-02 RX ADMIN — OLANZAPINE 2.5 MILLIGRAM(S): 15 TABLET, FILM COATED ORAL at 20:43

## 2023-12-02 RX ADMIN — Medication 50 MILLIGRAM(S): at 17:15

## 2023-12-02 RX ADMIN — BUDESONIDE AND FORMOTEROL FUMARATE DIHYDRATE 2 PUFF(S): 160; 4.5 AEROSOL RESPIRATORY (INHALATION) at 08:35

## 2023-12-02 RX ADMIN — BUDESONIDE AND FORMOTEROL FUMARATE DIHYDRATE 2 PUFF(S): 160; 4.5 AEROSOL RESPIRATORY (INHALATION) at 20:56

## 2023-12-02 RX ADMIN — ESCITALOPRAM OXALATE 20 MILLIGRAM(S): 10 TABLET, FILM COATED ORAL at 08:35

## 2023-12-02 RX ADMIN — GABAPENTIN 300 MILLIGRAM(S): 400 CAPSULE ORAL at 20:44

## 2023-12-02 RX ADMIN — OLANZAPINE 2.5 MILLIGRAM(S): 15 TABLET, FILM COATED ORAL at 12:10

## 2023-12-02 RX ADMIN — DESMOPRESSIN ACETATE 0.1 MILLIGRAM(S): 0.1 TABLET ORAL at 20:43

## 2023-12-02 RX ADMIN — PANTOPRAZOLE SODIUM 40 MILLIGRAM(S): 20 TABLET, DELAYED RELEASE ORAL at 08:35

## 2023-12-02 RX ADMIN — CLOZAPINE 200 MILLIGRAM(S): 150 TABLET, ORALLY DISINTEGRATING ORAL at 20:43

## 2023-12-02 RX ADMIN — MONTELUKAST 10 MILLIGRAM(S): 4 TABLET, CHEWABLE ORAL at 20:43

## 2023-12-02 RX ADMIN — Medication 2 MILLIGRAM(S): at 20:44

## 2023-12-02 RX ADMIN — GABAPENTIN 300 MILLIGRAM(S): 400 CAPSULE ORAL at 08:35

## 2023-12-02 RX ADMIN — OLANZAPINE 2.5 MILLIGRAM(S): 15 TABLET, FILM COATED ORAL at 08:36

## 2023-12-03 RX ADMIN — OLANZAPINE 2.5 MILLIGRAM(S): 15 TABLET, FILM COATED ORAL at 12:17

## 2023-12-03 RX ADMIN — OLANZAPINE 2.5 MILLIGRAM(S): 15 TABLET, FILM COATED ORAL at 21:01

## 2023-12-03 RX ADMIN — CLOZAPINE 200 MILLIGRAM(S): 150 TABLET, ORALLY DISINTEGRATING ORAL at 21:02

## 2023-12-03 RX ADMIN — MONTELUKAST 10 MILLIGRAM(S): 4 TABLET, CHEWABLE ORAL at 21:01

## 2023-12-03 RX ADMIN — BUDESONIDE AND FORMOTEROL FUMARATE DIHYDRATE 2 PUFF(S): 160; 4.5 AEROSOL RESPIRATORY (INHALATION) at 08:31

## 2023-12-03 RX ADMIN — PANTOPRAZOLE SODIUM 40 MILLIGRAM(S): 20 TABLET, DELAYED RELEASE ORAL at 08:31

## 2023-12-03 RX ADMIN — BUDESONIDE AND FORMOTEROL FUMARATE DIHYDRATE 2 PUFF(S): 160; 4.5 AEROSOL RESPIRATORY (INHALATION) at 21:07

## 2023-12-03 RX ADMIN — DESMOPRESSIN ACETATE 0.1 MILLIGRAM(S): 0.1 TABLET ORAL at 21:01

## 2023-12-03 RX ADMIN — GABAPENTIN 300 MILLIGRAM(S): 400 CAPSULE ORAL at 08:30

## 2023-12-03 RX ADMIN — ESCITALOPRAM OXALATE 20 MILLIGRAM(S): 10 TABLET, FILM COATED ORAL at 08:31

## 2023-12-03 RX ADMIN — OLANZAPINE 2.5 MILLIGRAM(S): 15 TABLET, FILM COATED ORAL at 08:30

## 2023-12-03 RX ADMIN — Medication 5 MILLIGRAM(S): at 21:01

## 2023-12-03 RX ADMIN — LITHIUM CARBONATE 1200 MILLIGRAM(S): 300 TABLET, EXTENDED RELEASE ORAL at 21:01

## 2023-12-03 RX ADMIN — GABAPENTIN 300 MILLIGRAM(S): 400 CAPSULE ORAL at 21:02

## 2023-12-03 RX ADMIN — Medication 2 MILLIGRAM(S): at 21:02

## 2023-12-04 PROCEDURE — 99232 SBSQ HOSP IP/OBS MODERATE 35: CPT

## 2023-12-04 RX ORDER — BUPROPION HYDROCHLORIDE 150 MG/1
150 TABLET, EXTENDED RELEASE ORAL DAILY
Refills: 0 | Status: DISCONTINUED | OUTPATIENT
Start: 2023-12-05 | End: 2024-02-06

## 2023-12-04 RX ADMIN — Medication 2 MILLIGRAM(S): at 21:26

## 2023-12-04 RX ADMIN — GABAPENTIN 300 MILLIGRAM(S): 400 CAPSULE ORAL at 09:16

## 2023-12-04 RX ADMIN — LITHIUM CARBONATE 1200 MILLIGRAM(S): 300 TABLET, EXTENDED RELEASE ORAL at 21:23

## 2023-12-04 RX ADMIN — OLANZAPINE 2.5 MILLIGRAM(S): 15 TABLET, FILM COATED ORAL at 15:24

## 2023-12-04 RX ADMIN — PANTOPRAZOLE SODIUM 40 MILLIGRAM(S): 20 TABLET, DELAYED RELEASE ORAL at 09:16

## 2023-12-04 RX ADMIN — MONTELUKAST 10 MILLIGRAM(S): 4 TABLET, CHEWABLE ORAL at 21:25

## 2023-12-04 RX ADMIN — OLANZAPINE 2.5 MILLIGRAM(S): 15 TABLET, FILM COATED ORAL at 21:26

## 2023-12-04 RX ADMIN — OLANZAPINE 2.5 MILLIGRAM(S): 15 TABLET, FILM COATED ORAL at 09:16

## 2023-12-04 RX ADMIN — CLOZAPINE 200 MILLIGRAM(S): 150 TABLET, ORALLY DISINTEGRATING ORAL at 21:25

## 2023-12-04 RX ADMIN — DESMOPRESSIN ACETATE 0.1 MILLIGRAM(S): 0.1 TABLET ORAL at 21:23

## 2023-12-04 RX ADMIN — BUDESONIDE AND FORMOTEROL FUMARATE DIHYDRATE 2 PUFF(S): 160; 4.5 AEROSOL RESPIRATORY (INHALATION) at 09:17

## 2023-12-04 RX ADMIN — ESCITALOPRAM OXALATE 20 MILLIGRAM(S): 10 TABLET, FILM COATED ORAL at 09:17

## 2023-12-04 RX ADMIN — GABAPENTIN 300 MILLIGRAM(S): 400 CAPSULE ORAL at 21:24

## 2023-12-04 NOTE — BH INPATIENT PSYCHIATRY PROGRESS NOTE - NSBHCHARTREVIEWVS_PSY_A_CORE FT
Vital Signs Last 24 Hrs  T(C): 36.5 (12-03-23 @ 20:29), Max: 36.5 (12-03-23 @ 20:29)  T(F): 97.7 (12-03-23 @ 20:29), Max: 97.7 (12-03-23 @ 20:29)  HR: --  BP: --  BP(mean): --  RR: --  SpO2: --    Orthostatic VS  12-03-23 @ 20:29  Lying BP: --/-- HR: --  Sitting BP: 122/68 HR: 97  Standing BP: 135/86 HR: 104  Site: --  Mode: --  Orthostatic VS  12-03-23 @ 08:04  Lying BP: --/-- HR: --  Sitting BP: 106/74 HR: 82  Standing BP: 134/98 HR: 107  Site: upper right arm  Mode: electronic  Orthostatic VS  12-02-23 @ 20:15  Lying BP: --/-- HR: --  Sitting BP: 135/82 HR: 99  Standing BP: 145/85 HR: 109  Site: --  Mode: --

## 2023-12-04 NOTE — BH INPATIENT PSYCHIATRY PROGRESS NOTE - NSBHASSESSSUMMFT_PSY_ALL_CORE
Patient is 29yo single woman, no dependents, domiciled with her Grandmother, unemployed on disability/SSI, pphx of schizoaffective disorder, multiple past psychiatric admissions including state and recent discharge from [facility], in tx with [facility], with pmhx of asthma, HTN, GERD, and hypothyroidism and schizoaffective disorder, in tx with [facility], who self presented to the ED reporting abdominal pain and requesting readmission to psychiatric unit, reporting CAH, depressive symptoms, IOR, suicidality, admitted to [facility] for psychiatric care.  Depression and psychosis likely multifactorial at this time, schizoaffective vs. borderline personality disorder vs. complex grief vs. adjustment disorder.      The patient remains depressed, anxious, reporting psychosis and SI.  Asked to start Wellbutrin which was helpful in the past.  Li level only 0.2 last week, but patient had missed a dose.  Will recheck.    1.) Obs: Refer to chart note filed today for update.  Routine checks, no CO.  2.) Psychiatric medication management:  - Reviewed options for addressing nocturnal enuresis including reducing clozapine vs reducing lithium vs adding desmopressin.  Pt decided upon desmopressin as she continues to have depression and psychosis.  Desmopressin 0.1 mg QHS.  - Lithobid 1200 mg QHS.  Pt aware that she needs to stay well hydrated and avoid diuretics and NSAIDs.   - Weekend team had started chlorpromazine standing at pt request.  Discussed concerns with lowering seizure threshold.  Reviewed alternatives.  Will utilize olanzapine 2.5 mg TID for now.  - Clozapine 200mg qHS for psychosis ([id number])  - Prazosin 2mg qhs, monitor BP carefully.  - Abilify Maintena 400mg IM q3 weeks rather than 4, per collateral from ACT team.  Doses given 10/19, 11/17.  - Mirtazapine was switched to escitalopram, will increase to 20 mg daily.  - Wellbutrin XL 150mg daily  - Gabapentin 300 mg BID for anxiety/chronic pain.  - PRN: haloperidol 5mg PO/IM q8hrs PRN for agitation, diphenhydramine 50mg PO/IM q6hrs PRN for EPS PPx, hydroxyzine 50mg PO q12hrs PRN for anxiety, lorazepam 2mg PO q8hrs PRN for anxiety or 2mg IM for assaultive behavior. Melatonin 5mg qHS PRN for insomnia  - Discussed ECT several times with pt who states she has been told in two hospitals in past that due to her obesity and asthma, she is not an ideal candidate.  Pt states that due to this she is not interested in receiving ECT at this time.  3.) Medical:   - Pt scheduled for dental extractions 11/1, however declined to attend on day prior when informed it would not be under general anesthesia.  - last clozapine labs on 10/23 (CBC with diff, troponins, CRP); CRP elevated at 15.8 likely due to dental infection/wisdom tooth issues  - pantoprazole 40mg PO before breakfast for GERD  - montelukast 10mg PO qHS for asthma  - budesonide 80mcg/formoterol 4.5mcg 2 puffs BID for asthma  - PRN: acetaminophen 650mg PO q6hrs PRN for pain, albuterol 90mcg 2puffs q6hrs PRN for dyspnea, ondansetron 4mg q6hrs PRN for nausea  - Discussed CRP with medicine service, given asymptomatic pt and normal troponin, no additional intervention/diagnostics at this time.  - Discontinued clobetasol as rash has resolved.  4.) Dispo planning: State application submitted. Patient is 29yo single woman, no dependents, domiciled with her Grandmother, unemployed on disability/SSI, pphx of schizoaffective disorder, multiple past psychiatric admissions including state and recent discharge from [facility], in tx with [facility], with pmhx of asthma, HTN, GERD, and hypothyroidism and schizoaffective disorder, in tx with [facility], who self presented to the ED reporting abdominal pain and requesting readmission to psychiatric unit, reporting CAH, depressive symptoms, IOR, suicidality, admitted to [facility] for psychiatric care.  Depression and psychosis likely multifactorial at this time, schizoaffective vs. borderline personality disorder vs. complex grief vs. adjustment disorder.      The patient remains depressed, anxious, reporting psychosis and SI.  Asked to start Wellbutrin which was helpful in the past.  Li level only 0.2 last week, but patient had missed a dose.  Will recheck.    1.) Obs: Refer to chart note filed today for update.  Routine checks, no CO.  2.) Psychiatric medication management:  - Reviewed options for addressing nocturnal enuresis including reducing clozapine vs reducing lithium vs adding desmopressin.  Pt decided upon desmopressin as she continues to have depression and psychosis.  Desmopressin 0.1 mg QHS.  - Lithobid 1200 mg QHS.  Pt aware that she needs to stay well hydrated and avoid diuretics and NSAIDs.   - Weekend team had started chlorpromazine standing at pt request.  Discussed concerns with lowering seizure threshold.  Reviewed alternatives.  Will utilize olanzapine 2.5 mg TID for now.  - Clozapine 200mg qHS for psychosis ([id number])  - Prazosin 2mg qhs, monitor BP carefully.  - Abilify Maintena 400mg IM q3 weeks rather than 4, per collateral from ACT team.  Doses given 10/19, 11/17.  - Mirtazapine was switched to escitalopram, will increase to 20 mg daily.  - Wellbutrin XL 150mg daily  - Gabapentin 300 mg BID for anxiety/chronic pain.  - PRN: haloperidol 5mg PO/IM q8hrs PRN for agitation, diphenhydramine 50mg PO/IM q6hrs PRN for EPS PPx, hydroxyzine 50mg PO q12hrs PRN for anxiety, lorazepam 2mg PO q8hrs PRN for anxiety or 2mg IM for assaultive behavior. Melatonin 5mg qHS PRN for insomnia  - Discussed ECT several times with pt who states she has been told in two hospitals in past that due to her obesity and asthma, she is not an ideal candidate.  Pt states that due to this she is not interested in receiving ECT at this time.  3.) Medical:   - Pt scheduled for dental extractions 11/1, however declined to attend on day prior when informed it would not be under general anesthesia.  - last clozapine labs on 10/23 (CBC with diff, troponins, CRP); CRP elevated at 15.8 likely due to dental infection/wisdom tooth issues  - pantoprazole 40mg PO before breakfast for GERD  - montelukast 10mg PO qHS for asthma  - budesonide 80mcg/formoterol 4.5mcg 2 puffs BID for asthma  - PRN: acetaminophen 650mg PO q6hrs PRN for pain, albuterol 90mcg 2puffs q6hrs PRN for dyspnea, ondansetron 4mg q6hrs PRN for nausea  - Discussed CRP with medicine service, given asymptomatic pt and normal troponin, no additional intervention/diagnostics at this time.  - Discontinued clobetasol as rash has resolved.  4.) Dispo planning: State application submitted.

## 2023-12-04 NOTE — BH INPATIENT PSYCHIATRY PROGRESS NOTE - NSBHFUPINTERVALHXFT_PSY_A_CORE
Patient seen for follow up of depression, AHs, chart reviewed, and case discussed with treatment team.  No events reported overnight.  The patient continues to feel depressed, with CAH to harm herself.  She is stressed not know where she is going to go after discharged.  Support provided.  She asks for Wellbutrin which she feels helped her in the past.  The patient reports having a BM recently.  The patient has been visible on the unit, social with peers, and attending groups.  The patient reports eating and sleeping well.  She has been compliant with medications, tolerating them well.    Spoke to Dr. Tobin to provide clinical update.

## 2023-12-04 NOTE — BH INPATIENT PSYCHIATRY PROGRESS NOTE - NSBHATTESTBILLING_PSY_A_CORE
99691-Qyenssanvq OBS or IP - moderate complexity OR 35-49 mins 32363-Bznmmgwayq OBS or IP - moderate complexity OR 35-49 mins

## 2023-12-04 NOTE — BH INPATIENT PSYCHIATRY PROGRESS NOTE - NSBHMETABOLIC_PSY_ALL_CORE_FT
BMI: BMI (kg/m2): 57.8 (09-14-23 @ 14:30)  HbA1c: A1C with Estimated Average Glucose Result: 5.2 % (11-01-23 @ 09:04)    Glucose:   BP: --Vital Signs Last 24 Hrs  T(C): 36.5 (12-03-23 @ 20:29), Max: 36.5 (12-03-23 @ 20:29)  T(F): 97.7 (12-03-23 @ 20:29), Max: 97.7 (12-03-23 @ 20:29)  HR: --  BP: --  BP(mean): --  RR: --  SpO2: --    Orthostatic VS  12-03-23 @ 20:29  Lying BP: --/-- HR: --  Sitting BP: 122/68 HR: 97  Standing BP: 135/86 HR: 104  Site: --  Mode: --  Orthostatic VS  12-03-23 @ 08:04  Lying BP: --/-- HR: --  Sitting BP: 106/74 HR: 82  Standing BP: 134/98 HR: 107  Site: upper right arm  Mode: electronic  Orthostatic VS  12-02-23 @ 20:15  Lying BP: --/-- HR: --  Sitting BP: 135/82 HR: 99  Standing BP: 145/85 HR: 109  Site: --  Mode: --    Lipid Panel: Date/Time: 11-01-23 @ 09:04  Cholesterol, Serum: 156  LDL Cholesterol Calculated: 82  HDL Cholesterol, Serum: 58  Total Cholesterol/HDL Ration Measurement: --  Triglycerides, Serum: 79

## 2023-12-04 NOTE — BH INPATIENT PSYCHIATRY PROGRESS NOTE - MSE UNSTRUCTURED FT
The patient appears stated age, with fair hygiene, and is dressed appropriately.  She was calm and cooperative with the interview.  She maintained appropriate eye contact.  No restlessness or slowing of movements observed.  Gait is steady.  The patient’s speech was fluent, normal in tone, rate, and volume.  The patient’s mood is “the same.”  Her affect is constricted, stable, appropriate.  The patient’s thoughts are goal directed.  She does not have any delusions, admits to command auditory hallucinations.  She has suicidal ideation without intent or plan on the unit, no homicidal ideation, intent, or plan.  Insight is fair.  Judgment is fair.  Impulse control has been good on the unit.

## 2023-12-05 PROCEDURE — 99232 SBSQ HOSP IP/OBS MODERATE 35: CPT

## 2023-12-05 RX ORDER — CHLORPROMAZINE HCL 10 MG
50 TABLET ORAL ONCE
Refills: 0 | Status: COMPLETED | OUTPATIENT
Start: 2023-12-05 | End: 2023-12-05

## 2023-12-05 RX ADMIN — OLANZAPINE 2.5 MILLIGRAM(S): 15 TABLET, FILM COATED ORAL at 08:32

## 2023-12-05 RX ADMIN — LITHIUM CARBONATE 1200 MILLIGRAM(S): 300 TABLET, EXTENDED RELEASE ORAL at 20:33

## 2023-12-05 RX ADMIN — Medication 2 MILLIGRAM(S): at 12:10

## 2023-12-05 RX ADMIN — DESMOPRESSIN ACETATE 0.1 MILLIGRAM(S): 0.1 TABLET ORAL at 20:32

## 2023-12-05 RX ADMIN — GABAPENTIN 300 MILLIGRAM(S): 400 CAPSULE ORAL at 20:32

## 2023-12-05 RX ADMIN — BUDESONIDE AND FORMOTEROL FUMARATE DIHYDRATE 2 PUFF(S): 160; 4.5 AEROSOL RESPIRATORY (INHALATION) at 08:31

## 2023-12-05 RX ADMIN — Medication 50 MILLIGRAM(S): at 16:03

## 2023-12-05 RX ADMIN — PANTOPRAZOLE SODIUM 40 MILLIGRAM(S): 20 TABLET, DELAYED RELEASE ORAL at 08:31

## 2023-12-05 RX ADMIN — CLOZAPINE 200 MILLIGRAM(S): 150 TABLET, ORALLY DISINTEGRATING ORAL at 20:32

## 2023-12-05 RX ADMIN — OLANZAPINE 2.5 MILLIGRAM(S): 15 TABLET, FILM COATED ORAL at 20:32

## 2023-12-05 RX ADMIN — OLANZAPINE 2.5 MILLIGRAM(S): 15 TABLET, FILM COATED ORAL at 12:10

## 2023-12-05 RX ADMIN — BUPROPION HYDROCHLORIDE 150 MILLIGRAM(S): 150 TABLET, EXTENDED RELEASE ORAL at 08:32

## 2023-12-05 RX ADMIN — GABAPENTIN 300 MILLIGRAM(S): 400 CAPSULE ORAL at 08:31

## 2023-12-05 RX ADMIN — MONTELUKAST 10 MILLIGRAM(S): 4 TABLET, CHEWABLE ORAL at 20:32

## 2023-12-05 RX ADMIN — Medication 2 MILLIGRAM(S): at 20:33

## 2023-12-05 RX ADMIN — Medication 2 MILLIGRAM(S): at 20:32

## 2023-12-05 RX ADMIN — Medication 5 MILLIGRAM(S): at 20:33

## 2023-12-05 RX ADMIN — ESCITALOPRAM OXALATE 20 MILLIGRAM(S): 10 TABLET, FILM COATED ORAL at 08:32

## 2023-12-05 NOTE — BH INPATIENT PSYCHIATRY PROGRESS NOTE - CURRENT MEDICATION
MEDICATIONS  (STANDING):  budesonide  80 MICROgram(s)/formoterol 4.5 MICROgram(s) Inhaler 2 Puff(s) Inhalation two times a day  buPROPion XL (24-Hour) 150 milliGRAM(s) Oral daily  chlorproMAZINE    Injectable 50 milliGRAM(s) IntraMuscular once  cloZAPine 200 milliGRAM(s) Oral at bedtime  desmopressin 0.1 milliGRAM(s) Oral at bedtime  escitalopram 20 milliGRAM(s) Oral daily  gabapentin 300 milliGRAM(s) Oral two times a day  influenza   Vaccine 0.5 milliLiter(s) IntraMuscular once  lithium SR (LITHOBID) 1200 milliGRAM(s) Oral at bedtime  montelukast 10 milliGRAM(s) Oral at bedtime  OLANZapine 2.5 milliGRAM(s) Oral three times a day  pantoprazole    Tablet 40 milliGRAM(s) Oral before breakfast  prazosin. 2 milliGRAM(s) Oral at bedtime    MEDICATIONS  (PRN):  acetaminophen     Tablet .. 650 milliGRAM(s) Oral every 6 hours PRN Moderate Pain (4 - 6)  albuterol    90 MICROgram(s) HFA Inhaler 2 Puff(s) Inhalation every 6 hours PRN Shortness of Breath and/or Wheezing  aluminum hydroxide/magnesium hydroxide/simethicone Suspension 30 milliLiter(s) Oral every 4 hours PRN Dyspepsia  benzocaine 20% Spray 1 Spray(s) Topical every 6 hours PRN tooth pain  chlorproMAZINE    Injectable 50 milliGRAM(s) IntraMuscular once PRN severe agitation  hydrOXYzine hydrochloride 50 milliGRAM(s) Oral every 12 hours PRN Anxiety  lidocaine   4% Patch 1 Patch Transdermal daily PRN LBP  LORazepam     Tablet 2 milliGRAM(s) Oral every 8 hours PRN Anxiety  LORazepam   Injectable 2 milliGRAM(s) IntraMuscular once PRN Assaultive behavior  melatonin. 5 milliGRAM(s) Oral at bedtime PRN Insomnia  ondansetron    Tablet 4 milliGRAM(s) Oral every 6 hours PRN nausea  polyethylene glycol 3350 17 Gram(s) Oral daily PRN Constipation

## 2023-12-05 NOTE — BH INPATIENT PSYCHIATRY PROGRESS NOTE - NSBHATTESTBILLING_PSY_A_CORE
74344-Ehyigrjotx OBS or IP - moderate complexity OR 35-49 mins 29254-Jvjgnmatod OBS or IP - moderate complexity OR 35-49 mins

## 2023-12-05 NOTE — BH INPATIENT PSYCHIATRY PROGRESS NOTE - NSBHASSESSSUMMFT_PSY_ALL_CORE
Patient is 27yo single woman, no dependents, domiciled with her Grandmother, unemployed on disability/SSI, pphx of schizoaffective disorder, multiple past psychiatric admissions including state and recent discharge from [facility], [facility] tx with [facility], with pmhx of asthma, HTN, GERD, and hypothyroidism and schizoaffective disorder, in tx with [facility], who self presented to the ED reporting abdominal pain and requesting readmission to psychiatric unit, reporting CAH, depressive symptoms, IOR, suicidality, admitted to [facility] for psychiatric care.  Depression and psychosis likely multifactorial at this time, schizoaffective vs. borderline personality disorder vs. complex grief vs. adjustment disorder.      The patient remains depressed, anxious, reporting worsening of SI.  Will place patient on 1:1 for safety.  Check Li level in am.    1.) Obs: start 1:1 for worsening SI.  2.) Psychiatric medication management:  - Reviewed options for addressing nocturnal enuresis including reducing clozapine vs reducing lithium vs adding desmopressin.  Pt decided upon desmopressin as she continues to have depression and psychosis.  Desmopressin 0.1 mg QHS.  - Lithobid 1200 mg QHS.  Pt aware that she needs to stay well hydrated and avoid diuretics and NSAIDs.   - Weekend team had started chlorpromazine standing at pt request.  Discussed concerns with lowering seizure threshold.  Reviewed alternatives.  Will utilize olanzapine 2.5 mg TID for now.  - Clozapine 200mg qHS for psychosis ([id number])  - Prazosin 2mg qhs, monitor BP carefully.  - Abilify Maintena 400mg IM q3 weeks rather than 4, per collateral from ACT team.  Doses given 10/19, 11/17.  - Mirtazapine was switched to escitalopram, will increase to 20 mg daily.  - Wellbutrin XL 150mg daily  - Gabapentin 300 mg BID for anxiety/chronic pain.  - PRN: haloperidol 5mg PO/IM q8hrs PRN for agitation, diphenhydramine 50mg PO/IM q6hrs PRN for EPS PPx, hydroxyzine 50mg PO q12hrs PRN for anxiety, lorazepam 2mg PO q8hrs PRN for anxiety or 2mg IM for assaultive behavior. Melatonin 5mg qHS PRN for insomnia  - Discussed ECT several times with pt who states she has been told in two hospitals in past that due to her obesity and asthma, she is not an ideal candidate.  Pt states that due to this she is not interested in receiving ECT at this time.  3.) Medical:   - Pt scheduled for dental extractions 11/1, however declined to attend on day prior when informed it would not be under general anesthesia.  - last clozapine labs on 10/23 (CBC with diff, troponins, CRP); CRP elevated at 15.8 likely due to dental infection/wisdom tooth issues  - pantoprazole 40mg PO before breakfast for GERD  - montelukast 10mg PO qHS for asthma  - budesonide 80mcg/formoterol 4.5mcg 2 puffs BID for asthma  - PRN: acetaminophen 650mg PO q6hrs PRN for pain, albuterol 90mcg 2puffs q6hrs PRN for dyspnea, ondansetron 4mg q6hrs PRN for nausea  - Discussed CRP with medicine service, given asymptomatic pt and normal troponin, no additional intervention/diagnostics at this time.  - Discontinued clobetasol as rash has resolved.  4.) Dispo planning: State application submitted. Patient is 29yo single woman, no dependents, domiciled with her Grandmother, unemployed on disability/SSI, pphx of schizoaffective disorder, multiple past psychiatric admissions including state and recent discharge from [facility], in tx with [facility], with pmhx of asthma, HTN, GERD, and hypothyroidism and schizoaffective disorder, in tx with [facility], who self presented to the ED reporting abdominal pain and requesting readmission to psychiatric unit, reporting CAH, depressive symptoms, IOR, suicidality, admitted to [facility] for psychiatric care.  Depression and psychosis likely multifactorial at this time, schizoaffective vs. borderline personality disorder vs. complex grief vs. adjustment disorder.      The patient remains depressed, anxious, reporting worsening of SI.  Will place patient on 1:1 for safety.  Check Li level in am.    1.) Obs: start 1:1 for worsening SI.  2.) Psychiatric medication management:  - Reviewed options for addressing nocturnal enuresis including reducing clozapine vs reducing lithium vs adding desmopressin.  Pt decided upon desmopressin as she continues to have depression and psychosis.  Desmopressin 0.1 mg QHS.  - Lithobid 1200 mg QHS.  Pt aware that she needs to stay well hydrated and avoid diuretics and NSAIDs.   - Weekend team had started chlorpromazine standing at pt request.  Discussed concerns with lowering seizure threshold.  Reviewed alternatives.  Will utilize olanzapine 2.5 mg TID for now.  - Clozapine 200mg qHS for psychosis ([id number])  - Prazosin 2mg qhs, monitor BP carefully.  - Abilify Maintena 400mg IM q3 weeks rather than 4, per collateral from ACT team.  Doses given 10/19, 11/17.  - Mirtazapine was switched to escitalopram, will increase to 20 mg daily.  - Wellbutrin XL 150mg daily  - Gabapentin 300 mg BID for anxiety/chronic pain.  - PRN: haloperidol 5mg PO/IM q8hrs PRN for agitation, diphenhydramine 50mg PO/IM q6hrs PRN for EPS PPx, hydroxyzine 50mg PO q12hrs PRN for anxiety, lorazepam 2mg PO q8hrs PRN for anxiety or 2mg IM for assaultive behavior. Melatonin 5mg qHS PRN for insomnia  - Discussed ECT several times with pt who states she has been told in two hospitals in past that due to her obesity and asthma, she is not an ideal candidate.  Pt states that due to this she is not interested in receiving ECT at this time.  3.) Medical:   - Pt scheduled for dental extractions 11/1, however declined to attend on day prior when informed it would not be under general anesthesia.  - last clozapine labs on 10/23 (CBC with diff, troponins, CRP); CRP elevated at 15.8 likely due to dental infection/wisdom tooth issues  - pantoprazole 40mg PO before breakfast for GERD  - montelukast 10mg PO qHS for asthma  - budesonide 80mcg/formoterol 4.5mcg 2 puffs BID for asthma  - PRN: acetaminophen 650mg PO q6hrs PRN for pain, albuterol 90mcg 2puffs q6hrs PRN for dyspnea, ondansetron 4mg q6hrs PRN for nausea  - Discussed CRP with medicine service, given asymptomatic pt and normal troponin, no additional intervention/diagnostics at this time.  - Discontinued clobetasol as rash has resolved.  4.) Dispo planning: State application submitted.

## 2023-12-05 NOTE — BH INPATIENT PSYCHIATRY PROGRESS NOTE - NSBHFUPINTERVALHXFT_PSY_A_CORE
Patient seen for follow up of depression, AHs, SI, chart reviewed, and case discussed with treatment team.  No events reported overnight.  The patient continues to feel depressed, with CAH to harm herself.  Reports she continues to be stressed not know where she is going to go after discharged.  Later in the day, the patient reported worsening of SI, with concern that she may drink the soap again.  Did not feel safe on the unit.  1:1 was started for safety.  The patient has been visible on the unit, social with peers, and attending groups.  The patient reports eating and sleeping well.  She has been compliant with medications, tolerating them well.

## 2023-12-05 NOTE — BH INPATIENT PSYCHIATRY PROGRESS NOTE - NSBHMETABOLIC_PSY_ALL_CORE_FT
BMI: BMI (kg/m2): 57.8 (09-14-23 @ 14:30)  HbA1c: A1C with Estimated Average Glucose Result: 5.2 % (11-01-23 @ 09:04)    Glucose:   BP: --Vital Signs Last 24 Hrs  T(C): --  T(F): --  HR: --  BP: --  BP(mean): --  RR: --  SpO2: --    Orthostatic VS  12-03-23 @ 20:29  Lying BP: --/-- HR: --  Sitting BP: 122/68 HR: 97  Standing BP: 135/86 HR: 104  Site: --  Mode: --    Lipid Panel: Date/Time: 11-01-23 @ 09:04  Cholesterol, Serum: 156  LDL Cholesterol Calculated: 82  HDL Cholesterol, Serum: 58  Total Cholesterol/HDL Ration Measurement: --  Triglycerides, Serum: 79

## 2023-12-05 NOTE — BH INPATIENT PSYCHIATRY PROGRESS NOTE - MSE UNSTRUCTURED FT
The patient appears stated age, with fair hygiene, and is dressed appropriately.  She was calm and cooperative with the interview.  She maintained appropriate eye contact.  No restlessness or slowing of movements observed.  Gait is steady.  The patient’s speech was fluent, normal in tone, rate, and volume.  The patient’s mood is “depressed.”  Her affect is constricted, stable, appropriate.  The patient’s thoughts are goal directed.  She does not have any delusions, admits to command auditory hallucinations.  She admitted to worsening of suicidal ideation with plan to drink soap, no intent, no homicidal ideation, intent, or plan.  Insight is fair.  Judgment is fair.  Impulse control has been good on the unit.

## 2023-12-05 NOTE — BH INPATIENT PSYCHIATRY PROGRESS NOTE - NSBHCHARTREVIEWVS_PSY_A_CORE FT
Vital Signs Last 24 Hrs  T(C): --  T(F): --  HR: --  BP: --  BP(mean): --  RR: --  SpO2: --    Orthostatic VS  12-03-23 @ 20:29  Lying BP: --/-- HR: --  Sitting BP: 122/68 HR: 97  Standing BP: 135/86 HR: 104  Site: --  Mode: --

## 2023-12-06 LAB
ALBUMIN SERPL ELPH-MCNC: 3.8 G/DL — SIGNIFICANT CHANGE UP (ref 3.3–5)
ALBUMIN SERPL ELPH-MCNC: 3.8 G/DL — SIGNIFICANT CHANGE UP (ref 3.3–5)
ALP SERPL-CCNC: 116 U/L — SIGNIFICANT CHANGE UP (ref 40–120)
ALP SERPL-CCNC: 116 U/L — SIGNIFICANT CHANGE UP (ref 40–120)
ALT FLD-CCNC: 18 U/L — SIGNIFICANT CHANGE UP (ref 4–33)
ALT FLD-CCNC: 18 U/L — SIGNIFICANT CHANGE UP (ref 4–33)
ANION GAP SERPL CALC-SCNC: 12 MMOL/L — SIGNIFICANT CHANGE UP (ref 7–14)
ANION GAP SERPL CALC-SCNC: 12 MMOL/L — SIGNIFICANT CHANGE UP (ref 7–14)
AST SERPL-CCNC: 18 U/L — SIGNIFICANT CHANGE UP (ref 4–32)
AST SERPL-CCNC: 18 U/L — SIGNIFICANT CHANGE UP (ref 4–32)
BASOPHILS # BLD AUTO: 0.02 K/UL — SIGNIFICANT CHANGE UP (ref 0–0.2)
BASOPHILS # BLD AUTO: 0.02 K/UL — SIGNIFICANT CHANGE UP (ref 0–0.2)
BASOPHILS NFR BLD AUTO: 0.2 % — SIGNIFICANT CHANGE UP (ref 0–2)
BASOPHILS NFR BLD AUTO: 0.2 % — SIGNIFICANT CHANGE UP (ref 0–2)
BILIRUB SERPL-MCNC: <0.2 MG/DL — SIGNIFICANT CHANGE UP (ref 0.2–1.2)
BILIRUB SERPL-MCNC: <0.2 MG/DL — SIGNIFICANT CHANGE UP (ref 0.2–1.2)
BUN SERPL-MCNC: 17 MG/DL — SIGNIFICANT CHANGE UP (ref 7–23)
BUN SERPL-MCNC: 17 MG/DL — SIGNIFICANT CHANGE UP (ref 7–23)
CALCIUM SERPL-MCNC: 9.3 MG/DL — SIGNIFICANT CHANGE UP (ref 8.4–10.5)
CALCIUM SERPL-MCNC: 9.3 MG/DL — SIGNIFICANT CHANGE UP (ref 8.4–10.5)
CHLORIDE SERPL-SCNC: 104 MMOL/L — SIGNIFICANT CHANGE UP (ref 98–107)
CHLORIDE SERPL-SCNC: 104 MMOL/L — SIGNIFICANT CHANGE UP (ref 98–107)
CLOZAPINE SERPL-MCNC: 244 NG/ML — LOW (ref 350–600)
CLOZAPINE SERPL-MCNC: 244 NG/ML — LOW (ref 350–600)
CO2 SERPL-SCNC: 23 MMOL/L — SIGNIFICANT CHANGE UP (ref 22–31)
CO2 SERPL-SCNC: 23 MMOL/L — SIGNIFICANT CHANGE UP (ref 22–31)
CREAT SERPL-MCNC: 0.7 MG/DL — SIGNIFICANT CHANGE UP (ref 0.5–1.3)
CREAT SERPL-MCNC: 0.7 MG/DL — SIGNIFICANT CHANGE UP (ref 0.5–1.3)
CRP SERPL-MCNC: 24.5 MG/L — HIGH
CRP SERPL-MCNC: 24.5 MG/L — HIGH
EGFR: 122 ML/MIN/1.73M2 — SIGNIFICANT CHANGE UP
EGFR: 122 ML/MIN/1.73M2 — SIGNIFICANT CHANGE UP
EOSINOPHIL # BLD AUTO: 0 K/UL — SIGNIFICANT CHANGE UP (ref 0–0.5)
EOSINOPHIL # BLD AUTO: 0 K/UL — SIGNIFICANT CHANGE UP (ref 0–0.5)
EOSINOPHIL NFR BLD AUTO: 0 % — SIGNIFICANT CHANGE UP (ref 0–6)
EOSINOPHIL NFR BLD AUTO: 0 % — SIGNIFICANT CHANGE UP (ref 0–6)
GLUCOSE SERPL-MCNC: 96 MG/DL — SIGNIFICANT CHANGE UP (ref 70–99)
GLUCOSE SERPL-MCNC: 96 MG/DL — SIGNIFICANT CHANGE UP (ref 70–99)
HCT VFR BLD CALC: 40 % — SIGNIFICANT CHANGE UP (ref 34.5–45)
HCT VFR BLD CALC: 40 % — SIGNIFICANT CHANGE UP (ref 34.5–45)
HGB BLD-MCNC: 12.3 G/DL — SIGNIFICANT CHANGE UP (ref 11.5–15.5)
HGB BLD-MCNC: 12.3 G/DL — SIGNIFICANT CHANGE UP (ref 11.5–15.5)
IANC: 7.96 K/UL — HIGH (ref 1.8–7.4)
IANC: 7.96 K/UL — HIGH (ref 1.8–7.4)
IMM GRANULOCYTES NFR BLD AUTO: 0.6 % — SIGNIFICANT CHANGE UP (ref 0–0.9)
IMM GRANULOCYTES NFR BLD AUTO: 0.6 % — SIGNIFICANT CHANGE UP (ref 0–0.9)
LITHIUM SERPL-MCNC: 0.6 MMOL/L — SIGNIFICANT CHANGE UP (ref 0.6–1.2)
LITHIUM SERPL-MCNC: 0.6 MMOL/L — SIGNIFICANT CHANGE UP (ref 0.6–1.2)
LYMPHOCYTES # BLD AUTO: 27.9 % — SIGNIFICANT CHANGE UP (ref 13–44)
LYMPHOCYTES # BLD AUTO: 27.9 % — SIGNIFICANT CHANGE UP (ref 13–44)
LYMPHOCYTES # BLD AUTO: 3.33 K/UL — HIGH (ref 1–3.3)
LYMPHOCYTES # BLD AUTO: 3.33 K/UL — HIGH (ref 1–3.3)
MCHC RBC-ENTMCNC: 24.2 PG — LOW (ref 27–34)
MCHC RBC-ENTMCNC: 24.2 PG — LOW (ref 27–34)
MCHC RBC-ENTMCNC: 30.8 GM/DL — LOW (ref 32–36)
MCHC RBC-ENTMCNC: 30.8 GM/DL — LOW (ref 32–36)
MCV RBC AUTO: 78.7 FL — LOW (ref 80–100)
MCV RBC AUTO: 78.7 FL — LOW (ref 80–100)
MONOCYTES # BLD AUTO: 0.57 K/UL — SIGNIFICANT CHANGE UP (ref 0–0.9)
MONOCYTES # BLD AUTO: 0.57 K/UL — SIGNIFICANT CHANGE UP (ref 0–0.9)
MONOCYTES NFR BLD AUTO: 4.8 % — SIGNIFICANT CHANGE UP (ref 2–14)
MONOCYTES NFR BLD AUTO: 4.8 % — SIGNIFICANT CHANGE UP (ref 2–14)
NEUTROPHILS # BLD AUTO: 7.96 K/UL — HIGH (ref 1.8–7.4)
NEUTROPHILS # BLD AUTO: 7.96 K/UL — HIGH (ref 1.8–7.4)
NEUTROPHILS NFR BLD AUTO: 66.5 % — SIGNIFICANT CHANGE UP (ref 43–77)
NEUTROPHILS NFR BLD AUTO: 66.5 % — SIGNIFICANT CHANGE UP (ref 43–77)
NRBC # BLD: 0 /100 WBCS — SIGNIFICANT CHANGE UP (ref 0–0)
NRBC # BLD: 0 /100 WBCS — SIGNIFICANT CHANGE UP (ref 0–0)
NRBC # FLD: 0 K/UL — SIGNIFICANT CHANGE UP (ref 0–0)
NRBC # FLD: 0 K/UL — SIGNIFICANT CHANGE UP (ref 0–0)
PLATELET # BLD AUTO: 310 K/UL — SIGNIFICANT CHANGE UP (ref 150–400)
PLATELET # BLD AUTO: 310 K/UL — SIGNIFICANT CHANGE UP (ref 150–400)
POTASSIUM SERPL-MCNC: 3.8 MMOL/L — SIGNIFICANT CHANGE UP (ref 3.5–5.3)
POTASSIUM SERPL-MCNC: 3.8 MMOL/L — SIGNIFICANT CHANGE UP (ref 3.5–5.3)
POTASSIUM SERPL-SCNC: 3.8 MMOL/L — SIGNIFICANT CHANGE UP (ref 3.5–5.3)
POTASSIUM SERPL-SCNC: 3.8 MMOL/L — SIGNIFICANT CHANGE UP (ref 3.5–5.3)
PROT SERPL-MCNC: 7.3 G/DL — SIGNIFICANT CHANGE UP (ref 6–8.3)
PROT SERPL-MCNC: 7.3 G/DL — SIGNIFICANT CHANGE UP (ref 6–8.3)
RBC # BLD: 5.08 M/UL — SIGNIFICANT CHANGE UP (ref 3.8–5.2)
RBC # BLD: 5.08 M/UL — SIGNIFICANT CHANGE UP (ref 3.8–5.2)
RBC # FLD: 15.2 % — HIGH (ref 10.3–14.5)
RBC # FLD: 15.2 % — HIGH (ref 10.3–14.5)
SODIUM SERPL-SCNC: 139 MMOL/L — SIGNIFICANT CHANGE UP (ref 135–145)
SODIUM SERPL-SCNC: 139 MMOL/L — SIGNIFICANT CHANGE UP (ref 135–145)
TROPONIN T, HIGH SENSITIVITY RESULT: 14 NG/L — SIGNIFICANT CHANGE UP
TROPONIN T, HIGH SENSITIVITY RESULT: 14 NG/L — SIGNIFICANT CHANGE UP
WBC # BLD: 11.95 K/UL — HIGH (ref 3.8–10.5)
WBC # BLD: 11.95 K/UL — HIGH (ref 3.8–10.5)
WBC # FLD AUTO: 11.95 K/UL — HIGH (ref 3.8–10.5)
WBC # FLD AUTO: 11.95 K/UL — HIGH (ref 3.8–10.5)

## 2023-12-06 PROCEDURE — 99232 SBSQ HOSP IP/OBS MODERATE 35: CPT

## 2023-12-06 PROCEDURE — 90832 PSYTX W PT 30 MINUTES: CPT

## 2023-12-06 RX ADMIN — GABAPENTIN 300 MILLIGRAM(S): 400 CAPSULE ORAL at 09:08

## 2023-12-06 RX ADMIN — BUDESONIDE AND FORMOTEROL FUMARATE DIHYDRATE 2 PUFF(S): 160; 4.5 AEROSOL RESPIRATORY (INHALATION) at 09:07

## 2023-12-06 RX ADMIN — BUPROPION HYDROCHLORIDE 150 MILLIGRAM(S): 150 TABLET, EXTENDED RELEASE ORAL at 09:08

## 2023-12-06 RX ADMIN — DESMOPRESSIN ACETATE 0.1 MILLIGRAM(S): 0.1 TABLET ORAL at 21:10

## 2023-12-06 RX ADMIN — ESCITALOPRAM OXALATE 20 MILLIGRAM(S): 10 TABLET, FILM COATED ORAL at 09:08

## 2023-12-06 RX ADMIN — CLOZAPINE 200 MILLIGRAM(S): 150 TABLET, ORALLY DISINTEGRATING ORAL at 21:12

## 2023-12-06 RX ADMIN — PANTOPRAZOLE SODIUM 40 MILLIGRAM(S): 20 TABLET, DELAYED RELEASE ORAL at 09:07

## 2023-12-06 RX ADMIN — MONTELUKAST 10 MILLIGRAM(S): 4 TABLET, CHEWABLE ORAL at 21:11

## 2023-12-06 RX ADMIN — OLANZAPINE 2.5 MILLIGRAM(S): 15 TABLET, FILM COATED ORAL at 12:02

## 2023-12-06 RX ADMIN — GABAPENTIN 300 MILLIGRAM(S): 400 CAPSULE ORAL at 21:11

## 2023-12-06 RX ADMIN — Medication 2 MILLIGRAM(S): at 15:45

## 2023-12-06 RX ADMIN — LITHIUM CARBONATE 1200 MILLIGRAM(S): 300 TABLET, EXTENDED RELEASE ORAL at 21:11

## 2023-12-06 RX ADMIN — OLANZAPINE 2.5 MILLIGRAM(S): 15 TABLET, FILM COATED ORAL at 09:08

## 2023-12-06 RX ADMIN — Medication 2 MILLIGRAM(S): at 21:11

## 2023-12-06 RX ADMIN — OLANZAPINE 2.5 MILLIGRAM(S): 15 TABLET, FILM COATED ORAL at 21:11

## 2023-12-06 NOTE — BH PSYCHOLOGY - CLINICIAN PSYCHOTHERAPY NOTE - NSBHPSYCHOLINT_PSY_A_CORE
Cognitive/behavioral therapy/Dialectical  Behavioral Therapy (DBT)/Dynamic issues addressed/Stress management/Supported coping skills/Supportive therapy

## 2023-12-06 NOTE — BH INPATIENT PSYCHIATRY PROGRESS NOTE - CURRENT MEDICATION
MEDICATIONS  (STANDING):  budesonide  80 MICROgram(s)/formoterol 4.5 MICROgram(s) Inhaler 2 Puff(s) Inhalation two times a day  buPROPion XL (24-Hour) 150 milliGRAM(s) Oral daily  cloZAPine 200 milliGRAM(s) Oral at bedtime  desmopressin 0.1 milliGRAM(s) Oral at bedtime  escitalopram 20 milliGRAM(s) Oral daily  gabapentin 300 milliGRAM(s) Oral two times a day  influenza   Vaccine 0.5 milliLiter(s) IntraMuscular once  lithium SR (LITHOBID) 1200 milliGRAM(s) Oral at bedtime  montelukast 10 milliGRAM(s) Oral at bedtime  OLANZapine 2.5 milliGRAM(s) Oral three times a day  pantoprazole    Tablet 40 milliGRAM(s) Oral before breakfast  prazosin. 2 milliGRAM(s) Oral at bedtime    MEDICATIONS  (PRN):  acetaminophen     Tablet .. 650 milliGRAM(s) Oral every 6 hours PRN Moderate Pain (4 - 6)  albuterol    90 MICROgram(s) HFA Inhaler 2 Puff(s) Inhalation every 6 hours PRN Shortness of Breath and/or Wheezing  aluminum hydroxide/magnesium hydroxide/simethicone Suspension 30 milliLiter(s) Oral every 4 hours PRN Dyspepsia  benzocaine 20% Spray 1 Spray(s) Topical every 6 hours PRN tooth pain  chlorproMAZINE    Injectable 50 milliGRAM(s) IntraMuscular once PRN severe agitation  hydrOXYzine hydrochloride 50 milliGRAM(s) Oral every 12 hours PRN Anxiety  lidocaine   4% Patch 1 Patch Transdermal daily PRN LBP  LORazepam     Tablet 2 milliGRAM(s) Oral every 8 hours PRN Anxiety  LORazepam   Injectable 2 milliGRAM(s) IntraMuscular once PRN Assaultive behavior  melatonin. 5 milliGRAM(s) Oral at bedtime PRN Insomnia  ondansetron    Tablet 4 milliGRAM(s) Oral every 6 hours PRN nausea  polyethylene glycol 3350 17 Gram(s) Oral daily PRN Constipation

## 2023-12-06 NOTE — BH PSYCHOLOGY - CLINICIAN PSYCHOTHERAPY NOTE - NSBHPSYCHOLNARRATIVE_PSY_A_CORE FT
Pt appeared slightly drowsy and presented with fair hygiene and unkempt grooming, dressed in her own clothes. Pt reported that she was feeling “more depressed; not good”, demonstrating dysthymic often constricted affect, making appropriate eye contact. No perceptual disturbances were observed, though she continues to report sporadic AH, which appear to be related to her desire for State admission. Thought process was linear, organized, and goal directed. Pts’ speech was WNL. Pt endorsed SI with plan to drink soap, noting that she “doesn’t trust [herself]” and thus was put back on 1:1 obs; though she denies intent. Pt denied HI, intent, or plan. Oriented x3. Fair insight and limited judgement demonstrated.     Writer met with Pt for a supportive therapy session, as psychology extern who normally meets with Pt was out for the week. Pt detailed a recent worsening of depression and self-harm urges, identifying that the process of waiting to hear back about whether State would be accepting her has been exacerbating symptoms. Writer reinforced Pt’s efforts to be out in the common areas of the unit, rather than isolating in her room. Writer then engaged Pt in a narrative therapy drawing exercise, in which she was instructed to construct a “Tree of Life.” This consisted of the ground representing her present life (daily activities, how she spends her time), roots representing where she comes from (family origin/ancestry/rituals), trunk, leaves, fruit, branches, and storms symbolizing skills/interests, important people to her, gifts given to her, hopes/dreams/wishes, and challenges/losses/stressful events, respectively. Pt talked through each part of her drawing, including components of her  heritage, her time in the hospital, the role of music in her life, hopes of being a music therapist and living in her own supportive apartment, along with the significance of her grandmother and 3 dogs. Pt expressed feeling grateful for those who love and support her even during these “stormy times.” Writer and Pt processed the amount of space on the page that was taken up by this tree and its roots as compared to the storm clouds, rain, and lighting; relating this to the significance that each play. Pt also shared metaphor of the storms playing a role in helping the tree grow, noting that they have also grown from/through challenges. Writer provided Pt with support, validation, encouragement, and a safe space to share her thoughts and feelings.

## 2023-12-06 NOTE — BH INPATIENT PSYCHIATRY PROGRESS NOTE - NSBHCHARTREVIEWVS_PSY_A_CORE FT
Vital Signs Last 24 Hrs  T(C): 36.8 (12-06-23 @ 08:30), Max: 36.8 (12-06-23 @ 08:30)  T(F): 98.3 (12-06-23 @ 08:30), Max: 98.3 (12-06-23 @ 08:30)  HR: --  BP: --  BP(mean): --  RR: --  SpO2: --    Orthostatic VS  12-06-23 @ 08:30  Lying BP: --/-- HR: --  Sitting BP: 103/76 HR: 113  Standing BP: --/-- HR: --  Site: --  Mode: --  Orthostatic VS  12-05-23 @ 21:12  Lying BP: --/-- HR: --  Sitting BP: 148/78 HR: 107  Standing BP: 149/108 HR: 117  Site: --  Mode: --

## 2023-12-06 NOTE — BH INPATIENT PSYCHIATRY PROGRESS NOTE - NSBHFUPINTERVALHXFT_PSY_A_CORE
Patient seen for follow up of depression, AHs, SI, chart reviewed, and case discussed with treatment team.  No events reported overnight.  The patient continues to complain of worsening SI, with loose plans on the unit.  She continues to feel unsafe without 1:1.  She is unable to identify a trigger to this worsening.  She reports continued depressed mood, with CAH to harm herself.  The patient has been visible on the unit, social with peers, and attending groups.  The patient reports eating and sleeping well.  She has been compliant with medications, tolerating them well.

## 2023-12-06 NOTE — BH INPATIENT PSYCHIATRY PROGRESS NOTE - MSE UNSTRUCTURED FT
The patient appears stated age, with fair hygiene, and is dressed appropriately.  She was calm and cooperative with the interview.  She maintained appropriate eye contact.  No restlessness or slowing of movements observed.  Gait is steady.  The patient’s speech was fluent, normal in tone, rate, and volume.  The patient’s mood is “really bad.”  Her affect is constricted, stable, appropriate.  The patient’s thoughts are goal directed.  She does not have any delusions, admits to command auditory hallucinations.  She admits to worsening of suicidal ideation with plan to drink soap, no intent, no homicidal ideation, intent, or plan.  Insight is fair.  Judgment is fair.  Impulse control has been good on the unit.

## 2023-12-06 NOTE — BH INPATIENT PSYCHIATRY PROGRESS NOTE - NSBHATTESTBILLING_PSY_A_CORE
39893-[id number] OBS or IP - moderate complexity OR 35-49 mins 21520-[id number] OBS or IP - moderate complexity OR 35-49 mins

## 2023-12-06 NOTE — BH INPATIENT PSYCHIATRY PROGRESS NOTE - NSBHMETABOLIC_PSY_ALL_CORE_FT
BMI: BMI (kg/m2): 57.8 (09-14-23 @ 14:30)  HbA1c: A1C with Estimated Average Glucose Result: 5.2 % (11-01-23 @ 09:04)    Glucose:   BP: --Vital Signs Last 24 Hrs  T(C): 36.8 (12-06-23 @ 08:30), Max: 36.8 (12-06-23 @ 08:30)  T(F): 98.3 (12-06-23 @ 08:30), Max: 98.3 (12-06-23 @ 08:30)  HR: --  BP: --  BP(mean): --  RR: --  SpO2: --    Orthostatic VS  12-06-23 @ 08:30  Lying BP: --/-- HR: --  Sitting BP: 103/76 HR: 113  Standing BP: --/-- HR: --  Site: --  Mode: --  Orthostatic VS  12-05-23 @ 21:12  Lying BP: --/-- HR: --  Sitting BP: 148/78 HR: 107  Standing BP: 149/108 HR: 117  Site: --  Mode: --    Lipid Panel: Date/Time: 11-01-23 @ 09:04  Cholesterol, Serum: 156  LDL Cholesterol Calculated: 82  HDL Cholesterol, Serum: 58  Total Cholesterol/HDL Ration Measurement: --  Triglycerides, Serum: 79

## 2023-12-06 NOTE — BH INPATIENT PSYCHIATRY PROGRESS NOTE - NSBHASSESSSUMMFT_PSY_ALL_CORE
Patient is 29yo single woman, no dependents, domiciled with her Grandmother, unemployed on disability/SSI, pphx of schizoaffective disorder, multiple past psychiatric admissions including state and recent discharge from [facility], [facility] tx [facility], with pmhx of asthma, HTN, GERD, and hypothyroidism and schizoaffective disorder, in tx with [facility], who self presented to the ED reporting abdominal pain and requesting readmission to psychiatric unit, reporting CAH, depressive symptoms, IOR, suicidality, admitted to [facility] for psychiatric care.  Depression and psychosis likely multifactorial at this time, schizoaffective vs. borderline personality disorder vs. complex grief vs. adjustment disorder.      The patient remains depressed, anxious, reporting worsening of SI since yesterday.  Will continue 1:1 for safety.  Li level pending.    1.) Obs: start 1:1 for worsening SI.  2.) Psychiatric medication management:  - Reviewed options for addressing nocturnal enuresis including reducing clozapine vs reducing lithium vs adding desmopressin.  Pt decided upon desmopressin as she continues to have depression and psychosis.  Desmopressin 0.1 mg QHS.  - Lithobid 1200 mg QHS.  Pt aware that she needs to stay well hydrated and avoid diuretics and NSAIDs.   - Weekend team had started chlorpromazine standing at pt request.  Discussed concerns with lowering seizure threshold.  Reviewed alternatives.  Will utilize olanzapine 2.5 mg TID for now.  - Clozapine 200mg qHS for psychosis ([id number])  - Prazosin 2mg qhs, monitor BP carefully.  - Abilify Maintena 400mg IM q3 weeks rather than 4, per collateral from ACT team.  Doses given 10/19, 11/17.  - Mirtazapine was switched to escitalopram, will increase to 20 mg daily.  - Wellbutrin XL 150mg daily  - Gabapentin 300 mg BID for anxiety/chronic pain.  - PRN: haloperidol 5mg PO/IM q8hrs PRN for agitation, diphenhydramine 50mg PO/IM q6hrs PRN for EPS PPx, hydroxyzine 50mg PO q12hrs PRN for anxiety, lorazepam 2mg PO q8hrs PRN for anxiety or 2mg IM for assaultive behavior. Melatonin 5mg qHS PRN for insomnia  - Discussed ECT several times with pt who states she has been told in two hospitals in past that due to her obesity and asthma, she is not an ideal candidate.  Pt states that due to this she is not interested in receiving ECT at this time.  3.) Medical:   - Pt scheduled for dental extractions 11/1, however declined to attend on day prior when informed it would not be under general anesthesia.  - last clozapine labs on 10/23 (CBC with diff, troponins, CRP); CRP elevated at 15.8 likely due to dental infection/wisdom tooth issues  - pantoprazole 40mg PO before breakfast for GERD  - montelukast 10mg PO qHS for asthma  - budesonide 80mcg/formoterol 4.5mcg 2 puffs BID for asthma  - PRN: acetaminophen 650mg PO q6hrs PRN for pain, albuterol 90mcg 2puffs q6hrs PRN for dyspnea, ondansetron 4mg q6hrs PRN for nausea  - Discussed CRP with medicine service, given asymptomatic pt and normal troponin, no additional intervention/diagnostics at this time.  - Discontinued clobetasol as rash has resolved.  4.) Dispo planning: State application submitted. Patient is 27yo single woman, no dependents, domiciled with her Grandmother, unemployed on disability/SSI, pphx of schizoaffective disorder, multiple past psychiatric admissions including state and recent discharge from [facility], in tx with [facility], with pmhx of asthma, HTN, GERD, and hypothyroidism and schizoaffective disorder, in tx with [facility], who self presented to the ED reporting abdominal pain and requesting readmission to psychiatric unit, reporting CAH, depressive symptoms, IOR, suicidality, admitted to [facility] for psychiatric care.  Depression and psychosis likely multifactorial at this time, schizoaffective vs. borderline personality disorder vs. complex grief vs. adjustment disorder.      The patient remains depressed, anxious, reporting worsening of SI since yesterday.  Will continue 1:1 for safety.  Li level pending.    1.) Obs: start 1:1 for worsening SI.  2.) Psychiatric medication management:  - Reviewed options for addressing nocturnal enuresis including reducing clozapine vs reducing lithium vs adding desmopressin.  Pt decided upon desmopressin as she continues to have depression and psychosis.  Desmopressin 0.1 mg QHS.  - Lithobid 1200 mg QHS.  Pt aware that she needs to stay well hydrated and avoid diuretics and NSAIDs.   - Weekend team had started chlorpromazine standing at pt request.  Discussed concerns with lowering seizure threshold.  Reviewed alternatives.  Will utilize olanzapine 2.5 mg TID for now.  - Clozapine 200mg qHS for psychosis ([id number])  - Prazosin 2mg qhs, monitor BP carefully.  - Abilify Maintena 400mg IM q3 weeks rather than 4, per collateral from ACT team.  Doses given 10/19, 11/17.  - Mirtazapine was switched to escitalopram, will increase to 20 mg daily.  - Wellbutrin XL 150mg daily  - Gabapentin 300 mg BID for anxiety/chronic pain.  - PRN: haloperidol 5mg PO/IM q8hrs PRN for agitation, diphenhydramine 50mg PO/IM q6hrs PRN for EPS PPx, hydroxyzine 50mg PO q12hrs PRN for anxiety, lorazepam 2mg PO q8hrs PRN for anxiety or 2mg IM for assaultive behavior. Melatonin 5mg qHS PRN for insomnia  - Discussed ECT several times with pt who states she has been told in two hospitals in past that due to her obesity and asthma, she is not an ideal candidate.  Pt states that due to this she is not interested in receiving ECT at this time.  3.) Medical:   - Pt scheduled for dental extractions 11/1, however declined to attend on day prior when informed it would not be under general anesthesia.  - last clozapine labs on 10/23 (CBC with diff, troponins, CRP); CRP elevated at 15.8 likely due to dental infection/wisdom tooth issues  - pantoprazole 40mg PO before breakfast for GERD  - montelukast 10mg PO qHS for asthma  - budesonide 80mcg/formoterol 4.5mcg 2 puffs BID for asthma  - PRN: acetaminophen 650mg PO q6hrs PRN for pain, albuterol 90mcg 2puffs q6hrs PRN for dyspnea, ondansetron 4mg q6hrs PRN for nausea  - Discussed CRP with medicine service, given asymptomatic pt and normal troponin, no additional intervention/diagnostics at this time.  - Discontinued clobetasol as rash has resolved.  4.) Dispo planning: State application submitted.

## 2023-12-07 PROCEDURE — 99232 SBSQ HOSP IP/OBS MODERATE 35: CPT

## 2023-12-07 RX ORDER — CLOZAPINE 150 MG/1
225 TABLET, ORALLY DISINTEGRATING ORAL AT BEDTIME
Refills: 0 | Status: DISCONTINUED | OUTPATIENT
Start: 2023-12-07 | End: 2023-12-11

## 2023-12-07 RX ORDER — CHLORPROMAZINE HCL 10 MG
50 TABLET ORAL EVERY 6 HOURS
Refills: 0 | Status: DISCONTINUED | OUTPATIENT
Start: 2023-12-07 | End: 2024-02-06

## 2023-12-07 RX ADMIN — CLOZAPINE 225 MILLIGRAM(S): 150 TABLET, ORALLY DISINTEGRATING ORAL at 21:18

## 2023-12-07 RX ADMIN — OLANZAPINE 2.5 MILLIGRAM(S): 15 TABLET, FILM COATED ORAL at 12:23

## 2023-12-07 RX ADMIN — Medication 2 MILLIGRAM(S): at 21:19

## 2023-12-07 RX ADMIN — Medication 75 MILLIGRAM(S): at 21:24

## 2023-12-07 RX ADMIN — Medication 50 MILLIGRAM(S): at 14:55

## 2023-12-07 RX ADMIN — BUPROPION HYDROCHLORIDE 150 MILLIGRAM(S): 150 TABLET, EXTENDED RELEASE ORAL at 08:34

## 2023-12-07 RX ADMIN — OLANZAPINE 2.5 MILLIGRAM(S): 15 TABLET, FILM COATED ORAL at 21:19

## 2023-12-07 RX ADMIN — PANTOPRAZOLE SODIUM 40 MILLIGRAM(S): 20 TABLET, DELAYED RELEASE ORAL at 08:34

## 2023-12-07 RX ADMIN — DESMOPRESSIN ACETATE 0.1 MILLIGRAM(S): 0.1 TABLET ORAL at 21:18

## 2023-12-07 RX ADMIN — OLANZAPINE 2.5 MILLIGRAM(S): 15 TABLET, FILM COATED ORAL at 08:35

## 2023-12-07 RX ADMIN — Medication 5 MILLIGRAM(S): at 21:18

## 2023-12-07 RX ADMIN — ESCITALOPRAM OXALATE 20 MILLIGRAM(S): 10 TABLET, FILM COATED ORAL at 08:35

## 2023-12-07 RX ADMIN — GABAPENTIN 300 MILLIGRAM(S): 400 CAPSULE ORAL at 08:35

## 2023-12-07 RX ADMIN — LITHIUM CARBONATE 1200 MILLIGRAM(S): 300 TABLET, EXTENDED RELEASE ORAL at 21:19

## 2023-12-07 RX ADMIN — BUDESONIDE AND FORMOTEROL FUMARATE DIHYDRATE 2 PUFF(S): 160; 4.5 AEROSOL RESPIRATORY (INHALATION) at 08:36

## 2023-12-07 RX ADMIN — GABAPENTIN 300 MILLIGRAM(S): 400 CAPSULE ORAL at 21:19

## 2023-12-07 RX ADMIN — MONTELUKAST 10 MILLIGRAM(S): 4 TABLET, CHEWABLE ORAL at 21:19

## 2023-12-07 NOTE — BH INPATIENT PSYCHIATRY PROGRESS NOTE - NSBHFUPINTERVALHXFT_PSY_A_CORE
Patient seen for follow up of depression, AHs, SI, chart reviewed, and case discussed with treatment team.  No events reported overnight.  The patient continues to report significant SI, and feels unsafe on the unit.  Behavior has been well controlled on 1:1, will continue.  She reports continued depressed mood, with CAH to harm herself.  The patient has been visible on the unit, social with peers, and attending groups.  The patient reports eating and sleeping well.  She has been compliant with medications, tolerating them well.

## 2023-12-07 NOTE — PHARMACOTHERAPY INTERVENTION NOTE - COMMENTS
Patient being treated for possible UTI with Cefpodoxime 100mg BID for 7 days. No notes in chart indicating that patient is complaining of symptoms. PharmD confirmed with RN that patient is asymptomatic. UA and UC likely contaminated as it was noted patient was menstruating on admission when sample was collected. Per IDSA guidelines, it is not recommended to treat asymptomatic bacteriuria. Recommendation provided to attending physician and antibiotic order discontinued.
Patient ordered for Tamiflu 75 mG daily x 10 days. 14 days duration recommended for institutional outbreaks. Order changed to 14 days duration with approval of Dr Johns and per Antimicrobial Dosing Optimization Policy.

## 2023-12-07 NOTE — BH INPATIENT PSYCHIATRY PROGRESS NOTE - NSBHMETABOLIC_PSY_ALL_CORE_FT
BMI: BMI (kg/m2): 57.8 (09-14-23 @ 14:30)  HbA1c: A1C with Estimated Average Glucose Result: 5.2 % (11-01-23 @ 09:04)    Glucose:   BP: --Vital Signs Last 24 Hrs  T(C): 36.7 (12-07-23 @ 08:27), Max: 36.9 (12-06-23 @ 20:30)  T(F): 98.1 (12-07-23 @ 08:27), Max: 98.4 (12-06-23 @ 20:30)  HR: --  BP: --  BP(mean): --  RR: 20 (12-06-23 @ 20:30) (20 - 20)  SpO2: 99% (12-06-23 @ 20:30) (99% - 99%)    Orthostatic VS  12-07-23 @ 08:27  Lying BP: --/-- HR: --  Sitting BP: 137/82 HR: 120  Standing BP: --/-- HR: --  Site: --  Mode: --  Orthostatic VS  12-06-23 @ 20:30  Lying BP: --/-- HR: --  Sitting BP: 134/78 HR: 98  Standing BP: 130/72 HR: 99  Site: --  Mode: --  Orthostatic VS  12-06-23 @ 08:30  Lying BP: --/-- HR: --  Sitting BP: 103/76 HR: 113  Standing BP: --/-- HR: --  Site: --  Mode: --  Orthostatic VS  12-05-23 @ 21:12  Lying BP: --/-- HR: --  Sitting BP: 148/78 HR: 107  Standing BP: 149/108 HR: 117  Site: --  Mode: --    Lipid Panel: Date/Time: 11-01-23 @ 09:04  Cholesterol, Serum: 156  LDL Cholesterol Calculated: 82  HDL Cholesterol, Serum: 58  Total Cholesterol/HDL Ration Measurement: --  Triglycerides, Serum: 79

## 2023-12-07 NOTE — BH INPATIENT PSYCHIATRY PROGRESS NOTE - PRN MEDS
MEDICATIONS  (PRN):  acetaminophen     Tablet .. 650 milliGRAM(s) Oral every 6 hours PRN Moderate Pain (4 - 6)  albuterol    90 MICROgram(s) HFA Inhaler 2 Puff(s) Inhalation every 6 hours PRN Shortness of Breath and/or Wheezing  aluminum hydroxide/magnesium hydroxide/simethicone Suspension 30 milliLiter(s) Oral every 4 hours PRN Dyspepsia  chlorproMAZINE    Injectable 50 milliGRAM(s) IntraMuscular once PRN severe agitation  hydrOXYzine hydrochloride 50 milliGRAM(s) Oral every 12 hours PRN Anxiety  lidocaine   4% Patch 1 Patch Transdermal daily PRN LBP  LORazepam     Tablet 2 milliGRAM(s) Oral every 8 hours PRN Anxiety  LORazepam   Injectable 2 milliGRAM(s) IntraMuscular once PRN Assaultive behavior  melatonin. 5 milliGRAM(s) Oral at bedtime PRN Insomnia  ondansetron    Tablet 4 milliGRAM(s) Oral every 6 hours PRN nausea  polyethylene glycol 3350 17 Gram(s) Oral daily PRN Constipation

## 2023-12-07 NOTE — BH INPATIENT PSYCHIATRY PROGRESS NOTE - NSBHCHARTREVIEWVS_PSY_A_CORE FT
Vital Signs Last 24 Hrs  T(C): 36.7 (12-07-23 @ 08:27), Max: 36.9 (12-06-23 @ 20:30)  T(F): 98.1 (12-07-23 @ 08:27), Max: 98.4 (12-06-23 @ 20:30)  HR: --  BP: --  BP(mean): --  RR: 20 (12-06-23 @ 20:30) (20 - 20)  SpO2: 99% (12-06-23 @ 20:30) (99% - 99%)    Orthostatic VS  12-07-23 @ 08:27  Lying BP: --/-- HR: --  Sitting BP: 137/82 HR: 120  Standing BP: --/-- HR: --  Site: --  Mode: --  Orthostatic VS  12-06-23 @ 20:30  Lying BP: --/-- HR: --  Sitting BP: 134/78 HR: 98  Standing BP: 130/72 HR: 99  Site: --  Mode: --  Orthostatic VS  12-06-23 @ 08:30  Lying BP: --/-- HR: --  Sitting BP: 103/76 HR: 113  Standing BP: --/-- HR: --  Site: --  Mode: --  Orthostatic VS  12-05-23 @ 21:12  Lying BP: --/-- HR: --  Sitting BP: 148/78 HR: 107  Standing BP: 149/108 HR: 117  Site: --  Mode: --

## 2023-12-07 NOTE — BH INPATIENT PSYCHIATRY PROGRESS NOTE - NSBHASSESSSUMMFT_PSY_ALL_CORE
Patient is 27yo single woman, no dependents, domiciled with her Grandmother, unemployed on disability/SSI, pphx of schizoaffective disorder, multiple past psychiatric admissions including state and recent discharge from [facility], in tx with [facility], with pmhx of asthma, HTN, GERD, and hypothyroidism and schizoaffective disorder, in tx with [facility], who self presented to the ED reporting abdominal pain and requesting readmission to psychiatric unit, reporting CAH, depressive symptoms, IOR, suicidality, admitted to [facility] for psychiatric care.  Depression and psychosis likely multifactorial at this time, schizoaffective vs. borderline personality disorder vs. complex grief vs. adjustment disorder.      The patient remains depressed, anxious, reporting worsening of SI.  Will continue 1:1 for safety.  Li level 0.6, clozapine level 244.  Will increase clozapine to 225mg.    1.) Obs: start 1:1 for worsening SI.  2.) Psychiatric medication management:  - Reviewed options for addressing nocturnal enuresis including reducing clozapine vs reducing lithium vs adding desmopressin.  Pt decided upon desmopressin as she continues to have depression and psychosis.  Desmopressin 0.1 mg QHS.  - Lithobid 1200 mg QHS.  Pt aware that she needs to stay well hydrated and avoid diuretics and NSAIDs.   - Weekend team had started chlorpromazine standing at pt request.  Discussed concerns with lowering seizure threshold.  Reviewed alternatives.  Will utilize olanzapine 2.5 mg TID for now.  - Increase Clozapine to 225mg qHS for psychosis ([id number])  - Prazosin 2mg qhs, monitor BP carefully.  - Abilify Maintena 400mg IM q3 weeks rather than 4, per collateral from ACT team.  Doses given 10/19, 11/17, next 12/8.  - Mirtazapine was switched to escitalopram, will increase to 20 mg daily.  - Wellbutrin XL 150mg daily  - Gabapentin 300 mg BID for anxiety/chronic pain.  - PRN: haloperidol 5mg PO/IM q8hrs PRN for agitation, diphenhydramine 50mg PO/IM q6hrs PRN for EPS PPx, hydroxyzine 50mg PO q12hrs PRN for anxiety, lorazepam 2mg PO q8hrs PRN for anxiety or 2mg IM for assaultive behavior. Melatonin 5mg qHS PRN for insomnia  - Discussed ECT several times with pt who states she has been told in two hospitals in past that due to her obesity and asthma, she is not an ideal candidate.  Pt states that due to this she is not interested in receiving ECT at this time.  3.) Medical:   - Pt scheduled for dental extractions 11/1, however declined to attend on day prior when informed it would not be under general anesthesia.  - last clozapine labs on 10/23 (CBC with diff, troponins, CRP); CRP elevated at 15.8 likely due to dental infection/wisdom tooth issues  - pantoprazole 40mg PO before breakfast for GERD  - montelukast 10mg PO qHS for asthma  - budesonide 80mcg/formoterol 4.5mcg 2 puffs BID for asthma  - PRN: acetaminophen 650mg PO q6hrs PRN for pain, albuterol 90mcg 2puffs q6hrs PRN for dyspnea, ondansetron 4mg q6hrs PRN for nausea  - Discussed CRP with medicine service, given asymptomatic pt and normal troponin, no additional intervention/diagnostics at this time.  - Discontinued clobetasol as rash has resolved.  4.) Dispo planning: State application submitted. Patient is 29yo single woman, no dependents, domiciled with her Grandmother, unemployed on disability/SSI, pphx of schizoaffective disorder, multiple past psychiatric admissions including state and recent discharge from [facility], in tx with [facility], with pmhx of asthma, HTN, GERD, and hypothyroidism and schizoaffective disorder, in tx with [facility], who self presented to the ED reporting abdominal pain and requesting readmission to psychiatric unit, reporting CAH, depressive symptoms, IOR, suicidality, admitted to [facility] for psychiatric care.  Depression and psychosis likely multifactorial at this time, schizoaffective vs. borderline personality disorder vs. complex grief vs. adjustment disorder.      The patient remains depressed, anxious, reporting worsening of SI.  Will continue 1:1 for safety.  Li level 0.6, clozapine level 244.  Will increase clozapine to 225mg.    1.) Obs: start 1:1 for worsening SI.  2.) Psychiatric medication management:  - Reviewed options for addressing nocturnal enuresis including reducing clozapine vs reducing lithium vs adding desmopressin.  Pt decided upon desmopressin as she continues to have depression and psychosis.  Desmopressin 0.1 mg QHS.  - Lithobid 1200 mg QHS.  Pt aware that she needs to stay well hydrated and avoid diuretics and NSAIDs.   - Weekend team had started chlorpromazine standing at pt request.  Discussed concerns with lowering seizure threshold.  Reviewed alternatives.  Will utilize olanzapine 2.5 mg TID for now.  - Increase Clozapine to 225mg qHS for psychosis ([id number])  - Prazosin 2mg qhs, monitor BP carefully.  - Abilify Maintena 400mg IM q3 weeks rather than 4, per collateral from ACT team.  Doses given 10/19, 11/17, next 12/8.  - Mirtazapine was switched to escitalopram, will increase to 20 mg daily.  - Wellbutrin XL 150mg daily  - Gabapentin 300 mg BID for anxiety/chronic pain.  - PRN: haloperidol 5mg PO/IM q8hrs PRN for agitation, diphenhydramine 50mg PO/IM q6hrs PRN for EPS PPx, hydroxyzine 50mg PO q12hrs PRN for anxiety, lorazepam 2mg PO q8hrs PRN for anxiety or 2mg IM for assaultive behavior. Melatonin 5mg qHS PRN for insomnia  - Discussed ECT several times with pt who states she has been told in two hospitals in past that due to her obesity and asthma, she is not an ideal candidate.  Pt states that due to this she is not interested in receiving ECT at this time.  3.) Medical:   - Pt scheduled for dental extractions 11/1, however declined to attend on day prior when informed it would not be under general anesthesia.  - last clozapine labs on 10/23 (CBC with diff, troponins, CRP); CRP elevated at 15.8 likely due to dental infection/wisdom tooth issues  - pantoprazole 40mg PO before breakfast for GERD  - montelukast 10mg PO qHS for asthma  - budesonide 80mcg/formoterol 4.5mcg 2 puffs BID for asthma  - PRN: acetaminophen 650mg PO q6hrs PRN for pain, albuterol 90mcg 2puffs q6hrs PRN for dyspnea, ondansetron 4mg q6hrs PRN for nausea  - Discussed CRP with medicine service, given asymptomatic pt and normal troponin, no additional intervention/diagnostics at this time.  - Discontinued clobetasol as rash has resolved.  4.) Dispo planning: State application submitted.

## 2023-12-07 NOTE — BH INPATIENT PSYCHIATRY PROGRESS NOTE - MSE UNSTRUCTURED FT
The patient appears stated age, with fair hygiene, and is dressed appropriately.  She was calm and cooperative with the interview.  She maintained appropriate eye contact.  No restlessness or slowing of movements observed.  Gait is steady.  The patient’s speech was fluent, normal in tone, rate, and volume.  The patient’s mood is “not good.”  Her affect is constricted, stable, appropriate.  The patient’s thoughts are goal directed.  She does not have any delusions, admits to command auditory hallucinations.  She admits to suicidal ideation with loose plan to drink soap, no intent, no homicidal ideation, intent, or plan.  Insight is fair.  Judgment is fair.  Impulse control has been tenuous on the unit.

## 2023-12-07 NOTE — BH INPATIENT PSYCHIATRY PROGRESS NOTE - NSBHATTESTBILLING_PSY_A_CORE
54784-Foohcgtqve OBS or IP - moderate complexity OR 35-49 mins 70206-Nixspfjyna OBS or IP - moderate complexity OR 35-49 mins

## 2023-12-07 NOTE — BH TREATMENT PLAN - NSTXDCOTHRINTERSW_PSY_ALL_CORE
Writer will work to educate the pt on the state referral process and explore any other aftercare options that may be an appropriate alternative to [facility] hospital. Writer will work to educate the pt on the state referral process and explore any other aftercare options that may be an appropriate alternative to [facility] hospital.

## 2023-12-08 PROCEDURE — 99232 SBSQ HOSP IP/OBS MODERATE 35: CPT

## 2023-12-08 RX ORDER — ARIPIPRAZOLE 15 MG/1
400 TABLET ORAL ONCE
Refills: 0 | Status: COMPLETED | OUTPATIENT
Start: 2023-12-08 | End: 2023-12-08

## 2023-12-08 RX ADMIN — ESCITALOPRAM OXALATE 20 MILLIGRAM(S): 10 TABLET, FILM COATED ORAL at 08:31

## 2023-12-08 RX ADMIN — Medication 2 MILLIGRAM(S): at 21:16

## 2023-12-08 RX ADMIN — ARIPIPRAZOLE 400 MILLIGRAM(S): 15 TABLET ORAL at 10:24

## 2023-12-08 RX ADMIN — ONDANSETRON 4 MILLIGRAM(S): 8 TABLET, FILM COATED ORAL at 16:12

## 2023-12-08 RX ADMIN — GABAPENTIN 300 MILLIGRAM(S): 400 CAPSULE ORAL at 08:30

## 2023-12-08 RX ADMIN — OLANZAPINE 2.5 MILLIGRAM(S): 15 TABLET, FILM COATED ORAL at 21:15

## 2023-12-08 RX ADMIN — OLANZAPINE 2.5 MILLIGRAM(S): 15 TABLET, FILM COATED ORAL at 08:30

## 2023-12-08 RX ADMIN — PANTOPRAZOLE SODIUM 40 MILLIGRAM(S): 20 TABLET, DELAYED RELEASE ORAL at 08:30

## 2023-12-08 RX ADMIN — MONTELUKAST 10 MILLIGRAM(S): 4 TABLET, CHEWABLE ORAL at 21:16

## 2023-12-08 RX ADMIN — BUPROPION HYDROCHLORIDE 150 MILLIGRAM(S): 150 TABLET, EXTENDED RELEASE ORAL at 08:31

## 2023-12-08 RX ADMIN — OLANZAPINE 2.5 MILLIGRAM(S): 15 TABLET, FILM COATED ORAL at 12:58

## 2023-12-08 RX ADMIN — CLOZAPINE 225 MILLIGRAM(S): 150 TABLET, ORALLY DISINTEGRATING ORAL at 21:15

## 2023-12-08 RX ADMIN — GABAPENTIN 300 MILLIGRAM(S): 400 CAPSULE ORAL at 21:15

## 2023-12-08 RX ADMIN — LITHIUM CARBONATE 1200 MILLIGRAM(S): 300 TABLET, EXTENDED RELEASE ORAL at 21:15

## 2023-12-08 RX ADMIN — BUDESONIDE AND FORMOTEROL FUMARATE DIHYDRATE 2 PUFF(S): 160; 4.5 AEROSOL RESPIRATORY (INHALATION) at 08:30

## 2023-12-08 RX ADMIN — Medication 75 MILLIGRAM(S): at 08:30

## 2023-12-08 RX ADMIN — DESMOPRESSIN ACETATE 0.1 MILLIGRAM(S): 0.1 TABLET ORAL at 21:15

## 2023-12-08 NOTE — BH INPATIENT PSYCHIATRY PROGRESS NOTE - NSDCCRITERIA_PSY_ALL_CORE
Department of Internal Medicine        CHIEF COMPLAINT: Outpatient lab work showing hypokalemia    Reason for Admission: Severe hypokalemia, COPD exacerbation    HISTORY OF PRESENT ILLNESS:      The patient is a 78 y.o. female who presents with having outpatient lab work performed having potassium 2.2. This was repeated and was 2.2 again today. Serum magnesium 1.7. Patient does complain of 3 watery bowel movements every day. Patient denies any chest or abdominal pain. She denies any dizziness. She denies shortness of breath at rest.  BUN/creatinine was 10/0.6. CO2 is 35. O2 saturation was only 91% on room air at rest.  She does complain of some exertional dyspnea. Patient does have some underlying expiratory wheezing. Temperature 97.2 with heart rate 88 and blood pressure 135/106.    5/7/2022  Patient seen examined on telemetry floor. Patient denies any new aches or pains. The patient denies any chest pain, abdominal pain, nausea vomiting or unusual shortness of breath. Patient though oxygen saturations have dropped down this morning with minimal activity. Potassium is only increased to 2.6 today with BUN/creatinine 9/0.5. Lipid panel and liver enzymes are normal.  WBC is 13.6 with hemoglobin 15. Temperature is 98.4 with heart rate in 90 and blood pressure 124/71. O2 sat was 87%-93% on 1 L nasal cannula at rest.    5/8/2022  Patient seen examined on telemetry floor. Patient states he feels great. Patient denies having any significant diarrhea but she did have little bit of loose bowel movements in the past 24 hours. She denies any problem chest pain, abdominal pain, nausea vomiting or unusual shortness of breath at rest.  BUN/creatinine was 8/0.5 serum potassium was increased to 3.2. Patient received 100 mEq of KCl IV and 80 mill equivalents of KCl p.o in the past 24 hours. Temperature is 98.4 with heart rate of 100 and blood pressure ranges 122//69.   With the patient having refractory hypokalemia we will consult nephrology. 5/9/2022  Patient seen examined on telemetry floor. Patient states she feels good again today. Patient states she is only had a few loose bowel movements the past 2 days. Patient denies any chest or abdominal pain. She denies any shortness of breath at rest or with activity. BUN/creatinine 9/0.4 with potassium 5.4 today. Magnesium was 2.0. WBC increased 24.1 which most likely from steroids. Hemoglobin 14.1 with platelet count 201. Temperature is 97.5 heart rate 91 blood pressure ranges 133//94 this morning. O2 sat 96% on 1/2 L nasal cannula. O2 saturation walking 200 feet ranged from 90-93% on room air. We will stop the oral potassium and IV fluids at this time. Pending evaluation with nephrology today. 5/10/2022  Patient seen examined on telemetry floor. Patient denies any problem with chest pain, abdominal pain, nausea/vomiting, unusual shortness of breath. Patient denies any diarrhea today or last night. BUN/creatinine was 30/0.5 today with potassium 4.4. Temperature 96.4 with heart rate of 91 and blood pressure ranges 106//80. O2 sat ranges 92-97% room air at rest.  Case discussed with nephrology yesterday afternoon. The patient was not discharged yesterday to further monitor patient's potassium. Patient will be discharged home today. We will check a repeat BMP next Monday.     Patient stable for discharge    Past Medical History:    Past Medical History:   Diagnosis Date    Bronchitis     Depression     GERD (gastroesophageal reflux disease)     Hyperlipidemia     Hypertension     Irritable bowel     Kidney stone     Migraines     Thyroid disease      Past Surgical History:    Past Surgical History:   Procedure Laterality Date    APPENDECTOMY      BACK SURGERY  2011    cervical disc surgery    HYSTERECTOMY      NERVE BLOCK Left 09/07/2016    sacroiliac joint #1    NERVE BLOCK Left 09/19/2016    si inj #2    NOSE SURGERY bone removed d/t breathing    OTHER SURGICAL HISTORY Left 11/07/2016    sacroiliac joint radiofrequency    OTHER SURGICAL HISTORY Left 08/07/2017    radiofrequency, sacroiliac    SPINE SURGERY      SPLENECTOMY  age 23    d/t a tear etiology unknown    TONSILLECTOMY         Medications Prior to Admission:    @  Prior to Admission medications    Medication Sig Start Date End Date Taking? Authorizing Provider   lisinopril-hydroCHLOROthiazide (PRINZIDE;ZESTORETIC) 20-25 MG per tablet Take 1 tablet by mouth daily 5/5/22   Kofi Ripple, DO   rizatriptan (MAXALT) 10 MG tablet Take 1 tablet by mouth once as needed for Migraine May repeat in 2 hours if needed 5/5/22 5/5/22  Kofi Ripple, DO   atorvastatin (LIPITOR) 20 MG tablet Take 1 tablet by mouth daily 5/5/22   Kofi Ripple, DO   omeprazole (PRILOSEC) 20 MG delayed release capsule Take 1 capsule by mouth daily 5/5/22   Kofi Ripple, DO   levothyroxine (SYNTHROID) 75 MCG tablet Take 1 tablet by mouth Daily 5/5/22   Kofi Ripple, DO   ibuprofen (ADVIL;MOTRIN) 400 MG tablet Take 1 tablet by mouth every 6 hours as needed for Pain 5/5/22   Kofi Ripple, DO       Allergies:   Iodides, Peanuts [peanut oil], and Penicillins    Social History:   Social History     Socioeconomic History    Marital status:      Spouse name: Not on file    Number of children: Not on file    Years of education: Not on file    Highest education level: Not on file   Occupational History    Not on file   Tobacco Use    Smoking status: Current Every Day Smoker     Packs/day: 0.50     Years: 50.00     Pack years: 25.00     Types: Cigarettes    Smokeless tobacco: Never Used   Substance and Sexual Activity    Alcohol use: No    Drug use: No    Sexual activity: Not Currently   Other Topics Concern    Not on file   Social History Narrative    Not on file     Social Determinants of Health     Financial Resource Strain: High Risk    Difficulty of Paying Living Expenses: Hard   Food Insecurity: Food Insecurity Present    Worried About Running Out of Food in the Last Year: Often true    Luiz of Food in the Last Year: Sometimes true   Transportation Needs:     Lack of Transportation (Medical): Not on file    Lack of Transportation (Non-Medical): Not on file   Physical Activity:     Days of Exercise per Week: Not on file    Minutes of Exercise per Session: Not on file   Stress:     Feeling of Stress : Not on file   Social Connections:     Frequency of Communication with Friends and Family: Not on file    Frequency of Social Gatherings with Friends and Family: Not on file    Attends [employer] Services: Not on file    Active Member of [address] [place] [address] [employer] or Organizations: Not on file    Attends Club or Organization Meetings: Not on file    Marital Status: Not on file   Intimate Partner Violence:     Fear of Current or Ex-Partner: Not on file    Emotionally Abused: Not on file    Physically Abused: Not on file    Sexually Abused: Not on file   Housing Stability:     Unable to Pay for Housing in the Last Year: Not on file    Number of [place] in the Last Year: Not on file    Unstable Housing in the Last Year: Not on file       Family History:   Family History   Problem Relation Age of Onset    Arthritis Other     Heart Disease Other     High Blood Pressure Other        REVIEW OF SYSTEMS:    Gen: Patient denies any lightheadedness or dizziness. No LOC or syncope. No fevers or chills. HEENT: No earache, sore throat or nasal congestion. Resp: Denies cough, hemoptysis or sputum production. Cardiac: Denies chest pain,+ SOB, diaphoresis or palpitations. GI: No nausea, vomiting, diarrhea or constipation. No melena or hematochezia. : No urinary complaints, dysuria, hematuria or frequency. MSK: No extremity weakness, paralysis or paresthesias.      PHYSICAL EXAM:    Vitals:  BP (!) 190/94   Pulse 91   Temp 97.5 °F (36.4 °C) (Oral)   Resp 18 Ht 4' 9\" (1.448 m)   Wt 105 lb (47.6 kg)   SpO2 96%   BMI 22.72 kg/m²     General:  This is a 78 y.o. yo female who is alert and oriented in NAD  HEENT:  Head is normocephalic and atraumatic, PERRLA, EOMI, mucus membranes moist with no pharyngeal erythema or exudate. Neck:  Supple with no carotid bruits, JVD or thyromegaly.   No cervical adenopathy  CV:  Regular rate and rhythm, no murmurs  Lungs: Coarse decreased breath sounds to auscultation bilaterally without rhonchi, wheezes, rales   Abdomen:  Soft, nontender, nondistended, bowel sounds present  Extremities:  No edema, peripheral pulses intact bilaterally  Neuro:  Cranial nerves II-XII grossly intact; motor and sensory function intact with no focal deficits  Skin:  No rashes, lesions or wounds    DATA:  CBC with Differential:    Lab Results   Component Value Date    WBC 24.1 05/09/2022    RBC 4.66 05/09/2022    HGB 14.1 05/09/2022    HCT 43.0 05/09/2022     05/09/2022    MCV 92.3 05/09/2022    MCH 30.3 05/09/2022    MCHC 32.8 05/09/2022    RDW 14.6 05/09/2022    LYMPHOPCT 3.5 05/09/2022    MONOPCT 3.1 05/09/2022    BASOPCT 0.2 05/09/2022    MONOSABS 0.00 05/09/2022    LYMPHSABS 0.96 05/09/2022    EOSABS 0.00 05/09/2022    BASOSABS 0.00 05/09/2022     CMP:    Lab Results   Component Value Date     05/08/2022    K 4.2 05/08/2022     05/08/2022    CO2 23 05/08/2022    BUN 11 05/08/2022    CREATININE 0.5 05/08/2022    GFRAA >60 05/08/2022    LABGLOM >60 05/08/2022    GLUCOSE 158 05/08/2022    PROT 7.1 05/07/2022    LABALBU 3.8 05/07/2022    CALCIUM 8.8 05/08/2022    BILITOT 0.7 05/07/2022    ALKPHOS 104 05/07/2022    AST 28 05/07/2022    ALT 21 05/07/2022     Magnesium:    Lab Results   Component Value Date    MG 2.0 05/09/2022     Phosphorus:    Lab Results   Component Value Date    PHOS 3.2 05/07/2022     PT/INR:  No results found for: PROTIME, INR  Troponin:    Lab Results   Component Value Date    TROPONINI <0.01 04/01/2018     U/A: Lab Results   Component Value Date    COLORU Yellow 04/01/2018    PROTEINU TRACE 04/01/2018    PHUR 8.0 04/01/2018    WBCUA 0-1 04/01/2018    RBCUA 2-5 04/01/2018    BACTERIA NONE 04/01/2018    CLARITYU Clear 04/01/2018    SPECGRAV 1.010 04/01/2018    LEUKOCYTESUR Negative 04/01/2018    UROBILINOGEN 0.2 04/01/2018    BILIRUBINUR Negative 04/01/2018    BLOODU MODERATE 04/01/2018    GLUCOSEU Negative 04/01/2018     ABG:  No results found for: PH, PCO2, PO2, HCO3, BE, THGB, TCO2, O2SAT  HgBA1c:  No results found for: LABA1C  FLP:    Lab Results   Component Value Date    TRIG 133 05/07/2022    HDL 58 05/07/2022    LDLCALC 89 05/07/2022    LABVLDL 27 05/07/2022     TSH:    Lab Results   Component Value Date    TSH 2.610 05/07/2022     IRON:  No results found for: IRON  LIPASE:    Lab Results   Component Value Date    LIPASE 14 04/01/2018       ASSESSMENT AND PLAN:      Patient Active Problem List    Diagnosis Date Noted    Sacroiliitis  08/08/2016    COPD exacerbation ([address] 05/06/2022    Acquired hypothyroidism 05/05/2022    Essential hypertension 05/05/2022    Chronic pain of both shoulders 05/05/2022    GERD without esophagitis 05/05/2022    Acute respiratory failure with hypoxia ([address] 04/01/2018    DDD (degenerative disc disease), cervical 08/08/2016    Status post cervical spinal fusion 08/08/2016    Cervical osteoarthritis 08/08/2016    DDD (degenerative disc disease), lumbar 08/08/2016    Facet syndrome, lumbar 08/08/2016     Impression:  1. Severe hypokalemia  2. Acute hypoxic respiratory failure with COPD exacerbation and current tobacco abuse  3. History hypertension  4. History hyperlipidemia  5. History hypothyroidism  6. History of depression  7.   Chronic diarrhea-3 bowel movements a day in the a.m.-    Plan:  Discharge home today    Prescription for Symbicort inhaler 80/4.5-2 puffs twice daily  Prescription for Spiriva Respimat 2.5--2 puffs daily  Prescription for Proventil inhaler 2 puffs every 4 hours as needed  Prescription for prednisone 10 mg tablet-3 tablets daily for 4 days  Prescription for Prinivil 20 mg tablet 1 daily    Continue same home meds but hold Prinzide(hydrochlorothiazide)    Lab requisition given for BMP on 5/16    Follow-up primary care physician in 1 week-monitor O2 saturations and patient's follow-up for tobacco cessation along with possible outpatient work-up for her intermittent diarrhea    Patient strongly urged to stop smoking which she states she will    Jose Juan North DO, D.O.  5/10/2022 When pt is no longer an acute or imminent risk of harm to self or others, and is able to care for self safely, pt may then be discharged.

## 2023-12-08 NOTE — BH INPATIENT PSYCHIATRY PROGRESS NOTE - NSBHMETABOLIC_PSY_ALL_CORE_FT
BMI: BMI (kg/m2): 57.8 (09-14-23 @ 14:30)  HbA1c: A1C with Estimated Average Glucose Result: 5.2 % (11-01-23 @ 09:04)    Glucose:   BP: --Vital Signs Last 24 Hrs  T(C): 36.5 (12-08-23 @ 08:30), Max: 36.7 (12-07-23 @ 20:26)  T(F): 97.7 (12-08-23 @ 08:30), Max: 98 (12-07-23 @ 20:26)  HR: --  BP: --  BP(mean): --  RR: --  SpO2: 100% (12-07-23 @ 20:26) (100% - 100%)    Orthostatic VS  12-08-23 @ 08:30  Lying BP: --/-- HR: --  Sitting BP: 130/95 HR: 106  Standing BP: 141/88 HR: 113  Site: --  Mode: --  Orthostatic VS  12-07-23 @ 20:26  Lying BP: --/-- HR: --  Sitting BP: 126/91 HR: 106  Standing BP: 142/97 HR: 114  Site: --  Mode: --  Orthostatic VS  12-07-23 @ 08:27  Lying BP: --/-- HR: --  Sitting BP: 137/82 HR: 120  Standing BP: --/-- HR: --  Site: --  Mode: --  Orthostatic VS  12-06-23 @ 20:30  Lying BP: --/-- HR: --  Sitting BP: 134/78 HR: 98  Standing BP: 130/72 HR: 99  Site: --  Mode: --    Lipid Panel: Date/Time: 11-01-23 @ 09:04  Cholesterol, Serum: 156  LDL Cholesterol Calculated: 82  HDL Cholesterol, Serum: 58  Total Cholesterol/HDL Ration Measurement: --  Triglycerides, Serum: 79

## 2023-12-08 NOTE — BH INPATIENT PSYCHIATRY PROGRESS NOTE - NSBHCHARTREVIEWVS_PSY_A_CORE FT
Vital Signs Last 24 Hrs  T(C): 36.5 (12-08-23 @ 08:30), Max: 36.7 (12-07-23 @ 20:26)  T(F): 97.7 (12-08-23 @ 08:30), Max: 98 (12-07-23 @ 20:26)  HR: --  BP: --  BP(mean): --  RR: --  SpO2: 100% (12-07-23 @ 20:26) (100% - 100%)    Orthostatic VS  12-08-23 @ 08:30  Lying BP: --/-- HR: --  Sitting BP: 130/95 HR: 106  Standing BP: 141/88 HR: 113  Site: --  Mode: --  Orthostatic VS  12-07-23 @ 20:26  Lying BP: --/-- HR: --  Sitting BP: 126/91 HR: 106  Standing BP: 142/97 HR: 114  Site: --  Mode: --  Orthostatic VS  12-07-23 @ 08:27  Lying BP: --/-- HR: --  Sitting BP: 137/82 HR: 120  Standing BP: --/-- HR: --  Site: --  Mode: --  Orthostatic VS  12-06-23 @ 20:30  Lying BP: --/-- HR: --  Sitting BP: 134/78 HR: 98  Standing BP: 130/72 HR: 99  Site: --  Mode: --

## 2023-12-08 NOTE — BH INPATIENT PSYCHIATRY PROGRESS NOTE - NSBHFUPINTERVALHXFT_PSY_A_CORE
Patient seen for follow up of depression, AHs, SI, chart reviewed, and case discussed with treatment team.  No events reported overnight.  The patient continues to report severe depression, with CAH to harm herself, and SI.  She has been trying to sleep more as an escape.  Encouraged her to continue to participate on the unit as well as a distraction.  She feels a little more tired today with the increase in clozapine.   Behavior has been well controlled on 1:1, will continue.  The patient has been visible on the unit, social with peers, and attending groups.  The patient reports eating and sleeping well.  She has been compliant with medications, tolerating them well.

## 2023-12-08 NOTE — BH INPATIENT PSYCHIATRY PROGRESS NOTE - PRN MEDS
MEDICATIONS  (PRN):  acetaminophen     Tablet .. 650 milliGRAM(s) Oral every 6 hours PRN Moderate Pain (4 - 6)  albuterol    90 MICROgram(s) HFA Inhaler 2 Puff(s) Inhalation every 6 hours PRN Shortness of Breath and/or Wheezing  aluminum hydroxide/magnesium hydroxide/simethicone Suspension 30 milliLiter(s) Oral every 4 hours PRN Dyspepsia  chlorproMAZINE    Injectable 50 milliGRAM(s) IntraMuscular once PRN severe agitation  chlorproMAZINE    Tablet 50 milliGRAM(s) Oral every 6 hours PRN agitation  hydrOXYzine hydrochloride 50 milliGRAM(s) Oral every 12 hours PRN Anxiety  lidocaine   4% Patch 1 Patch Transdermal daily PRN LBP  LORazepam     Tablet 2 milliGRAM(s) Oral every 8 hours PRN Anxiety  LORazepam   Injectable 2 milliGRAM(s) IntraMuscular once PRN Assaultive behavior  melatonin. 5 milliGRAM(s) Oral at bedtime PRN Insomnia  ondansetron    Tablet 4 milliGRAM(s) Oral every 6 hours PRN nausea  polyethylene glycol 3350 17 Gram(s) Oral daily PRN Constipation

## 2023-12-08 NOTE — BH INPATIENT PSYCHIATRY PROGRESS NOTE - NSBHASSESSSUMMFT_PSY_ALL_CORE
Patient is 27yo single woman, no dependents, domiciled with her Grandmother, unemployed on disability/SSI, pphx of schizoaffective disorder, multiple past psychiatric admissions including state and recent discharge from [facility], in tx with [facility], with pmhx of asthma, HTN, GERD, and hypothyroidism and schizoaffective disorder, in tx with [facility], who self presented to the ED reporting abdominal pain and requesting readmission to psychiatric unit, reporting CAH, depressive symptoms, IOR, suicidality, admitted to [facility] for psychiatric care.  Depression and psychosis likely multifactorial at this time, schizoaffective vs. borderline personality disorder vs. complex grief vs. adjustment disorder.      The patient continues to be depressed, anxious, with SI.  Will continue 1:1 for safety.  Li level 0.6, clozapine level 244.  Increased clozapine to 225mg.    1.) Obs: start 1:1 for worsening SI.  2.) Psychiatric medication management:  - Reviewed options for addressing nocturnal enuresis including reducing clozapine vs reducing lithium vs adding desmopressin.  Pt decided upon desmopressin as she continues to have depression and psychosis.  Desmopressin 0.1 mg QHS.  - Lithobid 1200 mg QHS.  Pt aware that she needs to stay well hydrated and avoid diuretics and NSAIDs.   - Weekend team had started chlorpromazine standing at pt request.  Discussed concerns with lowering seizure threshold.  Reviewed alternatives.  Will utilize olanzapine 2.5 mg TID for now.  - Increase Clozapine to 225mg qHS for psychosis ([id number])  - Prazosin 2mg qhs, monitor BP carefully.  - Abilify Maintena 400mg IM q3 weeks rather than 4, per collateral from ACT team.  Doses given 10/19, 11/17, next 12/8.  - Mirtazapine was switched to escitalopram, will increase to 20 mg daily.  - Wellbutrin XL 150mg daily  - Gabapentin 300 mg BID for anxiety/chronic pain.  - PRN: haloperidol 5mg PO/IM q8hrs PRN for agitation, diphenhydramine 50mg PO/IM q6hrs PRN for EPS PPx, hydroxyzine 50mg PO q12hrs PRN for anxiety, lorazepam 2mg PO q8hrs PRN for anxiety or 2mg IM for assaultive behavior. Melatonin 5mg qHS PRN for insomnia  - Discussed ECT several times with pt who states she has been told in two hospitals in past that due to her obesity and asthma, she is not an ideal candidate.  Pt states that due to this she is not interested in receiving ECT at this time.  3.) Medical:   - Pt scheduled for dental extractions 11/1, however declined to attend on day prior when informed it would not be under general anesthesia.  - last clozapine labs on 10/23 (CBC with diff, troponins, CRP); CRP elevated at 15.8 likely due to dental infection/wisdom tooth issues  - pantoprazole 40mg PO before breakfast for GERD  - montelukast 10mg PO qHS for asthma  - budesonide 80mcg/formoterol 4.5mcg 2 puffs BID for asthma  - PRN: acetaminophen 650mg PO q6hrs PRN for pain, albuterol 90mcg 2puffs q6hrs PRN for dyspnea, ondansetron 4mg q6hrs PRN for nausea  - Discussed CRP with medicine service, given asymptomatic pt and normal troponin, no additional intervention/diagnostics at this time.  - Discontinued clobetasol as rash has resolved.  4.) Dispo planning: State application submitted. Patient is 27yo single woman, no dependents, domiciled with her Grandmother, unemployed on disability/SSI, pphx of schizoaffective disorder, multiple past psychiatric admissions including state and recent discharge from [facility], in tx with [facility], with pmhx of asthma, HTN, GERD, and hypothyroidism and schizoaffective disorder, in tx with [facility], who self presented to the ED reporting abdominal pain and requesting readmission to psychiatric unit, reporting CAH, depressive symptoms, IOR, suicidality, admitted to [facility] for psychiatric care.  Depression and psychosis likely multifactorial at this time, schizoaffective vs. borderline personality disorder vs. complex grief vs. adjustment disorder.      The patient continues to be depressed, anxious, with SI.  Will continue 1:1 for safety.  Li level 0.6, clozapine level 244.  Increased clozapine to 225mg.    1.) Obs: start 1:1 for worsening SI.  2.) Psychiatric medication management:  - Reviewed options for addressing nocturnal enuresis including reducing clozapine vs reducing lithium vs adding desmopressin.  Pt decided upon desmopressin as she continues to have depression and psychosis.  Desmopressin 0.1 mg QHS.  - Lithobid 1200 mg QHS.  Pt aware that she needs to stay well hydrated and avoid diuretics and NSAIDs.   - Weekend team had started chlorpromazine standing at pt request.  Discussed concerns with lowering seizure threshold.  Reviewed alternatives.  Will utilize olanzapine 2.5 mg TID for now.  - Increase Clozapine to 225mg qHS for psychosis ([id number])  - Prazosin 2mg qhs, monitor BP carefully.  - Abilify Maintena 400mg IM q3 weeks rather than 4, per collateral from ACT team.  Doses given 10/19, 11/17, next 12/8.  - Mirtazapine was switched to escitalopram, will increase to 20 mg daily.  - Wellbutrin XL 150mg daily  - Gabapentin 300 mg BID for anxiety/chronic pain.  - PRN: haloperidol 5mg PO/IM q8hrs PRN for agitation, diphenhydramine 50mg PO/IM q6hrs PRN for EPS PPx, hydroxyzine 50mg PO q12hrs PRN for anxiety, lorazepam 2mg PO q8hrs PRN for anxiety or 2mg IM for assaultive behavior. Melatonin 5mg qHS PRN for insomnia  - Discussed ECT several times with pt who states she has been told in two hospitals in past that due to her obesity and asthma, she is not an ideal candidate.  Pt states that due to this she is not interested in receiving ECT at this time.  3.) Medical:   - Pt scheduled for dental extractions 11/1, however declined to attend on day prior when informed it would not be under general anesthesia.  - last clozapine labs on 10/23 (CBC with diff, troponins, CRP); CRP elevated at 15.8 likely due to dental infection/wisdom tooth issues  - pantoprazole 40mg PO before breakfast for GERD  - montelukast 10mg PO qHS for asthma  - budesonide 80mcg/formoterol 4.5mcg 2 puffs BID for asthma  - PRN: acetaminophen 650mg PO q6hrs PRN for pain, albuterol 90mcg 2puffs q6hrs PRN for dyspnea, ondansetron 4mg q6hrs PRN for nausea  - Discussed CRP with medicine service, given asymptomatic pt and normal troponin, no additional intervention/diagnostics at this time.  - Discontinued clobetasol as rash has resolved.  4.) Dispo planning: State application submitted. Patient is 29yo single woman, no dependents, domiciled with her Grandmother, unemployed on disability/SSI, pphx of schizoaffective disorder, multiple past psychiatric admissions including state and recent discharge from [facility], in tx with [facility], with pmhx of asthma, HTN, GERD, and hypothyroidism and schizoaffective disorder, in tx with [facility], who self presented to the ED reporting abdominal pain and requesting readmission to psychiatric unit, reporting CAH, depressive symptoms, IOR, suicidality, admitted to [facility] for psychiatric care.  Depression and psychosis likely multifactorial at this time, schizoaffective vs. borderline personality disorder vs. complex grief vs. adjustment disorder.      The patient continues to be depressed, anxious, with SI.  Will continue 1:1 for safety.  Li level 0.6, clozapine level 244.  Increased clozapine to 225mg.  For Abilify ESQUEDA today.    1.) Obs: start 1:1 for worsening SI.  2.) Psychiatric medication management:  - Reviewed options for addressing nocturnal enuresis including reducing clozapine vs reducing lithium vs adding desmopressin.  Pt decided upon desmopressin as she continues to have depression and psychosis.  Desmopressin 0.1 mg QHS.  - Lithobid 1200 mg QHS.  Pt aware that she needs to stay well hydrated and avoid diuretics and NSAIDs.   - Weekend team had started chlorpromazine standing at pt request.  Discussed concerns with lowering seizure threshold.  Reviewed alternatives.  Will utilize olanzapine 2.5 mg TID for now.  - Increase Clozapine to 225mg qHS for psychosis ([id number])  - Prazosin 2mg qhs, monitor BP carefully.  - Abilify Maintena 400mg IM q3 weeks rather than 4, per collateral from ACT team.  Doses given 10/19, 11/17, next 12/8.  - Mirtazapine was switched to escitalopram, will increase to 20 mg daily.  - Wellbutrin XL 150mg daily  - Gabapentin 300 mg BID for anxiety/chronic pain.  - PRN: haloperidol 5mg PO/IM q8hrs PRN for agitation, diphenhydramine 50mg PO/IM q6hrs PRN for EPS PPx, hydroxyzine 50mg PO q12hrs PRN for anxiety, lorazepam 2mg PO q8hrs PRN for anxiety or 2mg IM for assaultive behavior. Melatonin 5mg qHS PRN for insomnia  - Discussed ECT several times with pt who states she has been told in two hospitals in past that due to her obesity and asthma, she is not an ideal candidate.  Pt states that due to this she is not interested in receiving ECT at this time.  3.) Medical:   - Pt scheduled for dental extractions 11/1, however declined to attend on day prior when informed it would not be under general anesthesia.  - last clozapine labs on 10/23 (CBC with diff, troponins, CRP); CRP elevated at 15.8 likely due to dental infection/wisdom tooth issues  - pantoprazole 40mg PO before breakfast for GERD  - montelukast 10mg PO qHS for asthma  - budesonide 80mcg/formoterol 4.5mcg 2 puffs BID for asthma  - PRN: acetaminophen 650mg PO q6hrs PRN for pain, albuterol 90mcg 2puffs q6hrs PRN for dyspnea, ondansetron 4mg q6hrs PRN for nausea  - Discussed CRP with medicine service, given asymptomatic pt and normal troponin, no additional intervention/diagnostics at this time.  - Discontinued clobetasol as rash has resolved.  4.) Dispo planning: State application submitted. Patient is 29yo single woman, no dependents, domiciled with her Grandmother, unemployed on disability/SSI, pphx of schizoaffective disorder, multiple past psychiatric admissions including state and recent discharge from [facility], in tx with [facility], with pmhx of asthma, HTN, GERD, and hypothyroidism and schizoaffective disorder, in tx with [facility], who self presented to the ED reporting abdominal pain and requesting readmission to psychiatric unit, reporting CAH, depressive symptoms, IOR, suicidality, admitted to [facility] for psychiatric care.  Depression and psychosis likely multifactorial at this time, schizoaffective vs. borderline personality disorder vs. complex grief vs. adjustment disorder.      The patient continues to be depressed, anxious, with SI.  Will continue 1:1 for safety.  Li level 0.6, clozapine level 244.  Increased clozapine to 225mg.  For Abilify ESQUEDA today.    1.) Obs: start 1:1 for worsening SI.  2.) Psychiatric medication management:  - Reviewed options for addressing nocturnal enuresis including reducing clozapine vs reducing lithium vs adding desmopressin.  Pt decided upon desmopressin as she continues to have depression and psychosis.  Desmopressin 0.1 mg QHS.  - Lithobid 1200 mg QHS.  Pt aware that she needs to stay well hydrated and avoid diuretics and NSAIDs.   - Weekend team had started chlorpromazine standing at pt request.  Discussed concerns with lowering seizure threshold.  Reviewed alternatives.  Will utilize olanzapine 2.5 mg TID for now.  - Increase Clozapine to 225mg qHS for psychosis ([id number])  - Prazosin 2mg qhs, monitor BP carefully.  - Abilify Maintena 400mg IM q3 weeks rather than 4, per collateral from ACT team.  Doses given 10/19, 11/17, next 12/8.  - Mirtazapine was switched to escitalopram, will increase to 20 mg daily.  - Wellbutrin XL 150mg daily  - Gabapentin 300 mg BID for anxiety/chronic pain.  - PRN: haloperidol 5mg PO/IM q8hrs PRN for agitation, diphenhydramine 50mg PO/IM q6hrs PRN for EPS PPx, hydroxyzine 50mg PO q12hrs PRN for anxiety, lorazepam 2mg PO q8hrs PRN for anxiety or 2mg IM for assaultive behavior. Melatonin 5mg qHS PRN for insomnia  - Discussed ECT several times with pt who states she has been told in two hospitals in past that due to her obesity and asthma, she is not an ideal candidate.  Pt states that due to this she is not interested in receiving ECT at this time.  3.) Medical:   - Pt scheduled for dental extractions 11/1, however declined to attend on day prior when informed it would not be under general anesthesia.  - last clozapine labs on 10/23 (CBC with diff, troponins, CRP); CRP elevated at 15.8 likely due to dental infection/wisdom tooth issues  - pantoprazole 40mg PO before breakfast for GERD  - montelukast 10mg PO qHS for asthma  - budesonide 80mcg/formoterol 4.5mcg 2 puffs BID for asthma  - PRN: acetaminophen 650mg PO q6hrs PRN for pain, albuterol 90mcg 2puffs q6hrs PRN for dyspnea, ondansetron 4mg q6hrs PRN for nausea  - Discussed CRP with medicine service, given asymptomatic pt and normal troponin, no additional intervention/diagnostics at this time.  - Discontinued clobetasol as rash has resolved.  4.) Dispo planning: State application submitted.

## 2023-12-08 NOTE — CHART NOTE - NSCHARTNOTEFT_GEN_A_CORE
Nutrition f/u note:    Patient continues treatment for psychosis and mood.    Diet : Regular      Patient reports [ ] nausea  [ ] vomiting [ ] diarrhea [ ] constipation  [ ]chewing problems [ ] swallowing issues  [ ] other:     PO intake:  < 50% [ ] 50-75% [ ]   % [ x]  other :    Source for PO intake [ ] Patient [ ] family [x ] chart [x ] staff [ ] other    Unable to speak to patient as she was sleeping. On CO 1:1 for suicidal. As per staff, po intake is good. Has food preferences in place. 12/1 was sent to ER for evaluation of emesis-was cleared-no further episodes noted.        Current Weight: 11/4 280#, 10/21 262#, as per HIE: 10/7/23 317#, 9/14/23 315#- patient appears to weigh 317#      Pertinent Medications: MEDICATIONS  (STANDING):  budesonide  80 MICROgram(s)/formoterol 4.5 MICROgram(s) Inhaler 2 Puff(s) Inhalation two times a day  buPROPion XL (24-Hour) 150 milliGRAM(s) Oral daily  cloZAPine 225 milliGRAM(s) Oral at bedtime  desmopressin 0.1 milliGRAM(s) Oral at bedtime  escitalopram 20 milliGRAM(s) Oral daily  gabapentin 300 milliGRAM(s) Oral two times a day  influenza   Vaccine 0.5 milliLiter(s) IntraMuscular once  lithium SR (LITHOBID) 1200 milliGRAM(s) Oral at bedtime  montelukast 10 milliGRAM(s) Oral at bedtime  OLANZapine 2.5 milliGRAM(s) Oral three times a day  oseltamivir 75 milliGRAM(s) Oral daily  pantoprazole    Tablet 40 milliGRAM(s) Oral before breakfast  prazosin. 2 milliGRAM(s) Oral at bedtime    MEDICATIONS  (PRN):  acetaminophen     Tablet .. 650 milliGRAM(s) Oral every 6 hours PRN Moderate Pain (4 - 6)  albuterol    90 MICROgram(s) HFA Inhaler 2 Puff(s) Inhalation every 6 hours PRN Shortness of Breath and/or Wheezing  aluminum hydroxide/magnesium hydroxide/simethicone Suspension 30 milliLiter(s) Oral every 4 hours PRN Dyspepsia  chlorproMAZINE    Injectable 50 milliGRAM(s) IntraMuscular once PRN severe agitation  chlorproMAZINE    Tablet 50 milliGRAM(s) Oral every 6 hours PRN agitation  hydrOXYzine hydrochloride 50 milliGRAM(s) Oral every 12 hours PRN Anxiety  lidocaine   4% Patch 1 Patch Transdermal daily PRN LBP  LORazepam     Tablet 2 milliGRAM(s) Oral every 8 hours PRN Anxiety  LORazepam   Injectable 2 milliGRAM(s) IntraMuscular once PRN Assaultive behavior  melatonin. 5 milliGRAM(s) Oral at bedtime PRN Insomnia  ondansetron    Tablet 4 milliGRAM(s) Oral every 6 hours PRN nausea  polyethylene glycol 3350 17 Gram(s) Oral daily PRN Constipation    Pertinent Labs:  12-06 Na139 mmol/L Glu 96 mg/dL K+ 3.8 mmol/L Cr  0.70 mg/dL BUN 17 mg/dL 12-06 Alb 3.8 g/dL      Skin: intact    Estimated Needs:   [ x] no change since previous assessment  [ ] recalculated:       Previous Nutrition Diagnosis:     [ ] Inadequate Energy Intake [ ]Inadequate Oral Intake [ ] Excessive Energy Intake     [ ] Underweight [ ] Increased Nutrient Needs [x ] Overweight/Obesity     [ ] Altered GI Function [ ] Unintended Weight Loss [ ] Food & Nutrition Related Knowledge Deficit [ ] Malnutrition          Nutrition Diagnosis is [ x] ongoing  [ ] resolved [ ] not applicable         Recommend/plan:    [x ] Maintain present diet    [x ] Provide food preferences    [x ] Encourage healthy food choices and portion control    [ ] Other:        Monitoring and Evaluation:     [ x] PO intake [ ] Tolerance to diet prescription [ x] weights [x ] follow up per protocol    Emma Felder MS RDN  Pager #[phone] Nutrition f/u note:    Patient continues treatment for psychosis and mood.    Diet : Regular      Patient reports [ ] nausea  [ ] vomiting [ ] diarrhea [ ] constipation  [ ]chewing problems [ ] swallowing issues  [ ] other:     PO intake:  < 50% [ ] 50-75% [ ]   % [ x]  other :    Source for PO intake [ ] Patient [ ] family [x ] chart [x ] staff [ ] other    Unable to speak to patient as she was sleeping. On CO 1:1 for suicidal. As per staff, po intake is good. Has food preferences in place. 12/1 was sent to ER for evaluation of emesis-was cleared-no further episodes noted.        Current Weight: 11/4 280#, 10/21 262#, as per HIE: 10/7/23 317#, 9/14/23 315#- patient appears to weigh 317#      Pertinent Medications: MEDICATIONS  (STANDING):  budesonide  80 MICROgram(s)/formoterol 4.5 MICROgram(s) Inhaler 2 Puff(s) Inhalation two times a day  buPROPion XL (24-Hour) 150 milliGRAM(s) Oral daily  cloZAPine 225 milliGRAM(s) Oral at bedtime  desmopressin 0.1 milliGRAM(s) Oral at bedtime  escitalopram 20 milliGRAM(s) Oral daily  gabapentin 300 milliGRAM(s) Oral two times a day  influenza   Vaccine 0.5 milliLiter(s) IntraMuscular once  lithium SR (LITHOBID) 1200 milliGRAM(s) Oral at bedtime  montelukast 10 milliGRAM(s) Oral at bedtime  OLANZapine 2.5 milliGRAM(s) Oral three times a day  oseltamivir 75 milliGRAM(s) Oral daily  pantoprazole    Tablet 40 milliGRAM(s) Oral before breakfast  prazosin. 2 milliGRAM(s) Oral at bedtime    MEDICATIONS  (PRN):  acetaminophen     Tablet .. 650 milliGRAM(s) Oral every 6 hours PRN Moderate Pain (4 - 6)  albuterol    90 MICROgram(s) HFA Inhaler 2 Puff(s) Inhalation every 6 hours PRN Shortness of Breath and/or Wheezing  aluminum hydroxide/magnesium hydroxide/simethicone Suspension 30 milliLiter(s) Oral every 4 hours PRN Dyspepsia  chlorproMAZINE    Injectable 50 milliGRAM(s) IntraMuscular once PRN severe agitation  chlorproMAZINE    Tablet 50 milliGRAM(s) Oral every 6 hours PRN agitation  hydrOXYzine hydrochloride 50 milliGRAM(s) Oral every 12 hours PRN Anxiety  lidocaine   4% Patch 1 Patch Transdermal daily PRN LBP  LORazepam     Tablet 2 milliGRAM(s) Oral every 8 hours PRN Anxiety  LORazepam   Injectable 2 milliGRAM(s) IntraMuscular once PRN Assaultive behavior  melatonin. 5 milliGRAM(s) Oral at bedtime PRN Insomnia  ondansetron    Tablet 4 milliGRAM(s) Oral every 6 hours PRN nausea  polyethylene glycol 3350 17 Gram(s) Oral daily PRN Constipation    Pertinent Labs:  12-06 Na139 mmol/L Glu 96 mg/dL K+ 3.8 mmol/L Cr  0.70 mg/dL BUN 17 mg/dL 12-06 Alb 3.8 g/dL      Skin: intact    Estimated Needs:   [ x] no change since previous assessment  [ ] recalculated:       Previous Nutrition Diagnosis:     [ ] Inadequate Energy Intake [ ]Inadequate Oral Intake [ ] Excessive Energy Intake     [ ] Underweight [ ] Increased Nutrient Needs [x ] Overweight/Obesity     [ ] Altered GI Function [ ] Unintended Weight Loss [ ] Food & Nutrition Related Knowledge Deficit [ ] Malnutrition          Nutrition Diagnosis is [ x] ongoing  [ ] resolved [ ] not applicable         Recommend/plan:    [x ] Maintain present diet    [x ] Provide food preferences    [x ] Encourage healthy food choices and portion control    [ ] Other:        Monitoring and Evaluation:     [ x] PO intake [ ] Tolerance to diet prescription [ x] weights [x ] follow up per protocol    Emma Felder MS RDN  Pager #[phone]

## 2023-12-08 NOTE — BH INPATIENT PSYCHIATRY PROGRESS NOTE - MSE UNSTRUCTURED FT
The patient appears stated age, with fair hygiene, and is dressed appropriately.  She was calm and cooperative with the interview.  She maintained appropriate eye contact.  No restlessness or slowing of movements observed.  Gait is steady.  The patient’s speech was fluent, normal in tone, rate, and volume.  The patient’s mood is “still depressed.”  Her affect is constricted, stable, appropriate.  The patient’s thoughts are goal directed.  She does not have any delusions, admits to command auditory hallucinations.  She admits to suicidal ideation with loose plan, no intent, no homicidal ideation, intent, or plan.  Insight is fair.  Judgment is fair.  Impulse control has been tenuous on the unit.

## 2023-12-08 NOTE — BH INPATIENT PSYCHIATRY PROGRESS NOTE - NSBHATTESTBILLING_PSY_A_CORE
06408-[id number] OBS or IP - moderate complexity OR 35-49 mins 66083-[id number] OBS or IP - moderate complexity OR 35-49 mins

## 2023-12-08 NOTE — BH INPATIENT PSYCHIATRY PROGRESS NOTE - CURRENT MEDICATION
MEDICATIONS  (STANDING):  budesonide  80 MICROgram(s)/formoterol 4.5 MICROgram(s) Inhaler 2 Puff(s) Inhalation two times a day  buPROPion XL (24-Hour) 150 milliGRAM(s) Oral daily  cloZAPine 225 milliGRAM(s) Oral at bedtime  desmopressin 0.1 milliGRAM(s) Oral at bedtime  escitalopram 20 milliGRAM(s) Oral daily  gabapentin 300 milliGRAM(s) Oral two times a day  influenza   Vaccine 0.5 milliLiter(s) IntraMuscular once  lithium SR (LITHOBID) 1200 milliGRAM(s) Oral at bedtime  montelukast 10 milliGRAM(s) Oral at bedtime  OLANZapine 2.5 milliGRAM(s) Oral three times a day  oseltamivir 75 milliGRAM(s) Oral daily  pantoprazole    Tablet 40 milliGRAM(s) Oral before breakfast  prazosin. 2 milliGRAM(s) Oral at bedtime    MEDICATIONS  (PRN):  acetaminophen     Tablet .. 650 milliGRAM(s) Oral every 6 hours PRN Moderate Pain (4 - 6)  albuterol    90 MICROgram(s) HFA Inhaler 2 Puff(s) Inhalation every 6 hours PRN Shortness of Breath and/or Wheezing  aluminum hydroxide/magnesium hydroxide/simethicone Suspension 30 milliLiter(s) Oral every 4 hours PRN Dyspepsia  chlorproMAZINE    Injectable 50 milliGRAM(s) IntraMuscular once PRN severe agitation  chlorproMAZINE    Tablet 50 milliGRAM(s) Oral every 6 hours PRN agitation  hydrOXYzine hydrochloride 50 milliGRAM(s) Oral every 12 hours PRN Anxiety  lidocaine   4% Patch 1 Patch Transdermal daily PRN LBP  LORazepam     Tablet 2 milliGRAM(s) Oral every 8 hours PRN Anxiety  LORazepam   Injectable 2 milliGRAM(s) IntraMuscular once PRN Assaultive behavior  melatonin. 5 milliGRAM(s) Oral at bedtime PRN Insomnia  ondansetron    Tablet 4 milliGRAM(s) Oral every 6 hours PRN nausea  polyethylene glycol 3350 17 Gram(s) Oral daily PRN Constipation

## 2023-12-09 ENCOUNTER — EMERGENCY (EMERGENCY)
Facility: HOSPITAL | Age: 28
LOS: 1 days | Discharge: HOME | End: 2023-12-09
Attending: EMERGENCY MEDICINE | Admitting: EMERGENCY MEDICINE
Payer: MEDICAID

## 2023-12-09 VITALS
SYSTOLIC BLOOD PRESSURE: 126 MMHG | OXYGEN SATURATION: 99 % | RESPIRATION RATE: 18 BRPM | HEART RATE: 108 BPM | DIASTOLIC BLOOD PRESSURE: 67 MMHG

## 2023-12-09 VITALS
DIASTOLIC BLOOD PRESSURE: 84 MMHG | RESPIRATION RATE: 18 BRPM | SYSTOLIC BLOOD PRESSURE: 117 MMHG | HEART RATE: 80 BPM | OXYGEN SATURATION: 100 % | TEMPERATURE: 98 F

## 2023-12-09 LAB
ALBUMIN SERPL ELPH-MCNC: 3.8 G/DL — SIGNIFICANT CHANGE UP (ref 3.3–5)
ALBUMIN SERPL ELPH-MCNC: 3.8 G/DL — SIGNIFICANT CHANGE UP (ref 3.3–5)
ALP SERPL-CCNC: 99 U/L — SIGNIFICANT CHANGE UP (ref 40–120)
ALP SERPL-CCNC: 99 U/L — SIGNIFICANT CHANGE UP (ref 40–120)
ALT FLD-CCNC: 16 U/L — SIGNIFICANT CHANGE UP (ref 4–33)
ALT FLD-CCNC: 16 U/L — SIGNIFICANT CHANGE UP (ref 4–33)
ANION GAP SERPL CALC-SCNC: 12 MMOL/L — SIGNIFICANT CHANGE UP (ref 7–14)
ANION GAP SERPL CALC-SCNC: 12 MMOL/L — SIGNIFICANT CHANGE UP (ref 7–14)
APPEARANCE UR: CLEAR — SIGNIFICANT CHANGE UP
APPEARANCE UR: CLEAR — SIGNIFICANT CHANGE UP
AST SERPL-CCNC: 18 U/L — SIGNIFICANT CHANGE UP (ref 4–32)
AST SERPL-CCNC: 18 U/L — SIGNIFICANT CHANGE UP (ref 4–32)
BACTERIA # UR AUTO: ABNORMAL /HPF
BACTERIA # UR AUTO: ABNORMAL /HPF
BASOPHILS # BLD AUTO: 0.03 K/UL — SIGNIFICANT CHANGE UP (ref 0–0.2)
BASOPHILS # BLD AUTO: 0.03 K/UL — SIGNIFICANT CHANGE UP (ref 0–0.2)
BASOPHILS NFR BLD AUTO: 0.2 % — SIGNIFICANT CHANGE UP (ref 0–2)
BASOPHILS NFR BLD AUTO: 0.2 % — SIGNIFICANT CHANGE UP (ref 0–2)
BILIRUB SERPL-MCNC: <0.2 MG/DL — SIGNIFICANT CHANGE UP (ref 0.2–1.2)
BILIRUB SERPL-MCNC: <0.2 MG/DL — SIGNIFICANT CHANGE UP (ref 0.2–1.2)
BILIRUB UR-MCNC: NEGATIVE — SIGNIFICANT CHANGE UP
BILIRUB UR-MCNC: NEGATIVE — SIGNIFICANT CHANGE UP
BUN SERPL-MCNC: 19 MG/DL — SIGNIFICANT CHANGE UP (ref 7–23)
BUN SERPL-MCNC: 19 MG/DL — SIGNIFICANT CHANGE UP (ref 7–23)
CALCIUM SERPL-MCNC: 9.2 MG/DL — SIGNIFICANT CHANGE UP (ref 8.4–10.5)
CALCIUM SERPL-MCNC: 9.2 MG/DL — SIGNIFICANT CHANGE UP (ref 8.4–10.5)
CAST: 1 /LPF — SIGNIFICANT CHANGE UP (ref 0–4)
CAST: 1 /LPF — SIGNIFICANT CHANGE UP (ref 0–4)
CHLORIDE SERPL-SCNC: 105 MMOL/L — SIGNIFICANT CHANGE UP (ref 98–107)
CHLORIDE SERPL-SCNC: 105 MMOL/L — SIGNIFICANT CHANGE UP (ref 98–107)
CO2 SERPL-SCNC: 22 MMOL/L — SIGNIFICANT CHANGE UP (ref 22–31)
CO2 SERPL-SCNC: 22 MMOL/L — SIGNIFICANT CHANGE UP (ref 22–31)
COLOR SPEC: YELLOW — SIGNIFICANT CHANGE UP
COLOR SPEC: YELLOW — SIGNIFICANT CHANGE UP
CREAT SERPL-MCNC: 0.74 MG/DL — SIGNIFICANT CHANGE UP (ref 0.5–1.3)
CREAT SERPL-MCNC: 0.74 MG/DL — SIGNIFICANT CHANGE UP (ref 0.5–1.3)
DIFF PNL FLD: NEGATIVE — SIGNIFICANT CHANGE UP
DIFF PNL FLD: NEGATIVE — SIGNIFICANT CHANGE UP
EGFR: 114 ML/MIN/1.73M2 — SIGNIFICANT CHANGE UP
EGFR: 114 ML/MIN/1.73M2 — SIGNIFICANT CHANGE UP
EOSINOPHIL # BLD AUTO: 0 K/UL — SIGNIFICANT CHANGE UP (ref 0–0.5)
EOSINOPHIL # BLD AUTO: 0 K/UL — SIGNIFICANT CHANGE UP (ref 0–0.5)
EOSINOPHIL NFR BLD AUTO: 0 % — SIGNIFICANT CHANGE UP (ref 0–6)
EOSINOPHIL NFR BLD AUTO: 0 % — SIGNIFICANT CHANGE UP (ref 0–6)
GLUCOSE SERPL-MCNC: 92 MG/DL — SIGNIFICANT CHANGE UP (ref 70–99)
GLUCOSE SERPL-MCNC: 92 MG/DL — SIGNIFICANT CHANGE UP (ref 70–99)
GLUCOSE UR QL: NEGATIVE MG/DL — SIGNIFICANT CHANGE UP
GLUCOSE UR QL: NEGATIVE MG/DL — SIGNIFICANT CHANGE UP
HCG SERPL-ACNC: <1 MIU/ML — SIGNIFICANT CHANGE UP
HCG SERPL-ACNC: <1 MIU/ML — SIGNIFICANT CHANGE UP
HCT VFR BLD CALC: 35.5 % — SIGNIFICANT CHANGE UP (ref 34.5–45)
HCT VFR BLD CALC: 35.5 % — SIGNIFICANT CHANGE UP (ref 34.5–45)
HGB BLD-MCNC: 11 G/DL — LOW (ref 11.5–15.5)
HGB BLD-MCNC: 11 G/DL — LOW (ref 11.5–15.5)
IANC: 9.18 K/UL — HIGH (ref 1.8–7.4)
IANC: 9.18 K/UL — HIGH (ref 1.8–7.4)
IMM GRANULOCYTES NFR BLD AUTO: 0.5 % — SIGNIFICANT CHANGE UP (ref 0–0.9)
IMM GRANULOCYTES NFR BLD AUTO: 0.5 % — SIGNIFICANT CHANGE UP (ref 0–0.9)
KETONES UR-MCNC: NEGATIVE MG/DL — SIGNIFICANT CHANGE UP
KETONES UR-MCNC: NEGATIVE MG/DL — SIGNIFICANT CHANGE UP
LEUKOCYTE ESTERASE UR-ACNC: NEGATIVE — SIGNIFICANT CHANGE UP
LEUKOCYTE ESTERASE UR-ACNC: NEGATIVE — SIGNIFICANT CHANGE UP
LIDOCAIN IGE QN: 27 U/L — SIGNIFICANT CHANGE UP (ref 7–60)
LIDOCAIN IGE QN: 27 U/L — SIGNIFICANT CHANGE UP (ref 7–60)
LYMPHOCYTES # BLD AUTO: 24.9 % — SIGNIFICANT CHANGE UP (ref 13–44)
LYMPHOCYTES # BLD AUTO: 24.9 % — SIGNIFICANT CHANGE UP (ref 13–44)
LYMPHOCYTES # BLD AUTO: 3.39 K/UL — HIGH (ref 1–3.3)
LYMPHOCYTES # BLD AUTO: 3.39 K/UL — HIGH (ref 1–3.3)
MCHC RBC-ENTMCNC: 24.3 PG — LOW (ref 27–34)
MCHC RBC-ENTMCNC: 24.3 PG — LOW (ref 27–34)
MCHC RBC-ENTMCNC: 31 GM/DL — LOW (ref 32–36)
MCHC RBC-ENTMCNC: 31 GM/DL — LOW (ref 32–36)
MCV RBC AUTO: 78.5 FL — LOW (ref 80–100)
MCV RBC AUTO: 78.5 FL — LOW (ref 80–100)
MONOCYTES # BLD AUTO: 0.95 K/UL — HIGH (ref 0–0.9)
MONOCYTES # BLD AUTO: 0.95 K/UL — HIGH (ref 0–0.9)
MONOCYTES NFR BLD AUTO: 7 % — SIGNIFICANT CHANGE UP (ref 2–14)
MONOCYTES NFR BLD AUTO: 7 % — SIGNIFICANT CHANGE UP (ref 2–14)
NEUTROPHILS # BLD AUTO: 9.18 K/UL — HIGH (ref 1.8–7.4)
NEUTROPHILS # BLD AUTO: 9.18 K/UL — HIGH (ref 1.8–7.4)
NEUTROPHILS NFR BLD AUTO: 67.4 % — SIGNIFICANT CHANGE UP (ref 43–77)
NEUTROPHILS NFR BLD AUTO: 67.4 % — SIGNIFICANT CHANGE UP (ref 43–77)
NITRITE UR-MCNC: NEGATIVE — SIGNIFICANT CHANGE UP
NITRITE UR-MCNC: NEGATIVE — SIGNIFICANT CHANGE UP
NRBC # BLD: 0 /100 WBCS — SIGNIFICANT CHANGE UP (ref 0–0)
NRBC # BLD: 0 /100 WBCS — SIGNIFICANT CHANGE UP (ref 0–0)
NRBC # FLD: 0 K/UL — SIGNIFICANT CHANGE UP (ref 0–0)
NRBC # FLD: 0 K/UL — SIGNIFICANT CHANGE UP (ref 0–0)
PH UR: 5.5 — SIGNIFICANT CHANGE UP (ref 5–8)
PH UR: 5.5 — SIGNIFICANT CHANGE UP (ref 5–8)
PLATELET # BLD AUTO: 315 K/UL — SIGNIFICANT CHANGE UP (ref 150–400)
PLATELET # BLD AUTO: 315 K/UL — SIGNIFICANT CHANGE UP (ref 150–400)
POTASSIUM SERPL-MCNC: 4.1 MMOL/L — SIGNIFICANT CHANGE UP (ref 3.5–5.3)
POTASSIUM SERPL-MCNC: 4.1 MMOL/L — SIGNIFICANT CHANGE UP (ref 3.5–5.3)
POTASSIUM SERPL-SCNC: 4.1 MMOL/L — SIGNIFICANT CHANGE UP (ref 3.5–5.3)
POTASSIUM SERPL-SCNC: 4.1 MMOL/L — SIGNIFICANT CHANGE UP (ref 3.5–5.3)
PROT SERPL-MCNC: 7.1 G/DL — SIGNIFICANT CHANGE UP (ref 6–8.3)
PROT SERPL-MCNC: 7.1 G/DL — SIGNIFICANT CHANGE UP (ref 6–8.3)
PROT UR-MCNC: NEGATIVE MG/DL — SIGNIFICANT CHANGE UP
PROT UR-MCNC: NEGATIVE MG/DL — SIGNIFICANT CHANGE UP
RBC # BLD: 4.52 M/UL — SIGNIFICANT CHANGE UP (ref 3.8–5.2)
RBC # BLD: 4.52 M/UL — SIGNIFICANT CHANGE UP (ref 3.8–5.2)
RBC # FLD: 15.2 % — HIGH (ref 10.3–14.5)
RBC # FLD: 15.2 % — HIGH (ref 10.3–14.5)
RBC CASTS # UR COMP ASSIST: 2 /HPF — SIGNIFICANT CHANGE UP (ref 0–4)
RBC CASTS # UR COMP ASSIST: 2 /HPF — SIGNIFICANT CHANGE UP (ref 0–4)
REVIEW: SIGNIFICANT CHANGE UP
REVIEW: SIGNIFICANT CHANGE UP
SODIUM SERPL-SCNC: 139 MMOL/L — SIGNIFICANT CHANGE UP (ref 135–145)
SODIUM SERPL-SCNC: 139 MMOL/L — SIGNIFICANT CHANGE UP (ref 135–145)
SP GR SPEC: 1.03 — SIGNIFICANT CHANGE UP (ref 1–1.03)
SP GR SPEC: 1.03 — SIGNIFICANT CHANGE UP (ref 1–1.03)
SQUAMOUS # UR AUTO: 12 /HPF — HIGH (ref 0–5)
SQUAMOUS # UR AUTO: 12 /HPF — HIGH (ref 0–5)
UROBILINOGEN FLD QL: 0.2 MG/DL — SIGNIFICANT CHANGE UP (ref 0.2–1)
UROBILINOGEN FLD QL: 0.2 MG/DL — SIGNIFICANT CHANGE UP (ref 0.2–1)
WBC # BLD: 13.62 K/UL — HIGH (ref 3.8–10.5)
WBC # BLD: 13.62 K/UL — HIGH (ref 3.8–10.5)
WBC # FLD AUTO: 13.62 K/UL — HIGH (ref 3.8–10.5)
WBC # FLD AUTO: 13.62 K/UL — HIGH (ref 3.8–10.5)
WBC UR QL: 4 /HPF — SIGNIFICANT CHANGE UP (ref 0–5)
WBC UR QL: 4 /HPF — SIGNIFICANT CHANGE UP (ref 0–5)

## 2023-12-09 PROCEDURE — 76705 ECHO EXAM OF ABDOMEN: CPT | Mod: 26

## 2023-12-09 PROCEDURE — 74177 CT ABD & PELVIS W/CONTRAST: CPT | Mod: 26,MG

## 2023-12-09 PROCEDURE — 71046 X-RAY EXAM CHEST 2 VIEWS: CPT | Mod: 26

## 2023-12-09 PROCEDURE — 99285 EMERGENCY DEPT VISIT HI MDM: CPT

## 2023-12-09 PROCEDURE — 93010 ELECTROCARDIOGRAM REPORT: CPT

## 2023-12-09 PROCEDURE — G1004: CPT

## 2023-12-09 PROCEDURE — 99232 SBSQ HOSP IP/OBS MODERATE 35: CPT

## 2023-12-09 RX ORDER — SULINDAC 200 MG/1
150 TABLET ORAL ONCE
Refills: 0 | Status: COMPLETED | OUTPATIENT
Start: 2023-12-09 | End: 2023-12-09

## 2023-12-09 RX ORDER — SODIUM CHLORIDE 9 MG/ML
1000 INJECTION INTRAMUSCULAR; INTRAVENOUS; SUBCUTANEOUS ONCE
Refills: 0 | Status: COMPLETED | OUTPATIENT
Start: 2023-12-09 | End: 2023-12-09

## 2023-12-09 RX ORDER — SULINDAC 200 MG/1
150 TABLET ORAL
Refills: 0 | Status: COMPLETED | OUTPATIENT
Start: 2023-12-09 | End: 2023-12-12

## 2023-12-09 RX ORDER — FAMOTIDINE 10 MG/ML
20 INJECTION INTRAVENOUS ONCE
Refills: 0 | Status: COMPLETED | OUTPATIENT
Start: 2023-12-09 | End: 2023-12-09

## 2023-12-09 RX ADMIN — Medication 75 MILLIGRAM(S): at 09:32

## 2023-12-09 RX ADMIN — ESCITALOPRAM OXALATE 20 MILLIGRAM(S): 10 TABLET, FILM COATED ORAL at 09:32

## 2023-12-09 RX ADMIN — BUDESONIDE AND FORMOTEROL FUMARATE DIHYDRATE 2 PUFF(S): 160; 4.5 AEROSOL RESPIRATORY (INHALATION) at 09:33

## 2023-12-09 RX ADMIN — BUPROPION HYDROCHLORIDE 150 MILLIGRAM(S): 150 TABLET, EXTENDED RELEASE ORAL at 09:32

## 2023-12-09 RX ADMIN — GABAPENTIN 300 MILLIGRAM(S): 400 CAPSULE ORAL at 09:32

## 2023-12-09 RX ADMIN — SODIUM CHLORIDE 1000 MILLILITER(S): 9 INJECTION INTRAMUSCULAR; INTRAVENOUS; SUBCUTANEOUS at 18:56

## 2023-12-09 RX ADMIN — SULINDAC 150 MILLIGRAM(S): 200 TABLET ORAL at 14:49

## 2023-12-09 RX ADMIN — OLANZAPINE 2.5 MILLIGRAM(S): 15 TABLET, FILM COATED ORAL at 09:32

## 2023-12-09 RX ADMIN — PANTOPRAZOLE SODIUM 40 MILLIGRAM(S): 20 TABLET, DELAYED RELEASE ORAL at 09:32

## 2023-12-09 RX ADMIN — OLANZAPINE 2.5 MILLIGRAM(S): 15 TABLET, FILM COATED ORAL at 14:49

## 2023-12-09 RX ADMIN — Medication 650 MILLIGRAM(S): at 10:58

## 2023-12-09 RX ADMIN — FAMOTIDINE 20 MILLIGRAM(S): 10 INJECTION INTRAVENOUS at 18:56

## 2023-12-09 NOTE — ED ADULT NURSE NOTE - OBJECTIVE STATEMENT
patient Alert & orientedx4,c/o Right sided abdominal pain; pain started last night and has steadily increased today and pt started vomiting and having diarrhea;  coming from [address] arrive with staff member; pt . evaluated by MD.Placed 20g angiocath right  AC,labs drawn and sent. patient will be waiting for results, further evaluation and disposition.  made comfortable.will continue to monitor. patient Alert & orientedx4,c/o Right sided abdominal pain; pain started last night and has steadily increased today and pt started vomiting and having diarrhea;  coming from [address] arrive with staff member; pt . evaluated by MD.Placed 20g angiocath right  AC,labs drawn and sent. patient will be waiting for results, further evaluation and disposition.  made comfortable.will continue to monitor.

## 2023-12-09 NOTE — ED ADULT NURSE NOTE - NS PRO PASSIVE SMOKE EXP
Encounter Date: 5/28/2022       History     Chief Complaint   Patient presents with    Fall     61 y/o WF with a history chronic back pain with lumbar fusion, hypertension who presents today with c/o thoracic back pain and chest pain after falling off her porch about midnight. She feels that her back took most of the impact of the fall and her right hand. She denies any head injury or loss of consciousness. She does report significant back/chest pain with deep breathing.    The history is provided by the patient. No  was used.   Fall  The accident occurred several hours ago. The fall occurred while standing. She fell from a height of 3 to 5 ft. She landed on grass. There was no blood loss. The pain is present in the back and right hand. The pain is at a severity of 9/10. She was ambulatory at the scene. There was no entrapment after the fall. There was no drug use involved in the accident. Associated symptoms include back pain. Pertinent negatives include no neck pain, no paresthesias, no paralysis, no visual change, no fever, no numbness, no abdominal pain, no bowel incontinence, no nausea, no vomiting, no hematuria, no headaches, no hearing loss, no loss of consciousness and no tingling. The symptoms are aggravated by activity. Treatments tried: ultram. The treatment provided mild relief.     Review of patient's allergies indicates:   Allergen Reactions    Varenicline Other (See Comments)     History reviewed. No pertinent past medical history.  History reviewed. No pertinent surgical history.  History reviewed. No pertinent family history.  Social History     Tobacco Use    Smoking status: Never Smoker    Smokeless tobacco: Never Used   Substance Use Topics    Alcohol use: Not Currently    Drug use: Not Currently     Review of Systems   Constitutional: Negative for activity change, appetite change, chills and fever.   HENT: Negative for ear pain, nosebleeds, sore throat and trouble  swallowing.    Eyes: Negative for pain and redness.   Respiratory: Positive for shortness of breath. Negative for cough.    Gastrointestinal: Negative for abdominal pain, bowel incontinence, nausea and vomiting.   Endocrine: Negative for polydipsia, polyphagia and polyuria.   Genitourinary: Negative for decreased urine volume, difficulty urinating, dysuria and hematuria.   Musculoskeletal: Positive for back pain. Negative for neck pain.   Skin: Negative for rash and wound.   Allergic/Immunologic: Negative for environmental allergies and food allergies.   Neurological: Negative for tingling, loss of consciousness, numbness, headaches and paresthesias.   Hematological: Does not bruise/bleed easily.   Psychiatric/Behavioral: Negative for confusion and sleep disturbance. The patient is not nervous/anxious and is not hyperactive.      Physical Exam     Initial Vitals [05/28/22 1325]   BP Pulse Resp Temp SpO2   (!) 175/89 77 -- 98.3 °F (36.8 °C) (!) 93 %      MAP       --         Physical Exam    Nursing note and vitals reviewed.  Constitutional: She appears well-developed and well-nourished.   HENT:   Head: Normocephalic and atraumatic.   Eyes: Conjunctivae are normal. Pupils are equal, round, and reactive to light.   Neck:   Normal range of motion.  Cardiovascular: Normal rate and regular rhythm.   Pulmonary/Chest: She exhibits tenderness.   Abdominal: Abdomen is soft. Bowel sounds are normal.   Musculoskeletal:         General: Normal range of motion.      Right hand: Swelling and tenderness (right thumb) present. Decreased range of motion: right thumb.        Hands:       Cervical back: Normal range of motion.      Thoracic back: Tenderness present.      Right foot: Foot drop present.     Neurological: She is alert and oriented to person, place, and time. GCS score is 15. GCS eye subscore is 4. GCS verbal subscore is 5. GCS motor subscore is 6.   Skin: Skin is warm. Capillary refill takes less than 2 seconds.    Psychiatric: She has a normal mood and affect. Her behavior is normal. Judgment and thought content normal.         Medical Screening Exam   See Full Note    ED Course   Procedures  Labs Reviewed - No data to display       Imaging Results          X-Ray Finger 2 or More Views Right (Final result)  Result time 05/28/22 14:15:28    Final result by Keri Minor MD (05/28/22 14:15:28)                 Impression:      First distal phalanx fracture      Electronically signed by: Keri Minor  Date:    05/28/2022  Time:    14:15             Narrative:    EXAMINATION:  XR FINGER 2 OR MORE VIEWS RIGHT    CLINICAL HISTORY:  righ thumb pain;    FINDINGS:  There is a mildly comminuted fracture of the 1st distal phalanx extending to the interphalangeal joint.                               X-Ray Thoracic Spine AP Lateral (Final result)  Result time 05/28/22 14:12:10    Final result by Keri Minor MD (05/28/22 14:12:10)                 Impression:      1. Mild midthoracic compression fracture is new since 2018 but very present seen on prior abdominal AP  film of the 02/21/2022.  Clinical correlation requested  2. Mild scoliosis  3. Mild degenerative changes      Electronically signed by: Keri Minor  Date:    05/28/2022  Time:    14:12             Narrative:    EXAMINATION:  XR THORACIC SPINE AP LATERAL    CLINICAL HISTORY:  fall;    FINDINGS:  There is a exaggerated kyphosis.  Alignment otherwise normal in the lateral plane throughout the thoracic spine    There is mild wedging of a midthoracic vertebral body which is new since prior chest x-ray of 2018    Mild degenerative changes present    Mild scoliosis present.                               X-Ray Ribs 2 View Left (Final result)  Result time 05/28/22 14:14:42    Final result by Keri Minor MD (05/28/22 14:14:42)                 Impression:      Chronic appearing left rib fractures      Electronically signed by: Keri Minor  Date:    05/28/2022  Time:    14:14              Narrative:    EXAMINATION:  XR RIBS 2 VIEW LEFT    CLINICAL HISTORY:  Unspecified fall, initial encounter    FINDINGS:  Chronic appearing left 4th and 6th rib fractures present.  No more acute appearing fracture seen.                                 Medications   orphenadrine injection 60 mg (has no administration in time range)   HYDROcodone-acetaminophen 5-325 mg per tablet 1 tablet (has no administration in time range)                       Clinical Impression:   Final diagnoses:  [W19.XXXA] Fall  [W19.XXXA] Fall, initial encounter (Primary)  [S22.000A] Thoracic compression fracture, closed, initial encounter  [S62.524A] Closed nondisplaced fracture of distal phalanx of right thumb, initial encounter  [S22.42XA] Closed fracture of multiple ribs of left side, initial encounter          ED Disposition Condition    Discharge Stable        ED Prescriptions     Medication Sig Dispense Start Date End Date Auth. Provider    methocarbamoL (ROBAXIN) 750 MG Tab Take 1 tablet (750 mg total) by mouth 3 (three) times daily as needed (spasms). 30 tablet 5/28/2022 6/7/2022 RUSTY Ochoa        Follow-up Information     Follow up With Specialties Details Why Contact Info    Royce Stoner NP Gerontology, Family Medicine, Emergency Medicine On 5/31/2022  [address] [place] [id number]  [phone]             RUSTY Ochoa  05/28/22 [id number]     Unknown

## 2023-12-09 NOTE — ED PROVIDER NOTE - OBJECTIVE STATEMENT
29 y/o F with past medical history of GERD, obesity, asthma, schizoaffective disorder, depression presents from [facility] with complaints of worsening abdominal pain for the last 24 hours.  Patient states that the abdominal began last night around 7 PM while at rest.  Pain began in her right upper quadrant and occasionally appears in her right lower quadrant.  It is sharp and crampy in nature, getting worse over time.  Patient was given Zofran and NSAID compatible with lithium today, but then she immediately vomited it up.  Only 1 episode of emesis. Pain is not made better or worse by eating. Several episodes of diarrhea, nonbloody.  Patient denies any fevers or chills, URI symptoms, dysuria, abnormal vaginal discharge, known pregnancy, recent sexual activity, previous abdominal surgies, drug or alcohol use. Pt denies SI/HI, says her mood has been improved recently. 29 y/o F with past medical history of GERD, obesity, asthma, schizoaffective disorder, depression presents from [facility] with complaints of worsening abdominal pain for the last 24 hours.  Patient states that the abdominal began last night around 7 PM while at rest.  Pain began in her right upper quadrant and occasionally appears in her right lower quadrant.  It is sharp and crampy in nature, getting worse over time.  Patient was given Zofran and NSAID compatible with lithium today, but then she immediately vomited it up.  Only 1 episode of emesis. Pain is not made better or worse by eating. Several episodes of diarrhea, nonbloody.  Patient denies any fevers or chills, URI symptoms, dysuria, abnormal vaginal discharge, known pregnancy, recent sexual activity, previous abdominal surgies, drug or alcohol use. Pt denies SI/HI, says her mood has been improved recently.

## 2023-12-09 NOTE — ED PROVIDER NOTE - ATTENDING CONTRIBUTION TO CARE
Attending Statement: I have personally seen and examined this patient. I have fully participated in the care of this patient. I have reviewed all pertinent clinical information, including history physical exam, plan and the Resident's note and agree except as noted  "27yo female with PMH of schizoaffective disorder, asthma, HTN, GERD, obesity and hypothyroidism at [facility]"  Complaints of abdominal pain since last night.  Patient complaining of right-sided abdominal discomfort since yesterday, associated with nausea and an episode of nonbloody vomit.  Some loose nonbloody diarrhea.  Denies any fever or chills denies any dysuria no hematuria denies any vaginal bleeding.  No chest pain no shortness of breath.  Was sent in from [facility] for evaluation.  Vital signs appreciated patient tachycardic heart rate 108, blood pressure 126/67 pulse ox 99 on room air  Patient sitting up ANO x 3, cooperative.  Not icteric not jaundiced.  No respiratory distress answering questions in full sentences.  Obese female tender abdomen tender primarily in the right upper quadrant no rebound no guarding.  No CVA tenderness.  Plan n.p.o EKG, rectal time, labs, urine, chest x-ray, right upper quadrant ultrasound, antiemetics and reassess  615 patient had ultrasound preliminary looks normal will order CT abdomen pelvis Attending Statement: I have personally seen and examined this patient. I have fully participated in the care of this patient. I have reviewed all pertinent clinical information, including history physical exam, plan and the Resident's note and agree except as noted  "27yo female with PMH of schizoaffective disorder, asthma, HTN, GERD, obesity and hypothyroidism at [facility]"  Complaints of abdominal pain since last night.  Patient complaining of right-sided abdominal discomfort since yesterday, associated with nausea and an episode of nonbloody vomit.  Some loose nonbloody diarrhea.  Denies any fever or chills denies any dysuria no hematuria denies any vaginal bleeding.  No chest pain no shortness of breath.  Was sent in from [facility] for evaluation.  Vital signs appreciated patient tachycardic heart rate 108, blood pressure 126/67 pulse ox 99 on room air  Patient sitting up ANO x 3, cooperative.  Not icteric not jaundiced.  No respiratory distress answering questions in full sentences.  Obese female tender abdomen tender primarily in the right upper quadrant no rebound no guarding.  No CVA tenderness.  Plan n.p.o EKG, rectal time, labs, urine, chest x-ray, right upper quadrant ultrasound, antiemetics and reassess  615 patient had ultrasound preliminary looks normal will order CT abdomen pelvis

## 2023-12-09 NOTE — BH INPATIENT PSYCHIATRY PROGRESS NOTE - NSBHMETABOLIC_PSY_ALL_CORE_FT
BMI: BMI (kg/m2): 57.8 (09-14-23 @ 14:30)  HbA1c: A1C with Estimated Average Glucose Result: 5.2 % (11-01-23 @ 09:04)    Glucose:   BP: --Vital Signs Last 24 Hrs  T(C): 36.8 (12-09-23 @ 08:29), Max: 36.8 (12-09-23 @ 08:29)  T(F): 98.3 (12-09-23 @ 08:29), Max: 98.3 (12-09-23 @ 08:29)  HR: --  BP: --  BP(mean): --  RR: --  SpO2: --    Orthostatic VS  12-09-23 @ 08:29  Lying BP: --/-- HR: --  Sitting BP: 115/70 HR: 103  Standing BP: 145/83 HR: 121  Site: --  Mode: --  Orthostatic VS  12-08-23 @ 20:40  Lying BP: --/-- HR: --  Sitting BP: 124/86 HR: 100  Standing BP: 143/99 HR: 108  Site: --  Mode: --  Orthostatic VS  12-08-23 @ 08:30  Lying BP: --/-- HR: --  Sitting BP: 130/95 HR: 106  Standing BP: 141/88 HR: 113  Site: --  Mode: --  Orthostatic VS  12-07-23 @ 20:26  Lying BP: --/-- HR: --  Sitting BP: 126/91 HR: 106  Standing BP: 142/97 HR: 114  Site: --  Mode: --    Lipid Panel: Date/Time: 11-01-23 @ 09:04  Cholesterol, Serum: 156  LDL Cholesterol Calculated: 82  HDL Cholesterol, Serum: 58  Total Cholesterol/HDL Ration Measurement: --  Triglycerides, Serum: 79

## 2023-12-09 NOTE — BH INPATIENT PSYCHIATRY PROGRESS NOTE - NSBHASSESSSUMMFT_PSY_ALL_CORE
Patient is 29yo single woman, no dependents, domiciled with her Grandmother, unemployed on disability/SSI, pphx of schizoaffective disorder, multiple past psychiatric admissions including state and recent discharge from [facility], in tx with [facility], with pmhx of asthma, HTN, GERD, and hypothyroidism and schizoaffective disorder, in tx with [facility], who self presented to the ED reporting abdominal pain and requesting readmission to psychiatric unit, reporting CAH, depressive symptoms, IOR, suicidality, admitted to [facility] for psychiatric care.  Depression and psychosis likely multifactorial at this time, schizoaffective vs. borderline personality disorder vs. complex grief vs. adjustment disorder.      The patient continues to be depressed, anxious, with SI.  Will continue 1:1 for safety.  Li level 0.6, clozapine level 244.  Increased clozapine to 225mg.  For Abilify ESQUEDA today.    1.) Obs: start 1:1 for worsening SI.  2.) Psychiatric medication management:  - Reviewed options for addressing nocturnal enuresis including reducing clozapine vs reducing lithium vs adding desmopressin.  Pt decided upon desmopressin as she continues to have depression and psychosis.  Desmopressin 0.1 mg QHS.  - Lithobid 1200 mg QHS.  Pt aware that she needs to stay well hydrated and avoid diuretics and NSAIDs.   - Weekend team had started chlorpromazine standing at pt request.  Discussed concerns with lowering seizure threshold.  Reviewed alternatives.  Will utilize olanzapine 2.5 mg TID for now.  - Increase Clozapine to 225mg qHS for psychosis ([id number])  - Prazosin 2mg qhs, monitor BP carefully.  - Abilify Maintena 400mg IM q3 weeks rather than 4, per collateral from ACT team.  Doses given 10/19, 11/17, next 12/8.  - Mirtazapine was switched to escitalopram, will increase to 20 mg daily.  - Wellbutrin XL 150mg daily  - Gabapentin 300 mg BID for anxiety/chronic pain.  - PRN: haloperidol 5mg PO/IM q8hrs PRN for agitation, diphenhydramine 50mg PO/IM q6hrs PRN for EPS PPx, hydroxyzine 50mg PO q12hrs PRN for anxiety, lorazepam 2mg PO q8hrs PRN for anxiety or 2mg IM for assaultive behavior. Melatonin 5mg qHS PRN for insomnia  - Discussed ECT several times with pt who states she has been told in two hospitals in past that due to her obesity and asthma, she is not an ideal candidate.  Pt states that due to this she is not interested in receiving ECT at this time.  3.) Medical:   - Pt scheduled for dental extractions 11/1, however declined to attend on day prior when informed it would not be under general anesthesia.  - last clozapine labs on 10/23 (CBC with diff, troponins, CRP); CRP elevated at 15.8 likely due to dental infection/wisdom tooth issues  - pantoprazole 40mg PO before breakfast for GERD  - montelukast 10mg PO qHS for asthma  - budesonide 80mcg/formoterol 4.5mcg 2 puffs BID for asthma  - PRN: acetaminophen 650mg PO q6hrs PRN for pain, albuterol 90mcg 2puffs q6hrs PRN for dyspnea, ondansetron 4mg q6hrs PRN for nausea  - Discussed CRP with medicine service, given asymptomatic pt and normal troponin, no additional intervention/diagnostics at this time.  - Discontinued clobetasol as rash has resolved.  4.) Dispo planning: State application submitted. Patient is 27yo single woman, no dependents, domiciled with her Grandmother, unemployed on disability/SSI, pphx of schizoaffective disorder, multiple past psychiatric admissions including state and recent discharge from [facility], in tx with [facility], with pmhx of asthma, HTN, GERD, and hypothyroidism and schizoaffective disorder, in tx with [facility], who self presented to the ED reporting abdominal pain and requesting readmission to psychiatric unit, reporting CAH, depressive symptoms, IOR, suicidality, admitted to [facility] for psychiatric care.  Depression and psychosis likely multifactorial at this time, schizoaffective vs. borderline personality disorder vs. complex grief vs. adjustment disorder.      The patient continues to be depressed, anxious, with SI.  Will continue 1:1 for safety.  Li level 0.6, clozapine level 244.  Increased clozapine to 225mg.  For Abilify ESQUEDA today.    1.) Obs: start 1:1 for worsening SI.  2.) Psychiatric medication management:  - Reviewed options for addressing nocturnal enuresis including reducing clozapine vs reducing lithium vs adding desmopressin.  Pt decided upon desmopressin as she continues to have depression and psychosis.  Desmopressin 0.1 mg QHS.  - Lithobid 1200 mg QHS.  Pt aware that she needs to stay well hydrated and avoid diuretics and NSAIDs.   - Weekend team had started chlorpromazine standing at pt request.  Discussed concerns with lowering seizure threshold.  Reviewed alternatives.  Will utilize olanzapine 2.5 mg TID for now.  - Increase Clozapine to 225mg qHS for psychosis ([id number])  - Prazosin 2mg qhs, monitor BP carefully.  - Abilify Maintena 400mg IM q3 weeks rather than 4, per collateral from ACT team.  Doses given 10/19, 11/17, next 12/8.  - Mirtazapine was switched to escitalopram, will increase to 20 mg daily.  - Wellbutrin XL 150mg daily  - Gabapentin 300 mg BID for anxiety/chronic pain.  - PRN: haloperidol 5mg PO/IM q8hrs PRN for agitation, diphenhydramine 50mg PO/IM q6hrs PRN for EPS PPx, hydroxyzine 50mg PO q12hrs PRN for anxiety, lorazepam 2mg PO q8hrs PRN for anxiety or 2mg IM for assaultive behavior. Melatonin 5mg qHS PRN for insomnia  - Discussed ECT several times with pt who states she has been told in two hospitals in past that due to her obesity and asthma, she is not an ideal candidate.  Pt states that due to this she is not interested in receiving ECT at this time.  3.) Medical:   - Pt scheduled for dental extractions 11/1, however declined to attend on day prior when informed it would not be under general anesthesia.  - last clozapine labs on 10/23 (CBC with diff, troponins, CRP); CRP elevated at 15.8 likely due to dental infection/wisdom tooth issues  - pantoprazole 40mg PO before breakfast for GERD  - montelukast 10mg PO qHS for asthma  - budesonide 80mcg/formoterol 4.5mcg 2 puffs BID for asthma  - PRN: acetaminophen 650mg PO q6hrs PRN for pain, albuterol 90mcg 2puffs q6hrs PRN for dyspnea, ondansetron 4mg q6hrs PRN for nausea  - Discussed CRP with medicine service, given asymptomatic pt and normal troponin, no additional intervention/diagnostics at this time.  - Discontinued clobetasol as rash has resolved.  4.) Dispo planning: State application submitted.

## 2023-12-09 NOTE — BH CHART NOTE - NSEVENTNOTEFT_PSY_ALL_CORE
[facility] Inpatient to ED Transfer Summary    Reason for Transfer/Medical Summary:      PAST MEDICAL & SURGICAL HISTORY:  Asthma      Schizoaffective disorder      PTSD (post-traumatic stress disorder)      No significant past surgical history          Allergies    No Known Allergies    Intolerances        MEDICATIONS  (STANDING):  budesonide  80 MICROgram(s)/formoterol 4.5 MICROgram(s) Inhaler 2 Puff(s) Inhalation two times a day  buPROPion XL (24-Hour) 150 milliGRAM(s) Oral daily  cloZAPine 225 milliGRAM(s) Oral at bedtime  desmopressin 0.1 milliGRAM(s) Oral at bedtime  escitalopram 20 milliGRAM(s) Oral daily  gabapentin 300 milliGRAM(s) Oral two times a day  influenza   Vaccine 0.5 milliLiter(s) IntraMuscular once  lithium SR (LITHOBID) 1200 milliGRAM(s) Oral at bedtime  montelukast 10 milliGRAM(s) Oral at bedtime  OLANZapine 2.5 milliGRAM(s) Oral three times a day  oseltamivir 75 milliGRAM(s) Oral daily  pantoprazole    Tablet 40 milliGRAM(s) Oral before breakfast  prazosin. 2 milliGRAM(s) Oral at bedtime  sulindac 150 milliGRAM(s) Oral two times a day    MEDICATIONS  (PRN):  acetaminophen     Tablet .. 650 milliGRAM(s) Oral every 6 hours PRN Moderate Pain (4 - 6)  albuterol    90 MICROgram(s) HFA Inhaler 2 Puff(s) Inhalation every 6 hours PRN Shortness of Breath and/or Wheezing  aluminum hydroxide/magnesium hydroxide/simethicone Suspension 30 milliLiter(s) Oral every 4 hours PRN Dyspepsia  chlorproMAZINE    Injectable 50 milliGRAM(s) IntraMuscular once PRN severe agitation  chlorproMAZINE    Tablet 50 milliGRAM(s) Oral every 6 hours PRN agitation  hydrOXYzine hydrochloride 50 milliGRAM(s) Oral every 12 hours PRN Anxiety  lidocaine   4% Patch 1 Patch Transdermal daily PRN LBP  LORazepam     Tablet 2 milliGRAM(s) Oral every 8 hours PRN Anxiety  LORazepam   Injectable 2 milliGRAM(s) IntraMuscular once PRN Assaultive behavior  melatonin. 5 milliGRAM(s) Oral at bedtime PRN Insomnia  ondansetron    Tablet 4 milliGRAM(s) Oral every 6 hours PRN nausea  polyethylene glycol 3350 17 Gram(s) Oral daily PRN Constipation      Vital Signs Last 24 Hrs  T(C): 36.8 (09 Dec 2023 08:29), Max: 36.8 (09 Dec 2023 08:29)  T(F): 98.3 (09 Dec 2023 08:29), Max: 98.3 (09 Dec 2023 08:29)  HR: --  BP: --  BP(mean): --  RR: --  SpO2: --      CAPILLARY BLOOD GLUCOSE            PHYSICAL EXAM:  GENERAL: NAD, well-developed  HEAD:  Atraumatic, Normocephalic  EYES: EOMI, PERRLA, conjunctiva and sclera clear  NECK: Supple, No JVD  CHEST/LUNG: Clear to auscultation bilaterally; No wheeze  HEART: Regular rate and rhythm; No murmurs, rubs, or gallops  ABDOMEN: Soft, Nontender, Nondistended; Bowel sounds present  EXTREMITIES:  2+ Peripheral Pulses, No clubbing, cyanosis, or edema  PSYCH: AAOx3  NEUROLOGY: non-focal  SKIN: No rashes or lesions    LABS:                    Psychiatry Section:  Psychiatric Summary/[facility] admitting diagnosis:    Psychiatric Recommendations:    Observation status (check one):   ( ) Constant Observation  ( ) Enhanced care  ( ) Routine checks    Risk Status (check all that apply if present):  ( ) at risk for suicide/self-injury  ( ) at risk for aggressive behavior  ( ) at risk for elopement  ( ) other risk:    [facility] Inpatient to ED Transfer Summary    Reason for Transfer/Medical Summary:      PAST MEDICAL & SURGICAL HISTORY:  Asthma      Schizoaffective disorder      PTSD (post-traumatic stress disorder)      No significant past surgical history          Allergies    No Known Allergies    Intolerances        MEDICATIONS  (STANDING):  budesonide  80 MICROgram(s)/formoterol 4.5 MICROgram(s) Inhaler 2 Puff(s) Inhalation two times a day  buPROPion XL (24-Hour) 150 milliGRAM(s) Oral daily  cloZAPine 225 milliGRAM(s) Oral at bedtime  desmopressin 0.1 milliGRAM(s) Oral at bedtime  escitalopram 20 milliGRAM(s) Oral daily  gabapentin 300 milliGRAM(s) Oral two times a day  influenza   Vaccine 0.5 milliLiter(s) IntraMuscular once  lithium SR (LITHOBID) 1200 milliGRAM(s) Oral at bedtime  montelukast 10 milliGRAM(s) Oral at bedtime  OLANZapine 2.5 milliGRAM(s) Oral three times a day  oseltamivir 75 milliGRAM(s) Oral daily  pantoprazole    Tablet 40 milliGRAM(s) Oral before breakfast  prazosin. 2 milliGRAM(s) Oral at bedtime  sulindac 150 milliGRAM(s) Oral two times a day    MEDICATIONS  (PRN):  acetaminophen     Tablet .. 650 milliGRAM(s) Oral every 6 hours PRN Moderate Pain (4 - 6)  albuterol    90 MICROgram(s) HFA Inhaler 2 Puff(s) Inhalation every 6 hours PRN Shortness of Breath and/or Wheezing  aluminum hydroxide/magnesium hydroxide/simethicone Suspension 30 milliLiter(s) Oral every 4 hours PRN Dyspepsia  chlorproMAZINE    Injectable 50 milliGRAM(s) IntraMuscular once PRN severe agitation  chlorproMAZINE    Tablet 50 milliGRAM(s) Oral every 6 hours PRN agitation  hydrOXYzine hydrochloride 50 milliGRAM(s) Oral every 12 hours PRN Anxiety  lidocaine   4% Patch 1 Patch Transdermal daily PRN LBP  LORazepam     Tablet 2 milliGRAM(s) Oral every 8 hours PRN Anxiety  LORazepam   Injectable 2 milliGRAM(s) IntraMuscular once PRN Assaultive behavior  melatonin. 5 milliGRAM(s) Oral at bedtime PRN Insomnia  ondansetron    Tablet 4 milliGRAM(s) Oral every 6 hours PRN nausea  polyethylene glycol 3350 17 Gram(s) Oral daily PRN Constipation      Vital Signs Last 24 Hrs  T(C): 36.8 (09 Dec 2023 08:29), Max: 36.8 (09 Dec 2023 08:29)  T(F): 98.3 (09 Dec 2023 08:29), Max: 98.3 (09 Dec 2023 08:29)  HR: --  BP: --  BP(mean): --  RR: --  SpO2: --      CAPILLARY BLOOD GLUCOSE            PHYSICAL EXAM:  GENERAL: NAD, well-developed  HEAD:  Atraumatic, Normocephalic  EYES: EOMI, PERRLA, conjunctiva and sclera clear  NECK: Supple, No JVD  CHEST/LUNG: Clear to auscultation bilaterally; No wheeze  HEART: Regular rate and rhythm; No murmurs, rubs, or gallops  ABDOMEN: Soft, Nontender, Nondistended; Bowel sounds present  EXTREMITIES:  2+ Peripheral Pulses, No clubbing, cyanosis, or edema  PSYCH: AAOx3  NEUROLOGY: non-focal  SKIN: No rashes or lesions    LABS:                    Psychiatry Section:  Psychiatric Summary/[facility] admitting diagnosis:    Psychiatric Recommendations:    Observation status (check one):   ( ) Constant Observation  ( ) Enhanced care  ( ) Routine checks    Risk Status (check all that apply if present):  ( ) at risk for suicide/self-injury  ( ) at risk for aggressive behavior  ( ) at risk for elopement  ( ) other risk:    [facility] Inpatient to ED Transfer Summary    Reason for Transfer/Medical Summary:  Patient is 27yo female with PMH of schizoaffective disorder, asthma, HTN, GERD, obesity and hypothyroidism at [facility] complaining of acute LRQ and right pelvic abdominal pain. The pain started last night and was initially intermittent but has now been constant. She describes radiating pain from lower pelvis to mid epigastric. Patient has decreased appetite and NBNB emesis. Reports one episode of diarrhea today. Denies constipation, urinary sx, vaginal sx. Patient is on clozapine. Reports pain is 8/10. Tachycardic and normotensive. Afebrile. CVA tenderness and tenderness to palpation at LRQ.     PAST MEDICAL & SURGICAL HISTORY:  Asthma  Schizoaffective disorder  PTSD (post-traumatic stress disorder)  No significant past surgical history    Allergies  No Known Allergies  Intolerances    MEDICATIONS  (STANDING):  budesonide  80 MICROgram(s)/formoterol 4.5 MICROgram(s) Inhaler 2 Puff(s) Inhalation two times a day  buPROPion XL (24-Hour) 150 milliGRAM(s) Oral daily  cloZAPine 225 milliGRAM(s) Oral at bedtime  desmopressin 0.1 milliGRAM(s) Oral at bedtime  escitalopram 20 milliGRAM(s) Oral daily  gabapentin 300 milliGRAM(s) Oral two times a day  influenza   Vaccine 0.5 milliLiter(s) IntraMuscular once  lithium SR (LITHOBID) 1200 milliGRAM(s) Oral at bedtime  montelukast 10 milliGRAM(s) Oral at bedtime  OLANZapine 2.5 milliGRAM(s) Oral three times a day  oseltamivir 75 milliGRAM(s) Oral daily  pantoprazole    Tablet 40 milliGRAM(s) Oral before breakfast  prazosin. 2 milliGRAM(s) Oral at bedtime  sulindac 150 milliGRAM(s) Oral two times a day    MEDICATIONS  (PRN):  acetaminophen     Tablet .. 650 milliGRAM(s) Oral every 6 hours PRN Moderate Pain (4 - 6)  albuterol    90 MICROgram(s) HFA Inhaler 2 Puff(s) Inhalation every 6 hours PRN Shortness of Breath and/or Wheezing  aluminum hydroxide/magnesium hydroxide/simethicone Suspension 30 milliLiter(s) Oral every 4 hours PRN Dyspepsia  chlorproMAZINE    Injectable 50 milliGRAM(s) IntraMuscular once PRN severe agitation  chlorproMAZINE    Tablet 50 milliGRAM(s) Oral every 6 hours PRN agitation  hydrOXYzine hydrochloride 50 milliGRAM(s) Oral every 12 hours PRN Anxiety  lidocaine   4% Patch 1 Patch Transdermal daily PRN LBP  LORazepam     Tablet 2 milliGRAM(s) Oral every 8 hours PRN Anxiety  LORazepam   Injectable 2 milliGRAM(s) IntraMuscular once PRN Assaultive behavior  melatonin. 5 milliGRAM(s) Oral at bedtime PRN Insomnia  ondansetron    Tablet 4 milliGRAM(s) Oral every 6 hours PRN nausea  polyethylene glycol 3350 17 Gram(s) Oral daily PRN Constipation      Vital Signs Last 24 Hrs  T(C): 36.8 (09 Dec 2023 08:29), Max: 36.8 (09 Dec 2023 08:29)  T(F): 98.3 (09 Dec 2023 08:29), Max: 98.3 (09 Dec 2023 08:29)    PHYSICAL EXAM:  GENERAL: Patient wincing in pain and difficulty with walking and sitting down   EYES: Conjunctiva and sclera clear  CHEST/LUNG: Clear to auscultation bilaterally; No wheeze  HEART: tachycardic and rhythm; No murmurs, rubs, or gallops  ABDOMEN: Soft, LRQ tender, Nondistended; psoas sign positive; CVA tenderness   EXTREMITIES: No clubbing, cyanosis, or edema  PSYCH: AAOx3  NEUROLOGY: non-focal  SKIN: No rashes or lesions    LABS:    Psychiatry Section:  Psychiatric Summary/[facility] admitting diagnosis:  Patient is 27yo female with PMH of schizoaffective disorder, asthma, HTN, GERD, obesity and hypothyroidism at [facility] complaining of acute LRQ and right pelvic abdominal pain. The pain started last night and was initially intermittent but has now been constant. She describes radiating pain from lower pelvis to mid epigastric. Patient has decreased appetite and NBNB emesis. Reports one episode of diarrhea today. Denies constipation, urinary sx, vaginal sx. Patient is on clozapine. Reports pain is 8/10. Tachycardic and normotensive. Afebrile. CVA tenderness and tenderness to palpation at LRQ.     Psychiatric Recommendations:  - Desmopressin 0.1 mg QHS.  - Lithobid 1200 mg QHS.  - Olanzapine 2.5 mg TID   - Continue Clozapine 225mg qHS for psychosis ([id number])  - Prazosin 2mg qhs, monitor BP carefully.  - Abilify Maintena 400mg IM q3 weeks rather than 4, per collateral from ACT team.  Doses given 10/19, 11/17, next 12/8.  - Ecitalopram 20 mg daily.  - Wellbutrin XL 150mg daily  - Gabapentin 300 mg BID for anxiety/chronic pain.  - PRN: haloperidol 5mg PO/IM q8hrs PRN for agitation, diphenhydramine 50mg PO/IM q6hrs PRN for EPS PPx, hydroxyzine 50mg PO q12hrs PRN for anxiety, lorazepam 2mg PO q8hrs PRN for anxiety or 2mg IM for assaultive behavior. Melatonin 5mg qHS PRN for insomnia    3.) Medical:   - Pt scheduled for dental extractions 11/1, however declined to attend on day prior when informed it would not be under general anesthesia.  - last clozapine labs on 10/23 (CBC with diff, troponins, CRP); CRP elevated at 15.8 likely due to dental infection/wisdom tooth issues  - pantoprazole 40mg PO before breakfast for GERD  - montelukast 10mg PO qHS for asthma  - budesonide 80mcg/formoterol 4.5mcg 2 puffs BID for asthma  - PRN: acetaminophen 650mg PO q6hrs PRN for pain, albuterol 90mcg 2puffs q6hrs PRN for dyspnea, ondansetron 4mg q6hrs PRN for nausea  - Discussed CRP with medicine service, given asymptomatic pt and normal troponin, no additional intervention/diagnostics at this time.      Observation status (check one):   (x ) Constant Observation  ( ) Enhanced care  ( ) Routine checks    Risk Status (check all that apply if present):  ( x) at risk for suicide/self-injury  ( ) at risk for aggressive behavior  ( ) at risk for elopement  ( ) other risk:    [facility] Inpatient to ED Transfer Summary    Reason for Transfer/Medical Summary:  Patient is 27yo female with PMH of schizoaffective disorder, asthma, HTN, GERD, obesity and hypothyroidism at [facility] complaining of acute LRQ and right pelvic abdominal pain. The pain started last night and was initially intermittent but has now been constant. She describes radiating pain from lower pelvis to mid epigastric. Patient has decreased appetite and NBNB emesis. Reports one episode of diarrhea today. Denies constipation, urinary sx, vaginal sx. Patient is on clozapine. Reports pain is 8/10. Tachycardic and normotensive. Afebrile. CVA tenderness and tenderness to palpation at LRQ.     PAST MEDICAL & SURGICAL HISTORY:  Asthma  Schizoaffective disorder  PTSD (post-traumatic stress disorder)  No significant past surgical history    Allergies  No Known Allergies  Intolerances    MEDICATIONS  (STANDING):  budesonide  80 MICROgram(s)/formoterol 4.5 MICROgram(s) Inhaler 2 Puff(s) Inhalation two times a day  buPROPion XL (24-Hour) 150 milliGRAM(s) Oral daily  cloZAPine 225 milliGRAM(s) Oral at bedtime  desmopressin 0.1 milliGRAM(s) Oral at bedtime  escitalopram 20 milliGRAM(s) Oral daily  gabapentin 300 milliGRAM(s) Oral two times a day  influenza   Vaccine 0.5 milliLiter(s) IntraMuscular once  lithium SR (LITHOBID) 1200 milliGRAM(s) Oral at bedtime  montelukast 10 milliGRAM(s) Oral at bedtime  OLANZapine 2.5 milliGRAM(s) Oral three times a day  oseltamivir 75 milliGRAM(s) Oral daily  pantoprazole    Tablet 40 milliGRAM(s) Oral before breakfast  prazosin. 2 milliGRAM(s) Oral at bedtime  sulindac 150 milliGRAM(s) Oral two times a day    MEDICATIONS  (PRN):  acetaminophen     Tablet .. 650 milliGRAM(s) Oral every 6 hours PRN Moderate Pain (4 - 6)  albuterol    90 MICROgram(s) HFA Inhaler 2 Puff(s) Inhalation every 6 hours PRN Shortness of Breath and/or Wheezing  aluminum hydroxide/magnesium hydroxide/simethicone Suspension 30 milliLiter(s) Oral every 4 hours PRN Dyspepsia  chlorproMAZINE    Injectable 50 milliGRAM(s) IntraMuscular once PRN severe agitation  chlorproMAZINE    Tablet 50 milliGRAM(s) Oral every 6 hours PRN agitation  hydrOXYzine hydrochloride 50 milliGRAM(s) Oral every 12 hours PRN Anxiety  lidocaine   4% Patch 1 Patch Transdermal daily PRN LBP  LORazepam     Tablet 2 milliGRAM(s) Oral every 8 hours PRN Anxiety  LORazepam   Injectable 2 milliGRAM(s) IntraMuscular once PRN Assaultive behavior  melatonin. 5 milliGRAM(s) Oral at bedtime PRN Insomnia  ondansetron    Tablet 4 milliGRAM(s) Oral every 6 hours PRN nausea  polyethylene glycol 3350 17 Gram(s) Oral daily PRN Constipation      Vital Signs Last 24 Hrs  T(C): 36.8 (09 Dec 2023 08:29), Max: 36.8 (09 Dec 2023 08:29)  T(F): 98.3 (09 Dec 2023 08:29), Max: 98.3 (09 Dec 2023 08:29)    PHYSICAL EXAM:  GENERAL: Patient wincing in pain and difficulty with walking and sitting down   EYES: Conjunctiva and sclera clear  CHEST/LUNG: Clear to auscultation bilaterally; No wheeze  HEART: tachycardic and rhythm; No murmurs, rubs, or gallops  ABDOMEN: Soft, LRQ tender, Nondistended; psoas sign positive; CVA tenderness   EXTREMITIES: No clubbing, cyanosis, or edema  PSYCH: AAOx3  NEUROLOGY: non-focal  SKIN: No rashes or lesions    LABS:    Psychiatry Section:  Psychiatric Summary/[facility] admitting diagnosis:  Patient is 27yo female with PMH of schizoaffective disorder, asthma, HTN, GERD, obesity and hypothyroidism at [facility] complaining of acute LRQ and right pelvic abdominal pain. The pain started last night and was initially intermittent but has now been constant. She describes radiating pain from lower pelvis to mid epigastric. Patient has decreased appetite and NBNB emesis. Reports one episode of diarrhea today. Denies constipation, urinary sx, vaginal sx. Patient is on clozapine. Reports pain is 8/10. Tachycardic and normotensive. Afebrile. CVA tenderness and tenderness to palpation at LRQ.     Psychiatric Recommendations:  - Desmopressin 0.1 mg QHS.  - Lithobid 1200 mg QHS.  - Olanzapine 2.5 mg TID   - Continue Clozapine 225mg qHS for psychosis ([id number])  - Prazosin 2mg qhs, monitor BP carefully.  - Abilify Maintena 400mg IM q3 weeks rather than 4, per collateral from ACT team.  Doses given 10/19, 11/17, next 12/8.  - Ecitalopram 20 mg daily.  - Wellbutrin XL 150mg daily  - Gabapentin 300 mg BID for anxiety/chronic pain.  - PRN: haloperidol 5mg PO/IM q8hrs PRN for agitation, diphenhydramine 50mg PO/IM q6hrs PRN for EPS PPx, hydroxyzine 50mg PO q12hrs PRN for anxiety, lorazepam 2mg PO q8hrs PRN for anxiety or 2mg IM for assaultive behavior. Melatonin 5mg qHS PRN for insomnia    3.) Medical:   - Pt scheduled for dental extractions 11/1, however declined to attend on day prior when informed it would not be under general anesthesia.  - last clozapine labs on 10/23 (CBC with diff, troponins, CRP); CRP elevated at 15.8 likely due to dental infection/wisdom tooth issues  - pantoprazole 40mg PO before breakfast for GERD  - montelukast 10mg PO qHS for asthma  - budesonide 80mcg/formoterol 4.5mcg 2 puffs BID for asthma  - PRN: acetaminophen 650mg PO q6hrs PRN for pain, albuterol 90mcg 2puffs q6hrs PRN for dyspnea, ondansetron 4mg q6hrs PRN for nausea  - Discussed CRP with medicine service, given asymptomatic pt and normal troponin, no additional intervention/diagnostics at this time.      Observation status (check one):   (x ) Constant Observation  ( ) Enhanced care  ( ) Routine checks    Risk Status (check all that apply if present):  ( x) at risk for suicide/self-injury  ( ) at risk for aggressive behavior  ( ) at risk for elopement  ( ) other risk:

## 2023-12-09 NOTE — ED PROVIDER NOTE - PHYSICAL EXAMINATION
Gen: obese, NAD, AAOx3, non-toxic appearing, sitting up in bed appears calm  HEENT: NCAT, normal conjunctiva, oral mucosa moist  Lung: speaking in full sentences, good aeration bilaterally, lungs CTA b/l  CV: regular rate and rhythm. cap refill <2x. peripheral pulses 2+bilaterally   Abd: obese, soft, ND, RUQ very ttp, minimal RLQ tenderness   MSK: no visible deformities  Neuro: No focal deficits  Skin: Intact  Psych: normal affect

## 2023-12-09 NOTE — ED PROVIDER NOTE - CLINICAL SUMMARY MEDICAL DECISION MAKING FREE TEXT BOX
Differentials include cholecystitis, cholelithiasis, other gallbladder pathology, pyelonephritis, kidney stone, appendicitis. Will perform pelvic exam to r/o  causes of pain. Will obtain rectal temp. EKG before symptomatic medication, given extensive psych medications. Will order labs, urine, RUQ US and reassess as needed.

## 2023-12-09 NOTE — BH INPATIENT PSYCHIATRY PROGRESS NOTE - NSBHCHARTREVIEWVS_PSY_A_CORE FT
Vital Signs Last 24 Hrs  T(C): 36.8 (12-09-23 @ 08:29), Max: 36.8 (12-09-23 @ 08:29)  T(F): 98.3 (12-09-23 @ 08:29), Max: 98.3 (12-09-23 @ 08:29)  HR: --  BP: --  BP(mean): --  RR: --  SpO2: --    Orthostatic VS  12-09-23 @ 08:29  Lying BP: --/-- HR: --  Sitting BP: 115/70 HR: 103  Standing BP: 145/83 HR: 121  Site: --  Mode: --  Orthostatic VS  12-08-23 @ 20:40  Lying BP: --/-- HR: --  Sitting BP: 124/86 HR: 100  Standing BP: 143/99 HR: 108  Site: --  Mode: --  Orthostatic VS  12-08-23 @ 08:30  Lying BP: --/-- HR: --  Sitting BP: 130/95 HR: 106  Standing BP: 141/88 HR: 113  Site: --  Mode: --  Orthostatic VS  12-07-23 @ 20:26  Lying BP: --/-- HR: --  Sitting BP: 126/91 HR: 106  Standing BP: 142/97 HR: 114  Site: --  Mode: --

## 2023-12-09 NOTE — BH INPATIENT PSYCHIATRY PROGRESS NOTE - NSBHATTESTBILLING_PSY_A_CORE
[id number] OBS or IP - moderate complexity OR 35-49 mins [id number] OBS or IP - moderate complexity OR 35-49 mins

## 2023-12-09 NOTE — PROVIDER CONTACT NOTE (OTHER) - ASSESSMENT
Writer outreached [facility] Central Intake and spoke with unit directly: Desirae. Staff confirmed pt's ability to return with pt's bedside [facility] staff mbr. Writer outreached [facility] security: [id number] and arranged taxi with officer Nabil. Writer outreached [facility] Central Intake and spoke with unit directly: Desirae. Staff confirmed pt's ability to return with pt's bedside [facility] staff mbr. Writer outreached [facility] security: [id number] and arranged taxi with officer Nabil.

## 2023-12-09 NOTE — ED PROVIDER NOTE - CARE PLAN
Faxed refill request from [facility] for Lexapro 10 mg 1 tab daily, last refilled 12/10/18 # 90 with 1 refills. Last seen for preop 5/15/18. Ok to refill?   Principal Discharge DX:	Abdominal pain  Secondary Diagnosis:	Constipation   1

## 2023-12-09 NOTE — ED PROVIDER NOTE - PROGRESS NOTE DETAILS
Patient stable.  Noted to have a slight white count of 13.6 CMP unremarkable hCG negative ultrasound unremarkable chest x-ray clear.  Patient declined pelvic exam.  Pending CT abdomen pelvis to be done.  EKG sinus rhythm heart rate 86 with a QT of 384, qtc 459 Divya: Updated pt of negative findings and incidental findings. Paged [facility] resident for return to [facility]. Divya: Updated pt of negative findings and incidental findings. Paged [facility] resident for return to [facility]. CT , trace bl pleural effusion, no acute findings.  Large stool burden.  Normal appendix.  Patient sitting up in bed nontoxic well-appearing has no abdominal pain with asking to be discharged.  Staff at bedside.  Spoke with resident at [facility] Dr. Lina Sandoval  Aware patient reviewed results and plan for DC back to [facility], SW made aware of plan to dc CT , trace bl pleural effusion, no acute findings.  Large stool burden.  Normal appendix.  Patient sitting up in bed nontoxic well-appearing has no abdominal pain with asking to be discharged.  Staff at bedside.  Spoke with resident at [facility] Dr. Lina Sandoval  Aware patient reviewed results and plan for DC back to [facility], SW made aware of plan to dc

## 2023-12-09 NOTE — ED ADULT TRIAGE NOTE - CHIEF COMPLAINT QUOTE
Pt AOX4 c/o Right sided abdominal pain; pain started last night and has steadily increased today and pt started vomiting and having diarrhea; no Hx of surgery coming from [address] with staff member; Hx of asthma Pt AOX4 c/o Right sided abdominal pain; pain started last night and has steadily increased today and pt started vomiting and having diarrhea; no Hx of surgery coming from [address] with staff member; Hx of asthma

## 2023-12-09 NOTE — BH INPATIENT PSYCHIATRY PROGRESS NOTE - CURRENT MEDICATION
MEDICATIONS  (STANDING):  budesonide  80 MICROgram(s)/formoterol 4.5 MICROgram(s) Inhaler 2 Puff(s) Inhalation two times a day  buPROPion XL (24-Hour) 150 milliGRAM(s) Oral daily  cloZAPine 225 milliGRAM(s) Oral at bedtime  desmopressin 0.1 milliGRAM(s) Oral at bedtime  escitalopram 20 milliGRAM(s) Oral daily  gabapentin 300 milliGRAM(s) Oral two times a day  influenza   Vaccine 0.5 milliLiter(s) IntraMuscular once  lithium SR (LITHOBID) 1200 milliGRAM(s) Oral at bedtime  montelukast 10 milliGRAM(s) Oral at bedtime  OLANZapine 2.5 milliGRAM(s) Oral three times a day  oseltamivir 75 milliGRAM(s) Oral daily  pantoprazole    Tablet 40 milliGRAM(s) Oral before breakfast  prazosin. 2 milliGRAM(s) Oral at bedtime  sulindac 150 milliGRAM(s) Oral two times a day  sulindac 150 milliGRAM(s) Oral once    MEDICATIONS  (PRN):  acetaminophen     Tablet .. 650 milliGRAM(s) Oral every 6 hours PRN Moderate Pain (4 - 6)  albuterol    90 MICROgram(s) HFA Inhaler 2 Puff(s) Inhalation every 6 hours PRN Shortness of Breath and/or Wheezing  aluminum hydroxide/magnesium hydroxide/simethicone Suspension 30 milliLiter(s) Oral every 4 hours PRN Dyspepsia  chlorproMAZINE    Injectable 50 milliGRAM(s) IntraMuscular once PRN severe agitation  chlorproMAZINE    Tablet 50 milliGRAM(s) Oral every 6 hours PRN agitation  hydrOXYzine hydrochloride 50 milliGRAM(s) Oral every 12 hours PRN Anxiety  lidocaine   4% Patch 1 Patch Transdermal daily PRN LBP  LORazepam     Tablet 2 milliGRAM(s) Oral every 8 hours PRN Anxiety  LORazepam   Injectable 2 milliGRAM(s) IntraMuscular once PRN Assaultive behavior  melatonin. 5 milliGRAM(s) Oral at bedtime PRN Insomnia  ondansetron    Tablet 4 milliGRAM(s) Oral every 6 hours PRN nausea  polyethylene glycol 3350 17 Gram(s) Oral daily PRN Constipation

## 2023-12-09 NOTE — BH INPATIENT PSYCHIATRY PROGRESS NOTE - NSBHFUPINTERVALHXFT_PSY_A_CORE
Patient seen for follow up of depression, AHs, SI, chart reviewed, and case discussed with treatment team.  Pt complains of abdominal pain but is able to have a conversation about her progress.   The patient continues to report severe depression, with CAH to harm herself, and SI.  She has been trying to sleep more as an escape.  Encouraged her to continue to participate on the unit as well as a distraction.  She feels a little more tired today with the increase in clozapine.   Behavior has been well controlled on 1:1, will continue.  The patient has been visible on the unit, social with peers, and attending groups.  The patient reports eating and sleeping well.  She has been compliant with medications, tolerating them well.

## 2023-12-09 NOTE — BH CHART NOTE - NSEVENTNOTEFT_PSY_ALL_CORE
Patient complaining of intermittent right pelvis and abdominal pain with mild nausea. Mildly tender to palpation. Denies vomiting, diarrhea, constipation, urinary sx, and vaginal sx. Patient able to walk although slowly.  VSS. Started Sulindac 150mg BID for 3 days and continue to monitor pain and VS.     Vital Signs Last 24 Hrs  T(C): 36.8 (09 Dec 2023 08:29), Max: 36.8 (09 Dec 2023 08:29)  T(F): 98.3 (09 Dec 2023 08:29), Max: 98.3 (09 Dec 2023 08:29)    Physical Exam:   Sitting comfortable talking on the phone. Able to walk to her room and lay down for exam.   No skin changes noticed.   No distension on abdominal exam, but mild tenderness to palpation at RLQ and R upper pelvis.     Plan:   Sulindac 150mg for 3 days  Monitor pain and VS

## 2023-12-09 NOTE — ED ADULT TRIAGE NOTE - PAIN: PRESENCE, MLM
Covid screening completed no covid concerns at this time. Social distancing and face mask usage observed   complains of pain/discomfort

## 2023-12-09 NOTE — ED ADULT NURSE NOTE - NSFALLUNIVINTERV_ED_ALL_ED
Bed/Stretcher in lowest position, wheels locked, appropriate side rails in place/Call bell, personal items and telephone in reach/Instruct patient to call for assistance before getting out of bed/chair/stretcher/Non-slip footwear applied when patient is off stretcher/[place] to call system/Physically safe environment - no spills, clutter or unnecessary equipment/Purposeful proactive rounding/Room/bathroom lighting operational, light cord in reach Bed/Stretcher in lowest position, wheels locked, appropriate side rails in place/Call bell, personal items and telephone in reach/Instruct patient to call for assistance before getting out of bed/chair/stretcher/Non-slip footwear applied when patient is off stretcher/[place] to call system/Physically safe environment - no spills, clutter or unnecessary equipment/Purposeful proactive rounding/Room/bathroom lighting operational, light cord in reach

## 2023-12-09 NOTE — ED PROVIDER NOTE - NS ED ATTENDING STATEMENT MOD
PRIMARY CARE PROVIDER: Mino Hager MD  REFERRING PROVIDER: Mino Hager MD    Chief Complaint   Patient presents with   • Facial Pain     face pressure after a fall last year       Subjective   History of Present Illness:  Shannon Coombs is a  59 y.o. female who complains of facial pressure. The symptoms are localized to the left cheek. The patient has had mild symptoms. The symptoms have been intermittant for the last year. The symptoms are aggravated by  a previous fall where she struck left face. The symptoms are improved by hot showers.  She states the left cheek pressure is not constant.  She reports some congestion but denies any nasal drainage or postnasal drip.  She also denies any sinus infections.  She was treated with Augmentin which recently caused C. Difficile  and did not improve her symptoms. She is now currently taking cefdinir which is not improving the left facial pressure.  She also reports hitting her left upper incisor during this fall.  This was evaluated by Dr. Adrian who felt like this tooth was loose and she may lose this tooth.  She reports that she was evaluated at [facility] with CT scan of the facial bones at the end of October.  Per patient report, this was normal.  I do not have a copy of this report or a copy of this disc to review today. She denies malocclusion, diplopia, facial asymmetry, change in ability to breathe through her nose.     Review of Systems:  Review of Systems   Constitutional: Negative for chills and fever.   HENT: Positive for congestion, dental problem and sinus pressure. Negative for facial swelling, postnasal drip, rhinorrhea, sinus pain, sore throat and voice change.    Respiratory: Negative for cough and shortness of breath.    Cardiovascular: Negative for chest pain.   Gastrointestinal: Negative for diarrhea, nausea and vomiting.       Past History:  Past Medical History:   Diagnosis Date   • Allergic rhinitis     • Atrial premature depolarization    • Cardiac arrhythmia    • Dental abscess    • Deviated nasal septum    • Dizziness and giddiness    • Hearing loss    • Hypotension    • Rotator cuff tear    • Stomach ulcer    • Syncope and collapse    • Visual disturbance      Past Surgical History:   Procedure Laterality Date   • COLONOSCOPY  2018   • ENDOSCOPY  2018   • ENDOSCOPY  10/2018   • GANGLION CYST EXCISION      foot     Family History   Problem Relation Age of Onset   • Dementia Mother    • Hyperlipidemia Mother    • Heart disease Father    • Hyperlipidemia Father      Social History     Tobacco Use   • Smoking status: Former Smoker     Packs/day: 0.50     Years: 35.00     Pack years: 17.50     Types: Cigarettes     Last attempt to quit: 2019     Years since quittin.8   • Smokeless tobacco: Never Used   Substance Use Topics   • Alcohol use: Yes     Comment: rarely   • Drug use: No     Allergies:  Naproxen sodium    Current Outpatient Medications:   •  atorvastatin (LIPITOR) 20 MG tablet, Take 20 mg by mouth Daily., Disp: , Rfl:   •  Calcium Carbonate (CALTRATE 600) 1500 (600 Ca) MG tablet, Take 600 mg by mouth Daily., Disp: , Rfl:   •  Calcium Carbonate-Vitamin D (CALTRATE 600+D PO), Take  by mouth 2 (Two) Times a Day., Disp: , Rfl:   •  cefdinir (OMNICEF) 300 MG capsule, 300 mg., Disp: , Rfl:   •  cyclobenzaprine (FLEXERIL) 10 MG tablet, , Disp: , Rfl:   •  metoprolol succinate XL (TOPROL-XL) 25 MG 24 hr tablet, Take 25 mg by mouth Daily., Disp: , Rfl:   •  nicotine polacrilex (NICORETTE) 4 MG gum, Chew 4 mg As Needed for Smoking Cessation., Disp: , Rfl:   •  Probiotic Product (PROBIOTIC-10 PO), Take  by mouth., Disp: , Rfl:   •  ranitidine (ZANTAC) 300 MG capsule, Take 300 mg by mouth Every Evening., Disp: , Rfl:   •  spironolactone (ALDACTONE) 25 MG tablet, Take 25 mg by mouth., Disp: , Rfl:   •  vitamin B-12 (CYANOCOBALAMIN) 1000 MCG tablet, Take 1,000 mcg by mouth Daily., Disp: , Rfl:   •   "azelastine (ASTELIN) 0.1 % nasal spray, 2 sprays into the nostril(s) as directed by provider 2 (Two) Times a Day for 30 days. Use in each nostril as directed, Disp: 30 mL, Rfl: 6  •  fluticasone (FLONASE) 50 MCG/ACT nasal spray, 2 sprays into the nostril(s) as directed by provider Daily for 30 days. Administer 2 sprays in each nostril for each dose., Disp: 1 bottle, Rfl: 6  •  mupirocin (BACTROBAN) 2 % ointment, Apply  topically to the appropriate area as directed 3 (Three) Times a Day for 7 days. Apply intranasally, Disp: 22 g, Rfl: 0      Objective     Vital Signs:     /72 (BP Location: Left arm, Patient Position: Sitting)   Pulse 70   Temp 98.5 °F (36.9 °C)   Resp 17   Ht 160 cm (62.99\")   Wt 93 kg (205 lb)   SpO2 98%   Breastfeeding No   BMI 36.33 kg/m²     Physical Exam:  Physical Exam  CONSTITUTIONAL: well nourished, well-developed, alert, oriented, in no acute distress   COMMUNICATION AND VOICE: able to communicate normally, normal voice quality  HEAD: normocephalic, no lesions, atraumatic, no tenderness, no masses   FACE: appearance normal, no lesions, no tenderness, no deformities, facial motion symmetric  SALIVARY GLANDS: parotid glands with no tenderness, no swelling, no masses, submandibular glands with normal size, nontender  EYES: ocular motility normal, eyelids normal, orbits normal, no proptosis, conjunctiva normal , pupils equal, round   EARS:  Hearing: response to conversational voice normal bilaterally   External Ears: auricles without lesions  Otoscopic: tympanic membrane appearance normal, no lesions, no perforation, normal mobility, no fluid  NOSE:  External Nose: structure normal, no tenderness on palpation, no nasal discharge, no lesions, no evidence of trauma, nostrils patent   Intranasal Exam: nasal mucosa inflammation, vestibule within normal limits, inferior turbinate normal  ORAL:  Lips: upper and lower lips without lesion   Teeth: Left frontal incisor is mildly tender and " appears slightly darker in color  Gums: gingivae healthy   Oral Mucosa: oral mucosa normal, no mucosal lesions   Floor of Mouth: Warthin’s duct patent, mucosa normal  Tongue: lingual mucosa normal without lesions, normal tongue mobility   Palate: soft and hard palates with normal mucosa and structure  Oropharynx: oropharyngeal mucosa normal  NECK: neck appearance normal, no masses or tenderness  LYMPH NODES: no lymphadenopathy  CHEST/RESPIRATORY: respiratory effort normal, normal breath sounds   CARDIOVASCULAR: rate and rhythm normal, extremities without cyanosis or edema    NEUROLOGIC/PSYCHIATRIC: oriented to time, place and person, mood normal, affect appropriate, CN II-XII intact grossly      Assessment   Assessment:  1. Fall with injury, initial encounter    2. Left facial pressure and pain    3. Allergic rhinitis, unspecified seasonality, unspecified trigger        Plan   Plan:    New Medications Ordered This Visit   Medications   • fluticasone (FLONASE) 50 MCG/ACT nasal spray     Si sprays into the nostril(s) as directed by provider Daily for 30 days. Administer 2 sprays in each nostril for each dose.     Dispense:  1 bottle     Refill:  6   • azelastine (ASTELIN) 0.1 % nasal spray     Si sprays into the nostril(s) as directed by provider 2 (Two) Times a Day for 30 days. Use in each nostril as directed     Dispense:  30 mL     Refill:  6   • mupirocin (BACTROBAN) 2 % ointment     Sig: Apply  topically to the appropriate area as directed 3 (Three) Times a Day for 7 days. Apply intranasally     Dispense:  22 g     Refill:  0       Start nasal sprays. The proper use of nasal inhalers was discussed including the need for regular use and build up of drug levels before full effects.     We will request a copy of her CT scan and report from [facility].  If she continues to have symptoms, consider repeat CT scan of the facial bones.    Keep scheduled follow-up with her dentist regarding  injury to her left upper tooth-  questionable cause of symptoms.    Return in about 3 months (around 3/16/2020), or if symptoms worsen or fail to improve, for Recheck.    My findings and recommendations were discussed and questions were answered.     Gayle Portillo, APRN     Attending with

## 2023-12-09 NOTE — ED PROVIDER NOTE - PATIENT PORTAL LINK FT
You can access the FollowMyHealth Patient Portal offered by [facility] by registering at the following website: [email or web address]. By joining [email or web address] FollowMyHealth portal, you will also be able to view your health information using other applications (apps) compatible with our system. You can access the FollowMyHealth Patient Portal offered by [facility] by registering at the following website: [email or web address]. By joining [employer]’s FollowMyHealth portal, you will also be able to view your health information using other applications (apps) compatible with our system.

## 2023-12-09 NOTE — ED ADULT NURSE NOTE - CHIEF COMPLAINT QUOTE
Pt AOX4 c/o Right sided abdominal pain; pain started last night and has steadily increased today and pt started vomiting and having diarrhea; no Hx of surgery coming from [address] with staff member; Hx of asthma Pt AOX4 c/o Right sided abdominal pain; pain started last night and has steadily increased today and pt started vomiting and having diarrhea; no Hx of surgery coming from [address] with staff member; Hx of asthma

## 2023-12-09 NOTE — PROVIDER CONTACT NOTE (OTHER) - BACKGROUND
Provider requested assistance with mbr's return to [place] [id number] [address] Provider requested assistance with mbr's return to [place] [id number] [address]

## 2023-12-10 PROCEDURE — 99232 SBSQ HOSP IP/OBS MODERATE 35: CPT

## 2023-12-10 RX ADMIN — DESMOPRESSIN ACETATE 0.1 MILLIGRAM(S): 0.1 TABLET ORAL at 20:32

## 2023-12-10 RX ADMIN — SULINDAC 150 MILLIGRAM(S): 200 TABLET ORAL at 08:22

## 2023-12-10 RX ADMIN — BUDESONIDE AND FORMOTEROL FUMARATE DIHYDRATE 2 PUFF(S): 160; 4.5 AEROSOL RESPIRATORY (INHALATION) at 20:31

## 2023-12-10 RX ADMIN — MONTELUKAST 10 MILLIGRAM(S): 4 TABLET, CHEWABLE ORAL at 20:33

## 2023-12-10 RX ADMIN — LITHIUM CARBONATE 1200 MILLIGRAM(S): 300 TABLET, EXTENDED RELEASE ORAL at 20:33

## 2023-12-10 RX ADMIN — SULINDAC 150 MILLIGRAM(S): 200 TABLET ORAL at 20:32

## 2023-12-10 RX ADMIN — CLOZAPINE 225 MILLIGRAM(S): 150 TABLET, ORALLY DISINTEGRATING ORAL at 20:33

## 2023-12-10 RX ADMIN — SULINDAC 150 MILLIGRAM(S): 200 TABLET ORAL at 09:22

## 2023-12-10 RX ADMIN — ESCITALOPRAM OXALATE 20 MILLIGRAM(S): 10 TABLET, FILM COATED ORAL at 08:22

## 2023-12-10 RX ADMIN — PANTOPRAZOLE SODIUM 40 MILLIGRAM(S): 20 TABLET, DELAYED RELEASE ORAL at 08:22

## 2023-12-10 RX ADMIN — OLANZAPINE 2.5 MILLIGRAM(S): 15 TABLET, FILM COATED ORAL at 13:26

## 2023-12-10 RX ADMIN — Medication 2 MILLIGRAM(S): at 20:33

## 2023-12-10 RX ADMIN — GABAPENTIN 300 MILLIGRAM(S): 400 CAPSULE ORAL at 20:32

## 2023-12-10 RX ADMIN — GABAPENTIN 300 MILLIGRAM(S): 400 CAPSULE ORAL at 08:22

## 2023-12-10 RX ADMIN — OLANZAPINE 2.5 MILLIGRAM(S): 15 TABLET, FILM COATED ORAL at 20:33

## 2023-12-10 RX ADMIN — OLANZAPINE 2.5 MILLIGRAM(S): 15 TABLET, FILM COATED ORAL at 08:22

## 2023-12-10 RX ADMIN — Medication 75 MILLIGRAM(S): at 08:21

## 2023-12-10 RX ADMIN — BUPROPION HYDROCHLORIDE 150 MILLIGRAM(S): 150 TABLET, EXTENDED RELEASE ORAL at 08:22

## 2023-12-10 NOTE — BH INPATIENT PSYCHIATRY PROGRESS NOTE - NSBHATTESTBILLING_PSY_A_CORE
34451-Tonmtgdqht OBS or IP - moderate complexity OR 35-49 mins 71095-Mflmtylvdh OBS or IP - moderate complexity OR 35-49 mins

## 2023-12-10 NOTE — BH INPATIENT PSYCHIATRY PROGRESS NOTE - NSBHFUPINTERVALHXFT_PSY_A_CORE
Patient seen for follow up of depression, AHs, SI, chart reviewed, and case discussed with nursing team.  Pt was evaluated for her abdominal pain at the [facility] ED yesterday and says that he US and CT scan did not show abnormalities and she is feeling better today.  PT was playing video games with her room mate and says that this is unusual since her other room mates were mostly quiet and stayed to themselves.     The patient continues to report severe depression, with CAH to harm herself, and SI.  She has been trying to sleep more as an escape.  Encouraged her to continue to participate on the unit as well as a distraction.  She feels a little more tired today with the increase in clozapine.   Behavior has been well controlled on 1:1, will continue.  The patient has been visible on the unit, social with peers, and attending groups.  The patient reports eating and sleeping well.  She has been compliant with medications, tolerating them well. Patient seen for follow up of depression, AHs, SI, chart reviewed, and case discussed with nursing team.  Pt was evaluated for her abdominal pain at the [facility] ED yesterday and says that he US and CT scan did not show abnormalities and she is feeling better today.  PT was playing video games with her room mate and says that this is unusual since her other room mates were mostly quiet and stayed to themselves.     The patient continues to report severe depression, with CAH to harm herself, and SI.  She has been trying to sleep more as an escape.  Encouraged her to continue to participate on the unit as well as a distraction.  She feels a little more tired today with the increase in clozapine.   Behavior has been well controlled on 1:1, will continue.  The patient has been visible on the unit, social with peers, and attending groups.  The patient reports eating and sleeping well.  She has been compliant with medications, tolerating them well.

## 2023-12-10 NOTE — BH INPATIENT PSYCHIATRY PROGRESS NOTE - NSBHASSESSSUMMFT_PSY_ALL_CORE
Patient is 27yo single woman, no dependents, domiciled with her Grandmother, unemployed on disability/SSI, pphx of schizoaffective disorder, multiple past psychiatric admissions including state and recent discharge from [facility], in tx with [facility], with pmhx of asthma, HTN, GERD, and hypothyroidism and schizoaffective disorder, in tx with [facility], who self presented to the ED reporting abdominal pain and requesting readmission to psychiatric unit, reporting CAH, depressive symptoms, IOR, suicidality, admitted to [facility] for psychiatric care.  Depression and psychosis likely multifactorial at this time, schizoaffective vs. borderline personality disorder vs. complex grief vs. adjustment disorder.      The patient continues to be depressed, anxious, with SI.  Will continue 1:1 for safety.  Li level 0.6, clozapine level 244.  Increased clozapine to 225mg.  For Abilify ESQUEDA     1.) Obs: start 1:1 for worsening SI.  2.) Psychiatric medication management:  - Reviewed options for addressing nocturnal enuresis including reducing clozapine vs reducing lithium vs adding desmopressin.  Pt decided upon desmopressin as she continues to have depression and psychosis.  Desmopressin 0.1 mg QHS.  - Lithobid 1200 mg QHS.  Pt aware that she needs to stay well hydrated and avoid diuretics and NSAIDs.   - Weekend team had started chlorpromazine standing at pt request.  Discussed concerns with lowering seizure threshold.  Reviewed alternatives.  Will utilize olanzapine 2.5 mg TID for now.  - Increase Clozapine to 225mg qHS for psychosis ([id number])  - Prazosin 2mg qhs, monitor BP carefully.  - Abilify Maintena 400mg IM q3 weeks rather than 4, per collateral from ACT team.  Doses given 10/19, 11/17, next 12/8.  - Mirtazapine was switched to escitalopram, will increase to 20 mg daily.  - Wellbutrin XL 150mg daily  - Gabapentin 300 mg BID for anxiety/chronic pain.  - PRN: haloperidol 5mg PO/IM q8hrs PRN for agitation, diphenhydramine 50mg PO/IM q6hrs PRN for EPS PPx, hydroxyzine 50mg PO q12hrs PRN for anxiety, lorazepam 2mg PO q8hrs PRN for anxiety or 2mg IM for assaultive behavior. Melatonin 5mg qHS PRN for insomnia  - Discussed ECT several times with pt who states she has been told in two hospitals in past that due to her obesity and asthma, she is not an ideal candidate.  Pt states that due to this she is not interested in receiving ECT at this time.  3.) Medical:   - Pt scheduled for dental extractions 11/1, however declined to attend on day prior when informed it would not be under general anesthesia.  - last clozapine labs on 10/23 (CBC with diff, troponins, CRP); CRP elevated at 15.8 likely due to dental infection/wisdom tooth issues  - pantoprazole 40mg PO before breakfast for GERD  - montelukast 10mg PO qHS for asthma  - budesonide 80mcg/formoterol 4.5mcg 2 puffs BID for asthma  - PRN: acetaminophen 650mg PO q6hrs PRN for pain, albuterol 90mcg 2puffs q6hrs PRN for dyspnea, ondansetron 4mg q6hrs PRN for nausea  - Discussed CRP with medicine service, given asymptomatic pt and normal troponin, no additional intervention/diagnostics at this time.  - Discontinued clobetasol as rash has resolved.  4.) Dispo planning: State application submitted. Patient is 27yo single woman, no dependents, domiciled with her Grandmother, unemployed on disability/SSI, pphx of schizoaffective disorder, multiple past psychiatric admissions including state and recent discharge from [facility], in tx with [facility], with pmhx of asthma, HTN, GERD, and hypothyroidism and schizoaffective disorder, in tx with [facility], who self presented to the ED reporting abdominal pain and requesting readmission to psychiatric unit, reporting CAH, depressive symptoms, IOR, suicidality, admitted to [facility] for psychiatric care.  Depression and psychosis likely multifactorial at this time, schizoaffective vs. borderline personality disorder vs. complex grief vs. adjustment disorder.      The patient continues to be depressed, anxious, with SI.  Will continue 1:1 for safety.  Li level 0.6, clozapine level 244.  Increased clozapine to 225mg.  For Abilify ESQUEDA     1.) Obs: start 1:1 for worsening SI.  2.) Psychiatric medication management:  - Reviewed options for addressing nocturnal enuresis including reducing clozapine vs reducing lithium vs adding desmopressin.  Pt decided upon desmopressin as she continues to have depression and psychosis.  Desmopressin 0.1 mg QHS.  - Lithobid 1200 mg QHS.  Pt aware that she needs to stay well hydrated and avoid diuretics and NSAIDs.   - Weekend team had started chlorpromazine standing at pt request.  Discussed concerns with lowering seizure threshold.  Reviewed alternatives.  Will utilize olanzapine 2.5 mg TID for now.  - Increase Clozapine to 225mg qHS for psychosis ([id number])  - Prazosin 2mg qhs, monitor BP carefully.  - Abilify Maintena 400mg IM q3 weeks rather than 4, per collateral from ACT team.  Doses given 10/19, 11/17, next 12/8.  - Mirtazapine was switched to escitalopram, will increase to 20 mg daily.  - Wellbutrin XL 150mg daily  - Gabapentin 300 mg BID for anxiety/chronic pain.  - PRN: haloperidol 5mg PO/IM q8hrs PRN for agitation, diphenhydramine 50mg PO/IM q6hrs PRN for EPS PPx, hydroxyzine 50mg PO q12hrs PRN for anxiety, lorazepam 2mg PO q8hrs PRN for anxiety or 2mg IM for assaultive behavior. Melatonin 5mg qHS PRN for insomnia  - Discussed ECT several times with pt who states she has been told in two hospitals in past that due to her obesity and asthma, she is not an ideal candidate.  Pt states that due to this she is not interested in receiving ECT at this time.  3.) Medical:   - Pt scheduled for dental extractions 11/1, however declined to attend on day prior when informed it would not be under general anesthesia.  - last clozapine labs on 10/23 (CBC with diff, troponins, CRP); CRP elevated at 15.8 likely due to dental infection/wisdom tooth issues  - pantoprazole 40mg PO before breakfast for GERD  - montelukast 10mg PO qHS for asthma  - budesonide 80mcg/formoterol 4.5mcg 2 puffs BID for asthma  - PRN: acetaminophen 650mg PO q6hrs PRN for pain, albuterol 90mcg 2puffs q6hrs PRN for dyspnea, ondansetron 4mg q6hrs PRN for nausea  - Discussed CRP with medicine service, given asymptomatic pt and normal troponin, no additional intervention/diagnostics at this time.  - Discontinued clobetasol as rash has resolved.  4.) Dispo planning: State application submitted.

## 2023-12-10 NOTE — BH INPATIENT PSYCHIATRY PROGRESS NOTE - CURRENT MEDICATION
MEDICATIONS  (STANDING):  budesonide  80 MICROgram(s)/formoterol 4.5 MICROgram(s) Inhaler 2 Puff(s) Inhalation two times a day  buPROPion XL (24-Hour) 150 milliGRAM(s) Oral daily  cloZAPine 225 milliGRAM(s) Oral at bedtime  desmopressin 0.1 milliGRAM(s) Oral at bedtime  escitalopram 20 milliGRAM(s) Oral daily  gabapentin 300 milliGRAM(s) Oral two times a day  influenza   Vaccine 0.5 milliLiter(s) IntraMuscular once  lithium SR (LITHOBID) 1200 milliGRAM(s) Oral at bedtime  montelukast 10 milliGRAM(s) Oral at bedtime  OLANZapine 2.5 milliGRAM(s) Oral three times a day  oseltamivir 75 milliGRAM(s) Oral daily  pantoprazole    Tablet 40 milliGRAM(s) Oral before breakfast  prazosin. 2 milliGRAM(s) Oral at bedtime  sulindac 150 milliGRAM(s) Oral two times a day    MEDICATIONS  (PRN):  acetaminophen     Tablet .. 650 milliGRAM(s) Oral every 6 hours PRN Moderate Pain (4 - 6)  albuterol    90 MICROgram(s) HFA Inhaler 2 Puff(s) Inhalation every 6 hours PRN Shortness of Breath and/or Wheezing  aluminum hydroxide/magnesium hydroxide/simethicone Suspension 30 milliLiter(s) Oral every 4 hours PRN Dyspepsia  chlorproMAZINE    Injectable 50 milliGRAM(s) IntraMuscular once PRN severe agitation  chlorproMAZINE    Tablet 50 milliGRAM(s) Oral every 6 hours PRN agitation  hydrOXYzine hydrochloride 50 milliGRAM(s) Oral every 12 hours PRN Anxiety  lidocaine   4% Patch 1 Patch Transdermal daily PRN LBP  LORazepam     Tablet 2 milliGRAM(s) Oral every 8 hours PRN Anxiety  LORazepam   Injectable 2 milliGRAM(s) IntraMuscular once PRN Assaultive behavior  melatonin. 5 milliGRAM(s) Oral at bedtime PRN Insomnia  ondansetron    Tablet 4 milliGRAM(s) Oral every 6 hours PRN nausea  polyethylene glycol 3350 17 Gram(s) Oral daily PRN Constipation

## 2023-12-10 NOTE — BH INPATIENT PSYCHIATRY PROGRESS NOTE - NSBHMETABOLIC_PSY_ALL_CORE_FT
BMI: BMI (kg/m2): 57.8 (09-14-23 @ 14:30)  HbA1c: A1C with Estimated Average Glucose Result: 5.2 % (11-01-23 @ 09:04)    Glucose:   BP: --Vital Signs Last 24 Hrs  T(C): 36.7 (12-10-23 @ 08:13), Max: 37.5 (12-09-23 @ 18:35)  T(F): 98.1 (12-10-23 @ 08:13), Max: 99.5 (12-09-23 @ 18:35)  HR: 80 (12-09-23 @ 22:36) (80 - 95)  BP: 117/84 (12-09-23 @ 22:36) (117/84 - 132/64)  BP(mean): --  RR: 18 (12-09-23 @ 22:36) (18 - 18)  SpO2: 98% (12-10-23 @ 08:13) (98% - 100%)    Orthostatic VS  12-10-23 @ 08:13  Lying BP: --/-- HR: --  Sitting BP: 141/96 HR: 92  Standing BP: 145/92 HR: 99  Site: --  Mode: --  Orthostatic VS  12-09-23 @ 08:29  Lying BP: --/-- HR: --  Sitting BP: 115/70 HR: 103  Standing BP: 145/83 HR: 121  Site: --  Mode: --  Orthostatic VS  12-08-23 @ 20:40  Lying BP: --/-- HR: --  Sitting BP: 124/86 HR: 100  Standing BP: 143/99 HR: 108  Site: --  Mode: --    Lipid Panel: Date/Time: 11-01-23 @ 09:04  Cholesterol, Serum: 156  LDL Cholesterol Calculated: 82  HDL Cholesterol, Serum: 58  Total Cholesterol/HDL Ration Measurement: --  Triglycerides, Serum: 79

## 2023-12-10 NOTE — BH INPATIENT PSYCHIATRY PROGRESS NOTE - NSBHCHARTREVIEWVS_PSY_A_CORE FT
Vital Signs Last 24 Hrs  T(C): 36.7 (12-10-23 @ 08:13), Max: 37.5 (12-09-23 @ 18:35)  T(F): 98.1 (12-10-23 @ 08:13), Max: 99.5 (12-09-23 @ 18:35)  HR: 80 (12-09-23 @ 22:36) (80 - 95)  BP: 117/84 (12-09-23 @ 22:36) (117/84 - 132/64)  BP(mean): --  RR: 18 (12-09-23 @ 22:36) (18 - 18)  SpO2: 98% (12-10-23 @ 08:13) (98% - 100%)    Orthostatic VS  12-10-23 @ 08:13  Lying BP: --/-- HR: --  Sitting BP: 141/96 HR: 92  Standing BP: 145/92 HR: 99  Site: --  Mode: --  Orthostatic VS  12-09-23 @ 08:29  Lying BP: --/-- HR: --  Sitting BP: 115/70 HR: 103  Standing BP: 145/83 HR: 121  Site: --  Mode: --  Orthostatic VS  12-08-23 @ 20:40  Lying BP: --/-- HR: --  Sitting BP: 124/86 HR: 100  Standing BP: 143/99 HR: 108  Site: --  Mode: --

## 2023-12-11 PROCEDURE — 99232 SBSQ HOSP IP/OBS MODERATE 35: CPT

## 2023-12-11 PROCEDURE — 90853 GROUP PSYCHOTHERAPY: CPT

## 2023-12-11 RX ORDER — CLOZAPINE 150 MG/1
250 TABLET, ORALLY DISINTEGRATING ORAL AT BEDTIME
Refills: 0 | Status: DISCONTINUED | OUTPATIENT
Start: 2023-12-11 | End: 2023-12-14

## 2023-12-11 RX ADMIN — SULINDAC 150 MILLIGRAM(S): 200 TABLET ORAL at 08:35

## 2023-12-11 RX ADMIN — PANTOPRAZOLE SODIUM 40 MILLIGRAM(S): 20 TABLET, DELAYED RELEASE ORAL at 08:35

## 2023-12-11 RX ADMIN — SULINDAC 150 MILLIGRAM(S): 200 TABLET ORAL at 09:46

## 2023-12-11 RX ADMIN — BUDESONIDE AND FORMOTEROL FUMARATE DIHYDRATE 2 PUFF(S): 160; 4.5 AEROSOL RESPIRATORY (INHALATION) at 20:35

## 2023-12-11 RX ADMIN — GABAPENTIN 300 MILLIGRAM(S): 400 CAPSULE ORAL at 08:36

## 2023-12-11 RX ADMIN — OLANZAPINE 2.5 MILLIGRAM(S): 15 TABLET, FILM COATED ORAL at 08:36

## 2023-12-11 RX ADMIN — OLANZAPINE 2.5 MILLIGRAM(S): 15 TABLET, FILM COATED ORAL at 20:36

## 2023-12-11 RX ADMIN — GABAPENTIN 300 MILLIGRAM(S): 400 CAPSULE ORAL at 20:36

## 2023-12-11 RX ADMIN — SULINDAC 150 MILLIGRAM(S): 200 TABLET ORAL at 20:36

## 2023-12-11 RX ADMIN — ESCITALOPRAM OXALATE 20 MILLIGRAM(S): 10 TABLET, FILM COATED ORAL at 08:36

## 2023-12-11 RX ADMIN — Medication 75 MILLIGRAM(S): at 08:35

## 2023-12-11 RX ADMIN — LITHIUM CARBONATE 1200 MILLIGRAM(S): 300 TABLET, EXTENDED RELEASE ORAL at 20:36

## 2023-12-11 RX ADMIN — MONTELUKAST 10 MILLIGRAM(S): 4 TABLET, CHEWABLE ORAL at 20:37

## 2023-12-11 RX ADMIN — DESMOPRESSIN ACETATE 0.1 MILLIGRAM(S): 0.1 TABLET ORAL at 20:36

## 2023-12-11 RX ADMIN — OLANZAPINE 2.5 MILLIGRAM(S): 15 TABLET, FILM COATED ORAL at 12:52

## 2023-12-11 RX ADMIN — CLOZAPINE 250 MILLIGRAM(S): 150 TABLET, ORALLY DISINTEGRATING ORAL at 20:36

## 2023-12-11 RX ADMIN — BUPROPION HYDROCHLORIDE 150 MILLIGRAM(S): 150 TABLET, EXTENDED RELEASE ORAL at 08:34

## 2023-12-11 RX ADMIN — Medication 2 MILLIGRAM(S): at 20:37

## 2023-12-11 NOTE — BH INPATIENT PSYCHIATRY PROGRESS NOTE - CURRENT MEDICATION
MEDICATIONS  (STANDING):  budesonide  80 MICROgram(s)/formoterol 4.5 MICROgram(s) Inhaler 2 Puff(s) Inhalation two times a day  buPROPion XL (24-Hour) 150 milliGRAM(s) Oral daily  cloZAPine 250 milliGRAM(s) Oral at bedtime  desmopressin 0.1 milliGRAM(s) Oral at bedtime  escitalopram 20 milliGRAM(s) Oral daily  gabapentin 300 milliGRAM(s) Oral two times a day  influenza   Vaccine 0.5 milliLiter(s) IntraMuscular once  lithium SR (LITHOBID) 1200 milliGRAM(s) Oral at bedtime  montelukast 10 milliGRAM(s) Oral at bedtime  OLANZapine 2.5 milliGRAM(s) Oral three times a day  oseltamivir 75 milliGRAM(s) Oral daily  pantoprazole    Tablet 40 milliGRAM(s) Oral before breakfast  prazosin. 2 milliGRAM(s) Oral at bedtime  sulindac 150 milliGRAM(s) Oral two times a day    MEDICATIONS  (PRN):  acetaminophen     Tablet .. 650 milliGRAM(s) Oral every 6 hours PRN Moderate Pain (4 - 6)  albuterol    90 MICROgram(s) HFA Inhaler 2 Puff(s) Inhalation every 6 hours PRN Shortness of Breath and/or Wheezing  aluminum hydroxide/magnesium hydroxide/simethicone Suspension 30 milliLiter(s) Oral every 4 hours PRN Dyspepsia  chlorproMAZINE    Injectable 50 milliGRAM(s) IntraMuscular once PRN severe agitation  chlorproMAZINE    Tablet 50 milliGRAM(s) Oral every 6 hours PRN agitation  hydrOXYzine hydrochloride 50 milliGRAM(s) Oral every 12 hours PRN Anxiety  lidocaine   4% Patch 1 Patch Transdermal daily PRN LBP  LORazepam     Tablet 2 milliGRAM(s) Oral every 8 hours PRN Anxiety  LORazepam   Injectable 2 milliGRAM(s) IntraMuscular once PRN Assaultive behavior  melatonin. 5 milliGRAM(s) Oral at bedtime PRN Insomnia  ondansetron    Tablet 4 milliGRAM(s) Oral every 6 hours PRN nausea  polyethylene glycol 3350 17 Gram(s) Oral daily PRN Constipation

## 2023-12-11 NOTE — BH PSYCHOLOGY - GROUP THERAPY NOTE - NSBHPSYCHOLRESPCOMMENT_PSY_A_CORE FT
The patient appeared adequately groomed and casually dressed. Pt appeared very engaged in the group as evidenced by her willingness to independently verbally communicate during the discussion. Pt volunteered to read aloud from worksheet and noted that this topic was very relevant to her. Pt highlighted the possibility that others might not use these same skills when communicating with her, and that they may not “be open to a compromise.” Pt shared thoughts about what she can do vs. what is out of her control.  Pt agreed with much of what other members shared. Speech was WNL. PT was oriented X3. Pt was appropriate and supportive with peers.

## 2023-12-11 NOTE — BH INPATIENT PSYCHIATRY PROGRESS NOTE - NSBHMETABOLIC_PSY_ALL_CORE_FT
BMI: BMI (kg/m2): 57.8 (09-14-23 @ 14:30)  HbA1c: A1C with Estimated Average Glucose Result: 5.2 % (11-01-23 @ 09:04)    Glucose:   BP: --Vital Signs Last 24 Hrs  T(C): 36.7 (12-11-23 @ 08:30), Max: 36.7 (12-11-23 @ 08:30)  T(F): 98.1 (12-11-23 @ 08:30), Max: 98.1 (12-11-23 @ 08:30)  HR: --  BP: --  BP(mean): --  RR: --  SpO2: --    Orthostatic VS  12-11-23 @ 08:30  Lying BP: --/-- HR: --  Sitting BP: 124/76 HR: 109  Standing BP: 135/89 HR: 116  Site: --  Mode: --  Orthostatic VS  12-10-23 @ 08:13  Lying BP: --/-- HR: --  Sitting BP: 141/96 HR: 92  Standing BP: 145/92 HR: 99  Site: --  Mode: --    Lipid Panel: Date/Time: 11-01-23 @ 09:04  Cholesterol, Serum: 156  LDL Cholesterol Calculated: 82  HDL Cholesterol, Serum: 58  Total Cholesterol/HDL Ration Measurement: --  Triglycerides, Serum: 79

## 2023-12-11 NOTE — BH INPATIENT PSYCHIATRY PROGRESS NOTE - NSBHASSESSSUMMFT_PSY_ALL_CORE
Patient is 27yo single woman, no dependents, domiciled with her Grandmother, unemployed on disability/SSI, pphx of schizoaffective disorder, multiple past psychiatric admissions including state and recent discharge from [facility], in tx with [facility], with pmhx of asthma, HTN, GERD, and hypothyroidism and schizoaffective disorder, in tx with [facility], who self presented to the ED reporting abdominal pain and requesting readmission to psychiatric unit, reporting CAH, depressive symptoms, IOR, suicidality, admitted to [facility] for psychiatric care.  Depression and psychosis likely multifactorial at this time, schizoaffective vs. borderline personality disorder vs. complex grief vs. adjustment disorder.      The patient continues to be depressed, anxious, with CAH and SI.  Will continue 1:1 for safety.  Increase clozapine to 250mg.  Received Abilify ESQUEDA Friday.    1.) Obs: start 1:1 for worsening SI.  2.) Psychiatric medication management:  - Reviewed options for addressing nocturnal enuresis including reducing clozapine vs reducing lithium vs adding desmopressin.  Pt decided upon desmopressin as she continues to have depression and psychosis.  Desmopressin 0.1 mg QHS.  - Lithobid 1200 mg QHS.  Pt aware that she needs to stay well hydrated and avoid diuretics and NSAIDs.   - Weekend team had started chlorpromazine standing at pt request.  Discussed concerns with lowering seizure threshold.  Reviewed alternatives.  Will utilize olanzapine 2.5 mg TID for now.  - Increase Clozapine to 250mg qHS for psychosis ([id number])  - Prazosin 2mg qhs, monitor BP carefully.  - Abilify Maintena 400mg IM q3 weeks rather than 4, per collateral from ACT team.  Doses given 10/19, 11/17, 12/8, next 12/29.  - Mirtazapine was switched to escitalopram, will increase to 20 mg daily.  - Wellbutrin XL 150mg daily  - Gabapentin 300 mg BID for anxiety/chronic pain.  - PRN: haloperidol 5mg PO/IM q8hrs PRN for agitation, diphenhydramine 50mg PO/IM q6hrs PRN for EPS PPx, hydroxyzine 50mg PO q12hrs PRN for anxiety, lorazepam 2mg PO q8hrs PRN for anxiety or 2mg IM for assaultive behavior. Melatonin 5mg qHS PRN for insomnia  - Discussed ECT several times with pt who states she has been told in two hospitals in past that due to her obesity and asthma, she is not an ideal candidate.  Pt states that due to this she is not interested in receiving ECT at this time.  3.) Medical:   - Pt scheduled for dental extractions 11/1, however declined to attend on day prior when informed it would not be under general anesthesia.  - last clozapine labs on 10/23 (CBC with diff, troponins, CRP); CRP elevated at 15.8 likely due to dental infection/wisdom tooth issues  - pantoprazole 40mg PO before breakfast for GERD  - montelukast 10mg PO qHS for asthma  - budesonide 80mcg/formoterol 4.5mcg 2 puffs BID for asthma  - PRN: acetaminophen 650mg PO q6hrs PRN for pain, albuterol 90mcg 2puffs q6hrs PRN for dyspnea, ondansetron 4mg q6hrs PRN for nausea  - Discussed CRP with medicine service, given asymptomatic pt and normal troponin, no additional intervention/diagnostics at this time.  - Discontinued clobetasol as rash has resolved.  4.) Dispo planning: State application submitted. Patient is 27yo single woman, no dependents, domiciled with her Grandmother, unemployed on disability/SSI, pphx of schizoaffective disorder, multiple past psychiatric admissions including state and recent discharge from [facility], in tx with [facility], with pmhx of asthma, HTN, GERD, and hypothyroidism and schizoaffective disorder, in tx with [facility], who self presented to the ED reporting abdominal pain and requesting readmission to psychiatric unit, reporting CAH, depressive symptoms, IOR, suicidality, admitted to [facility] for psychiatric care.  Depression and psychosis likely multifactorial at this time, schizoaffective vs. borderline personality disorder vs. complex grief vs. adjustment disorder.      The patient continues to be depressed, anxious, with CAH and SI.  Will continue 1:1 for safety.  Increase clozapine to 250mg.  Received Abilify ESQUEDA Friday.    1.) Obs: start 1:1 for worsening SI.  2.) Psychiatric medication management:  - Reviewed options for addressing nocturnal enuresis including reducing clozapine vs reducing lithium vs adding desmopressin.  Pt decided upon desmopressin as she continues to have depression and psychosis.  Desmopressin 0.1 mg QHS.  - Lithobid 1200 mg QHS.  Pt aware that she needs to stay well hydrated and avoid diuretics and NSAIDs.   - Weekend team had started chlorpromazine standing at pt request.  Discussed concerns with lowering seizure threshold.  Reviewed alternatives.  Will utilize olanzapine 2.5 mg TID for now.  - Increase Clozapine to 250mg qHS for psychosis ([id number])  - Prazosin 2mg qhs, monitor BP carefully.  - Abilify Maintena 400mg IM q3 weeks rather than 4, per collateral from ACT team.  Doses given 10/19, 11/17, 12/8, next 12/29.  - Mirtazapine was switched to escitalopram, will increase to 20 mg daily.  - Wellbutrin XL 150mg daily  - Gabapentin 300 mg BID for anxiety/chronic pain.  - PRN: haloperidol 5mg PO/IM q8hrs PRN for agitation, diphenhydramine 50mg PO/IM q6hrs PRN for EPS PPx, hydroxyzine 50mg PO q12hrs PRN for anxiety, lorazepam 2mg PO q8hrs PRN for anxiety or 2mg IM for assaultive behavior. Melatonin 5mg qHS PRN for insomnia  - Discussed ECT several times with pt who states she has been told in two hospitals in past that due to her obesity and asthma, she is not an ideal candidate.  Pt states that due to this she is not interested in receiving ECT at this time.  3.) Medical:   - Pt scheduled for dental extractions 11/1, however declined to attend on day prior when informed it would not be under general anesthesia.  - last clozapine labs on 10/23 (CBC with diff, troponins, CRP); CRP elevated at 15.8 likely due to dental infection/wisdom tooth issues  - pantoprazole 40mg PO before breakfast for GERD  - montelukast 10mg PO qHS for asthma  - budesonide 80mcg/formoterol 4.5mcg 2 puffs BID for asthma  - PRN: acetaminophen 650mg PO q6hrs PRN for pain, albuterol 90mcg 2puffs q6hrs PRN for dyspnea, ondansetron 4mg q6hrs PRN for nausea  - Discussed CRP with medicine service, given asymptomatic pt and normal troponin, no additional intervention/diagnostics at this time.  - Discontinued clobetasol as rash has resolved.  4.) Dispo planning: State application submitted.

## 2023-12-11 NOTE — BH INPATIENT PSYCHIATRY PROGRESS NOTE - MSE UNSTRUCTURED FT
The patient appears stated age, with fair hygiene, and is dressed appropriately.  She was calm and cooperative with the interview.  She maintained appropriate eye contact.  No restlessness or slowing of movements observed.  Gait is steady.  The patient’s speech was fluent, normal in tone, rate, and volume.  The patient’s mood is “depressed.”  Her affect is constricted, stable, appropriate.  The patient’s thoughts are goal directed.  She does not have any delusions, admits to command auditory hallucinations.  She admits to suicidal ideation with loose plan, no intent, no homicidal ideation, intent, or plan.  Insight is fair.  Judgment is fair.  Impulse control has been tenuous on the unit.

## 2023-12-11 NOTE — BH INPATIENT PSYCHIATRY PROGRESS NOTE - NSBHATTESTBILLING_PSY_A_CORE
27208-[id number] OBS or IP - moderate complexity OR 35-49 mins [id number]-Zacowicozh OBS or IP - moderate complexity OR 35-49 mins

## 2023-12-11 NOTE — BH INPATIENT PSYCHIATRY PROGRESS NOTE - NSBHFUPINTERVALHXFT_PSY_A_CORE
Patient seen for follow up of depression, AHs, SI, chart reviewed, and case discussed with nursing team.  No events reported overnight.  The patient had some abdominal pain over the weekend, was evaluated in [facility] ED, work up negative for findings.  She states the pain is much better today.  The patient continues to report feeling frustrated, not knowing her path forward.  She continues to complain of severe depression, with CAH to harm herself, and SI.  Behavior has been well controlled on 1:1, will continue.  The patient has been visible on the unit, social with peers, and attending groups.  The patient reports eating and sleeping well.  She has been compliant with medications, tolerating them well.  Agreeable to continuing to titrate clozapine. Patient seen for follow up of depression, AHs, SI, chart reviewed, and case discussed with nursing team.  No events reported overnight.  The patient had some abdominal pain over the weekend, was evaluated in [facility] ED, work up negative for findings.  She states the pain is much better today.  The patient continues to report feeling frustrated, not knowing her path forward.  She continues to complain of severe depression, with CAH to harm herself, and SI.  Behavior has been well controlled on 1:1, will continue.  The patient has been visible on the unit, social with peers, and attending groups.  The patient reports eating and sleeping well.  She has been compliant with medications, tolerating them well.  Agreeable to continuing to titrate clozapine.

## 2023-12-11 NOTE — BH INPATIENT PSYCHIATRY PROGRESS NOTE - NSBHCHARTREVIEWVS_PSY_A_CORE FT
Vital Signs Last 24 Hrs  T(C): 36.7 (12-11-23 @ 08:30), Max: 36.7 (12-11-23 @ 08:30)  T(F): 98.1 (12-11-23 @ 08:30), Max: 98.1 (12-11-23 @ 08:30)  HR: --  BP: --  BP(mean): --  RR: --  SpO2: --    Orthostatic VS  12-11-23 @ 08:30  Lying BP: --/-- HR: --  Sitting BP: 124/76 HR: 109  Standing BP: 135/89 HR: 116  Site: --  Mode: --  Orthostatic VS  12-10-23 @ 08:13  Lying BP: --/-- HR: --  Sitting BP: 141/96 HR: 92  Standing BP: 145/92 HR: 99  Site: --  Mode: --

## 2023-12-12 PROCEDURE — 99232 SBSQ HOSP IP/OBS MODERATE 35: CPT

## 2023-12-12 RX ADMIN — ESCITALOPRAM OXALATE 20 MILLIGRAM(S): 10 TABLET, FILM COATED ORAL at 08:25

## 2023-12-12 RX ADMIN — Medication 2 MILLIGRAM(S): at 21:29

## 2023-12-12 RX ADMIN — GABAPENTIN 300 MILLIGRAM(S): 400 CAPSULE ORAL at 21:28

## 2023-12-12 RX ADMIN — DESMOPRESSIN ACETATE 0.1 MILLIGRAM(S): 0.1 TABLET ORAL at 21:28

## 2023-12-12 RX ADMIN — CLOZAPINE 250 MILLIGRAM(S): 150 TABLET, ORALLY DISINTEGRATING ORAL at 21:27

## 2023-12-12 RX ADMIN — LITHIUM CARBONATE 1200 MILLIGRAM(S): 300 TABLET, EXTENDED RELEASE ORAL at 21:28

## 2023-12-12 RX ADMIN — MONTELUKAST 10 MILLIGRAM(S): 4 TABLET, CHEWABLE ORAL at 21:28

## 2023-12-12 RX ADMIN — OLANZAPINE 2.5 MILLIGRAM(S): 15 TABLET, FILM COATED ORAL at 08:25

## 2023-12-12 RX ADMIN — BUPROPION HYDROCHLORIDE 150 MILLIGRAM(S): 150 TABLET, EXTENDED RELEASE ORAL at 08:25

## 2023-12-12 RX ADMIN — PANTOPRAZOLE SODIUM 40 MILLIGRAM(S): 20 TABLET, DELAYED RELEASE ORAL at 08:25

## 2023-12-12 RX ADMIN — OLANZAPINE 2.5 MILLIGRAM(S): 15 TABLET, FILM COATED ORAL at 12:19

## 2023-12-12 RX ADMIN — SULINDAC 150 MILLIGRAM(S): 200 TABLET ORAL at 09:48

## 2023-12-12 RX ADMIN — SULINDAC 150 MILLIGRAM(S): 200 TABLET ORAL at 08:24

## 2023-12-12 RX ADMIN — GABAPENTIN 300 MILLIGRAM(S): 400 CAPSULE ORAL at 08:25

## 2023-12-12 RX ADMIN — OLANZAPINE 2.5 MILLIGRAM(S): 15 TABLET, FILM COATED ORAL at 21:28

## 2023-12-12 RX ADMIN — Medication 75 MILLIGRAM(S): at 09:35

## 2023-12-12 NOTE — BH INPATIENT PSYCHIATRY PROGRESS NOTE - NSBHFUPINTERVALHXFT_PSY_A_CORE
Patient seen for follow up of depression, AHs, SI, chart reviewed, and case discussed with nursing team.  No events reported overnight.  The patient continues to report severe depression, with CAH to harm herself, and SI.  Behavior has been well controlled on 1:1, will continue.  The patient has been visible on the unit, social with peers, and attending groups.  The patient reports eating and sleeping well.  She has been compliant with medications, tolerating increase in clozapine well.

## 2023-12-12 NOTE — BH INPATIENT PSYCHIATRY PROGRESS NOTE - NSBHCHARTREVIEWVS_PSY_A_CORE FT
Vital Signs Last 24 Hrs  T(C): 36.2 (12-12-23 @ 08:37), Max: 36.2 (12-12-23 @ 08:37)  T(F): 97.1 (12-12-23 @ 08:37), Max: 97.1 (12-12-23 @ 08:37)  HR: --  BP: --  BP(mean): --  RR: --  SpO2: --    Orthostatic VS  12-12-23 @ 08:37  Lying BP: --/-- HR: --  Sitting BP: 139/81 HR: 99  Standing BP: 140/89 HR: 114  Site: --  Mode: --  Orthostatic VS  12-11-23 @ 08:30  Lying BP: --/-- HR: --  Sitting BP: 124/76 HR: 109  Standing BP: 135/89 HR: 116  Site: --  Mode: --

## 2023-12-12 NOTE — BH PSYCHOLOGY - CLINICIAN PSYCHOTHERAPY NOTE - NSBHPSYCHOLNARRATIVE_PSY_A_CORE FT
Writer met with Pt for 30-minute individual therapy session. Pt was alert and presented with adequate hygiene and grooming. Pt reported feeling "good", affect full and congruent. Pt denied experiencing any A/V hallucinations. Her thought process was linear and goal directed. Pts’ speech was WNL, content was relevant to discussion. Pt denied passive or active SI, HI or NSSI. Oriented x3. Limited insight and judgement demonstrated.      Pt and Wr did song analysis, pt reflected on how traits of thomas were traits she "sometimes" identifies with such as strength, being brave and "not caring". Pt identified with song stating that she has often felt hurt by other peoples words and is learning more and more to "let go off that". Wr and pt explored how pt has progressed in this area with pt stating that she is trying to be less reactive, as well as using self affirmations when someone triggers her, and reflected back on situations on the unit when this happened. Pt shared that the song evokes happiness and especially hopefulness in her, as well as identifying some of the symbols in the song such as wings with using ones innate abilities and resources to "move forward and not let oneself be weighted down" by other peoples opinions. Pt shared that she is not feeling suicidal anymore, as well as noticing an increase in mood. Pt and Wr explored what contributed to shift in mood with pt sharing she believes her medication as well as her ability to tolerate more distressing emotions more frequently have helped.      Writer met with Pt for 30-minute individual therapy session. Pt was alert and presented with adequate hygiene and grooming. Pt reported feeling "good", affect full and congruent. Pt denied experiencing any A/V hallucinations. Her thought process was linear and goal directed. Pts’ speech was WNL, content was relevant to discussion. Pt denied passive or active SI, HI or NSSI. Oriented x3. Limited insight and judgement demonstrated.      Pt and Wr did song analysis, pt reflected on how traits of thomas were traits she "sometimes" identifies with such as strength, being brave and "not caring". Pt identified with song stating that she has often felt hurt by other peoples words and is learning more and more to "let go of that". Wr and pt explored how pt has progressed in this area with pt stating that she is trying to be less reactive, as well as using self affirmations when someone triggers her, and reflected back on situations on the unit when this has happened. Pt shared that the song evokes happiness and especially hopefulness in her, as well as identifying some of the symbols in the song such as wings; with using ones innate abilities and resources to "move forward and not let oneself be weighted down" by other peoples opinions. Pt shared that she is not feeling suicidal anymore, as well as noticing an improvment in mood. Pt and Wr explored what contributed to shift in mood with pt sharing she believes her medication as well as her ability to tolerate more distressing emotions more frequently have helped.

## 2023-12-12 NOTE — BH INPATIENT PSYCHIATRY PROGRESS NOTE - NSBHATTESTBILLING_PSY_A_CORE
33411-Xoedguggrl OBS or IP - moderate complexity OR 35-49 mins [id number]-Bhsiymscig OBS or IP - moderate complexity OR 35-49 mins

## 2023-12-12 NOTE — BH INPATIENT PSYCHIATRY PROGRESS NOTE - NSBHMETABOLIC_PSY_ALL_CORE_FT
BMI: BMI (kg/m2): 57.8 (09-14-23 @ 14:30)  HbA1c: A1C with Estimated Average Glucose Result: 5.2 % (11-01-23 @ 09:04)    Glucose:   BP: --Vital Signs Last 24 Hrs  T(C): 36.2 (12-12-23 @ 08:37), Max: 36.2 (12-12-23 @ 08:37)  T(F): 97.1 (12-12-23 @ 08:37), Max: 97.1 (12-12-23 @ 08:37)  HR: --  BP: --  BP(mean): --  RR: --  SpO2: --    Orthostatic VS  12-12-23 @ 08:37  Lying BP: --/-- HR: --  Sitting BP: 139/81 HR: 99  Standing BP: 140/89 HR: 114  Site: --  Mode: --  Orthostatic VS  12-11-23 @ 08:30  Lying BP: --/-- HR: --  Sitting BP: 124/76 HR: 109  Standing BP: 135/89 HR: 116  Site: --  Mode: --    Lipid Panel: Date/Time: 11-01-23 @ 09:04  Cholesterol, Serum: 156  LDL Cholesterol Calculated: 82  HDL Cholesterol, Serum: 58  Total Cholesterol/HDL Ration Measurement: --  Triglycerides, Serum: 79

## 2023-12-12 NOTE — BH INPATIENT PSYCHIATRY PROGRESS NOTE - NSBHASSESSSUMMFT_PSY_ALL_CORE
Patient is 27yo single woman, no dependents, domiciled with her Grandmother, unemployed on disability/SSI, pphx of schizoaffective disorder, multiple past psychiatric admissions including state and recent discharge from [facility], in tx with [facility], with pmhx of asthma, HTN, GERD, and hypothyroidism and schizoaffective disorder, in tx with [facility], who self presented to the ED reporting abdominal pain and requesting readmission to psychiatric unit, reporting CAH, depressive symptoms, IOR, suicidality, admitted to [facility] for psychiatric care.  Depression and psychosis likely multifactorial at this time, schizoaffective vs. borderline personality disorder vs. complex grief vs. adjustment disorder.      The patient continues to be depressed, anxious, with CAH and SI.  Will continue 1:1 for safety.  Increased clozapine to 250mg.    1.) Obs: start 1:1 for worsening SI.  2.) Psychiatric medication management:  - Reviewed options for addressing nocturnal enuresis including reducing clozapine vs reducing lithium vs adding desmopressin.  Pt decided upon desmopressin as she continues to have depression and psychosis.  Desmopressin 0.1 mg QHS.  - Lithobid 1200 mg QHS.  Pt aware that she needs to stay well hydrated and avoid diuretics and NSAIDs.   - Weekend team had started chlorpromazine standing at pt request.  Discussed concerns with lowering seizure threshold.  Reviewed alternatives.  Will utilize olanzapine 2.5 mg TID for now.  - Increase Clozapine to 250mg qHS for psychosis ([id number])  - Prazosin 2mg qhs, monitor BP carefully.  - Abilify Maintena 400mg IM q3 weeks rather than 4, per collateral from ACT team.  Doses given 10/19, 11/17, 12/8, next 12/29.  - Mirtazapine was switched to escitalopram, will increase to 20 mg daily.  - Wellbutrin XL 150mg daily  - Gabapentin 300 mg BID for anxiety/chronic pain.  - PRN: haloperidol 5mg PO/IM q8hrs PRN for agitation, diphenhydramine 50mg PO/IM q6hrs PRN for EPS PPx, hydroxyzine 50mg PO q12hrs PRN for anxiety, lorazepam 2mg PO q8hrs PRN for anxiety or 2mg IM for assaultive behavior. Melatonin 5mg qHS PRN for insomnia  - Discussed ECT several times with pt who states she has been told in two hospitals in past that due to her obesity and asthma, she is not an ideal candidate.  Pt states that due to this she is not interested in receiving ECT at this time.  3.) Medical:   - Pt scheduled for dental extractions 11/1, however declined to attend on day prior when informed it would not be under general anesthesia.  - last clozapine labs on 10/23 (CBC with diff, troponins, CRP); CRP elevated at 15.8 likely due to dental infection/wisdom tooth issues  - pantoprazole 40mg PO before breakfast for GERD  - montelukast 10mg PO qHS for asthma  - budesonide 80mcg/formoterol 4.5mcg 2 puffs BID for asthma  - PRN: acetaminophen 650mg PO q6hrs PRN for pain, albuterol 90mcg 2puffs q6hrs PRN for dyspnea, ondansetron 4mg q6hrs PRN for nausea  - Discussed CRP with medicine service, given asymptomatic pt and normal troponin, no additional intervention/diagnostics at this time.  - Discontinued clobetasol as rash has resolved.  4.) Dispo planning: State application submitted. Patient is 29yo single woman, no dependents, domiciled with her Grandmother, unemployed on disability/SSI, pphx of schizoaffective disorder, multiple past psychiatric admissions including state and recent discharge from [facility], in tx with [facility], with pmhx of asthma, HTN, GERD, and hypothyroidism and schizoaffective disorder, in tx with [facility], who self presented to the ED reporting abdominal pain and requesting readmission to psychiatric unit, reporting CAH, depressive symptoms, IOR, suicidality, admitted to [facility] for psychiatric care.  Depression and psychosis likely multifactorial at this time, schizoaffective vs. borderline personality disorder vs. complex grief vs. adjustment disorder.      The patient continues to be depressed, anxious, with CAH and SI.  Will continue 1:1 for safety.  Increased clozapine to 250mg.    1.) Obs: start 1:1 for worsening SI.  2.) Psychiatric medication management:  - Reviewed options for addressing nocturnal enuresis including reducing clozapine vs reducing lithium vs adding desmopressin.  Pt decided upon desmopressin as she continues to have depression and psychosis.  Desmopressin 0.1 mg QHS.  - Lithobid 1200 mg QHS.  Pt aware that she needs to stay well hydrated and avoid diuretics and NSAIDs.   - Weekend team had started chlorpromazine standing at pt request.  Discussed concerns with lowering seizure threshold.  Reviewed alternatives.  Will utilize olanzapine 2.5 mg TID for now.  - Increase Clozapine to 250mg qHS for psychosis ([id number])  - Prazosin 2mg qhs, monitor BP carefully.  - Abilify Maintena 400mg IM q3 weeks rather than 4, per collateral from ACT team.  Doses given 10/19, 11/17, 12/8, next 12/29.  - Mirtazapine was switched to escitalopram, will increase to 20 mg daily.  - Wellbutrin XL 150mg daily  - Gabapentin 300 mg BID for anxiety/chronic pain.  - PRN: haloperidol 5mg PO/IM q8hrs PRN for agitation, diphenhydramine 50mg PO/IM q6hrs PRN for EPS PPx, hydroxyzine 50mg PO q12hrs PRN for anxiety, lorazepam 2mg PO q8hrs PRN for anxiety or 2mg IM for assaultive behavior. Melatonin 5mg qHS PRN for insomnia  - Discussed ECT several times with pt who states she has been told in two hospitals in past that due to her obesity and asthma, she is not an ideal candidate.  Pt states that due to this she is not interested in receiving ECT at this time.  3.) Medical:   - Pt scheduled for dental extractions 11/1, however declined to attend on day prior when informed it would not be under general anesthesia.  - last clozapine labs on 10/23 (CBC with diff, troponins, CRP); CRP elevated at 15.8 likely due to dental infection/wisdom tooth issues  - pantoprazole 40mg PO before breakfast for GERD  - montelukast 10mg PO qHS for asthma  - budesonide 80mcg/formoterol 4.5mcg 2 puffs BID for asthma  - PRN: acetaminophen 650mg PO q6hrs PRN for pain, albuterol 90mcg 2puffs q6hrs PRN for dyspnea, ondansetron 4mg q6hrs PRN for nausea  - Discussed CRP with medicine service, given asymptomatic pt and normal troponin, no additional intervention/diagnostics at this time.  - Discontinued clobetasol as rash has resolved.  4.) Dispo planning: State application submitted.

## 2023-12-12 NOTE — BH INPATIENT PSYCHIATRY PROGRESS NOTE - MSE UNSTRUCTURED FT
The patient appears stated age, with fair hygiene, and is dressed appropriately.  She was calm and cooperative with the interview.  She maintained appropriate eye contact.  No restlessness or slowing of movements observed.  Gait is steady.  The patient’s speech was fluent, normal in tone, rate, and volume.  The patient’s mood is “the same.”  Her affect is constricted, stable, appropriate.  The patient’s thoughts are goal directed.  She does not have any delusions, admits to command auditory hallucinations to harm herself.  She admits to suicidal ideation with loose plan, no intent, no homicidal ideation, intent, or plan.  Insight is fair.  Judgment is fair.  Impulse control has been tenuous on the unit.

## 2023-12-12 NOTE — BH INPATIENT PSYCHIATRY PROGRESS NOTE - CURRENT MEDICATION
MEDICATIONS  (STANDING):  budesonide  80 MICROgram(s)/formoterol 4.5 MICROgram(s) Inhaler 2 Puff(s) Inhalation two times a day  buPROPion XL (24-Hour) 150 milliGRAM(s) Oral daily  cloZAPine 250 milliGRAM(s) Oral at bedtime  desmopressin 0.1 milliGRAM(s) Oral at bedtime  escitalopram 20 milliGRAM(s) Oral daily  gabapentin 300 milliGRAM(s) Oral two times a day  influenza   Vaccine 0.5 milliLiter(s) IntraMuscular once  lithium SR (LITHOBID) 1200 milliGRAM(s) Oral at bedtime  montelukast 10 milliGRAM(s) Oral at bedtime  OLANZapine 2.5 milliGRAM(s) Oral three times a day  oseltamivir 75 milliGRAM(s) Oral daily  pantoprazole    Tablet 40 milliGRAM(s) Oral before breakfast  prazosin. 2 milliGRAM(s) Oral at bedtime    MEDICATIONS  (PRN):  acetaminophen     Tablet .. 650 milliGRAM(s) Oral every 6 hours PRN Moderate Pain (4 - 6)  albuterol    90 MICROgram(s) HFA Inhaler 2 Puff(s) Inhalation every 6 hours PRN Shortness of Breath and/or Wheezing  aluminum hydroxide/magnesium hydroxide/simethicone Suspension 30 milliLiter(s) Oral every 4 hours PRN Dyspepsia  chlorproMAZINE    Injectable 50 milliGRAM(s) IntraMuscular once PRN severe agitation  chlorproMAZINE    Tablet 50 milliGRAM(s) Oral every 6 hours PRN agitation  hydrOXYzine hydrochloride 50 milliGRAM(s) Oral every 12 hours PRN Anxiety  lidocaine   4% Patch 1 Patch Transdermal daily PRN LBP  LORazepam     Tablet 2 milliGRAM(s) Oral every 8 hours PRN Anxiety  LORazepam   Injectable 2 milliGRAM(s) IntraMuscular once PRN Assaultive behavior  melatonin. 5 milliGRAM(s) Oral at bedtime PRN Insomnia  ondansetron    Tablet 4 milliGRAM(s) Oral every 6 hours PRN nausea  polyethylene glycol 3350 17 Gram(s) Oral daily PRN Constipation

## 2023-12-13 PROCEDURE — 99232 SBSQ HOSP IP/OBS MODERATE 35: CPT

## 2023-12-13 RX ADMIN — CLOZAPINE 250 MILLIGRAM(S): 150 TABLET, ORALLY DISINTEGRATING ORAL at 21:34

## 2023-12-13 RX ADMIN — GABAPENTIN 300 MILLIGRAM(S): 400 CAPSULE ORAL at 08:37

## 2023-12-13 RX ADMIN — ESCITALOPRAM OXALATE 20 MILLIGRAM(S): 10 TABLET, FILM COATED ORAL at 08:37

## 2023-12-13 RX ADMIN — DESMOPRESSIN ACETATE 0.1 MILLIGRAM(S): 0.1 TABLET ORAL at 21:34

## 2023-12-13 RX ADMIN — Medication 2 MILLIGRAM(S): at 21:34

## 2023-12-13 RX ADMIN — BUPROPION HYDROCHLORIDE 150 MILLIGRAM(S): 150 TABLET, EXTENDED RELEASE ORAL at 08:37

## 2023-12-13 RX ADMIN — OLANZAPINE 2.5 MILLIGRAM(S): 15 TABLET, FILM COATED ORAL at 21:39

## 2023-12-13 RX ADMIN — LITHIUM CARBONATE 1200 MILLIGRAM(S): 300 TABLET, EXTENDED RELEASE ORAL at 21:34

## 2023-12-13 RX ADMIN — PANTOPRAZOLE SODIUM 40 MILLIGRAM(S): 20 TABLET, DELAYED RELEASE ORAL at 08:37

## 2023-12-13 RX ADMIN — BUDESONIDE AND FORMOTEROL FUMARATE DIHYDRATE 2 PUFF(S): 160; 4.5 AEROSOL RESPIRATORY (INHALATION) at 22:21

## 2023-12-13 RX ADMIN — OLANZAPINE 2.5 MILLIGRAM(S): 15 TABLET, FILM COATED ORAL at 08:37

## 2023-12-13 RX ADMIN — GABAPENTIN 300 MILLIGRAM(S): 400 CAPSULE ORAL at 21:34

## 2023-12-13 RX ADMIN — BUDESONIDE AND FORMOTEROL FUMARATE DIHYDRATE 2 PUFF(S): 160; 4.5 AEROSOL RESPIRATORY (INHALATION) at 08:37

## 2023-12-13 RX ADMIN — MONTELUKAST 10 MILLIGRAM(S): 4 TABLET, CHEWABLE ORAL at 21:34

## 2023-12-13 RX ADMIN — Medication 75 MILLIGRAM(S): at 09:52

## 2023-12-13 RX ADMIN — OLANZAPINE 2.5 MILLIGRAM(S): 15 TABLET, FILM COATED ORAL at 12:46

## 2023-12-13 NOTE — BH INPATIENT PSYCHIATRY PROGRESS NOTE - MSE UNSTRUCTURED FT
The patient appears stated age, with fair hygiene, and is dressed appropriately.  She was calm and cooperative with the interview.  She maintained appropriate eye contact.  No restlessness or slowing of movements observed.  Gait is steady.  The patient’s speech was fluent, normal in tone, rate, and volume.  The patient’s mood is “a little better.”  Her affect is constricted, stable, appropriate.  The patient’s thoughts are goal directed.  She does not have any delusions, denies auditory hallucinations thus far today.  She denies suicidal ideation, no homicidal ideation, intent, or plan.  Insight is fair.  Judgment is fair.  Impulse control has been fair on the unit.

## 2023-12-13 NOTE — BH INPATIENT PSYCHIATRY PROGRESS NOTE - NSBHFUPINTERVALHXFT_PSY_A_CORE
Patient seen for follow up of depression, AHs, SI, chart reviewed, and case discussed with nursing team.  No events reported overnight.  The patient states she is starting to feel a little better.  She feels the increase in clozapine is helping.  She complains of some sedation with it.  She states AH's have decreased, and with it, SI.  She denies any acute SI, plan or intent, and feels safer on the unit.  She feels she could maintain safety off 1:1, and feels comfortable approaching staff should symptoms return.  Behavior has been well controlled.  The patient has been visible on the unit, social with peers, and attending groups.  The patient reports eating and sleeping well.  She has been compliant with medications, tolerating increase in clozapine well.

## 2023-12-13 NOTE — BH INPATIENT PSYCHIATRY PROGRESS NOTE - NSBHASSESSSUMMFT_PSY_ALL_CORE
Patient is 27yo single woman, no dependents, domiciled with her Grandmother, unemployed on disability/SSI, pphx of schizoaffective disorder, multiple past psychiatric admissions including state and recent discharge from [facility], in tx with [facility], with pmhx of asthma, HTN, GERD, and hypothyroidism and schizoaffective disorder, in tx with [facility], who self presented to the ED reporting abdominal pain and requesting readmission to psychiatric unit, reporting CAH, depressive symptoms, IOR, suicidality, admitted to [facility] for psychiatric care.  Depression and psychosis likely multifactorial at this time, schizoaffective vs. borderline personality disorder vs. complex grief vs. adjustment disorder.      The patient is showing some small improvement in symptoms, reporting less AH and less SI.  She denies any suicidal plan or intent, and feels safe on the unit wihtout 1:1.  Will discontinue 1:1 for safety - discussed with team.  Continue clozapine to 250mg.    1.) Obs: Stop 1:1, patient with decrease in SI.  She has been able to approach staff when needed.  2.) Psychiatric medication management:  - Reviewed options for addressing nocturnal enuresis including reducing clozapine vs reducing lithium vs adding desmopressin.  Pt decided upon desmopressin as she continues to have depression and psychosis.  Desmopressin 0.1 mg QHS.  - Lithobid 1200 mg QHS.  Pt aware that she needs to stay well hydrated and avoid diuretics and NSAIDs.   - Weekend team had started chlorpromazine standing at pt request.  Discussed concerns with lowering seizure threshold.  Reviewed alternatives.  Will utilize olanzapine 2.5 mg TID for now.  - Increase Clozapine to 250mg qHS for psychosis ([id number])  - Prazosin 2mg qhs, monitor BP carefully.  - Abilify Maintena 400mg IM q3 weeks rather than 4, per collateral from ACT team.  Doses given 10/19, 11/17, 12/8, next 12/29.  - Mirtazapine was switched to escitalopram, will increase to 20 mg daily.  - Wellbutrin XL 150mg daily  - Gabapentin 300 mg BID for anxiety/chronic pain.  - PRN: haloperidol 5mg PO/IM q8hrs PRN for agitation, diphenhydramine 50mg PO/IM q6hrs PRN for EPS PPx, hydroxyzine 50mg PO q12hrs PRN for anxiety, lorazepam 2mg PO q8hrs PRN for anxiety or 2mg IM for assaultive behavior. Melatonin 5mg qHS PRN for insomnia  - Discussed ECT several times with pt who states she has been told in two hospitals in past that due to her obesity and asthma, she is not an ideal candidate.  Pt states that due to this she is not interested in receiving ECT at this time.  3.) Medical:   - Pt scheduled for dental extractions 11/1, however declined to attend on day prior when informed it would not be under general anesthesia.  - last clozapine labs on 10/23 (CBC with diff, troponins, CRP); CRP elevated at 15.8 likely due to dental infection/wisdom tooth issues  - pantoprazole 40mg PO before breakfast for GERD  - montelukast 10mg PO qHS for asthma  - budesonide 80mcg/formoterol 4.5mcg 2 puffs BID for asthma  - PRN: acetaminophen 650mg PO q6hrs PRN for pain, albuterol 90mcg 2puffs q6hrs PRN for dyspnea, ondansetron 4mg q6hrs PRN for nausea  - Discussed CRP with medicine service, given asymptomatic pt and normal troponin, no additional intervention/diagnostics at this time.  - Discontinued clobetasol as rash has resolved.  4.) Dispo planning: State application submitted. Patient is 27yo single woman, no dependents, domiciled with her Grandmother, unemployed on disability/SSI, pphx of schizoaffective disorder, multiple past psychiatric admissions including state and recent discharge from [facility], in tx with [facility], with pmhx of asthma, HTN, GERD, and hypothyroidism and schizoaffective disorder, in tx with [facility], who self presented to the ED reporting abdominal pain and requesting readmission to psychiatric unit, reporting CAH, depressive symptoms, IOR, suicidality, admitted to [facility] for psychiatric care.  Depression and psychosis likely multifactorial at this time, schizoaffective vs. borderline personality disorder vs. complex grief vs. adjustment disorder.      The patient is showing some small improvement in symptoms, reporting less AH and less SI.  She denies any suicidal plan or intent, and feels safe on the unit wihtout 1:1.  Will discontinue 1:1 for safety - discussed with team.  Continue clozapine to 250mg.    1.) Obs: Stop 1:1, patient with decrease in SI.  She has been able to approach staff when needed.  2.) Psychiatric medication management:  - Reviewed options for addressing nocturnal enuresis including reducing clozapine vs reducing lithium vs adding desmopressin.  Pt decided upon desmopressin as she continues to have depression and psychosis.  Desmopressin 0.1 mg QHS.  - Lithobid 1200 mg QHS.  Pt aware that she needs to stay well hydrated and avoid diuretics and NSAIDs.   - Weekend team had started chlorpromazine standing at pt request.  Discussed concerns with lowering seizure threshold.  Reviewed alternatives.  Will utilize olanzapine 2.5 mg TID for now.  - Increase Clozapine to 250mg qHS for psychosis ([id number])  - Prazosin 2mg qhs, monitor BP carefully.  - Abilify Maintena 400mg IM q3 weeks rather than 4, per collateral from ACT team.  Doses given 10/19, 11/17, 12/8, next 12/29.  - Mirtazapine was switched to escitalopram, will increase to 20 mg daily.  - Wellbutrin XL 150mg daily  - Gabapentin 300 mg BID for anxiety/chronic pain.  - PRN: haloperidol 5mg PO/IM q8hrs PRN for agitation, diphenhydramine 50mg PO/IM q6hrs PRN for EPS PPx, hydroxyzine 50mg PO q12hrs PRN for anxiety, lorazepam 2mg PO q8hrs PRN for anxiety or 2mg IM for assaultive behavior. Melatonin 5mg qHS PRN for insomnia  - Discussed ECT several times with pt who states she has been told in two hospitals in past that due to her obesity and asthma, she is not an ideal candidate.  Pt states that due to this she is not interested in receiving ECT at this time.  3.) Medical:   - Pt scheduled for dental extractions 11/1, however declined to attend on day prior when informed it would not be under general anesthesia.  - last clozapine labs on 10/23 (CBC with diff, troponins, CRP); CRP elevated at 15.8 likely due to dental infection/wisdom tooth issues  - pantoprazole 40mg PO before breakfast for GERD  - montelukast 10mg PO qHS for asthma  - budesonide 80mcg/formoterol 4.5mcg 2 puffs BID for asthma  - PRN: acetaminophen 650mg PO q6hrs PRN for pain, albuterol 90mcg 2puffs q6hrs PRN for dyspnea, ondansetron 4mg q6hrs PRN for nausea  - Discussed CRP with medicine service, given asymptomatic pt and normal troponin, no additional intervention/diagnostics at this time.  - Discontinued clobetasol as rash has resolved.  4.) Dispo planning: State application submitted.

## 2023-12-13 NOTE — BH INPATIENT PSYCHIATRY PROGRESS NOTE - NSBHMETABOLIC_PSY_ALL_CORE_FT
BMI: BMI (kg/m2): 57.8 (09-14-23 @ 14:30)  HbA1c: A1C with Estimated Average Glucose Result: 5.2 % (11-01-23 @ 09:04)    Glucose:   BP: --Vital Signs Last 24 Hrs  T(C): 36.7 (12-13-23 @ 08:17), Max: 36.7 (12-12-23 @ 20:06)  T(F): 98 (12-13-23 @ 08:17), Max: 98 (12-12-23 @ 20:06)  HR: --  BP: --  BP(mean): --  RR: --  SpO2: --    Orthostatic VS  12-13-23 @ 08:17  Lying BP: --/-- HR: --  Sitting BP: 139/84 HR: 93  Standing BP: 139/96 HR: 98  Site: --  Mode: --  Orthostatic VS  12-12-23 @ 20:06  Lying BP: --/-- HR: --  Sitting BP: 135/74 HR: 88  Standing BP: 142/80 HR: 87  Site: --  Mode: --  Orthostatic VS  12-12-23 @ 08:37  Lying BP: --/-- HR: --  Sitting BP: 139/81 HR: 99  Standing BP: 140/89 HR: 114  Site: --  Mode: --    Lipid Panel: Date/Time: 11-01-23 @ 09:04  Cholesterol, Serum: 156  LDL Cholesterol Calculated: 82  HDL Cholesterol, Serum: 58  Total Cholesterol/HDL Ration Measurement: --  Triglycerides, Serum: 79

## 2023-12-13 NOTE — BH INPATIENT PSYCHIATRY PROGRESS NOTE - NSBHATTESTBILLING_PSY_A_CORE
24238-Yibouswtox OBS or IP - moderate complexity OR 35-49 mins 23510-Gddnszwnxn OBS or IP - moderate complexity OR 35-49 mins

## 2023-12-13 NOTE — BH INPATIENT PSYCHIATRY PROGRESS NOTE - NSBHCHARTREVIEWVS_PSY_A_CORE FT
Vital Signs Last 24 Hrs  T(C): 36.7 (12-13-23 @ 08:17), Max: 36.7 (12-12-23 @ 20:06)  T(F): 98 (12-13-23 @ 08:17), Max: 98 (12-12-23 @ 20:06)  HR: --  BP: --  BP(mean): --  RR: --  SpO2: --    Orthostatic VS  12-13-23 @ 08:17  Lying BP: --/-- HR: --  Sitting BP: 139/84 HR: 93  Standing BP: 139/96 HR: 98  Site: --  Mode: --  Orthostatic VS  12-12-23 @ 20:06  Lying BP: --/-- HR: --  Sitting BP: 135/74 HR: 88  Standing BP: 142/80 HR: 87  Site: --  Mode: --  Orthostatic VS  12-12-23 @ 08:37  Lying BP: --/-- HR: --  Sitting BP: 139/81 HR: 99  Standing BP: 140/89 HR: 114  Site: --  Mode: --

## 2023-12-14 PROCEDURE — 99232 SBSQ HOSP IP/OBS MODERATE 35: CPT

## 2023-12-14 RX ORDER — CLOZAPINE 150 MG/1
275 TABLET, ORALLY DISINTEGRATING ORAL AT BEDTIME
Refills: 0 | Status: DISCONTINUED | OUTPATIENT
Start: 2023-12-14 | End: 2023-12-19

## 2023-12-14 RX ORDER — OLANZAPINE 15 MG/1
2.5 TABLET, FILM COATED ORAL AT BEDTIME
Refills: 0 | Status: COMPLETED | OUTPATIENT
Start: 2023-12-14 | End: 2023-12-15

## 2023-12-14 RX ORDER — OLANZAPINE 15 MG/1
2.5 TABLET, FILM COATED ORAL
Refills: 0 | Status: DISCONTINUED | OUTPATIENT
Start: 2023-12-14 | End: 2023-12-14

## 2023-12-14 RX ADMIN — GABAPENTIN 300 MILLIGRAM(S): 400 CAPSULE ORAL at 08:54

## 2023-12-14 RX ADMIN — BUDESONIDE AND FORMOTEROL FUMARATE DIHYDRATE 2 PUFF(S): 160; 4.5 AEROSOL RESPIRATORY (INHALATION) at 20:45

## 2023-12-14 RX ADMIN — CLOZAPINE 275 MILLIGRAM(S): 150 TABLET, ORALLY DISINTEGRATING ORAL at 20:45

## 2023-12-14 RX ADMIN — BUPROPION HYDROCHLORIDE 150 MILLIGRAM(S): 150 TABLET, EXTENDED RELEASE ORAL at 08:55

## 2023-12-14 RX ADMIN — ESCITALOPRAM OXALATE 20 MILLIGRAM(S): 10 TABLET, FILM COATED ORAL at 08:54

## 2023-12-14 RX ADMIN — OLANZAPINE 2.5 MILLIGRAM(S): 15 TABLET, FILM COATED ORAL at 20:45

## 2023-12-14 RX ADMIN — DESMOPRESSIN ACETATE 0.1 MILLIGRAM(S): 0.1 TABLET ORAL at 20:46

## 2023-12-14 RX ADMIN — BUDESONIDE AND FORMOTEROL FUMARATE DIHYDRATE 2 PUFF(S): 160; 4.5 AEROSOL RESPIRATORY (INHALATION) at 08:58

## 2023-12-14 RX ADMIN — Medication 2 MILLIGRAM(S): at 20:45

## 2023-12-14 RX ADMIN — OLANZAPINE 2.5 MILLIGRAM(S): 15 TABLET, FILM COATED ORAL at 08:54

## 2023-12-14 RX ADMIN — Medication 75 MILLIGRAM(S): at 08:54

## 2023-12-14 RX ADMIN — GABAPENTIN 300 MILLIGRAM(S): 400 CAPSULE ORAL at 20:46

## 2023-12-14 RX ADMIN — LITHIUM CARBONATE 1200 MILLIGRAM(S): 300 TABLET, EXTENDED RELEASE ORAL at 20:44

## 2023-12-14 RX ADMIN — PANTOPRAZOLE SODIUM 40 MILLIGRAM(S): 20 TABLET, DELAYED RELEASE ORAL at 08:54

## 2023-12-14 RX ADMIN — MONTELUKAST 10 MILLIGRAM(S): 4 TABLET, CHEWABLE ORAL at 20:43

## 2023-12-14 NOTE — BH INPATIENT PSYCHIATRY PROGRESS NOTE - NSBHATTESTBILLING_PSY_A_CORE
99752-Uuwptqbhef OBS or IP - moderate complexity OR 35-49 mins 13847-Vzvgkawgsr OBS or IP - moderate complexity OR 35-49 mins

## 2023-12-14 NOTE — BH INPATIENT PSYCHIATRY PROGRESS NOTE - NSBHFUPINTERVALHXFT_PSY_A_CORE
Patient seen for follow up of depression, AHs, SI, chart reviewed, and case discussed with nursing team.  No events reported overnight.  The patient continues to report some improvement in symptoms.  She felt safe off 1:1 yesterday, denies any return of SI.  She states her mood has been getting better, and denies any AH today.  She feels the increase in clozapine is helping.  Behavior has been well controlled.  The patient has been visible on the unit, social with peers, and attending groups.  The patient reports eating and sleeping well.  She has been compliant with medications, tolerating increase in clozapine well.  The patient was in agreement with continuing to maximize clozapine, and tapering off Zyprexa.

## 2023-12-14 NOTE — BH INPATIENT PSYCHIATRY PROGRESS NOTE - CURRENT MEDICATION
MEDICATIONS  (STANDING):  budesonide  80 MICROgram(s)/formoterol 4.5 MICROgram(s) Inhaler 2 Puff(s) Inhalation two times a day  buPROPion XL (24-Hour) 150 milliGRAM(s) Oral daily  cloZAPine 275 milliGRAM(s) Oral at bedtime  desmopressin 0.1 milliGRAM(s) Oral at bedtime  escitalopram 20 milliGRAM(s) Oral daily  gabapentin 300 milliGRAM(s) Oral two times a day  influenza   Vaccine 0.5 milliLiter(s) IntraMuscular once  lithium SR (LITHOBID) 1200 milliGRAM(s) Oral at bedtime  montelukast 10 milliGRAM(s) Oral at bedtime  OLANZapine 2.5 milliGRAM(s) Oral two times a day  oseltamivir 75 milliGRAM(s) Oral daily  pantoprazole    Tablet 40 milliGRAM(s) Oral before breakfast  prazosin. 2 milliGRAM(s) Oral at bedtime    MEDICATIONS  (PRN):  acetaminophen     Tablet .. 650 milliGRAM(s) Oral every 6 hours PRN Moderate Pain (4 - 6)  albuterol    90 MICROgram(s) HFA Inhaler 2 Puff(s) Inhalation every 6 hours PRN Shortness of Breath and/or Wheezing  aluminum hydroxide/magnesium hydroxide/simethicone Suspension 30 milliLiter(s) Oral every 4 hours PRN Dyspepsia  chlorproMAZINE    Injectable 50 milliGRAM(s) IntraMuscular once PRN severe agitation  chlorproMAZINE    Tablet 50 milliGRAM(s) Oral every 6 hours PRN agitation  hydrOXYzine hydrochloride 50 milliGRAM(s) Oral every 12 hours PRN Anxiety  lidocaine   4% Patch 1 Patch Transdermal daily PRN LBP  LORazepam     Tablet 2 milliGRAM(s) Oral every 8 hours PRN Anxiety  LORazepam   Injectable 2 milliGRAM(s) IntraMuscular once PRN Assaultive behavior  melatonin. 5 milliGRAM(s) Oral at bedtime PRN Insomnia  ondansetron    Tablet 4 milliGRAM(s) Oral every 6 hours PRN nausea  polyethylene glycol 3350 17 Gram(s) Oral daily PRN Constipation

## 2023-12-14 NOTE — BH TREATMENT PLAN - NSTXDCOTHRINTERSW_PSY_ALL_CORE
Writer will work to educate the pt on the state referral process and explore any other aftercare options that may be an appropriate alternative to [facility] hospital. Writer will work to educate the pt on the state referral process and explore any other aftercare options that may be an appropriate alternative to [facility] hospital.

## 2023-12-14 NOTE — BH INPATIENT PSYCHIATRY PROGRESS NOTE - NSBHASSESSSUMMFT_PSY_ALL_CORE
Patient is 27yo single woman, no dependents, domiciled with her Grandmother, unemployed on disability/SSI, pphx of schizoaffective disorder, multiple past psychiatric admissions including state and recent discharge from [facility], in tx with [facility], with pmhx of asthma, HTN, GERD, and hypothyroidism and schizoaffective disorder, in tx with [facility], who self presented to the ED reporting abdominal pain and requesting readmission to psychiatric unit, reporting CAH, depressive symptoms, IOR, suicidality, admitted to [facility] for psychiatric care.  Depression and psychosis likely multifactorial at this time, schizoaffective vs. borderline personality disorder vs. complex grief vs. adjustment disorder.      The patient is showing some improvement in symptoms, reporting less AH and less SI.  She denies any suicidal plan or intent, and feels safe off 1:1.  Increase clozapine to 275mg.  Start to taper Zyprexa, as it is low dose, and likely not affecting symptoms.  Clozapine increase likely more significant in improvement.    1.) Obs: Stop 1:1, patient with decrease in SI.  She has been able to approach staff when needed.  2.) Psychiatric medication management:  - Reviewed options for addressing nocturnal enuresis including reducing clozapine vs reducing lithium vs adding desmopressin.  Pt decided upon desmopressin as she continues to have depression and psychosis.  Desmopressin 0.1 mg QHS.  - Lithobid 1200 mg QHS.  Pt aware that she needs to stay well hydrated and avoid diuretics and NSAIDs.   - Decrease olanzapine to 2.5 mg BID with plan to taper off.  - Increase Clozapine to 275mg qHS for psychosis ([id number])  - Prazosin 2mg qhs, monitor BP carefully.  - Abilify Maintena 400mg IM q3 weeks rather than 4, per collateral from ACT team.  Doses given 10/19, 11/17, 12/8, next 12/29.  - Mirtazapine was switched to escitalopram, will increase to 20 mg daily.  - Wellbutrin XL 150mg daily  - Gabapentin 300 mg BID for anxiety/chronic pain.  - PRN: haloperidol 5mg PO/IM q8hrs PRN for agitation, diphenhydramine 50mg PO/IM q6hrs PRN for EPS PPx, hydroxyzine 50mg PO q12hrs PRN for anxiety, lorazepam 2mg PO q8hrs PRN for anxiety or 2mg IM for assaultive behavior. Melatonin 5mg qHS PRN for insomnia  - Discussed ECT several times with pt who states she has been told in two hospitals in past that due to her obesity and asthma, she is not an ideal candidate.  Pt states that due to this she is not interested in receiving ECT at this time.  3.) Medical:   - Pt scheduled for dental extractions 11/1, however declined to attend on day prior when informed it would not be under general anesthesia.  - last clozapine labs on 10/23 (CBC with diff, troponins, CRP); CRP elevated at 15.8 likely due to dental infection/wisdom tooth issues  - pantoprazole 40mg PO before breakfast for GERD  - montelukast 10mg PO qHS for asthma  - budesonide 80mcg/formoterol 4.5mcg 2 puffs BID for asthma  - PRN: acetaminophen 650mg PO q6hrs PRN for pain, albuterol 90mcg 2puffs q6hrs PRN for dyspnea, ondansetron 4mg q6hrs PRN for nausea  - Discussed CRP with medicine service, given asymptomatic pt and normal troponin, no additional intervention/diagnostics at this time.  - Discontinued clobetasol as rash has resolved.  4.) Dispo planning: State application submitted. Patient is 29yo single woman, no dependents, domiciled with her Grandmother, unemployed on disability/SSI, pphx of schizoaffective disorder, multiple past psychiatric admissions including state and recent discharge from [facility], in tx with [facility], with pmhx of asthma, HTN, GERD, and hypothyroidism and schizoaffective disorder, in tx with [facility], who self presented to the ED reporting abdominal pain and requesting readmission to psychiatric unit, reporting CAH, depressive symptoms, IOR, suicidality, admitted to [facility] for psychiatric care.  Depression and psychosis likely multifactorial at this time, schizoaffective vs. borderline personality disorder vs. complex grief vs. adjustment disorder.      The patient is showing some improvement in symptoms, reporting less AH and less SI.  She denies any suicidal plan or intent, and feels safe off 1:1.  Increase clozapine to 275mg.  Start to taper Zyprexa, as it is low dose, and likely not affecting symptoms.  Clozapine increase likely more significant in improvement.    1.) Obs: Stop 1:1, patient with decrease in SI.  She has been able to approach staff when needed.  2.) Psychiatric medication management:  - Reviewed options for addressing nocturnal enuresis including reducing clozapine vs reducing lithium vs adding desmopressin.  Pt decided upon desmopressin as she continues to have depression and psychosis.  Desmopressin 0.1 mg QHS.  - Lithobid 1200 mg QHS.  Pt aware that she needs to stay well hydrated and avoid diuretics and NSAIDs.   - Decrease olanzapine to 2.5 mg BID with plan to taper off.  - Increase Clozapine to 275mg qHS for psychosis ([id number])  - Prazosin 2mg qhs, monitor BP carefully.  - Abilify Maintena 400mg IM q3 weeks rather than 4, per collateral from ACT team.  Doses given 10/19, 11/17, 12/8, next 12/29.  - Mirtazapine was switched to escitalopram, will increase to 20 mg daily.  - Wellbutrin XL 150mg daily  - Gabapentin 300 mg BID for anxiety/chronic pain.  - PRN: haloperidol 5mg PO/IM q8hrs PRN for agitation, diphenhydramine 50mg PO/IM q6hrs PRN for EPS PPx, hydroxyzine 50mg PO q12hrs PRN for anxiety, lorazepam 2mg PO q8hrs PRN for anxiety or 2mg IM for assaultive behavior. Melatonin 5mg qHS PRN for insomnia  - Discussed ECT several times with pt who states she has been told in two hospitals in past that due to her obesity and asthma, she is not an ideal candidate.  Pt states that due to this she is not interested in receiving ECT at this time.  3.) Medical:   - Pt scheduled for dental extractions 11/1, however declined to attend on day prior when informed it would not be under general anesthesia.  - last clozapine labs on 10/23 (CBC with diff, troponins, CRP); CRP elevated at 15.8 likely due to dental infection/wisdom tooth issues  - pantoprazole 40mg PO before breakfast for GERD  - montelukast 10mg PO qHS for asthma  - budesonide 80mcg/formoterol 4.5mcg 2 puffs BID for asthma  - PRN: acetaminophen 650mg PO q6hrs PRN for pain, albuterol 90mcg 2puffs q6hrs PRN for dyspnea, ondansetron 4mg q6hrs PRN for nausea  - Discussed CRP with medicine service, given asymptomatic pt and normal troponin, no additional intervention/diagnostics at this time.  - Discontinued clobetasol as rash has resolved.  4.) Dispo planning: State application submitted.

## 2023-12-14 NOTE — BH INPATIENT PSYCHIATRY PROGRESS NOTE - NSBHCHARTREVIEWVS_PSY_A_CORE FT
Vital Signs Last 24 Hrs  T(C): 36.7 (12-14-23 @ 07:55), Max: 37.1 (12-13-23 @ 20:38)  T(F): 98 (12-14-23 @ 07:55), Max: 98.7 (12-13-23 @ 20:38)  HR: --  BP: --  BP(mean): --  RR: --  SpO2: --    Orthostatic VS  12-14-23 @ 07:55  Lying BP: --/-- HR: --  Sitting BP: 131/78 HR: 86  Standing BP: 123/81 HR: 73  Site: --  Mode: --  Orthostatic VS  12-13-23 @ 20:38  Lying BP: --/-- HR: --  Sitting BP: 120/72 HR: 107  Standing BP: 122/82 HR: 112  Site: --  Mode: --  Orthostatic VS  12-13-23 @ 08:17  Lying BP: --/-- HR: --  Sitting BP: 139/84 HR: 93  Standing BP: 139/96 HR: 98  Site: --  Mode: --  Orthostatic VS  12-12-23 @ 20:06  Lying BP: --/-- HR: --  Sitting BP: 135/74 HR: 88  Standing BP: 142/80 HR: 87  Site: --  Mode: --

## 2023-12-14 NOTE — BH INPATIENT PSYCHIATRY PROGRESS NOTE - NSBHMETABOLIC_PSY_ALL_CORE_FT
BMI: BMI (kg/m2): 57.8 (09-14-23 @ 14:30)  HbA1c: A1C with Estimated Average Glucose Result: 5.2 % (11-01-23 @ 09:04)    Glucose:   BP: --Vital Signs Last 24 Hrs  T(C): 36.7 (12-14-23 @ 07:55), Max: 37.1 (12-13-23 @ 20:38)  T(F): 98 (12-14-23 @ 07:55), Max: 98.7 (12-13-23 @ 20:38)  HR: --  BP: --  BP(mean): --  RR: --  SpO2: --    Orthostatic VS  12-14-23 @ 07:55  Lying BP: --/-- HR: --  Sitting BP: 131/78 HR: 86  Standing BP: 123/81 HR: 73  Site: --  Mode: --  Orthostatic VS  12-13-23 @ 20:38  Lying BP: --/-- HR: --  Sitting BP: 120/72 HR: 107  Standing BP: 122/82 HR: 112  Site: --  Mode: --  Orthostatic VS  12-13-23 @ 08:17  Lying BP: --/-- HR: --  Sitting BP: 139/84 HR: 93  Standing BP: 139/96 HR: 98  Site: --  Mode: --  Orthostatic VS  12-12-23 @ 20:06  Lying BP: --/-- HR: --  Sitting BP: 135/74 HR: 88  Standing BP: 142/80 HR: 87  Site: --  Mode: --    Lipid Panel: Date/Time: 11-01-23 @ 09:04  Cholesterol, Serum: 156  LDL Cholesterol Calculated: 82  HDL Cholesterol, Serum: 58  Total Cholesterol/HDL Ration Measurement: --  Triglycerides, Serum: 79

## 2023-12-14 NOTE — BH INPATIENT PSYCHIATRY PROGRESS NOTE - MSE UNSTRUCTURED FT
The patient appears stated age, with fair hygiene, and is dressed appropriately.  She was calm and cooperative with the interview.  She maintained appropriate eye contact.  No restlessness or slowing of movements observed.  Gait is steady.  The patient’s speech was fluent, normal in tone, rate, and volume.  The patient’s mood is “getting better.”  Her affect is constricted, stable, appropriate.  The patient’s thoughts are goal directed.  She does not have any delusions, denies auditory hallucinations today.  She denies suicidal ideation, no homicidal ideation, intent, or plan.  Insight is fair.  Judgment is fair.  Impulse control has been fair on the unit.

## 2023-12-14 NOTE — BH PSYCHOLOGY - CLINICIAN PSYCHOTHERAPY NOTE - NSBHPSYCHOLNARRATIVE_PSY_A_CORE FT
Writer met with Pt for 30-minute individual therapy session. Pt was alert and presented with adequate hygiene and grooming. Pt reported feeling "good", affect full and congruent. Pt denied experiencing any A/V hallucinations. Her thought process was linear and goal directed. Pts’ speech was WNL, content was relevant to discussion. Pt denied passive or active SI, HI or NSSI. Oriented x3. Limited insight and judgement demonstrated.      Pt reported feeling less anxious due to having spoken to . Explored past ACT and distress tolerance skills for pt to use reflecting back and exploring pts mood the previous day. Did some psychoed on anxiety and avoidance. Pt reflected on why she is excited about potential housing, and explored with Wr community and social supports outside hospital beyond grandmother and ex boyfriend. Pt and Wr explored pts identity as it is intertwined with her mental health history and began values exploration.

## 2023-12-15 LAB
BASOPHILS # BLD AUTO: 0.02 K/UL — SIGNIFICANT CHANGE UP (ref 0–0.2)
BASOPHILS # BLD AUTO: 0.02 K/UL — SIGNIFICANT CHANGE UP (ref 0–0.2)
BASOPHILS NFR BLD AUTO: 0.2 % — SIGNIFICANT CHANGE UP (ref 0–2)
BASOPHILS NFR BLD AUTO: 0.2 % — SIGNIFICANT CHANGE UP (ref 0–2)
CRP SERPL-MCNC: 20.6 MG/L — HIGH
CRP SERPL-MCNC: 20.6 MG/L — HIGH
EOSINOPHIL # BLD AUTO: 0 K/UL — SIGNIFICANT CHANGE UP (ref 0–0.5)
EOSINOPHIL # BLD AUTO: 0 K/UL — SIGNIFICANT CHANGE UP (ref 0–0.5)
EOSINOPHIL NFR BLD AUTO: 0 % — SIGNIFICANT CHANGE UP (ref 0–6)
EOSINOPHIL NFR BLD AUTO: 0 % — SIGNIFICANT CHANGE UP (ref 0–6)
HCT VFR BLD CALC: 40.3 % — SIGNIFICANT CHANGE UP (ref 34.5–45)
HCT VFR BLD CALC: 40.3 % — SIGNIFICANT CHANGE UP (ref 34.5–45)
HGB BLD-MCNC: 12.2 G/DL — SIGNIFICANT CHANGE UP (ref 11.5–15.5)
HGB BLD-MCNC: 12.2 G/DL — SIGNIFICANT CHANGE UP (ref 11.5–15.5)
IANC: 6.07 K/UL — SIGNIFICANT CHANGE UP (ref 1.8–7.4)
IANC: 6.07 K/UL — SIGNIFICANT CHANGE UP (ref 1.8–7.4)
IMM GRANULOCYTES NFR BLD AUTO: 0.4 % — SIGNIFICANT CHANGE UP (ref 0–0.9)
IMM GRANULOCYTES NFR BLD AUTO: 0.4 % — SIGNIFICANT CHANGE UP (ref 0–0.9)
LYMPHOCYTES # BLD AUTO: 3.06 K/UL — SIGNIFICANT CHANGE UP (ref 1–3.3)
LYMPHOCYTES # BLD AUTO: 3.06 K/UL — SIGNIFICANT CHANGE UP (ref 1–3.3)
LYMPHOCYTES # BLD AUTO: 31.2 % — SIGNIFICANT CHANGE UP (ref 13–44)
LYMPHOCYTES # BLD AUTO: 31.2 % — SIGNIFICANT CHANGE UP (ref 13–44)
MCHC RBC-ENTMCNC: 24.3 PG — LOW (ref 27–34)
MCHC RBC-ENTMCNC: 24.3 PG — LOW (ref 27–34)
MCHC RBC-ENTMCNC: 30.3 GM/DL — LOW (ref 32–36)
MCHC RBC-ENTMCNC: 30.3 GM/DL — LOW (ref 32–36)
MCV RBC AUTO: 80.1 FL — SIGNIFICANT CHANGE UP (ref 80–100)
MCV RBC AUTO: 80.1 FL — SIGNIFICANT CHANGE UP (ref 80–100)
MONOCYTES # BLD AUTO: 0.62 K/UL — SIGNIFICANT CHANGE UP (ref 0–0.9)
MONOCYTES # BLD AUTO: 0.62 K/UL — SIGNIFICANT CHANGE UP (ref 0–0.9)
MONOCYTES NFR BLD AUTO: 6.3 % — SIGNIFICANT CHANGE UP (ref 2–14)
MONOCYTES NFR BLD AUTO: 6.3 % — SIGNIFICANT CHANGE UP (ref 2–14)
NEUTROPHILS # BLD AUTO: 6.07 K/UL — SIGNIFICANT CHANGE UP (ref 1.8–7.4)
NEUTROPHILS # BLD AUTO: 6.07 K/UL — SIGNIFICANT CHANGE UP (ref 1.8–7.4)
NEUTROPHILS NFR BLD AUTO: 61.9 % — SIGNIFICANT CHANGE UP (ref 43–77)
NEUTROPHILS NFR BLD AUTO: 61.9 % — SIGNIFICANT CHANGE UP (ref 43–77)
NRBC # BLD: 0 /100 WBCS — SIGNIFICANT CHANGE UP (ref 0–0)
NRBC # BLD: 0 /100 WBCS — SIGNIFICANT CHANGE UP (ref 0–0)
NRBC # FLD: 0 K/UL — SIGNIFICANT CHANGE UP (ref 0–0)
NRBC # FLD: 0 K/UL — SIGNIFICANT CHANGE UP (ref 0–0)
PLATELET # BLD AUTO: 314 K/UL — SIGNIFICANT CHANGE UP (ref 150–400)
PLATELET # BLD AUTO: 314 K/UL — SIGNIFICANT CHANGE UP (ref 150–400)
RBC # BLD: 5.03 M/UL — SIGNIFICANT CHANGE UP (ref 3.8–5.2)
RBC # BLD: 5.03 M/UL — SIGNIFICANT CHANGE UP (ref 3.8–5.2)
RBC # FLD: 15.4 % — HIGH (ref 10.3–14.5)
RBC # FLD: 15.4 % — HIGH (ref 10.3–14.5)
TROPONIN T, HIGH SENSITIVITY RESULT: 8 NG/L — SIGNIFICANT CHANGE UP
TROPONIN T, HIGH SENSITIVITY RESULT: 8 NG/L — SIGNIFICANT CHANGE UP
WBC # BLD: 9.81 K/UL — SIGNIFICANT CHANGE UP (ref 3.8–10.5)
WBC # BLD: 9.81 K/UL — SIGNIFICANT CHANGE UP (ref 3.8–10.5)
WBC # FLD AUTO: 9.81 K/UL — SIGNIFICANT CHANGE UP (ref 3.8–10.5)
WBC # FLD AUTO: 9.81 K/UL — SIGNIFICANT CHANGE UP (ref 3.8–10.5)

## 2023-12-15 PROCEDURE — 99232 SBSQ HOSP IP/OBS MODERATE 35: CPT

## 2023-12-15 RX ADMIN — BUDESONIDE AND FORMOTEROL FUMARATE DIHYDRATE 2 PUFF(S): 160; 4.5 AEROSOL RESPIRATORY (INHALATION) at 09:45

## 2023-12-15 RX ADMIN — Medication 75 MILLIGRAM(S): at 09:44

## 2023-12-15 RX ADMIN — GABAPENTIN 300 MILLIGRAM(S): 400 CAPSULE ORAL at 09:45

## 2023-12-15 RX ADMIN — MONTELUKAST 10 MILLIGRAM(S): 4 TABLET, CHEWABLE ORAL at 20:30

## 2023-12-15 RX ADMIN — LITHIUM CARBONATE 1200 MILLIGRAM(S): 300 TABLET, EXTENDED RELEASE ORAL at 20:30

## 2023-12-15 RX ADMIN — DESMOPRESSIN ACETATE 0.1 MILLIGRAM(S): 0.1 TABLET ORAL at 20:29

## 2023-12-15 RX ADMIN — BUPROPION HYDROCHLORIDE 150 MILLIGRAM(S): 150 TABLET, EXTENDED RELEASE ORAL at 09:45

## 2023-12-15 RX ADMIN — GABAPENTIN 300 MILLIGRAM(S): 400 CAPSULE ORAL at 20:30

## 2023-12-15 RX ADMIN — ESCITALOPRAM OXALATE 20 MILLIGRAM(S): 10 TABLET, FILM COATED ORAL at 09:45

## 2023-12-15 RX ADMIN — BUDESONIDE AND FORMOTEROL FUMARATE DIHYDRATE 2 PUFF(S): 160; 4.5 AEROSOL RESPIRATORY (INHALATION) at 20:31

## 2023-12-15 RX ADMIN — Medication 2 MILLIGRAM(S): at 20:30

## 2023-12-15 RX ADMIN — PANTOPRAZOLE SODIUM 40 MILLIGRAM(S): 20 TABLET, DELAYED RELEASE ORAL at 09:45

## 2023-12-15 RX ADMIN — OLANZAPINE 2.5 MILLIGRAM(S): 15 TABLET, FILM COATED ORAL at 20:30

## 2023-12-15 RX ADMIN — CLOZAPINE 275 MILLIGRAM(S): 150 TABLET, ORALLY DISINTEGRATING ORAL at 20:30

## 2023-12-15 NOTE — BH INPATIENT PSYCHIATRY PROGRESS NOTE - NSBHFUPINTERVALHXFT_PSY_A_CORE
Patient seen for follow up of depression, AHs, SI, chart reviewed, and case discussed with nursing team.  No events reported overnight.  The patient continues to report some improvement in symptoms.  She states her mood has been getting better, and denies any AH today.  She also denies SI.  Behavior has been well controlled.  The patient has been visible on the unit, social with peers, and attending groups.  The patient reports eating and sleeping well.  She has been compliant with medications, tolerating increases in clozapine well.

## 2023-12-15 NOTE — BH INPATIENT PSYCHIATRY PROGRESS NOTE - MSE UNSTRUCTURED FT
The patient appears stated age, with fair hygiene, and is dressed appropriately.  She was calm and cooperative with the interview.  She maintained appropriate eye contact.  No restlessness or slowing of movements observed.  Gait is steady.  The patient’s speech was fluent, normal in tone, rate, and volume.  The patient’s mood is “getting better.”  Her affect is less constricted, stable, appropriate.  The patient’s thoughts are goal directed.  She does not have any delusions, denies auditory hallucinations today.  She denies suicidal ideation, no homicidal ideation, intent, or plan.  Insight is fair.  Judgment is fair.  Impulse control has been fair on the unit.

## 2023-12-15 NOTE — BH INPATIENT PSYCHIATRY PROGRESS NOTE - NSBHMETABOLIC_PSY_ALL_CORE_FT
BMI: BMI (kg/m2): 57.8 (09-14-23 @ 14:30)  HbA1c: A1C with Estimated Average Glucose Result: 5.2 % (11-01-23 @ 09:04)    Glucose:   BP: --Vital Signs Last 24 Hrs  T(C): 36.9 (12-15-23 @ 08:19), Max: 36.9 (12-15-23 @ 08:19)  T(F): 98.5 (12-15-23 @ 08:19), Max: 98.5 (12-15-23 @ 08:19)  HR: --  BP: --  BP(mean): --  RR: --  SpO2: --    Orthostatic VS  12-15-23 @ 08:19  Lying BP: --/-- HR: --  Sitting BP: 111/64 HR: 87  Standing BP: 132/69 HR: 92  Site: --  Mode: --  Orthostatic VS  12-14-23 @ 20:28  Lying BP: --/-- HR: --  Sitting BP: 141/78 HR: 96  Standing BP: 138/79 HR: 108  Site: --  Mode: --  Orthostatic VS  12-14-23 @ 07:55  Lying BP: --/-- HR: --  Sitting BP: 131/78 HR: 86  Standing BP: 123/81 HR: 73  Site: --  Mode: --  Orthostatic VS  12-13-23 @ 20:38  Lying BP: --/-- HR: --  Sitting BP: 120/72 HR: 107  Standing BP: 122/82 HR: 112  Site: --  Mode: --    Lipid Panel: Date/Time: 11-01-23 @ 09:04  Cholesterol, Serum: 156  LDL Cholesterol Calculated: 82  HDL Cholesterol, Serum: 58  Total Cholesterol/HDL Ration Measurement: --  Triglycerides, Serum: 79

## 2023-12-15 NOTE — BH INPATIENT PSYCHIATRY PROGRESS NOTE - NSBHASSESSSUMMFT_PSY_ALL_CORE
Patient is 27yo single woman, no dependents, domiciled with her Grandmother, unemployed on disability/SSI, pphx of schizoaffective disorder, multiple past psychiatric admissions including state and recent discharge from [facility], in tx with [facility], with pmhx of asthma, HTN, GERD, and hypothyroidism and schizoaffective disorder, in tx with [facility], who self presented to the ED reporting abdominal pain and requesting readmission to psychiatric unit, reporting CAH, depressive symptoms, IOR, suicidality, admitted to [facility] for psychiatric care.  Depression and psychosis likely multifactorial at this time, schizoaffective vs. borderline personality disorder vs. complex grief vs. adjustment disorder.      The patient is showing some improvement in symptoms, reporting less AH and less SI.  She denies any suicidal plan or intent, and feels safe off 1:1.  Increased clozapine to 275mg, tolerating it well.  Taper off Zyprexa.    1.) Obs: Stop 1:1, patient with decrease in SI.  She has been able to approach staff when needed.  2.) Psychiatric medication management:  - Reviewed options for addressing nocturnal enuresis including reducing clozapine vs reducing lithium vs adding desmopressin.  Pt decided upon desmopressin as she continues to have depression and psychosis.  Desmopressin 0.1 mg QHS.  - Lithobid 1200 mg QHS.  Pt aware that she needs to stay well hydrated and avoid diuretics and NSAIDs.   - Decrease olanzapine to 2.5 mg BID with plan to taper off.  - Increase Clozapine to 275mg qHS for psychosis ([id number])  - Prazosin 2mg qhs, monitor BP carefully.  - Abilify Maintena 400mg IM q3 weeks rather than 4, per collateral from ACT team.  Doses given 10/19, 11/17, 12/8, next 12/29.  - Mirtazapine was switched to escitalopram, will increase to 20 mg daily.  - Wellbutrin XL 150mg daily  - Gabapentin 300 mg BID for anxiety/chronic pain.  - PRN: haloperidol 5mg PO/IM q8hrs PRN for agitation, diphenhydramine 50mg PO/IM q6hrs PRN for EPS PPx, hydroxyzine 50mg PO q12hrs PRN for anxiety, lorazepam 2mg PO q8hrs PRN for anxiety or 2mg IM for assaultive behavior. Melatonin 5mg qHS PRN for insomnia  - Discussed ECT several times with pt who states she has been told in two hospitals in past that due to her obesity and asthma, she is not an ideal candidate.  Pt states that due to this she is not interested in receiving ECT at this time.  3.) Medical:   - Pt scheduled for dental extractions 11/1, however declined to attend on day prior when informed it would not be under general anesthesia.  - last clozapine labs on 10/23 (CBC with diff, troponins, CRP); CRP elevated at 15.8 likely due to dental infection/wisdom tooth issues  - pantoprazole 40mg PO before breakfast for GERD  - montelukast 10mg PO qHS for asthma  - budesonide 80mcg/formoterol 4.5mcg 2 puffs BID for asthma  - PRN: acetaminophen 650mg PO q6hrs PRN for pain, albuterol 90mcg 2puffs q6hrs PRN for dyspnea, ondansetron 4mg q6hrs PRN for nausea  - Discussed CRP with medicine service, given asymptomatic pt and normal troponin, no additional intervention/diagnostics at this time.  - Discontinued clobetasol as rash has resolved.  4.) Dispo planning: State application submitted. Patient is 27yo single woman, no dependents, domiciled with her Grandmother, unemployed on disability/SSI, pphx of schizoaffective disorder, multiple past psychiatric admissions including state and recent discharge from [facility], in tx with [facility], with pmhx of asthma, HTN, GERD, and hypothyroidism and schizoaffective disorder, in tx with [facility], who self presented to the ED reporting abdominal pain and requesting readmission to psychiatric unit, reporting CAH, depressive symptoms, IOR, suicidality, admitted to [facility] for psychiatric care.  Depression and psychosis likely multifactorial at this time, schizoaffective vs. borderline personality disorder vs. complex grief vs. adjustment disorder.      The patient is showing some improvement in symptoms, reporting less AH and less SI.  She denies any suicidal plan or intent, and feels safe off 1:1.  Increased clozapine to 275mg, tolerating it well.  Taper off Zyprexa.    1.) Obs: Stop 1:1, patient with decrease in SI.  She has been able to approach staff when needed.  2.) Psychiatric medication management:  - Reviewed options for addressing nocturnal enuresis including reducing clozapine vs reducing lithium vs adding desmopressin.  Pt decided upon desmopressin as she continues to have depression and psychosis.  Desmopressin 0.1 mg QHS.  - Lithobid 1200 mg QHS.  Pt aware that she needs to stay well hydrated and avoid diuretics and NSAIDs.   - Decrease olanzapine to 2.5 mg BID with plan to taper off.  - Increase Clozapine to 275mg qHS for psychosis ([id number])  - Prazosin 2mg qhs, monitor BP carefully.  - Abilify Maintena 400mg IM q3 weeks rather than 4, per collateral from ACT team.  Doses given 10/19, 11/17, 12/8, next 12/29.  - Mirtazapine was switched to escitalopram, will increase to 20 mg daily.  - Wellbutrin XL 150mg daily  - Gabapentin 300 mg BID for anxiety/chronic pain.  - PRN: haloperidol 5mg PO/IM q8hrs PRN for agitation, diphenhydramine 50mg PO/IM q6hrs PRN for EPS PPx, hydroxyzine 50mg PO q12hrs PRN for anxiety, lorazepam 2mg PO q8hrs PRN for anxiety or 2mg IM for assaultive behavior. Melatonin 5mg qHS PRN for insomnia  - Discussed ECT several times with pt who states she has been told in two hospitals in past that due to her obesity and asthma, she is not an ideal candidate.  Pt states that due to this she is not interested in receiving ECT at this time.  3.) Medical:   - Pt scheduled for dental extractions 11/1, however declined to attend on day prior when informed it would not be under general anesthesia.  - last clozapine labs on 10/23 (CBC with diff, troponins, CRP); CRP elevated at 15.8 likely due to dental infection/wisdom tooth issues  - pantoprazole 40mg PO before breakfast for GERD  - montelukast 10mg PO qHS for asthma  - budesonide 80mcg/formoterol 4.5mcg 2 puffs BID for asthma  - PRN: acetaminophen 650mg PO q6hrs PRN for pain, albuterol 90mcg 2puffs q6hrs PRN for dyspnea, ondansetron 4mg q6hrs PRN for nausea  - Discussed CRP with medicine service, given asymptomatic pt and normal troponin, no additional intervention/diagnostics at this time.  - Discontinued clobetasol as rash has resolved.  4.) Dispo planning: State application submitted.

## 2023-12-15 NOTE — BH INPATIENT PSYCHIATRY PROGRESS NOTE - NSBHCHARTREVIEWVS_PSY_A_CORE FT
Vital Signs Last 24 Hrs  T(C): 36.9 (12-15-23 @ 08:19), Max: 36.9 (12-15-23 @ 08:19)  T(F): 98.5 (12-15-23 @ 08:19), Max: 98.5 (12-15-23 @ 08:19)  HR: --  BP: --  BP(mean): --  RR: --  SpO2: --    Orthostatic VS  12-15-23 @ 08:19  Lying BP: --/-- HR: --  Sitting BP: 111/64 HR: 87  Standing BP: 132/69 HR: 92  Site: --  Mode: --  Orthostatic VS  12-14-23 @ 20:28  Lying BP: --/-- HR: --  Sitting BP: 141/78 HR: 96  Standing BP: 138/79 HR: 108  Site: --  Mode: --  Orthostatic VS  12-14-23 @ 07:55  Lying BP: --/-- HR: --  Sitting BP: 131/78 HR: 86  Standing BP: 123/81 HR: 73  Site: --  Mode: --  Orthostatic VS  12-13-23 @ 20:38  Lying BP: --/-- HR: --  Sitting BP: 120/72 HR: 107  Standing BP: 122/82 HR: 112  Site: --  Mode: --

## 2023-12-15 NOTE — BH INPATIENT PSYCHIATRY PROGRESS NOTE - NSBHATTESTBILLING_PSY_A_CORE
[id number] OBS or IP - moderate complexity OR 35-49 mins [id number] OBS or IP - moderate complexity OR 35-49 mins

## 2023-12-15 NOTE — LEGAL STATUS PROGRESS NOTE - NSLEGALSTATUS_PSY_ALL_CORE
9.27 Involuntary (2PC) Converted from Voluntary
9.13 Voluntary
9.27 Involuntary (2PC) Converted from Voluntary

## 2023-12-15 NOTE — BH INPATIENT PSYCHIATRY PROGRESS NOTE - CURRENT MEDICATION
MEDICATIONS  (STANDING):  budesonide  80 MICROgram(s)/formoterol 4.5 MICROgram(s) Inhaler 2 Puff(s) Inhalation two times a day  buPROPion XL (24-Hour) 150 milliGRAM(s) Oral daily  cloZAPine 275 milliGRAM(s) Oral at bedtime  desmopressin 0.1 milliGRAM(s) Oral at bedtime  escitalopram 20 milliGRAM(s) Oral daily  gabapentin 300 milliGRAM(s) Oral two times a day  influenza   Vaccine 0.5 milliLiter(s) IntraMuscular once  lithium SR (LITHOBID) 1200 milliGRAM(s) Oral at bedtime  montelukast 10 milliGRAM(s) Oral at bedtime  OLANZapine 2.5 milliGRAM(s) Oral at bedtime  oseltamivir 75 milliGRAM(s) Oral daily  pantoprazole    Tablet 40 milliGRAM(s) Oral before breakfast  prazosin. 2 milliGRAM(s) Oral at bedtime    MEDICATIONS  (PRN):  acetaminophen     Tablet .. 650 milliGRAM(s) Oral every 6 hours PRN Moderate Pain (4 - 6)  albuterol    90 MICROgram(s) HFA Inhaler 2 Puff(s) Inhalation every 6 hours PRN Shortness of Breath and/or Wheezing  aluminum hydroxide/magnesium hydroxide/simethicone Suspension 30 milliLiter(s) Oral every 4 hours PRN Dyspepsia  chlorproMAZINE    Injectable 50 milliGRAM(s) IntraMuscular once PRN severe agitation  chlorproMAZINE    Tablet 50 milliGRAM(s) Oral every 6 hours PRN agitation  hydrOXYzine hydrochloride 50 milliGRAM(s) Oral every 12 hours PRN Anxiety  lidocaine   4% Patch 1 Patch Transdermal daily PRN LBP  LORazepam     Tablet 2 milliGRAM(s) Oral every 8 hours PRN Anxiety  LORazepam   Injectable 2 milliGRAM(s) IntraMuscular once PRN Assaultive behavior  melatonin. 5 milliGRAM(s) Oral at bedtime PRN Insomnia  ondansetron    Tablet 4 milliGRAM(s) Oral every 6 hours PRN nausea  polyethylene glycol 3350 17 Gram(s) Oral daily PRN Constipation

## 2023-12-16 RX ORDER — ONDANSETRON 8 MG/1
4 TABLET, FILM COATED ORAL EVERY 6 HOURS
Refills: 0 | Status: DISCONTINUED | OUTPATIENT
Start: 2023-12-16 | End: 2024-02-06

## 2023-12-16 RX ADMIN — BUDESONIDE AND FORMOTEROL FUMARATE DIHYDRATE 2 PUFF(S): 160; 4.5 AEROSOL RESPIRATORY (INHALATION) at 08:29

## 2023-12-16 RX ADMIN — MONTELUKAST 10 MILLIGRAM(S): 4 TABLET, CHEWABLE ORAL at 20:47

## 2023-12-16 RX ADMIN — DESMOPRESSIN ACETATE 0.1 MILLIGRAM(S): 0.1 TABLET ORAL at 20:47

## 2023-12-16 RX ADMIN — ESCITALOPRAM OXALATE 20 MILLIGRAM(S): 10 TABLET, FILM COATED ORAL at 08:28

## 2023-12-16 RX ADMIN — GABAPENTIN 300 MILLIGRAM(S): 400 CAPSULE ORAL at 08:28

## 2023-12-16 RX ADMIN — Medication 650 MILLIGRAM(S): at 21:33

## 2023-12-16 RX ADMIN — GABAPENTIN 300 MILLIGRAM(S): 400 CAPSULE ORAL at 20:48

## 2023-12-16 RX ADMIN — ONDANSETRON 4 MILLIGRAM(S): 8 TABLET, FILM COATED ORAL at 17:53

## 2023-12-16 RX ADMIN — PANTOPRAZOLE SODIUM 40 MILLIGRAM(S): 20 TABLET, DELAYED RELEASE ORAL at 08:28

## 2023-12-16 RX ADMIN — LITHIUM CARBONATE 1200 MILLIGRAM(S): 300 TABLET, EXTENDED RELEASE ORAL at 20:48

## 2023-12-16 RX ADMIN — Medication 2 MILLIGRAM(S): at 20:48

## 2023-12-16 RX ADMIN — Medication 30 MILLILITER(S): at 17:41

## 2023-12-16 RX ADMIN — BUPROPION HYDROCHLORIDE 150 MILLIGRAM(S): 150 TABLET, EXTENDED RELEASE ORAL at 08:29

## 2023-12-16 RX ADMIN — Medication 75 MILLIGRAM(S): at 08:28

## 2023-12-16 RX ADMIN — CLOZAPINE 275 MILLIGRAM(S): 150 TABLET, ORALLY DISINTEGRATING ORAL at 20:47

## 2023-12-16 RX ADMIN — ONDANSETRON 4 MILLIGRAM(S): 8 TABLET, FILM COATED ORAL at 22:00

## 2023-12-16 RX ADMIN — Medication 650 MILLIGRAM(S): at 13:02

## 2023-12-17 RX ADMIN — Medication 2 MILLIGRAM(S): at 20:27

## 2023-12-17 RX ADMIN — CLOZAPINE 275 MILLIGRAM(S): 150 TABLET, ORALLY DISINTEGRATING ORAL at 20:26

## 2023-12-17 RX ADMIN — Medication 75 MILLIGRAM(S): at 08:39

## 2023-12-17 RX ADMIN — BUDESONIDE AND FORMOTEROL FUMARATE DIHYDRATE 2 PUFF(S): 160; 4.5 AEROSOL RESPIRATORY (INHALATION) at 08:40

## 2023-12-17 RX ADMIN — GABAPENTIN 300 MILLIGRAM(S): 400 CAPSULE ORAL at 08:39

## 2023-12-17 RX ADMIN — BUPROPION HYDROCHLORIDE 150 MILLIGRAM(S): 150 TABLET, EXTENDED RELEASE ORAL at 08:39

## 2023-12-17 RX ADMIN — PANTOPRAZOLE SODIUM 40 MILLIGRAM(S): 20 TABLET, DELAYED RELEASE ORAL at 08:40

## 2023-12-17 RX ADMIN — MONTELUKAST 10 MILLIGRAM(S): 4 TABLET, CHEWABLE ORAL at 20:27

## 2023-12-17 RX ADMIN — LITHIUM CARBONATE 1200 MILLIGRAM(S): 300 TABLET, EXTENDED RELEASE ORAL at 20:27

## 2023-12-17 RX ADMIN — ESCITALOPRAM OXALATE 20 MILLIGRAM(S): 10 TABLET, FILM COATED ORAL at 08:40

## 2023-12-17 RX ADMIN — Medication 650 MILLIGRAM(S): at 20:28

## 2023-12-17 RX ADMIN — GABAPENTIN 300 MILLIGRAM(S): 400 CAPSULE ORAL at 20:26

## 2023-12-17 RX ADMIN — DESMOPRESSIN ACETATE 0.1 MILLIGRAM(S): 0.1 TABLET ORAL at 20:26

## 2023-12-18 PROCEDURE — 99231 SBSQ HOSP IP/OBS SF/LOW 25: CPT

## 2023-12-18 RX ADMIN — Medication 75 MILLIGRAM(S): at 08:31

## 2023-12-18 RX ADMIN — PANTOPRAZOLE SODIUM 40 MILLIGRAM(S): 20 TABLET, DELAYED RELEASE ORAL at 08:31

## 2023-12-18 RX ADMIN — GABAPENTIN 300 MILLIGRAM(S): 400 CAPSULE ORAL at 20:42

## 2023-12-18 RX ADMIN — MONTELUKAST 10 MILLIGRAM(S): 4 TABLET, CHEWABLE ORAL at 20:54

## 2023-12-18 RX ADMIN — ESCITALOPRAM OXALATE 20 MILLIGRAM(S): 10 TABLET, FILM COATED ORAL at 08:32

## 2023-12-18 RX ADMIN — BUDESONIDE AND FORMOTEROL FUMARATE DIHYDRATE 2 PUFF(S): 160; 4.5 AEROSOL RESPIRATORY (INHALATION) at 08:31

## 2023-12-18 RX ADMIN — CLOZAPINE 275 MILLIGRAM(S): 150 TABLET, ORALLY DISINTEGRATING ORAL at 20:42

## 2023-12-18 RX ADMIN — LITHIUM CARBONATE 1200 MILLIGRAM(S): 300 TABLET, EXTENDED RELEASE ORAL at 20:42

## 2023-12-18 RX ADMIN — BUPROPION HYDROCHLORIDE 150 MILLIGRAM(S): 150 TABLET, EXTENDED RELEASE ORAL at 08:31

## 2023-12-18 RX ADMIN — DESMOPRESSIN ACETATE 0.1 MILLIGRAM(S): 0.1 TABLET ORAL at 20:42

## 2023-12-18 RX ADMIN — GABAPENTIN 300 MILLIGRAM(S): 400 CAPSULE ORAL at 08:31

## 2023-12-18 RX ADMIN — Medication 50 MILLIGRAM(S): at 08:59

## 2023-12-18 RX ADMIN — Medication 2 MILLIGRAM(S): at 20:43

## 2023-12-18 RX ADMIN — Medication 650 MILLIGRAM(S): at 00:24

## 2023-12-18 NOTE — BH INPATIENT PSYCHIATRY PROGRESS NOTE - NSBHCHARTREVIEWVS_PSY_A_CORE FT
Vital Signs Last 24 Hrs  T(C): 36.9 (12-18-23 @ 08:29), Max: 36.9 (12-18-23 @ 08:29)  T(F): 98.5 (12-18-23 @ 08:29), Max: 98.5 (12-18-23 @ 08:29)  HR: --  BP: --  BP(mean): --  RR: --  SpO2: --    Orthostatic VS  12-18-23 @ 08:29  Lying BP: --/-- HR: --  Sitting BP: 113/65 HR: 87  Standing BP: 138/91 HR: 85  Site: --  Mode: --  Orthostatic VS  12-17-23 @ 20:37  Lying BP: --/-- HR: --  Sitting BP: 134/77 HR: 98  Standing BP: 135/82 HR: 105  Site: --  Mode: --  Orthostatic VS  12-17-23 @ 08:23  Lying BP: --/-- HR: --  Sitting BP: 122/70 HR: 103  Standing BP: 140/87 HR: 109  Site: --  Mode: --  Orthostatic VS  12-16-23 @ 20:43  Lying BP: --/-- HR: --  Sitting BP: 125/88 HR: 101  Standing BP: 123/90 HR: 106  Site: --  Mode: --  Orthostatic VS  12-16-23 @ 18:03  Lying BP: --/-- HR: --  Sitting BP: 135/89 HR: 95  Standing BP: 151/100 HR: 113  Site: --  Mode: --

## 2023-12-18 NOTE — BH INPATIENT PSYCHIATRY PROGRESS NOTE - NSBHFUPINTERVALHXFT_PSY_A_CORE
Chart reviewed including pertinent labs, imaging, ekg. case discussed with treatment team.  Over this interval: patient has various, shifting, and vague somatic complaints. she notes feeling more anxious due to uncertainty of her disposition and housing. she denies SI. denies AH at this time. no a/e to medications. pt is in good behavioral control. no STAT medications required.

## 2023-12-18 NOTE — BH INPATIENT PSYCHIATRY PROGRESS NOTE - PRN MEDS
MEDICATIONS  (PRN):  acetaminophen     Tablet .. 650 milliGRAM(s) Oral every 6 hours PRN Moderate Pain (4 - 6)  albuterol    90 MICROgram(s) HFA Inhaler 2 Puff(s) Inhalation every 6 hours PRN Shortness of Breath and/or Wheezing  aluminum hydroxide/magnesium hydroxide/simethicone Suspension 30 milliLiter(s) Oral every 4 hours PRN Dyspepsia  chlorproMAZINE    Injectable 50 milliGRAM(s) IntraMuscular once PRN severe agitation  chlorproMAZINE    Tablet 50 milliGRAM(s) Oral every 6 hours PRN agitation  hydrOXYzine hydrochloride 50 milliGRAM(s) Oral every 12 hours PRN Anxiety  lidocaine   4% Patch 1 Patch Transdermal daily PRN LBP  LORazepam     Tablet 2 milliGRAM(s) Oral every 8 hours PRN Anxiety  LORazepam   Injectable 2 milliGRAM(s) IntraMuscular once PRN Assaultive behavior  melatonin. 5 milliGRAM(s) Oral at bedtime PRN Insomnia  ondansetron    Tablet 4 milliGRAM(s) Oral every 6 hours PRN nausea  ondansetron    Tablet 4 milliGRAM(s) Oral every 6 hours PRN nausea  polyethylene glycol 3350 17 Gram(s) Oral daily PRN Constipation

## 2023-12-18 NOTE — BH INPATIENT PSYCHIATRY PROGRESS NOTE - CURRENT MEDICATION
MEDICATIONS  (STANDING):  budesonide  80 MICROgram(s)/formoterol 4.5 MICROgram(s) Inhaler 2 Puff(s) Inhalation two times a day  buPROPion XL (24-Hour) 150 milliGRAM(s) Oral daily  cloZAPine 275 milliGRAM(s) Oral at bedtime  desmopressin 0.1 milliGRAM(s) Oral at bedtime  escitalopram 20 milliGRAM(s) Oral daily  gabapentin 300 milliGRAM(s) Oral two times a day  influenza   Vaccine 0.5 milliLiter(s) IntraMuscular once  lithium SR (LITHOBID) 1200 milliGRAM(s) Oral at bedtime  montelukast 10 milliGRAM(s) Oral at bedtime  oseltamivir 75 milliGRAM(s) Oral daily  pantoprazole    Tablet 40 milliGRAM(s) Oral before breakfast  prazosin. 2 milliGRAM(s) Oral at bedtime    MEDICATIONS  (PRN):  acetaminophen     Tablet .. 650 milliGRAM(s) Oral every 6 hours PRN Moderate Pain (4 - 6)  albuterol    90 MICROgram(s) HFA Inhaler 2 Puff(s) Inhalation every 6 hours PRN Shortness of Breath and/or Wheezing  aluminum hydroxide/magnesium hydroxide/simethicone Suspension 30 milliLiter(s) Oral every 4 hours PRN Dyspepsia  chlorproMAZINE    Injectable 50 milliGRAM(s) IntraMuscular once PRN severe agitation  chlorproMAZINE    Tablet 50 milliGRAM(s) Oral every 6 hours PRN agitation  hydrOXYzine hydrochloride 50 milliGRAM(s) Oral every 12 hours PRN Anxiety  lidocaine   4% Patch 1 Patch Transdermal daily PRN LBP  LORazepam     Tablet 2 milliGRAM(s) Oral every 8 hours PRN Anxiety  LORazepam   Injectable 2 milliGRAM(s) IntraMuscular once PRN Assaultive behavior  melatonin. 5 milliGRAM(s) Oral at bedtime PRN Insomnia  ondansetron    Tablet 4 milliGRAM(s) Oral every 6 hours PRN nausea  ondansetron    Tablet 4 milliGRAM(s) Oral every 6 hours PRN nausea  polyethylene glycol 3350 17 Gram(s) Oral daily PRN Constipation

## 2023-12-18 NOTE — BH INPATIENT PSYCHIATRY PROGRESS NOTE - MSE UNSTRUCTURED FT
The patient appears stated age, with fair hygiene, and is dressed appropriately.  She was calm and cooperative with the interview.  She maintained appropriate eye contact.  No restlessness or slowing of movements observed.  Gait is steady.  The patient’s speech was fluent, normal in tone, rate, and volume.  The patient’s mood is “a little anxious”  Her affect is less constricted, stable, appropriate.  The patient’s thoughts are goal directed.  She does not have any delusions, denies auditory hallucinations today.  She denies suicidal ideation, no homicidal ideation, intent, or plan.  Insight is fair.  Judgment is fair.  Impulse control has been fair on the unit.

## 2023-12-18 NOTE — BH INPATIENT PSYCHIATRY PROGRESS NOTE - NSBHASSESSSUMMFT_PSY_ALL_CORE
Patient is 27yo single woman, no dependents, domiciled with her Grandmother, unemployed on disability/SSI, pphx of schizoaffective disorder, multiple past psychiatric admissions including state and recent discharge from [facility], [facility], with pmhx of asthma, HTN, GERD, and hypothyroidism and schizoaffective disorder, in tx with [facility], who self presented to the ED reporting abdominal pain and requesting readmission to psychiatric unit, reporting CAH, depressive symptoms, IOR, suicidality, admitted to [facility] for psychiatric care.  Depression and psychosis likely multifactorial at this time, schizoaffective vs. borderline personality disorder vs. complex grief vs. adjustment disorder.      patient reports feeling anxious this AM but otherwise denies SI, HI, AVH. per primary team, pt accepted to [facility] and awaiting placement     1.) Obs: q15 min obs   2.) Psychiatric medication management:  - Reviewed options for addressing nocturnal enuresis including reducing clozapine vs reducing lithium vs adding desmopressin.  Pt decided upon desmopressin as she continues to have depression and psychosis.  Desmopressin 0.1 mg QHS.  - Lithobid 1200 mg QHS.  Pt aware that she needs to stay well hydrated and avoid diuretics and NSAIDs.   - Decrease olanzapine to 2.5 mg BID with plan to taper off.  - Increase Clozapine to 275mg qHS for psychosis ([id number])  - Prazosin 2mg qhs, monitor BP carefully.  - Abilify Maintena 400mg IM q3 weeks rather than 4, per collateral from ACT team.  Doses given 10/19, 11/17, 12/8, next 12/29.  - Mirtazapine was switched to escitalopram, will increase to 20 mg daily.  - Wellbutrin XL 150mg daily  - Gabapentin 300 mg BID for anxiety/chronic pain.  - PRN: haloperidol 5mg PO/IM q8hrs PRN for agitation, diphenhydramine 50mg PO/IM q6hrs PRN for EPS PPx, hydroxyzine 50mg PO q12hrs PRN for anxiety, lorazepam 2mg PO q8hrs PRN for anxiety or 2mg IM for assaultive behavior. Melatonin 5mg qHS PRN for insomnia  - Discussed ECT several times with pt who states she has been told in two hospitals in past that due to her obesity and asthma, she is not an ideal candidate.  Pt states that due to this she is not interested in receiving ECT at this time.  3.) Medical:   - Pt scheduled for dental extractions 11/1, however declined to attend on day prior when informed it would not be under general anesthesia.  - last clozapine labs on 10/23 (CBC with diff, troponins, CRP); CRP elevated at 15.8 likely due to dental infection/wisdom tooth issues  - pantoprazole 40mg PO before breakfast for GERD  - montelukast 10mg PO qHS for asthma  - budesonide 80mcg/formoterol 4.5mcg 2 puffs BID for asthma  - PRN: acetaminophen 650mg PO q6hrs PRN for pain, albuterol 90mcg 2puffs q6hrs PRN for dyspnea, ondansetron 4mg q6hrs PRN for nausea  - Discussed CRP with medicine service, given asymptomatic pt and normal troponin, no additional intervention/diagnostics at this time.  - Discontinued clobetasol as rash has resolved.  4.) Dispo planning: State application submitted. Patient is 27yo single woman, no dependents, domiciled with her Grandmother, unemployed on disability/SSI, pphx of schizoaffective disorder, multiple past psychiatric admissions including state and recent discharge from [facility], in tx with [facility], with pmhx of asthma, HTN, GERD, and hypothyroidism and schizoaffective disorder, in tx with [facility], who self presented to the ED reporting abdominal pain and requesting readmission to psychiatric unit, reporting CAH, depressive symptoms, IOR, suicidality, admitted to [facility] for psychiatric care.  Depression and psychosis likely multifactorial at this time, schizoaffective vs. borderline personality disorder vs. complex grief vs. adjustment disorder.      patient reports feeling anxious this AM but otherwise denies SI, HI, AVH. per primary team, pt accepted to [facility] and awaiting placement     1.) Obs: q15 min obs   2.) Psychiatric medication management:  - Reviewed options for addressing nocturnal enuresis including reducing clozapine vs reducing lithium vs adding desmopressin.  Pt decided upon desmopressin as she continues to have depression and psychosis.  Desmopressin 0.1 mg QHS.  - Lithobid 1200 mg QHS.  Pt aware that she needs to stay well hydrated and avoid diuretics and NSAIDs.   - Decrease olanzapine to 2.5 mg BID with plan to taper off.  - Increase Clozapine to 275mg qHS for psychosis ([id number])  - Prazosin 2mg qhs, monitor BP carefully.  - Abilify Maintena 400mg IM q3 weeks rather than 4, per collateral from ACT team.  Doses given 10/19, 11/17, 12/8, next 12/29.  - Mirtazapine was switched to escitalopram, will increase to 20 mg daily.  - Wellbutrin XL 150mg daily  - Gabapentin 300 mg BID for anxiety/chronic pain.  - PRN: haloperidol 5mg PO/IM q8hrs PRN for agitation, diphenhydramine 50mg PO/IM q6hrs PRN for EPS PPx, hydroxyzine 50mg PO q12hrs PRN for anxiety, lorazepam 2mg PO q8hrs PRN for anxiety or 2mg IM for assaultive behavior. Melatonin 5mg qHS PRN for insomnia  - Discussed ECT several times with pt who states she has been told in two hospitals in past that due to her obesity and asthma, she is not an ideal candidate.  Pt states that due to this she is not interested in receiving ECT at this time.  3.) Medical:   - Pt scheduled for dental extractions 11/1, however declined to attend on day prior when informed it would not be under general anesthesia.  - last clozapine labs on 10/23 (CBC with diff, troponins, CRP); CRP elevated at 15.8 likely due to dental infection/wisdom tooth issues  - pantoprazole 40mg PO before breakfast for GERD  - montelukast 10mg PO qHS for asthma  - budesonide 80mcg/formoterol 4.5mcg 2 puffs BID for asthma  - PRN: acetaminophen 650mg PO q6hrs PRN for pain, albuterol 90mcg 2puffs q6hrs PRN for dyspnea, ondansetron 4mg q6hrs PRN for nausea  - Discussed CRP with medicine service, given asymptomatic pt and normal troponin, no additional intervention/diagnostics at this time.  - Discontinued clobetasol as rash has resolved.  4.) Dispo planning: State application submitted.

## 2023-12-18 NOTE — BH INPATIENT PSYCHIATRY PROGRESS NOTE - NSBHATTESTBILLING_PSY_A_CORE
91222-[id number] OBS or IP - low complexity OR 25-34 mins [id number] OBS or IP - low complexity OR 25-34 mins

## 2023-12-19 PROCEDURE — 99232 SBSQ HOSP IP/OBS MODERATE 35: CPT

## 2023-12-19 RX ORDER — CLOZAPINE 150 MG/1
300 TABLET, ORALLY DISINTEGRATING ORAL AT BEDTIME
Refills: 0 | Status: DISCONTINUED | OUTPATIENT
Start: 2023-12-19 | End: 2024-02-06

## 2023-12-19 RX ADMIN — Medication 2 MILLIGRAM(S): at 20:22

## 2023-12-19 RX ADMIN — Medication 75 MILLIGRAM(S): at 08:56

## 2023-12-19 RX ADMIN — MONTELUKAST 10 MILLIGRAM(S): 4 TABLET, CHEWABLE ORAL at 20:23

## 2023-12-19 RX ADMIN — CLOZAPINE 300 MILLIGRAM(S): 150 TABLET, ORALLY DISINTEGRATING ORAL at 20:20

## 2023-12-19 RX ADMIN — BUDESONIDE AND FORMOTEROL FUMARATE DIHYDRATE 2 PUFF(S): 160; 4.5 AEROSOL RESPIRATORY (INHALATION) at 08:57

## 2023-12-19 RX ADMIN — BUDESONIDE AND FORMOTEROL FUMARATE DIHYDRATE 2 PUFF(S): 160; 4.5 AEROSOL RESPIRATORY (INHALATION) at 21:34

## 2023-12-19 RX ADMIN — PANTOPRAZOLE SODIUM 40 MILLIGRAM(S): 20 TABLET, DELAYED RELEASE ORAL at 08:57

## 2023-12-19 RX ADMIN — BUPROPION HYDROCHLORIDE 150 MILLIGRAM(S): 150 TABLET, EXTENDED RELEASE ORAL at 08:56

## 2023-12-19 RX ADMIN — GABAPENTIN 300 MILLIGRAM(S): 400 CAPSULE ORAL at 08:56

## 2023-12-19 RX ADMIN — LITHIUM CARBONATE 1200 MILLIGRAM(S): 300 TABLET, EXTENDED RELEASE ORAL at 20:21

## 2023-12-19 RX ADMIN — DESMOPRESSIN ACETATE 0.1 MILLIGRAM(S): 0.1 TABLET ORAL at 20:22

## 2023-12-19 RX ADMIN — GABAPENTIN 300 MILLIGRAM(S): 400 CAPSULE ORAL at 20:21

## 2023-12-19 RX ADMIN — ESCITALOPRAM OXALATE 20 MILLIGRAM(S): 10 TABLET, FILM COATED ORAL at 08:56

## 2023-12-19 NOTE — BH PSYCHOLOGY - CLINICIAN PSYCHOTHERAPY NOTE - NSBHPSYCHOLNARRATIVE_PSY_A_CORE FT
Writer met with Pt for 30-minute individual therapy session. Pt was alert and presented with adequate hygiene and grooming. Pt reported feeling "good", affect full and congruent. Pt denied experiencing any A/V hallucinations. Her thought process was linear and goal directed. Pts’ speech was WNL, content was relevant to discussion. Pt denied passive or active SI, HI or NSSI. Oriented x3. Limited insight and judgement demonstrated.      Pt and Wr discussed pts mood, as well as engagement with groups, and psychology board and explored reasons why pt was leaving psychology group early. Pt and Wr did values exploration, and ACT bullseye worksheet. Did some psychoed on values and ACT. Pt identified values in different areas such as work/education: focus, persistence; relationships: boundaries, honesty, trust, unity; leisure: creativity, fun, engagement; as well as health/personal growth: caring for self, selfcare, living independently. Explored how patient viewed where she stands in each of those areas; as well as reasons behind why and what she would be doing differently to live closer to her values.

## 2023-12-19 NOTE — BH INPATIENT PSYCHIATRY PROGRESS NOTE - NSBHCHARTREVIEWVS_PSY_A_CORE FT
Vital Signs Last 24 Hrs  T(C): 37 (12-19-23 @ 08:37), Max: 37 (12-19-23 @ 08:37)  T(F): 98.6 (12-19-23 @ 08:37), Max: 98.6 (12-19-23 @ 08:37)  HR: --  BP: --  BP(mean): --  RR: --  SpO2: --    Orthostatic VS  12-19-23 @ 08:37  Lying BP: --/-- HR: --  Sitting BP: 147/83 HR: 104  Standing BP: 139/84 HR: 105  Site: --  Mode: --  Orthostatic VS  12-18-23 @ 21:14  Lying BP: --/-- HR: --  Sitting BP: 125/74 HR: 97  Standing BP: 136/85 HR: 103  Site: --  Mode: --  Orthostatic VS  12-18-23 @ 08:29  Lying BP: --/-- HR: --  Sitting BP: 113/65 HR: 87  Standing BP: 138/91 HR: 85  Site: --  Mode: --  Orthostatic VS  12-17-23 @ 20:37  Lying BP: --/-- HR: --  Sitting BP: 134/77 HR: 98  Standing BP: 135/82 HR: 105  Site: --  Mode: --

## 2023-12-19 NOTE — BH INPATIENT PSYCHIATRY PROGRESS NOTE - NSBHMETABOLIC_PSY_ALL_CORE_FT
BMI: BMI (kg/m2): 57.8 (09-14-23 @ 14:30)  HbA1c: A1C with Estimated Average Glucose Result: 5.2 % (11-01-23 @ 09:04)    Glucose:   BP: --Vital Signs Last 24 Hrs  T(C): 37 (12-19-23 @ 08:37), Max: 37 (12-19-23 @ 08:37)  T(F): 98.6 (12-19-23 @ 08:37), Max: 98.6 (12-19-23 @ 08:37)  HR: --  BP: --  BP(mean): --  RR: --  SpO2: --    Orthostatic VS  12-19-23 @ 08:37  Lying BP: --/-- HR: --  Sitting BP: 147/83 HR: 104  Standing BP: 139/84 HR: 105  Site: --  Mode: --  Orthostatic VS  12-18-23 @ 21:14  Lying BP: --/-- HR: --  Sitting BP: 125/74 HR: 97  Standing BP: 136/85 HR: 103  Site: --  Mode: --  Orthostatic VS  12-18-23 @ 08:29  Lying BP: --/-- HR: --  Sitting BP: 113/65 HR: 87  Standing BP: 138/91 HR: 85  Site: --  Mode: --  Orthostatic VS  12-17-23 @ 20:37  Lying BP: --/-- HR: --  Sitting BP: 134/77 HR: 98  Standing BP: 135/82 HR: 105  Site: --  Mode: --    Lipid Panel: Date/Time: 11-01-23 @ 09:04  Cholesterol, Serum: 156  LDL Cholesterol Calculated: 82  HDL Cholesterol, Serum: 58  Total Cholesterol/HDL Ration Measurement: --  Triglycerides, Serum: 79

## 2023-12-19 NOTE — BH INPATIENT PSYCHIATRY PROGRESS NOTE - NSBHFUPINTERVALHXFT_PSY_A_CORE
Patient seen for follow up of depression, AHs, SI, chart reviewed, and case discussed with nursing team.  No events reported overnight.  The patient states she has been doing relatively well.  She continues to have some anxiety related to disposition planning.  However, she states overall her mood has been better.  She also denies any AH or SI today.  Behavior continues to be well controlled.  The patient has been visible on the unit, social with peers, and attending groups.  The patient reports eating and sleeping well.  She has been compliant with medications, tolerating them well.

## 2023-12-19 NOTE — BH INPATIENT PSYCHIATRY PROGRESS NOTE - NSBHASSESSSUMMFT_PSY_ALL_CORE
Patient is 27yo single woman, no dependents, domiciled with her Grandmother, unemployed on disability/SSI, pphx of schizoaffective disorder, multiple past psychiatric admissions including state and recent discharge from [facility], in tx with [facility], with pmhx of asthma, HTN, GERD, and hypothyroidism and schizoaffective disorder, in tx with [facility], who self presented to the ED reporting abdominal pain and requesting readmission to psychiatric unit, reporting CAH, depressive symptoms, IOR, suicidality, admitted to [facility] for psychiatric care.  Depression and psychosis likely multifactorial at this time, schizoaffective vs. borderline personality disorder vs. complex grief vs. adjustment disorder.      The patient is showing some improvement in symptoms, reporting improvement in mood, denies AH or SI today.  She has been tolerating increase in clozapine well, will increase to 300mg hs.  Tapered off Zyprexa.    1.) Obs: Stop 1:1, patient with decrease in SI.  She has been able to approach staff when needed.  2.) Psychiatric medication management:  - Reviewed options for addressing nocturnal enuresis including reducing clozapine vs reducing lithium vs adding desmopressin.  Pt decided upon desmopressin as she continues to have depression and psychosis.  Desmopressin 0.1 mg QHS.  - Lithobid 1200 mg QHS.  Pt aware that she needs to stay well hydrated and avoid diuretics and NSAIDs.   - Discontinued olanzapine  - Increase Clozapine to 300mg qHS for psychosis ([id number])  - Prazosin 2mg qhs, monitor BP carefully.  - Abilify Maintena 400mg IM q3 weeks rather than 4, per collateral from ACT team.  Doses given 10/19, 11/17, 12/8, next 12/29.  - Mirtazapine was switched to escitalopram, increased to 20 mg daily.  - Wellbutrin XL 150mg daily  - Gabapentin 300 mg BID for anxiety/chronic pain.  - PRN: haloperidol 5mg PO/IM q8hrs PRN for agitation, diphenhydramine 50mg PO/IM q6hrs PRN for EPS PPx, hydroxyzine 50mg PO q12hrs PRN for anxiety, lorazepam 2mg PO q8hrs PRN for anxiety or 2mg IM for assaultive behavior. Melatonin 5mg qHS PRN for insomnia  - Discussed ECT several times with pt who states she has been told in two hospitals in past that due to her obesity and asthma, she is not an ideal candidate.  Pt states that due to this she is not interested in receiving ECT at this time.  3.) Medical:   - Pt scheduled for dental extractions 11/1, however declined to attend on day prior when informed it would not be under general anesthesia.  - last clozapine labs on 10/23 (CBC with diff, troponins, CRP); CRP elevated at 15.8 likely due to dental infection/wisdom tooth issues  - pantoprazole 40mg PO before breakfast for GERD  - montelukast 10mg PO qHS for asthma  - budesonide 80mcg/formoterol 4.5mcg 2 puffs BID for asthma  - PRN: acetaminophen 650mg PO q6hrs PRN for pain, albuterol 90mcg 2puffs q6hrs PRN for dyspnea, ondansetron 4mg q6hrs PRN for nausea  - Discussed CRP with medicine service, given asymptomatic pt and normal troponin, no additional intervention/diagnostics at this time.  - Discontinued clobetasol as rash has resolved.  4.) Dispo planning: State application submitted. Patient is 27yo single woman, no dependents, domiciled with her Grandmother, unemployed on disability/SSI, pphx of schizoaffective disorder, multiple past psychiatric admissions including state and recent discharge from [facility], in tx with [facility], with pmhx of asthma, HTN, GERD, and hypothyroidism and schizoaffective disorder, in tx with [facility], who self presented to the ED reporting abdominal pain and requesting readmission to psychiatric unit, reporting CAH, depressive symptoms, IOR, suicidality, admitted to [facility] for psychiatric care.  Depression and psychosis likely multifactorial at this time, schizoaffective vs. borderline personality disorder vs. complex grief vs. adjustment disorder.      The patient is showing some improvement in symptoms, reporting improvement in mood, denies AH or SI today.  She has been tolerating increase in clozapine well, will increase to 300mg hs.  Tapered off Zyprexa.    1.) Obs: Stop 1:1, patient with decrease in SI.  She has been able to approach staff when needed.  2.) Psychiatric medication management:  - Reviewed options for addressing nocturnal enuresis including reducing clozapine vs reducing lithium vs adding desmopressin.  Pt decided upon desmopressin as she continues to have depression and psychosis.  Desmopressin 0.1 mg QHS.  - Lithobid 1200 mg QHS.  Pt aware that she needs to stay well hydrated and avoid diuretics and NSAIDs.   - Discontinued olanzapine  - Increase Clozapine to 300mg qHS for psychosis ([id number])  - Prazosin 2mg qhs, monitor BP carefully.  - Abilify Maintena 400mg IM q3 weeks rather than 4, per collateral from ACT team.  Doses given 10/19, 11/17, 12/8, next 12/29.  - Mirtazapine was switched to escitalopram, increased to 20 mg daily.  - Wellbutrin XL 150mg daily  - Gabapentin 300 mg BID for anxiety/chronic pain.  - PRN: haloperidol 5mg PO/IM q8hrs PRN for agitation, diphenhydramine 50mg PO/IM q6hrs PRN for EPS PPx, hydroxyzine 50mg PO q12hrs PRN for anxiety, lorazepam 2mg PO q8hrs PRN for anxiety or 2mg IM for assaultive behavior. Melatonin 5mg qHS PRN for insomnia  - Discussed ECT several times with pt who states she has been told in two hospitals in past that due to her obesity and asthma, she is not an ideal candidate.  Pt states that due to this she is not interested in receiving ECT at this time.  3.) Medical:   - Pt scheduled for dental extractions 11/1, however declined to attend on day prior when informed it would not be under general anesthesia.  - last clozapine labs on 10/23 (CBC with diff, troponins, CRP); CRP elevated at 15.8 likely due to dental infection/wisdom tooth issues  - pantoprazole 40mg PO before breakfast for GERD  - montelukast 10mg PO qHS for asthma  - budesonide 80mcg/formoterol 4.5mcg 2 puffs BID for asthma  - PRN: acetaminophen 650mg PO q6hrs PRN for pain, albuterol 90mcg 2puffs q6hrs PRN for dyspnea, ondansetron 4mg q6hrs PRN for nausea  - Discussed CRP with medicine service, given asymptomatic pt and normal troponin, no additional intervention/diagnostics at this time.  - Discontinued clobetasol as rash has resolved.  4.) Dispo planning: State application submitted.

## 2023-12-19 NOTE — BH INPATIENT PSYCHIATRY PROGRESS NOTE - CURRENT MEDICATION
MEDICATIONS  (STANDING):  budesonide  80 MICROgram(s)/formoterol 4.5 MICROgram(s) Inhaler 2 Puff(s) Inhalation two times a day  buPROPion XL (24-Hour) 150 milliGRAM(s) Oral daily  cloZAPine 300 milliGRAM(s) Oral at bedtime  desmopressin 0.1 milliGRAM(s) Oral at bedtime  escitalopram 20 milliGRAM(s) Oral daily  gabapentin 300 milliGRAM(s) Oral two times a day  influenza   Vaccine 0.5 milliLiter(s) IntraMuscular once  lithium SR (LITHOBID) 1200 milliGRAM(s) Oral at bedtime  montelukast 10 milliGRAM(s) Oral at bedtime  oseltamivir 75 milliGRAM(s) Oral daily  pantoprazole    Tablet 40 milliGRAM(s) Oral before breakfast  prazosin. 2 milliGRAM(s) Oral at bedtime    MEDICATIONS  (PRN):  acetaminophen     Tablet .. 650 milliGRAM(s) Oral every 6 hours PRN Moderate Pain (4 - 6)  albuterol    90 MICROgram(s) HFA Inhaler 2 Puff(s) Inhalation every 6 hours PRN Shortness of Breath and/or Wheezing  aluminum hydroxide/magnesium hydroxide/simethicone Suspension 30 milliLiter(s) Oral every 4 hours PRN Dyspepsia  chlorproMAZINE    Injectable 50 milliGRAM(s) IntraMuscular once PRN severe agitation  chlorproMAZINE    Tablet 50 milliGRAM(s) Oral every 6 hours PRN agitation  hydrOXYzine hydrochloride 50 milliGRAM(s) Oral every 12 hours PRN Anxiety  lidocaine   4% Patch 1 Patch Transdermal daily PRN LBP  LORazepam     Tablet 2 milliGRAM(s) Oral every 8 hours PRN Anxiety  LORazepam   Injectable 2 milliGRAM(s) IntraMuscular once PRN Assaultive behavior  melatonin. 5 milliGRAM(s) Oral at bedtime PRN Insomnia  ondansetron    Tablet 4 milliGRAM(s) Oral every 6 hours PRN nausea  ondansetron    Tablet 4 milliGRAM(s) Oral every 6 hours PRN nausea  polyethylene glycol 3350 17 Gram(s) Oral daily PRN Constipation

## 2023-12-19 NOTE — BH INPATIENT PSYCHIATRY PROGRESS NOTE - NSBHATTESTBILLING_PSY_A_CORE
[id number] OBS or IP - moderate complexity OR 35-49 mins [id number] OBS or IP - moderate complexity OR 35-49 mins

## 2023-12-19 NOTE — BH INPATIENT PSYCHIATRY PROGRESS NOTE - MSE UNSTRUCTURED FT
The patient appears stated age, with fair hygiene, and is dressed appropriately.  She was calm and cooperative with the interview.  She maintained appropriate eye contact.  No restlessness or slowing of movements observed.  Gait is steady.  The patient’s speech was fluent, normal in tone, rate, and volume.  The patient’s mood is “a little anxious.”  Her affect is less constricted, more reactive, stable, appropriate.  The patient’s thoughts are goal directed.  She does not have any delusions, denies auditory hallucinations today.  She denies suicidal ideation, no homicidal ideation, intent, or plan.  Insight is fair.  Judgment is fair.  Impulse control has been fair on the unit.

## 2023-12-20 PROCEDURE — 99232 SBSQ HOSP IP/OBS MODERATE 35: CPT

## 2023-12-20 RX ADMIN — GABAPENTIN 300 MILLIGRAM(S): 400 CAPSULE ORAL at 08:53

## 2023-12-20 RX ADMIN — Medication 2 MILLIGRAM(S): at 20:32

## 2023-12-20 RX ADMIN — GABAPENTIN 300 MILLIGRAM(S): 400 CAPSULE ORAL at 20:32

## 2023-12-20 RX ADMIN — BUDESONIDE AND FORMOTEROL FUMARATE DIHYDRATE 2 PUFF(S): 160; 4.5 AEROSOL RESPIRATORY (INHALATION) at 08:54

## 2023-12-20 RX ADMIN — DESMOPRESSIN ACETATE 0.1 MILLIGRAM(S): 0.1 TABLET ORAL at 20:32

## 2023-12-20 RX ADMIN — ESCITALOPRAM OXALATE 20 MILLIGRAM(S): 10 TABLET, FILM COATED ORAL at 08:53

## 2023-12-20 RX ADMIN — MONTELUKAST 10 MILLIGRAM(S): 4 TABLET, CHEWABLE ORAL at 20:32

## 2023-12-20 RX ADMIN — CLOZAPINE 300 MILLIGRAM(S): 150 TABLET, ORALLY DISINTEGRATING ORAL at 20:32

## 2023-12-20 RX ADMIN — LITHIUM CARBONATE 1200 MILLIGRAM(S): 300 TABLET, EXTENDED RELEASE ORAL at 20:31

## 2023-12-20 RX ADMIN — BUDESONIDE AND FORMOTEROL FUMARATE DIHYDRATE 2 PUFF(S): 160; 4.5 AEROSOL RESPIRATORY (INHALATION) at 20:31

## 2023-12-20 RX ADMIN — Medication 650 MILLIGRAM(S): at 17:06

## 2023-12-20 RX ADMIN — Medication 75 MILLIGRAM(S): at 08:53

## 2023-12-20 RX ADMIN — BUPROPION HYDROCHLORIDE 150 MILLIGRAM(S): 150 TABLET, EXTENDED RELEASE ORAL at 08:53

## 2023-12-20 RX ADMIN — PANTOPRAZOLE SODIUM 40 MILLIGRAM(S): 20 TABLET, DELAYED RELEASE ORAL at 08:53

## 2023-12-20 NOTE — BH INPATIENT PSYCHIATRY PROGRESS NOTE - NSBHFUPINTERVALHXFT_PSY_A_CORE
f/up SAD, care discussed w/ tx team, VSS. no overnight issues. Patient reported that she is feeling better, no longer isolates herself. Denied dizziness or constipation, LBM yesterday. Patient reported sleeping fairly and with fair appetite. Reported that she is anxious waiting to hear if she is accepted into housing. Tolerating medications. denied SI/I/P.

## 2023-12-20 NOTE — BH INPATIENT PSYCHIATRY PROGRESS NOTE - NSTXDCOTHRPROGRES_PSY_ALL_CORE
Improving V-Y Flap Text: The defect edges were debeveled with a #15 scalpel blade.  Given the location of the defect, shape of the defect and the proximity to free margins a V-Y flap was deemed most appropriate.  Using a sterile surgical marker, an appropriate advancement flap was drawn incorporating the defect and placing the expected incisions within the relaxed skin tension lines where possible.    The area thus outlined was incised deep to adipose tissue with a #15 scalpel blade.  The skin margins were undermined to an appropriate distance in all directions utilizing iris scissors.

## 2023-12-20 NOTE — BH INPATIENT PSYCHIATRY PROGRESS NOTE - MSE UNSTRUCTURED FT
The patient appears stated age, with fair hygiene, and is dressed appropriately.  She was calm and cooperative with the interview.  She maintained appropriate eye contact.  No restlessness or slowing of movements observed.  Gait is steady.  The patient’s speech was fluent, normal in tone, rate, and volume.  The patient’s mood is “pretty good”  Her affect is euthymic, reactive, stable, appropriate.  The patient’s thoughts are goal directed.  She does not have any delusions, denies auditory hallucinations today.  She denies suicidal ideation, no homicidal ideation, intent, or plan.  Insight is fair.  Judgment is fair.  Impulse control has been fair on the unit.

## 2023-12-20 NOTE — BH INPATIENT PSYCHIATRY PROGRESS NOTE - NSBHMETABOLIC_PSY_ALL_CORE_FT
BMI: BMI (kg/m2): 57.8 (09-14-23 @ 14:30)  HbA1c: A1C with Estimated Average Glucose Result: 5.2 % (11-01-23 @ 09:04)    Glucose:   BP: --Vital Signs Last 24 Hrs  T(C): 36.3 (12-20-23 @ 08:31), Max: 36.6 (12-19-23 @ 20:20)  T(F): 97.4 (12-20-23 @ 08:31), Max: 97.8 (12-19-23 @ 20:20)  HR: --  BP: --  BP(mean): --  RR: --  SpO2: 100% (12-19-23 @ 20:20) (100% - 100%)    Orthostatic VS  12-20-23 @ 08:31  Lying BP: --/-- HR: --  Sitting BP: 135/81 HR: 83  Standing BP: 124/71 HR: 88  Site: --  Mode: --  Orthostatic VS  12-19-23 @ 20:20  Lying BP: --/-- HR: --  Sitting BP: 135/74 HR: 98  Standing BP: 140/85 HR: 105  Site: --  Mode: --  Orthostatic VS  12-19-23 @ 08:37  Lying BP: --/-- HR: --  Sitting BP: 147/83 HR: 104  Standing BP: 139/84 HR: 105  Site: --  Mode: --  Orthostatic VS  12-18-23 @ 21:14  Lying BP: --/-- HR: --  Sitting BP: 125/74 HR: 97  Standing BP: 136/85 HR: 103  Site: --  Mode: --    Lipid Panel: Date/Time: 11-01-23 @ 09:04  Cholesterol, Serum: 156  LDL Cholesterol Calculated: 82  HDL Cholesterol, Serum: 58  Total Cholesterol/HDL Ration Measurement: --  Triglycerides, Serum: 79

## 2023-12-20 NOTE — BH INPATIENT PSYCHIATRY PROGRESS NOTE - NSBHCHARTREVIEWVS_PSY_A_CORE FT
Vital Signs Last 24 Hrs  T(C): 36.3 (12-20-23 @ 08:31), Max: 36.6 (12-19-23 @ 20:20)  T(F): 97.4 (12-20-23 @ 08:31), Max: 97.8 (12-19-23 @ 20:20)  HR: --  BP: --  BP(mean): --  RR: --  SpO2: 100% (12-19-23 @ 20:20) (100% - 100%)    Orthostatic VS  12-20-23 @ 08:31  Lying BP: --/-- HR: --  Sitting BP: 135/81 HR: 83  Standing BP: 124/71 HR: 88  Site: --  Mode: --  Orthostatic VS  12-19-23 @ 20:20  Lying BP: --/-- HR: --  Sitting BP: 135/74 HR: 98  Standing BP: 140/85 HR: 105  Site: --  Mode: --  Orthostatic VS  12-19-23 @ 08:37  Lying BP: --/-- HR: --  Sitting BP: 147/83 HR: 104  Standing BP: 139/84 HR: 105  Site: --  Mode: --  Orthostatic VS  12-18-23 @ 21:14  Lying BP: --/-- HR: --  Sitting BP: 125/74 HR: 97  Standing BP: 136/85 HR: 103  Site: --  Mode: --

## 2023-12-20 NOTE — BH INPATIENT PSYCHIATRY PROGRESS NOTE - NSBHASSESSSUMMFT_PSY_ALL_CORE
Patient is 27yo single woman, no dependents, domiciled with her Grandmother, unemployed on disability/SSI, pphx of schizoaffective disorder, multiple past psychiatric admissions including state and recent discharge from [facility], in tx [facility], with pmhx of asthma, HTN, GERD, and hypothyroidism and schizoaffective disorder, in tx with [facility], who self presented to the ED reporting abdominal pain and requesting readmission to psychiatric unit, reporting CAH, depressive symptoms, IOR, suicidality, admitted to [facility] for psychiatric care.  Depression and psychosis likely multifactorial at this time, schizoaffective vs. borderline personality disorder vs. complex grief vs. adjustment disorder.      The patient is showing some improvement in symptoms, reporting improvement in mood, denies AH or SI today.  She has been tolerating increase in clozapine well, will increase to 300mg hs.  Tapered off Zyprexa.    1.) Obs: Stop 1:1, patient with decrease in SI.  She has been able to approach staff when needed.  2.) Psychiatric medication management:  - Reviewed options for addressing nocturnal enuresis including reducing clozapine vs reducing lithium vs adding desmopressin.  Pt decided upon desmopressin as she continues to have depression and psychosis.  Desmopressin 0.1 mg QHS.  - Lithobid 1200 mg QHS.  Pt aware that she needs to stay well hydrated and avoid diuretics and NSAIDs.   - Discontinued olanzapine  - Increase Clozapine to 300mg qHS for psychosis ([id number])  - Prazosin 2mg qhs, monitor BP carefully.  - Abilify Maintena 400mg IM q3 weeks rather than 4, per collateral from ACT team.  Doses given 10/19, 11/17, 12/8, next 12/29.  - Mirtazapine was switched to escitalopram, increased to 20 mg daily.  - Wellbutrin XL 150mg daily  - Gabapentin 300 mg BID for anxiety/chronic pain.  - PRN: haloperidol 5mg PO/IM q8hrs PRN for agitation, diphenhydramine 50mg PO/IM q6hrs PRN for EPS PPx, hydroxyzine 50mg PO q12hrs PRN for anxiety, lorazepam 2mg PO q8hrs PRN for anxiety or 2mg IM for assaultive behavior. Melatonin 5mg qHS PRN for insomnia  - Discussed ECT several times with pt who states she has been told in two hospitals in past that due to her obesity and asthma, she is not an ideal candidate.  Pt states that due to this she is not interested in receiving ECT at this time.  3.) Medical:   - Pt scheduled for dental extractions 11/1, however declined to attend on day prior when informed it would not be under general anesthesia.  - last clozapine labs on 10/23 (CBC with diff, troponins, CRP); CRP elevated at 15.8 likely due to dental infection/wisdom tooth issues  - pantoprazole 40mg PO before breakfast for GERD  - montelukast 10mg PO qHS for asthma  - budesonide 80mcg/formoterol 4.5mcg 2 puffs BID for asthma  - PRN: acetaminophen 650mg PO q6hrs PRN for pain, albuterol 90mcg 2puffs q6hrs PRN for dyspnea, ondansetron 4mg q6hrs PRN for nausea  - Discussed CRP with medicine service, given asymptomatic pt and normal troponin, no additional intervention/diagnostics at this time.  - Discontinued clobetasol as rash has resolved.  4.) Dispo planning: State application submitted. Patient is 27yo single woman, no dependents, domiciled with her Grandmother, unemployed on disability/SSI, pphx of schizoaffective disorder, multiple past psychiatric admissions including state and recent discharge from [facility], in tx with [facility], with pmhx of asthma, HTN, GERD, and hypothyroidism and schizoaffective disorder, in tx with [facility], who self presented to the ED reporting abdominal pain and requesting readmission to psychiatric unit, reporting CAH, depressive symptoms, IOR, suicidality, admitted to [facility] for psychiatric care.  Depression and psychosis likely multifactorial at this time, schizoaffective vs. borderline personality disorder vs. complex grief vs. adjustment disorder.      The patient is showing some improvement in symptoms, reporting improvement in mood, denies AH or SI today.  She has been tolerating increase in clozapine well, will increase to 300mg hs.  Tapered off Zyprexa.    1.) Obs: Stop 1:1, patient with decrease in SI.  She has been able to approach staff when needed.  2.) Psychiatric medication management:  - Reviewed options for addressing nocturnal enuresis including reducing clozapine vs reducing lithium vs adding desmopressin.  Pt decided upon desmopressin as she continues to have depression and psychosis.  Desmopressin 0.1 mg QHS.  - Lithobid 1200 mg QHS.  Pt aware that she needs to stay well hydrated and avoid diuretics and NSAIDs.   - Discontinued olanzapine  - Increase Clozapine to 300mg qHS for psychosis ([id number])  - Prazosin 2mg qhs, monitor BP carefully.  - Abilify Maintena 400mg IM q3 weeks rather than 4, per collateral from ACT team.  Doses given 10/19, 11/17, 12/8, next 12/29.  - Mirtazapine was switched to escitalopram, increased to 20 mg daily.  - Wellbutrin XL 150mg daily  - Gabapentin 300 mg BID for anxiety/chronic pain.  - PRN: haloperidol 5mg PO/IM q8hrs PRN for agitation, diphenhydramine 50mg PO/IM q6hrs PRN for EPS PPx, hydroxyzine 50mg PO q12hrs PRN for anxiety, lorazepam 2mg PO q8hrs PRN for anxiety or 2mg IM for assaultive behavior. Melatonin 5mg qHS PRN for insomnia  - Discussed ECT several times with pt who states she has been told in two hospitals in past that due to her obesity and asthma, she is not an ideal candidate.  Pt states that due to this she is not interested in receiving ECT at this time.  3.) Medical:   - Pt scheduled for dental extractions 11/1, however declined to attend on day prior when informed it would not be under general anesthesia.  - last clozapine labs on 10/23 (CBC with diff, troponins, CRP); CRP elevated at 15.8 likely due to dental infection/wisdom tooth issues  - pantoprazole 40mg PO before breakfast for GERD  - montelukast 10mg PO qHS for asthma  - budesonide 80mcg/formoterol 4.5mcg 2 puffs BID for asthma  - PRN: acetaminophen 650mg PO q6hrs PRN for pain, albuterol 90mcg 2puffs q6hrs PRN for dyspnea, ondansetron 4mg q6hrs PRN for nausea  - Discussed CRP with medicine service, given asymptomatic pt and normal troponin, no additional intervention/diagnostics at this time.  - Discontinued clobetasol as rash has resolved.  4.) Dispo planning: State application submitted.

## 2023-12-20 NOTE — BH INPATIENT PSYCHIATRY PROGRESS NOTE - NSBHATTESTBILLING_PSY_A_CORE
13141-Preaxhvxzi OBS or IP - moderate complexity OR 35-49 mins 80643-Tshmdvjnuw OBS or IP - moderate complexity OR 35-49 mins

## 2023-12-21 PROCEDURE — 99232 SBSQ HOSP IP/OBS MODERATE 35: CPT

## 2023-12-21 RX ADMIN — CLOZAPINE 300 MILLIGRAM(S): 150 TABLET, ORALLY DISINTEGRATING ORAL at 20:23

## 2023-12-21 RX ADMIN — BUDESONIDE AND FORMOTEROL FUMARATE DIHYDRATE 2 PUFF(S): 160; 4.5 AEROSOL RESPIRATORY (INHALATION) at 20:23

## 2023-12-21 RX ADMIN — DESMOPRESSIN ACETATE 0.1 MILLIGRAM(S): 0.1 TABLET ORAL at 20:23

## 2023-12-21 RX ADMIN — ESCITALOPRAM OXALATE 20 MILLIGRAM(S): 10 TABLET, FILM COATED ORAL at 08:39

## 2023-12-21 RX ADMIN — LITHIUM CARBONATE 1200 MILLIGRAM(S): 300 TABLET, EXTENDED RELEASE ORAL at 20:23

## 2023-12-21 RX ADMIN — MONTELUKAST 10 MILLIGRAM(S): 4 TABLET, CHEWABLE ORAL at 20:23

## 2023-12-21 RX ADMIN — PANTOPRAZOLE SODIUM 40 MILLIGRAM(S): 20 TABLET, DELAYED RELEASE ORAL at 08:39

## 2023-12-21 RX ADMIN — GABAPENTIN 300 MILLIGRAM(S): 400 CAPSULE ORAL at 20:23

## 2023-12-21 RX ADMIN — GABAPENTIN 300 MILLIGRAM(S): 400 CAPSULE ORAL at 08:40

## 2023-12-21 RX ADMIN — Medication 75 MILLIGRAM(S): at 08:39

## 2023-12-21 RX ADMIN — BUDESONIDE AND FORMOTEROL FUMARATE DIHYDRATE 2 PUFF(S): 160; 4.5 AEROSOL RESPIRATORY (INHALATION) at 08:39

## 2023-12-21 RX ADMIN — BUPROPION HYDROCHLORIDE 150 MILLIGRAM(S): 150 TABLET, EXTENDED RELEASE ORAL at 08:41

## 2023-12-21 RX ADMIN — Medication 2 MILLIGRAM(S): at 20:23

## 2023-12-21 NOTE — BH INPATIENT PSYCHIATRY PROGRESS NOTE - NSBHFUPINTERVALHXFT_PSY_A_CORE
f/up SAD, care discussed w/ tx team, VSS. no overnight issues. Patient reported that she is feeling pretty well and feel content that her housing application has been approved.  Reported that she is motivated for the next step which is to interview and move into a residence.  Denied dizziness or constipation. Patient reported sleeping fairly and with fair appetite.  Tolerating medications. denied SI/I/P.

## 2023-12-21 NOTE — BH INPATIENT PSYCHIATRY PROGRESS NOTE - CURRENT MEDICATION
MEDICATIONS  (STANDING):  budesonide  80 MICROgram(s)/formoterol 4.5 MICROgram(s) Inhaler 2 Puff(s) Inhalation two times a day  buPROPion XL (24-Hour) 150 milliGRAM(s) Oral daily  cloZAPine 300 milliGRAM(s) Oral at bedtime  desmopressin 0.1 milliGRAM(s) Oral at bedtime  escitalopram 20 milliGRAM(s) Oral daily  gabapentin 300 milliGRAM(s) Oral two times a day  influenza   Vaccine 0.5 milliLiter(s) IntraMuscular once  lithium SR (LITHOBID) 1200 milliGRAM(s) Oral at bedtime  montelukast 10 milliGRAM(s) Oral at bedtime  pantoprazole    Tablet 40 milliGRAM(s) Oral before breakfast  prazosin. 2 milliGRAM(s) Oral at bedtime    MEDICATIONS  (PRN):  acetaminophen     Tablet .. 650 milliGRAM(s) Oral every 6 hours PRN Moderate Pain (4 - 6)  albuterol    90 MICROgram(s) HFA Inhaler 2 Puff(s) Inhalation every 6 hours PRN Shortness of Breath and/or Wheezing  aluminum hydroxide/magnesium hydroxide/simethicone Suspension 30 milliLiter(s) Oral every 4 hours PRN Dyspepsia  chlorproMAZINE    Injectable 50 milliGRAM(s) IntraMuscular once PRN severe agitation  chlorproMAZINE    Tablet 50 milliGRAM(s) Oral every 6 hours PRN agitation  hydrOXYzine hydrochloride 50 milliGRAM(s) Oral every 12 hours PRN Anxiety  lidocaine   4% Patch 1 Patch Transdermal daily PRN LBP  LORazepam     Tablet 2 milliGRAM(s) Oral every 8 hours PRN Anxiety  LORazepam   Injectable 2 milliGRAM(s) IntraMuscular once PRN Assaultive behavior  melatonin. 5 milliGRAM(s) Oral at bedtime PRN Insomnia  ondansetron    Tablet 4 milliGRAM(s) Oral every 6 hours PRN nausea  ondansetron    Tablet 4 milliGRAM(s) Oral every 6 hours PRN nausea  polyethylene glycol 3350 17 Gram(s) Oral daily PRN Constipation

## 2023-12-21 NOTE — BH INPATIENT PSYCHIATRY PROGRESS NOTE - NSBHASSESSSUMMFT_PSY_ALL_CORE
Patient is 27yo single woman, no dependents, domiciled with her Grandmother, unemployed on disability/SSI, pphx of schizoaffective disorder, multiple past psychiatric admissions including state and recent discharge from [facility], in tx with [facility], with pmhx of asthma, HTN, GERD, and hypothyroidism and schizoaffective disorder, in tx with [facility], who self presented to the ED reporting abdominal pain and requesting readmission to psychiatric unit, reporting CAH, depressive symptoms, IOR, suicidality, admitted to [facility] for psychiatric care.  Depression and psychosis likely multifactorial at this time, schizoaffective vs. borderline personality disorder vs. complex grief vs. adjustment disorder.      The patient is showing some improvement in symptoms, reporting improvement in mood, denies AH or SI today.  She has been tolerating increase in clozapine well, will increase to 300mg hs.  Tapered off Zyprexa.    1.) Obs: Stop 1:1, patient with decrease in SI.  She has been able to approach staff when needed.  2.) Psychiatric medication management:  - Reviewed options for addressing nocturnal enuresis including reducing clozapine vs reducing lithium vs adding desmopressin.  Pt decided upon desmopressin as she continues to have depression and psychosis.  Desmopressin 0.1 mg QHS.  - Lithobid 1200 mg QHS.  Pt aware that she needs to stay well hydrated and avoid diuretics and NSAIDs.   - Discontinued olanzapine  - Increase Clozapine to 300mg qHS for psychosis ([id number])  - Prazosin 2mg qhs, monitor BP carefully.  - Abilify Maintena 400mg IM q3 weeks rather than 4, per collateral from ACT team.  Doses given 10/19, 11/17, 12/8, next 12/29.  - Mirtazapine was switched to escitalopram, increased to 20 mg daily.  - Wellbutrin XL 150mg daily  - Gabapentin 300 mg BID for anxiety/chronic pain.  - PRN: haloperidol 5mg PO/IM q8hrs PRN for agitation, diphenhydramine 50mg PO/IM q6hrs PRN for EPS PPx, hydroxyzine 50mg PO q12hrs PRN for anxiety, lorazepam 2mg PO q8hrs PRN for anxiety or 2mg IM for assaultive behavior. Melatonin 5mg qHS PRN for insomnia  - Discussed ECT several times with pt who states she has been told in two hospitals in past that due to her obesity and asthma, she is not an ideal candidate.  Pt states that due to this she is not interested in receiving ECT at this time.  3.) Medical:   - Pt scheduled for dental extractions 11/1, however declined to attend on day prior when informed it would not be under general anesthesia.  - last clozapine labs on 10/23 (CBC with diff, troponins, CRP); CRP elevated at 15.8 likely due to dental infection/wisdom tooth issues  - pantoprazole 40mg PO before breakfast for GERD  - montelukast 10mg PO qHS for asthma  - budesonide 80mcg/formoterol 4.5mcg 2 puffs BID for asthma  - PRN: acetaminophen 650mg PO q6hrs PRN for pain, albuterol 90mcg 2puffs q6hrs PRN for dyspnea, ondansetron 4mg q6hrs PRN for nausea  - Discussed CRP with medicine service, given asymptomatic pt and normal troponin, no additional intervention/diagnostics at this time.  - Discontinued clobetasol as rash has resolved.  4.) Dispo planning: State application submitted. Patient is 27yo single woman, no dependents, domiciled with her Grandmother, unemployed on disability/SSI, pphx of schizoaffective disorder, multiple past psychiatric admissions including state and recent discharge from [facility], in tx with [facility], with pmhx of asthma, HTN, GERD, and hypothyroidism and schizoaffective disorder, in tx with [facility], who self presented to the ED reporting abdominal pain and requesting readmission to psychiatric unit, reporting CAH, depressive symptoms, IOR, suicidality, admitted to [facility] for psychiatric care.  Depression and psychosis likely multifactorial at this time, schizoaffective vs. borderline personality disorder vs. complex grief vs. adjustment disorder.      The patient is showing some improvement in symptoms, reporting improvement in mood, denies AH or SI today.  She has been tolerating increase in clozapine well, will increase to 300mg hs.  Tapered off Zyprexa.    1.) Obs: Stop 1:1, patient with decrease in SI.  She has been able to approach staff when needed.  2.) Psychiatric medication management:  - Reviewed options for addressing nocturnal enuresis including reducing clozapine vs reducing lithium vs adding desmopressin.  Pt decided upon desmopressin as she continues to have depression and psychosis.  Desmopressin 0.1 mg QHS.  - Lithobid 1200 mg QHS.  Pt aware that she needs to stay well hydrated and avoid diuretics and NSAIDs.   - Discontinued olanzapine  - Increase Clozapine to 300mg qHS for psychosis ([id number])  - Prazosin 2mg qhs, monitor BP carefully.  - Abilify Maintena 400mg IM q3 weeks rather than 4, per collateral from ACT team.  Doses given 10/19, 11/17, 12/8, next 12/29.  - Mirtazapine was switched to escitalopram, increased to 20 mg daily.  - Wellbutrin XL 150mg daily  - Gabapentin 300 mg BID for anxiety/chronic pain.  - PRN: haloperidol 5mg PO/IM q8hrs PRN for agitation, diphenhydramine 50mg PO/IM q6hrs PRN for EPS PPx, hydroxyzine 50mg PO q12hrs PRN for anxiety, lorazepam 2mg PO q8hrs PRN for anxiety or 2mg IM for assaultive behavior. Melatonin 5mg qHS PRN for insomnia  - Discussed ECT several times with pt who states she has been told in two hospitals in past that due to her obesity and asthma, she is not an ideal candidate.  Pt states that due to this she is not interested in receiving ECT at this time.  3.) Medical:   - Pt scheduled for dental extractions 11/1, however declined to attend on day prior when informed it would not be under general anesthesia.  - last clozapine labs on 10/23 (CBC with diff, troponins, CRP); CRP elevated at 15.8 likely due to dental infection/wisdom tooth issues  - pantoprazole 40mg PO before breakfast for GERD  - montelukast 10mg PO qHS for asthma  - budesonide 80mcg/formoterol 4.5mcg 2 puffs BID for asthma  - PRN: acetaminophen 650mg PO q6hrs PRN for pain, albuterol 90mcg 2puffs q6hrs PRN for dyspnea, ondansetron 4mg q6hrs PRN for nausea  - Discussed CRP with medicine service, given asymptomatic pt and normal troponin, no additional intervention/diagnostics at this time.  - Discontinued clobetasol as rash has resolved.  4.) Dispo planning: State application submitted.

## 2023-12-21 NOTE — BH INPATIENT PSYCHIATRY PROGRESS NOTE - NSBHCHARTREVIEWVS_PSY_A_CORE FT
Vital Signs Last 24 Hrs  T(C): 36.7 (12-21-23 @ 07:42), Max: 36.7 (12-21-23 @ 07:42)  T(F): 98.1 (12-21-23 @ 07:42), Max: 98.1 (12-21-23 @ 07:42)  HR: --  BP: --  BP(mean): --  RR: --  SpO2: --    Orthostatic VS  12-21-23 @ 07:42  Lying BP: --/-- HR: --  Sitting BP: 138/81 HR: 85  Standing BP: 123/71 HR: 89  Site: --  Mode: --  Orthostatic VS  12-20-23 @ 20:40  Lying BP: --/-- HR: --  Sitting BP: 125/68 HR: 94  Standing BP: 126/75 HR: 101  Site: --  Mode: --  Orthostatic VS  12-20-23 @ 08:31  Lying BP: --/-- HR: --  Sitting BP: 135/81 HR: 83  Standing BP: 124/71 HR: 88  Site: --  Mode: --  Orthostatic VS  12-19-23 @ 20:20  Lying BP: --/-- HR: --  Sitting BP: 135/74 HR: 98  Standing BP: 140/85 HR: 105  Site: --  Mode: --

## 2023-12-21 NOTE — BH TREATMENT PLAN - NSTXDCOTHRINTERSW_PSY_ALL_CORE
Writer will work to educate the pt on the state referral process and explore any other aftercare options that may be an appropriate alternative to [facility] hospital. Writer will work to educate the pt on the state referral process and explore any other aftercare options that may be an appropriate alternative to [facility] hospital.

## 2023-12-21 NOTE — BH INPATIENT PSYCHIATRY PROGRESS NOTE - MSE UNSTRUCTURED FT
The patient appears stated age, with fair hygiene, and is dressed appropriately.  She was calm and cooperative with the interview.  She maintained appropriate eye contact.  No restlessness or slowing of movements observed.  Gait is steady.  The patient’s speech was fluent, normal in tone, rate, and volume.  The patient’s mood is “pretty well”  Her affect is euthymic, reactive, stable, appropriate.  The patient’s thoughts are goal directed.  She does not have any delusions, denies auditory hallucinations today.  She denies suicidal ideation, no homicidal ideation, intent, or plan.  Insight is fair.  Judgment is fair.  Impulse control has been fair on the unit.

## 2023-12-21 NOTE — BH INPATIENT PSYCHIATRY PROGRESS NOTE - NSBHATTESTBILLING_PSY_A_CORE
96083-Kzbfufsklb OBS or IP - moderate complexity OR 35-49 mins 44080-Qneuqwyeck OBS or IP - moderate complexity OR 35-49 mins

## 2023-12-21 NOTE — BH INPATIENT PSYCHIATRY PROGRESS NOTE - NSBHMETABOLIC_PSY_ALL_CORE_FT
BMI: BMI (kg/m2): 57.8 (09-14-23 @ 14:30)  HbA1c: A1C with Estimated Average Glucose Result: 5.2 % (11-01-23 @ 09:04)    Glucose:   BP: --Vital Signs Last 24 Hrs  T(C): 36.7 (12-21-23 @ 07:42), Max: 36.7 (12-21-23 @ 07:42)  T(F): 98.1 (12-21-23 @ 07:42), Max: 98.1 (12-21-23 @ 07:42)  HR: --  BP: --  BP(mean): --  RR: --  SpO2: --    Orthostatic VS  12-21-23 @ 07:42  Lying BP: --/-- HR: --  Sitting BP: 138/81 HR: 85  Standing BP: 123/71 HR: 89  Site: --  Mode: --  Orthostatic VS  12-20-23 @ 20:40  Lying BP: --/-- HR: --  Sitting BP: 125/68 HR: 94  Standing BP: 126/75 HR: 101  Site: --  Mode: --  Orthostatic VS  12-20-23 @ 08:31  Lying BP: --/-- HR: --  Sitting BP: 135/81 HR: 83  Standing BP: 124/71 HR: 88  Site: --  Mode: --  Orthostatic VS  12-19-23 @ 20:20  Lying BP: --/-- HR: --  Sitting BP: 135/74 HR: 98  Standing BP: 140/85 HR: 105  Site: --  Mode: --    Lipid Panel: Date/Time: 11-01-23 @ 09:04  Cholesterol, Serum: 156  LDL Cholesterol Calculated: 82  HDL Cholesterol, Serum: 58  Total Cholesterol/HDL Ration Measurement: --  Triglycerides, Serum: 79

## 2023-12-22 LAB
BASOPHILS # BLD AUTO: 0.02 K/UL — SIGNIFICANT CHANGE UP (ref 0–0.2)
BASOPHILS # BLD AUTO: 0.02 K/UL — SIGNIFICANT CHANGE UP (ref 0–0.2)
BASOPHILS NFR BLD AUTO: 0.2 % — SIGNIFICANT CHANGE UP (ref 0–2)
BASOPHILS NFR BLD AUTO: 0.2 % — SIGNIFICANT CHANGE UP (ref 0–2)
CRP SERPL-MCNC: 29.8 MG/L — HIGH
CRP SERPL-MCNC: 29.8 MG/L — HIGH
EOSINOPHIL # BLD AUTO: 0 K/UL — SIGNIFICANT CHANGE UP (ref 0–0.5)
EOSINOPHIL # BLD AUTO: 0 K/UL — SIGNIFICANT CHANGE UP (ref 0–0.5)
EOSINOPHIL NFR BLD AUTO: 0 % — SIGNIFICANT CHANGE UP (ref 0–6)
EOSINOPHIL NFR BLD AUTO: 0 % — SIGNIFICANT CHANGE UP (ref 0–6)
HCT VFR BLD CALC: 41.2 % — SIGNIFICANT CHANGE UP (ref 34.5–45)
HCT VFR BLD CALC: 41.2 % — SIGNIFICANT CHANGE UP (ref 34.5–45)
HGB BLD-MCNC: 12.5 G/DL — SIGNIFICANT CHANGE UP (ref 11.5–15.5)
HGB BLD-MCNC: 12.5 G/DL — SIGNIFICANT CHANGE UP (ref 11.5–15.5)
IANC: 8.35 K/UL — HIGH (ref 1.8–7.4)
IANC: 8.35 K/UL — HIGH (ref 1.8–7.4)
IMM GRANULOCYTES NFR BLD AUTO: 0.2 % — SIGNIFICANT CHANGE UP (ref 0–0.9)
IMM GRANULOCYTES NFR BLD AUTO: 0.2 % — SIGNIFICANT CHANGE UP (ref 0–0.9)
LYMPHOCYTES # BLD AUTO: 2.61 K/UL — SIGNIFICANT CHANGE UP (ref 1–3.3)
LYMPHOCYTES # BLD AUTO: 2.61 K/UL — SIGNIFICANT CHANGE UP (ref 1–3.3)
LYMPHOCYTES # BLD AUTO: 22.7 % — SIGNIFICANT CHANGE UP (ref 13–44)
LYMPHOCYTES # BLD AUTO: 22.7 % — SIGNIFICANT CHANGE UP (ref 13–44)
MCHC RBC-ENTMCNC: 24.4 PG — LOW (ref 27–34)
MCHC RBC-ENTMCNC: 24.4 PG — LOW (ref 27–34)
MCHC RBC-ENTMCNC: 30.3 GM/DL — LOW (ref 32–36)
MCHC RBC-ENTMCNC: 30.3 GM/DL — LOW (ref 32–36)
MCV RBC AUTO: 80.3 FL — SIGNIFICANT CHANGE UP (ref 80–100)
MCV RBC AUTO: 80.3 FL — SIGNIFICANT CHANGE UP (ref 80–100)
MONOCYTES # BLD AUTO: 0.52 K/UL — SIGNIFICANT CHANGE UP (ref 0–0.9)
MONOCYTES # BLD AUTO: 0.52 K/UL — SIGNIFICANT CHANGE UP (ref 0–0.9)
MONOCYTES NFR BLD AUTO: 4.5 % — SIGNIFICANT CHANGE UP (ref 2–14)
MONOCYTES NFR BLD AUTO: 4.5 % — SIGNIFICANT CHANGE UP (ref 2–14)
NEUTROPHILS # BLD AUTO: 8.35 K/UL — HIGH (ref 1.8–7.4)
NEUTROPHILS # BLD AUTO: 8.35 K/UL — HIGH (ref 1.8–7.4)
NEUTROPHILS NFR BLD AUTO: 72.4 % — SIGNIFICANT CHANGE UP (ref 43–77)
NEUTROPHILS NFR BLD AUTO: 72.4 % — SIGNIFICANT CHANGE UP (ref 43–77)
NRBC # BLD: 0 /100 WBCS — SIGNIFICANT CHANGE UP (ref 0–0)
NRBC # BLD: 0 /100 WBCS — SIGNIFICANT CHANGE UP (ref 0–0)
NRBC # FLD: 0 K/UL — SIGNIFICANT CHANGE UP (ref 0–0)
NRBC # FLD: 0 K/UL — SIGNIFICANT CHANGE UP (ref 0–0)
PLATELET # BLD AUTO: 309 K/UL — SIGNIFICANT CHANGE UP (ref 150–400)
PLATELET # BLD AUTO: 309 K/UL — SIGNIFICANT CHANGE UP (ref 150–400)
RBC # BLD: 5.13 M/UL — SIGNIFICANT CHANGE UP (ref 3.8–5.2)
RBC # BLD: 5.13 M/UL — SIGNIFICANT CHANGE UP (ref 3.8–5.2)
RBC # FLD: 15.4 % — HIGH (ref 10.3–14.5)
RBC # FLD: 15.4 % — HIGH (ref 10.3–14.5)
TROPONIN T, HIGH SENSITIVITY RESULT: 21 NG/L — SIGNIFICANT CHANGE UP
TROPONIN T, HIGH SENSITIVITY RESULT: 21 NG/L — SIGNIFICANT CHANGE UP
WBC # BLD: 11.52 K/UL — HIGH (ref 3.8–10.5)
WBC # BLD: 11.52 K/UL — HIGH (ref 3.8–10.5)
WBC # FLD AUTO: 11.52 K/UL — HIGH (ref 3.8–10.5)
WBC # FLD AUTO: 11.52 K/UL — HIGH (ref 3.8–10.5)

## 2023-12-22 PROCEDURE — 99231 SBSQ HOSP IP/OBS SF/LOW 25: CPT

## 2023-12-22 RX ORDER — ARIPIPRAZOLE 15 MG/1
400 TABLET ORAL ONCE
Refills: 0 | Status: COMPLETED | OUTPATIENT
Start: 2023-12-29 | End: 2023-12-29

## 2023-12-22 RX ADMIN — ESCITALOPRAM OXALATE 20 MILLIGRAM(S): 10 TABLET, FILM COATED ORAL at 09:31

## 2023-12-22 RX ADMIN — Medication 2 MILLIGRAM(S): at 21:02

## 2023-12-22 RX ADMIN — CLOZAPINE 300 MILLIGRAM(S): 150 TABLET, ORALLY DISINTEGRATING ORAL at 21:02

## 2023-12-22 RX ADMIN — LITHIUM CARBONATE 1200 MILLIGRAM(S): 300 TABLET, EXTENDED RELEASE ORAL at 21:03

## 2023-12-22 RX ADMIN — BUDESONIDE AND FORMOTEROL FUMARATE DIHYDRATE 2 PUFF(S): 160; 4.5 AEROSOL RESPIRATORY (INHALATION) at 09:31

## 2023-12-22 RX ADMIN — PANTOPRAZOLE SODIUM 40 MILLIGRAM(S): 20 TABLET, DELAYED RELEASE ORAL at 09:31

## 2023-12-22 RX ADMIN — MONTELUKAST 10 MILLIGRAM(S): 4 TABLET, CHEWABLE ORAL at 21:02

## 2023-12-22 RX ADMIN — GABAPENTIN 300 MILLIGRAM(S): 400 CAPSULE ORAL at 21:02

## 2023-12-22 RX ADMIN — BUPROPION HYDROCHLORIDE 150 MILLIGRAM(S): 150 TABLET, EXTENDED RELEASE ORAL at 09:31

## 2023-12-22 RX ADMIN — BUDESONIDE AND FORMOTEROL FUMARATE DIHYDRATE 2 PUFF(S): 160; 4.5 AEROSOL RESPIRATORY (INHALATION) at 21:02

## 2023-12-22 RX ADMIN — DESMOPRESSIN ACETATE 0.1 MILLIGRAM(S): 0.1 TABLET ORAL at 21:02

## 2023-12-22 RX ADMIN — GABAPENTIN 300 MILLIGRAM(S): 400 CAPSULE ORAL at 09:31

## 2023-12-22 NOTE — BH INPATIENT PSYCHIATRY PROGRESS NOTE - NSBHATTESTBILLING_PSY_A_CORE
22702-Iwmncmmzvd OBS or IP - low complexity OR 25-34 mins [id number]-Ygmxpbsqhu OBS or IP - low complexity OR 25-34 mins

## 2023-12-22 NOTE — BH INPATIENT PSYCHIATRY PROGRESS NOTE - MSE UNSTRUCTURED FT
The patient appears stated age, with fair hygiene, and is dressed appropriately.  She was calm and cooperative with the interview.  She maintained appropriate eye contact.  No restlessness or slowing of movements observed.  Gait is steady.  The patient’s speech was fluent, normal in tone, rate, and volume.  The patient’s mood is “good”  Her affect is euthymic, reactive, stable, appropriate.  The patient’s thoughts are goal directed.  She does not have any delusions, denies auditory hallucinations today.  She denies suicidal ideation, no homicidal ideation, intent, or plan.  Insight is fair.  Judgment is fair.  Impulse control has been fair on the unit.

## 2023-12-22 NOTE — BH INPATIENT PSYCHIATRY PROGRESS NOTE - NSBHMETABOLIC_PSY_ALL_CORE_FT
BMI: BMI (kg/m2): 57.8 (09-14-23 @ 14:30)  HbA1c: A1C with Estimated Average Glucose Result: 5.2 % (11-01-23 @ 09:04)    Glucose:   BP: --Vital Signs Last 24 Hrs  T(C): 36.7 (12-22-23 @ 08:19), Max: 36.7 (12-21-23 @ 20:37)  T(F): 98 (12-22-23 @ 08:19), Max: 98.1 (12-21-23 @ 20:37)  HR: --  BP: --  BP(mean): --  RR: 18 (12-21-23 @ 20:37) (18 - 18)  SpO2: 99% (12-21-23 @ 20:37) (99% - 99%)    Orthostatic VS  12-22-23 @ 08:19  Lying BP: --/-- HR: --  Sitting BP: 140/88 HR: 100  Standing BP: 144/96 HR: 103  Site: --  Mode: --  Orthostatic VS  12-21-23 @ 20:37  Lying BP: --/-- HR: --  Sitting BP: 133/77 HR: 102  Standing BP: 128/70 HR: 103  Site: --  Mode: --  Orthostatic VS  12-21-23 @ 07:42  Lying BP: --/-- HR: --  Sitting BP: 138/81 HR: 85  Standing BP: 123/71 HR: 89  Site: --  Mode: --  Orthostatic VS  12-20-23 @ 20:40  Lying BP: --/-- HR: --  Sitting BP: 125/68 HR: 94  Standing BP: 126/75 HR: 101  Site: --  Mode: --    Lipid Panel: Date/Time: 11-01-23 @ 09:04  Cholesterol, Serum: 156  LDL Cholesterol Calculated: 82  HDL Cholesterol, Serum: 58  Total Cholesterol/HDL Ration Measurement: --  Triglycerides, Serum: 79

## 2023-12-22 NOTE — BH INPATIENT PSYCHIATRY PROGRESS NOTE - NSBHFUPINTERVALHXFT_PSY_A_CORE
chart reviewed including pertinent labs. Case discussed with nursing staff. no behavioral issues. pt adherent to treatment. she feels her mood is stable. no SI. no AH. pt is optimistic and looking forward to being placed in housing. she has no concerns at this time and she is tolerating treatment without issues.

## 2023-12-22 NOTE — BH INPATIENT PSYCHIATRY PROGRESS NOTE - NSBHCHARTREVIEWVS_PSY_A_CORE FT
Vital Signs Last 24 Hrs  T(C): 36.7 (12-22-23 @ 08:19), Max: 36.7 (12-21-23 @ 20:37)  T(F): 98 (12-22-23 @ 08:19), Max: 98.1 (12-21-23 @ 20:37)  HR: --  BP: --  BP(mean): --  RR: 18 (12-21-23 @ 20:37) (18 - 18)  SpO2: 99% (12-21-23 @ 20:37) (99% - 99%)    Orthostatic VS  12-22-23 @ 08:19  Lying BP: --/-- HR: --  Sitting BP: 140/88 HR: 100  Standing BP: 144/96 HR: 103  Site: --  Mode: --  Orthostatic VS  12-21-23 @ 20:37  Lying BP: --/-- HR: --  Sitting BP: 133/77 HR: 102  Standing BP: 128/70 HR: 103  Site: --  Mode: --  Orthostatic VS  12-21-23 @ 07:42  Lying BP: --/-- HR: --  Sitting BP: 138/81 HR: 85  Standing BP: 123/71 HR: 89  Site: --  Mode: --  Orthostatic VS  12-20-23 @ 20:40  Lying BP: --/-- HR: --  Sitting BP: 125/68 HR: 94  Standing BP: 126/75 HR: 101  Site: --  Mode: --

## 2023-12-22 NOTE — BH INPATIENT PSYCHIATRY PROGRESS NOTE - NSBHASSESSSUMMFT_PSY_ALL_CORE
Patient is 27yo single woman, no dependents, domiciled with her Grandmother, unemployed on disability/SSI, pphx of schizoaffective disorder, multiple past psychiatric admissions including state and recent discharge from [facility], in tx with [facility], with pmhx of asthma, HTN, GERD, and hypothyroidism and schizoaffective disorder, in tx with [facility], who self presented to the ED reporting abdominal pain and requesting readmission to psychiatric unit, reporting CAH, depressive symptoms, IOR, suicidality, admitted to [facility] for psychiatric care.  Depression and psychosis likely multifactorial at this time, schizoaffective vs. borderline personality disorder vs. complex grief vs. adjustment disorder.      ongoing improvements in relation to mood and psychosis and pt feels mood is stable. plan as below. hospitalization due to need for state vs. residential placement     1.) Obs: Stop 1:1, patient with decrease in SI.  She has been able to approach staff when needed.  2.) Psychiatric medication management:  - Reviewed options for addressing nocturnal enuresis including reducing clozapine vs reducing lithium vs adding desmopressin.  Pt decided upon desmopressin as she continues to have depression and psychosis.  Desmopressin 0.1 mg QHS.  - Lithobid 1200 mg QHS.  Pt aware that she needs to stay well hydrated and avoid diuretics and NSAIDs.   - Discontinued olanzapine  - Increase Clozapine to 300mg qHS for psychosis ([id number])  - Prazosin 2mg qhs, monitor BP carefully.  - Abilify Maintena 400mg IM q3 weeks rather than 4, per collateral from ACT team.  Doses given 10/19, 11/17, 12/8, next 12/29.  - Mirtazapine was switched to escitalopram, increased to 20 mg daily.  - Wellbutrin XL 150mg daily  - Gabapentin 300 mg BID for anxiety/chronic pain.  - PRN: haloperidol 5mg PO/IM q8hrs PRN for agitation, diphenhydramine 50mg PO/IM q6hrs PRN for EPS PPx, hydroxyzine 50mg PO q12hrs PRN for anxiety, lorazepam 2mg PO q8hrs PRN for anxiety or 2mg IM for assaultive behavior. Melatonin 5mg qHS PRN for insomnia  - Discussed ECT several times with pt who states she has been told in two hospitals in past that due to her obesity and asthma, she is not an ideal candidate.  Pt states that due to this she is not interested in receiving ECT at this time.  3.) Medical:   - Pt scheduled for dental extractions 11/1, however declined to attend on day prior when informed it would not be under general anesthesia.  - last clozapine labs on 10/23 (CBC with diff, troponins, CRP); CRP elevated at 15.8 likely due to dental infection/wisdom tooth issues  - pantoprazole 40mg PO before breakfast for GERD  - montelukast 10mg PO qHS for asthma  - budesonide 80mcg/formoterol 4.5mcg 2 puffs BID for asthma  - PRN: acetaminophen 650mg PO q6hrs PRN for pain, albuterol 90mcg 2puffs q6hrs PRN for dyspnea, ondansetron 4mg q6hrs PRN for nausea  - Discussed CRP with medicine service, given asymptomatic pt and normal troponin, no additional intervention/diagnostics at this time.  - Discontinued clobetasol as rash has resolved.  4.) Dispo planning: State application submitted. Patient is 29yo single woman, no dependents, domiciled with her Grandmother, unemployed on disability/SSI, pphx of schizoaffective disorder, multiple past psychiatric admissions including state and recent discharge from [facility], in tx with [facility], with pmhx of asthma, HTN, GERD, and hypothyroidism and schizoaffective disorder, in tx with [facility], who self presented to the ED reporting abdominal pain and requesting readmission to psychiatric unit, reporting CAH, depressive symptoms, IOR, suicidality, admitted to [facility] for psychiatric care.  Depression and psychosis likely multifactorial at this time, schizoaffective vs. borderline personality disorder vs. complex grief vs. adjustment disorder.      ongoing improvements in relation to mood and psychosis and pt feels mood is stable. plan as below. hospitalization due to need for state vs. residential placement     1.) Obs: Stop 1:1, patient with decrease in SI.  She has been able to approach staff when needed.  2.) Psychiatric medication management:  - Reviewed options for addressing nocturnal enuresis including reducing clozapine vs reducing lithium vs adding desmopressin.  Pt decided upon desmopressin as she continues to have depression and psychosis.  Desmopressin 0.1 mg QHS.  - Lithobid 1200 mg QHS.  Pt aware that she needs to stay well hydrated and avoid diuretics and NSAIDs.   - Discontinued olanzapine  - Increase Clozapine to 300mg qHS for psychosis ([id number])  - Prazosin 2mg qhs, monitor BP carefully.  - Abilify Maintena 400mg IM q3 weeks rather than 4, per collateral from ACT team.  Doses given 10/19, 11/17, 12/8, next 12/29.  - Mirtazapine was switched to escitalopram, increased to 20 mg daily.  - Wellbutrin XL 150mg daily  - Gabapentin 300 mg BID for anxiety/chronic pain.  - PRN: haloperidol 5mg PO/IM q8hrs PRN for agitation, diphenhydramine 50mg PO/IM q6hrs PRN for EPS PPx, hydroxyzine 50mg PO q12hrs PRN for anxiety, lorazepam 2mg PO q8hrs PRN for anxiety or 2mg IM for assaultive behavior. Melatonin 5mg qHS PRN for insomnia  - Discussed ECT several times with pt who states she has been told in two hospitals in past that due to her obesity and asthma, she is not an ideal candidate.  Pt states that due to this she is not interested in receiving ECT at this time.  3.) Medical:   - Pt scheduled for dental extractions 11/1, however declined to attend on day prior when informed it would not be under general anesthesia.  - last clozapine labs on 10/23 (CBC with diff, troponins, CRP); CRP elevated at 15.8 likely due to dental infection/wisdom tooth issues  - pantoprazole 40mg PO before breakfast for GERD  - montelukast 10mg PO qHS for asthma  - budesonide 80mcg/formoterol 4.5mcg 2 puffs BID for asthma  - PRN: acetaminophen 650mg PO q6hrs PRN for pain, albuterol 90mcg 2puffs q6hrs PRN for dyspnea, ondansetron 4mg q6hrs PRN for nausea  - Discussed CRP with medicine service, given asymptomatic pt and normal troponin, no additional intervention/diagnostics at this time.  - Discontinued clobetasol as rash has resolved.  4.) Dispo planning: State application submitted.

## 2023-12-23 RX ADMIN — ESCITALOPRAM OXALATE 20 MILLIGRAM(S): 10 TABLET, FILM COATED ORAL at 08:16

## 2023-12-23 RX ADMIN — DESMOPRESSIN ACETATE 0.1 MILLIGRAM(S): 0.1 TABLET ORAL at 20:36

## 2023-12-23 RX ADMIN — PANTOPRAZOLE SODIUM 40 MILLIGRAM(S): 20 TABLET, DELAYED RELEASE ORAL at 08:17

## 2023-12-23 RX ADMIN — Medication 2 MILLIGRAM(S): at 20:36

## 2023-12-23 RX ADMIN — CLOZAPINE 300 MILLIGRAM(S): 150 TABLET, ORALLY DISINTEGRATING ORAL at 20:36

## 2023-12-23 RX ADMIN — LITHIUM CARBONATE 1200 MILLIGRAM(S): 300 TABLET, EXTENDED RELEASE ORAL at 20:35

## 2023-12-23 RX ADMIN — GABAPENTIN 300 MILLIGRAM(S): 400 CAPSULE ORAL at 08:17

## 2023-12-23 RX ADMIN — BUPROPION HYDROCHLORIDE 150 MILLIGRAM(S): 150 TABLET, EXTENDED RELEASE ORAL at 08:17

## 2023-12-23 RX ADMIN — MONTELUKAST 10 MILLIGRAM(S): 4 TABLET, CHEWABLE ORAL at 20:36

## 2023-12-23 RX ADMIN — BUDESONIDE AND FORMOTEROL FUMARATE DIHYDRATE 2 PUFF(S): 160; 4.5 AEROSOL RESPIRATORY (INHALATION) at 20:36

## 2023-12-23 RX ADMIN — GABAPENTIN 300 MILLIGRAM(S): 400 CAPSULE ORAL at 20:36

## 2023-12-23 RX ADMIN — Medication 650 MILLIGRAM(S): at 10:49

## 2023-12-24 RX ADMIN — CLOZAPINE 300 MILLIGRAM(S): 150 TABLET, ORALLY DISINTEGRATING ORAL at 20:40

## 2023-12-24 RX ADMIN — BUDESONIDE AND FORMOTEROL FUMARATE DIHYDRATE 2 PUFF(S): 160; 4.5 AEROSOL RESPIRATORY (INHALATION) at 20:41

## 2023-12-24 RX ADMIN — DESMOPRESSIN ACETATE 0.1 MILLIGRAM(S): 0.1 TABLET ORAL at 20:40

## 2023-12-24 RX ADMIN — MONTELUKAST 10 MILLIGRAM(S): 4 TABLET, CHEWABLE ORAL at 20:40

## 2023-12-24 RX ADMIN — BUPROPION HYDROCHLORIDE 150 MILLIGRAM(S): 150 TABLET, EXTENDED RELEASE ORAL at 08:31

## 2023-12-24 RX ADMIN — PANTOPRAZOLE SODIUM 40 MILLIGRAM(S): 20 TABLET, DELAYED RELEASE ORAL at 08:31

## 2023-12-24 RX ADMIN — GABAPENTIN 300 MILLIGRAM(S): 400 CAPSULE ORAL at 08:31

## 2023-12-24 RX ADMIN — LITHIUM CARBONATE 1200 MILLIGRAM(S): 300 TABLET, EXTENDED RELEASE ORAL at 20:40

## 2023-12-24 RX ADMIN — GABAPENTIN 300 MILLIGRAM(S): 400 CAPSULE ORAL at 20:40

## 2023-12-24 RX ADMIN — ESCITALOPRAM OXALATE 20 MILLIGRAM(S): 10 TABLET, FILM COATED ORAL at 08:31

## 2023-12-24 RX ADMIN — BUDESONIDE AND FORMOTEROL FUMARATE DIHYDRATE 2 PUFF(S): 160; 4.5 AEROSOL RESPIRATORY (INHALATION) at 08:32

## 2023-12-24 RX ADMIN — Medication 2 MILLIGRAM(S): at 20:40

## 2023-12-25 RX ADMIN — PANTOPRAZOLE SODIUM 40 MILLIGRAM(S): 20 TABLET, DELAYED RELEASE ORAL at 08:27

## 2023-12-25 RX ADMIN — BUPROPION HYDROCHLORIDE 150 MILLIGRAM(S): 150 TABLET, EXTENDED RELEASE ORAL at 08:27

## 2023-12-25 RX ADMIN — BUDESONIDE AND FORMOTEROL FUMARATE DIHYDRATE 2 PUFF(S): 160; 4.5 AEROSOL RESPIRATORY (INHALATION) at 21:26

## 2023-12-25 RX ADMIN — Medication 2 MILLIGRAM(S): at 21:25

## 2023-12-25 RX ADMIN — GABAPENTIN 300 MILLIGRAM(S): 400 CAPSULE ORAL at 08:27

## 2023-12-25 RX ADMIN — LITHIUM CARBONATE 1200 MILLIGRAM(S): 300 TABLET, EXTENDED RELEASE ORAL at 21:25

## 2023-12-25 RX ADMIN — ESCITALOPRAM OXALATE 20 MILLIGRAM(S): 10 TABLET, FILM COATED ORAL at 08:29

## 2023-12-25 RX ADMIN — CLOZAPINE 300 MILLIGRAM(S): 150 TABLET, ORALLY DISINTEGRATING ORAL at 21:25

## 2023-12-25 RX ADMIN — GABAPENTIN 300 MILLIGRAM(S): 400 CAPSULE ORAL at 21:25

## 2023-12-25 RX ADMIN — MONTELUKAST 10 MILLIGRAM(S): 4 TABLET, CHEWABLE ORAL at 21:25

## 2023-12-25 RX ADMIN — DESMOPRESSIN ACETATE 0.1 MILLIGRAM(S): 0.1 TABLET ORAL at 21:25

## 2023-12-25 RX ADMIN — BUDESONIDE AND FORMOTEROL FUMARATE DIHYDRATE 2 PUFF(S): 160; 4.5 AEROSOL RESPIRATORY (INHALATION) at 08:26

## 2023-12-26 PROCEDURE — 99231 SBSQ HOSP IP/OBS SF/LOW 25: CPT

## 2023-12-26 RX ADMIN — BUDESONIDE AND FORMOTEROL FUMARATE DIHYDRATE 2 PUFF(S): 160; 4.5 AEROSOL RESPIRATORY (INHALATION) at 09:06

## 2023-12-26 RX ADMIN — ESCITALOPRAM OXALATE 20 MILLIGRAM(S): 10 TABLET, FILM COATED ORAL at 09:05

## 2023-12-26 RX ADMIN — Medication 2 MILLIGRAM(S): at 21:23

## 2023-12-26 RX ADMIN — PANTOPRAZOLE SODIUM 40 MILLIGRAM(S): 20 TABLET, DELAYED RELEASE ORAL at 09:05

## 2023-12-26 RX ADMIN — BUPROPION HYDROCHLORIDE 150 MILLIGRAM(S): 150 TABLET, EXTENDED RELEASE ORAL at 09:05

## 2023-12-26 RX ADMIN — ALBUTEROL 2 PUFF(S): 90 AEROSOL, METERED ORAL at 22:07

## 2023-12-26 RX ADMIN — GABAPENTIN 300 MILLIGRAM(S): 400 CAPSULE ORAL at 09:05

## 2023-12-26 RX ADMIN — GABAPENTIN 300 MILLIGRAM(S): 400 CAPSULE ORAL at 21:23

## 2023-12-26 RX ADMIN — MONTELUKAST 10 MILLIGRAM(S): 4 TABLET, CHEWABLE ORAL at 21:23

## 2023-12-26 RX ADMIN — DESMOPRESSIN ACETATE 0.1 MILLIGRAM(S): 0.1 TABLET ORAL at 21:24

## 2023-12-26 RX ADMIN — LITHIUM CARBONATE 1200 MILLIGRAM(S): 300 TABLET, EXTENDED RELEASE ORAL at 21:22

## 2023-12-26 RX ADMIN — LIDOCAINE 1 PATCH: 4 CREAM TOPICAL at 21:29

## 2023-12-26 RX ADMIN — CLOZAPINE 300 MILLIGRAM(S): 150 TABLET, ORALLY DISINTEGRATING ORAL at 21:23

## 2023-12-26 RX ADMIN — BUDESONIDE AND FORMOTEROL FUMARATE DIHYDRATE 2 PUFF(S): 160; 4.5 AEROSOL RESPIRATORY (INHALATION) at 22:07

## 2023-12-26 NOTE — BH INPATIENT PSYCHIATRY PROGRESS NOTE - NSBHFUPINTERVALHXFT_PSY_A_CORE
Chart reviewed, case discussed with nursing staff.  no behavioral issues. The patient continues to feel improvements in her mood.  She also denies any current SI or self-injurious thoughts.  The patient denies any AHs.  Pt is hopeful for housing.  She has been eating and sleeping well.  She is compliant with medications, tolerating them well.

## 2023-12-26 NOTE — BH INPATIENT PSYCHIATRY PROGRESS NOTE - NSBHASSESSSUMMFT_PSY_ALL_CORE
Patient is 27yo single woman, no dependents, domiciled with her Grandmother, unemployed on disability/SSI, pphx of schizoaffective disorder, multiple past psychiatric admissions including state and recent discharge from [facility], in tx with [facility], with pmhx of asthma, HTN, GERD, and hypothyroidism and schizoaffective disorder, in tx with [facility], who self presented to the ED reporting abdominal pain and requesting readmission to psychiatric unit, reporting CAH, depressive symptoms, IOR, suicidality, admitted to [facility] for psychiatric care.  Depression and psychosis likely multifactorial at this time, schizoaffective vs. borderline personality disorder vs. complex grief vs. adjustment disorder.      Ongoing improvements in relation to mood and psychosis and pt feels mood is stable. plan as below. hospitalization due to need for state vs. residential placement     1.) Obs: Stop 1:1, patient with decrease in SI.  She has been able to approach staff when needed.  2.) Psychiatric medication management:  - Reviewed options for addressing nocturnal enuresis including reducing clozapine vs reducing lithium vs adding desmopressin.  Pt decided upon desmopressin as she continues to have depression and psychosis.  Desmopressin 0.1 mg QHS.  - Lithobid 1200 mg QHS.  Pt aware that she needs to stay well hydrated and avoid diuretics and NSAIDs.   - Discontinued olanzapine  - Increase Clozapine to 300mg qHS for psychosis ([id number])  - Prazosin 2mg qhs, monitor BP carefully.  - Abilify Maintena 400mg IM q3 weeks rather than 4, per collateral from ACT team.  Doses given 10/19, 11/17, 12/8, next 12/29.  - Mirtazapine was switched to escitalopram, increased to 20 mg daily.  - Wellbutrin XL 150mg daily  - Gabapentin 300 mg BID for anxiety/chronic pain.  - PRN: haloperidol 5mg PO/IM q8hrs PRN for agitation, diphenhydramine 50mg PO/IM q6hrs PRN for EPS PPx, hydroxyzine 50mg PO q12hrs PRN for anxiety, lorazepam 2mg PO q8hrs PRN for anxiety or 2mg IM for assaultive behavior. Melatonin 5mg qHS PRN for insomnia  - Discussed ECT several times with pt who states she has been told in two hospitals in past that due to her obesity and asthma, she is not an ideal candidate.  Pt states that due to this she is not interested in receiving ECT at this time.  3.) Medical:   - Pt scheduled for dental extractions 11/1, however declined to attend on day prior when informed it would not be under general anesthesia.  - last clozapine labs on 10/23 (CBC with diff, troponins, CRP); CRP elevated at 15.8 likely due to dental infection/wisdom tooth issues  - pantoprazole 40mg PO before breakfast for GERD  - montelukast 10mg PO qHS for asthma  - budesonide 80mcg/formoterol 4.5mcg 2 puffs BID for asthma  - PRN: acetaminophen 650mg PO q6hrs PRN for pain, albuterol 90mcg 2puffs q6hrs PRN for dyspnea, ondansetron 4mg q6hrs PRN for nausea  - Discussed CRP with medicine service, given asymptomatic pt and normal troponin, no additional intervention/diagnostics at this time.  - Discontinued clobetasol as rash has resolved.  4.) Dispo planning: State application submitted. Patient is 29yo single woman, no dependents, domiciled with her Grandmother, unemployed on disability/SSI, pphx of schizoaffective disorder, multiple past psychiatric admissions including state and recent discharge from [facility], in tx with [facility], with pmhx of asthma, HTN, GERD, and hypothyroidism and schizoaffective disorder, in tx with [facility], who self presented to the ED reporting abdominal pain and requesting readmission to psychiatric unit, reporting CAH, depressive symptoms, IOR, suicidality, admitted to [facility] for psychiatric care.  Depression and psychosis likely multifactorial at this time, schizoaffective vs. borderline personality disorder vs. complex grief vs. adjustment disorder.      Ongoing improvements in relation to mood and psychosis and pt feels mood is stable. plan as below. hospitalization due to need for state vs. residential placement     1.) Obs: Stop 1:1, patient with decrease in SI.  She has been able to approach staff when needed.  2.) Psychiatric medication management:  - Reviewed options for addressing nocturnal enuresis including reducing clozapine vs reducing lithium vs adding desmopressin.  Pt decided upon desmopressin as she continues to have depression and psychosis.  Desmopressin 0.1 mg QHS.  - Lithobid 1200 mg QHS.  Pt aware that she needs to stay well hydrated and avoid diuretics and NSAIDs.   - Discontinued olanzapine  - Increase Clozapine to 300mg qHS for psychosis ([id number])  - Prazosin 2mg qhs, monitor BP carefully.  - Abilify Maintena 400mg IM q3 weeks rather than 4, per collateral from ACT team.  Doses given 10/19, 11/17, 12/8, next 12/29.  - Mirtazapine was switched to escitalopram, increased to 20 mg daily.  - Wellbutrin XL 150mg daily  - Gabapentin 300 mg BID for anxiety/chronic pain.  - PRN: haloperidol 5mg PO/IM q8hrs PRN for agitation, diphenhydramine 50mg PO/IM q6hrs PRN for EPS PPx, hydroxyzine 50mg PO q12hrs PRN for anxiety, lorazepam 2mg PO q8hrs PRN for anxiety or 2mg IM for assaultive behavior. Melatonin 5mg qHS PRN for insomnia  - Discussed ECT several times with pt who states she has been told in two hospitals in past that due to her obesity and asthma, she is not an ideal candidate.  Pt states that due to this she is not interested in receiving ECT at this time.  3.) Medical:   - Pt scheduled for dental extractions 11/1, however declined to attend on day prior when informed it would not be under general anesthesia.  - last clozapine labs on 10/23 (CBC with diff, troponins, CRP); CRP elevated at 15.8 likely due to dental infection/wisdom tooth issues  - pantoprazole 40mg PO before breakfast for GERD  - montelukast 10mg PO qHS for asthma  - budesonide 80mcg/formoterol 4.5mcg 2 puffs BID for asthma  - PRN: acetaminophen 650mg PO q6hrs PRN for pain, albuterol 90mcg 2puffs q6hrs PRN for dyspnea, ondansetron 4mg q6hrs PRN for nausea  - Discussed CRP with medicine service, given asymptomatic pt and normal troponin, no additional intervention/diagnostics at this time.  - Discontinued clobetasol as rash has resolved.  4.) Dispo planning: State application submitted.

## 2023-12-26 NOTE — BH INPATIENT PSYCHIATRY PROGRESS NOTE - NSBHCHARTREVIEWVS_PSY_A_CORE FT
Vital Signs Last 24 Hrs  T(C): 36.7 (12-26-23 @ 08:01), Max: 36.9 (12-25-23 @ 20:48)  T(F): 98 (12-26-23 @ 08:01), Max: 98.4 (12-25-23 @ 20:48)  HR: --  BP: --  BP(mean): --  RR: --  SpO2: --    Orthostatic VS  12-26-23 @ 08:01  Lying BP: --/-- HR: --  Sitting BP: 107/61 HR: 82  Standing BP: 113/62 HR: 101  Site: --  Mode: --  Orthostatic VS  12-25-23 @ 20:48  Lying BP: --/-- HR: --  Sitting BP: 111/72 HR: 106  Standing BP: 98/76 HR: 110  Site: --  Mode: --

## 2023-12-26 NOTE — BH INPATIENT PSYCHIATRY PROGRESS NOTE - NSBHATTESTBILLING_PSY_A_CORE
[id number] OBS or IP - low complexity OR 25-34 mins [id number] OBS or IP - low complexity OR 25-34 mins

## 2023-12-26 NOTE — BH INPATIENT PSYCHIATRY PROGRESS NOTE - NSBHMETABOLIC_PSY_ALL_CORE_FT
BMI: BMI (kg/m2): 57.8 (09-14-23 @ 14:30)  HbA1c: A1C with Estimated Average Glucose Result: 5.2 % (11-01-23 @ 09:04)    Glucose:   BP: 143/68 (12-25-23 @ 08:25) (143/68 - 143/68)Vital Signs Last 24 Hrs  T(C): 36.7 (12-26-23 @ 08:01), Max: 36.9 (12-25-23 @ 20:48)  T(F): 98 (12-26-23 @ 08:01), Max: 98.4 (12-25-23 @ 20:48)  HR: --  BP: --  BP(mean): --  RR: --  SpO2: --    Orthostatic VS  12-26-23 @ 08:01  Lying BP: --/-- HR: --  Sitting BP: 107/61 HR: 82  Standing BP: 113/62 HR: 101  Site: --  Mode: --  Orthostatic VS  12-25-23 @ 20:48  Lying BP: --/-- HR: --  Sitting BP: 111/72 HR: 106  Standing BP: 98/76 HR: 110  Site: --  Mode: --    Lipid Panel: Date/Time: 11-01-23 @ 09:04  Cholesterol, Serum: 156  LDL Cholesterol Calculated: 82  HDL Cholesterol, Serum: 58  Total Cholesterol/HDL Ration Measurement: --  Triglycerides, Serum: 79

## 2023-12-27 PROCEDURE — 99232 SBSQ HOSP IP/OBS MODERATE 35: CPT

## 2023-12-27 RX ADMIN — CLOZAPINE 300 MILLIGRAM(S): 150 TABLET, ORALLY DISINTEGRATING ORAL at 20:18

## 2023-12-27 RX ADMIN — LITHIUM CARBONATE 1200 MILLIGRAM(S): 300 TABLET, EXTENDED RELEASE ORAL at 20:17

## 2023-12-27 RX ADMIN — ESCITALOPRAM OXALATE 20 MILLIGRAM(S): 10 TABLET, FILM COATED ORAL at 08:34

## 2023-12-27 RX ADMIN — DESMOPRESSIN ACETATE 0.1 MILLIGRAM(S): 0.1 TABLET ORAL at 20:17

## 2023-12-27 RX ADMIN — BUDESONIDE AND FORMOTEROL FUMARATE DIHYDRATE 2 PUFF(S): 160; 4.5 AEROSOL RESPIRATORY (INHALATION) at 08:35

## 2023-12-27 RX ADMIN — GABAPENTIN 300 MILLIGRAM(S): 400 CAPSULE ORAL at 08:34

## 2023-12-27 RX ADMIN — Medication 650 MILLIGRAM(S): at 18:18

## 2023-12-27 RX ADMIN — GABAPENTIN 300 MILLIGRAM(S): 400 CAPSULE ORAL at 20:19

## 2023-12-27 RX ADMIN — Medication 2 MILLIGRAM(S): at 20:18

## 2023-12-27 RX ADMIN — MONTELUKAST 10 MILLIGRAM(S): 4 TABLET, CHEWABLE ORAL at 20:18

## 2023-12-27 RX ADMIN — PANTOPRAZOLE SODIUM 40 MILLIGRAM(S): 20 TABLET, DELAYED RELEASE ORAL at 08:34

## 2023-12-27 RX ADMIN — BUDESONIDE AND FORMOTEROL FUMARATE DIHYDRATE 2 PUFF(S): 160; 4.5 AEROSOL RESPIRATORY (INHALATION) at 20:18

## 2023-12-27 RX ADMIN — BUPROPION HYDROCHLORIDE 150 MILLIGRAM(S): 150 TABLET, EXTENDED RELEASE ORAL at 08:35

## 2023-12-27 NOTE — BH INPATIENT PSYCHIATRY PROGRESS NOTE - NSBHATTESTBILLING_PSY_A_CORE
[id number] OBS or IP - moderate complexity OR 35-49 mins [id number] OBS or IP - moderate complexity OR 35-49 mins

## 2023-12-27 NOTE — BH INPATIENT PSYCHIATRY PROGRESS NOTE - NSBHMETABOLIC_PSY_ALL_CORE_FT
BMI: BMI (kg/m2): 57.8 (09-14-23 @ 14:30)  HbA1c: A1C with Estimated Average Glucose Result: 5.2 % (11-01-23 @ 09:04)    Glucose:   BP: 143/68 (12-25-23 @ 08:25) (143/68 - 143/68)Vital Signs Last 24 Hrs  T(C): 37 (12-27-23 @ 07:48), Max: 37 (12-27-23 @ 07:48)  T(F): 98.6 (12-27-23 @ 07:48), Max: 98.6 (12-27-23 @ 07:48)  HR: --  BP: --  BP(mean): --  RR: --  SpO2: --    Orthostatic VS  12-27-23 @ 07:48  Lying BP: --/-- HR: --  Sitting BP: 103/60 HR: 90  Standing BP: --/-- HR: --  Site: --  Mode: --  Orthostatic VS  12-26-23 @ 20:53  Lying BP: --/-- HR: --  Sitting BP: 144/77 HR: 108  Standing BP: 140/75 HR: 110  Site: --  Mode: --  Orthostatic VS  12-26-23 @ 08:01  Lying BP: --/-- HR: --  Sitting BP: 107/61 HR: 82  Standing BP: 113/62 HR: 101  Site: --  Mode: --  Orthostatic VS  12-25-23 @ 20:48  Lying BP: --/-- HR: --  Sitting BP: 111/72 HR: 106  Standing BP: 98/76 HR: 110  Site: --  Mode: --    Lipid Panel: Date/Time: 11-01-23 @ 09:04  Cholesterol, Serum: 156  LDL Cholesterol Calculated: 82  HDL Cholesterol, Serum: 58  Total Cholesterol/HDL Ration Measurement: --  Triglycerides, Serum: 79

## 2023-12-27 NOTE — BH INPATIENT PSYCHIATRY PROGRESS NOTE - NSBHFUPINTERVALHXFT_PSY_A_CORE
f/up mood, care discussed w/ tx team, VSS. Patient reported having "good stress", stating that she knows that housing will take time. Patient visible on the unit, social with select peers. Patient reported sleeping well and with good appetite. denied SE to meds. denied dizziness or constipation, LBM yesterday. denied SI/I/P.

## 2023-12-27 NOTE — BH INPATIENT PSYCHIATRY PROGRESS NOTE - NSBHASSESSSUMMFT_PSY_ALL_CORE
Patient is 27yo single woman, no dependents, domiciled with her Grandmother, unemployed on disability/SSI, pphx of schizoaffective disorder, multiple past psychiatric admissions including state and recent discharge from [facility], [facility] tx with [facility], with pmhx of asthma, HTN, GERD, and hypothyroidism and schizoaffective disorder, in tx with [facility], who self presented to the ED reporting abdominal pain and requesting readmission to psychiatric unit, reporting CAH, depressive symptoms, IOR, suicidality, admitted to [facility] for psychiatric care.  Depression and psychosis likely multifactorial at this time, schizoaffective vs. borderline personality disorder vs. complex grief vs. adjustment disorder.      Ongoing improvements in relation to mood and psychosis and pt feels mood is stable. plan as below. hospitalization due to need for state vs. residential placement     1.) Obs: Stop 1:1, patient with decrease in SI.  She has been able to approach staff when needed.  2.) Psychiatric medication management:  - Reviewed options for addressing nocturnal enuresis including reducing clozapine vs reducing lithium vs adding desmopressin.  Pt decided upon desmopressin as she continues to have depression and psychosis.  Desmopressin 0.1 mg QHS.  - Lithobid 1200 mg QHS.  Pt aware that she needs to stay well hydrated and avoid diuretics and NSAIDs.   - Discontinued olanzapine  - Increase Clozapine to 300mg qHS for psychosis ([id number])  - Prazosin 2mg qhs, monitor BP carefully.  - Abilify Maintena 400mg IM q3 weeks rather than 4, per collateral from ACT team.  Doses given 10/19, 11/17, 12/8, next 12/29.  - Mirtazapine was switched to escitalopram, increased to 20 mg daily.  - Wellbutrin XL 150mg daily  - Gabapentin 300 mg BID for anxiety/chronic pain.  - PRN: haloperidol 5mg PO/IM q8hrs PRN for agitation, diphenhydramine 50mg PO/IM q6hrs PRN for EPS PPx, hydroxyzine 50mg PO q12hrs PRN for anxiety, lorazepam 2mg PO q8hrs PRN for anxiety or 2mg IM for assaultive behavior. Melatonin 5mg qHS PRN for insomnia  - Discussed ECT several times with pt who states she has been told in two hospitals in past that due to her obesity and asthma, she is not an ideal candidate.  Pt states that due to this she is not interested in receiving ECT at this time.  3.) Medical:   - Pt scheduled for dental extractions 11/1, however declined to attend on day prior when informed it would not be under general anesthesia.  - last clozapine labs on 10/23 (CBC with diff, troponins, CRP); CRP elevated at 15.8 likely due to dental infection/wisdom tooth issues  - pantoprazole 40mg PO before breakfast for GERD  - montelukast 10mg PO qHS for asthma  - budesonide 80mcg/formoterol 4.5mcg 2 puffs BID for asthma  - PRN: acetaminophen 650mg PO q6hrs PRN for pain, albuterol 90mcg 2puffs q6hrs PRN for dyspnea, ondansetron 4mg q6hrs PRN for nausea  - Discussed CRP with medicine service, given asymptomatic pt and normal troponin, no additional intervention/diagnostics at this time.  - Discontinued clobetasol as rash has resolved.  4.) Dispo planning: State application submitted. Patient is 29yo single woman, no dependents, domiciled with her Grandmother, unemployed on disability/SSI, pphx of schizoaffective disorder, multiple past psychiatric admissions including state and recent discharge from [facility], in tx with [facility], with pmhx of asthma, HTN, GERD, and hypothyroidism and schizoaffective disorder, in tx with [facility], who self presented to the ED reporting abdominal pain and requesting readmission to psychiatric unit, reporting CAH, depressive symptoms, IOR, suicidality, admitted to [facility] for psychiatric care.  Depression and psychosis likely multifactorial at this time, schizoaffective vs. borderline personality disorder vs. complex grief vs. adjustment disorder.      Ongoing improvements in relation to mood and psychosis and pt feels mood is stable. plan as below. hospitalization due to need for state vs. residential placement     1.) Obs: Stop 1:1, patient with decrease in SI.  She has been able to approach staff when needed.  2.) Psychiatric medication management:  - Reviewed options for addressing nocturnal enuresis including reducing clozapine vs reducing lithium vs adding desmopressin.  Pt decided upon desmopressin as she continues to have depression and psychosis.  Desmopressin 0.1 mg QHS.  - Lithobid 1200 mg QHS.  Pt aware that she needs to stay well hydrated and avoid diuretics and NSAIDs.   - Discontinued olanzapine  - Increase Clozapine to 300mg qHS for psychosis ([id number])  - Prazosin 2mg qhs, monitor BP carefully.  - Abilify Maintena 400mg IM q3 weeks rather than 4, per collateral from ACT team.  Doses given 10/19, 11/17, 12/8, next 12/29.  - Mirtazapine was switched to escitalopram, increased to 20 mg daily.  - Wellbutrin XL 150mg daily  - Gabapentin 300 mg BID for anxiety/chronic pain.  - PRN: haloperidol 5mg PO/IM q8hrs PRN for agitation, diphenhydramine 50mg PO/IM q6hrs PRN for EPS PPx, hydroxyzine 50mg PO q12hrs PRN for anxiety, lorazepam 2mg PO q8hrs PRN for anxiety or 2mg IM for assaultive behavior. Melatonin 5mg qHS PRN for insomnia  - Discussed ECT several times with pt who states she has been told in two hospitals in past that due to her obesity and asthma, she is not an ideal candidate.  Pt states that due to this she is not interested in receiving ECT at this time.  3.) Medical:   - Pt scheduled for dental extractions 11/1, however declined to attend on day prior when informed it would not be under general anesthesia.  - last clozapine labs on 10/23 (CBC with diff, troponins, CRP); CRP elevated at 15.8 likely due to dental infection/wisdom tooth issues  - pantoprazole 40mg PO before breakfast for GERD  - montelukast 10mg PO qHS for asthma  - budesonide 80mcg/formoterol 4.5mcg 2 puffs BID for asthma  - PRN: acetaminophen 650mg PO q6hrs PRN for pain, albuterol 90mcg 2puffs q6hrs PRN for dyspnea, ondansetron 4mg q6hrs PRN for nausea  - Discussed CRP with medicine service, given asymptomatic pt and normal troponin, no additional intervention/diagnostics at this time.  - Discontinued clobetasol as rash has resolved.  4.) Dispo planning: State application submitted.

## 2023-12-27 NOTE — BH INPATIENT PSYCHIATRY PROGRESS NOTE - NSBHCHARTREVIEWVS_PSY_A_CORE FT
Vital Signs Last 24 Hrs  T(C): 37 (12-27-23 @ 07:48), Max: 37 (12-27-23 @ 07:48)  T(F): 98.6 (12-27-23 @ 07:48), Max: 98.6 (12-27-23 @ 07:48)  HR: --  BP: --  BP(mean): --  RR: --  SpO2: --    Orthostatic VS  12-27-23 @ 07:48  Lying BP: --/-- HR: --  Sitting BP: 103/60 HR: 90  Standing BP: --/-- HR: --  Site: --  Mode: --  Orthostatic VS  12-26-23 @ 20:53  Lying BP: --/-- HR: --  Sitting BP: 144/77 HR: 108  Standing BP: 140/75 HR: 110  Site: --  Mode: --  Orthostatic VS  12-26-23 @ 08:01  Lying BP: --/-- HR: --  Sitting BP: 107/61 HR: 82  Standing BP: 113/62 HR: 101  Site: --  Mode: --  Orthostatic VS  12-25-23 @ 20:48  Lying BP: --/-- HR: --  Sitting BP: 111/72 HR: 106  Standing BP: 98/76 HR: 110  Site: --  Mode: --

## 2023-12-28 LAB
BASOPHILS # BLD AUTO: 0.02 K/UL — SIGNIFICANT CHANGE UP (ref 0–0.2)
BASOPHILS # BLD AUTO: 0.02 K/UL — SIGNIFICANT CHANGE UP (ref 0–0.2)
BASOPHILS NFR BLD AUTO: 0.2 % — SIGNIFICANT CHANGE UP (ref 0–2)
BASOPHILS NFR BLD AUTO: 0.2 % — SIGNIFICANT CHANGE UP (ref 0–2)
CRP SERPL-MCNC: 19.9 MG/L — HIGH
CRP SERPL-MCNC: 19.9 MG/L — HIGH
EOSINOPHIL # BLD AUTO: 0 K/UL — SIGNIFICANT CHANGE UP (ref 0–0.5)
EOSINOPHIL # BLD AUTO: 0 K/UL — SIGNIFICANT CHANGE UP (ref 0–0.5)
EOSINOPHIL NFR BLD AUTO: 0 % — SIGNIFICANT CHANGE UP (ref 0–6)
EOSINOPHIL NFR BLD AUTO: 0 % — SIGNIFICANT CHANGE UP (ref 0–6)
HCT VFR BLD CALC: 39.2 % — SIGNIFICANT CHANGE UP (ref 34.5–45)
HCT VFR BLD CALC: 39.2 % — SIGNIFICANT CHANGE UP (ref 34.5–45)
HGB BLD-MCNC: 12 G/DL — SIGNIFICANT CHANGE UP (ref 11.5–15.5)
HGB BLD-MCNC: 12 G/DL — SIGNIFICANT CHANGE UP (ref 11.5–15.5)
IANC: 5.86 K/UL — SIGNIFICANT CHANGE UP (ref 1.8–7.4)
IANC: 5.86 K/UL — SIGNIFICANT CHANGE UP (ref 1.8–7.4)
IMM GRANULOCYTES NFR BLD AUTO: 0.4 % — SIGNIFICANT CHANGE UP (ref 0–0.9)
IMM GRANULOCYTES NFR BLD AUTO: 0.4 % — SIGNIFICANT CHANGE UP (ref 0–0.9)
LYMPHOCYTES # BLD AUTO: 3.06 K/UL — SIGNIFICANT CHANGE UP (ref 1–3.3)
LYMPHOCYTES # BLD AUTO: 3.06 K/UL — SIGNIFICANT CHANGE UP (ref 1–3.3)
LYMPHOCYTES # BLD AUTO: 32.4 % — SIGNIFICANT CHANGE UP (ref 13–44)
LYMPHOCYTES # BLD AUTO: 32.4 % — SIGNIFICANT CHANGE UP (ref 13–44)
MCHC RBC-ENTMCNC: 24.3 PG — LOW (ref 27–34)
MCHC RBC-ENTMCNC: 24.3 PG — LOW (ref 27–34)
MCHC RBC-ENTMCNC: 30.6 GM/DL — LOW (ref 32–36)
MCHC RBC-ENTMCNC: 30.6 GM/DL — LOW (ref 32–36)
MCV RBC AUTO: 79.4 FL — LOW (ref 80–100)
MCV RBC AUTO: 79.4 FL — LOW (ref 80–100)
MONOCYTES # BLD AUTO: 0.45 K/UL — SIGNIFICANT CHANGE UP (ref 0–0.9)
MONOCYTES # BLD AUTO: 0.45 K/UL — SIGNIFICANT CHANGE UP (ref 0–0.9)
MONOCYTES NFR BLD AUTO: 4.8 % — SIGNIFICANT CHANGE UP (ref 2–14)
MONOCYTES NFR BLD AUTO: 4.8 % — SIGNIFICANT CHANGE UP (ref 2–14)
NEUTROPHILS # BLD AUTO: 5.86 K/UL — SIGNIFICANT CHANGE UP (ref 1.8–7.4)
NEUTROPHILS # BLD AUTO: 5.86 K/UL — SIGNIFICANT CHANGE UP (ref 1.8–7.4)
NEUTROPHILS NFR BLD AUTO: 62.2 % — SIGNIFICANT CHANGE UP (ref 43–77)
NEUTROPHILS NFR BLD AUTO: 62.2 % — SIGNIFICANT CHANGE UP (ref 43–77)
NRBC # BLD: 0 /100 WBCS — SIGNIFICANT CHANGE UP (ref 0–0)
NRBC # BLD: 0 /100 WBCS — SIGNIFICANT CHANGE UP (ref 0–0)
NRBC # FLD: 0 K/UL — SIGNIFICANT CHANGE UP (ref 0–0)
NRBC # FLD: 0 K/UL — SIGNIFICANT CHANGE UP (ref 0–0)
PLATELET # BLD AUTO: 297 K/UL — SIGNIFICANT CHANGE UP (ref 150–400)
PLATELET # BLD AUTO: 297 K/UL — SIGNIFICANT CHANGE UP (ref 150–400)
RBC # BLD: 4.94 M/UL — SIGNIFICANT CHANGE UP (ref 3.8–5.2)
RBC # BLD: 4.94 M/UL — SIGNIFICANT CHANGE UP (ref 3.8–5.2)
RBC # FLD: 15.1 % — HIGH (ref 10.3–14.5)
RBC # FLD: 15.1 % — HIGH (ref 10.3–14.5)
TROPONIN T, HIGH SENSITIVITY RESULT: 9 NG/L — SIGNIFICANT CHANGE UP
TROPONIN T, HIGH SENSITIVITY RESULT: 9 NG/L — SIGNIFICANT CHANGE UP
WBC # BLD: 9.43 K/UL — SIGNIFICANT CHANGE UP (ref 3.8–10.5)
WBC # BLD: 9.43 K/UL — SIGNIFICANT CHANGE UP (ref 3.8–10.5)
WBC # FLD AUTO: 9.43 K/UL — SIGNIFICANT CHANGE UP (ref 3.8–10.5)
WBC # FLD AUTO: 9.43 K/UL — SIGNIFICANT CHANGE UP (ref 3.8–10.5)

## 2023-12-28 PROCEDURE — 99232 SBSQ HOSP IP/OBS MODERATE 35: CPT

## 2023-12-28 RX ADMIN — MONTELUKAST 10 MILLIGRAM(S): 4 TABLET, CHEWABLE ORAL at 20:55

## 2023-12-28 RX ADMIN — CLOZAPINE 300 MILLIGRAM(S): 150 TABLET, ORALLY DISINTEGRATING ORAL at 20:55

## 2023-12-28 RX ADMIN — LITHIUM CARBONATE 1200 MILLIGRAM(S): 300 TABLET, EXTENDED RELEASE ORAL at 20:53

## 2023-12-28 RX ADMIN — GABAPENTIN 300 MILLIGRAM(S): 400 CAPSULE ORAL at 20:56

## 2023-12-28 RX ADMIN — ESCITALOPRAM OXALATE 20 MILLIGRAM(S): 10 TABLET, FILM COATED ORAL at 09:12

## 2023-12-28 RX ADMIN — BUPROPION HYDROCHLORIDE 150 MILLIGRAM(S): 150 TABLET, EXTENDED RELEASE ORAL at 09:12

## 2023-12-28 RX ADMIN — Medication 2 MILLIGRAM(S): at 20:56

## 2023-12-28 RX ADMIN — GABAPENTIN 300 MILLIGRAM(S): 400 CAPSULE ORAL at 09:12

## 2023-12-28 RX ADMIN — BUDESONIDE AND FORMOTEROL FUMARATE DIHYDRATE 2 PUFF(S): 160; 4.5 AEROSOL RESPIRATORY (INHALATION) at 20:54

## 2023-12-28 RX ADMIN — PANTOPRAZOLE SODIUM 40 MILLIGRAM(S): 20 TABLET, DELAYED RELEASE ORAL at 09:11

## 2023-12-28 RX ADMIN — DESMOPRESSIN ACETATE 0.1 MILLIGRAM(S): 0.1 TABLET ORAL at 20:56

## 2023-12-28 RX ADMIN — BUDESONIDE AND FORMOTEROL FUMARATE DIHYDRATE 2 PUFF(S): 160; 4.5 AEROSOL RESPIRATORY (INHALATION) at 09:12

## 2023-12-28 RX ADMIN — Medication 650 MILLIGRAM(S): at 12:50

## 2023-12-28 NOTE — BH INPATIENT PSYCHIATRY PROGRESS NOTE - NSBHASSESSSUMMFT_PSY_ALL_CORE
Patient is 29yo single woman, no dependents, domiciled with her Grandmother, unemployed on disability/SSI, pphx of schizoaffective disorder, multiple past psychiatric admissions including state and recent discharge from [facility], in tx with [facility], with pmhx of asthma, HTN, GERD, and hypothyroidism and schizoaffective disorder, in tx with [facility], who self presented to the ED reporting abdominal pain and requesting readmission to psychiatric unit, reporting CAH, depressive symptoms, IOR, suicidality, admitted to [facility] for psychiatric care.  Depression and psychosis likely multifactorial at this time, schizoaffective vs. borderline personality disorder vs. complex grief vs. adjustment disorder.      on assessment, patient is showing improvements in mood, thoughts and functioning.     1.) Obs: Stop 1:1, patient with decrease in SI.  She has been able to approach staff when needed.  2.) Psychiatric medication management:  - Reviewed options for addressing nocturnal enuresis including reducing clozapine vs reducing lithium vs adding desmopressin.  Pt decided upon desmopressin as she continues to have depression and psychosis.  Desmopressin 0.1 mg QHS.  - Lithobid 1200 mg QHS.  Pt aware that she needs to stay well hydrated and avoid diuretics and NSAIDs.   - Discontinued olanzapine  - Increase Clozapine to 300mg qHS for psychosis ([id number])  - Prazosin 2mg qhs, monitor BP carefully.  - Abilify Maintena 400mg IM q3 weeks rather than 4, per collateral from ACT team.  Doses given 10/19, 11/17, 12/8, next 12/29.  - Mirtazapine was switched to escitalopram, increased to 20 mg daily.  - Wellbutrin XL 150mg daily  - Gabapentin 300 mg BID for anxiety/chronic pain.  - PRN: haloperidol 5mg PO/IM q8hrs PRN for agitation, diphenhydramine 50mg PO/IM q6hrs PRN for EPS PPx, hydroxyzine 50mg PO q12hrs PRN for anxiety, lorazepam 2mg PO q8hrs PRN for anxiety or 2mg IM for assaultive behavior. Melatonin 5mg qHS PRN for insomnia  - Discussed ECT several times with pt who states she has been told in two hospitals in past that due to her obesity and asthma, she is not an ideal candidate.  Pt states that due to this she is not interested in receiving ECT at this time.  3.) Medical:   - Pt scheduled for dental extractions 11/1, however declined to attend on day prior when informed it would not be under general anesthesia.  - last clozapine labs on 10/23 (CBC with diff, troponins, CRP); CRP elevated at 15.8 likely due to dental infection/wisdom tooth issues  - pantoprazole 40mg PO before breakfast for GERD  - montelukast 10mg PO qHS for asthma  - budesonide 80mcg/formoterol 4.5mcg 2 puffs BID for asthma  - PRN: acetaminophen 650mg PO q6hrs PRN for pain, albuterol 90mcg 2puffs q6hrs PRN for dyspnea, ondansetron 4mg q6hrs PRN for nausea  - Discussed CRP with medicine service, given asymptomatic pt and normal troponin, no additional intervention/diagnostics at this time.  - Discontinued clobetasol as rash has resolved.  4.) Dispo planning: State application submitted. Patient is 29yo single woman, no dependents, domiciled with her Grandmother, unemployed on disability/SSI, pphx of schizoaffective disorder, multiple past psychiatric admissions including state and recent discharge from [facility], in tx with [facility], with pmhx of asthma, HTN, GERD, and hypothyroidism and schizoaffective disorder, in tx with [facility], who self presented to the ED reporting abdominal pain and requesting readmission to psychiatric unit, reporting CAH, depressive symptoms, IOR, suicidality, admitted to [facility] for psychiatric care.  Depression and psychosis likely multifactorial at this time, schizoaffective vs. borderline personality disorder vs. complex grief vs. adjustment disorder.      on assessment, patient is showing improvements in mood, thoughts and functioning.     1.) Obs: Stop 1:1, patient with decrease in SI.  She has been able to approach staff when needed.  2.) Psychiatric medication management:  - Reviewed options for addressing nocturnal enuresis including reducing clozapine vs reducing lithium vs adding desmopressin.  Pt decided upon desmopressin as she continues to have depression and psychosis.  Desmopressin 0.1 mg QHS.  - Lithobid 1200 mg QHS.  Pt aware that she needs to stay well hydrated and avoid diuretics and NSAIDs.   - Discontinued olanzapine  - Increase Clozapine to 300mg qHS for psychosis ([id number])  - Prazosin 2mg qhs, monitor BP carefully.  - Abilify Maintena 400mg IM q3 weeks rather than 4, per collateral from ACT team.  Doses given 10/19, 11/17, 12/8, next 12/29.  - Mirtazapine was switched to escitalopram, increased to 20 mg daily.  - Wellbutrin XL 150mg daily  - Gabapentin 300 mg BID for anxiety/chronic pain.  - PRN: haloperidol 5mg PO/IM q8hrs PRN for agitation, diphenhydramine 50mg PO/IM q6hrs PRN for EPS PPx, hydroxyzine 50mg PO q12hrs PRN for anxiety, lorazepam 2mg PO q8hrs PRN for anxiety or 2mg IM for assaultive behavior. Melatonin 5mg qHS PRN for insomnia  - Discussed ECT several times with pt who states she has been told in two hospitals in past that due to her obesity and asthma, she is not an ideal candidate.  Pt states that due to this she is not interested in receiving ECT at this time.  3.) Medical:   - Pt scheduled for dental extractions 11/1, however declined to attend on day prior when informed it would not be under general anesthesia.  - last clozapine labs on 10/23 (CBC with diff, troponins, CRP); CRP elevated at 15.8 likely due to dental infection/wisdom tooth issues  - pantoprazole 40mg PO before breakfast for GERD  - montelukast 10mg PO qHS for asthma  - budesonide 80mcg/formoterol 4.5mcg 2 puffs BID for asthma  - PRN: acetaminophen 650mg PO q6hrs PRN for pain, albuterol 90mcg 2puffs q6hrs PRN for dyspnea, ondansetron 4mg q6hrs PRN for nausea  - Discussed CRP with medicine service, given asymptomatic pt and normal troponin, no additional intervention/diagnostics at this time.  - Discontinued clobetasol as rash has resolved.  4.) Dispo planning: State application submitted.

## 2023-12-28 NOTE — BH INPATIENT PSYCHIATRY PROGRESS NOTE - NSBHATTESTBILLING_PSY_A_CORE
91398-[id number] OBS or IP - moderate complexity OR 35-49 mins [id number] OBS or IP - moderate complexity OR 35-49 mins

## 2023-12-28 NOTE — BH INPATIENT PSYCHIATRY PROGRESS NOTE - NSBHMETABOLIC_PSY_ALL_CORE_FT
BMI: BMI (kg/m2): 57.8 (09-14-23 @ 14:30)  HbA1c: A1C with Estimated Average Glucose Result: 5.2 % (11-01-23 @ 09:04)    Glucose:   BP: 103/60 (12-27-23 @ 07:48) (103/60 - 103/60)Vital Signs Last 24 Hrs  T(C): 36.4 (12-28-23 @ 08:18), Max: 36.7 (12-27-23 @ 20:34)  T(F): 97.6 (12-28-23 @ 08:18), Max: 98 (12-27-23 @ 20:34)  HR: --  BP: --  BP(mean): --  RR: --  SpO2: --    Orthostatic VS  12-28-23 @ 08:18  Lying BP: --/-- HR: --  Sitting BP: 125/74 HR: 88  Standing BP: 115/68 HR: 73  Site: --  Mode: --  Orthostatic VS  12-27-23 @ 20:34  Lying BP: --/-- HR: --  Sitting BP: 130/90 HR: 98  Standing BP: 140/96 HR: 102  Site: --  Mode: electronic  Orthostatic VS  12-26-23 @ 20:53  Lying BP: --/-- HR: --  Sitting BP: 144/77 HR: 108  Standing BP: 140/75 HR: 110  Site: --  Mode: --    Lipid Panel: Date/Time: 11-01-23 @ 09:04  Cholesterol, Serum: 156  LDL Cholesterol Calculated: 82  HDL Cholesterol, Serum: 58  Total Cholesterol/HDL Ration Measurement: --  Triglycerides, Serum: 79

## 2023-12-28 NOTE — BH INPATIENT PSYCHIATRY PROGRESS NOTE - NSBHCHARTREVIEWVS_PSY_A_CORE FT
Vital Signs Last 24 Hrs  T(C): 36.4 (12-28-23 @ 08:18), Max: 36.7 (12-27-23 @ 20:34)  T(F): 97.6 (12-28-23 @ 08:18), Max: 98 (12-27-23 @ 20:34)  HR: --  BP: --  BP(mean): --  RR: --  SpO2: --    Orthostatic VS  12-28-23 @ 08:18  Lying BP: --/-- HR: --  Sitting BP: 125/74 HR: 88  Standing BP: 115/68 HR: 73  Site: --  Mode: --  Orthostatic VS  12-27-23 @ 20:34  Lying BP: --/-- HR: --  Sitting BP: 130/90 HR: 98  Standing BP: 140/96 HR: 102  Site: --  Mode: electronic  Orthostatic VS  12-26-23 @ 20:53  Lying BP: --/-- HR: --  Sitting BP: 144/77 HR: 108  Standing BP: 140/75 HR: 110  Site: --  Mode: --

## 2023-12-28 NOTE — BH TREATMENT PLAN - NSTXDCOTHRINTERSW_PSY_ALL_CORE
Writer will work to educate the pt on the state referral process and explore any other aftercare options that may be an appropriate alternative to [facility] housing. Writer will work to educate the pt on the state referral process and explore any other aftercare options that may be an appropriate alternative to [facility] housing.

## 2023-12-28 NOTE — BH INPATIENT PSYCHIATRY PROGRESS NOTE - NSBHFUPINTERVALHXFT_PSY_A_CORE
f/up mood, care discussed w/ tx team, VSS. labs reviewed: ANC 5.86, WBC 9.43,  Patient reported feeling "pretty well", concerned about renewing her SSI as she does not want her SSI check suspended. Reported talking with  about it.  Patient visible on the unit, social with select peers. Patient reported sleeping well and with good appetite. denied SE to meds. denied dizziness or constipation. denied SI/I/P.

## 2023-12-29 PROCEDURE — 99232 SBSQ HOSP IP/OBS MODERATE 35: CPT

## 2023-12-29 RX ADMIN — ARIPIPRAZOLE 400 MILLIGRAM(S): 15 TABLET ORAL at 09:14

## 2023-12-29 RX ADMIN — GABAPENTIN 300 MILLIGRAM(S): 400 CAPSULE ORAL at 08:08

## 2023-12-29 RX ADMIN — Medication 650 MILLIGRAM(S): at 09:02

## 2023-12-29 RX ADMIN — ESCITALOPRAM OXALATE 20 MILLIGRAM(S): 10 TABLET, FILM COATED ORAL at 08:08

## 2023-12-29 RX ADMIN — BUDESONIDE AND FORMOTEROL FUMARATE DIHYDRATE 2 PUFF(S): 160; 4.5 AEROSOL RESPIRATORY (INHALATION) at 21:06

## 2023-12-29 RX ADMIN — MONTELUKAST 10 MILLIGRAM(S): 4 TABLET, CHEWABLE ORAL at 21:04

## 2023-12-29 RX ADMIN — LITHIUM CARBONATE 1200 MILLIGRAM(S): 300 TABLET, EXTENDED RELEASE ORAL at 21:04

## 2023-12-29 RX ADMIN — CLOZAPINE 300 MILLIGRAM(S): 150 TABLET, ORALLY DISINTEGRATING ORAL at 21:04

## 2023-12-29 RX ADMIN — BUPROPION HYDROCHLORIDE 150 MILLIGRAM(S): 150 TABLET, EXTENDED RELEASE ORAL at 08:08

## 2023-12-29 RX ADMIN — DESMOPRESSIN ACETATE 0.1 MILLIGRAM(S): 0.1 TABLET ORAL at 21:04

## 2023-12-29 RX ADMIN — Medication 2 MILLIGRAM(S): at 21:04

## 2023-12-29 RX ADMIN — PANTOPRAZOLE SODIUM 40 MILLIGRAM(S): 20 TABLET, DELAYED RELEASE ORAL at 08:08

## 2023-12-29 RX ADMIN — GABAPENTIN 300 MILLIGRAM(S): 400 CAPSULE ORAL at 21:04

## 2023-12-29 NOTE — BH INPATIENT PSYCHIATRY PROGRESS NOTE - MSE UNSTRUCTURED FT
The patient appears stated age, with fair hygiene, and is dressed appropriately.  She was calm and cooperative with the interview.  She maintained appropriate eye contact.  No restlessness or slowing of movements observed.  Gait is steady.  The patient’s speech was fluent, normal in tone, rate, and volume.  The patient’s mood is “tired”  Her affect is euthymic, reactive, stable, appropriate.  The patient’s thoughts are goal directed.  She does not have any delusions, denies auditory hallucinations today.  She denies suicidal ideation, no homicidal ideation, intent, or plan.  Insight is fair.  Judgment is fair.  Impulse control has been fair on the unit.

## 2023-12-29 NOTE — BH INPATIENT PSYCHIATRY PROGRESS NOTE - NSBHATTESTBILLING_PSY_A_CORE
71863-Fhbvoxvpyb OBS or IP - moderate complexity OR 35-49 mins [id number]-Wtigdvcxiu OBS or IP - moderate complexity OR 35-49 mins

## 2023-12-29 NOTE — BH INPATIENT PSYCHIATRY PROGRESS NOTE - NSBHFUPINTERVALHXFT_PSY_A_CORE
f/up mood, care discussed w/ tx team, LIZS.  Patient reported feeling "tired",  due to receiving the ESQUEDA today, was inquiring if she will get more information about her housing by tuesday. Patient visible on the unit, social with select peers. Patient reported sleeping well and with good appetite. denied SE to meds. denied dizziness or constipation. denied SI/I/P.

## 2023-12-29 NOTE — BH INPATIENT PSYCHIATRY PROGRESS NOTE - NSBHASSESSSUMMFT_PSY_ALL_CORE
Patient is 29yo single woman, no dependents, domiciled with her Grandmother, unemployed on disability/SSI, pphx of schizoaffective disorder, multiple past psychiatric admissions including state and recent discharge from [facility], [facility], with pmhx of asthma, HTN, GERD, and hypothyroidism and schizoaffective disorder, in tx with [facility], who self presented to the ED reporting abdominal pain and requesting readmission to psychiatric unit, reporting CAH, depressive symptoms, IOR, suicidality, admitted to [facility] for psychiatric care.  Depression and psychosis likely multifactorial at this time, schizoaffective vs. borderline personality disorder vs. complex grief vs. adjustment disorder.      on assessment, patient is showing improvements in mood, thoughts and functioning.     1.) Obs: Stop 1:1, patient with decrease in SI.  She has been able to approach staff when needed.  2.) Psychiatric medication management:  - Reviewed options for addressing nocturnal enuresis including reducing clozapine vs reducing lithium vs adding desmopressin.  Pt decided upon desmopressin as she continues to have depression and psychosis.  Desmopressin 0.1 mg QHS.  - Lithobid 1200 mg QHS.  Pt aware that she needs to stay well hydrated and avoid diuretics and NSAIDs.   - Discontinued olanzapine  - Increase Clozapine to 300mg qHS for psychosis ([id number])  - Prazosin 2mg qhs, monitor BP carefully.  - Abilify Maintena 400mg IM q3 weeks rather than 4, per collateral from ACT team.  Doses given 10/19, 11/17, 12/8, 12/29.  - Mirtazapine was switched to escitalopram, increased to 20 mg daily.  - Wellbutrin XL 150mg daily  - Gabapentin 300 mg BID for anxiety/chronic pain.  - PRN: haloperidol 5mg PO/IM q8hrs PRN for agitation, diphenhydramine 50mg PO/IM q6hrs PRN for EPS PPx, hydroxyzine 50mg PO q12hrs PRN for anxiety, lorazepam 2mg PO q8hrs PRN for anxiety or 2mg IM for assaultive behavior. Melatonin 5mg qHS PRN for insomnia  - Discussed ECT several times with pt who states she has been told in two hospitals in past that due to her obesity and asthma, she is not an ideal candidate.  Pt states that due to this she is not interested in receiving ECT at this time.  3.) Medical:   - Pt scheduled for dental extractions 11/1, however declined to attend on day prior when informed it would not be under general anesthesia.  - last clozapine labs on 10/23 (CBC with diff, troponins, CRP); CRP elevated at 15.8 likely due to dental infection/wisdom tooth issues  - pantoprazole 40mg PO before breakfast for GERD  - montelukast 10mg PO qHS for asthma  - budesonide 80mcg/formoterol 4.5mcg 2 puffs BID for asthma  - PRN: acetaminophen 650mg PO q6hrs PRN for pain, albuterol 90mcg 2puffs q6hrs PRN for dyspnea, ondansetron 4mg q6hrs PRN for nausea  - Discussed CRP with medicine service, given asymptomatic pt and normal troponin, no additional intervention/diagnostics at this time.  - Discontinued clobetasol as rash has resolved.  4.) Dispo planning: State application submitted. Patient is 29yo single woman, no dependents, domiciled with her Grandmother, unemployed on disability/SSI, pphx of schizoaffective disorder, multiple past psychiatric admissions including state and recent discharge from [facility], in tx with [facility], with pmhx of asthma, HTN, GERD, and hypothyroidism and schizoaffective disorder, in tx with [facility], who self presented to the ED reporting abdominal pain and requesting readmission to psychiatric unit, reporting CAH, depressive symptoms, IOR, suicidality, admitted to [facility] for psychiatric care.  Depression and psychosis likely multifactorial at this time, schizoaffective vs. borderline personality disorder vs. complex grief vs. adjustment disorder.      on assessment, patient is showing improvements in mood, thoughts and functioning.     1.) Obs: Stop 1:1, patient with decrease in SI.  She has been able to approach staff when needed.  2.) Psychiatric medication management:  - Reviewed options for addressing nocturnal enuresis including reducing clozapine vs reducing lithium vs adding desmopressin.  Pt decided upon desmopressin as she continues to have depression and psychosis.  Desmopressin 0.1 mg QHS.  - Lithobid 1200 mg QHS.  Pt aware that she needs to stay well hydrated and avoid diuretics and NSAIDs.   - Discontinued olanzapine  - Increase Clozapine to 300mg qHS for psychosis ([id number])  - Prazosin 2mg qhs, monitor BP carefully.  - Abilify Maintena 400mg IM q3 weeks rather than 4, per collateral from ACT team.  Doses given 10/19, 11/17, 12/8, 12/29.  - Mirtazapine was switched to escitalopram, increased to 20 mg daily.  - Wellbutrin XL 150mg daily  - Gabapentin 300 mg BID for anxiety/chronic pain.  - PRN: haloperidol 5mg PO/IM q8hrs PRN for agitation, diphenhydramine 50mg PO/IM q6hrs PRN for EPS PPx, hydroxyzine 50mg PO q12hrs PRN for anxiety, lorazepam 2mg PO q8hrs PRN for anxiety or 2mg IM for assaultive behavior. Melatonin 5mg qHS PRN for insomnia  - Discussed ECT several times with pt who states she has been told in two hospitals in past that due to her obesity and asthma, she is not an ideal candidate.  Pt states that due to this she is not interested in receiving ECT at this time.  3.) Medical:   - Pt scheduled for dental extractions 11/1, however declined to attend on day prior when informed it would not be under general anesthesia.  - last clozapine labs on 10/23 (CBC with diff, troponins, CRP); CRP elevated at 15.8 likely due to dental infection/wisdom tooth issues  - pantoprazole 40mg PO before breakfast for GERD  - montelukast 10mg PO qHS for asthma  - budesonide 80mcg/formoterol 4.5mcg 2 puffs BID for asthma  - PRN: acetaminophen 650mg PO q6hrs PRN for pain, albuterol 90mcg 2puffs q6hrs PRN for dyspnea, ondansetron 4mg q6hrs PRN for nausea  - Discussed CRP with medicine service, given asymptomatic pt and normal troponin, no additional intervention/diagnostics at this time.  - Discontinued clobetasol as rash has resolved.  4.) Dispo planning: State application submitted.

## 2023-12-29 NOTE — BH INPATIENT PSYCHIATRY PROGRESS NOTE - NSBHCHARTREVIEWVS_PSY_A_CORE FT
Vital Signs Last 24 Hrs  T(C): 36.8 (12-29-23 @ 07:40), Max: 36.8 (12-28-23 @ 20:31)  T(F): 98.2 (12-29-23 @ 07:40), Max: 98.3 (12-28-23 @ 20:31)  HR: --  BP: --  BP(mean): --  RR: 18 (12-28-23 @ 20:31) (18 - 18)  SpO2: 100% (12-28-23 @ 20:31) (100% - 100%)    Orthostatic VS  12-29-23 @ 07:40  Lying BP: 123/70 HR: 92  Sitting BP: 116/90 HR: 105  Standing BP: --/-- HR: --  Site: --  Mode: --  Orthostatic VS  12-28-23 @ 20:31  Lying BP: --/-- HR: --  Sitting BP: 138/82 HR: 98  Standing BP: 140/86 HR: 103  Site: --  Mode: --  Orthostatic VS  12-28-23 @ 08:18  Lying BP: --/-- HR: --  Sitting BP: 125/74 HR: 88  Standing BP: 115/68 HR: 73  Site: --  Mode: --  Orthostatic VS  12-27-23 @ 20:34  Lying BP: --/-- HR: --  Sitting BP: 130/90 HR: 98  Standing BP: 140/96 HR: 102  Site: --  Mode: electronic

## 2023-12-29 NOTE — BH INPATIENT PSYCHIATRY PROGRESS NOTE - NSBHMETABOLIC_PSY_ALL_CORE_FT
BMI: BMI (kg/m2): 57.8 (09-14-23 @ 14:30)  HbA1c: A1C with Estimated Average Glucose Result: 5.2 % (11-01-23 @ 09:04)    Glucose:   BP: 103/60 (12-27-23 @ 07:48) (103/60 - 103/60)Vital Signs Last 24 Hrs  T(C): 36.8 (12-29-23 @ 07:40), Max: 36.8 (12-28-23 @ 20:31)  T(F): 98.2 (12-29-23 @ 07:40), Max: 98.3 (12-28-23 @ 20:31)  HR: --  BP: --  BP(mean): --  RR: 18 (12-28-23 @ 20:31) (18 - 18)  SpO2: 100% (12-28-23 @ 20:31) (100% - 100%)    Orthostatic VS  12-29-23 @ 07:40  Lying BP: 123/70 HR: 92  Sitting BP: 116/90 HR: 105  Standing BP: --/-- HR: --  Site: --  Mode: --  Orthostatic VS  12-28-23 @ 20:31  Lying BP: --/-- HR: --  Sitting BP: 138/82 HR: 98  Standing BP: 140/86 HR: 103  Site: --  Mode: --  Orthostatic VS  12-28-23 @ 08:18  Lying BP: --/-- HR: --  Sitting BP: 125/74 HR: 88  Standing BP: 115/68 HR: 73  Site: --  Mode: --  Orthostatic VS  12-27-23 @ 20:34  Lying BP: --/-- HR: --  Sitting BP: 130/90 HR: 98  Standing BP: 140/96 HR: 102  Site: --  Mode: electronic    Lipid Panel: Date/Time: 11-01-23 @ 09:04  Cholesterol, Serum: 156  LDL Cholesterol Calculated: 82  HDL Cholesterol, Serum: 58  Total Cholesterol/HDL Ration Measurement: --  Triglycerides, Serum: 79

## 2023-12-30 RX ADMIN — GABAPENTIN 300 MILLIGRAM(S): 400 CAPSULE ORAL at 22:42

## 2023-12-30 RX ADMIN — CLOZAPINE 300 MILLIGRAM(S): 150 TABLET, ORALLY DISINTEGRATING ORAL at 22:43

## 2023-12-30 RX ADMIN — Medication 2 MILLIGRAM(S): at 22:43

## 2023-12-30 RX ADMIN — ESCITALOPRAM OXALATE 20 MILLIGRAM(S): 10 TABLET, FILM COATED ORAL at 08:45

## 2023-12-30 RX ADMIN — BUPROPION HYDROCHLORIDE 150 MILLIGRAM(S): 150 TABLET, EXTENDED RELEASE ORAL at 08:45

## 2023-12-30 RX ADMIN — PANTOPRAZOLE SODIUM 40 MILLIGRAM(S): 20 TABLET, DELAYED RELEASE ORAL at 08:47

## 2023-12-30 RX ADMIN — GABAPENTIN 300 MILLIGRAM(S): 400 CAPSULE ORAL at 08:46

## 2023-12-30 RX ADMIN — BUDESONIDE AND FORMOTEROL FUMARATE DIHYDRATE 2 PUFF(S): 160; 4.5 AEROSOL RESPIRATORY (INHALATION) at 08:45

## 2023-12-30 RX ADMIN — MONTELUKAST 10 MILLIGRAM(S): 4 TABLET, CHEWABLE ORAL at 22:42

## 2023-12-30 RX ADMIN — LITHIUM CARBONATE 1200 MILLIGRAM(S): 300 TABLET, EXTENDED RELEASE ORAL at 22:42

## 2023-12-30 RX ADMIN — Medication 650 MILLIGRAM(S): at 16:02

## 2023-12-31 RX ORDER — SULINDAC 200 MG/1
200 TABLET ORAL ONCE
Refills: 0 | Status: DISCONTINUED | OUTPATIENT
Start: 2023-12-31 | End: 2023-12-31

## 2023-12-31 RX ORDER — DICLOFENAC SODIUM 75 MG/1
50 TABLET, DELAYED RELEASE ORAL ONCE
Refills: 0 | Status: DISCONTINUED | OUTPATIENT
Start: 2023-12-31 | End: 2023-12-31

## 2023-12-31 RX ORDER — SULINDAC 200 MG/1
150 TABLET ORAL ONCE
Refills: 0 | Status: DISCONTINUED | OUTPATIENT
Start: 2023-12-31 | End: 2024-01-31

## 2023-12-31 RX ADMIN — LITHIUM CARBONATE 1200 MILLIGRAM(S): 300 TABLET, EXTENDED RELEASE ORAL at 20:20

## 2023-12-31 RX ADMIN — Medication 2 MILLIGRAM(S): at 20:21

## 2023-12-31 RX ADMIN — BUPROPION HYDROCHLORIDE 150 MILLIGRAM(S): 150 TABLET, EXTENDED RELEASE ORAL at 09:27

## 2023-12-31 RX ADMIN — Medication 650 MILLIGRAM(S): at 21:04

## 2023-12-31 RX ADMIN — PANTOPRAZOLE SODIUM 40 MILLIGRAM(S): 20 TABLET, DELAYED RELEASE ORAL at 09:27

## 2023-12-31 RX ADMIN — DESMOPRESSIN ACETATE 0.1 MILLIGRAM(S): 0.1 TABLET ORAL at 20:19

## 2023-12-31 RX ADMIN — ESCITALOPRAM OXALATE 20 MILLIGRAM(S): 10 TABLET, FILM COATED ORAL at 09:27

## 2023-12-31 RX ADMIN — GABAPENTIN 300 MILLIGRAM(S): 400 CAPSULE ORAL at 09:27

## 2023-12-31 RX ADMIN — MONTELUKAST 10 MILLIGRAM(S): 4 TABLET, CHEWABLE ORAL at 20:19

## 2023-12-31 RX ADMIN — BUDESONIDE AND FORMOTEROL FUMARATE DIHYDRATE 2 PUFF(S): 160; 4.5 AEROSOL RESPIRATORY (INHALATION) at 09:27

## 2023-12-31 RX ADMIN — GABAPENTIN 300 MILLIGRAM(S): 400 CAPSULE ORAL at 20:21

## 2023-12-31 RX ADMIN — Medication 650 MILLIGRAM(S): at 21:45

## 2023-12-31 RX ADMIN — CLOZAPINE 300 MILLIGRAM(S): 150 TABLET, ORALLY DISINTEGRATING ORAL at 20:21

## 2023-12-31 RX ADMIN — BUDESONIDE AND FORMOTEROL FUMARATE DIHYDRATE 2 PUFF(S): 160; 4.5 AEROSOL RESPIRATORY (INHALATION) at 21:46

## 2023-12-31 NOTE — BH CHART NOTE - NSEVENTNOTEFT_PSY_ALL_CORE
Interval History:  SANKET asked to evaluate patient for c/o of non-radicular LBP, LRQ abdominal pain and vaginal discharge. Patient with known, chronic hx of LBP. No additional  symptoms. VSS. On exam, PE unremarkable and patient in NAD. ROS negative otherwise. Pt denies chest pain, SOB, or edema. Denies headache, visual changes, confusion, or n/v.       T(C): 36.8 (12-31-23 @ 07:59), Max: 36.8 (12-31-23 @ 07:59)  HR: --  BP: --  RR: --  SpO2: 107% (12-30-23 @ 20:45) (107% - 107%)    Physical Exam:  Gen: Patient sitting on bench in day room, NAD   HEENT: NC/AT,  EOMI.    Resp: CTA b/l. No wheezes, ronchi, or crackles.   CV: Radial pulses 2+ b/l, RRR, no murmurs.   Abd: soft, NTND, no guarding or rigidity. NABS.    Ext: ROM intact, no clubbing, edema, or cyanosis.   Neuro: awake, alert, grossly oriented.     Assessment:  SANKET called for /o of non-radicular LBP, LRQ abdominal pain and vaginal discharge. Patient with known hx of chronic LBP. Given no additional symptoms, LRQ and vaginal discharge likely to be benign. Pt clinically stable with exam otherwise unremarkable.     Plan:  1. Ordered diclofenac 50mg x 1    2. no further medical intervention necessary at this time.   3. will continue to monitor routinely.   4. d/w RN staff    Interval History:  SANKET asked to evaluate patient for c/o of non-radicular LBP, LRQ abdominal pain and vaginal discharge. Patient with known, chronic hx of LBP. No additional  symptoms. VSS. On exam, PE unremarkable and patient in NAD. ROS negative otherwise. Pt denies chest pain, SOB, or edema. Denies headache, visual changes, confusion, or n/v.       T(C): 36.8 (12-31-23 @ 07:59), Max: 36.8 (12-31-23 @ 07:59)  HR: --  BP: --  RR: --  SpO2: 107% (12-30-23 @ 20:45) (107% - 107%)    Physical Exam:  Gen: Patient sitting on bench in day room, NAD   HEENT: NC/AT,  EOMI.    Resp: CTA b/l. No wheezes, ronchi, or crackles.   CV: Radial pulses 2+ b/l, RRR, no murmurs.   Abd: soft, NTND, no guarding or rigidity. NABS.    Ext: ROM intact, no clubbing, edema, or cyanosis.   Neuro: awake, alert, grossly oriented.     Assessment:  SANKET called for /o of non-radicular LBP, LRQ abdominal pain and vaginal discharge. Patient with known hx of chronic LBP. Given no additional symptoms, LRQ and vaginal discharge likely to be benign. Pt clinically stable with exam otherwise unremarkable.     Plan:  1. Ordered Sulindac 200mg PO x 1  2. no further medical intervention necessary at this time.   3. will continue to monitor routinely.   4. d/w RN staff

## 2024-01-01 RX ADMIN — Medication 650 MILLIGRAM(S): at 17:02

## 2024-01-01 RX ADMIN — PANTOPRAZOLE SODIUM 40 MILLIGRAM(S): 20 TABLET, DELAYED RELEASE ORAL at 08:54

## 2024-01-01 RX ADMIN — LITHIUM CARBONATE 1200 MILLIGRAM(S): 300 TABLET, EXTENDED RELEASE ORAL at 20:40

## 2024-01-01 RX ADMIN — Medication 5 MILLIGRAM(S): at 20:41

## 2024-01-01 RX ADMIN — ESCITALOPRAM OXALATE 20 MILLIGRAM(S): 10 TABLET, FILM COATED ORAL at 08:52

## 2024-01-01 RX ADMIN — CLOZAPINE 300 MILLIGRAM(S): 150 TABLET, ORALLY DISINTEGRATING ORAL at 20:40

## 2024-01-01 RX ADMIN — DESMOPRESSIN ACETATE 0.1 MILLIGRAM(S): 0.1 TABLET ORAL at 20:41

## 2024-01-01 RX ADMIN — Medication 2 MILLIGRAM(S): at 20:40

## 2024-01-01 RX ADMIN — GABAPENTIN 300 MILLIGRAM(S): 400 CAPSULE ORAL at 08:52

## 2024-01-01 RX ADMIN — MONTELUKAST 10 MILLIGRAM(S): 4 TABLET, CHEWABLE ORAL at 20:41

## 2024-01-01 RX ADMIN — BUPROPION HYDROCHLORIDE 150 MILLIGRAM(S): 150 TABLET, EXTENDED RELEASE ORAL at 08:52

## 2024-01-01 RX ADMIN — BUDESONIDE AND FORMOTEROL FUMARATE DIHYDRATE 2 PUFF(S): 160; 4.5 AEROSOL RESPIRATORY (INHALATION) at 08:52

## 2024-01-01 RX ADMIN — GABAPENTIN 300 MILLIGRAM(S): 400 CAPSULE ORAL at 20:41

## 2024-01-02 RX ADMIN — Medication 2 MILLIGRAM(S): at 20:25

## 2024-01-02 RX ADMIN — ESCITALOPRAM OXALATE 20 MILLIGRAM(S): 10 TABLET, FILM COATED ORAL at 08:49

## 2024-01-02 RX ADMIN — BUDESONIDE AND FORMOTEROL FUMARATE DIHYDRATE 2 PUFF(S): 160; 4.5 AEROSOL RESPIRATORY (INHALATION) at 08:55

## 2024-01-02 RX ADMIN — CLOZAPINE 300 MILLIGRAM(S): 150 TABLET, ORALLY DISINTEGRATING ORAL at 20:25

## 2024-01-02 RX ADMIN — GABAPENTIN 300 MILLIGRAM(S): 400 CAPSULE ORAL at 20:25

## 2024-01-02 RX ADMIN — LITHIUM CARBONATE 1200 MILLIGRAM(S): 300 TABLET, EXTENDED RELEASE ORAL at 20:26

## 2024-01-02 RX ADMIN — BUPROPION HYDROCHLORIDE 150 MILLIGRAM(S): 150 TABLET, EXTENDED RELEASE ORAL at 08:49

## 2024-01-02 RX ADMIN — DESMOPRESSIN ACETATE 0.1 MILLIGRAM(S): 0.1 TABLET ORAL at 20:25

## 2024-01-02 RX ADMIN — GABAPENTIN 300 MILLIGRAM(S): 400 CAPSULE ORAL at 08:49

## 2024-01-02 RX ADMIN — PANTOPRAZOLE SODIUM 40 MILLIGRAM(S): 20 TABLET, DELAYED RELEASE ORAL at 08:49

## 2024-01-02 RX ADMIN — MONTELUKAST 10 MILLIGRAM(S): 4 TABLET, CHEWABLE ORAL at 20:26

## 2024-01-02 RX ADMIN — BUDESONIDE AND FORMOTEROL FUMARATE DIHYDRATE 2 PUFF(S): 160; 4.5 AEROSOL RESPIRATORY (INHALATION) at 20:25

## 2024-01-02 NOTE — BH INPATIENT PSYCHIATRY PROGRESS NOTE - NSBHATTESTBILLING_PSY_A_CORE
[id number] OBS or IP - moderate complexity OR 35-49 mins [id number] OBS or IP - moderate complexity OR 35-49 mins

## 2024-01-02 NOTE — BH INPATIENT PSYCHIATRY PROGRESS NOTE - CURRENT MEDICATION
MEDICATIONS  (STANDING):  budesonide  80 MICROgram(s)/formoterol 4.5 MICROgram(s) Inhaler 2 Puff(s) Inhalation two times a day  buPROPion XL (24-Hour) 150 milliGRAM(s) Oral daily  cloZAPine 300 milliGRAM(s) Oral at bedtime  desmopressin 0.1 milliGRAM(s) Oral at bedtime  escitalopram 20 milliGRAM(s) Oral daily  gabapentin 300 milliGRAM(s) Oral two times a day  influenza   Vaccine 0.5 milliLiter(s) IntraMuscular once  lithium SR (LITHOBID) 1200 milliGRAM(s) Oral at bedtime  montelukast 10 milliGRAM(s) Oral at bedtime  pantoprazole    Tablet 40 milliGRAM(s) Oral before breakfast  prazosin. 2 milliGRAM(s) Oral at bedtime  sulindac 150 milliGRAM(s) Oral once    MEDICATIONS  (PRN):  acetaminophen     Tablet .. 650 milliGRAM(s) Oral every 6 hours PRN Moderate Pain (4 - 6)  albuterol    90 MICROgram(s) HFA Inhaler 2 Puff(s) Inhalation every 6 hours PRN Shortness of Breath and/or Wheezing  aluminum hydroxide/magnesium hydroxide/simethicone Suspension 30 milliLiter(s) Oral every 4 hours PRN Dyspepsia  chlorproMAZINE    Injectable 50 milliGRAM(s) IntraMuscular once PRN severe agitation  chlorproMAZINE    Tablet 50 milliGRAM(s) Oral every 6 hours PRN agitation  hydrOXYzine hydrochloride 50 milliGRAM(s) Oral every 12 hours PRN Anxiety  lidocaine   4% Patch 1 Patch Transdermal daily PRN LBP  LORazepam     Tablet 2 milliGRAM(s) Oral every 8 hours PRN Anxiety  LORazepam   Injectable 2 milliGRAM(s) IntraMuscular once PRN Assaultive behavior  melatonin. 5 milliGRAM(s) Oral at bedtime PRN Insomnia  ondansetron    Tablet 4 milliGRAM(s) Oral every 6 hours PRN nausea  ondansetron    Tablet 4 milliGRAM(s) Oral every 6 hours PRN nausea  polyethylene glycol 3350 17 Gram(s) Oral daily PRN Constipation

## 2024-01-02 NOTE — BH INPATIENT PSYCHIATRY PROGRESS NOTE - NSBHMETABOLIC_PSY_ALL_CORE_FT
BMI: BMI (kg/m2): 57.8 (09-14-23 @ 14:30)  HbA1c: A1C with Estimated Average Glucose Result: 5.2 % (11-01-23 @ 09:04)    Glucose:   BP: --Vital Signs Last 24 Hrs  T(C): 36.6 (01-02-24 @ 07:41), Max: 36.7 (01-01-24 @ 20:43)  T(F): 97.9 (01-02-24 @ 07:41), Max: 98 (01-01-24 @ 20:43)  HR: --  BP: --  BP(mean): --  RR: --  SpO2: 98% (01-01-24 @ 20:43) (98% - 98%)    Orthostatic VS  01-02-24 @ 07:41  Lying BP: --/-- HR: --  Sitting BP: 123/85 HR: 112  Standing BP: 142/85 HR: 107  Site: --  Mode: --  Orthostatic VS  01-01-24 @ 20:43  Lying BP: --/-- HR: --  Sitting BP: 136/81 HR: 103  Standing BP: 143/89 HR: 109  Site: --  Mode: --  Orthostatic VS  01-01-24 @ 08:55  Lying BP: --/-- HR: --  Sitting BP: 128/79 HR: 105  Standing BP: 143/80 HR: 94  Site: --  Mode: --  Orthostatic VS  12-31-23 @ 22:11  Lying BP: --/-- HR: --  Sitting BP: 141/76 HR: 101  Standing BP: 122/63 HR: 116  Site: --  Mode: --    Lipid Panel: Date/Time: 11-01-23 @ 09:04  Cholesterol, Serum: 156  LDL Cholesterol Calculated: 82  HDL Cholesterol, Serum: 58  Total Cholesterol/HDL Ration Measurement: --  Triglycerides, Serum: 79

## 2024-01-02 NOTE — BH PSYCHOLOGY - CLINICIAN PSYCHOTHERAPY NOTE - NSBHPSYCHOLNARRATIVE_PSY_A_CORE FT
Writer met with Pt for 30-minute individual therapy session. Pt was alert and presented with adequate hygiene and grooming. Pt reported feeling "good", affect full and congruent. Pt denied experiencing any A/V hallucinations. Her thought process was linear and goal directed. Pts’ speech was WNL, content was relevant to discussion. Pt denied passive or active SI, HI or NSSI. Oriented x3. Fair insight and judgement demonstrated.      Pt shared feeling "ok" with no incidents over the past two weeks related to mood or SI, sharing that she feels "glad" about outpatient housing plan. Denied any anxiety while waiting, but shared some nervousness around the upcoming housing interview. Explored plan for how to manage nervousness (coping skills) as well as asking  about possible questions that she may prepare for. Did ACT interventions to encourage client to allow "sitting" with uncertainty and feelings of nervousness. Pt reflected back on wanting to go to state, sharing she "felt she would never get better" but stated that she is "happy" to leave and go back to the community. Explored short term goals to achieve her long term goal of returning to school (researching programs), as well as how her support system differs from her previous discharges. Asked pt about physical pain she had been reporting with pt sharing she feels "fine" but has some minor pain due to herniated disks. Pointed out pts anxiety, as well as reassurance seeking on unit, and provided space for pt to process thoughts and feelings about it. Pt shared sometimes being unaware of her anxiety. Explored skills to practice awareness around emotions and behavior.

## 2024-01-02 NOTE — BH INPATIENT PSYCHIATRY PROGRESS NOTE - NSBHFUPINTERVALHXFT_PSY_A_CORE
f/up mood, care discussed w/ tx team, VSS.  Patient visible on the unit, often observed on the phone at times. Patient reported sleeping well and with good appetite. denied SE to meds. denied dizziness or constipation. denied SI/I/P. Reported that she wants to speak with  about any progress with the housing. no apparent distress. denied any pain.

## 2024-01-02 NOTE — BH PSYCHOLOGY - GROUP THERAPY NOTE - NSBHPSYCHOLRESPCOMMENT_PSY_A_CORE FT
The patient appeared adequately groomed and casually dressed. Pt appeared engaged in the group as evidenced by their willingness to independently verbally communicate during the discussion. Pt discussed how it is helpful for her to not lay down in bed until she is tired, as well as a goal of hers being to go back to school to study music therapy. Pt was oriented X3. Pt was appropriate with peers.

## 2024-01-02 NOTE — BH INPATIENT PSYCHIATRY PROGRESS NOTE - NSBHCHARTREVIEWVS_PSY_A_CORE FT
Vital Signs Last 24 Hrs  T(C): 36.6 (01-02-24 @ 07:41), Max: 36.7 (01-01-24 @ 20:43)  T(F): 97.9 (01-02-24 @ 07:41), Max: 98 (01-01-24 @ 20:43)  HR: --  BP: --  BP(mean): --  RR: --  SpO2: 98% (01-01-24 @ 20:43) (98% - 98%)    Orthostatic VS  01-02-24 @ 07:41  Lying BP: --/-- HR: --  Sitting BP: 123/85 HR: 112  Standing BP: 142/85 HR: 107  Site: --  Mode: --  Orthostatic VS  01-01-24 @ 20:43  Lying BP: --/-- HR: --  Sitting BP: 136/81 HR: 103  Standing BP: 143/89 HR: 109  Site: --  Mode: --  Orthostatic VS  01-01-24 @ 08:55  Lying BP: --/-- HR: --  Sitting BP: 128/79 HR: 105  Standing BP: 143/80 HR: 94  Site: --  Mode: --  Orthostatic VS  12-31-23 @ 22:11  Lying BP: --/-- HR: --  Sitting BP: 141/76 HR: 101  Standing BP: 122/63 HR: 116  Site: --  Mode: --

## 2024-01-02 NOTE — BH INPATIENT PSYCHIATRY PROGRESS NOTE - NSBHASSESSSUMMFT_PSY_ALL_CORE
Patient is 29yo single woman, no dependents, domiciled with her Grandmother, unemployed on disability/SSI, pphx of schizoaffective disorder, multiple past psychiatric admissions including state and recent discharge from [facility], [facility], with pmhx of asthma, HTN, GERD, and hypothyroidism and schizoaffective disorder, in tx with [facility], who self presented to the ED reporting abdominal pain and requesting readmission to psychiatric unit, reporting CAH, depressive symptoms, IOR, suicidality, admitted to [facility] for psychiatric care.  Depression and psychosis likely multifactorial at this time, schizoaffective vs. borderline personality disorder vs. complex grief vs. adjustment disorder.      on assessment, patient is showing improvements in mood, thoughts and functioning.     1.) Obs: Stop 1:1, patient with decrease in SI.  She has been able to approach staff when needed.  2.) Psychiatric medication management:  - Reviewed options for addressing nocturnal enuresis including reducing clozapine vs reducing lithium vs adding desmopressin.  Pt decided upon desmopressin as she continues to have depression and psychosis.  Desmopressin 0.1 mg QHS.  - Lithobid 1200 mg QHS.  Pt aware that she needs to stay well hydrated and avoid diuretics and NSAIDs.   - Discontinued olanzapine  - Increase Clozapine to 300mg qHS for psychosis ([id number])  - Prazosin 2mg qhs, monitor BP carefully.  - Abilify Maintena 400mg IM q3 weeks rather than 4, per collateral from ACT team.  Doses given 10/19, 11/17, 12/8, 12/29.  - Mirtazapine was switched to escitalopram, increased to 20 mg daily.  - Wellbutrin XL 150mg daily  - Gabapentin 300 mg BID for anxiety/chronic pain.  - PRN: haloperidol 5mg PO/IM q8hrs PRN for agitation, diphenhydramine 50mg PO/IM q6hrs PRN for EPS PPx, hydroxyzine 50mg PO q12hrs PRN for anxiety, lorazepam 2mg PO q8hrs PRN for anxiety or 2mg IM for assaultive behavior. Melatonin 5mg qHS PRN for insomnia  - Discussed ECT several times with pt who states she has been told in two hospitals in past that due to her obesity and asthma, she is not an ideal candidate.  Pt states that due to this she is not interested in receiving ECT at this time.  3.) Medical:   - Pt scheduled for dental extractions 11/1, however declined to attend on day prior when informed it would not be under general anesthesia.  - last clozapine labs on 10/23 (CBC with diff, troponins, CRP); CRP elevated at 15.8 likely due to dental infection/wisdom tooth issues  - pantoprazole 40mg PO before breakfast for GERD  - montelukast 10mg PO qHS for asthma  - budesonide 80mcg/formoterol 4.5mcg 2 puffs BID for asthma  - PRN: acetaminophen 650mg PO q6hrs PRN for pain, albuterol 90mcg 2puffs q6hrs PRN for dyspnea, ondansetron 4mg q6hrs PRN for nausea  - Discussed CRP with medicine service, given asymptomatic pt and normal troponin, no additional intervention/diagnostics at this time.  - Discontinued clobetasol as rash has resolved.  4.) Dispo planning: State application submitted. Patient is 29yo single woman, no dependents, domiciled with her Grandmother, unemployed on disability/SSI, pphx of schizoaffective disorder, multiple past psychiatric admissions including state and recent discharge from [facility], in tx with [facility], with pmhx of asthma, HTN, GERD, and hypothyroidism and schizoaffective disorder, in tx with [facility], who self presented to the ED reporting abdominal pain and requesting readmission to psychiatric unit, reporting CAH, depressive symptoms, IOR, suicidality, admitted to [facility] for psychiatric care.  Depression and psychosis likely multifactorial at this time, schizoaffective vs. borderline personality disorder vs. complex grief vs. adjustment disorder.      on assessment, patient is showing improvements in mood, thoughts and functioning.     1.) Obs: Stop 1:1, patient with decrease in SI.  She has been able to approach staff when needed.  2.) Psychiatric medication management:  - Reviewed options for addressing nocturnal enuresis including reducing clozapine vs reducing lithium vs adding desmopressin.  Pt decided upon desmopressin as she continues to have depression and psychosis.  Desmopressin 0.1 mg QHS.  - Lithobid 1200 mg QHS.  Pt aware that she needs to stay well hydrated and avoid diuretics and NSAIDs.   - Discontinued olanzapine  - Increase Clozapine to 300mg qHS for psychosis ([id number])  - Prazosin 2mg qhs, monitor BP carefully.  - Abilify Maintena 400mg IM q3 weeks rather than 4, per collateral from ACT team.  Doses given 10/19, 11/17, 12/8, 12/29.  - Mirtazapine was switched to escitalopram, increased to 20 mg daily.  - Wellbutrin XL 150mg daily  - Gabapentin 300 mg BID for anxiety/chronic pain.  - PRN: haloperidol 5mg PO/IM q8hrs PRN for agitation, diphenhydramine 50mg PO/IM q6hrs PRN for EPS PPx, hydroxyzine 50mg PO q12hrs PRN for anxiety, lorazepam 2mg PO q8hrs PRN for anxiety or 2mg IM for assaultive behavior. Melatonin 5mg qHS PRN for insomnia  - Discussed ECT several times with pt who states she has been told in two hospitals in past that due to her obesity and asthma, she is not an ideal candidate.  Pt states that due to this she is not interested in receiving ECT at this time.  3.) Medical:   - Pt scheduled for dental extractions 11/1, however declined to attend on day prior when informed it would not be under general anesthesia.  - last clozapine labs on 10/23 (CBC with diff, troponins, CRP); CRP elevated at 15.8 likely due to dental infection/wisdom tooth issues  - pantoprazole 40mg PO before breakfast for GERD  - montelukast 10mg PO qHS for asthma  - budesonide 80mcg/formoterol 4.5mcg 2 puffs BID for asthma  - PRN: acetaminophen 650mg PO q6hrs PRN for pain, albuterol 90mcg 2puffs q6hrs PRN for dyspnea, ondansetron 4mg q6hrs PRN for nausea  - Discussed CRP with medicine service, given asymptomatic pt and normal troponin, no additional intervention/diagnostics at this time.  - Discontinued clobetasol as rash has resolved.  4.) Dispo planning: State application submitted.

## 2024-01-03 PROCEDURE — 99232 SBSQ HOSP IP/OBS MODERATE 35: CPT

## 2024-01-03 RX ADMIN — MONTELUKAST 10 MILLIGRAM(S): 4 TABLET, CHEWABLE ORAL at 20:37

## 2024-01-03 RX ADMIN — PANTOPRAZOLE SODIUM 40 MILLIGRAM(S): 20 TABLET, DELAYED RELEASE ORAL at 09:31

## 2024-01-03 RX ADMIN — BUDESONIDE AND FORMOTEROL FUMARATE DIHYDRATE 2 PUFF(S): 160; 4.5 AEROSOL RESPIRATORY (INHALATION) at 20:36

## 2024-01-03 RX ADMIN — BUDESONIDE AND FORMOTEROL FUMARATE DIHYDRATE 2 PUFF(S): 160; 4.5 AEROSOL RESPIRATORY (INHALATION) at 09:32

## 2024-01-03 RX ADMIN — CLOZAPINE 300 MILLIGRAM(S): 150 TABLET, ORALLY DISINTEGRATING ORAL at 20:36

## 2024-01-03 RX ADMIN — Medication 2 MILLIGRAM(S): at 20:37

## 2024-01-03 RX ADMIN — DESMOPRESSIN ACETATE 0.1 MILLIGRAM(S): 0.1 TABLET ORAL at 20:37

## 2024-01-03 RX ADMIN — GABAPENTIN 300 MILLIGRAM(S): 400 CAPSULE ORAL at 20:37

## 2024-01-03 RX ADMIN — GABAPENTIN 300 MILLIGRAM(S): 400 CAPSULE ORAL at 09:31

## 2024-01-03 RX ADMIN — BUPROPION HYDROCHLORIDE 150 MILLIGRAM(S): 150 TABLET, EXTENDED RELEASE ORAL at 09:31

## 2024-01-03 RX ADMIN — LITHIUM CARBONATE 1200 MILLIGRAM(S): 300 TABLET, EXTENDED RELEASE ORAL at 20:36

## 2024-01-03 RX ADMIN — ESCITALOPRAM OXALATE 20 MILLIGRAM(S): 10 TABLET, FILM COATED ORAL at 09:31

## 2024-01-03 NOTE — BH INPATIENT PSYCHIATRY PROGRESS NOTE - NSBHFUPINTERVALHXFT_PSY_A_CORE
f/up mood, care discussed w/ tx team, VSS.  Patient sitting comfortably in the dayroom, social with select peers.  Patient reported sleeping well and with good appetite. denied SE to meds. denied dizziness or constipation. denied SI/I/P. Patient is waiting for housing interviews.  no apparent distress. reported good concentration and denied feeling depressed.

## 2024-01-03 NOTE — BH INPATIENT PSYCHIATRY PROGRESS NOTE - NSBHCHARTREVIEWVS_PSY_A_CORE FT
Vital Signs Last 24 Hrs  T(C): 36.8 (01-03-24 @ 08:21), Max: 36.8 (01-03-24 @ 08:21)  T(F): 98.3 (01-03-24 @ 08:21), Max: 98.3 (01-03-24 @ 08:21)  HR: --  BP: --  BP(mean): --  RR: --  SpO2: --    Orthostatic VS  01-03-24 @ 08:21  Lying BP: --/-- HR: --  Sitting BP: 123/82 HR: 98  Standing BP: 131/77 HR: 103  Site: --  Mode: --  Orthostatic VS  01-02-24 @ 20:26  Lying BP: --/-- HR: --  Sitting BP: 137/88 HR: 111  Standing BP: 131/83 HR: 113  Site: --  Mode: --  Orthostatic VS  01-02-24 @ 07:41  Lying BP: --/-- HR: --  Sitting BP: 123/85 HR: 112  Standing BP: 142/85 HR: 107  Site: --  Mode: --  Orthostatic VS  01-01-24 @ 20:43  Lying BP: --/-- HR: --  Sitting BP: 136/81 HR: 103  Standing BP: 143/89 HR: 109  Site: --  Mode: --

## 2024-01-03 NOTE — BH INPATIENT PSYCHIATRY PROGRESS NOTE - NSBHASSESSSUMMFT_PSY_ALL_CORE
Patient is 27yo single woman, no dependents, domiciled with her Grandmother, unemployed on disability/SSI, pphx of schizoaffective disorder, multiple past psychiatric admissions including state and recent discharge from [facility], in tx with [facility], with pmhx of asthma, HTN, GERD, and hypothyroidism and schizoaffective disorder, in tx with [facility], who self presented to the ED reporting abdominal pain and requesting readmission to psychiatric unit, reporting CAH, depressive symptoms, IOR, suicidality, admitted to [facility] for psychiatric care.  Depression and psychosis likely multifactorial at this time, schizoaffective vs. borderline personality disorder vs. complex grief vs. adjustment disorder.      on assessment, patient is showing improvements in mood, thoughts and functioning. Awaiting housing interviews. clozapine bloodwork in AM.     1.) Obs: Stop 1:1, patient with decrease in SI.  She has been able to approach staff when needed.  2.) Psychiatric medication management:  - Reviewed options for addressing nocturnal enuresis including reducing clozapine vs reducing lithium vs adding desmopressin.  Pt decided upon desmopressin as she continues to have depression and psychosis.  Desmopressin 0.1 mg QHS.  - Lithobid 1200 mg QHS.  Pt aware that she needs to stay well hydrated and avoid diuretics and NSAIDs.   - Discontinued olanzapine  - Increase Clozapine to 300mg qHS for psychosis ([id number])  - Prazosin 2mg qhs, monitor BP carefully.  - Abilify Maintena 400mg IM q3 weeks rather than 4, per collateral from ACT team.  Doses given 10/19, 11/17, 12/8, 12/29.  - Mirtazapine was switched to escitalopram, increased to 20 mg daily.  - Wellbutrin XL 150mg daily  - Gabapentin 300 mg BID for anxiety/chronic pain.  - PRN: haloperidol 5mg PO/IM q8hrs PRN for agitation, diphenhydramine 50mg PO/IM q6hrs PRN for EPS PPx, hydroxyzine 50mg PO q12hrs PRN for anxiety, lorazepam 2mg PO q8hrs PRN for anxiety or 2mg IM for assaultive behavior. Melatonin 5mg qHS PRN for insomnia  - Discussed ECT several times with pt who states she has been told in two hospitals in past that due to her obesity and asthma, she is not an ideal candidate.  Pt states that due to this she is not interested in receiving ECT at this time.  3.) Medical:   - Pt scheduled for dental extractions 11/1, however declined to attend on day prior when informed it would not be under general anesthesia.  - last clozapine labs on 10/23 (CBC with diff, troponins, CRP); CRP elevated at 15.8 likely due to dental infection/wisdom tooth issues  - pantoprazole 40mg PO before breakfast for GERD  - montelukast 10mg PO qHS for asthma  - budesonide 80mcg/formoterol 4.5mcg 2 puffs BID for asthma  - PRN: acetaminophen 650mg PO q6hrs PRN for pain, albuterol 90mcg 2puffs q6hrs PRN for dyspnea, ondansetron 4mg q6hrs PRN for nausea  - Discussed CRP with medicine service, given asymptomatic pt and normal troponin, no additional intervention/diagnostics at this time.  - Discontinued clobetasol as rash has resolved.  4.) Dispo planning: State application submitted. Patient is 27yo single woman, no dependents, domiciled with her Grandmother, unemployed on disability/SSI, pphx of schizoaffective disorder, multiple past psychiatric admissions including state and recent discharge from [facility], in tx with [facility], with pmhx of asthma, HTN, GERD, and hypothyroidism and schizoaffective disorder, in tx with [facility], who self presented to the ED reporting abdominal pain and requesting readmission to psychiatric unit, reporting CAH, depressive symptoms, IOR, suicidality, admitted to [facility] for psychiatric care.  Depression and psychosis likely multifactorial at this time, schizoaffective vs. borderline personality disorder vs. complex grief vs. adjustment disorder.      on assessment, patient is showing improvements in mood, thoughts and functioning. Awaiting housing interviews. clozapine bloodwork in AM.     1.) Obs: Stop 1:1, patient with decrease in SI.  She has been able to approach staff when needed.  2.) Psychiatric medication management:  - Reviewed options for addressing nocturnal enuresis including reducing clozapine vs reducing lithium vs adding desmopressin.  Pt decided upon desmopressin as she continues to have depression and psychosis.  Desmopressin 0.1 mg QHS.  - Lithobid 1200 mg QHS.  Pt aware that she needs to stay well hydrated and avoid diuretics and NSAIDs.   - Discontinued olanzapine  - Increase Clozapine to 300mg qHS for psychosis ([id number])  - Prazosin 2mg qhs, monitor BP carefully.  - Abilify Maintena 400mg IM q3 weeks rather than 4, per collateral from ACT team.  Doses given 10/19, 11/17, 12/8, 12/29.  - Mirtazapine was switched to escitalopram, increased to 20 mg daily.  - Wellbutrin XL 150mg daily  - Gabapentin 300 mg BID for anxiety/chronic pain.  - PRN: haloperidol 5mg PO/IM q8hrs PRN for agitation, diphenhydramine 50mg PO/IM q6hrs PRN for EPS PPx, hydroxyzine 50mg PO q12hrs PRN for anxiety, lorazepam 2mg PO q8hrs PRN for anxiety or 2mg IM for assaultive behavior. Melatonin 5mg qHS PRN for insomnia  - Discussed ECT several times with pt who states she has been told in two hospitals in past that due to her obesity and asthma, she is not an ideal candidate.  Pt states that due to this she is not interested in receiving ECT at this time.  3.) Medical:   - Pt scheduled for dental extractions 11/1, however declined to attend on day prior when informed it would not be under general anesthesia.  - last clozapine labs on 10/23 (CBC with diff, troponins, CRP); CRP elevated at 15.8 likely due to dental infection/wisdom tooth issues  - pantoprazole 40mg PO before breakfast for GERD  - montelukast 10mg PO qHS for asthma  - budesonide 80mcg/formoterol 4.5mcg 2 puffs BID for asthma  - PRN: acetaminophen 650mg PO q6hrs PRN for pain, albuterol 90mcg 2puffs q6hrs PRN for dyspnea, ondansetron 4mg q6hrs PRN for nausea  - Discussed CRP with medicine service, given asymptomatic pt and normal troponin, no additional intervention/diagnostics at this time.  - Discontinued clobetasol as rash has resolved.  4.) Dispo planning: State application submitted.

## 2024-01-03 NOTE — BH INPATIENT PSYCHIATRY PROGRESS NOTE - NSBHMETABOLIC_PSY_ALL_CORE_FT
BMI: BMI (kg/m2): 57.8 (09-14-23 @ 14:30)  HbA1c: A1C with Estimated Average Glucose Result: 5.2 % (11-01-23 @ 09:04)    Glucose:   BP: --Vital Signs Last 24 Hrs  T(C): 36.8 (01-03-24 @ 08:21), Max: 36.8 (01-03-24 @ 08:21)  T(F): 98.3 (01-03-24 @ 08:21), Max: 98.3 (01-03-24 @ 08:21)  HR: --  BP: --  BP(mean): --  RR: --  SpO2: --    Orthostatic VS  01-03-24 @ 08:21  Lying BP: --/-- HR: --  Sitting BP: 123/82 HR: 98  Standing BP: 131/77 HR: 103  Site: --  Mode: --  Orthostatic VS  01-02-24 @ 20:26  Lying BP: --/-- HR: --  Sitting BP: 137/88 HR: 111  Standing BP: 131/83 HR: 113  Site: --  Mode: --  Orthostatic VS  01-02-24 @ 07:41  Lying BP: --/-- HR: --  Sitting BP: 123/85 HR: 112  Standing BP: 142/85 HR: 107  Site: --  Mode: --  Orthostatic VS  01-01-24 @ 20:43  Lying BP: --/-- HR: --  Sitting BP: 136/81 HR: 103  Standing BP: 143/89 HR: 109  Site: --  Mode: --    Lipid Panel: Date/Time: 11-01-23 @ 09:04  Cholesterol, Serum: 156  LDL Cholesterol Calculated: 82  HDL Cholesterol, Serum: 58  Total Cholesterol/HDL Ration Measurement: --  Triglycerides, Serum: 79

## 2024-01-03 NOTE — BH INPATIENT PSYCHIATRY PROGRESS NOTE - NSBHATTESTBILLING_PSY_A_CORE
89939-Gnjnqiccel OBS or IP - moderate complexity OR 35-49 mins [id number]-Isfhceluoe OBS or IP - moderate complexity OR 35-49 mins

## 2024-01-04 LAB
BASOPHILS # BLD AUTO: 0.01 K/UL — SIGNIFICANT CHANGE UP (ref 0–0.2)
BASOPHILS # BLD AUTO: 0.01 K/UL — SIGNIFICANT CHANGE UP (ref 0–0.2)
BASOPHILS NFR BLD AUTO: 0.1 % — SIGNIFICANT CHANGE UP (ref 0–2)
BASOPHILS NFR BLD AUTO: 0.1 % — SIGNIFICANT CHANGE UP (ref 0–2)
CLOZAPINE SERPL-MCNC: 416 NG/ML — SIGNIFICANT CHANGE UP (ref 350–600)
CLOZAPINE SERPL-MCNC: 416 NG/ML — SIGNIFICANT CHANGE UP (ref 350–600)
CRP SERPL-MCNC: 27.9 MG/L — HIGH
CRP SERPL-MCNC: 27.9 MG/L — HIGH
EOSINOPHIL # BLD AUTO: 0 K/UL — SIGNIFICANT CHANGE UP (ref 0–0.5)
EOSINOPHIL # BLD AUTO: 0 K/UL — SIGNIFICANT CHANGE UP (ref 0–0.5)
EOSINOPHIL NFR BLD AUTO: 0 % — SIGNIFICANT CHANGE UP (ref 0–6)
EOSINOPHIL NFR BLD AUTO: 0 % — SIGNIFICANT CHANGE UP (ref 0–6)
HCT VFR BLD CALC: 39.2 % — SIGNIFICANT CHANGE UP (ref 34.5–45)
HCT VFR BLD CALC: 39.2 % — SIGNIFICANT CHANGE UP (ref 34.5–45)
HGB BLD-MCNC: 11.8 G/DL — SIGNIFICANT CHANGE UP (ref 11.5–15.5)
HGB BLD-MCNC: 11.8 G/DL — SIGNIFICANT CHANGE UP (ref 11.5–15.5)
IANC: 6.3 K/UL — SIGNIFICANT CHANGE UP (ref 1.8–7.4)
IANC: 6.3 K/UL — SIGNIFICANT CHANGE UP (ref 1.8–7.4)
IMM GRANULOCYTES NFR BLD AUTO: 0.3 % — SIGNIFICANT CHANGE UP (ref 0–0.9)
IMM GRANULOCYTES NFR BLD AUTO: 0.3 % — SIGNIFICANT CHANGE UP (ref 0–0.9)
LYMPHOCYTES # BLD AUTO: 2.6 K/UL — SIGNIFICANT CHANGE UP (ref 1–3.3)
LYMPHOCYTES # BLD AUTO: 2.6 K/UL — SIGNIFICANT CHANGE UP (ref 1–3.3)
LYMPHOCYTES # BLD AUTO: 27.2 % — SIGNIFICANT CHANGE UP (ref 13–44)
LYMPHOCYTES # BLD AUTO: 27.2 % — SIGNIFICANT CHANGE UP (ref 13–44)
MCHC RBC-ENTMCNC: 24.2 PG — LOW (ref 27–34)
MCHC RBC-ENTMCNC: 24.2 PG — LOW (ref 27–34)
MCHC RBC-ENTMCNC: 30.1 GM/DL — LOW (ref 32–36)
MCHC RBC-ENTMCNC: 30.1 GM/DL — LOW (ref 32–36)
MCV RBC AUTO: 80.3 FL — SIGNIFICANT CHANGE UP (ref 80–100)
MCV RBC AUTO: 80.3 FL — SIGNIFICANT CHANGE UP (ref 80–100)
MONOCYTES # BLD AUTO: 0.63 K/UL — SIGNIFICANT CHANGE UP (ref 0–0.9)
MONOCYTES # BLD AUTO: 0.63 K/UL — SIGNIFICANT CHANGE UP (ref 0–0.9)
MONOCYTES NFR BLD AUTO: 6.6 % — SIGNIFICANT CHANGE UP (ref 2–14)
MONOCYTES NFR BLD AUTO: 6.6 % — SIGNIFICANT CHANGE UP (ref 2–14)
NEUTROPHILS # BLD AUTO: 6.3 K/UL — SIGNIFICANT CHANGE UP (ref 1.8–7.4)
NEUTROPHILS # BLD AUTO: 6.3 K/UL — SIGNIFICANT CHANGE UP (ref 1.8–7.4)
NEUTROPHILS NFR BLD AUTO: 65.8 % — SIGNIFICANT CHANGE UP (ref 43–77)
NEUTROPHILS NFR BLD AUTO: 65.8 % — SIGNIFICANT CHANGE UP (ref 43–77)
NRBC # BLD: 0 /100 WBCS — SIGNIFICANT CHANGE UP (ref 0–0)
NRBC # BLD: 0 /100 WBCS — SIGNIFICANT CHANGE UP (ref 0–0)
NRBC # FLD: 0 K/UL — SIGNIFICANT CHANGE UP (ref 0–0)
NRBC # FLD: 0 K/UL — SIGNIFICANT CHANGE UP (ref 0–0)
PLATELET # BLD AUTO: 290 K/UL — SIGNIFICANT CHANGE UP (ref 150–400)
PLATELET # BLD AUTO: 290 K/UL — SIGNIFICANT CHANGE UP (ref 150–400)
RBC # BLD: 4.88 M/UL — SIGNIFICANT CHANGE UP (ref 3.8–5.2)
RBC # BLD: 4.88 M/UL — SIGNIFICANT CHANGE UP (ref 3.8–5.2)
RBC # FLD: 15.4 % — HIGH (ref 10.3–14.5)
RBC # FLD: 15.4 % — HIGH (ref 10.3–14.5)
TROPONIN T, HIGH SENSITIVITY RESULT: 6 NG/L — SIGNIFICANT CHANGE UP
TROPONIN T, HIGH SENSITIVITY RESULT: 6 NG/L — SIGNIFICANT CHANGE UP
WBC # BLD: 9.57 K/UL — SIGNIFICANT CHANGE UP (ref 3.8–10.5)
WBC # BLD: 9.57 K/UL — SIGNIFICANT CHANGE UP (ref 3.8–10.5)
WBC # FLD AUTO: 9.57 K/UL — SIGNIFICANT CHANGE UP (ref 3.8–10.5)
WBC # FLD AUTO: 9.57 K/UL — SIGNIFICANT CHANGE UP (ref 3.8–10.5)

## 2024-01-04 PROCEDURE — 99232 SBSQ HOSP IP/OBS MODERATE 35: CPT

## 2024-01-04 RX ADMIN — ESCITALOPRAM OXALATE 20 MILLIGRAM(S): 10 TABLET, FILM COATED ORAL at 08:39

## 2024-01-04 RX ADMIN — Medication 2 MILLIGRAM(S): at 20:19

## 2024-01-04 RX ADMIN — BUDESONIDE AND FORMOTEROL FUMARATE DIHYDRATE 2 PUFF(S): 160; 4.5 AEROSOL RESPIRATORY (INHALATION) at 20:19

## 2024-01-04 RX ADMIN — PANTOPRAZOLE SODIUM 40 MILLIGRAM(S): 20 TABLET, DELAYED RELEASE ORAL at 08:38

## 2024-01-04 RX ADMIN — MONTELUKAST 10 MILLIGRAM(S): 4 TABLET, CHEWABLE ORAL at 20:19

## 2024-01-04 RX ADMIN — LITHIUM CARBONATE 1200 MILLIGRAM(S): 300 TABLET, EXTENDED RELEASE ORAL at 20:19

## 2024-01-04 RX ADMIN — GABAPENTIN 300 MILLIGRAM(S): 400 CAPSULE ORAL at 08:38

## 2024-01-04 RX ADMIN — CLOZAPINE 300 MILLIGRAM(S): 150 TABLET, ORALLY DISINTEGRATING ORAL at 20:18

## 2024-01-04 RX ADMIN — BUPROPION HYDROCHLORIDE 150 MILLIGRAM(S): 150 TABLET, EXTENDED RELEASE ORAL at 08:38

## 2024-01-04 RX ADMIN — GABAPENTIN 300 MILLIGRAM(S): 400 CAPSULE ORAL at 20:19

## 2024-01-04 RX ADMIN — DESMOPRESSIN ACETATE 0.1 MILLIGRAM(S): 0.1 TABLET ORAL at 20:19

## 2024-01-04 RX ADMIN — BUDESONIDE AND FORMOTEROL FUMARATE DIHYDRATE 2 PUFF(S): 160; 4.5 AEROSOL RESPIRATORY (INHALATION) at 08:38

## 2024-01-04 NOTE — BH TREATMENT PLAN - NSTXSUICIDINTERPR_PSY_ALL_CORE
Psych rehab recommends that patient attend individual and group therapy for support, psychoeducation and skill-integration as well as to facilitate progress towards specified goal.
Patient has met goal.
Patient has demonstrated some progress towards this goal. Psych rehab recommends patient continues to engage in individual and group therapy sessions in order for patient to gain psychoeducation, psychotherapy and support.
Patient has demonstrated some progress towards this goal. Psych rehab recommends patient continues to engage in individual and group therapy sessions in order for patient to gain psychoeducation, psychotherapy and support.
Patient has met goal.
Psych rehab recommends that patient attend individual and group therapy for support, psychoeducation and skill-integration as well as to facilitate progress towards specified goal.

## 2024-01-04 NOTE — BH PSYCHOLOGY - CLINICIAN PSYCHOTHERAPY NOTE - NSBHPSYCHOLGOALS_PSY_A_CORE
CONTROLLED SUBSTANCE   Decrease symptoms/Assessment/Improve level of independent functioning/Improve social/vocational/coping skills/Psychoeducation/Treatment compliance

## 2024-01-04 NOTE — BH INPATIENT PSYCHIATRY PROGRESS NOTE - NSBHATTESTBILLING_PSY_A_CORE
77866-Kzynhjtqfl OBS or IP - moderate complexity OR 35-49 mins [id number]-Kfiubvwdpj OBS or IP - moderate complexity OR 35-49 mins

## 2024-01-04 NOTE — BH INPATIENT PSYCHIATRY PROGRESS NOTE - NSBHASSESSSUMMFT_PSY_ALL_CORE
Patient is 27yo single woman, no dependents, domiciled with her Grandmother, unemployed on disability/SSI, pphx of schizoaffective disorder, multiple past psychiatric admissions including state and recent discharge from [facility], in tx with [facility], with pmhx of asthma, HTN, GERD, and hypothyroidism and schizoaffective disorder, in tx with [facility], who self presented to the ED reporting abdominal pain and requesting readmission to psychiatric unit, reporting CAH, depressive symptoms, IOR, suicidality, admitted to [facility] for psychiatric care.  Depression and psychosis likely multifactorial at this time, schizoaffective vs. borderline personality disorder vs. complex grief vs. adjustment disorder.      on assessment, patient is showing improvements in mood, thoughts and functioning. Awaiting housing interviews. clozapine level pending.     1.) Obs: Stop 1:1, patient with decrease in SI.  She has been able to approach staff when needed.  2.) Psychiatric medication management:  - Reviewed options for addressing nocturnal enuresis including reducing clozapine vs reducing lithium vs adding desmopressin.  Pt decided upon desmopressin as she continues to have depression and psychosis.  Desmopressin 0.1 mg QHS.  - Lithobid 1200 mg QHS.  Pt aware that she needs to stay well hydrated and avoid diuretics and NSAIDs.   - Discontinued olanzapine  - Increase Clozapine to 300mg qHS for psychosis ([id number])  - Prazosin 2mg qhs, monitor BP carefully.  - Abilify Maintena 400mg IM q3 weeks rather than 4, per collateral from ACT team.  Doses given 10/19, 11/17, 12/8, 12/29.  - Mirtazapine was switched to escitalopram, increased to 20 mg daily.  - Wellbutrin XL 150mg daily  - Gabapentin 300 mg BID for anxiety/chronic pain.  - PRN: haloperidol 5mg PO/IM q8hrs PRN for agitation, diphenhydramine 50mg PO/IM q6hrs PRN for EPS PPx, hydroxyzine 50mg PO q12hrs PRN for anxiety, lorazepam 2mg PO q8hrs PRN for anxiety or 2mg IM for assaultive behavior. Melatonin 5mg qHS PRN for insomnia  - Discussed ECT several times with pt who states she has been told in two hospitals in past that due to her obesity and asthma, she is not an ideal candidate.  Pt states that due to this she is not interested in receiving ECT at this time.  3.) Medical:   - Pt scheduled for dental extractions 11/1, however declined to attend on day prior when informed it would not be under general anesthesia.  - last clozapine labs on 10/23 (CBC with diff, troponins, CRP); CRP elevated at 15.8 likely due to dental infection/wisdom tooth issues  - pantoprazole 40mg PO before breakfast for GERD  - montelukast 10mg PO qHS for asthma  - budesonide 80mcg/formoterol 4.5mcg 2 puffs BID for asthma  - PRN: acetaminophen 650mg PO q6hrs PRN for pain, albuterol 90mcg 2puffs q6hrs PRN for dyspnea, ondansetron 4mg q6hrs PRN for nausea  - Discussed CRP with medicine service, given asymptomatic pt and normal troponin, no additional intervention/diagnostics at this time.  - Discontinued clobetasol as rash has resolved.  4.) Dispo planning: State application submitted. Patient is 29yo single woman, no dependents, domiciled with her Grandmother, unemployed on disability/SSI, pphx of schizoaffective disorder, multiple past psychiatric admissions including state and recent discharge from [facility], in tx with [facility], with pmhx of asthma, HTN, GERD, and hypothyroidism and schizoaffective disorder, in tx with [facility], who self presented to the ED reporting abdominal pain and requesting readmission to psychiatric unit, reporting CAH, depressive symptoms, IOR, suicidality, admitted to [facility] for psychiatric care.  Depression and psychosis likely multifactorial at this time, schizoaffective vs. borderline personality disorder vs. complex grief vs. adjustment disorder.      on assessment, patient is showing improvements in mood, thoughts and functioning. Awaiting housing interviews. clozapine level pending.     1.) Obs: Stop 1:1, patient with decrease in SI.  She has been able to approach staff when needed.  2.) Psychiatric medication management:  - Reviewed options for addressing nocturnal enuresis including reducing clozapine vs reducing lithium vs adding desmopressin.  Pt decided upon desmopressin as she continues to have depression and psychosis.  Desmopressin 0.1 mg QHS.  - Lithobid 1200 mg QHS.  Pt aware that she needs to stay well hydrated and avoid diuretics and NSAIDs.   - Discontinued olanzapine  - Increase Clozapine to 300mg qHS for psychosis ([id number])  - Prazosin 2mg qhs, monitor BP carefully.  - Abilify Maintena 400mg IM q3 weeks rather than 4, per collateral from ACT team.  Doses given 10/19, 11/17, 12/8, 12/29.  - Mirtazapine was switched to escitalopram, increased to 20 mg daily.  - Wellbutrin XL 150mg daily  - Gabapentin 300 mg BID for anxiety/chronic pain.  - PRN: haloperidol 5mg PO/IM q8hrs PRN for agitation, diphenhydramine 50mg PO/IM q6hrs PRN for EPS PPx, hydroxyzine 50mg PO q12hrs PRN for anxiety, lorazepam 2mg PO q8hrs PRN for anxiety or 2mg IM for assaultive behavior. Melatonin 5mg qHS PRN for insomnia  - Discussed ECT several times with pt who states she has been told in two hospitals in past that due to her obesity and asthma, she is not an ideal candidate.  Pt states that due to this she is not interested in receiving ECT at this time.  3.) Medical:   - Pt scheduled for dental extractions 11/1, however declined to attend on day prior when informed it would not be under general anesthesia.  - last clozapine labs on 10/23 (CBC with diff, troponins, CRP); CRP elevated at 15.8 likely due to dental infection/wisdom tooth issues  - pantoprazole 40mg PO before breakfast for GERD  - montelukast 10mg PO qHS for asthma  - budesonide 80mcg/formoterol 4.5mcg 2 puffs BID for asthma  - PRN: acetaminophen 650mg PO q6hrs PRN for pain, albuterol 90mcg 2puffs q6hrs PRN for dyspnea, ondansetron 4mg q6hrs PRN for nausea  - Discussed CRP with medicine service, given asymptomatic pt and normal troponin, no additional intervention/diagnostics at this time.  - Discontinued clobetasol as rash has resolved.  4.) Dispo planning: State application submitted.

## 2024-01-04 NOTE — BH INPATIENT PSYCHIATRY PROGRESS NOTE - NSBHFUPINTERVALHXFT_PSY_A_CORE
f/up mood, care discussed w/ tx team, RONNIE. labs: WBC 9.57, ANC 6.30,  Patient sitting comfortably in the dayroom, was observed on the phone during the AM hours,  social with select peers.  Patient reported sleeping well and with good appetite. denied SE to meds. denied dizziness or constipation. denied SI/I/P. instructed that housing interviews may take time and patient was understanding.

## 2024-01-04 NOTE — BH INPATIENT PSYCHIATRY PROGRESS NOTE - NSBHCHARTREVIEWVS_PSY_A_CORE FT
Vital Signs Last 24 Hrs  T(C): 36.3 (01-04-24 @ 07:38), Max: 36.3 (01-04-24 @ 07:38)  T(F): 97.3 (01-04-24 @ 07:38), Max: 97.3 (01-04-24 @ 07:38)  HR: --  BP: --  BP(mean): --  RR: --  SpO2: --    Orthostatic VS  01-04-24 @ 07:38  Lying BP: --/-- HR: --  Sitting BP: 119/70 HR: 87  Standing BP: --/-- HR: --  Site: --  Mode: --  Orthostatic VS  01-03-24 @ 20:10  Lying BP: --/-- HR: --  Sitting BP: 139/87 HR: 92  Standing BP: 143/86 HR: 99  Site: --  Mode: --  Orthostatic VS  01-03-24 @ 08:21  Lying BP: --/-- HR: --  Sitting BP: 123/82 HR: 98  Standing BP: 131/77 HR: 103  Site: --  Mode: --  Orthostatic VS  01-02-24 @ 20:26  Lying BP: --/-- HR: --  Sitting BP: 137/88 HR: 111  Standing BP: 131/83 HR: 113  Site: --  Mode: --

## 2024-01-04 NOTE — BH INPATIENT PSYCHIATRY PROGRESS NOTE - NSBHMETABOLIC_PSY_ALL_CORE_FT
BMI: BMI (kg/m2): 57.8 (09-14-23 @ 14:30)  HbA1c: A1C with Estimated Average Glucose Result: 5.2 % (11-01-23 @ 09:04)    Glucose:   BP: --Vital Signs Last 24 Hrs  T(C): 36.3 (01-04-24 @ 07:38), Max: 36.3 (01-04-24 @ 07:38)  T(F): 97.3 (01-04-24 @ 07:38), Max: 97.3 (01-04-24 @ 07:38)  HR: --  BP: --  BP(mean): --  RR: --  SpO2: --    Orthostatic VS  01-04-24 @ 07:38  Lying BP: --/-- HR: --  Sitting BP: 119/70 HR: 87  Standing BP: --/-- HR: --  Site: --  Mode: --  Orthostatic VS  01-03-24 @ 20:10  Lying BP: --/-- HR: --  Sitting BP: 139/87 HR: 92  Standing BP: 143/86 HR: 99  Site: --  Mode: --  Orthostatic VS  01-03-24 @ 08:21  Lying BP: --/-- HR: --  Sitting BP: 123/82 HR: 98  Standing BP: 131/77 HR: 103  Site: --  Mode: --  Orthostatic VS  01-02-24 @ 20:26  Lying BP: --/-- HR: --  Sitting BP: 137/88 HR: 111  Standing BP: 131/83 HR: 113  Site: --  Mode: --    Lipid Panel: Date/Time: 11-01-23 @ 09:04  Cholesterol, Serum: 156  LDL Cholesterol Calculated: 82  HDL Cholesterol, Serum: 58  Total Cholesterol/HDL Ration Measurement: --  Triglycerides, Serum: 79

## 2024-01-04 NOTE — BH PSYCHOLOGY - CLINICIAN PSYCHOTHERAPY NOTE - NSBHPSYCHOLNARRATIVE_PSY_A_CORE FT
Writer met with Pt for 45-minute individual therapy session. Pt was alert and presented with adequate hygiene and grooming. Pt reported feeling "tired but good", affect neutral. Pt denied experiencing any A/V hallucinations. Her thought process was linear and goal directed. Pts’ speech was WNL, content was relevant to discussion. Pt denied passive or active SI, HI or NSSI. Oriented x3. Fair insight and judgement demonstrated.      Pt and Wr explored feelings of anxiety pt was experiencing due to waiting "on news from housing". Pt discussed her feelings of anxiety surrounding uncertainty, normalized and validated feelings and practiced and reviewed previous ACT acceptance interventions. Went over SMART goals worksheet to identify short and long term goals in continuing her education and receiving a degree in music therapy. Explored with what about music therapy excited her, with pt sharing "helping others" as well as "expressing her needs" being a part of it. Explored how pt could incorporate these values into other areas of her life outside of education. Explored difference in perceived support system outside the hospital since being admitted, with pt stating that her family (uncle and cousin) seem more understanding towards her mental health issues as well as her being able to appropriately express to them what she may need.

## 2024-01-04 NOTE — BH INPATIENT PSYCHIATRY PROGRESS NOTE - MSE UNSTRUCTURED FT
The patient appears stated age, with fair hygiene, calm and cooperative with the interview.  She maintained appropriate eye contact.  No restlessness or slowing of movements observed.  Gait is steady.  The patient’s speech was fluent, normal in tone, rate, and volume.  The patient’s mood is “pretty good”  Her affect is euthymic, reactive, stable, appropriate.  The patient’s thoughts are goal directed.  She does not have any delusions, denies auditory hallucinations today.  She denies suicidal ideation, no homicidal ideation, intent, or plan.  Insight is fair.  Judgment is fair.  Impulse control has been fair on the unit.

## 2024-01-05 PROCEDURE — 99232 SBSQ HOSP IP/OBS MODERATE 35: CPT

## 2024-01-05 RX ADMIN — PANTOPRAZOLE SODIUM 40 MILLIGRAM(S): 20 TABLET, DELAYED RELEASE ORAL at 08:22

## 2024-01-05 RX ADMIN — CLOZAPINE 300 MILLIGRAM(S): 150 TABLET, ORALLY DISINTEGRATING ORAL at 21:24

## 2024-01-05 RX ADMIN — Medication 2 MILLIGRAM(S): at 21:26

## 2024-01-05 RX ADMIN — ESCITALOPRAM OXALATE 20 MILLIGRAM(S): 10 TABLET, FILM COATED ORAL at 08:22

## 2024-01-05 RX ADMIN — LITHIUM CARBONATE 1200 MILLIGRAM(S): 300 TABLET, EXTENDED RELEASE ORAL at 21:24

## 2024-01-05 RX ADMIN — GABAPENTIN 300 MILLIGRAM(S): 400 CAPSULE ORAL at 21:25

## 2024-01-05 RX ADMIN — BUPROPION HYDROCHLORIDE 150 MILLIGRAM(S): 150 TABLET, EXTENDED RELEASE ORAL at 08:22

## 2024-01-05 RX ADMIN — GABAPENTIN 300 MILLIGRAM(S): 400 CAPSULE ORAL at 08:22

## 2024-01-05 RX ADMIN — Medication 650 MILLIGRAM(S): at 11:28

## 2024-01-05 RX ADMIN — MONTELUKAST 10 MILLIGRAM(S): 4 TABLET, CHEWABLE ORAL at 21:25

## 2024-01-05 RX ADMIN — Medication 5 MILLIGRAM(S): at 21:26

## 2024-01-05 RX ADMIN — DESMOPRESSIN ACETATE 0.1 MILLIGRAM(S): 0.1 TABLET ORAL at 21:25
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
## 2024-01-05 NOTE — BH INPATIENT PSYCHIATRY PROGRESS NOTE - NSBHCHARTREVIEWVS_PSY_A_CORE FT
Vital Signs Last 24 Hrs  T(C): 36.7 (01-05-24 @ 08:29), Max: 36.8 (01-04-24 @ 20:40)  T(F): 98 (01-05-24 @ 08:29), Max: 98.2 (01-04-24 @ 20:40)  HR: --  BP: --  BP(mean): --  RR: --  SpO2: --    Orthostatic VS  01-05-24 @ 08:29  Lying BP: --/-- HR: --  Sitting BP: 127/76 HR: 90  Standing BP: 147/88 HR: 100  Site: --  Mode: --  Orthostatic VS  01-04-24 @ 20:40  Lying BP: --/-- HR: --  Sitting BP: 138/77 HR: 98  Standing BP: 150/88 HR: 103  Site: --  Mode: --  Orthostatic VS  01-04-24 @ 07:38  Lying BP: --/-- HR: --  Sitting BP: 119/70 HR: 87  Standing BP: --/-- HR: --  Site: --  Mode: --  Orthostatic VS  01-03-24 @ 20:10  Lying BP: --/-- HR: --  Sitting BP: 139/87 HR: 92  Standing BP: 143/86 HR: 99  Site: --  Mode: --

## 2024-01-05 NOTE — BH INPATIENT PSYCHIATRY PROGRESS NOTE - NSBHMETABOLIC_PSY_ALL_CORE_FT
BMI: BMI (kg/m2): 57.8 (09-14-23 @ 14:30)  HbA1c: A1C with Estimated Average Glucose Result: 5.2 % (11-01-23 @ 09:04)    Glucose:   BP: --Vital Signs Last 24 Hrs  T(C): 36.7 (01-05-24 @ 08:29), Max: 36.8 (01-04-24 @ 20:40)  T(F): 98 (01-05-24 @ 08:29), Max: 98.2 (01-04-24 @ 20:40)  HR: --  BP: --  BP(mean): --  RR: --  SpO2: --    Orthostatic VS  01-05-24 @ 08:29  Lying BP: --/-- HR: --  Sitting BP: 127/76 HR: 90  Standing BP: 147/88 HR: 100  Site: --  Mode: --  Orthostatic VS  01-04-24 @ 20:40  Lying BP: --/-- HR: --  Sitting BP: 138/77 HR: 98  Standing BP: 150/88 HR: 103  Site: --  Mode: --  Orthostatic VS  01-04-24 @ 07:38  Lying BP: --/-- HR: --  Sitting BP: 119/70 HR: 87  Standing BP: --/-- HR: --  Site: --  Mode: --  Orthostatic VS  01-03-24 @ 20:10  Lying BP: --/-- HR: --  Sitting BP: 139/87 HR: 92  Standing BP: 143/86 HR: 99  Site: --  Mode: --    Lipid Panel: Date/Time: 11-01-23 @ 09:04  Cholesterol, Serum: 156  LDL Cholesterol Calculated: 82  HDL Cholesterol, Serum: 58  Total Cholesterol/HDL Ration Measurement: --  Triglycerides, Serum: 79

## 2024-01-05 NOTE — BH INPATIENT PSYCHIATRY PROGRESS NOTE - NSBHFUPINTERVALHXFT_PSY_A_CORE
f/up mood, care discussed w/ tx team, VSS. clozapine level 416,  Patient sitting comfortably by the phone during the AM hours,  social with select peers.  Patient reported sleeping well and with good appetite. denied dizziness or constipation. denied SI/I/P. denied any pain.  reported that she needed to speak to  about SSI benefits.

## 2024-01-05 NOTE — BH INPATIENT PSYCHIATRY PROGRESS NOTE - NSBHATTESTBILLING_PSY_A_CORE
47623-Pphbtwpbdj OBS or IP - moderate complexity OR 35-49 mins 21913-Frvgcsxtan OBS or IP - moderate complexity OR 35-49 mins

## 2024-01-06 RX ADMIN — PANTOPRAZOLE SODIUM 40 MILLIGRAM(S): 20 TABLET, DELAYED RELEASE ORAL at 09:15

## 2024-01-06 RX ADMIN — CLOZAPINE 300 MILLIGRAM(S): 150 TABLET, ORALLY DISINTEGRATING ORAL at 20:35

## 2024-01-06 RX ADMIN — BUDESONIDE AND FORMOTEROL FUMARATE DIHYDRATE 2 PUFF(S): 160; 4.5 AEROSOL RESPIRATORY (INHALATION) at 20:34

## 2024-01-06 RX ADMIN — BUDESONIDE AND FORMOTEROL FUMARATE DIHYDRATE 2 PUFF(S): 160; 4.5 AEROSOL RESPIRATORY (INHALATION) at 09:16

## 2024-01-06 RX ADMIN — LITHIUM CARBONATE 1200 MILLIGRAM(S): 300 TABLET, EXTENDED RELEASE ORAL at 20:35

## 2024-01-06 RX ADMIN — GABAPENTIN 300 MILLIGRAM(S): 400 CAPSULE ORAL at 20:35

## 2024-01-06 RX ADMIN — ESCITALOPRAM OXALATE 20 MILLIGRAM(S): 10 TABLET, FILM COATED ORAL at 09:16

## 2024-01-06 RX ADMIN — GABAPENTIN 300 MILLIGRAM(S): 400 CAPSULE ORAL at 09:15

## 2024-01-06 RX ADMIN — BUPROPION HYDROCHLORIDE 150 MILLIGRAM(S): 150 TABLET, EXTENDED RELEASE ORAL at 09:16

## 2024-01-06 RX ADMIN — DESMOPRESSIN ACETATE 0.1 MILLIGRAM(S): 0.1 TABLET ORAL at 20:36

## 2024-01-06 RX ADMIN — Medication 2 MILLIGRAM(S): at 20:35

## 2024-01-06 RX ADMIN — MONTELUKAST 10 MILLIGRAM(S): 4 TABLET, CHEWABLE ORAL at 22:30

## 2024-01-07 RX ADMIN — Medication 2 MILLIGRAM(S): at 20:31

## 2024-01-07 RX ADMIN — GABAPENTIN 300 MILLIGRAM(S): 400 CAPSULE ORAL at 08:15

## 2024-01-07 RX ADMIN — DESMOPRESSIN ACETATE 0.1 MILLIGRAM(S): 0.1 TABLET ORAL at 20:31

## 2024-01-07 RX ADMIN — LITHIUM CARBONATE 1200 MILLIGRAM(S): 300 TABLET, EXTENDED RELEASE ORAL at 20:31

## 2024-01-07 RX ADMIN — PANTOPRAZOLE SODIUM 40 MILLIGRAM(S): 20 TABLET, DELAYED RELEASE ORAL at 08:15

## 2024-01-07 RX ADMIN — ESCITALOPRAM OXALATE 20 MILLIGRAM(S): 10 TABLET, FILM COATED ORAL at 08:15

## 2024-01-07 RX ADMIN — BUDESONIDE AND FORMOTEROL FUMARATE DIHYDRATE 2 PUFF(S): 160; 4.5 AEROSOL RESPIRATORY (INHALATION) at 20:31

## 2024-01-07 RX ADMIN — GABAPENTIN 300 MILLIGRAM(S): 400 CAPSULE ORAL at 20:30

## 2024-01-07 RX ADMIN — MONTELUKAST 10 MILLIGRAM(S): 4 TABLET, CHEWABLE ORAL at 20:30

## 2024-01-07 RX ADMIN — BUPROPION HYDROCHLORIDE 150 MILLIGRAM(S): 150 TABLET, EXTENDED RELEASE ORAL at 08:15

## 2024-01-07 RX ADMIN — CLOZAPINE 300 MILLIGRAM(S): 150 TABLET, ORALLY DISINTEGRATING ORAL at 20:30

## 2024-01-08 PROCEDURE — 99232 SBSQ HOSP IP/OBS MODERATE 35: CPT

## 2024-01-08 RX ADMIN — LITHIUM CARBONATE 1200 MILLIGRAM(S): 300 TABLET, EXTENDED RELEASE ORAL at 20:37

## 2024-01-08 RX ADMIN — DESMOPRESSIN ACETATE 0.1 MILLIGRAM(S): 0.1 TABLET ORAL at 20:36

## 2024-01-08 RX ADMIN — GABAPENTIN 300 MILLIGRAM(S): 400 CAPSULE ORAL at 20:36

## 2024-01-08 RX ADMIN — CLOZAPINE 300 MILLIGRAM(S): 150 TABLET, ORALLY DISINTEGRATING ORAL at 20:37

## 2024-01-08 RX ADMIN — GABAPENTIN 300 MILLIGRAM(S): 400 CAPSULE ORAL at 09:15

## 2024-01-08 RX ADMIN — Medication 2 MILLIGRAM(S): at 20:36

## 2024-01-08 RX ADMIN — BUPROPION HYDROCHLORIDE 150 MILLIGRAM(S): 150 TABLET, EXTENDED RELEASE ORAL at 09:15

## 2024-01-08 RX ADMIN — BUDESONIDE AND FORMOTEROL FUMARATE DIHYDRATE 2 PUFF(S): 160; 4.5 AEROSOL RESPIRATORY (INHALATION) at 09:16

## 2024-01-08 RX ADMIN — ESCITALOPRAM OXALATE 20 MILLIGRAM(S): 10 TABLET, FILM COATED ORAL at 09:15

## 2024-01-08 RX ADMIN — BUDESONIDE AND FORMOTEROL FUMARATE DIHYDRATE 2 PUFF(S): 160; 4.5 AEROSOL RESPIRATORY (INHALATION) at 20:36

## 2024-01-08 RX ADMIN — MONTELUKAST 10 MILLIGRAM(S): 4 TABLET, CHEWABLE ORAL at 20:38

## 2024-01-08 RX ADMIN — PANTOPRAZOLE SODIUM 40 MILLIGRAM(S): 20 TABLET, DELAYED RELEASE ORAL at 11:06
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
## 2024-01-08 NOTE — BH INPATIENT PSYCHIATRY PROGRESS NOTE - MSE UNSTRUCTURED FT
The patient appears stated age, with fair hygiene,  cooperative with the interview.  She maintained appropriate eye contact.  No restlessness or slowing of movements observed.  Gait is steady.  The patient’s speech was fluent, normal in tone, rate, and volume.  The patient’s mood is “pretty good”  Her affect is euthymic, reactive, stable, appropriate.  The patient’s thoughts are goal directed.  She does not have any delusions, denies auditory hallucinations today.  She denies suicidal ideation, no homicidal ideation, intent, or plan.  Insight is fair.  Judgment is fair.  Impulse control has been fair on the unit.

## 2024-01-08 NOTE — BH INPATIENT PSYCHIATRY PROGRESS NOTE - NSBHATTESTBILLING_PSY_A_CORE
37963-Caiqsactcj OBS or IP - moderate complexity OR 35-49 mins 11535-Waapobsjol OBS or IP - moderate complexity OR 35-49 mins
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
## 2024-01-08 NOTE — BH INPATIENT PSYCHIATRY PROGRESS NOTE - NSBHCHARTREVIEWVS_PSY_A_CORE FT
Vital Signs Last 24 Hrs  T(C): 36.8 (01-08-24 @ 07:34), Max: 36.9 (01-07-24 @ 20:41)  T(F): 98.3 (01-08-24 @ 07:34), Max: 98.4 (01-07-24 @ 20:41)  HR: --  BP: --  BP(mean): --  RR: --  SpO2: --    Orthostatic VS  01-08-24 @ 07:34  Lying BP: --/-- HR: --  Sitting BP: 128/75 HR: 83  Standing BP: 118/81 HR: 74  Site: --  Mode: --  Orthostatic VS  01-07-24 @ 20:41  Lying BP: --/-- HR: --  Sitting BP: 125/84 HR: 86  Standing BP: 136/79 HR: 109  Site: --  Mode: --  Orthostatic VS  01-07-24 @ 07:42  Lying BP: --/-- HR: --  Sitting BP: 125/81 HR: 99  Standing BP: 133/80 HR: 106  Site: --  Mode: --  Orthostatic VS  01-06-24 @ 20:44  Lying BP: --/-- HR: --  Sitting BP: 130/83 HR: 101  Standing BP: 135/85 HR: 104  Site: --  Mode: --

## 2024-01-08 NOTE — BH INPATIENT PSYCHIATRY PROGRESS NOTE - NSBHMETABOLIC_PSY_ALL_CORE_FT
BMI: BMI (kg/m2): 57.8 (09-14-23 @ 14:30)  HbA1c: A1C with Estimated Average Glucose Result: 5.2 % (11-01-23 @ 09:04)    Glucose:   BP: --Vital Signs Last 24 Hrs  T(C): 36.8 (01-08-24 @ 07:34), Max: 36.9 (01-07-24 @ 20:41)  T(F): 98.3 (01-08-24 @ 07:34), Max: 98.4 (01-07-24 @ 20:41)  HR: --  BP: --  BP(mean): --  RR: --  SpO2: --    Orthostatic VS  01-08-24 @ 07:34  Lying BP: --/-- HR: --  Sitting BP: 128/75 HR: 83  Standing BP: 118/81 HR: 74  Site: --  Mode: --  Orthostatic VS  01-07-24 @ 20:41  Lying BP: --/-- HR: --  Sitting BP: 125/84 HR: 86  Standing BP: 136/79 HR: 109  Site: --  Mode: --  Orthostatic VS  01-07-24 @ 07:42  Lying BP: --/-- HR: --  Sitting BP: 125/81 HR: 99  Standing BP: 133/80 HR: 106  Site: --  Mode: --  Orthostatic VS  01-06-24 @ 20:44  Lying BP: --/-- HR: --  Sitting BP: 130/83 HR: 101  Standing BP: 135/85 HR: 104  Site: --  Mode: --    Lipid Panel: Date/Time: 11-01-23 @ 09:04  Cholesterol, Serum: 156  LDL Cholesterol Calculated: 82  HDL Cholesterol, Serum: 58  Total Cholesterol/HDL Ration Measurement: --  Triglycerides, Serum: 79

## 2024-01-09 LAB
ALBUMIN SERPL ELPH-MCNC: 3.9 G/DL — SIGNIFICANT CHANGE UP (ref 3.3–5)
ALBUMIN SERPL ELPH-MCNC: 3.9 G/DL — SIGNIFICANT CHANGE UP (ref 3.3–5)
ALP SERPL-CCNC: 108 U/L — SIGNIFICANT CHANGE UP (ref 40–120)
ALP SERPL-CCNC: 108 U/L — SIGNIFICANT CHANGE UP (ref 40–120)
ALT FLD-CCNC: 16 U/L — SIGNIFICANT CHANGE UP (ref 4–33)
ALT FLD-CCNC: 16 U/L — SIGNIFICANT CHANGE UP (ref 4–33)
ANION GAP SERPL CALC-SCNC: 9 MMOL/L — SIGNIFICANT CHANGE UP (ref 7–14)
ANION GAP SERPL CALC-SCNC: 9 MMOL/L — SIGNIFICANT CHANGE UP (ref 7–14)
AST SERPL-CCNC: 14 U/L — SIGNIFICANT CHANGE UP (ref 4–32)
AST SERPL-CCNC: 14 U/L — SIGNIFICANT CHANGE UP (ref 4–32)
BASOPHILS # BLD AUTO: 0.01 K/UL — SIGNIFICANT CHANGE UP (ref 0–0.2)
BASOPHILS # BLD AUTO: 0.01 K/UL — SIGNIFICANT CHANGE UP (ref 0–0.2)
BASOPHILS NFR BLD AUTO: 0.1 % — SIGNIFICANT CHANGE UP (ref 0–2)
BASOPHILS NFR BLD AUTO: 0.1 % — SIGNIFICANT CHANGE UP (ref 0–2)
BILIRUB SERPL-MCNC: <0.2 MG/DL — SIGNIFICANT CHANGE UP (ref 0.2–1.2)
BILIRUB SERPL-MCNC: <0.2 MG/DL — SIGNIFICANT CHANGE UP (ref 0.2–1.2)
BUN SERPL-MCNC: 19 MG/DL — SIGNIFICANT CHANGE UP (ref 7–23)
BUN SERPL-MCNC: 19 MG/DL — SIGNIFICANT CHANGE UP (ref 7–23)
CALCIUM SERPL-MCNC: 9.5 MG/DL — SIGNIFICANT CHANGE UP (ref 8.4–10.5)
CALCIUM SERPL-MCNC: 9.5 MG/DL — SIGNIFICANT CHANGE UP (ref 8.4–10.5)
CHLORIDE SERPL-SCNC: 107 MMOL/L — SIGNIFICANT CHANGE UP (ref 98–107)
CHLORIDE SERPL-SCNC: 107 MMOL/L — SIGNIFICANT CHANGE UP (ref 98–107)
CO2 SERPL-SCNC: 24 MMOL/L — SIGNIFICANT CHANGE UP (ref 22–31)
CO2 SERPL-SCNC: 24 MMOL/L — SIGNIFICANT CHANGE UP (ref 22–31)
CREAT SERPL-MCNC: 0.66 MG/DL — SIGNIFICANT CHANGE UP (ref 0.5–1.3)
CREAT SERPL-MCNC: 0.66 MG/DL — SIGNIFICANT CHANGE UP (ref 0.5–1.3)
CRP SERPL-MCNC: 23.8 MG/L — HIGH
CRP SERPL-MCNC: 23.8 MG/L — HIGH
EGFR: 124 ML/MIN/1.73M2 — SIGNIFICANT CHANGE UP
EGFR: 124 ML/MIN/1.73M2 — SIGNIFICANT CHANGE UP
EOSINOPHIL # BLD AUTO: 0 K/UL — SIGNIFICANT CHANGE UP (ref 0–0.5)
EOSINOPHIL # BLD AUTO: 0 K/UL — SIGNIFICANT CHANGE UP (ref 0–0.5)
EOSINOPHIL NFR BLD AUTO: 0 % — SIGNIFICANT CHANGE UP (ref 0–6)
EOSINOPHIL NFR BLD AUTO: 0 % — SIGNIFICANT CHANGE UP (ref 0–6)
GLUCOSE SERPL-MCNC: 90 MG/DL — SIGNIFICANT CHANGE UP (ref 70–99)
GLUCOSE SERPL-MCNC: 90 MG/DL — SIGNIFICANT CHANGE UP (ref 70–99)
HCT VFR BLD CALC: 39.5 % — SIGNIFICANT CHANGE UP (ref 34.5–45)
HCT VFR BLD CALC: 39.5 % — SIGNIFICANT CHANGE UP (ref 34.5–45)
HGB BLD-MCNC: 12.1 G/DL — SIGNIFICANT CHANGE UP (ref 11.5–15.5)
HGB BLD-MCNC: 12.1 G/DL — SIGNIFICANT CHANGE UP (ref 11.5–15.5)
IANC: 7.36 K/UL — SIGNIFICANT CHANGE UP (ref 1.8–7.4)
IANC: 7.36 K/UL — SIGNIFICANT CHANGE UP (ref 1.8–7.4)
IMM GRANULOCYTES NFR BLD AUTO: 0.3 % — SIGNIFICANT CHANGE UP (ref 0–0.9)
IMM GRANULOCYTES NFR BLD AUTO: 0.3 % — SIGNIFICANT CHANGE UP (ref 0–0.9)
LYMPHOCYTES # BLD AUTO: 2.59 K/UL — SIGNIFICANT CHANGE UP (ref 1–3.3)
LYMPHOCYTES # BLD AUTO: 2.59 K/UL — SIGNIFICANT CHANGE UP (ref 1–3.3)
LYMPHOCYTES # BLD AUTO: 24.4 % — SIGNIFICANT CHANGE UP (ref 13–44)
LYMPHOCYTES # BLD AUTO: 24.4 % — SIGNIFICANT CHANGE UP (ref 13–44)
MCHC RBC-ENTMCNC: 24.4 PG — LOW (ref 27–34)
MCHC RBC-ENTMCNC: 24.4 PG — LOW (ref 27–34)
MCHC RBC-ENTMCNC: 30.6 GM/DL — LOW (ref 32–36)
MCHC RBC-ENTMCNC: 30.6 GM/DL — LOW (ref 32–36)
MCV RBC AUTO: 79.8 FL — LOW (ref 80–100)
MCV RBC AUTO: 79.8 FL — LOW (ref 80–100)
MONOCYTES # BLD AUTO: 0.62 K/UL — SIGNIFICANT CHANGE UP (ref 0–0.9)
MONOCYTES # BLD AUTO: 0.62 K/UL — SIGNIFICANT CHANGE UP (ref 0–0.9)
MONOCYTES NFR BLD AUTO: 5.8 % — SIGNIFICANT CHANGE UP (ref 2–14)
MONOCYTES NFR BLD AUTO: 5.8 % — SIGNIFICANT CHANGE UP (ref 2–14)
NEUTROPHILS # BLD AUTO: 7.36 K/UL — SIGNIFICANT CHANGE UP (ref 1.8–7.4)
NEUTROPHILS # BLD AUTO: 7.36 K/UL — SIGNIFICANT CHANGE UP (ref 1.8–7.4)
NEUTROPHILS NFR BLD AUTO: 69.4 % — SIGNIFICANT CHANGE UP (ref 43–77)
NEUTROPHILS NFR BLD AUTO: 69.4 % — SIGNIFICANT CHANGE UP (ref 43–77)
NRBC # BLD: 0 /100 WBCS — SIGNIFICANT CHANGE UP (ref 0–0)
NRBC # BLD: 0 /100 WBCS — SIGNIFICANT CHANGE UP (ref 0–0)
NRBC # FLD: 0 K/UL — SIGNIFICANT CHANGE UP (ref 0–0)
NRBC # FLD: 0 K/UL — SIGNIFICANT CHANGE UP (ref 0–0)
PLATELET # BLD AUTO: 304 K/UL — SIGNIFICANT CHANGE UP (ref 150–400)
PLATELET # BLD AUTO: 304 K/UL — SIGNIFICANT CHANGE UP (ref 150–400)
POTASSIUM SERPL-MCNC: 4.3 MMOL/L — SIGNIFICANT CHANGE UP (ref 3.5–5.3)
POTASSIUM SERPL-MCNC: 4.3 MMOL/L — SIGNIFICANT CHANGE UP (ref 3.5–5.3)
POTASSIUM SERPL-SCNC: 4.3 MMOL/L — SIGNIFICANT CHANGE UP (ref 3.5–5.3)
POTASSIUM SERPL-SCNC: 4.3 MMOL/L — SIGNIFICANT CHANGE UP (ref 3.5–5.3)
PROT SERPL-MCNC: 7.5 G/DL — SIGNIFICANT CHANGE UP (ref 6–8.3)
PROT SERPL-MCNC: 7.5 G/DL — SIGNIFICANT CHANGE UP (ref 6–8.3)
RBC # BLD: 4.95 M/UL — SIGNIFICANT CHANGE UP (ref 3.8–5.2)
RBC # BLD: 4.95 M/UL — SIGNIFICANT CHANGE UP (ref 3.8–5.2)
RBC # FLD: 15.5 % — HIGH (ref 10.3–14.5)
RBC # FLD: 15.5 % — HIGH (ref 10.3–14.5)
SODIUM SERPL-SCNC: 140 MMOL/L — SIGNIFICANT CHANGE UP (ref 135–145)
SODIUM SERPL-SCNC: 140 MMOL/L — SIGNIFICANT CHANGE UP (ref 135–145)
TROPONIN T, HIGH SENSITIVITY RESULT: <6 NG/L — SIGNIFICANT CHANGE UP
TROPONIN T, HIGH SENSITIVITY RESULT: <6 NG/L — SIGNIFICANT CHANGE UP
WBC # BLD: 10.61 K/UL — HIGH (ref 3.8–10.5)
WBC # BLD: 10.61 K/UL — HIGH (ref 3.8–10.5)
WBC # FLD AUTO: 10.61 K/UL — HIGH (ref 3.8–10.5)
WBC # FLD AUTO: 10.61 K/UL — HIGH (ref 3.8–10.5)

## 2024-01-09 PROCEDURE — 99232 SBSQ HOSP IP/OBS MODERATE 35: CPT

## 2024-01-09 PROCEDURE — 93010 ELECTROCARDIOGRAM REPORT: CPT

## 2024-01-09 RX ORDER — LIDOCAINE 4 G/100G
1 CREAM TOPICAL DAILY
Refills: 0 | Status: COMPLETED | OUTPATIENT
Start: 2024-01-09 | End: 2024-01-14

## 2024-01-09 RX ORDER — SIMETHICONE 80 MG/1
80 TABLET, CHEWABLE ORAL EVERY 6 HOURS
Refills: 0 | Status: DISCONTINUED | OUTPATIENT
Start: 2024-01-09 | End: 2024-02-06

## 2024-01-09 RX ADMIN — MONTELUKAST 10 MILLIGRAM(S): 4 TABLET, CHEWABLE ORAL at 21:00

## 2024-01-09 RX ADMIN — PANTOPRAZOLE SODIUM 40 MILLIGRAM(S): 20 TABLET, DELAYED RELEASE ORAL at 08:42

## 2024-01-09 RX ADMIN — Medication 2 MILLIGRAM(S): at 21:00

## 2024-01-09 RX ADMIN — GABAPENTIN 300 MILLIGRAM(S): 400 CAPSULE ORAL at 08:42

## 2024-01-09 RX ADMIN — LITHIUM CARBONATE 1200 MILLIGRAM(S): 300 TABLET, EXTENDED RELEASE ORAL at 21:00

## 2024-01-09 RX ADMIN — BUPROPION HYDROCHLORIDE 150 MILLIGRAM(S): 150 TABLET, EXTENDED RELEASE ORAL at 08:42

## 2024-01-09 RX ADMIN — BUDESONIDE AND FORMOTEROL FUMARATE DIHYDRATE 2 PUFF(S): 160; 4.5 AEROSOL RESPIRATORY (INHALATION) at 08:43

## 2024-01-09 RX ADMIN — GABAPENTIN 300 MILLIGRAM(S): 400 CAPSULE ORAL at 21:00

## 2024-01-09 RX ADMIN — DESMOPRESSIN ACETATE 0.1 MILLIGRAM(S): 0.1 TABLET ORAL at 21:00

## 2024-01-09 RX ADMIN — BUDESONIDE AND FORMOTEROL FUMARATE DIHYDRATE 2 PUFF(S): 160; 4.5 AEROSOL RESPIRATORY (INHALATION) at 21:00

## 2024-01-09 RX ADMIN — CLOZAPINE 300 MILLIGRAM(S): 150 TABLET, ORALLY DISINTEGRATING ORAL at 21:00

## 2024-01-09 RX ADMIN — ESCITALOPRAM OXALATE 20 MILLIGRAM(S): 10 TABLET, FILM COATED ORAL at 08:42
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
## 2024-01-09 NOTE — BH INPATIENT PSYCHIATRY PROGRESS NOTE - NSBHCHARTREVIEWVS_PSY_A_CORE FT
Vital Signs Last 24 Hrs  T(C): 36.8 (01-09-24 @ 07:50), Max: 36.8 (01-09-24 @ 07:50)  T(F): 98.2 (01-09-24 @ 07:50), Max: 98.2 (01-09-24 @ 07:50)  HR: --  BP: --  BP(mean): --  RR: --  SpO2: 97% (01-08-24 @ 20:46) (97% - 97%)    Orthostatic VS  01-09-24 @ 07:50  Lying BP: --/-- HR: --  Sitting BP: 110/64 HR: 83  Standing BP: 139/85 HR: 108  Site: --  Mode: --  Orthostatic VS  01-08-24 @ 20:46  Lying BP: --/-- HR: --  Sitting BP: 123/77 HR: 85  Standing BP: 108/60 HR: 120  Site: --  Mode: --  Orthostatic VS  01-08-24 @ 07:34  Lying BP: --/-- HR: --  Sitting BP: 128/75 HR: 83  Standing BP: 118/81 HR: 74  Site: --  Mode: --  Orthostatic VS  01-07-24 @ 20:41  Lying BP: --/-- HR: --  Sitting BP: 125/84 HR: 86  Standing BP: 136/79 HR: 109  Site: --  Mode: --

## 2024-01-09 NOTE — PROGRESS NOTE ADULT - ASSESSMENT
28F admitted for depression, called for chest pain and abdominal pain.    # Chest pain  - Localized on R ant chest, non-pleuritic  - EKG is non-revealing and troponin is neg as well  - Denies GERD symptoms at present time  - Can apply lidocaine patch to area to see if it helps  - Simethicone as needed for gas    # Abdominal pain  - On R side of adomen/RUQ  - Abdominal exam is benign, non-tender to palpation  - CMP/CBC are all WNL  - Simethicone prn as above    # GERD  - C/w daily PPI    3 Obesity Vs morbid obesity  - Can check BMI to calculate accurate BMI  - Dietitian evaluation    # Depression  - Management per Psych    Discussed with Primary Team  Will sign off and please reconsult as needed.

## 2024-01-09 NOTE — BH PSYCHOLOGY - CLINICIAN PSYCHOTHERAPY NOTE - NSBHPSYCHOLINT_PSY_A_CORE
Cognitive/behavioral therapy/Dialectical  Behavioral Therapy (DBT)/Dynamic issues addressed/Interpersonal Therapy (IPT)/Reality testing/Supported coping skills/Supportive therapy/Treatment compliance encouraged

## 2024-01-09 NOTE — PROGRESS NOTE ADULT - SUBJECTIVE AND OBJECTIVE BOX
CC/Reason for Consult:     SUBJECTIVE / OVERNIGHT EVENTS:  Seen by me this afternoon, called for R sided chest pain and R sided abdominal pain. Chest pain is throbbing and constant, non-pleuritic. No SOB, has never had it before. No cough. Has had reflux symptoms but is better since she began taking protonix. Abdominal pain localized on R side of abdomen. Some nausea but able to eat, no vomiting. No fevers or chills, no urinary symptoms. Normal BM x2 today. No dizziness or headache.    MEDICATIONS  (STANDING):  budesonide  80 MICROgram(s)/formoterol 4.5 MICROgram(s) Inhaler 2 Puff(s) Inhalation two times a day  buPROPion XL (24-Hour) 150 milliGRAM(s) Oral daily  cloZAPine 300 milliGRAM(s) Oral at bedtime  desmopressin 0.1 milliGRAM(s) Oral at bedtime  escitalopram 20 milliGRAM(s) Oral daily  gabapentin 300 milliGRAM(s) Oral two times a day  influenza   Vaccine 0.5 milliLiter(s) IntraMuscular once  lithium SR (LITHOBID) 1200 milliGRAM(s) Oral at bedtime  montelukast 10 milliGRAM(s) Oral at bedtime  pantoprazole    Tablet 40 milliGRAM(s) Oral before breakfast  prazosin. 2 milliGRAM(s) Oral at bedtime  sulindac 150 milliGRAM(s) Oral once    MEDICATIONS  (PRN):  acetaminophen     Tablet .. 650 milliGRAM(s) Oral every 6 hours PRN Moderate Pain (4 - 6)  albuterol    90 MICROgram(s) HFA Inhaler 2 Puff(s) Inhalation every 6 hours PRN Shortness of Breath and/or Wheezing  aluminum hydroxide/magnesium hydroxide/simethicone Suspension 30 milliLiter(s) Oral every 4 hours PRN Dyspepsia  chlorproMAZINE    Injectable 50 milliGRAM(s) IntraMuscular once PRN severe agitation  chlorproMAZINE    Tablet 50 milliGRAM(s) Oral every 6 hours PRN agitation  hydrOXYzine hydrochloride 50 milliGRAM(s) Oral every 12 hours PRN Anxiety  lidocaine   4% Patch 1 Patch Transdermal daily PRN LBP  LORazepam     Tablet 2 milliGRAM(s) Oral every 8 hours PRN Anxiety  LORazepam   Injectable 2 milliGRAM(s) IntraMuscular once PRN Assaultive behavior  melatonin. 5 milliGRAM(s) Oral at bedtime PRN Insomnia  ondansetron    Tablet 4 milliGRAM(s) Oral every 6 hours PRN nausea  ondansetron    Tablet 4 milliGRAM(s) Oral every 6 hours PRN nausea  polyethylene glycol 3350 17 Gram(s) Oral daily PRN Constipation  simethicone 80 milliGRAM(s) Chew every 6 hours PRN gas      Vital Signs Last 24 Hrs  T(C): 36.8 (09 Jan 2024 07:50), Max: 36.8 (09 Jan 2024 07:50)  T(F): 98.2 (09 Jan 2024 07:50), Max: 98.2 (09 Jan 2024 07:50)  VSS  SpO2: 97% (08 Jan 2024 20:46) (97% - 97%)      CAPILLARY BLOOD GLUCOSE            PHYSICAL EXAM:  GENERAL: NAD, well-developed, obese  HEAD:  Atraumatic, Normocephalic  EYES: EOMI, conjunctiva and sclera clear  NECK: Supple, No JVD  CHEST/LUNG: Clear to auscultation bilaterally; No wheeze  HEART: Regular rate and rhythm; No murmurs, rubs, or gallops, NO TTP on ACW  ABDOMEN: Soft, NO TTP, Nondistended; Bowel sounds present  EXTREMITIES:  2+ Peripheral Pulses, No clubbing, cyanosis, or edema  PSYCH: AAOx3  NEUROLOGY: non-focal  SKIN: No rashes or lesions    LABS:                        12.1   10.61 )-----------( 304      ( 09 Jan 2024 12:14 )             39.5     01-09    140  |  107  |  19  ----------------------------<  90  4.3   |  24  |  0.66    Ca    9.5      09 Jan 2024 12:14    TPro  7.5  /  Alb  3.9  /  TBili  <0.2  /  DBili  x   /  AST  14  /  ALT  16  /  AlkPhos  108  01-09          Urinalysis Basic - ( 09 Jan 2024 12:14 )    Color: x / Appearance: x / SG: x / pH: x  Gluc: 90 mg/dL / Ketone: x  / Bili: x / Urobili: x   Blood: x / Protein: x / Nitrite: x   Leuk Esterase: x / RBC: x / WBC x   Sq Epi: x / Non Sq Epi: x / Bacteria: x        RADIOLOGY & ADDITIONAL TESTS:  EKG: reviewed by me, SR , possible RBBB (similar pattern seen on EKG from 10/2023), no acute ST/T changes

## 2024-01-09 NOTE — BH INPATIENT PSYCHIATRY PROGRESS NOTE - NSBHFUPINTERVALHXFT_PSY_A_CORE
f/up mood, care discussed w/ tx team, VSS.  Patient out in the milieu, reported experiencing chest pain, stomach ache and dizziness. ekg and labs completed--reviewed, troponin wnl, seen by hospitalist.  Patient reported sleeping well and with good appetite. denied dizziness or constipation. denied SI/I/P. Instructed patient to take simethicone.

## 2024-01-09 NOTE — BH PSYCHOLOGY - CLINICIAN PSYCHOTHERAPY NOTE - NSBHPSYCHOLNARRATIVE_PSY_A_CORE FT
Writer met with Pt for 30-minute individual therapy session. Pt was alert and presented with adequate hygiene and grooming. Pt reported feeling "somewhat anxious", affect neutral. Pt denied experiencing any A/V hallucinations. Her thought process was linear and goal directed. Pts’ speech was WNL, content was relevant to discussion. Pt denied passive or active SI, HI or NSSI. Oriented x3. Fair insight and judgement demonstrated.      Pt and Wr explored feelings of anxiety pt was experiencing due to waiting "on news from housing". Pt discussed her feelings of anxiety surrounding uncertainty, normalized and validated feelings and practiced and reviewed previous ACT acceptance interventions. Pt shared that she was feeling sad due to her dogs health declining and possibly having to be put down. Made some space for patient to process feelings this brought up related to past losses, including her grandfather. Pt discussed physical symptoms she has been experiencing in the past few days including having dizzy spells. Explored connection between physical symptoms and feelings of anxiety and sadness.

## 2024-01-09 NOTE — BH INPATIENT PSYCHIATRY PROGRESS NOTE - NSBHASSESSSUMMFT_PSY_ALL_CORE
Patient is 27yo single woman, no dependents, domiciled with her Grandmother, unemployed on disability/SSI, pphx of schizoaffective disorder, multiple past psychiatric admissions including state and recent discharge from [facility], in tx with [facility], with pmhx of asthma, HTN, GERD, and hypothyroidism and schizoaffective disorder, in tx with [facility], who self presented to the ED reporting abdominal pain and requesting readmission to psychiatric unit, reporting CAH, depressive symptoms, IOR, suicidality, admitted to [facility] for psychiatric care.  Depression and psychosis likely multifactorial at this time, schizoaffective vs. borderline personality disorder vs. complex grief vs. adjustment disorder.      on assessment, patient continue to show improvements in mood, thoughts and functioning. Awaiting housing interviews. clozapine level 416 on 1/4/24, reported chest pain today, ekg and labs unremarkable.     1.) Obs: Stop 1:1, patient with decrease in SI.  She has been able to approach staff when needed.  2.) Psychiatric medication management:  - Reviewed options for addressing nocturnal enuresis including reducing clozapine vs reducing lithium vs adding desmopressin.  Pt decided upon desmopressin as she continues to have depression and psychosis.  Desmopressin 0.1 mg QHS.  - Lithobid 1200 mg QHS.  Pt aware that she needs to stay well hydrated and avoid diuretics and NSAIDs.   - Discontinued olanzapine  - Increase Clozapine to 300mg qHS for psychosis ([id number]), level 416 on 1/4/24  - Prazosin 2mg qhs, monitor BP carefully.  - Abilify Maintena 400mg IM q3 weeks rather than 4, per collateral from ACT team.  Doses given 10/19, 11/17, 12/8, 12/29, next on 1/19  - Mirtazapine was switched to escitalopram, increased to 20 mg daily.  - Wellbutrin XL 150mg daily  - Gabapentin 300 mg BID for anxiety/chronic pain.  - PRN: haloperidol 5mg PO/IM q8hrs PRN for agitation, diphenhydramine 50mg PO/IM q6hrs PRN for EPS PPx, hydroxyzine 50mg PO q12hrs PRN for anxiety, lorazepam 2mg PO q8hrs PRN for anxiety or 2mg IM for assaultive behavior. Melatonin 5mg qHS PRN for insomnia  - Discussed ECT several times with pt who states she has been told in two hospitals in past that due to her obesity and asthma, she is not an ideal candidate.  Pt states that due to this she is not interested in receiving ECT at this time.  3.) Medical:   - Pt scheduled for dental extractions 11/1, however declined to attend on day prior when informed it would not be under general anesthesia.  - last clozapine labs on 10/23 (CBC with diff, troponins, CRP); CRP elevated at 15.8 likely due to dental infection/wisdom tooth issues  - pantoprazole 40mg PO before breakfast for GERD  - montelukast 10mg PO qHS for asthma  - budesonide 80mcg/formoterol 4.5mcg 2 puffs BID for asthma  - PRN: acetaminophen 650mg PO q6hrs PRN for pain, albuterol 90mcg 2puffs q6hrs PRN for dyspnea, ondansetron 4mg q6hrs PRN for nausea  - Discussed CRP with medicine service, given asymptomatic pt and normal troponin, no additional intervention/diagnostics at this time.  - Discontinued clobetasol as rash has resolved.  4.) Dispo planning: State application submitted. Patient is 29yo single woman, no dependents, domiciled with her Grandmother, unemployed on disability/SSI, pphx of schizoaffective disorder, multiple past psychiatric admissions including state and recent discharge from [facility], in tx with [facility], with pmhx of asthma, HTN, GERD, and hypothyroidism and schizoaffective disorder, in tx with [facility], who self presented to the ED reporting abdominal pain and requesting readmission to psychiatric unit, reporting CAH, depressive symptoms, IOR, suicidality, admitted to [facility] for psychiatric care.  Depression and psychosis likely multifactorial at this time, schizoaffective vs. borderline personality disorder vs. complex grief vs. adjustment disorder.      on assessment, patient continue to show improvements in mood, thoughts and functioning. Awaiting housing interviews. clozapine level 416 on 1/4/24, reported chest pain today, ekg and labs unremarkable.     1.) Obs: Stop 1:1, patient with decrease in SI.  She has been able to approach staff when needed.  2.) Psychiatric medication management:  - Reviewed options for addressing nocturnal enuresis including reducing clozapine vs reducing lithium vs adding desmopressin.  Pt decided upon desmopressin as she continues to have depression and psychosis.  Desmopressin 0.1 mg QHS.  - Lithobid 1200 mg QHS.  Pt aware that she needs to stay well hydrated and avoid diuretics and NSAIDs.   - Discontinued olanzapine  - Increase Clozapine to 300mg qHS for psychosis ([id number]), level 416 on 1/4/24  - Prazosin 2mg qhs, monitor BP carefully.  - Abilify Maintena 400mg IM q3 weeks rather than 4, per collateral from ACT team.  Doses given 10/19, 11/17, 12/8, 12/29, next on 1/19  - Mirtazapine was switched to escitalopram, increased to 20 mg daily.  - Wellbutrin XL 150mg daily  - Gabapentin 300 mg BID for anxiety/chronic pain.  - PRN: haloperidol 5mg PO/IM q8hrs PRN for agitation, diphenhydramine 50mg PO/IM q6hrs PRN for EPS PPx, hydroxyzine 50mg PO q12hrs PRN for anxiety, lorazepam 2mg PO q8hrs PRN for anxiety or 2mg IM for assaultive behavior. Melatonin 5mg qHS PRN for insomnia  - Discussed ECT several times with pt who states she has been told in two hospitals in past that due to her obesity and asthma, she is not an ideal candidate.  Pt states that due to this she is not interested in receiving ECT at this time.  3.) Medical:   - Pt scheduled for dental extractions 11/1, however declined to attend on day prior when informed it would not be under general anesthesia.  - last clozapine labs on 10/23 (CBC with diff, troponins, CRP); CRP elevated at 15.8 likely due to dental infection/wisdom tooth issues  - pantoprazole 40mg PO before breakfast for GERD  - montelukast 10mg PO qHS for asthma  - budesonide 80mcg/formoterol 4.5mcg 2 puffs BID for asthma  - PRN: acetaminophen 650mg PO q6hrs PRN for pain, albuterol 90mcg 2puffs q6hrs PRN for dyspnea, ondansetron 4mg q6hrs PRN for nausea  - Discussed CRP with medicine service, given asymptomatic pt and normal troponin, no additional intervention/diagnostics at this time.  - Discontinued clobetasol as rash has resolved.  4.) Dispo planning: State application submitted.

## 2024-01-09 NOTE — BH INPATIENT PSYCHIATRY PROGRESS NOTE - NSBHMETABOLIC_PSY_ALL_CORE_FT
BMI: BMI (kg/m2): 57.8 (09-14-23 @ 14:30)  HbA1c: A1C with Estimated Average Glucose Result: 5.2 % (11-01-23 @ 09:04)    Glucose:   BP: --Vital Signs Last 24 Hrs  T(C): 36.8 (01-09-24 @ 07:50), Max: 36.8 (01-09-24 @ 07:50)  T(F): 98.2 (01-09-24 @ 07:50), Max: 98.2 (01-09-24 @ 07:50)  HR: --  BP: --  BP(mean): --  RR: --  SpO2: 97% (01-08-24 @ 20:46) (97% - 97%)    Orthostatic VS  01-09-24 @ 07:50  Lying BP: --/-- HR: --  Sitting BP: 110/64 HR: 83  Standing BP: 139/85 HR: 108  Site: --  Mode: --  Orthostatic VS  01-08-24 @ 20:46  Lying BP: --/-- HR: --  Sitting BP: 123/77 HR: 85  Standing BP: 108/60 HR: 120  Site: --  Mode: --  Orthostatic VS  01-08-24 @ 07:34  Lying BP: --/-- HR: --  Sitting BP: 128/75 HR: 83  Standing BP: 118/81 HR: 74  Site: --  Mode: --  Orthostatic VS  01-07-24 @ 20:41  Lying BP: --/-- HR: --  Sitting BP: 125/84 HR: 86  Standing BP: 136/79 HR: 109  Site: --  Mode: --    Lipid Panel: Date/Time: 11-01-23 @ 09:04  Cholesterol, Serum: 156  LDL Cholesterol Calculated: 82  HDL Cholesterol, Serum: 58  Total Cholesterol/HDL Ration Measurement: --  Triglycerides, Serum: 79

## 2024-01-09 NOTE — BH INPATIENT PSYCHIATRY PROGRESS NOTE - MSE UNSTRUCTURED FT
The patient appears stated age, with fair hygiene,  cooperative with the interview.  She maintained appropriate eye contact.  No restlessness or slowing of movements observed.  Gait is steady.  The patient’s speech was fluent, normal in tone, rate, and volume.  The patient’s mood is “pretty good emotionally”  Her affect is euthymic, reactive, stable, appropriate.  The patient’s thoughts are goal directed.  She does not have any delusions, denies auditory hallucinations today.  She denies suicidal ideation, no homicidal ideation, intent, or plan.  Insight is fair.  Judgment is fair.  Impulse control has been fair on the unit.

## 2024-01-09 NOTE — BH INPATIENT PSYCHIATRY PROGRESS NOTE - PRN MEDS
MEDICATIONS  (PRN):  acetaminophen     Tablet .. 650 milliGRAM(s) Oral every 6 hours PRN Moderate Pain (4 - 6)  albuterol    90 MICROgram(s) HFA Inhaler 2 Puff(s) Inhalation every 6 hours PRN Shortness of Breath and/or Wheezing  aluminum hydroxide/magnesium hydroxide/simethicone Suspension 30 milliLiter(s) Oral every 4 hours PRN Dyspepsia  chlorproMAZINE    Injectable 50 milliGRAM(s) IntraMuscular once PRN severe agitation  chlorproMAZINE    Tablet 50 milliGRAM(s) Oral every 6 hours PRN agitation  hydrOXYzine hydrochloride 50 milliGRAM(s) Oral every 12 hours PRN Anxiety  lidocaine   4% Patch 1 Patch Transdermal daily PRN LBP  LORazepam     Tablet 2 milliGRAM(s) Oral every 8 hours PRN Anxiety  LORazepam   Injectable 2 milliGRAM(s) IntraMuscular once PRN Assaultive behavior  melatonin. 5 milliGRAM(s) Oral at bedtime PRN Insomnia  ondansetron    Tablet 4 milliGRAM(s) Oral every 6 hours PRN nausea  ondansetron    Tablet 4 milliGRAM(s) Oral every 6 hours PRN nausea  polyethylene glycol 3350 17 Gram(s) Oral daily PRN Constipation  simethicone 80 milliGRAM(s) Chew every 6 hours PRN gas

## 2024-01-09 NOTE — BH INPATIENT PSYCHIATRY PROGRESS NOTE - NSBHATTESTBILLING_PSY_A_CORE
85989-[id number] OBS or IP - moderate complexity OR 35-49 mins 49362-Bqvoeofkhx OBS or IP - moderate complexity OR 35-49 mins

## 2024-01-10 PROCEDURE — 99232 SBSQ HOSP IP/OBS MODERATE 35: CPT

## 2024-01-10 RX ADMIN — CLOZAPINE 300 MILLIGRAM(S): 150 TABLET, ORALLY DISINTEGRATING ORAL at 20:50

## 2024-01-10 RX ADMIN — GABAPENTIN 300 MILLIGRAM(S): 400 CAPSULE ORAL at 08:19

## 2024-01-10 RX ADMIN — GABAPENTIN 300 MILLIGRAM(S): 400 CAPSULE ORAL at 20:51

## 2024-01-10 RX ADMIN — Medication 30 MILLILITER(S): at 21:48

## 2024-01-10 RX ADMIN — MONTELUKAST 10 MILLIGRAM(S): 4 TABLET, CHEWABLE ORAL at 20:51

## 2024-01-10 RX ADMIN — PANTOPRAZOLE SODIUM 40 MILLIGRAM(S): 20 TABLET, DELAYED RELEASE ORAL at 08:19

## 2024-01-10 RX ADMIN — BUPROPION HYDROCHLORIDE 150 MILLIGRAM(S): 150 TABLET, EXTENDED RELEASE ORAL at 08:19

## 2024-01-10 RX ADMIN — Medication 2 MILLIGRAM(S): at 20:51

## 2024-01-10 RX ADMIN — LITHIUM CARBONATE 1200 MILLIGRAM(S): 300 TABLET, EXTENDED RELEASE ORAL at 20:51

## 2024-01-10 RX ADMIN — ESCITALOPRAM OXALATE 20 MILLIGRAM(S): 10 TABLET, FILM COATED ORAL at 08:20

## 2024-01-10 RX ADMIN — DESMOPRESSIN ACETATE 0.1 MILLIGRAM(S): 0.1 TABLET ORAL at 20:51

## 2024-01-10 NOTE — BH INPATIENT PSYCHIATRY PROGRESS NOTE - NSBHCHARTREVIEWVS_PSY_A_CORE FT
Vital Signs Last 24 Hrs  T(C): 36.7 (01-10-24 @ 07:42), Max: 36.8 (01-09-24 @ 20:21)  T(F): 98 (01-10-24 @ 07:42), Max: 98.2 (01-09-24 @ 20:21)  HR: --  BP: --  BP(mean): --  RR: 16 (01-09-24 @ 20:21) (16 - 16)  SpO2: 100% (01-09-24 @ 20:21) (100% - 100%)    Orthostatic VS  01-10-24 @ 07:42  Lying BP: --/-- HR: --  Sitting BP: 117/66 HR: 92  Standing BP: --/-- HR: --  Site: --  Mode: --  Orthostatic VS  01-09-24 @ 20:21  Lying BP: --/-- HR: --  Sitting BP: 125/76 HR: 97  Standing BP: 130/78 HR: 100  Site: --  Mode: --  Orthostatic VS  01-09-24 @ 07:50  Lying BP: --/-- HR: --  Sitting BP: 110/64 HR: 83  Standing BP: 139/85 HR: 108  Site: --  Mode: --  Orthostatic VS  01-08-24 @ 20:46  Lying BP: --/-- HR: --  Sitting BP: 123/77 HR: 85  Standing BP: 108/60 HR: 120  Site: --  Mode: --

## 2024-01-10 NOTE — BH INPATIENT PSYCHIATRY PROGRESS NOTE - NSBHASSESSSUMMFT_PSY_ALL_CORE
Patient is 27yo single woman, no dependents, domiciled with her Grandmother, unemployed on disability/SSI, pphx of schizoaffective disorder, multiple past psychiatric admissions including state and recent discharge from [facility], in tx with [facility], with pmhx of asthma, HTN, GERD, and hypothyroidism and schizoaffective disorder, in tx with [facility], who self presented to the ED reporting abdominal pain and requesting readmission to psychiatric unit, reporting CAH, depressive symptoms, IOR, suicidality, admitted to [facility] for psychiatric care.  Depression and psychosis likely multifactorial at this time, schizoaffective vs. borderline personality disorder vs. complex grief vs. adjustment disorder.      on assessment, patient continue to show improvements in mood, thoughts and functioning. Awaiting housing interviews. clozapine level 416 on 1/4/24.     1.) Obs: Stop 1:1, patient with decrease in SI.  She has been able to approach staff when needed.  2.) Psychiatric medication management:  - Reviewed options for addressing nocturnal enuresis including reducing clozapine vs reducing lithium vs adding desmopressin.  Pt decided upon desmopressin as she continues to have depression and psychosis.  Desmopressin 0.1 mg QHS.  - Lithobid 1200 mg QHS.  Pt aware that she needs to stay well hydrated and avoid diuretics and NSAIDs.   - Discontinued olanzapine  - Increase Clozapine to 300mg qHS for psychosis ([id number]), level 416 on 1/4/24  - Prazosin 2mg qhs, monitor BP carefully.  - Abilify Maintena 400mg IM q3 weeks rather than 4, per collateral from ACT team.  Doses given 10/19, 11/17, 12/8, 12/29, next on 1/19  - Mirtazapine was switched to escitalopram, increased to 20 mg daily.  - Wellbutrin XL 150mg daily  - Gabapentin 300 mg BID for anxiety/chronic pain.  - PRN: haloperidol 5mg PO/IM q8hrs PRN for agitation, diphenhydramine 50mg PO/IM q6hrs PRN for EPS PPx, hydroxyzine 50mg PO q12hrs PRN for anxiety, lorazepam 2mg PO q8hrs PRN for anxiety or 2mg IM for assaultive behavior. Melatonin 5mg qHS PRN for insomnia  - Discussed ECT several times with pt who states she has been told in two hospitals in past that due to her obesity and asthma, she is not an ideal candidate.  Pt states that due to this she is not interested in receiving ECT at this time.  3.) Medical:   - Pt scheduled for dental extractions 11/1, however declined to attend on day prior when informed it would not be under general anesthesia.  - last clozapine labs on 10/23 (CBC with diff, troponins, CRP); CRP elevated at 15.8 likely due to dental infection/wisdom tooth issues  - pantoprazole 40mg PO before breakfast for GERD  - montelukast 10mg PO qHS for asthma  - budesonide 80mcg/formoterol 4.5mcg 2 puffs BID for asthma  - PRN: acetaminophen 650mg PO q6hrs PRN for pain, albuterol 90mcg 2puffs q6hrs PRN for dyspnea, ondansetron 4mg q6hrs PRN for nausea  - Discussed CRP with medicine service, given asymptomatic pt and normal troponin, no additional intervention/diagnostics at this time.  - Discontinued clobetasol as rash has resolved.  4.) Dispo planning: State application submitted. Patient is 27yo single woman, no dependents, domiciled with her Grandmother, unemployed on disability/SSI, pphx of schizoaffective disorder, multiple past psychiatric admissions including state and recent discharge from [facility], in tx with [facility], with pmhx of asthma, HTN, GERD, and hypothyroidism and schizoaffective disorder, in tx with [facility], who self presented to the ED reporting abdominal pain and requesting readmission to psychiatric unit, reporting CAH, depressive symptoms, IOR, suicidality, admitted to [facility] for psychiatric care.  Depression and psychosis likely multifactorial at this time, schizoaffective vs. borderline personality disorder vs. complex grief vs. adjustment disorder.      on assessment, patient continue to show improvements in mood, thoughts and functioning. Awaiting housing interviews. clozapine level 416 on 1/4/24.     1.) Obs: Stop 1:1, patient with decrease in SI.  She has been able to approach staff when needed.  2.) Psychiatric medication management:  - Reviewed options for addressing nocturnal enuresis including reducing clozapine vs reducing lithium vs adding desmopressin.  Pt decided upon desmopressin as she continues to have depression and psychosis.  Desmopressin 0.1 mg QHS.  - Lithobid 1200 mg QHS.  Pt aware that she needs to stay well hydrated and avoid diuretics and NSAIDs.   - Discontinued olanzapine  - Increase Clozapine to 300mg qHS for psychosis ([id number]), level 416 on 1/4/24  - Prazosin 2mg qhs, monitor BP carefully.  - Abilify Maintena 400mg IM q3 weeks rather than 4, per collateral from ACT team.  Doses given 10/19, 11/17, 12/8, 12/29, next on 1/19  - Mirtazapine was switched to escitalopram, increased to 20 mg daily.  - Wellbutrin XL 150mg daily  - Gabapentin 300 mg BID for anxiety/chronic pain.  - PRN: haloperidol 5mg PO/IM q8hrs PRN for agitation, diphenhydramine 50mg PO/IM q6hrs PRN for EPS PPx, hydroxyzine 50mg PO q12hrs PRN for anxiety, lorazepam 2mg PO q8hrs PRN for anxiety or 2mg IM for assaultive behavior. Melatonin 5mg qHS PRN for insomnia  - Discussed ECT several times with pt who states she has been told in two hospitals in past that due to her obesity and asthma, she is not an ideal candidate.  Pt states that due to this she is not interested in receiving ECT at this time.  3.) Medical:   - Pt scheduled for dental extractions 11/1, however declined to attend on day prior when informed it would not be under general anesthesia.  - last clozapine labs on 10/23 (CBC with diff, troponins, CRP); CRP elevated at 15.8 likely due to dental infection/wisdom tooth issues  - pantoprazole 40mg PO before breakfast for GERD  - montelukast 10mg PO qHS for asthma  - budesonide 80mcg/formoterol 4.5mcg 2 puffs BID for asthma  - PRN: acetaminophen 650mg PO q6hrs PRN for pain, albuterol 90mcg 2puffs q6hrs PRN for dyspnea, ondansetron 4mg q6hrs PRN for nausea  - Discussed CRP with medicine service, given asymptomatic pt and normal troponin, no additional intervention/diagnostics at this time.  - Discontinued clobetasol as rash has resolved.  4.) Dispo planning: State application submitted.

## 2024-01-10 NOTE — BH INPATIENT PSYCHIATRY PROGRESS NOTE - NSBHFUPINTERVALHXFT_PSY_A_CORE
f/up mood, care discussed w/ tx team, RONNIE.  Patient out in the milieu, reported feeling "pretty good", reported to staff that another peer called her names. According to staff, patient was able to understand that the other peer was disorganized and was able to remain calm, use coping skills. Patient reported sleeping well and with good appetite. denied dizziness or constipation. denied SI/I/P. denied chest pressure or pain.

## 2024-01-10 NOTE — BH PSYCHOLOGY - GROUP THERAPY NOTE - NSBHPSYCHOLRESPCOMMENT_PSY_A_CORE FT
The patient appeared adequately groomed and casually dressed. Pt appeared engaged in the group as evidenced by their willingness to independently verbally communicate during the discussion. Pt discussed the different ways she has learned to ask for support, as well as shame often feeling "more long lasting" than guilt. Pt was oriented X3. Pt was appropriate with peers.

## 2024-01-10 NOTE — BH INPATIENT PSYCHIATRY PROGRESS NOTE - NSBHATTESTBILLING_PSY_A_CORE
[id number] OBS or IP - moderate complexity OR 35-49 mins [id number] OBS or IP - moderate complexity OR 35-49 mins

## 2024-01-10 NOTE — BH INPATIENT PSYCHIATRY PROGRESS NOTE - CURRENT MEDICATION
MEDICATIONS  (STANDING):  budesonide  80 MICROgram(s)/formoterol 4.5 MICROgram(s) Inhaler 2 Puff(s) Inhalation two times a day  buPROPion XL (24-Hour) 150 milliGRAM(s) Oral daily  cloZAPine 300 milliGRAM(s) Oral at bedtime  desmopressin 0.1 milliGRAM(s) Oral at bedtime  escitalopram 20 milliGRAM(s) Oral daily  gabapentin 300 milliGRAM(s) Oral two times a day  influenza   Vaccine 0.5 milliLiter(s) IntraMuscular once  lidocaine   4% Patch 1 Patch Transdermal daily  lithium SR (LITHOBID) 1200 milliGRAM(s) Oral at bedtime  montelukast 10 milliGRAM(s) Oral at bedtime  pantoprazole    Tablet 40 milliGRAM(s) Oral before breakfast  prazosin. 2 milliGRAM(s) Oral at bedtime  sulindac 150 milliGRAM(s) Oral once    MEDICATIONS  (PRN):  acetaminophen     Tablet .. 650 milliGRAM(s) Oral every 6 hours PRN Moderate Pain (4 - 6)  albuterol    90 MICROgram(s) HFA Inhaler 2 Puff(s) Inhalation every 6 hours PRN Shortness of Breath and/or Wheezing  aluminum hydroxide/magnesium hydroxide/simethicone Suspension 30 milliLiter(s) Oral every 4 hours PRN Dyspepsia  chlorproMAZINE    Injectable 50 milliGRAM(s) IntraMuscular once PRN severe agitation  chlorproMAZINE    Tablet 50 milliGRAM(s) Oral every 6 hours PRN agitation  hydrOXYzine hydrochloride 50 milliGRAM(s) Oral every 12 hours PRN Anxiety  lidocaine   4% Patch 1 Patch Transdermal daily PRN LBP  LORazepam     Tablet 2 milliGRAM(s) Oral every 8 hours PRN Anxiety  LORazepam   Injectable 2 milliGRAM(s) IntraMuscular once PRN Assaultive behavior  melatonin. 5 milliGRAM(s) Oral at bedtime PRN Insomnia  ondansetron    Tablet 4 milliGRAM(s) Oral every 6 hours PRN nausea  ondansetron    Tablet 4 milliGRAM(s) Oral every 6 hours PRN nausea  polyethylene glycol 3350 17 Gram(s) Oral daily PRN Constipation  simethicone 80 milliGRAM(s) Chew every 6 hours PRN gas

## 2024-01-10 NOTE — CHART NOTE - NSCHARTNOTEFT_GEN_A_CORE
Pt with complaint of R side stomach pain, described as a cramping/ throbbing. Denies nausea, vomit, diarrhea, constipations. Last BM 1/10.   General :NAD  VSS  Abd: Positive BS, Obesely distended, mild generalized tenderness on palpation.  CV: S1--> S2.  A/P   28 F admitted to [facility] for Schizoaffective disorder with cramping/ throbbing abdominal pain. Abd exam no localized tenderness   - Maalox 30 ml po X 1  - Monitor for improvement in symptoms. Pt with complaint of R side stomach pain, described as a cramping/ throbbing. Denies nausea, vomit, diarrhea, constipations. Last BM 1/10.   General :NAD  VSS  Abd: Positive BS, Obesely distended, mild generalized tenderness on palpation.  CV: S1--> S2.  A/P   28 F admitted to [facility] for Schizoaffective disorder with cramping/ throbbing abdominal pain. Abd exam no localized tenderness   - Maalox 30 ml po X 1  - Monitor for improvement in symptoms.

## 2024-01-10 NOTE — BH INPATIENT PSYCHIATRY PROGRESS NOTE - NSBHMETABOLIC_PSY_ALL_CORE_FT
BMI: BMI (kg/m2): 57.8 (09-14-23 @ 14:30)  HbA1c: A1C with Estimated Average Glucose Result: 5.2 % (11-01-23 @ 09:04)    Glucose:   BP: --Vital Signs Last 24 Hrs  T(C): 36.7 (01-10-24 @ 07:42), Max: 36.8 (01-09-24 @ 20:21)  T(F): 98 (01-10-24 @ 07:42), Max: 98.2 (01-09-24 @ 20:21)  HR: --  BP: --  BP(mean): --  RR: 16 (01-09-24 @ 20:21) (16 - 16)  SpO2: 100% (01-09-24 @ 20:21) (100% - 100%)    Orthostatic VS  01-10-24 @ 07:42  Lying BP: --/-- HR: --  Sitting BP: 117/66 HR: 92  Standing BP: --/-- HR: --  Site: --  Mode: --  Orthostatic VS  01-09-24 @ 20:21  Lying BP: --/-- HR: --  Sitting BP: 125/76 HR: 97  Standing BP: 130/78 HR: 100  Site: --  Mode: --  Orthostatic VS  01-09-24 @ 07:50  Lying BP: --/-- HR: --  Sitting BP: 110/64 HR: 83  Standing BP: 139/85 HR: 108  Site: --  Mode: --  Orthostatic VS  01-08-24 @ 20:46  Lying BP: --/-- HR: --  Sitting BP: 123/77 HR: 85  Standing BP: 108/60 HR: 120  Site: --  Mode: --    Lipid Panel: Date/Time: 11-01-23 @ 09:04  Cholesterol, Serum: 156  LDL Cholesterol Calculated: 82  HDL Cholesterol, Serum: 58  Total Cholesterol/HDL Ration Measurement: --  Triglycerides, Serum: 79

## 2024-01-10 NOTE — BH PSYCHOLOGY - GROUP THERAPY NOTE - NSPSYCHOLGRPCOGPROB_PSY_A_CORE
other... PAST SURGICAL HISTORY:  H/O mastoidectomy Right ear    History of appendectomy 1960's    History of cholecystectomy 1960's    History of colon polyps removal colonoscopy 2010    History of hysterectomy age: 32's    History of total knee replacement right 2006  left 2011    Hx of cataract surgery bilateral 2008    L3- L5 laminectomy with fusion 2/2013

## 2024-01-11 PROCEDURE — 99232 SBSQ HOSP IP/OBS MODERATE 35: CPT

## 2024-01-11 RX ADMIN — ESCITALOPRAM OXALATE 20 MILLIGRAM(S): 10 TABLET, FILM COATED ORAL at 08:07

## 2024-01-11 RX ADMIN — Medication 2 MILLIGRAM(S): at 20:44

## 2024-01-11 RX ADMIN — GABAPENTIN 300 MILLIGRAM(S): 400 CAPSULE ORAL at 08:07

## 2024-01-11 RX ADMIN — GABAPENTIN 300 MILLIGRAM(S): 400 CAPSULE ORAL at 20:44

## 2024-01-11 RX ADMIN — BUDESONIDE AND FORMOTEROL FUMARATE DIHYDRATE 2 PUFF(S): 160; 4.5 AEROSOL RESPIRATORY (INHALATION) at 20:45

## 2024-01-11 RX ADMIN — LITHIUM CARBONATE 1200 MILLIGRAM(S): 300 TABLET, EXTENDED RELEASE ORAL at 20:43

## 2024-01-11 RX ADMIN — CLOZAPINE 300 MILLIGRAM(S): 150 TABLET, ORALLY DISINTEGRATING ORAL at 20:44

## 2024-01-11 RX ADMIN — DESMOPRESSIN ACETATE 0.1 MILLIGRAM(S): 0.1 TABLET ORAL at 20:43

## 2024-01-11 RX ADMIN — MONTELUKAST 10 MILLIGRAM(S): 4 TABLET, CHEWABLE ORAL at 20:43

## 2024-01-11 RX ADMIN — BUPROPION HYDROCHLORIDE 150 MILLIGRAM(S): 150 TABLET, EXTENDED RELEASE ORAL at 08:07

## 2024-01-11 RX ADMIN — PANTOPRAZOLE SODIUM 40 MILLIGRAM(S): 20 TABLET, DELAYED RELEASE ORAL at 08:07

## 2024-01-11 NOTE — BH INPATIENT PSYCHIATRY PROGRESS NOTE - NSBHMETABOLIC_PSY_ALL_CORE_FT
BMI: BMI (kg/m2): 57.8 (09-14-23 @ 14:30)  HbA1c: A1C with Estimated Average Glucose Result: 5.2 % (11-01-23 @ 09:04)    Glucose:   BP: --Vital Signs Last 24 Hrs  T(C): 36.7 (01-11-24 @ 08:15), Max: 36.9 (01-10-24 @ 20:30)  T(F): 98 (01-11-24 @ 08:15), Max: 98.4 (01-10-24 @ 20:30)  HR: --  BP: --  BP(mean): --  RR: --  SpO2: --    Orthostatic VS  01-11-24 @ 08:15  Lying BP: --/-- HR: --  Sitting BP: 130/78 HR: 108  Standing BP: 126/82 HR: 102  Site: --  Mode: --  Orthostatic VS  01-10-24 @ 20:30  Lying BP: --/-- HR: --  Sitting BP: 102/73 HR: 79  Standing BP: 106/74 HR: 82  Site: --  Mode: --  Orthostatic VS  01-10-24 @ 07:42  Lying BP: --/-- HR: --  Sitting BP: 117/66 HR: 92  Standing BP: --/-- HR: --  Site: --  Mode: --  Orthostatic VS  01-09-24 @ 20:21  Lying BP: --/-- HR: --  Sitting BP: 125/76 HR: 97  Standing BP: 130/78 HR: 100  Site: --  Mode: --    Lipid Panel: Date/Time: 11-01-23 @ 09:04  Cholesterol, Serum: 156  LDL Cholesterol Calculated: 82  HDL Cholesterol, Serum: 58  Total Cholesterol/HDL Ration Measurement: --  Triglycerides, Serum: 79

## 2024-01-11 NOTE — BH INPATIENT PSYCHIATRY PROGRESS NOTE - MSE UNSTRUCTURED FT
The patient appears stated age, with fair hygiene,  cooperative with the interview.  She maintained appropriate eye contact.  No restlessness or slowing of movements observed.  Gait is steady.  The patient’s speech was fluent, normal in tone, rate, and volume.  The patient’s mood is “ok”  Her affect is euthymic, reactive, stable, appropriate.  The patient’s thoughts are goal directed.  She does not have any delusions, denies auditory hallucinations today.  She denies suicidal ideation, no homicidal ideation, intent, or plan.  Insight is fair.  Judgment is fair.  Impulse control has been fair on the unit.

## 2024-01-11 NOTE — BH INPATIENT PSYCHIATRY PROGRESS NOTE - NSBHFUPINTERVALHXFT_PSY_A_CORE
f/up mood, care discussed w/ tx team, LIZS.  Patient out in the milieu, concerned about her housing interview. Patient reported sleeping well and with good appetite. denied dizziness or constipation. denied SI/I/P. Taking medications, no td, eps or tremors observed/ reported. Inquired when her ESQUEDA was due.

## 2024-01-11 NOTE — BH INPATIENT PSYCHIATRY PROGRESS NOTE - NSBHATTESTBILLING_PSY_A_CORE
04101-[id number] OBS or IP - moderate complexity OR 35-49 mins [id number]-Oirjejugbk OBS or IP - moderate complexity OR 35-49 mins

## 2024-01-11 NOTE — BH INPATIENT PSYCHIATRY PROGRESS NOTE - NSBHASSESSSUMMFT_PSY_ALL_CORE
Patient is 29yo single woman, no dependents, domiciled with her Grandmother, unemployed on disability/SSI, pphx of schizoaffective disorder, multiple past psychiatric admissions including state and recent discharge from [facility], in tx with [facility], with pmhx of asthma, HTN, GERD, and hypothyroidism and schizoaffective disorder, in tx with [facility], who self presented to the ED reporting abdominal pain and requesting readmission to psychiatric unit, reporting CAH, depressive symptoms, IOR, suicidality, admitted to [facility] for psychiatric care.  Depression and psychosis likely multifactorial at this time, schizoaffective vs. borderline personality disorder vs. complex grief vs. adjustment disorder.      on assessment, patient continue to show improvements in mood, thoughts and functioning. Awaiting housing interviews. clozapine level 416 on 1/4/24.     1.) Obs: Stop 1:1, patient with decrease in SI.  She has been able to approach staff when needed.  2.) Psychiatric medication management:  - Reviewed options for addressing nocturnal enuresis including reducing clozapine vs reducing lithium vs adding desmopressin.  Pt decided upon desmopressin as she continues to have depression and psychosis.  Desmopressin 0.1 mg QHS.  - Lithobid 1200 mg QHS.  Pt aware that she needs to stay well hydrated and avoid diuretics and NSAIDs.   - Discontinued olanzapine  - Increase Clozapine to 300mg qHS for psychosis ([id number]), level 416 on 1/4/24  - Prazosin 2mg qhs, monitor BP carefully.  - Abilify Maintena 400mg IM q3 weeks rather than 4, per collateral from ACT team.  Doses given 10/19, 11/17, 12/8, 12/29, next on 1/19  - Mirtazapine was switched to escitalopram, increased to 20 mg daily.  - Wellbutrin XL 150mg daily  - Gabapentin 300 mg BID for anxiety/chronic pain.  - PRN: haloperidol 5mg PO/IM q8hrs PRN for agitation, diphenhydramine 50mg PO/IM q6hrs PRN for EPS PPx, hydroxyzine 50mg PO q12hrs PRN for anxiety, lorazepam 2mg PO q8hrs PRN for anxiety or 2mg IM for assaultive behavior. Melatonin 5mg qHS PRN for insomnia  - Discussed ECT several times with pt who states she has been told in two hospitals in past that due to her obesity and asthma, she is not an ideal candidate.  Pt states that due to this she is not interested in receiving ECT at this time.  3.) Medical:   - Pt scheduled for dental extractions 11/1, however declined to attend on day prior when informed it would not be under general anesthesia.  - last clozapine labs on 10/23 (CBC with diff, troponins, CRP); CRP elevated at 15.8 likely due to dental infection/wisdom tooth issues  - pantoprazole 40mg PO before breakfast for GERD  - montelukast 10mg PO qHS for asthma  - budesonide 80mcg/formoterol 4.5mcg 2 puffs BID for asthma  - PRN: acetaminophen 650mg PO q6hrs PRN for pain, albuterol 90mcg 2puffs q6hrs PRN for dyspnea, ondansetron 4mg q6hrs PRN for nausea  - Discussed CRP with medicine service, given asymptomatic pt and normal troponin, no additional intervention/diagnostics at this time.  - Discontinued clobetasol as rash has resolved.  4.) Dispo planning: State application submitted. Patient is 27yo single woman, no dependents, domiciled with her Grandmother, unemployed on disability/SSI, pphx of schizoaffective disorder, multiple past psychiatric admissions including state and recent discharge from [facility], in tx with [facility], with pmhx of asthma, HTN, GERD, and hypothyroidism and schizoaffective disorder, in tx with [facility], who self presented to the ED reporting abdominal pain and requesting readmission to psychiatric unit, reporting CAH, depressive symptoms, IOR, suicidality, admitted to [facility] for psychiatric care.  Depression and psychosis likely multifactorial at this time, schizoaffective vs. borderline personality disorder vs. complex grief vs. adjustment disorder.      on assessment, patient continue to show improvements in mood, thoughts and functioning. Awaiting housing interviews. clozapine level 416 on 1/4/24.     1.) Obs: Stop 1:1, patient with decrease in SI.  She has been able to approach staff when needed.  2.) Psychiatric medication management:  - Reviewed options for addressing nocturnal enuresis including reducing clozapine vs reducing lithium vs adding desmopressin.  Pt decided upon desmopressin as she continues to have depression and psychosis.  Desmopressin 0.1 mg QHS.  - Lithobid 1200 mg QHS.  Pt aware that she needs to stay well hydrated and avoid diuretics and NSAIDs.   - Discontinued olanzapine  - Increase Clozapine to 300mg qHS for psychosis ([id number]), level 416 on 1/4/24  - Prazosin 2mg qhs, monitor BP carefully.  - Abilify Maintena 400mg IM q3 weeks rather than 4, per collateral from ACT team.  Doses given 10/19, 11/17, 12/8, 12/29, next on 1/19  - Mirtazapine was switched to escitalopram, increased to 20 mg daily.  - Wellbutrin XL 150mg daily  - Gabapentin 300 mg BID for anxiety/chronic pain.  - PRN: haloperidol 5mg PO/IM q8hrs PRN for agitation, diphenhydramine 50mg PO/IM q6hrs PRN for EPS PPx, hydroxyzine 50mg PO q12hrs PRN for anxiety, lorazepam 2mg PO q8hrs PRN for anxiety or 2mg IM for assaultive behavior. Melatonin 5mg qHS PRN for insomnia  - Discussed ECT several times with pt who states she has been told in two hospitals in past that due to her obesity and asthma, she is not an ideal candidate.  Pt states that due to this she is not interested in receiving ECT at this time.  3.) Medical:   - Pt scheduled for dental extractions 11/1, however declined to attend on day prior when informed it would not be under general anesthesia.  - last clozapine labs on 10/23 (CBC with diff, troponins, CRP); CRP elevated at 15.8 likely due to dental infection/wisdom tooth issues  - pantoprazole 40mg PO before breakfast for GERD  - montelukast 10mg PO qHS for asthma  - budesonide 80mcg/formoterol 4.5mcg 2 puffs BID for asthma  - PRN: acetaminophen 650mg PO q6hrs PRN for pain, albuterol 90mcg 2puffs q6hrs PRN for dyspnea, ondansetron 4mg q6hrs PRN for nausea  - Discussed CRP with medicine service, given asymptomatic pt and normal troponin, no additional intervention/diagnostics at this time.  - Discontinued clobetasol as rash has resolved.  4.) Dispo planning: State application submitted.

## 2024-01-11 NOTE — BH INPATIENT PSYCHIATRY PROGRESS NOTE - NSBHCHARTREVIEWVS_PSY_A_CORE FT
Vital Signs Last 24 Hrs  T(C): 36.7 (01-11-24 @ 08:15), Max: 36.9 (01-10-24 @ 20:30)  T(F): 98 (01-11-24 @ 08:15), Max: 98.4 (01-10-24 @ 20:30)  HR: --  BP: --  BP(mean): --  RR: --  SpO2: --    Orthostatic VS  01-11-24 @ 08:15  Lying BP: --/-- HR: --  Sitting BP: 130/78 HR: 108  Standing BP: 126/82 HR: 102  Site: --  Mode: --  Orthostatic VS  01-10-24 @ 20:30  Lying BP: --/-- HR: --  Sitting BP: 102/73 HR: 79  Standing BP: 106/74 HR: 82  Site: --  Mode: --  Orthostatic VS  01-10-24 @ 07:42  Lying BP: --/-- HR: --  Sitting BP: 117/66 HR: 92  Standing BP: --/-- HR: --  Site: --  Mode: --  Orthostatic VS  01-09-24 @ 20:21  Lying BP: --/-- HR: --  Sitting BP: 125/76 HR: 97  Standing BP: 130/78 HR: 100  Site: --  Mode: --

## 2024-01-12 PROCEDURE — 99222 1ST HOSP IP/OBS MODERATE 55: CPT

## 2024-01-12 RX ADMIN — BUDESONIDE AND FORMOTEROL FUMARATE DIHYDRATE 2 PUFF(S): 160; 4.5 AEROSOL RESPIRATORY (INHALATION) at 20:35

## 2024-01-12 RX ADMIN — PANTOPRAZOLE SODIUM 40 MILLIGRAM(S): 20 TABLET, DELAYED RELEASE ORAL at 08:22

## 2024-01-12 RX ADMIN — Medication 2 MILLIGRAM(S): at 20:35

## 2024-01-12 RX ADMIN — DESMOPRESSIN ACETATE 0.1 MILLIGRAM(S): 0.1 TABLET ORAL at 20:36

## 2024-01-12 RX ADMIN — BUPROPION HYDROCHLORIDE 150 MILLIGRAM(S): 150 TABLET, EXTENDED RELEASE ORAL at 08:21

## 2024-01-12 RX ADMIN — GABAPENTIN 300 MILLIGRAM(S): 400 CAPSULE ORAL at 08:22

## 2024-01-12 RX ADMIN — CLOZAPINE 300 MILLIGRAM(S): 150 TABLET, ORALLY DISINTEGRATING ORAL at 20:36

## 2024-01-12 RX ADMIN — LITHIUM CARBONATE 1200 MILLIGRAM(S): 300 TABLET, EXTENDED RELEASE ORAL at 20:35

## 2024-01-12 RX ADMIN — MONTELUKAST 10 MILLIGRAM(S): 4 TABLET, CHEWABLE ORAL at 20:36

## 2024-01-12 RX ADMIN — GABAPENTIN 300 MILLIGRAM(S): 400 CAPSULE ORAL at 20:36

## 2024-01-12 RX ADMIN — ESCITALOPRAM OXALATE 20 MILLIGRAM(S): 10 TABLET, FILM COATED ORAL at 08:22

## 2024-01-12 NOTE — BH INPATIENT PSYCHIATRY PROGRESS NOTE - NSBHFUPINTERVALHXFT_PSY_A_CORE
f/up mood, care discussed w/ tx team, RONNIE.  Patient out in the milieu, reported that she is "ecstatic" about her housing interview next week.  Patient reported sleeping well and with good appetite. denied dizziness or constipation. denied SI/I/P. Taking medications, no td, eps or tremors observed/ reported.

## 2024-01-12 NOTE — BH INPATIENT PSYCHIATRY PROGRESS NOTE - NSBHCHARTREVIEWVS_PSY_A_CORE FT
Vital Signs Last 24 Hrs  T(C): 36.6 (01-12-24 @ 08:55), Max: 36.6 (01-11-24 @ 20:23)  T(F): 97.9 (01-12-24 @ 08:55), Max: 97.9 (01-12-24 @ 08:55)  HR: --  BP: --  BP(mean): --  RR: --  SpO2: --    Orthostatic VS  01-12-24 @ 08:55  Lying BP: --/-- HR: --  Sitting BP: 133/76 HR: 99  Standing BP: 135/95 HR: 106  Site: --  Mode: --  Orthostatic VS  01-11-24 @ 20:23  Lying BP: --/-- HR: --  Sitting BP: 123/73 HR: 104  Standing BP: 140/88 HR: 109  Site: --  Mode: --  Orthostatic VS  01-11-24 @ 08:15  Lying BP: --/-- HR: --  Sitting BP: 130/78 HR: 108  Standing BP: 126/82 HR: 102  Site: --  Mode: --  Orthostatic VS  01-10-24 @ 20:30  Lying BP: --/-- HR: --  Sitting BP: 102/73 HR: 79  Standing BP: 106/74 HR: 82  Site: --  Mode: --

## 2024-01-12 NOTE — BH INPATIENT PSYCHIATRY PROGRESS NOTE - NSBHASSESSSUMMFT_PSY_ALL_CORE
Patient is 27yo single woman, no dependents, domiciled with her Grandmother, unemployed on disability/SSI, pphx of schizoaffective disorder, multiple past psychiatric admissions including state and recent discharge from [facility], in tx with [facility], with pmhx of asthma, HTN, GERD, and hypothyroidism and schizoaffective disorder, in tx with [facility], who self presented to the ED reporting abdominal pain and requesting readmission to psychiatric unit, reporting CAH, depressive symptoms, IOR, suicidality, admitted to [facility] for psychiatric care.  Depression and psychosis likely multifactorial at this time, schizoaffective vs. borderline personality disorder vs. complex grief vs. adjustment disorder.      on assessment, patient continue to show improvements in mood, thoughts and functioning. housing interview next week. clozapine level 416 on 1/4/24.     1.) Obs: Stop 1:1, patient with decrease in SI.  She has been able to approach staff when needed.  2.) Psychiatric medication management:  - Reviewed options for addressing nocturnal enuresis including reducing clozapine vs reducing lithium vs adding desmopressin.  Pt decided upon desmopressin as she continues to have depression and psychosis.  Desmopressin 0.1 mg QHS.  - Lithobid 1200 mg QHS.  Pt aware that she needs to stay well hydrated and avoid diuretics and NSAIDs.   - Discontinued olanzapine  - Increase Clozapine to 300mg qHS for psychosis ([id number]), level 416 on 1/4/24  - Prazosin 2mg qhs, monitor BP carefully.  - Abilify Maintena 400mg IM q3 weeks rather than 4, per collateral from ACT team.  Doses given 10/19, 11/17, 12/8, 12/29, next on 1/19  - Mirtazapine was switched to escitalopram, increased to 20 mg daily.  - Wellbutrin XL 150mg daily  - Gabapentin 300 mg BID for anxiety/chronic pain.  - PRN: haloperidol 5mg PO/IM q8hrs PRN for agitation, diphenhydramine 50mg PO/IM q6hrs PRN for EPS PPx, hydroxyzine 50mg PO q12hrs PRN for anxiety, lorazepam 2mg PO q8hrs PRN for anxiety or 2mg IM for assaultive behavior. Melatonin 5mg qHS PRN for insomnia  - Discussed ECT several times with pt who states she has been told in two hospitals in past that due to her obesity and asthma, she is not an ideal candidate.  Pt states that due to this she is not interested in receiving ECT at this time.  3.) Medical:   - Pt scheduled for dental extractions 11/1, however declined to attend on day prior when informed it would not be under general anesthesia.  - last clozapine labs on 10/23 (CBC with diff, troponins, CRP); CRP elevated at 15.8 likely due to dental infection/wisdom tooth issues  - pantoprazole 40mg PO before breakfast for GERD  - montelukast 10mg PO qHS for asthma  - budesonide 80mcg/formoterol 4.5mcg 2 puffs BID for asthma  - PRN: acetaminophen 650mg PO q6hrs PRN for pain, albuterol 90mcg 2puffs q6hrs PRN for dyspnea, ondansetron 4mg q6hrs PRN for nausea  - Discussed CRP with medicine service, given asymptomatic pt and normal troponin, no additional intervention/diagnostics at this time.  - Discontinued clobetasol as rash has resolved.  4.) Dispo planning: State application submitted. Patient is 27yo single woman, no dependents, domiciled with her Grandmother, unemployed on disability/SSI, pphx of schizoaffective disorder, multiple past psychiatric admissions including state and recent discharge from [facility], in tx with [facility], with pmhx of asthma, HTN, GERD, and hypothyroidism and schizoaffective disorder, in tx with [facility], who self presented to the ED reporting abdominal pain and requesting readmission to psychiatric unit, reporting CAH, depressive symptoms, IOR, suicidality, admitted to [facility] for psychiatric care.  Depression and psychosis likely multifactorial at this time, schizoaffective vs. borderline personality disorder vs. complex grief vs. adjustment disorder.      on assessment, patient continue to show improvements in mood, thoughts and functioning. housing interview next week. clozapine level 416 on 1/4/24.     1.) Obs: Stop 1:1, patient with decrease in SI.  She has been able to approach staff when needed.  2.) Psychiatric medication management:  - Reviewed options for addressing nocturnal enuresis including reducing clozapine vs reducing lithium vs adding desmopressin.  Pt decided upon desmopressin as she continues to have depression and psychosis.  Desmopressin 0.1 mg QHS.  - Lithobid 1200 mg QHS.  Pt aware that she needs to stay well hydrated and avoid diuretics and NSAIDs.   - Discontinued olanzapine  - Increase Clozapine to 300mg qHS for psychosis ([id number]), level 416 on 1/4/24  - Prazosin 2mg qhs, monitor BP carefully.  - Abilify Maintena 400mg IM q3 weeks rather than 4, per collateral from ACT team.  Doses given 10/19, 11/17, 12/8, 12/29, next on 1/19  - Mirtazapine was switched to escitalopram, increased to 20 mg daily.  - Wellbutrin XL 150mg daily  - Gabapentin 300 mg BID for anxiety/chronic pain.  - PRN: haloperidol 5mg PO/IM q8hrs PRN for agitation, diphenhydramine 50mg PO/IM q6hrs PRN for EPS PPx, hydroxyzine 50mg PO q12hrs PRN for anxiety, lorazepam 2mg PO q8hrs PRN for anxiety or 2mg IM for assaultive behavior. Melatonin 5mg qHS PRN for insomnia  - Discussed ECT several times with pt who states she has been told in two hospitals in past that due to her obesity and asthma, she is not an ideal candidate.  Pt states that due to this she is not interested in receiving ECT at this time.  3.) Medical:   - Pt scheduled for dental extractions 11/1, however declined to attend on day prior when informed it would not be under general anesthesia.  - last clozapine labs on 10/23 (CBC with diff, troponins, CRP); CRP elevated at 15.8 likely due to dental infection/wisdom tooth issues  - pantoprazole 40mg PO before breakfast for GERD  - montelukast 10mg PO qHS for asthma  - budesonide 80mcg/formoterol 4.5mcg 2 puffs BID for asthma  - PRN: acetaminophen 650mg PO q6hrs PRN for pain, albuterol 90mcg 2puffs q6hrs PRN for dyspnea, ondansetron 4mg q6hrs PRN for nausea  - Discussed CRP with medicine service, given asymptomatic pt and normal troponin, no additional intervention/diagnostics at this time.  - Discontinued clobetasol as rash has resolved.  4.) Dispo planning: State application submitted.

## 2024-01-12 NOTE — BH INPATIENT PSYCHIATRY PROGRESS NOTE - NSBHATTESTBILLING_PSY_A_CORE
15835-[id number] OBS or IP - moderate complexity OR 35-49 mins [id number] OBS or IP - moderate complexity OR 35-49 mins

## 2024-01-12 NOTE — BH INPATIENT PSYCHIATRY PROGRESS NOTE - NSBHMETABOLIC_PSY_ALL_CORE_FT
BMI: BMI (kg/m2): 57.8 (09-14-23 @ 14:30)  HbA1c: A1C with Estimated Average Glucose Result: 5.2 % (11-01-23 @ 09:04)    Glucose:   BP: --Vital Signs Last 24 Hrs  T(C): 36.6 (01-12-24 @ 08:55), Max: 36.6 (01-11-24 @ 20:23)  T(F): 97.9 (01-12-24 @ 08:55), Max: 97.9 (01-12-24 @ 08:55)  HR: --  BP: --  BP(mean): --  RR: --  SpO2: --    Orthostatic VS  01-12-24 @ 08:55  Lying BP: --/-- HR: --  Sitting BP: 133/76 HR: 99  Standing BP: 135/95 HR: 106  Site: --  Mode: --  Orthostatic VS  01-11-24 @ 20:23  Lying BP: --/-- HR: --  Sitting BP: 123/73 HR: 104  Standing BP: 140/88 HR: 109  Site: --  Mode: --  Orthostatic VS  01-11-24 @ 08:15  Lying BP: --/-- HR: --  Sitting BP: 130/78 HR: 108  Standing BP: 126/82 HR: 102  Site: --  Mode: --  Orthostatic VS  01-10-24 @ 20:30  Lying BP: --/-- HR: --  Sitting BP: 102/73 HR: 79  Standing BP: 106/74 HR: 82  Site: --  Mode: --    Lipid Panel: Date/Time: 11-01-23 @ 09:04  Cholesterol, Serum: 156  LDL Cholesterol Calculated: 82  HDL Cholesterol, Serum: 58  Total Cholesterol/HDL Ration Measurement: --  Triglycerides, Serum: 79

## 2024-01-13 RX ADMIN — BUDESONIDE AND FORMOTEROL FUMARATE DIHYDRATE 2 PUFF(S): 160; 4.5 AEROSOL RESPIRATORY (INHALATION) at 20:21

## 2024-01-13 RX ADMIN — Medication 2 MILLIGRAM(S): at 20:23

## 2024-01-13 RX ADMIN — LIDOCAINE 1 PATCH: 4 CREAM TOPICAL at 17:29

## 2024-01-13 RX ADMIN — MONTELUKAST 10 MILLIGRAM(S): 4 TABLET, CHEWABLE ORAL at 20:23

## 2024-01-13 RX ADMIN — BUDESONIDE AND FORMOTEROL FUMARATE DIHYDRATE 2 PUFF(S): 160; 4.5 AEROSOL RESPIRATORY (INHALATION) at 09:01

## 2024-01-13 RX ADMIN — ESCITALOPRAM OXALATE 20 MILLIGRAM(S): 10 TABLET, FILM COATED ORAL at 08:59

## 2024-01-13 RX ADMIN — BUPROPION HYDROCHLORIDE 150 MILLIGRAM(S): 150 TABLET, EXTENDED RELEASE ORAL at 08:59

## 2024-01-13 RX ADMIN — GABAPENTIN 300 MILLIGRAM(S): 400 CAPSULE ORAL at 20:23

## 2024-01-13 RX ADMIN — DESMOPRESSIN ACETATE 0.1 MILLIGRAM(S): 0.1 TABLET ORAL at 20:23

## 2024-01-13 RX ADMIN — CLOZAPINE 300 MILLIGRAM(S): 150 TABLET, ORALLY DISINTEGRATING ORAL at 20:23

## 2024-01-13 RX ADMIN — PANTOPRAZOLE SODIUM 40 MILLIGRAM(S): 20 TABLET, DELAYED RELEASE ORAL at 09:01

## 2024-01-13 RX ADMIN — LITHIUM CARBONATE 1200 MILLIGRAM(S): 300 TABLET, EXTENDED RELEASE ORAL at 20:23

## 2024-01-13 RX ADMIN — GABAPENTIN 300 MILLIGRAM(S): 400 CAPSULE ORAL at 08:59

## 2024-01-14 RX ADMIN — CLOZAPINE 300 MILLIGRAM(S): 150 TABLET, ORALLY DISINTEGRATING ORAL at 20:38

## 2024-01-14 RX ADMIN — GABAPENTIN 300 MILLIGRAM(S): 400 CAPSULE ORAL at 20:38

## 2024-01-14 RX ADMIN — DESMOPRESSIN ACETATE 0.1 MILLIGRAM(S): 0.1 TABLET ORAL at 20:38

## 2024-01-14 RX ADMIN — BUPROPION HYDROCHLORIDE 150 MILLIGRAM(S): 150 TABLET, EXTENDED RELEASE ORAL at 09:03

## 2024-01-14 RX ADMIN — Medication 2 MILLIGRAM(S): at 20:39

## 2024-01-14 RX ADMIN — ESCITALOPRAM OXALATE 20 MILLIGRAM(S): 10 TABLET, FILM COATED ORAL at 09:03

## 2024-01-14 RX ADMIN — PANTOPRAZOLE SODIUM 40 MILLIGRAM(S): 20 TABLET, DELAYED RELEASE ORAL at 09:02

## 2024-01-14 RX ADMIN — MONTELUKAST 10 MILLIGRAM(S): 4 TABLET, CHEWABLE ORAL at 20:38

## 2024-01-14 RX ADMIN — LITHIUM CARBONATE 1200 MILLIGRAM(S): 300 TABLET, EXTENDED RELEASE ORAL at 20:38

## 2024-01-14 RX ADMIN — GABAPENTIN 300 MILLIGRAM(S): 400 CAPSULE ORAL at 09:02

## 2024-01-15 RX ADMIN — CLOZAPINE 300 MILLIGRAM(S): 150 TABLET, ORALLY DISINTEGRATING ORAL at 20:20

## 2024-01-15 RX ADMIN — LIDOCAINE 1 PATCH: 4 CREAM TOPICAL at 16:00

## 2024-01-15 RX ADMIN — DESMOPRESSIN ACETATE 0.1 MILLIGRAM(S): 0.1 TABLET ORAL at 20:20

## 2024-01-15 RX ADMIN — GABAPENTIN 300 MILLIGRAM(S): 400 CAPSULE ORAL at 08:06

## 2024-01-15 RX ADMIN — BUPROPION HYDROCHLORIDE 150 MILLIGRAM(S): 150 TABLET, EXTENDED RELEASE ORAL at 08:06

## 2024-01-15 RX ADMIN — Medication 2 MILLIGRAM(S): at 20:21

## 2024-01-15 RX ADMIN — BUDESONIDE AND FORMOTEROL FUMARATE DIHYDRATE 2 PUFF(S): 160; 4.5 AEROSOL RESPIRATORY (INHALATION) at 20:22

## 2024-01-15 RX ADMIN — PANTOPRAZOLE SODIUM 40 MILLIGRAM(S): 20 TABLET, DELAYED RELEASE ORAL at 08:06

## 2024-01-15 RX ADMIN — LITHIUM CARBONATE 1200 MILLIGRAM(S): 300 TABLET, EXTENDED RELEASE ORAL at 20:20

## 2024-01-15 RX ADMIN — GABAPENTIN 300 MILLIGRAM(S): 400 CAPSULE ORAL at 20:20

## 2024-01-15 RX ADMIN — ESCITALOPRAM OXALATE 20 MILLIGRAM(S): 10 TABLET, FILM COATED ORAL at 08:06

## 2024-01-15 RX ADMIN — MONTELUKAST 10 MILLIGRAM(S): 4 TABLET, CHEWABLE ORAL at 20:20

## 2024-01-16 LAB
BASOPHILS # BLD AUTO: 0.02 K/UL — SIGNIFICANT CHANGE UP (ref 0–0.2)
BASOPHILS NFR BLD AUTO: 0.2 % — SIGNIFICANT CHANGE UP (ref 0–2)
CRP SERPL-MCNC: 29.1 MG/L — HIGH
EOSINOPHIL # BLD AUTO: 0 K/UL — SIGNIFICANT CHANGE UP (ref 0–0.5)
EOSINOPHIL NFR BLD AUTO: 0 % — SIGNIFICANT CHANGE UP (ref 0–6)
HCT VFR BLD CALC: 40.2 % — SIGNIFICANT CHANGE UP (ref 34.5–45)
HGB BLD-MCNC: 12.1 G/DL — SIGNIFICANT CHANGE UP (ref 11.5–15.5)
IANC: 7.04 K/UL — SIGNIFICANT CHANGE UP (ref 1.8–7.4)
IMM GRANULOCYTES NFR BLD AUTO: 0.6 % — SIGNIFICANT CHANGE UP (ref 0–0.9)
LITHIUM SERPL-MCNC: 0.7 MMOL/L — SIGNIFICANT CHANGE UP (ref 0.6–1.2)
LYMPHOCYTES # BLD AUTO: 2.33 K/UL — SIGNIFICANT CHANGE UP (ref 1–3.3)
LYMPHOCYTES # BLD AUTO: 23.1 % — SIGNIFICANT CHANGE UP (ref 13–44)
MCHC RBC-ENTMCNC: 24.1 PG — LOW (ref 27–34)
MCHC RBC-ENTMCNC: 30.1 GM/DL — LOW (ref 32–36)
MCV RBC AUTO: 79.9 FL — LOW (ref 80–100)
MONOCYTES # BLD AUTO: 0.62 K/UL — SIGNIFICANT CHANGE UP (ref 0–0.9)
MONOCYTES NFR BLD AUTO: 6.2 % — SIGNIFICANT CHANGE UP (ref 2–14)
NEUTROPHILS # BLD AUTO: 7.04 K/UL — SIGNIFICANT CHANGE UP (ref 1.8–7.4)
NEUTROPHILS NFR BLD AUTO: 69.9 % — SIGNIFICANT CHANGE UP (ref 43–77)
NRBC # BLD: 0 /100 WBCS — SIGNIFICANT CHANGE UP (ref 0–0)
NRBC # FLD: 0 K/UL — SIGNIFICANT CHANGE UP (ref 0–0)
PLATELET # BLD AUTO: 314 K/UL — SIGNIFICANT CHANGE UP (ref 150–400)
RBC # BLD: 5.03 M/UL — SIGNIFICANT CHANGE UP (ref 3.8–5.2)
RBC # FLD: 15.8 % — HIGH (ref 10.3–14.5)
TROPONIN T, HIGH SENSITIVITY RESULT: <6 NG/L — SIGNIFICANT CHANGE UP
WBC # BLD: 10.07 K/UL — SIGNIFICANT CHANGE UP (ref 3.8–10.5)
WBC # FLD AUTO: 10.07 K/UL — SIGNIFICANT CHANGE UP (ref 3.8–10.5)

## 2024-01-16 PROCEDURE — 99232 SBSQ HOSP IP/OBS MODERATE 35: CPT

## 2024-01-16 RX ADMIN — GABAPENTIN 300 MILLIGRAM(S): 400 CAPSULE ORAL at 09:29

## 2024-01-16 RX ADMIN — ESCITALOPRAM OXALATE 20 MILLIGRAM(S): 10 TABLET, FILM COATED ORAL at 09:29

## 2024-01-16 RX ADMIN — LITHIUM CARBONATE 1200 MILLIGRAM(S): 300 TABLET, EXTENDED RELEASE ORAL at 20:55

## 2024-01-16 RX ADMIN — Medication 2 MILLIGRAM(S): at 20:56

## 2024-01-16 RX ADMIN — MONTELUKAST 10 MILLIGRAM(S): 4 TABLET, CHEWABLE ORAL at 20:57

## 2024-01-16 RX ADMIN — DESMOPRESSIN ACETATE 0.1 MILLIGRAM(S): 0.1 TABLET ORAL at 20:56

## 2024-01-16 RX ADMIN — CLOZAPINE 300 MILLIGRAM(S): 150 TABLET, ORALLY DISINTEGRATING ORAL at 20:56

## 2024-01-16 RX ADMIN — PANTOPRAZOLE SODIUM 40 MILLIGRAM(S): 20 TABLET, DELAYED RELEASE ORAL at 09:28

## 2024-01-16 RX ADMIN — BUDESONIDE AND FORMOTEROL FUMARATE DIHYDRATE 2 PUFF(S): 160; 4.5 AEROSOL RESPIRATORY (INHALATION) at 09:35

## 2024-01-16 RX ADMIN — GABAPENTIN 300 MILLIGRAM(S): 400 CAPSULE ORAL at 20:55

## 2024-01-16 RX ADMIN — BUPROPION HYDROCHLORIDE 150 MILLIGRAM(S): 150 TABLET, EXTENDED RELEASE ORAL at 09:29

## 2024-01-16 RX ADMIN — BUDESONIDE AND FORMOTEROL FUMARATE DIHYDRATE 2 PUFF(S): 160; 4.5 AEROSOL RESPIRATORY (INHALATION) at 20:57

## 2024-01-16 NOTE — BH INPATIENT PSYCHIATRY PROGRESS NOTE - NSBHCHARTREVIEWVS_PSY_A_CORE FT
Vital Signs Last 24 Hrs  T(C): 36.8 (01-16-24 @ 08:06), Max: 36.8 (01-15-24 @ 20:30)  T(F): 98.2 (01-16-24 @ 08:06), Max: 98.3 (01-15-24 @ 20:30)  HR: --  BP: --  BP(mean): --  RR: --  SpO2: --    Orthostatic VS  01-16-24 @ 08:06  Lying BP: --/-- HR: --  Sitting BP: 127/71 HR: 78  Standing BP: 148/81 HR: 85  Site: --  Mode: --  Orthostatic VS  01-15-24 @ 20:30  Lying BP: --/-- HR: --  Sitting BP: 129/73 HR: 107  Standing BP: 134/81 HR: 111  Site: --  Mode: --  Orthostatic VS  01-15-24 @ 08:23  Lying BP: --/-- HR: --  Sitting BP: 131/57 HR: 90  Standing BP: 129/84 HR: 101  Site: --  Mode: --  Orthostatic VS  01-14-24 @ 20:42  Lying BP: --/-- HR: --  Sitting BP: 127/82 HR: 110  Standing BP: 141/92 HR: 112  Site: --  Mode: --

## 2024-01-16 NOTE — BH PSYCHOLOGY - CLINICIAN PSYCHOTHERAPY NOTE - NSBHPSYCHOLGOALS_PSY_A_CORE
Decrease symptoms/Assessment/Improve level of independent functioning/Improve social/vocational/coping skills/Psychoeducation/Treatment compliance Decrease symptoms/Improve level of independent functioning/Improve social/vocational/coping skills/Psychoeducation/Treatment compliance

## 2024-01-16 NOTE — BH PSYCHOLOGY - CLINICIAN PSYCHOTHERAPY NOTE - NSBHPSYCHOLNARRATIVE_PSY_A_CORE FT
Writer met with Pt for 30-minute individual therapy session. Pt was alert and presented with adequate hygiene and but unkempt grooming. Pt reported feeling "good", affect full. Pt denied experiencing any A/V hallucinations. Her thought process was linear and goal directed. Pts’ speech was WNL, content was relevant to discussion. Pt denied passive or active SI, HI or NSSI. Oriented x3. Fair insight and judgement demonstrated.      Pt and Wr explored pts emotional response to waiting on housing interview, with pt sharing she was feeling hopeful and nervous. Normalized feelings. Pt and Wr went over interpersonal skills. Pt shared being worried about her grandmother not picking up phone, practiced thought defusion and acceptance of feelings using different ACT and DBT techniques. Wr explored pts lack of self care, and grooming. Pt shared that she "showers every day" as well as "brushing her hair every day". Explored possible barriers to self care and how to overcome those.

## 2024-01-16 NOTE — BH INPATIENT PSYCHIATRY PROGRESS NOTE - MSE UNSTRUCTURED FT
The patient appears stated age, with fair hygiene,  cooperative with the interview.  She maintained appropriate eye contact.  No restlessness or slowing of movements observed.  Gait is steady.  The patient’s speech was fluent, normal in tone, rate, and volume.  The patient’s mood is “good”  Her affect is euthymic, reactive, stable, appropriate.  The patient’s thoughts are goal directed.  She does not have any delusions, denies auditory hallucinations today.  She denies suicidal ideation, no homicidal ideation, intent, or plan.  Insight is fair.  Judgment is fair.  Impulse control has been fair on the unit.

## 2024-01-16 NOTE — BH PSYCHOLOGY - CLINICIAN PSYCHOTHERAPY NOTE - NSBHPSYCHOLINT_PSY_A_CORE
Cognitive/behavioral therapy/Dialectical  Behavioral Therapy (DBT)/Dynamic issues addressed/Interpersonal Therapy (IPT)/Reality testing/Supported coping skills/Supportive therapy/Treatment compliance encouraged Cognitive/behavioral therapy/Dialectical  Behavioral Therapy (DBT)/Dynamic issues addressed/Interpersonal Therapy (IPT)/Supported coping skills/Supportive therapy/Treatment compliance encouraged

## 2024-01-16 NOTE — ED BEHAVIORAL HEALTH ASSESSMENT NOTE - CURRENTLY ENROLLED STUDENT
[Home] : at home, [unfilled] , at the time of the visit. [Medical Office: ([facility])___] : at the medical office located in  [Verbal consent obtained from patient] : the patient, [unfilled] [FreeTextEntry1] : Follow up  [de-identified] : 55 year old with CP/Cervical myelopathy, irritable bowel disease, neurogenic bladder, recurrent UTS presenting for TTM. Patient reports that about 5-6 weeks ago, her cervical myelopathy sx got worse - patient has known spinal stenosis on MRI. Saw NSGY, ordered for imaging, and recommended trial of lidocaine patch. No longer on keflex prophylaxis since summer, discontinued by ID and urology. Currently, symptoms stable. Last month, patient had mammogram and biopsy which showed non-proliferative fibrocystic changes. She has significant challenges especially during the night, as she is wheel chair bound and needs to be repositioned frequently so asking for letter of support for additional home care hours at night time.  No

## 2024-01-16 NOTE — BH INPATIENT PSYCHIATRY PROGRESS NOTE - NSBHATTESTBILLING_PSY_A_CORE
44699-Aayjmpbofd OBS or IP - moderate complexity OR 35-49 mins 11978-Vzilhculkg OBS or IP - moderate complexity OR 35-49 mins

## 2024-01-16 NOTE — BH INPATIENT PSYCHIATRY PROGRESS NOTE - NSBHASSESSSUMMFT_PSY_ALL_CORE
Patient is 27yo single woman, no dependents, domiciled with her Grandmother, unemployed on disability/SSI, pphx of schizoaffective disorder, multiple past psychiatric admissions including state and recent discharge from [facility], [facility], with pmhx of asthma, HTN, GERD, and hypothyroidism and schizoaffective disorder, in tx with [facility], who self presented to the ED reporting abdominal pain and requesting readmission to psychiatric unit, reporting CAH, depressive symptoms, IOR, suicidality, admitted to [facility] for psychiatric care.  Depression and psychosis likely multifactorial at this time, schizoaffective vs. borderline personality disorder vs. complex grief vs. adjustment disorder.      on assessment, patient continue to show improvements in mood, thoughts and functioning. housing interview next week. clozapine level 416 on 1/4/24.     1/16/24: ANC = 7.04, chronically elevated CRP since admission - continue to monitor and trend, Li = 0.7. Pt states she is looking forward to housing interview Thursday 1.) Obs: Stop 1:1, patient with decrease in SI.  She has been able to approach staff when needed.  2.) Psychiatric medication management:  - Reviewed options for addressing nocturnal enuresis including reducing clozapine vs reducing lithium vs adding desmopressin.  Pt decided upon desmopressin as she continues to have depression and psychosis.  Desmopressin 0.1 mg QHS.  - Lithobid 1200 mg QHS.  Pt aware that she needs to stay well hydrated and avoid diuretics and NSAIDs.   - Discontinued olanzapine  - Increase Clozapine to 300mg qHS for psychosis ([id number]), level 416 on 1/4/24  - Prazosin 2mg qhs, monitor BP carefully.  - Abilify Maintena 400mg IM q3 weeks rather than 4, per collateral from ACT team.  Doses given 10/19, 11/17, 12/8, 12/29, next on 1/19  - Mirtazapine was switched to escitalopram, increased to 20 mg daily.  - Wellbutrin XL 150mg daily  - Gabapentin 300 mg BID for anxiety/chronic pain.  - PRN: haloperidol 5mg PO/IM q8hrs PRN for agitation, diphenhydramine 50mg PO/IM q6hrs PRN for EPS PPx, hydroxyzine 50mg PO q12hrs PRN for anxiety, lorazepam 2mg PO q8hrs PRN for anxiety or 2mg IM for assaultive behavior. Melatonin 5mg qHS PRN for insomnia  - Discussed ECT several times with pt who states she has been told in two hospitals in past that due to her obesity and asthma, she is not an ideal candidate.  Pt states that due to this she is not interested in receiving ECT at this time.  3.) Medical:   - Pt scheduled for dental extractions 11/1, however declined to attend on day prior when informed it would not be under general anesthesia.  - last clozapine labs on 10/23 (CBC with diff, troponins, CRP); CRP elevated at 15.8 likely due to dental infection/wisdom tooth issues  - pantoprazole 40mg PO before breakfast for GERD  - montelukast 10mg PO qHS for asthma  - budesonide 80mcg/formoterol 4.5mcg 2 puffs BID for asthma  - PRN: acetaminophen 650mg PO q6hrs PRN for pain, albuterol 90mcg 2puffs q6hrs PRN for dyspnea, ondansetron 4mg q6hrs PRN for nausea  - Discussed CRP with medicine service, given asymptomatic pt and normal troponin, no additional intervention/diagnostics at this time.  - Discontinued clobetasol as rash has resolved.  4.) Dispo planning: State application submitted. Patient is 27yo single woman, no dependents, domiciled with her Grandmother, unemployed on disability/SSI, pphx of schizoaffective disorder, multiple past psychiatric admissions including state and recent discharge from [facility], in tx [facility], with pmhx of asthma, HTN, GERD, and hypothyroidism and schizoaffective disorder, in tx with [facility], who self presented to the ED reporting abdominal pain and requesting readmission to psychiatric unit, reporting CAH, depressive symptoms, IOR, suicidality, admitted to [facility] 2N for psychiatric care.  Depression and psychosis likely multifactorial at this time, schizoaffective vs. borderline personality disorder vs. complex grief vs. adjustment disorder.      on assessment, patient continue to show improvements in mood, thoughts and functioning. housing interview next week. clozapine level 416 on 1/4/24.     1/16/24: ANC = 7.04, chronically elevated CRP since admission - continue to monitor and trend, Li = 0.7. Pt states she is looking forward to housing interview Thursday 1.) Obs: Stop 1:1, patient with decrease in SI.  She has been able to approach staff when needed.  2.) Psychiatric medication management:  - Reviewed options for addressing nocturnal enuresis including reducing clozapine vs reducing lithium vs adding desmopressin.  Pt decided upon desmopressin as she continues to have depression and psychosis.  Desmopressin 0.1 mg QHS.  - Lithobid 1200 mg QHS.  Pt aware that she needs to stay well hydrated and avoid diuretics and NSAIDs.   - Discontinued olanzapine  - Increase Clozapine to 300mg qHS for psychosis ([id number]), level 416 on 1/4/24  - Prazosin 2mg qhs, monitor BP carefully.  - Abilify Maintena 400mg IM q3 weeks rather than 4, per collateral from ACT team.  Doses given 10/19, 11/17, 12/8, 12/29, next on 1/19  - Mirtazapine was switched to escitalopram, increased to 20 mg daily.  - Wellbutrin XL 150mg daily  - Gabapentin 300 mg BID for anxiety/chronic pain.  - PRN: haloperidol 5mg PO/IM q8hrs PRN for agitation, diphenhydramine 50mg PO/IM q6hrs PRN for EPS PPx, hydroxyzine 50mg PO q12hrs PRN for anxiety, lorazepam 2mg PO q8hrs PRN for anxiety or 2mg IM for assaultive behavior. Melatonin 5mg qHS PRN for insomnia  - Discussed ECT several times with pt who states she has been told in two hospitals in past that due to her obesity and asthma, she is not an ideal candidate.  Pt states that due to this she is not interested in receiving ECT at this time.  3.) Medical:   - Pt scheduled for dental extractions 11/1, however declined to attend on day prior when informed it would not be under general anesthesia.  - last clozapine labs on 10/23 (CBC with diff, troponins, CRP); CRP elevated at 15.8 likely due to dental infection/wisdom tooth issues  - pantoprazole 40mg PO before breakfast for GERD  - montelukast 10mg PO qHS for asthma  - budesonide 80mcg/formoterol 4.5mcg 2 puffs BID for asthma  - PRN: acetaminophen 650mg PO q6hrs PRN for pain, albuterol 90mcg 2puffs q6hrs PRN for dyspnea, ondansetron 4mg q6hrs PRN for nausea  - Discussed CRP with medicine service, given asymptomatic pt and normal troponin, no additional intervention/diagnostics at this time.  - Discontinued clobetasol as rash has resolved.  4.) Dispo planning: State application submitted.

## 2024-01-16 NOTE — BH INPATIENT PSYCHIATRY PROGRESS NOTE - NSBHMETABOLIC_PSY_ALL_CORE_FT
BMI: BMI (kg/m2): 57.8 (09-14-23 @ 14:30)  HbA1c: A1C with Estimated Average Glucose Result: 5.2 % (11-01-23 @ 09:04)    Glucose:   BP: --Vital Signs Last 24 Hrs  T(C): 36.8 (01-16-24 @ 08:06), Max: 36.8 (01-15-24 @ 20:30)  T(F): 98.2 (01-16-24 @ 08:06), Max: 98.3 (01-15-24 @ 20:30)  HR: --  BP: --  BP(mean): --  RR: --  SpO2: --    Orthostatic VS  01-16-24 @ 08:06  Lying BP: --/-- HR: --  Sitting BP: 127/71 HR: 78  Standing BP: 148/81 HR: 85  Site: --  Mode: --  Orthostatic VS  01-15-24 @ 20:30  Lying BP: --/-- HR: --  Sitting BP: 129/73 HR: 107  Standing BP: 134/81 HR: 111  Site: --  Mode: --  Orthostatic VS  01-15-24 @ 08:23  Lying BP: --/-- HR: --  Sitting BP: 131/57 HR: 90  Standing BP: 129/84 HR: 101  Site: --  Mode: --  Orthostatic VS  01-14-24 @ 20:42  Lying BP: --/-- HR: --  Sitting BP: 127/82 HR: 110  Standing BP: 141/92 HR: 112  Site: --  Mode: --    Lipid Panel: Date/Time: 11-01-23 @ 09:04  Cholesterol, Serum: 156  LDL Cholesterol Calculated: 82  HDL Cholesterol, Serum: 58  Total Cholesterol/HDL Ration Measurement: --  Triglycerides, Serum: 79

## 2024-01-16 NOTE — BH INPATIENT PSYCHIATRY PROGRESS NOTE - NSBHFUPINTERVALHXFT_PSY_A_CORE
f/up mood, care discussed w/ tx team, RONNIE.  Patient out in the milieu sitting by the phones much of the day per staff report, reported that she is "happy" about her housing interview this Thursday and states she prefers to live in the [place]. Pt states the interview is via video call and she is excited about it.  Patient reported sleeping well and with good appetite. denied SI/I/P or A/VH. Taking medications, no td, eps or tremors observed/ reported. 1/16/24  ANC = 7.04, CRP appears chronically elevated and stable since admission; will continue to trend and monitor. Pt denies any s/s of cardiac concerns. Li+ level = 0.7 f/up mood, care discussed w/ tx team, RONNIE.  Patient out in the milieu sitting by the phones much of the day per staff report, reported that she is "happy" about her housing interview this Thursday and states she prefers to live in the [place]. Pt states the interview is via video call and she is excited about it.  Patient reported sleeping well and with good appetite. denied SI/I/P or A/VH. Taking medications, no td, eps or tremors observed/ reported. 1/16/24  ANC = 7.04, CRP appears chronically elevated and stable since admission; will continue to trend and monitor. Pt denies any s/s of cardiac concerns. Li+ level = 0.7

## 2024-01-17 PROCEDURE — 90853 GROUP PSYCHOTHERAPY: CPT

## 2024-01-17 PROCEDURE — 99232 SBSQ HOSP IP/OBS MODERATE 35: CPT

## 2024-01-17 RX ADMIN — DESMOPRESSIN ACETATE 0.1 MILLIGRAM(S): 0.1 TABLET ORAL at 21:03

## 2024-01-17 RX ADMIN — Medication 2 MILLIGRAM(S): at 21:04

## 2024-01-17 RX ADMIN — GABAPENTIN 300 MILLIGRAM(S): 400 CAPSULE ORAL at 21:04

## 2024-01-17 RX ADMIN — CLOZAPINE 300 MILLIGRAM(S): 150 TABLET, ORALLY DISINTEGRATING ORAL at 21:04

## 2024-01-17 RX ADMIN — BUDESONIDE AND FORMOTEROL FUMARATE DIHYDRATE 2 PUFF(S): 160; 4.5 AEROSOL RESPIRATORY (INHALATION) at 09:40

## 2024-01-17 RX ADMIN — BUDESONIDE AND FORMOTEROL FUMARATE DIHYDRATE 2 PUFF(S): 160; 4.5 AEROSOL RESPIRATORY (INHALATION) at 21:03

## 2024-01-17 RX ADMIN — LITHIUM CARBONATE 1200 MILLIGRAM(S): 300 TABLET, EXTENDED RELEASE ORAL at 21:03

## 2024-01-17 RX ADMIN — GABAPENTIN 300 MILLIGRAM(S): 400 CAPSULE ORAL at 09:40

## 2024-01-17 RX ADMIN — MONTELUKAST 10 MILLIGRAM(S): 4 TABLET, CHEWABLE ORAL at 21:04

## 2024-01-17 RX ADMIN — ESCITALOPRAM OXALATE 20 MILLIGRAM(S): 10 TABLET, FILM COATED ORAL at 09:40

## 2024-01-17 RX ADMIN — BUPROPION HYDROCHLORIDE 150 MILLIGRAM(S): 150 TABLET, EXTENDED RELEASE ORAL at 09:41

## 2024-01-17 NOTE — BH PSYCHOLOGY - GROUP THERAPY NOTE - NSBHPSYCHOLRESPCOMMENT_PSY_A_CORE FT
The patient appeared adequately groomed and casually dressed. Patient appeared very engaged in group reflecting on how different obstacles throughout her life have impacted her, as well as how she has learned different ways to use radical acceptance and tolerate her emotions. Pt shared feelings that come up with being on the unit longer than many others. Speech was WNL. Pt was oriented X3. Pt was appropriate with peers.

## 2024-01-17 NOTE — BH INPATIENT PSYCHIATRY PROGRESS NOTE - NSBHCHARTREVIEWVS_PSY_A_CORE FT
Vital Signs Last 24 Hrs  T(C): 36.2 (01-17-24 @ 08:14), Max: 36.4 (01-16-24 @ 20:26)  T(F): 97.1 (01-17-24 @ 08:14), Max: 97.6 (01-16-24 @ 20:26)  HR: --  BP: --  BP(mean): --  RR: --  SpO2: --    Orthostatic VS  01-17-24 @ 08:14  Lying BP: --/-- HR: --  Sitting BP: 126/76 HR: 100  Standing BP: 131/79 HR: 106  Site: --  Mode: --  Orthostatic VS  01-16-24 @ 20:26  Lying BP: --/-- HR: --  Sitting BP: 132/79 HR: 98  Standing BP: 141/82 HR: 110  Site: --  Mode: --  Orthostatic VS  01-16-24 @ 08:06  Lying BP: --/-- HR: --  Sitting BP: 127/71 HR: 78  Standing BP: 148/81 HR: 85  Site: --  Mode: --  Orthostatic VS  01-15-24 @ 20:30  Lying BP: --/-- HR: --  Sitting BP: 129/73 HR: 107  Standing BP: 134/81 HR: 111  Site: --  Mode: --

## 2024-01-17 NOTE — BH INPATIENT PSYCHIATRY PROGRESS NOTE - NSBHFUPINTERVALHXFT_PSY_A_CORE
f/up mood, care discussed w/ tx team, RONNIE. labs reviewd, chart reviewed,  Patient out in the milieu, reported that she is ready for her interview tomorrow.  Patient reported sleeping well and with good appetite. denied dizziness or constipation. denied SI/I/P. Taking medications, no td, eps or tremors observed/ reported.

## 2024-01-17 NOTE — BH INPATIENT PSYCHIATRY PROGRESS NOTE - NSBHATTESTATTENDCOLLABFT_PSY_A_CORE
Hernán
Hernán
Keith
Hernán
Mendelowitz
Mendelowitz
Hernán

## 2024-01-17 NOTE — BH INPATIENT PSYCHIATRY PROGRESS NOTE - NSBHMETABOLIC_PSY_ALL_CORE_FT
BMI: BMI (kg/m2): 57.8 (09-14-23 @ 14:30)  HbA1c: A1C with Estimated Average Glucose Result: 5.2 % (11-01-23 @ 09:04)    Glucose:   BP: --Vital Signs Last 24 Hrs  T(C): 36.2 (01-17-24 @ 08:14), Max: 36.4 (01-16-24 @ 20:26)  T(F): 97.1 (01-17-24 @ 08:14), Max: 97.6 (01-16-24 @ 20:26)  HR: --  BP: --  BP(mean): --  RR: --  SpO2: --    Orthostatic VS  01-17-24 @ 08:14  Lying BP: --/-- HR: --  Sitting BP: 126/76 HR: 100  Standing BP: 131/79 HR: 106  Site: --  Mode: --  Orthostatic VS  01-16-24 @ 20:26  Lying BP: --/-- HR: --  Sitting BP: 132/79 HR: 98  Standing BP: 141/82 HR: 110  Site: --  Mode: --  Orthostatic VS  01-16-24 @ 08:06  Lying BP: --/-- HR: --  Sitting BP: 127/71 HR: 78  Standing BP: 148/81 HR: 85  Site: --  Mode: --  Orthostatic VS  01-15-24 @ 20:30  Lying BP: --/-- HR: --  Sitting BP: 129/73 HR: 107  Standing BP: 134/81 HR: 111  Site: --  Mode: --    Lipid Panel: Date/Time: 11-01-23 @ 09:04  Cholesterol, Serum: 156  LDL Cholesterol Calculated: 82  HDL Cholesterol, Serum: 58  Total Cholesterol/HDL Ration Measurement: --  Triglycerides, Serum: 79

## 2024-01-17 NOTE — BH INPATIENT PSYCHIATRY PROGRESS NOTE - NSBHASSESSSUMMFT_PSY_ALL_CORE
Patient is 27yo single woman, no dependents, domiciled with her Grandmother, unemployed on disability/SSI, pphx of schizoaffective disorder, multiple past psychiatric admissions including state and recent discharge from [facility], [facility], with pmhx of asthma, HTN, GERD, and hypothyroidism and schizoaffective disorder, in tx with [facility], who self presented to the ED reporting abdominal pain and requesting readmission to psychiatric unit, reporting CAH, depressive symptoms, IOR, suicidality, admitted to [facility] 2N for psychiatric care.  Depression and psychosis likely multifactorial at this time, schizoaffective vs. borderline personality disorder vs. complex grief vs. adjustment disorder.      on assessment, patient continue to show improvements in mood, thoughts and functioning. housing interview tomorrow. clozapine level 416 on 1/4/24.     1.) Obs: Stop 1:1, patient with decrease in SI.  She has been able to approach staff when needed.  2.) Psychiatric medication management:  - Reviewed options for addressing nocturnal enuresis including reducing clozapine vs reducing lithium vs adding desmopressin.  Pt decided upon desmopressin as she continues to have depression and psychosis.  Desmopressin 0.1 mg QHS.  - Lithobid 1200 mg QHS.  Pt aware that she needs to stay well hydrated and avoid diuretics and NSAIDs. lithium level 0.7 on 1/16  - Discontinued olanzapine  - Increase Clozapine to 300mg qHS for psychosis ([id number]), level 416 on 1/4/24  - Prazosin 2mg qhs, monitor BP carefully.  - Abilify Maintena 400mg IM q3 weeks rather than 4, per collateral from ACT team.  Doses given 10/19, 11/17, 12/8, 12/29, next on 1/19  - Mirtazapine was switched to escitalopram, increased to 20 mg daily.  - Wellbutrin XL 150mg daily  - Gabapentin 300 mg BID for anxiety/chronic pain.  - PRN: haloperidol 5mg PO/IM q8hrs PRN for agitation, diphenhydramine 50mg PO/IM q6hrs PRN for EPS PPx, hydroxyzine 50mg PO q12hrs PRN for anxiety, lorazepam 2mg PO q8hrs PRN for anxiety or 2mg IM for assaultive behavior. Melatonin 5mg qHS PRN for insomnia  - Discussed ECT several times with pt who states she has been told in two hospitals in past that due to her obesity and asthma, she is not an ideal candidate.  Pt states that due to this she is not interested in receiving ECT at this time.  3.) Medical:   - Pt scheduled for dental extractions 11/1, however declined to attend on day prior when informed it would not be under general anesthesia.  - last clozapine labs on 10/23 (CBC with diff, troponins, CRP); CRP elevated at 15.8 likely due to dental infection/wisdom tooth issues  - pantoprazole 40mg PO before breakfast for GERD  - montelukast 10mg PO qHS for asthma  - budesonide 80mcg/formoterol 4.5mcg 2 puffs BID for asthma  - PRN: acetaminophen 650mg PO q6hrs PRN for pain, albuterol 90mcg 2puffs q6hrs PRN for dyspnea, ondansetron 4mg q6hrs PRN for nausea  - Discussed CRP with medicine service, given asymptomatic pt and normal troponin, no additional intervention/diagnostics at this time.  - Discontinued clobetasol as rash has resolved.  4.) Dispo planning: State application submitted.

## 2024-01-18 PROCEDURE — 99232 SBSQ HOSP IP/OBS MODERATE 35: CPT

## 2024-01-18 RX ADMIN — GABAPENTIN 300 MILLIGRAM(S): 400 CAPSULE ORAL at 09:16

## 2024-01-18 RX ADMIN — CLOZAPINE 300 MILLIGRAM(S): 150 TABLET, ORALLY DISINTEGRATING ORAL at 21:36

## 2024-01-18 RX ADMIN — ESCITALOPRAM OXALATE 20 MILLIGRAM(S): 10 TABLET, FILM COATED ORAL at 09:16

## 2024-01-18 RX ADMIN — DESMOPRESSIN ACETATE 0.1 MILLIGRAM(S): 0.1 TABLET ORAL at 21:36

## 2024-01-18 RX ADMIN — Medication 2 MILLIGRAM(S): at 21:36

## 2024-01-18 RX ADMIN — ONDANSETRON 4 MILLIGRAM(S): 8 TABLET, FILM COATED ORAL at 18:00

## 2024-01-18 RX ADMIN — LITHIUM CARBONATE 1200 MILLIGRAM(S): 300 TABLET, EXTENDED RELEASE ORAL at 21:36

## 2024-01-18 RX ADMIN — BUDESONIDE AND FORMOTEROL FUMARATE DIHYDRATE 2 PUFF(S): 160; 4.5 AEROSOL RESPIRATORY (INHALATION) at 09:16

## 2024-01-18 RX ADMIN — PANTOPRAZOLE SODIUM 40 MILLIGRAM(S): 20 TABLET, DELAYED RELEASE ORAL at 09:17

## 2024-01-18 RX ADMIN — GABAPENTIN 300 MILLIGRAM(S): 400 CAPSULE ORAL at 21:36

## 2024-01-18 RX ADMIN — MONTELUKAST 10 MILLIGRAM(S): 4 TABLET, CHEWABLE ORAL at 21:36

## 2024-01-18 RX ADMIN — BUDESONIDE AND FORMOTEROL FUMARATE DIHYDRATE 2 PUFF(S): 160; 4.5 AEROSOL RESPIRATORY (INHALATION) at 21:37

## 2024-01-18 RX ADMIN — BUPROPION HYDROCHLORIDE 150 MILLIGRAM(S): 150 TABLET, EXTENDED RELEASE ORAL at 09:16

## 2024-01-18 NOTE — BH INPATIENT PSYCHIATRY PROGRESS NOTE - NSBHASSESSSUMMFT_PSY_ALL_CORE
Patient is 27yo single woman, no dependents, domiciled with her Grandmother, unemployed on disability/SSI, pphx of schizoaffective disorder, multiple past psychiatric admissions including state and recent discharge from [facility], in [facility], with pmhx of asthma, HTN, GERD, and hypothyroidism and schizoaffective disorder, in tx with [facility], who self presented to the ED reporting abdominal pain and requesting readmission to psychiatric unit, reporting CAH, depressive symptoms, IOR, suicidality, admitted to [facility] 2N for psychiatric care.  Depression and psychosis likely multifactorial at this time, schizoaffective vs. borderline personality disorder vs. complex grief vs. adjustment disorder.      Overall improved, dispo planning continues.     1.) Obs: Continue routine checks.  No need for 1:1.  2.) Psychiatric medication management:  - Reviewed options for addressing nocturnal enuresis including reducing clozapine vs reducing lithium vs adding desmopressin.  Pt decided upon desmopressin as she continues to have depression and psychosis.  Desmopressin 0.1 mg QHS.  - Lithobid 1200 mg QHS.  Pt aware that she needs to stay well hydrated and avoid diuretics and NSAIDs. lithium level 0.7 on 1/16  - Discontinued olanzapine  - Increase Clozapine to 300mg qHS for psychosis ([id number]), level 416 on 1/4/24  - Prazosin 2mg qhs, monitor BP carefully.  - Abilify Maintena 400mg IM q3 weeks rather than 4, per collateral from ACT team.  Doses given 10/19, 11/17, 12/8, 12/29, next on 1/19  - Mirtazapine was switched to escitalopram, increased to 20 mg daily.  - Wellbutrin XL 150mg daily  - Gabapentin 300 mg BID for anxiety/chronic pain.  - PRN: haloperidol 5mg PO/IM q8hrs PRN for agitation, diphenhydramine 50mg PO/IM q6hrs PRN for EPS PPx, hydroxyzine 50mg PO q12hrs PRN for anxiety, lorazepam 2mg PO q8hrs PRN for anxiety or 2mg IM for assaultive behavior. Melatonin 5mg qHS PRN for insomnia  - Discussed ECT several times with pt who states she has been told in two hospitals in past that due to her obesity and asthma, she is not an ideal candidate.  Pt states that due to this she is not interested in receiving ECT at this time.  3.) Medical:   - Pt scheduled for dental extractions 11/1, however declined to attend on day prior when informed it would not be under general anesthesia.  - last clozapine labs on 10/23 (CBC with diff, troponins, CRP); CRP elevated at 15.8 likely due to dental infection/wisdom tooth issues  - pantoprazole 40mg PO before breakfast for GERD  - montelukast 10mg PO qHS for asthma  - budesonide 80mcg/formoterol 4.5mcg 2 puffs BID for asthma  - PRN: acetaminophen 650mg PO q6hrs PRN for pain, albuterol 90mcg 2puffs q6hrs PRN for dyspnea, ondansetron 4mg q6hrs PRN for nausea  - Discussed CRP with medicine service, given asymptomatic pt and normal troponin, no additional intervention/diagnostics at this time.  - Discontinued clobetasol as rash has resolved.  4.) Dispo planning: Housing interview today.

## 2024-01-18 NOTE — BH INPATIENT PSYCHIATRY PROGRESS NOTE - MSE UNSTRUCTURED FT
Appearance: Dressed appropriately.  Good hygiene and grooming.   Behavior: Cooperative.  Calm, well related, good eye contact.   Motor: No abnormal movements, no psychomotor slowing or activation.  Speech: Regular rate.  Mood: "Good."  Affect: Pleasant.  Thought Process: Linear.  Associations: Fair.  Thought Content: No AVH, SIIP, HIIP reported.  Future oriented regarding housing.   Insight: Fair.  Judgment: Fair on interview.  Attention: Fair.  Language: Fluent.  Gait: Intact.

## 2024-01-18 NOTE — BH INPATIENT PSYCHIATRY PROGRESS NOTE - NSBHMETABOLIC_PSY_ALL_CORE_FT
BMI: BMI (kg/m2): 57.8 (09-14-23 @ 14:30)  HbA1c: A1C with Estimated Average Glucose Result: 5.2 % (11-01-23 @ 09:04)    Glucose:   BP: --Vital Signs Last 24 Hrs  T(C): 36.7 (01-18-24 @ 07:42), Max: 36.7 (01-18-24 @ 07:42)  T(F): 98.1 (01-18-24 @ 07:42), Max: 98.1 (01-18-24 @ 07:42)  HR: --  BP: --  BP(mean): --  RR: --  SpO2: --    Orthostatic VS  01-18-24 @ 07:42  Lying BP: --/-- HR: --  Sitting BP: 116/69 HR: 90  Standing BP: 121/71 HR: 88  Site: --  Mode: --  Orthostatic VS  01-17-24 @ 20:37  Lying BP: --/-- HR: --  Sitting BP: 108/83 HR: 98  Standing BP: 142/88 HR: 109  Site: --  Mode: --  Orthostatic VS  01-17-24 @ 08:14  Lying BP: --/-- HR: --  Sitting BP: 126/76 HR: 100  Standing BP: 131/79 HR: 106  Site: --  Mode: --  Orthostatic VS  01-16-24 @ 20:26  Lying BP: --/-- HR: --  Sitting BP: 132/79 HR: 98  Standing BP: 141/82 HR: 110  Site: --  Mode: --    Lipid Panel: Date/Time: 11-01-23 @ 09:04  Cholesterol, Serum: 156  LDL Cholesterol Calculated: 82  HDL Cholesterol, Serum: 58  Total Cholesterol/HDL Ration Measurement: --  Triglycerides, Serum: 79

## 2024-01-18 NOTE — BH INPATIENT PSYCHIATRY PROGRESS NOTE - NSBHCHARTREVIEWVS_PSY_A_CORE FT
Vital Signs Last 24 Hrs  T(C): 36.7 (01-18-24 @ 07:42), Max: 36.7 (01-18-24 @ 07:42)  T(F): 98.1 (01-18-24 @ 07:42), Max: 98.1 (01-18-24 @ 07:42)  HR: --  BP: --  BP(mean): --  RR: --  SpO2: --    Orthostatic VS  01-18-24 @ 07:42  Lying BP: --/-- HR: --  Sitting BP: 116/69 HR: 90  Standing BP: 121/71 HR: 88  Site: --  Mode: --  Orthostatic VS  01-17-24 @ 20:37  Lying BP: --/-- HR: --  Sitting BP: 108/83 HR: 98  Standing BP: 142/88 HR: 109  Site: --  Mode: --  Orthostatic VS  01-17-24 @ 08:14  Lying BP: --/-- HR: --  Sitting BP: 126/76 HR: 100  Standing BP: 131/79 HR: 106  Site: --  Mode: --  Orthostatic VS  01-16-24 @ 20:26  Lying BP: --/-- HR: --  Sitting BP: 132/79 HR: 98  Standing BP: 141/82 HR: 110  Site: --  Mode: --

## 2024-01-19 PROCEDURE — 99232 SBSQ HOSP IP/OBS MODERATE 35: CPT

## 2024-01-19 RX ORDER — ARIPIPRAZOLE 15 MG/1
400 TABLET ORAL ONCE
Refills: 0 | Status: COMPLETED | OUTPATIENT
Start: 2024-01-19 | End: 2024-01-19

## 2024-01-19 RX ORDER — CEPHALEXIN 500 MG
500 CAPSULE ORAL THREE TIMES A DAY
Refills: 0 | Status: COMPLETED | OUTPATIENT
Start: 2024-01-19 | End: 2024-01-26

## 2024-01-19 RX ADMIN — DESMOPRESSIN ACETATE 0.1 MILLIGRAM(S): 0.1 TABLET ORAL at 20:50

## 2024-01-19 RX ADMIN — ESCITALOPRAM OXALATE 20 MILLIGRAM(S): 10 TABLET, FILM COATED ORAL at 09:25

## 2024-01-19 RX ADMIN — GABAPENTIN 300 MILLIGRAM(S): 400 CAPSULE ORAL at 09:25

## 2024-01-19 RX ADMIN — MONTELUKAST 10 MILLIGRAM(S): 4 TABLET, CHEWABLE ORAL at 20:51

## 2024-01-19 RX ADMIN — LITHIUM CARBONATE 1200 MILLIGRAM(S): 300 TABLET, EXTENDED RELEASE ORAL at 20:51

## 2024-01-19 RX ADMIN — Medication 500 MILLIGRAM(S): at 20:51

## 2024-01-19 RX ADMIN — BUPROPION HYDROCHLORIDE 150 MILLIGRAM(S): 150 TABLET, EXTENDED RELEASE ORAL at 09:26

## 2024-01-19 RX ADMIN — Medication 2 MILLIGRAM(S): at 20:51

## 2024-01-19 RX ADMIN — CLOZAPINE 300 MILLIGRAM(S): 150 TABLET, ORALLY DISINTEGRATING ORAL at 20:51

## 2024-01-19 RX ADMIN — PANTOPRAZOLE SODIUM 40 MILLIGRAM(S): 20 TABLET, DELAYED RELEASE ORAL at 09:25

## 2024-01-19 RX ADMIN — GABAPENTIN 300 MILLIGRAM(S): 400 CAPSULE ORAL at 20:50

## 2024-01-19 RX ADMIN — ARIPIPRAZOLE 400 MILLIGRAM(S): 15 TABLET ORAL at 16:27

## 2024-01-19 RX ADMIN — BUDESONIDE AND FORMOTEROL FUMARATE DIHYDRATE 2 PUFF(S): 160; 4.5 AEROSOL RESPIRATORY (INHALATION) at 20:51

## 2024-01-19 NOTE — BH INPATIENT PSYCHIATRY PROGRESS NOTE - NSBHMETABOLIC_PSY_ALL_CORE_FT
BMI: BMI (kg/m2): 57.8 (09-14-23 @ 14:30)  HbA1c: A1C with Estimated Average Glucose Result: 5.2 % (11-01-23 @ 09:04)    Glucose:   BP: --Vital Signs Last 24 Hrs  T(C): 36.6 (01-19-24 @ 08:12), Max: 36.8 (01-18-24 @ 20:31)  T(F): 97.8 (01-19-24 @ 08:12), Max: 98.2 (01-18-24 @ 20:31)  HR: --  BP: --  BP(mean): --  RR: --  SpO2: --    Orthostatic VS  01-19-24 @ 08:12  Lying BP: --/-- HR: --  Sitting BP: 138/93 HR: 97  Standing BP: 148/70 HR: 90  Site: --  Mode: --  Orthostatic VS  01-18-24 @ 20:31  Lying BP: --/-- HR: --  Sitting BP: 133/80 HR: 98  Standing BP: 120/66 HR: 98  Site: --  Mode: --  Orthostatic VS  01-18-24 @ 07:42  Lying BP: --/-- HR: --  Sitting BP: 116/69 HR: 90  Standing BP: 121/71 HR: 88  Site: --  Mode: --  Orthostatic VS  01-17-24 @ 20:37  Lying BP: --/-- HR: --  Sitting BP: 108/83 HR: 98  Standing BP: 142/88 HR: 109  Site: --  Mode: --    Lipid Panel: Date/Time: 11-01-23 @ 09:04  Cholesterol, Serum: 156  LDL Cholesterol Calculated: 82  HDL Cholesterol, Serum: 58  Total Cholesterol/HDL Ration Measurement: --  Triglycerides, Serum: 79

## 2024-01-19 NOTE — BH INPATIENT PSYCHIATRY PROGRESS NOTE - CURRENT MEDICATION
MEDICATIONS  (STANDING):  budesonide  80 MICROgram(s)/formoterol 4.5 MICROgram(s) Inhaler 2 Puff(s) Inhalation two times a day  buPROPion XL (24-Hour) 150 milliGRAM(s) Oral daily  cloZAPine 300 milliGRAM(s) Oral at bedtime  desmopressin 0.1 milliGRAM(s) Oral at bedtime  escitalopram 20 milliGRAM(s) Oral daily  gabapentin 300 milliGRAM(s) Oral two times a day  influenza   Vaccine 0.5 milliLiter(s) IntraMuscular once  lithium SR (LITHOBID) 1200 milliGRAM(s) Oral at bedtime  montelukast 10 milliGRAM(s) Oral at bedtime  pantoprazole    Tablet 40 milliGRAM(s) Oral before breakfast  prazosin. 2 milliGRAM(s) Oral at bedtime  sulindac 150 milliGRAM(s) Oral once    MEDICATIONS  (PRN):  acetaminophen     Tablet .. 650 milliGRAM(s) Oral every 6 hours PRN Moderate Pain (4 - 6)  albuterol    90 MICROgram(s) HFA Inhaler 2 Puff(s) Inhalation every 6 hours PRN Shortness of Breath and/or Wheezing  aluminum hydroxide/magnesium hydroxide/simethicone Suspension 30 milliLiter(s) Oral every 4 hours PRN Dyspepsia  chlorproMAZINE    Injectable 50 milliGRAM(s) IntraMuscular once PRN severe agitation  chlorproMAZINE    Tablet 50 milliGRAM(s) Oral every 6 hours PRN agitation  hydrOXYzine hydrochloride 50 milliGRAM(s) Oral every 12 hours PRN Anxiety  lidocaine   4% Patch 1 Patch Transdermal daily PRN LBP  LORazepam     Tablet 2 milliGRAM(s) Oral every 8 hours PRN Anxiety  LORazepam   Injectable 2 milliGRAM(s) IntraMuscular once PRN Assaultive behavior  melatonin. 5 milliGRAM(s) Oral at bedtime PRN Insomnia  ondansetron    Tablet 4 milliGRAM(s) Oral every 6 hours PRN nausea  ondansetron    Tablet 4 milliGRAM(s) Oral every 6 hours PRN nausea  polyethylene glycol 3350 17 Gram(s) Oral daily PRN Constipation  simethicone 80 milliGRAM(s) Chew every 6 hours PRN gas   MEDICATIONS  (STANDING):  ARIPiprazole Injectable, Long Acting. (ABILIFY MAINTENA) 400 milliGRAM(s) IntraMuscular once  budesonide  80 MICROgram(s)/formoterol 4.5 MICROgram(s) Inhaler 2 Puff(s) Inhalation two times a day  buPROPion XL (24-Hour) 150 milliGRAM(s) Oral daily  cephalexin 500 milliGRAM(s) Oral three times a day  cloZAPine 300 milliGRAM(s) Oral at bedtime  desmopressin 0.1 milliGRAM(s) Oral at bedtime  escitalopram 20 milliGRAM(s) Oral daily  gabapentin 300 milliGRAM(s) Oral two times a day  influenza   Vaccine 0.5 milliLiter(s) IntraMuscular once  lithium SR (LITHOBID) 1200 milliGRAM(s) Oral at bedtime  montelukast 10 milliGRAM(s) Oral at bedtime  pantoprazole    Tablet 40 milliGRAM(s) Oral before breakfast  prazosin. 2 milliGRAM(s) Oral at bedtime  sulindac 150 milliGRAM(s) Oral once    MEDICATIONS  (PRN):  acetaminophen     Tablet .. 650 milliGRAM(s) Oral every 6 hours PRN Moderate Pain (4 - 6)  albuterol    90 MICROgram(s) HFA Inhaler 2 Puff(s) Inhalation every 6 hours PRN Shortness of Breath and/or Wheezing  aluminum hydroxide/magnesium hydroxide/simethicone Suspension 30 milliLiter(s) Oral every 4 hours PRN Dyspepsia  chlorproMAZINE    Injectable 50 milliGRAM(s) IntraMuscular once PRN severe agitation  chlorproMAZINE    Tablet 50 milliGRAM(s) Oral every 6 hours PRN agitation  hydrOXYzine hydrochloride 50 milliGRAM(s) Oral every 12 hours PRN Anxiety  lidocaine   4% Patch 1 Patch Transdermal daily PRN LBP  LORazepam     Tablet 2 milliGRAM(s) Oral every 8 hours PRN Anxiety  LORazepam   Injectable 2 milliGRAM(s) IntraMuscular once PRN Assaultive behavior  melatonin. 5 milliGRAM(s) Oral at bedtime PRN Insomnia  ondansetron    Tablet 4 milliGRAM(s) Oral every 6 hours PRN nausea  ondansetron    Tablet 4 milliGRAM(s) Oral every 6 hours PRN nausea  polyethylene glycol 3350 17 Gram(s) Oral daily PRN Constipation  simethicone 80 milliGRAM(s) Chew every 6 hours PRN gas

## 2024-01-19 NOTE — BH INPATIENT PSYCHIATRY PROGRESS NOTE - NSBHFUPINTERVALHXFT_PSY_A_CORE
Pt continues to report mild interpersonal conflicts with peer on unit over TV.  Pt otherwise states she is doing well without any return of previous symptoms.  States she has a painful bump on her abdomen.

## 2024-01-19 NOTE — BH INPATIENT PSYCHIATRY PROGRESS NOTE - NSBHCHARTREVIEWVS_PSY_A_CORE FT
Vital Signs Last 24 Hrs  T(C): 36.6 (01-19-24 @ 08:12), Max: 36.8 (01-18-24 @ 20:31)  T(F): 97.8 (01-19-24 @ 08:12), Max: 98.2 (01-18-24 @ 20:31)  HR: --  BP: --  BP(mean): --  RR: --  SpO2: --    Orthostatic VS  01-19-24 @ 08:12  Lying BP: --/-- HR: --  Sitting BP: 138/93 HR: 97  Standing BP: 148/70 HR: 90  Site: --  Mode: --  Orthostatic VS  01-18-24 @ 20:31  Lying BP: --/-- HR: --  Sitting BP: 133/80 HR: 98  Standing BP: 120/66 HR: 98  Site: --  Mode: --  Orthostatic VS  01-18-24 @ 07:42  Lying BP: --/-- HR: --  Sitting BP: 116/69 HR: 90  Standing BP: 121/71 HR: 88  Site: --  Mode: --  Orthostatic VS  01-17-24 @ 20:37  Lying BP: --/-- HR: --  Sitting BP: 108/83 HR: 98  Standing BP: 142/88 HR: 109  Site: --  Mode: --

## 2024-01-19 NOTE — BH INPATIENT PSYCHIATRY PROGRESS NOTE - NSBHASSESSSUMMFT_PSY_ALL_CORE
Patient is 29yo single woman, no dependents, domiciled with her Grandmother, unemployed on disability/SSI, pphx of schizoaffective disorder, multiple past psychiatric admissions including state and recent discharge from [facility], in tx with [facility], with pmhx of asthma, HTN, GERD, and hypothyroidism and schizoaffective disorder, in tx with [facility], who self presented to the ED reporting abdominal pain and requesting readmission to psychiatric unit, reporting CAH, depressive symptoms, IOR, suicidality, admitted to [facility] 2N for psychiatric care.  Depression and psychosis likely multifactorial at this time, schizoaffective vs. borderline personality disorder vs. complex grief vs. adjustment disorder.      Overall improved, dispo planning continues.     1.) Obs: Continue routine checks.  No need for 1:1.  2.) Psychiatric medication management:  - Reviewed options for addressing nocturnal enuresis including reducing clozapine vs reducing lithium vs adding desmopressin.  Pt decided upon desmopressin as she continues to have depression and psychosis.  Desmopressin 0.1 mg QHS.  - Lithobid 1200 mg QHS.  Pt aware that she needs to stay well hydrated and avoid diuretics and NSAIDs. lithium level 0.7 on 1/16  - Discontinued olanzapine  - Increase Clozapine to 300mg qHS for psychosis ([id number]), level 416 on 1/4/24  - Prazosin 2mg qhs, monitor BP carefully.  - Abilify Maintena 400mg IM q3 weeks rather than 4, per collateral from ACT team.  Doses given 10/19, 11/17, 12/8, 12/29, next on 1/19  - Mirtazapine was switched to escitalopram, increased to 20 mg daily.  - Wellbutrin XL 150mg daily  - Gabapentin 300 mg BID for anxiety/chronic pain.  - PRN: haloperidol 5mg PO/IM q8hrs PRN for agitation, diphenhydramine 50mg PO/IM q6hrs PRN for EPS PPx, hydroxyzine 50mg PO q12hrs PRN for anxiety, lorazepam 2mg PO q8hrs PRN for anxiety or 2mg IM for assaultive behavior. Melatonin 5mg qHS PRN for insomnia  - Discussed ECT several times with pt who states she has been told in two hospitals in past that due to her obesity and asthma, she is not an ideal candidate.  Pt states that due to this she is not interested in receiving ECT at this time.  3.) Medical:   - Pt scheduled for dental extractions 11/1, however declined to attend on day prior when informed it would not be under general anesthesia.  - last clozapine labs on 10/23 (CBC with diff, troponins, CRP); CRP elevated at 15.8 likely due to dental infection/wisdom tooth issues  - pantoprazole 40mg PO before breakfast for GERD  - montelukast 10mg PO qHS for asthma  - budesonide 80mcg/formoterol 4.5mcg 2 puffs BID for asthma  - PRN: acetaminophen 650mg PO q6hrs PRN for pain, albuterol 90mcg 2puffs q6hrs PRN for dyspnea, ondansetron 4mg q6hrs PRN for nausea  - Discussed CRP with medicine service, given asymptomatic pt and normal troponin, no additional intervention/diagnostics at this time.  - Discontinued clobetasol as rash has resolved.  - Possible folliculitis/furunculosis, will give Keflex 500 mg TID x 7 days with warm compresses.  4.) Dispo planning: Housing interview today. Patient is 27yo single woman, no dependents, domiciled with her Grandmother, unemployed on disability/SSI, pphx of schizoaffective disorder, multiple past psychiatric admissions including state and recent discharge from [facility], in tx with [facility], with pmhx of asthma, HTN, GERD, and hypothyroidism and schizoaffective disorder, in tx with [facility], who self presented to the ED reporting abdominal pain and requesting readmission to psychiatric unit, reporting CAH, depressive symptoms, IOR, suicidality, admitted to [facility] for psychiatric care.  Depression and psychosis likely multifactorial at this time, schizoaffective vs. borderline personality disorder vs. complex grief vs. adjustment disorder.      Overall improved, dispo planning continues.     1.) Obs: Continue routine checks.  No need for 1:1.  2.) Psychiatric medication management:  - Reviewed options for addressing nocturnal enuresis including reducing clozapine vs reducing lithium vs adding desmopressin.  Pt decided upon desmopressin as she continues to have depression and psychosis.  Desmopressin 0.1 mg QHS.  - Lithobid 1200 mg QHS.  Pt aware that she needs to stay well hydrated and avoid diuretics and NSAIDs. lithium level 0.7 on 1/16  - Discontinued olanzapine  - Increase Clozapine to 300mg qHS for psychosis ([id number]), level 416 on 1/4/24  - Prazosin 2mg qhs, monitor BP carefully.  - Abilify Maintena 400mg IM q3 weeks rather than 4, per collateral from ACT team.  Doses given 10/19, 11/17, 12/8, 12/29, 1/19, next due 2/9.  - Mirtazapine was switched to escitalopram, increased to 20 mg daily.  - Wellbutrin XL 150mg daily  - Gabapentin 300 mg BID for anxiety/chronic pain.  - PRN: haloperidol 5mg PO/IM q8hrs PRN for agitation, diphenhydramine 50mg PO/IM q6hrs PRN for EPS PPx, hydroxyzine 50mg PO q12hrs PRN for anxiety, lorazepam 2mg PO q8hrs PRN for anxiety or 2mg IM for assaultive behavior. Melatonin 5mg qHS PRN for insomnia  - Discussed ECT several times with pt who states she has been told in two hospitals in past that due to her obesity and asthma, she is not an ideal candidate.  Pt states that due to this she is not interested in receiving ECT at this time.  3.) Medical:   - Pt scheduled for dental extractions 11/1, however declined to attend on day prior when informed it would not be under general anesthesia.  - last clozapine labs on 10/23 (CBC with diff, troponins, CRP); CRP elevated at 15.8 likely due to dental infection/wisdom tooth issues  - pantoprazole 40mg PO before breakfast for GERD  - montelukast 10mg PO qHS for asthma  - budesonide 80mcg/formoterol 4.5mcg 2 puffs BID for asthma  - PRN: acetaminophen 650mg PO q6hrs PRN for pain, albuterol 90mcg 2puffs q6hrs PRN for dyspnea, ondansetron 4mg q6hrs PRN for nausea  - Discussed CRP with medicine service, given asymptomatic pt and normal troponin, no additional intervention/diagnostics at this time.  - Discontinued clobetasol as rash has resolved.  - Possible folliculitis/furunculosis, will give Keflex 500 mg TID x 7 days with warm compresses.  4.) Dispo planning: Housing interview today.

## 2024-01-19 NOTE — BH INPATIENT PSYCHIATRY PROGRESS NOTE - MSE UNSTRUCTURED FT
Appearance: Dressed appropriately.  Good hygiene and grooming.   Behavior: Cooperative.  Calm, well related, good eye contact.   Motor: No abnormal movements, no psychomotor slowing or activation.  Speech: Regular rate.  Mood: "Good."  Affect: Pleasant.  Thought Process: Linear.  Associations: Fair.  Thought Content: No AVH, SIIP, HIIP reported.   Insight: Fair.  Judgment: Fair on interview.  Attention: Fair.  Language: Fluent.  Gait: Intact.     PHYSICAL  Abdomen: small ecchymotic circular protrusion from LLQ, tender to palpation, no drainage noted.

## 2024-01-20 RX ADMIN — MONTELUKAST 10 MILLIGRAM(S): 4 TABLET, CHEWABLE ORAL at 20:34

## 2024-01-20 RX ADMIN — Medication 500 MILLIGRAM(S): at 13:52

## 2024-01-20 RX ADMIN — Medication 500 MILLIGRAM(S): at 20:35

## 2024-01-20 RX ADMIN — Medication 500 MILLIGRAM(S): at 08:50

## 2024-01-20 RX ADMIN — Medication 2 MILLIGRAM(S): at 21:37

## 2024-01-20 RX ADMIN — DESMOPRESSIN ACETATE 0.1 MILLIGRAM(S): 0.1 TABLET ORAL at 20:34

## 2024-01-20 RX ADMIN — BUPROPION HYDROCHLORIDE 150 MILLIGRAM(S): 150 TABLET, EXTENDED RELEASE ORAL at 08:50

## 2024-01-20 RX ADMIN — BUDESONIDE AND FORMOTEROL FUMARATE DIHYDRATE 2 PUFF(S): 160; 4.5 AEROSOL RESPIRATORY (INHALATION) at 08:51

## 2024-01-20 RX ADMIN — BUDESONIDE AND FORMOTEROL FUMARATE DIHYDRATE 2 PUFF(S): 160; 4.5 AEROSOL RESPIRATORY (INHALATION) at 20:33

## 2024-01-20 RX ADMIN — GABAPENTIN 300 MILLIGRAM(S): 400 CAPSULE ORAL at 20:34

## 2024-01-20 RX ADMIN — CLOZAPINE 300 MILLIGRAM(S): 150 TABLET, ORALLY DISINTEGRATING ORAL at 20:34

## 2024-01-20 RX ADMIN — PANTOPRAZOLE SODIUM 40 MILLIGRAM(S): 20 TABLET, DELAYED RELEASE ORAL at 08:50

## 2024-01-20 RX ADMIN — GABAPENTIN 300 MILLIGRAM(S): 400 CAPSULE ORAL at 08:50

## 2024-01-20 RX ADMIN — LITHIUM CARBONATE 1200 MILLIGRAM(S): 300 TABLET, EXTENDED RELEASE ORAL at 20:34

## 2024-01-20 RX ADMIN — ESCITALOPRAM OXALATE 20 MILLIGRAM(S): 10 TABLET, FILM COATED ORAL at 08:50

## 2024-01-21 RX ADMIN — Medication 2 MILLIGRAM(S): at 21:24

## 2024-01-21 RX ADMIN — CLOZAPINE 300 MILLIGRAM(S): 150 TABLET, ORALLY DISINTEGRATING ORAL at 21:22

## 2024-01-21 RX ADMIN — LITHIUM CARBONATE 1200 MILLIGRAM(S): 300 TABLET, EXTENDED RELEASE ORAL at 21:19

## 2024-01-21 RX ADMIN — Medication 500 MILLIGRAM(S): at 21:21

## 2024-01-21 RX ADMIN — Medication 500 MILLIGRAM(S): at 13:32

## 2024-01-21 RX ADMIN — ESCITALOPRAM OXALATE 20 MILLIGRAM(S): 10 TABLET, FILM COATED ORAL at 08:36

## 2024-01-21 RX ADMIN — BUDESONIDE AND FORMOTEROL FUMARATE DIHYDRATE 2 PUFF(S): 160; 4.5 AEROSOL RESPIRATORY (INHALATION) at 21:25

## 2024-01-21 RX ADMIN — BUPROPION HYDROCHLORIDE 150 MILLIGRAM(S): 150 TABLET, EXTENDED RELEASE ORAL at 08:36

## 2024-01-21 RX ADMIN — MONTELUKAST 10 MILLIGRAM(S): 4 TABLET, CHEWABLE ORAL at 21:19

## 2024-01-21 RX ADMIN — GABAPENTIN 300 MILLIGRAM(S): 400 CAPSULE ORAL at 21:20

## 2024-01-21 RX ADMIN — GABAPENTIN 300 MILLIGRAM(S): 400 CAPSULE ORAL at 08:37

## 2024-01-21 RX ADMIN — PANTOPRAZOLE SODIUM 40 MILLIGRAM(S): 20 TABLET, DELAYED RELEASE ORAL at 08:36

## 2024-01-21 RX ADMIN — DESMOPRESSIN ACETATE 0.1 MILLIGRAM(S): 0.1 TABLET ORAL at 21:20

## 2024-01-21 RX ADMIN — Medication 500 MILLIGRAM(S): at 08:36

## 2024-01-22 PROCEDURE — 99231 SBSQ HOSP IP/OBS SF/LOW 25: CPT

## 2024-01-22 RX ADMIN — Medication 500 MILLIGRAM(S): at 20:59

## 2024-01-22 RX ADMIN — BUPROPION HYDROCHLORIDE 150 MILLIGRAM(S): 150 TABLET, EXTENDED RELEASE ORAL at 09:44

## 2024-01-22 RX ADMIN — GABAPENTIN 300 MILLIGRAM(S): 400 CAPSULE ORAL at 20:54

## 2024-01-22 RX ADMIN — Medication 500 MILLIGRAM(S): at 09:44

## 2024-01-22 RX ADMIN — DESMOPRESSIN ACETATE 0.1 MILLIGRAM(S): 0.1 TABLET ORAL at 20:56

## 2024-01-22 RX ADMIN — LITHIUM CARBONATE 1200 MILLIGRAM(S): 300 TABLET, EXTENDED RELEASE ORAL at 20:55

## 2024-01-22 RX ADMIN — Medication 500 MILLIGRAM(S): at 12:58

## 2024-01-22 RX ADMIN — GABAPENTIN 300 MILLIGRAM(S): 400 CAPSULE ORAL at 09:43

## 2024-01-22 RX ADMIN — PANTOPRAZOLE SODIUM 40 MILLIGRAM(S): 20 TABLET, DELAYED RELEASE ORAL at 09:43

## 2024-01-22 RX ADMIN — ESCITALOPRAM OXALATE 20 MILLIGRAM(S): 10 TABLET, FILM COATED ORAL at 09:43

## 2024-01-22 RX ADMIN — BUDESONIDE AND FORMOTEROL FUMARATE DIHYDRATE 2 PUFF(S): 160; 4.5 AEROSOL RESPIRATORY (INHALATION) at 20:54

## 2024-01-22 RX ADMIN — MONTELUKAST 10 MILLIGRAM(S): 4 TABLET, CHEWABLE ORAL at 20:56

## 2024-01-22 RX ADMIN — BUDESONIDE AND FORMOTEROL FUMARATE DIHYDRATE 2 PUFF(S): 160; 4.5 AEROSOL RESPIRATORY (INHALATION) at 09:45

## 2024-01-22 RX ADMIN — CLOZAPINE 300 MILLIGRAM(S): 150 TABLET, ORALLY DISINTEGRATING ORAL at 20:55

## 2024-01-22 RX ADMIN — Medication 2 MILLIGRAM(S): at 20:56

## 2024-01-22 NOTE — BH INPATIENT PSYCHIATRY PROGRESS NOTE - NSBHASSESSSUMMFT_PSY_ALL_CORE
Patient is 29yo single woman, no dependents, domiciled with her Grandmother, unemployed on disability/SSI, pphx of schizoaffective disorder, multiple past psychiatric admissions including state and recent discharge from [facility], in tx with [facility], with pmhx of asthma, HTN, GERD, and hypothyroidism and schizoaffective disorder, in tx with [facility], who self presented to the ED reporting abdominal pain and requesting readmission to psychiatric unit, reporting CAH, depressive symptoms, IOR, suicidality, admitted to [facility] 2N for psychiatric care.  Depression and psychosis likely multifactorial at this time, schizoaffective vs. borderline personality disorder vs. complex grief vs. adjustment disorder.      Overall improved, dispo planning continues.     1.) Obs: Continue routine checks.  No need for 1:1.  2.) Psychiatric medication management:  - Reviewed options for addressing nocturnal enuresis including reducing clozapine vs reducing lithium vs adding desmopressin.  Pt decided upon desmopressin as she continues to have depression and psychosis.  Desmopressin 0.1 mg QHS.  - Lithobid 1200 mg QHS.  Pt aware that she needs to stay well hydrated and avoid diuretics and NSAIDs. lithium level 0.7 on   - Discontinued olanzapine  - Increase Clozapine to 300mg qHS for psychosis ([id number]), level 416 on 24  - Prazosin 2mg qhs, monitor BP carefully.  - Abilify Maintena 400mg IM q3 weeks rather than 4, per collateral from ACT team.  Doses given 10/19, , , , , next due .  - Mirtazapine was switched to escitalopram, increased to 20 mg daily.  - Wellbutrin XL 150mg daily  - Gabapentin 300 mg BID for anxiety/chronic pain.  - PRN: haloperidol 5mg PO/IM q8hrs PRN for agitation, diphenhydramine 50mg PO/IM q6hrs PRN for EPS PPx, hydroxyzine 50mg PO q12hrs PRN for anxiety, lorazepam 2mg PO q8hrs PRN for anxiety or 2mg IM for assaultive behavior. Melatonin 5mg qHS PRN for insomnia  - Discussed ECT several times with pt who states she has been told in two hospitals in past that due to her obesity and asthma, she is not an ideal candidate.  Pt states that due to this she is not interested in receiving ECT at this time.  3.) Medical:   - Pt scheduled for dental extractions , however declined to attend on day prior when informed it would not be under general anesthesia.  - last clozapine labs on 10/23 (CBC with diff, troponins, CRP); CRP elevated at 15.8 likely due to dental infection/wisdom tooth issues  - pantoprazole 40mg PO before breakfast for GERD  - montelukast 10mg PO qHS for asthma  - budesonide 80mcg/formoterol 4.5mcg 2 puffs BID for asthma  - PRN: acetaminophen 650mg PO q6hrs PRN for pain, albuterol 90mcg 2puffs q6hrs PRN for dyspnea, ondansetron 4mg q6hrs PRN for nausea  - Discussed CRP with medicine service, given asymptomatic pt and normal troponin, no additional intervention/diagnostics at this time.  - Discontinued clobetasol as rash has resolved.  - Possible folliculitis/furunculosis, will give Keflex 500 mg TID x 7 days with warm compresses.  4.) Dispo planninnd housing interview pending. Patient is 27yo single woman, no dependents, domiciled with her Grandmother, unemployed on disability/SSI, pphx of schizoaffective disorder, multiple past psychiatric admissions including state and recent discharge from [facility], in tx with [facility], with pmhx of asthma, HTN, GERD, and hypothyroidism and schizoaffective disorder, in tx with [facility], who self presented to the ED reporting abdominal pain and requesting readmission to psychiatric unit, reporting CAH, depressive symptoms, IOR, suicidality, admitted to [facility] 2N for psychiatric care.  Depression and psychosis likely multifactorial at this time, schizoaffective vs. borderline personality disorder vs. complex grief vs. adjustment disorder.      Remains improved in symptoms.  Dispo planning underway.    1.) Obs: Continue routine checks.  No need for 1:1.  2.) Psychiatric medication management:  - Reviewed options for addressing nocturnal enuresis including reducing clozapine vs reducing lithium vs adding desmopressin.  Pt decided upon desmopressin as she continues to have depression and psychosis.  Desmopressin 0.1 mg QHS.  - Lithobid 1200 mg QHS.  Pt aware that she needs to stay well hydrated and avoid diuretics and NSAIDs. lithium level 0.7 on   - Discontinued olanzapine  - Increase Clozapine to 300mg qHS for psychosis ([id number]), level 416 on 24  - Prazosin 2mg qhs, monitor BP carefully.  - Abilify Maintena 400mg IM q3 weeks rather than 4, per collateral from ACT team.  Doses given 10/19, , , , , next due .  - Mirtazapine was switched to escitalopram, increased to 20 mg daily.  - Wellbutrin XL 150mg daily  - Gabapentin 300 mg BID for anxiety/chronic pain.  - PRN: haloperidol 5mg PO/IM q8hrs PRN for agitation, diphenhydramine 50mg PO/IM q6hrs PRN for EPS PPx, hydroxyzine 50mg PO q12hrs PRN for anxiety, lorazepam 2mg PO q8hrs PRN for anxiety or 2mg IM for assaultive behavior. Melatonin 5mg qHS PRN for insomnia  - Discussed ECT several times with pt who states she has been told in two hospitals in past that due to her obesity and asthma, she is not an ideal candidate.  Pt states that due to this she is not interested in receiving ECT at this time.  3.) Medical:   - Pt scheduled for dental extractions , however declined to attend on day prior when informed it would not be under general anesthesia.  - last clozapine labs on 10/23 (CBC with diff, troponins, CRP); CRP elevated at 15.8 likely due to dental infection/wisdom tooth issues  - pantoprazole 40mg PO before breakfast for GERD  - montelukast 10mg PO qHS for asthma  - budesonide 80mcg/formoterol 4.5mcg 2 puffs BID for asthma  - PRN: acetaminophen 650mg PO q6hrs PRN for pain, albuterol 90mcg 2puffs q6hrs PRN for dyspnea, ondansetron 4mg q6hrs PRN for nausea  - Discussed CRP with medicine service, given asymptomatic pt and normal troponin, no additional intervention/diagnostics at this time.  - Discontinued clobetasol as rash has resolved.  - Possible folliculitis/furunculosis, will give Keflex 500 mg TID x 7 days with warm compresses.  4.) Dispo planninnd housing interview pending.

## 2024-01-22 NOTE — BH INPATIENT PSYCHIATRY PROGRESS NOTE - NSBHATTESTBILLING_PSY_A_CORE
[id number] OBS or IP - moderate complexity OR 35-49 mins 13714-[id number] OBS or IP - low complexity OR 25-34 mins

## 2024-01-22 NOTE — BH INPATIENT PSYCHIATRY PROGRESS NOTE - NSBHCHARTREVIEWVS_PSY_A_CORE FT
Vital Signs Last 24 Hrs  T(C): 36.8 (01-22-24 @ 07:40), Max: 37 (01-21-24 @ 20:58)  T(F): 98.3 (01-22-24 @ 07:40), Max: 98.6 (01-21-24 @ 20:58)  HR: --  BP: 136/78 (01-21-24 @ 20:58) (136/78 - 136/78)  BP(mean): 105 (01-21-24 @ 20:58) (105 - 105)  RR: --  SpO2: --    Orthostatic VS  01-22-24 @ 07:40  Lying BP: --/-- HR: --  Sitting BP: 119/68 HR: 81  Standing BP: 135/70 HR: 96  Site: --  Mode: --  Orthostatic VS  01-21-24 @ 08:10  Lying BP: --/-- HR: --  Sitting BP: 122/64 HR: 93  Standing BP: 134/90 HR: 101  Site: --  Mode: --  Orthostatic VS  01-20-24 @ 21:18  Lying BP: --/-- HR: --  Sitting BP: --/-- HR: --  Standing BP: 129/72 HR: 99  Site: --  Mode: --

## 2024-01-22 NOTE — BH INPATIENT PSYCHIATRY PROGRESS NOTE - CURRENT MEDICATION
MEDICATIONS  (STANDING):  budesonide  80 MICROgram(s)/formoterol 4.5 MICROgram(s) Inhaler 2 Puff(s) Inhalation two times a day  buPROPion XL (24-Hour) 150 milliGRAM(s) Oral daily  cephalexin 500 milliGRAM(s) Oral three times a day  cloZAPine 300 milliGRAM(s) Oral at bedtime  desmopressin 0.1 milliGRAM(s) Oral at bedtime  escitalopram 20 milliGRAM(s) Oral daily  gabapentin 300 milliGRAM(s) Oral two times a day  influenza   Vaccine 0.5 milliLiter(s) IntraMuscular once  lithium SR (LITHOBID) 1200 milliGRAM(s) Oral at bedtime  montelukast 10 milliGRAM(s) Oral at bedtime  pantoprazole    Tablet 40 milliGRAM(s) Oral before breakfast  prazosin. 2 milliGRAM(s) Oral at bedtime  sulindac 150 milliGRAM(s) Oral once    MEDICATIONS  (PRN):  acetaminophen     Tablet .. 650 milliGRAM(s) Oral every 6 hours PRN Moderate Pain (4 - 6)  albuterol    90 MICROgram(s) HFA Inhaler 2 Puff(s) Inhalation every 6 hours PRN Shortness of Breath and/or Wheezing  aluminum hydroxide/magnesium hydroxide/simethicone Suspension 30 milliLiter(s) Oral every 4 hours PRN Dyspepsia  chlorproMAZINE    Injectable 50 milliGRAM(s) IntraMuscular once PRN severe agitation  chlorproMAZINE    Tablet 50 milliGRAM(s) Oral every 6 hours PRN agitation  hydrOXYzine hydrochloride 50 milliGRAM(s) Oral every 12 hours PRN Anxiety  lidocaine   4% Patch 1 Patch Transdermal daily PRN LBP  LORazepam     Tablet 2 milliGRAM(s) Oral every 8 hours PRN Anxiety  LORazepam   Injectable 2 milliGRAM(s) IntraMuscular once PRN Assaultive behavior  melatonin. 5 milliGRAM(s) Oral at bedtime PRN Insomnia  ondansetron    Tablet 4 milliGRAM(s) Oral every 6 hours PRN nausea  ondansetron    Tablet 4 milliGRAM(s) Oral every 6 hours PRN nausea  polyethylene glycol 3350 17 Gram(s) Oral daily PRN Constipation  simethicone 80 milliGRAM(s) Chew every 6 hours PRN gas

## 2024-01-22 NOTE — BH INPATIENT PSYCHIATRY PROGRESS NOTE - NSBHFUPINTERVALHXFT_PSY_A_CORE
Pt states today that she has an upcoming second interview with housing and is hopeful it will go well.

## 2024-01-22 NOTE — BH INPATIENT PSYCHIATRY PROGRESS NOTE - MSE UNSTRUCTURED FT
Appearance: Dressed appropriately.  Good hygiene and grooming.   Behavior: Cooperative, calm, well related, good eye contact.   Motor: No abnormal movements, no psychomotor slowing or activation.  Speech: Regular rate.  Mood: "Good."  Affect: Pleasant.  Thought Process: Linear.  Associations: Fair.  Thought Content: No AVH, SIIP, HIIP reported.   Insight: Fair.  Judgment: Fair on interview.  Attention: Fair.  Language: Fluent.  Gait: Intact.

## 2024-01-22 NOTE — BH INPATIENT PSYCHIATRY PROGRESS NOTE - NSBHMETABOLIC_PSY_ALL_CORE_FT
BMI: BMI (kg/m2): 57.8 (09-14-23 @ 14:30)  HbA1c: A1C with Estimated Average Glucose Result: 5.2 % (11-01-23 @ 09:04)    Glucose:   BP: 136/78 (01-21-24 @ 20:58) (136/78 - 136/78)Vital Signs Last 24 Hrs  T(C): 36.8 (01-22-24 @ 07:40), Max: 37 (01-21-24 @ 20:58)  T(F): 98.3 (01-22-24 @ 07:40), Max: 98.6 (01-21-24 @ 20:58)  HR: --  BP: 136/78 (01-21-24 @ 20:58) (136/78 - 136/78)  BP(mean): 105 (01-21-24 @ 20:58) (105 - 105)  RR: --  SpO2: --    Orthostatic VS  01-22-24 @ 07:40  Lying BP: --/-- HR: --  Sitting BP: 119/68 HR: 81  Standing BP: 135/70 HR: 96  Site: --  Mode: --  Orthostatic VS  01-21-24 @ 08:10  Lying BP: --/-- HR: --  Sitting BP: 122/64 HR: 93  Standing BP: 134/90 HR: 101  Site: --  Mode: --  Orthostatic VS  01-20-24 @ 21:18  Lying BP: --/-- HR: --  Sitting BP: --/-- HR: --  Standing BP: 129/72 HR: 99  Site: --  Mode: --    Lipid Panel: Date/Time: 11-01-23 @ 09:04  Cholesterol, Serum: 156  LDL Cholesterol Calculated: 82  HDL Cholesterol, Serum: 58  Total Cholesterol/HDL Ration Measurement: --  Triglycerides, Serum: 79

## 2024-01-23 PROCEDURE — 99231 SBSQ HOSP IP/OBS SF/LOW 25: CPT

## 2024-01-23 RX ADMIN — Medication 2 MILLIGRAM(S): at 21:06

## 2024-01-23 RX ADMIN — LITHIUM CARBONATE 1200 MILLIGRAM(S): 300 TABLET, EXTENDED RELEASE ORAL at 21:04

## 2024-01-23 RX ADMIN — CLOZAPINE 300 MILLIGRAM(S): 150 TABLET, ORALLY DISINTEGRATING ORAL at 21:04

## 2024-01-23 RX ADMIN — GABAPENTIN 300 MILLIGRAM(S): 400 CAPSULE ORAL at 21:05

## 2024-01-23 RX ADMIN — BUPROPION HYDROCHLORIDE 150 MILLIGRAM(S): 150 TABLET, EXTENDED RELEASE ORAL at 09:33

## 2024-01-23 RX ADMIN — DESMOPRESSIN ACETATE 0.1 MILLIGRAM(S): 0.1 TABLET ORAL at 21:05

## 2024-01-23 RX ADMIN — PANTOPRAZOLE SODIUM 40 MILLIGRAM(S): 20 TABLET, DELAYED RELEASE ORAL at 09:33

## 2024-01-23 RX ADMIN — GABAPENTIN 300 MILLIGRAM(S): 400 CAPSULE ORAL at 09:33

## 2024-01-23 RX ADMIN — MONTELUKAST 10 MILLIGRAM(S): 4 TABLET, CHEWABLE ORAL at 21:05

## 2024-01-23 RX ADMIN — Medication 500 MILLIGRAM(S): at 14:06

## 2024-01-23 RX ADMIN — Medication 500 MILLIGRAM(S): at 09:33

## 2024-01-23 RX ADMIN — ESCITALOPRAM OXALATE 20 MILLIGRAM(S): 10 TABLET, FILM COATED ORAL at 09:33

## 2024-01-23 RX ADMIN — BUDESONIDE AND FORMOTEROL FUMARATE DIHYDRATE 2 PUFF(S): 160; 4.5 AEROSOL RESPIRATORY (INHALATION) at 09:33

## 2024-01-23 RX ADMIN — BUDESONIDE AND FORMOTEROL FUMARATE DIHYDRATE 2 PUFF(S): 160; 4.5 AEROSOL RESPIRATORY (INHALATION) at 21:06

## 2024-01-23 RX ADMIN — Medication 500 MILLIGRAM(S): at 21:05

## 2024-01-23 NOTE — BH PSYCHOLOGY - CLINICIAN PSYCHOTHERAPY NOTE - NSBHPSYCHOLNARRATIVE_PSY_A_CORE FT
Writer met with Pt for 20-minute individual therapy session. Pt was alert and presented with adequate hygiene and grooming. Pt reported feeling "good", affect full. Pt denied experiencing any A/V hallucinations. Her thought process was linear and goal directed. Pts’ speech was WNL, content was relevant to discussion. Pt denied passive or active SI, HI or NSSI. Oriented x3. Fair insight and judgement demonstrated.      Pt and Wr explored pts emotional response to waiting on housing visit approval, with pt sharing she was feeling hopeful and nervous. Normalized feelings of anxiety and nervousness and practiced expansion, while pointing out pts ability to tolerate feelings. Pt shared that she has not used PRN's in weeks - validated pts efforts in achieving one of her tx goals, and explored skills that have helped her in tolerating feelings of anxiety and uncertainty. Pt shared that she would "feel very sad" if housing fell through. Normalized feelings and went over [place] of control with pt writing down what would be in her control vs out of her control while waiting for an answer. Pt and Wr talked about pts shift in mood, as well as hope for the future. Instilled hope and resiliency pointing out areas that pt has worked on during her stay.

## 2024-01-23 NOTE — BH INPATIENT PSYCHIATRY PROGRESS NOTE - NSBHMETABOLIC_PSY_ALL_CORE_FT
BMI: BMI (kg/m2): 57.8 (09-14-23 @ 14:30)  HbA1c: A1C with Estimated Average Glucose Result: 5.2 % (11-01-23 @ 09:04)    Glucose:   BP: 136/78 (01-21-24 @ 20:58) (136/78 - 136/78)Vital Signs Last 24 Hrs  T(C): 36.9 (01-23-24 @ 09:15), Max: 37 (01-22-24 @ 20:37)  T(F): 98.4 (01-23-24 @ 09:15), Max: 98.6 (01-22-24 @ 20:37)  HR: --  BP: --  BP(mean): --  RR: 16 (01-23-24 @ 09:15) (16 - 16)  SpO2: --    Orthostatic VS  01-23-24 @ 09:15  Lying BP: --/-- HR: --  Sitting BP: 127/90 HR: 102  Standing BP: 143/89 HR: 112  Site: --  Mode: --  Orthostatic VS  01-22-24 @ 20:37  Lying BP: --/-- HR: --  Sitting BP: 145/76 HR: 106  Standing BP: 140/76 HR: 114  Site: --  Mode: --  Orthostatic VS  01-22-24 @ 07:40  Lying BP: --/-- HR: --  Sitting BP: 119/68 HR: 81  Standing BP: 135/70 HR: 96  Site: --  Mode: --    Lipid Panel: Date/Time: 11-01-23 @ 09:04  Cholesterol, Serum: 156  LDL Cholesterol Calculated: 82  HDL Cholesterol, Serum: 58  Total Cholesterol/HDL Ration Measurement: --  Triglycerides, Serum: 79

## 2024-01-23 NOTE — BH INPATIENT PSYCHIATRY PROGRESS NOTE - NSBHFUPINTERVALHXFT_PSY_A_CORE
Pt states she is hopeful she can go on site visit for housing.  Denies all symptoms on psychiatric ROS.

## 2024-01-23 NOTE — BH PSYCHOLOGY - CLINICIAN PSYCHOTHERAPY NOTE - NSBHPSYCHOLGOALS_PSY_A_CORE
Decrease symptoms/Improve level of independent functioning/Improve social/vocational/coping skills/Psychoeducation/Treatment compliance

## 2024-01-23 NOTE — BH INPATIENT PSYCHIATRY PROGRESS NOTE - NSBHASSESSSUMMFT_PSY_ALL_CORE
Patient is 29yo single woman, no dependents, domiciled with her Grandmother, unemployed on disability/SSI, pphx of schizoaffective disorder, multiple past psychiatric admissions including state and recent discharge from [facility], in tx with [facility], with pmhx of asthma, HTN, GERD, and hypothyroidism and schizoaffective disorder, in tx with [facility], who self presented to the ED reporting abdominal pain and requesting readmission to psychiatric unit, reporting CAH, depressive symptoms, IOR, suicidality, admitted to [facility] for psychiatric care.  Depression and psychosis likely multifactorial at this time, schizoaffective vs. borderline personality disorder vs. complex grief vs. adjustment disorder.      Symptoms largely stabilized.  Dispo planning underway.    1.) Obs: Continue routine checks.  No need for 1:1.  2.) Psychiatric medication management:  - Reviewed options for addressing nocturnal enuresis including reducing clozapine vs reducing lithium vs adding desmopressin.  Pt decided upon desmopressin as she continues to have depression and psychosis.  Desmopressin 0.1 mg QHS.  - Lithobid 1200 mg QHS.  Pt aware that she needs to stay well hydrated and avoid diuretics and NSAIDs. lithium level 0.7 on   - Discontinued olanzapine  - Increase Clozapine to 300mg qHS for psychosis ([id number]), level 416 on 24  - Prazosin 2mg qhs, monitor BP carefully.  - Abilify Maintena 400mg IM q3 weeks rather than 4, per collateral from ACT team.  Doses given 10/19, , , , , next due .  - Mirtazapine was switched to escitalopram, increased to 20 mg daily.  - Wellbutrin XL 150mg daily  - Gabapentin 300 mg BID for anxiety/chronic pain.  - PRN: haloperidol 5mg PO/IM q8hrs PRN for agitation, diphenhydramine 50mg PO/IM q6hrs PRN for EPS PPx, hydroxyzine 50mg PO q12hrs PRN for anxiety, lorazepam 2mg PO q8hrs PRN for anxiety or 2mg IM for assaultive behavior. Melatonin 5mg qHS PRN for insomnia  - Discussed ECT several times with pt who states she has been told in two hospitals in past that due to her obesity and asthma, she is not an ideal candidate.  Pt states that due to this she is not interested in receiving ECT at this time.  3.) Medical:   - Pt scheduled for dental extractions , however declined to attend on day prior when informed it would not be under general anesthesia.  - last clozapine labs on 10/23 (CBC with diff, troponins, CRP); CRP elevated at 15.8 likely due to dental infection/wisdom tooth issues  - pantoprazole 40mg PO before breakfast for GERD  - montelukast 10mg PO qHS for asthma  - budesonide 80mcg/formoterol 4.5mcg 2 puffs BID for asthma  - PRN: acetaminophen 650mg PO q6hrs PRN for pain, albuterol 90mcg 2puffs q6hrs PRN for dyspnea, ondansetron 4mg q6hrs PRN for nausea  - Discussed CRP with medicine service, given asymptomatic pt and normal troponin, no additional intervention/diagnostics at this time.  - Discontinued clobetasol as rash has resolved.  - Possible folliculitis/furunculosis, will give Keflex 500 mg TID x 7 days with warm compresses.  4.) Dispo planninnd housing interview pending.

## 2024-01-23 NOTE — BH INPATIENT PSYCHIATRY PROGRESS NOTE - MSE UNSTRUCTURED FT
Appearance: Dressed appropriately.  Good hygiene and grooming.   Behavior: Cooperative, calm, well related, good eye contact.   Motor: No abnormal movements, no psychomotor slowing or activation.  Speech: Regular rate.  Mood: "Good."  Affect: Pleasant.  Thought Process: Linear.  Associations: Fair.  Thought Content: Future oriented. No AVH, SIIP, HIIP reported.   Insight: Fair.  Judgment: Fair on interview.  Attention: Fair.  Language: Fluent.  Gait: Intact.

## 2024-01-23 NOTE — BH PSYCHOLOGY - CLINICIAN PSYCHOTHERAPY NOTE - NSBHPSYCHOLINT_PSY_A_CORE
Cognitive/behavioral therapy/Dialectical  Behavioral Therapy (DBT)/Dynamic issues addressed/Interpersonal Therapy (IPT)/Supported coping skills/Supportive therapy/Treatment compliance encouraged

## 2024-01-23 NOTE — BH INPATIENT PSYCHIATRY PROGRESS NOTE - NSBHCHARTREVIEWVS_PSY_A_CORE FT
Vital Signs Last 24 Hrs  T(C): 36.9 (01-23-24 @ 09:15), Max: 37 (01-22-24 @ 20:37)  T(F): 98.4 (01-23-24 @ 09:15), Max: 98.6 (01-22-24 @ 20:37)  HR: --  BP: --  BP(mean): --  RR: 16 (01-23-24 @ 09:15) (16 - 16)  SpO2: --    Orthostatic VS  01-23-24 @ 09:15  Lying BP: --/-- HR: --  Sitting BP: 127/90 HR: 102  Standing BP: 143/89 HR: 112  Site: --  Mode: --  Orthostatic VS  01-22-24 @ 20:37  Lying BP: --/-- HR: --  Sitting BP: 145/76 HR: 106  Standing BP: 140/76 HR: 114  Site: --  Mode: --  Orthostatic VS  01-22-24 @ 07:40  Lying BP: --/-- HR: --  Sitting BP: 119/68 HR: 81  Standing BP: 135/70 HR: 96  Site: --  Mode: --

## 2024-01-24 LAB
BASOPHILS # BLD AUTO: 0.02 K/UL — SIGNIFICANT CHANGE UP (ref 0–0.2)
BASOPHILS NFR BLD AUTO: 0.2 % — SIGNIFICANT CHANGE UP (ref 0–2)
EOSINOPHIL # BLD AUTO: 0 K/UL — SIGNIFICANT CHANGE UP (ref 0–0.5)
EOSINOPHIL NFR BLD AUTO: 0 % — SIGNIFICANT CHANGE UP (ref 0–6)
HCT VFR BLD CALC: 37.9 % — SIGNIFICANT CHANGE UP (ref 34.5–45)
HGB BLD-MCNC: 11.5 G/DL — SIGNIFICANT CHANGE UP (ref 11.5–15.5)
IANC: 6.77 K/UL — SIGNIFICANT CHANGE UP (ref 1.8–7.4)
IMM GRANULOCYTES NFR BLD AUTO: 0.5 % — SIGNIFICANT CHANGE UP (ref 0–0.9)
LYMPHOCYTES # BLD AUTO: 2.59 K/UL — SIGNIFICANT CHANGE UP (ref 1–3.3)
LYMPHOCYTES # BLD AUTO: 25.7 % — SIGNIFICANT CHANGE UP (ref 13–44)
MCHC RBC-ENTMCNC: 23.5 PG — LOW (ref 27–34)
MCHC RBC-ENTMCNC: 30.3 GM/DL — LOW (ref 32–36)
MCV RBC AUTO: 77.5 FL — LOW (ref 80–100)
MONOCYTES # BLD AUTO: 0.63 K/UL — SIGNIFICANT CHANGE UP (ref 0–0.9)
MONOCYTES NFR BLD AUTO: 6.3 % — SIGNIFICANT CHANGE UP (ref 2–14)
NEUTROPHILS # BLD AUTO: 6.77 K/UL — SIGNIFICANT CHANGE UP (ref 1.8–7.4)
NEUTROPHILS NFR BLD AUTO: 67.3 % — SIGNIFICANT CHANGE UP (ref 43–77)
NRBC # BLD: 0 /100 WBCS — SIGNIFICANT CHANGE UP (ref 0–0)
NRBC # FLD: 0 K/UL — SIGNIFICANT CHANGE UP (ref 0–0)
PLATELET # BLD AUTO: 291 K/UL — SIGNIFICANT CHANGE UP (ref 150–400)
RBC # BLD: 4.89 M/UL — SIGNIFICANT CHANGE UP (ref 3.8–5.2)
RBC # FLD: 16.2 % — HIGH (ref 10.3–14.5)
WBC # BLD: 10.06 K/UL — SIGNIFICANT CHANGE UP (ref 3.8–10.5)
WBC # FLD AUTO: 10.06 K/UL — SIGNIFICANT CHANGE UP (ref 3.8–10.5)

## 2024-01-24 PROCEDURE — 99231 SBSQ HOSP IP/OBS SF/LOW 25: CPT

## 2024-01-24 RX ADMIN — PANTOPRAZOLE SODIUM 40 MILLIGRAM(S): 20 TABLET, DELAYED RELEASE ORAL at 09:27

## 2024-01-24 RX ADMIN — Medication 500 MILLIGRAM(S): at 13:24

## 2024-01-24 RX ADMIN — Medication 500 MILLIGRAM(S): at 09:26

## 2024-01-24 RX ADMIN — LITHIUM CARBONATE 1200 MILLIGRAM(S): 300 TABLET, EXTENDED RELEASE ORAL at 20:19

## 2024-01-24 RX ADMIN — GABAPENTIN 300 MILLIGRAM(S): 400 CAPSULE ORAL at 20:20

## 2024-01-24 RX ADMIN — Medication 2 MILLIGRAM(S): at 20:19

## 2024-01-24 RX ADMIN — Medication 500 MILLIGRAM(S): at 20:20

## 2024-01-24 RX ADMIN — MONTELUKAST 10 MILLIGRAM(S): 4 TABLET, CHEWABLE ORAL at 20:20

## 2024-01-24 RX ADMIN — CLOZAPINE 300 MILLIGRAM(S): 150 TABLET, ORALLY DISINTEGRATING ORAL at 20:20

## 2024-01-24 RX ADMIN — BUDESONIDE AND FORMOTEROL FUMARATE DIHYDRATE 2 PUFF(S): 160; 4.5 AEROSOL RESPIRATORY (INHALATION) at 09:27

## 2024-01-24 RX ADMIN — GABAPENTIN 300 MILLIGRAM(S): 400 CAPSULE ORAL at 09:27

## 2024-01-24 RX ADMIN — BUPROPION HYDROCHLORIDE 150 MILLIGRAM(S): 150 TABLET, EXTENDED RELEASE ORAL at 09:27

## 2024-01-24 RX ADMIN — DESMOPRESSIN ACETATE 0.1 MILLIGRAM(S): 0.1 TABLET ORAL at 20:21

## 2024-01-24 RX ADMIN — ESCITALOPRAM OXALATE 20 MILLIGRAM(S): 10 TABLET, FILM COATED ORAL at 09:26

## 2024-01-24 RX ADMIN — BUDESONIDE AND FORMOTEROL FUMARATE DIHYDRATE 2 PUFF(S): 160; 4.5 AEROSOL RESPIRATORY (INHALATION) at 20:18

## 2024-01-24 NOTE — BH INPATIENT PSYCHIATRY PROGRESS NOTE - NSBHASSESSSUMMFT_PSY_ALL_CORE
Patient is 27yo single woman, no dependents, domiciled with her Grandmother, unemployed on disability/SSI, pphx of schizoaffective disorder, multiple past psychiatric admissions including state and recent discharge from [facility], in tx with [facility], with pmhx of asthma, HTN, GERD, and hypothyroidism and schizoaffective disorder, in tx with [facility], who self presented to the ED reporting abdominal pain and requesting readmission to psychiatric unit, reporting CAH, depressive symptoms, IOR, suicidality, admitted to [facility] for psychiatric care.  Depression and psychosis likely multifactorial at this time, schizoaffective vs. borderline personality disorder vs. complex grief vs. adjustment disorder.      Symptoms continue to be largely stabilized.  Dispo planning underway.    1.) Obs: Continue routine checks.  No need for 1:1.  2.) Psychiatric medication management:  - Reviewed options for addressing nocturnal enuresis including reducing clozapine vs reducing lithium vs adding desmopressin.  Pt decided upon desmopressin as she continues to have depression and psychosis.  Desmopressin 0.1 mg QHS.  - Lithobid 1200 mg QHS.  Pt aware that she needs to stay well hydrated and avoid diuretics and NSAIDs. lithium level 0.7 on   - Discontinued olanzapine  - Increase Clozapine to 300mg qHS for psychosis ([id number]), level 416 on 24  - Prazosin 2mg qhs, monitor BP carefully.  - Abilify Maintena 400mg IM q3 weeks rather than 4, per collateral from ACT team.  Doses given 10/19, , , , , next due .  - Mirtazapine was switched to escitalopram, increased to 20 mg daily.  - Wellbutrin XL 150mg daily  - Gabapentin 300 mg BID for anxiety/chronic pain.  - PRN: haloperidol 5mg PO/IM q8hrs PRN for agitation, diphenhydramine 50mg PO/IM q6hrs PRN for EPS PPx, hydroxyzine 50mg PO q12hrs PRN for anxiety, lorazepam 2mg PO q8hrs PRN for anxiety or 2mg IM for assaultive behavior. Melatonin 5mg qHS PRN for insomnia  - Discussed ECT several times with pt who states she has been told in two hospitals in past that due to her obesity and asthma, she is not an ideal candidate.  Pt states that due to this she is not interested in receiving ECT at this time.  3.) Medical:   - Pt scheduled for dental extractions , however declined to attend on day prior when informed it would not be under general anesthesia.  - last clozapine labs on 10/23 (CBC with diff, troponins, CRP); CRP elevated at 15.8 likely due to dental infection/wisdom tooth issues  - pantoprazole 40mg PO before breakfast for GERD  - montelukast 10mg PO qHS for asthma  - budesonide 80mcg/formoterol 4.5mcg 2 puffs BID for asthma  - PRN: acetaminophen 650mg PO q6hrs PRN for pain, albuterol 90mcg 2puffs q6hrs PRN for dyspnea, ondansetron 4mg q6hrs PRN for nausea  - Discussed CRP with medicine service, given asymptomatic pt and normal troponin, no additional intervention/diagnostics at this time.  - Discontinued clobetasol as rash has resolved.  - Possible folliculitis/furunculosis, will give Keflex 500 mg TID x 7 days with warm compresses.  4.) Dispo planninnd housing interview pending.

## 2024-01-24 NOTE — BH INPATIENT PSYCHIATRY PROGRESS NOTE - NSBHFUPINTERVALHXFT_PSY_A_CORE
Pt reports that symptoms have not returned, denies all symptoms on ROS.  Pt acknowledges that male peers on unit have been arguing, pt states she has been avoiding all the drama.

## 2024-01-24 NOTE — BH INPATIENT PSYCHIATRY PROGRESS NOTE - NSBHCHARTREVIEWVS_PSY_A_CORE FT
Vital Signs Last 24 Hrs  T(C): 36.1 (01-24-24 @ 08:38), Max: 36.9 (01-23-24 @ 20:31)  T(F): 97 (01-24-24 @ 08:38), Max: 98.4 (01-23-24 @ 20:31)  HR: 92 (01-23-24 @ 20:31) (92 - 92)  BP: 112/60 (01-23-24 @ 20:31) (112/60 - 112/60)  BP(mean): --  RR: --  SpO2: --    Orthostatic VS  01-24-24 @ 08:38  Lying BP: --/-- HR: --  Sitting BP: 119/68 HR: 72  Standing BP: 138/78 HR: 81  Site: --  Mode: --  Orthostatic VS  01-23-24 @ 09:15  Lying BP: --/-- HR: --  Sitting BP: 127/90 HR: 102  Standing BP: 143/89 HR: 112  Site: --  Mode: --  Orthostatic VS  01-22-24 @ 20:37  Lying BP: --/-- HR: --  Sitting BP: 145/76 HR: 106  Standing BP: 140/76 HR: 114  Site: --  Mode: --

## 2024-01-24 NOTE — BH INPATIENT PSYCHIATRY PROGRESS NOTE - NSBHMETABOLIC_PSY_ALL_CORE_FT
BMI: BMI (kg/m2): 57.8 (09-14-23 @ 14:30)  HbA1c: A1C with Estimated Average Glucose Result: 5.2 % (11-01-23 @ 09:04)    Glucose:   BP: 112/60 (01-23-24 @ 20:31) (112/60 - 136/78)Vital Signs Last 24 Hrs  T(C): 36.1 (01-24-24 @ 08:38), Max: 36.9 (01-23-24 @ 20:31)  T(F): 97 (01-24-24 @ 08:38), Max: 98.4 (01-23-24 @ 20:31)  HR: 92 (01-23-24 @ 20:31) (92 - 92)  BP: 112/60 (01-23-24 @ 20:31) (112/60 - 112/60)  BP(mean): --  RR: --  SpO2: --    Orthostatic VS  01-24-24 @ 08:38  Lying BP: --/-- HR: --  Sitting BP: 119/68 HR: 72  Standing BP: 138/78 HR: 81  Site: --  Mode: --  Orthostatic VS  01-23-24 @ 09:15  Lying BP: --/-- HR: --  Sitting BP: 127/90 HR: 102  Standing BP: 143/89 HR: 112  Site: --  Mode: --  Orthostatic VS  01-22-24 @ 20:37  Lying BP: --/-- HR: --  Sitting BP: 145/76 HR: 106  Standing BP: 140/76 HR: 114  Site: --  Mode: --    Lipid Panel: Date/Time: 11-01-23 @ 09:04  Cholesterol, Serum: 156  LDL Cholesterol Calculated: 82  HDL Cholesterol, Serum: 58  Total Cholesterol/HDL Ration Measurement: --  Triglycerides, Serum: 79

## 2024-01-24 NOTE — BH INPATIENT PSYCHIATRY PROGRESS NOTE - MSE UNSTRUCTURED FT
Appearance: Dressed appropriately.  Good hygiene and grooming.   Behavior: Cooperative, calm, well related, good eye contact.  Found watching television in day room.  Motor: No abnormal movements, no psychomotor slowing or activation.  Speech: Regular rate.  Mood: "Good."  Affect: Neutral  Thought Process: Linear.  Associations: Fair.  Thought Content: Future oriented. No AVH, SIIP, HIIP reported.   Insight: Fair.  Judgment: Fair on interview.  Attention: Fair.  Language: Fluent.  Gait: Intact.

## 2024-01-24 NOTE — BH INPATIENT PSYCHIATRY PROGRESS NOTE - NSTXDCOTHRPROGRES_PSY_ALL_CORE
Bladder Infection, Female (Adult)    Normally, bacteria do not stay in the urine. But when they do, the urine can become infected. This is called a urinary tract infection (UTI). An infection can occur anywhere in the urinary tract, from the kidney to the bladder and urethra. The most common place for a UTI is in the bladder. This is called a bladder infection. This is one of the most common infections in women. Most bladder infections are easily treated. They are not serious unless the infection spreads up to the kidney.  The phrases \"bladder infection\", \"UTI,\" and \"cystitis,\" are often used to describe the same thing, but they are not always the same. Cystitis is an inflammation of the bladder. The most common cause of cystitis is an infection.  In summary:  · Infections in the urine are called UTIs.  · Cystitis is usually caused by a UTI.  · Not al UTIs and cases of cystitis are bladder infections.  · Bladder infections are the most common type of cystitis.  Symptoms  The infection causes inflammation in the urethra and bladder, which causes many of the symptoms. The most common symptoms of a bladder infection are:  · Pain or burning when urinating  · Having to urinate more often than usual  · Urgent need to urinate  · Only a small amount of urine comes out  · Blood in urine  · Abdominal discomfort, usually in the lower abdomen, above the pubic bone  · Cloudy, strong, or bad smelling urine  · Urinary retention, being unable to urinate  · Unable to hold urine in (urinary incontinence)  · Fever  · Loss of appetite  · Confusion (in older adults)  Causes  Bladder infections are not contagious. You can't get one from someone else, from a toilet seat, or from sharing a bath.  The most common cause of bladder infections is bacteria from the bowels. The bacteria get onto the skin around the opening of the urethra. From there it can get into the urine and travel up to the bladder, causing inflammation and infection.  This usually happens because of:  · Wiping improperly after urinating. Always wipe from front to back.  · Bowel incontinence  · Pregnancy  · Procedures such as having a catheter inserted  · Older age  · Not emptying your bladder (stagnated urine gives bacteria a chance to grow)  · Dehydration  · Constipation  · Sex  · Use of a diaphragm for birth control   Treatment  Bladder infections are diagnosed by a urine test. They are treated with antibiotics and usually clear up quickly without complications. Treatment helps prevent a more serious kidney infection.  Medicines  Medicines can help in the treatment of a bladder infection:  · Take antibiotics until they are used up, even if you feel better. It is important to finish them to make sure the infection has cleared.  · You can use acetaminophen or ibuprofen for pain, fever, or discomfort, unless another medicine was prescribed. You can also alternate them, or use both together. They work differently and are a different class of medicines, so taking them together is not an overdose. If you have chronic liver or kidney disease, talk with your healthcare provider before using these medicines. Also talk with your provider if you've ever had a stomach ulcer or gastrointestinal bleeding, or are taking blood-thinner medicines.  · If you are given phenazopydridine to reduce burning with urination, it will cause your urine to become a bright orange color. This can stain clothing.  Care and prevention  These self-care steps can help prevent future infections:  · Drink plenty of fluids to prevent dehydration and flush out of the bladder. Do this unless you must restrict fluids for other health reasons, or your doctor told you not to.  · Proper cleaning after going to the bathroom is important. Wipe from front to back after using the toilet to prevent the spread of bacteria.  · Urinate more often. Don't try to hold urine in for a long time.  · Wear loose-fitting clothes and  cotton underwear. Avoid tight-fitting pans.  · Improve your diet and prevent constipation. Eat more fresh fruit and vegetables, and fiber, and less junk and fatty foods.  · Avoid sex until your symptoms are gone.  · Avoid caffeine, alcohol, and spicy foods. These can irritate the bladder.  · Urinate right after intercourse to flush out the bladder.  · If you use birth control pills and have frequent bladder infections, discuss it with your doctor.  Follow-up care  Call your healthcare provider if all symptoms are not gone after 3 days of treatment. This is especially important if you have repeat infections.  If a culture was done, you will be told if your treatment needs to be changed. If directed, you can call to find out the results.  If X-rays were done, you will be told if the results will affect your treatment.  Call 911  Call emergency services if any of the following occur:  · Trouble breathing  · Difficulty arousing or confusion  · Fainting or loss of consciousness  · Rapid heart rate  When to seek medical advice  Call your healthcare provider right away if any of these occur:  · Fever of 100.4ºF (38.0ºC) or higher, or as directed  · Symptoms are not better by the third day of treatment  · Back or belly (abdominal) pain that gets worse  · Repeated vomiting, or unable to keep medicine down  · Weakness or dizziness  · Vaginal discharge  · Pain, redness, or swelling in the outer vaginal area (labia)  © 7860-3127 [employer]. [address] [place] [address], [place] [address]. All rights reserved. This information is not intended as a substitute for professional medical care. Always follow your healthcare professional's instructions.         Improving

## 2024-01-24 NOTE — BH INPATIENT PSYCHIATRY PROGRESS NOTE - NSBHCONSDANGERSELF_PSY_A_CORE
suicidal behavior

## 2024-01-25 PROCEDURE — 99231 SBSQ HOSP IP/OBS SF/LOW 25: CPT

## 2024-01-25 RX ADMIN — CLOZAPINE 300 MILLIGRAM(S): 150 TABLET, ORALLY DISINTEGRATING ORAL at 20:45

## 2024-01-25 RX ADMIN — GABAPENTIN 300 MILLIGRAM(S): 400 CAPSULE ORAL at 20:44

## 2024-01-25 RX ADMIN — PANTOPRAZOLE SODIUM 40 MILLIGRAM(S): 20 TABLET, DELAYED RELEASE ORAL at 08:24

## 2024-01-25 RX ADMIN — DESMOPRESSIN ACETATE 0.1 MILLIGRAM(S): 0.1 TABLET ORAL at 20:44

## 2024-01-25 RX ADMIN — GABAPENTIN 300 MILLIGRAM(S): 400 CAPSULE ORAL at 08:32

## 2024-01-25 RX ADMIN — ESCITALOPRAM OXALATE 20 MILLIGRAM(S): 10 TABLET, FILM COATED ORAL at 08:32

## 2024-01-25 RX ADMIN — Medication 5 MILLIGRAM(S): at 20:45

## 2024-01-25 RX ADMIN — MONTELUKAST 10 MILLIGRAM(S): 4 TABLET, CHEWABLE ORAL at 20:45

## 2024-01-25 RX ADMIN — LITHIUM CARBONATE 1200 MILLIGRAM(S): 300 TABLET, EXTENDED RELEASE ORAL at 20:45

## 2024-01-25 RX ADMIN — Medication 500 MILLIGRAM(S): at 20:44

## 2024-01-25 RX ADMIN — Medication 500 MILLIGRAM(S): at 08:32

## 2024-01-25 RX ADMIN — Medication 2 MILLIGRAM(S): at 20:45

## 2024-01-25 RX ADMIN — BUDESONIDE AND FORMOTEROL FUMARATE DIHYDRATE 2 PUFF(S): 160; 4.5 AEROSOL RESPIRATORY (INHALATION) at 08:31

## 2024-01-25 RX ADMIN — BUPROPION HYDROCHLORIDE 150 MILLIGRAM(S): 150 TABLET, EXTENDED RELEASE ORAL at 08:32

## 2024-01-25 NOTE — BH INPATIENT PSYCHIATRY PROGRESS NOTE - NSBHFUPINTERVALHXFT_PSY_A_CORE
Pt states she is feeling anxious about housing application and whether the hospital will allow her to attend the site visit.  Otherwise states her previous psychiatric symptoms remain resolved.  Pt states bump on her abdomen is mostly going away with antibiotics.

## 2024-01-25 NOTE — BH INPATIENT PSYCHIATRY PROGRESS NOTE - NSBHCHARTREVIEWVS_PSY_A_CORE FT
Vital Signs Last 24 Hrs  T(C): 36.7 (01-25-24 @ 08:30), Max: 37 (01-24-24 @ 20:28)  T(F): 98.1 (01-25-24 @ 08:30), Max: 98.6 (01-24-24 @ 20:28)  HR: --  BP: --  BP(mean): --  RR: 18 (01-24-24 @ 20:28) (18 - 18)  SpO2: 100% (01-24-24 @ 20:28) (100% - 100%)    Orthostatic VS  01-25-24 @ 08:30  Lying BP: --/-- HR: --  Sitting BP: 104/73 HR: 83  Standing BP: 129/85 HR: 107  Site: --  Mode: --  Orthostatic VS  01-24-24 @ 20:28  Lying BP: --/-- HR: --  Sitting BP: 133/75 HR: 100  Standing BP: 140/77 HR: 102  Site: --  Mode: --  Orthostatic VS  01-24-24 @ 08:38  Lying BP: --/-- HR: --  Sitting BP: 119/68 HR: 72  Standing BP: 138/78 HR: 81  Site: --  Mode: --

## 2024-01-25 NOTE — BH INPATIENT PSYCHIATRY PROGRESS NOTE - NSBHMETABOLIC_PSY_ALL_CORE_FT
BMI: BMI (kg/m2): 57.8 (09-14-23 @ 14:30)  HbA1c: A1C with Estimated Average Glucose Result: 5.2 % (11-01-23 @ 09:04)    Glucose:   BP: 112/60 (01-23-24 @ 20:31) (112/60 - 112/60)Vital Signs Last 24 Hrs  T(C): 36.7 (01-25-24 @ 08:30), Max: 37 (01-24-24 @ 20:28)  T(F): 98.1 (01-25-24 @ 08:30), Max: 98.6 (01-24-24 @ 20:28)  HR: --  BP: --  BP(mean): --  RR: 18 (01-24-24 @ 20:28) (18 - 18)  SpO2: 100% (01-24-24 @ 20:28) (100% - 100%)    Orthostatic VS  01-25-24 @ 08:30  Lying BP: --/-- HR: --  Sitting BP: 104/73 HR: 83  Standing BP: 129/85 HR: 107  Site: --  Mode: --  Orthostatic VS  01-24-24 @ 20:28  Lying BP: --/-- HR: --  Sitting BP: 133/75 HR: 100  Standing BP: 140/77 HR: 102  Site: --  Mode: --  Orthostatic VS  01-24-24 @ 08:38  Lying BP: --/-- HR: --  Sitting BP: 119/68 HR: 72  Standing BP: 138/78 HR: 81  Site: --  Mode: --    Lipid Panel: Date/Time: 11-01-23 @ 09:04  Cholesterol, Serum: 156  LDL Cholesterol Calculated: 82  HDL Cholesterol, Serum: 58  Total Cholesterol/HDL Ration Measurement: --  Triglycerides, Serum: 79

## 2024-01-25 NOTE — BH INPATIENT PSYCHIATRY PROGRESS NOTE - NSBHASSESSSUMMFT_PSY_ALL_CORE
Patient is 29yo single woman, no dependents, domiciled with her Grandmother, unemployed on disability/SSI, pphx of schizoaffective disorder, multiple past psychiatric admissions including state and recent discharge from [facility], in [facility], with pmhx of asthma, HTN, GERD, and hypothyroidism and schizoaffective disorder, in tx with [facility], who self presented to the ED reporting abdominal pain and requesting readmission to psychiatric unit, reporting CAH, depressive symptoms, IOR, suicidality, admitted to [facility] for psychiatric care.  Depression and psychosis likely multifactorial at this time, schizoaffective vs. borderline personality disorder vs. complex grief vs. adjustment disorder.      Stable.  Awaiting housing.     1.) Obs: Continue routine checks.  No need for 1:1.  2.) Psychiatric medication management:  - Reviewed options for addressing nocturnal enuresis including reducing clozapine vs reducing lithium vs adding desmopressin.  Pt decided upon desmopressin as she continues to have depression and psychosis.  Desmopressin 0.1 mg QHS.  - Lithobid 1200 mg QHS.  Pt aware that she needs to stay well hydrated and avoid diuretics and NSAIDs. lithium level 0.7 on   - Discontinued olanzapine  - Increase Clozapine to 300mg qHS for psychosis ([id number]), level 416 on 24  - Prazosin 2mg qhs, monitor BP carefully.  - Abilify Maintena 400mg IM q3 weeks rather than 4, per collateral from ACT team.  Doses given 10/19, , , , , next due .  - Mirtazapine was switched to escitalopram, increased to 20 mg daily.  - Wellbutrin XL 150mg daily  - Gabapentin 300 mg BID for anxiety/chronic pain.  - PRN: haloperidol 5mg PO/IM q8hrs PRN for agitation, diphenhydramine 50mg PO/IM q6hrs PRN for EPS PPx, hydroxyzine 50mg PO q12hrs PRN for anxiety, lorazepam 2mg PO q8hrs PRN for anxiety or 2mg IM for assaultive behavior. Melatonin 5mg qHS PRN for insomnia  - Discussed ECT several times with pt who states she has been told in two hospitals in past that due to her obesity and asthma, she is not an ideal candidate.  Pt states that due to this she is not interested in receiving ECT at this time.  3.) Medical:   - Pt scheduled for dental extractions , however declined to attend on day prior when informed it would not be under general anesthesia.  - last clozapine labs on 10/23 (CBC with diff, troponins, CRP); CRP elevated at 15.8 likely due to dental infection/wisdom tooth issues  - pantoprazole 40mg PO before breakfast for GERD  - montelukast 10mg PO qHS for asthma  - budesonide 80mcg/formoterol 4.5mcg 2 puffs BID for asthma  - PRN: acetaminophen 650mg PO q6hrs PRN for pain, albuterol 90mcg 2puffs q6hrs PRN for dyspnea, ondansetron 4mg q6hrs PRN for nausea  - Discussed CRP with medicine service, given asymptomatic pt and normal troponin, no additional intervention/diagnostics at this time.  - Discontinued clobetasol as rash has resolved.  - Possible folliculitis/furunculosis, will give Keflex 500 mg TID x 7 days with warm compresses.  4.) Dispo planninnd housing interview pending.

## 2024-01-26 PROCEDURE — 90853 GROUP PSYCHOTHERAPY: CPT

## 2024-01-26 PROCEDURE — 99231 SBSQ HOSP IP/OBS SF/LOW 25: CPT

## 2024-01-26 RX ADMIN — LITHIUM CARBONATE 1200 MILLIGRAM(S): 300 TABLET, EXTENDED RELEASE ORAL at 20:25

## 2024-01-26 RX ADMIN — MONTELUKAST 10 MILLIGRAM(S): 4 TABLET, CHEWABLE ORAL at 20:25

## 2024-01-26 RX ADMIN — BUDESONIDE AND FORMOTEROL FUMARATE DIHYDRATE 2 PUFF(S): 160; 4.5 AEROSOL RESPIRATORY (INHALATION) at 08:33

## 2024-01-26 RX ADMIN — Medication 500 MILLIGRAM(S): at 08:34

## 2024-01-26 RX ADMIN — PANTOPRAZOLE SODIUM 40 MILLIGRAM(S): 20 TABLET, DELAYED RELEASE ORAL at 07:53

## 2024-01-26 RX ADMIN — CLOZAPINE 300 MILLIGRAM(S): 150 TABLET, ORALLY DISINTEGRATING ORAL at 20:25

## 2024-01-26 RX ADMIN — BUPROPION HYDROCHLORIDE 150 MILLIGRAM(S): 150 TABLET, EXTENDED RELEASE ORAL at 08:34

## 2024-01-26 RX ADMIN — GABAPENTIN 300 MILLIGRAM(S): 400 CAPSULE ORAL at 20:59

## 2024-01-26 RX ADMIN — GABAPENTIN 300 MILLIGRAM(S): 400 CAPSULE ORAL at 08:34

## 2024-01-26 RX ADMIN — BUDESONIDE AND FORMOTEROL FUMARATE DIHYDRATE 2 PUFF(S): 160; 4.5 AEROSOL RESPIRATORY (INHALATION) at 20:24

## 2024-01-26 RX ADMIN — Medication 500 MILLIGRAM(S): at 12:31

## 2024-01-26 RX ADMIN — DESMOPRESSIN ACETATE 0.1 MILLIGRAM(S): 0.1 TABLET ORAL at 20:25

## 2024-01-26 RX ADMIN — Medication 2 MILLIGRAM(S): at 20:25

## 2024-01-26 RX ADMIN — ESCITALOPRAM OXALATE 20 MILLIGRAM(S): 10 TABLET, FILM COATED ORAL at 08:34

## 2024-01-26 NOTE — BH PSYCHOLOGY - GROUP THERAPY NOTE - NSBHPSYCHOLRESPCOMMENT_PSY_A_CORE FT
The patient appeared adequately groomed and casually dressed. Pt appeared very engaged in the group as evidenced by her willingness to verbally communicate during the discussion. She explained that she tends to assess her emotions within specific contexts, categorizing them as positive, negative, or a combination of both. She elaborated on her observation that emotions with negative labels often share common factors, such as being interconnected, prompting one another, or being transferable. Speech was WNL. PT was oriented X3. Pt was appropriate with peers.

## 2024-01-26 NOTE — BH INPATIENT PSYCHIATRY PROGRESS NOTE - NSBHCHARTREVIEWVS_PSY_A_CORE FT
Vital Signs Last 24 Hrs  T(C): 36.7 (01-26-24 @ 08:38), Max: 36.7 (01-26-24 @ 08:37)  T(F): 98.1 (01-26-24 @ 08:38), Max: 98.1 (01-26-24 @ 08:37)  HR: --  BP: --  BP(mean): --  RR: --  SpO2: --    Orthostatic VS  01-26-24 @ 08:38  Lying BP: --/-- HR: --  Sitting BP: 122/71 HR: 72  Standing BP: 103/64 HR: 81  Site: --  Mode: --  Orthostatic VS  01-26-24 @ 08:37  Lying BP: --/-- HR: --  Sitting BP: 122/71 HR: 72  Standing BP: 103/64 HR: 81  Site: --  Mode: --  Orthostatic VS  01-25-24 @ 20:40  Lying BP: --/-- HR: --  Sitting BP: 131/76 HR: 97  Standing BP: 137/88 HR: 106  Site: --  Mode: --  Orthostatic VS  01-25-24 @ 08:30  Lying BP: --/-- HR: --  Sitting BP: 104/73 HR: 83  Standing BP: 129/85 HR: 107  Site: --  Mode: --  Orthostatic VS  01-24-24 @ 20:28  Lying BP: --/-- HR: --  Sitting BP: 133/75 HR: 100  Standing BP: 140/77 HR: 102  Site: --  Mode: --

## 2024-01-26 NOTE — BH INPATIENT PSYCHIATRY PROGRESS NOTE - NSBHFUPINTERVALHXFT_PSY_A_CORE
Pt was happy to learn that hospital has approved housing site visit.  Continues to deny all symptoms on psychiatric ROS.

## 2024-01-26 NOTE — BH INPATIENT PSYCHIATRY PROGRESS NOTE - MSE UNSTRUCTURED FT
Appearance: Dressed appropriately.  Good hygiene and grooming.   Behavior: Cooperative, calm, well related, good eye contact.  Found watching TV with peers.  Motor: No abnormal movements, no psychomotor slowing or activation.  Speech: Regular rate.  Mood: "good."  Affect: Neutral quality, full range.  Thought Process: Linear.  Associations: Fair.  Thought Content: Future oriented. No AVH, SIIP, HIIP reported.   Insight: Fair.  Judgment: Fair on interview.  Attention: Fair.  Language: Fluent.  Gait: Intact.

## 2024-01-26 NOTE — BH INPATIENT PSYCHIATRY PROGRESS NOTE - NSBHASSESSSUMMFT_PSY_ALL_CORE
Patient is 29yo single woman, no dependents, domiciled with her Grandmother, unemployed on disability/SSI, pphx of schizoaffective disorder, multiple past psychiatric admissions including state and recent discharge from [facility], in tx with [facility], with pmhx of asthma, HTN, GERD, and hypothyroidism and schizoaffective disorder, in tx with [facility], who self presented to the ED reporting abdominal pain and requesting readmission to psychiatric unit, reporting CAH, depressive symptoms, IOR, suicidality, admitted to [facility] for psychiatric care.  Depression and psychosis likely multifactorial at this time, schizoaffective vs. borderline personality disorder vs. complex grief vs. adjustment disorder.      Stable.  Awaiting housing.     1.) Obs: Continue routine checks.  No need for 1:1.  2.) Psychiatric medication management:  - Reviewed options for addressing nocturnal enuresis including reducing clozapine vs reducing lithium vs adding desmopressin.  Pt decided upon desmopressin as she continues to have depression and psychosis.  Desmopressin 0.1 mg QHS.  - Lithobid 1200 mg QHS.  Pt aware that she needs to stay well hydrated and avoid diuretics and NSAIDs. lithium level 0.7 on   - Discontinued olanzapine  - Increase Clozapine to 300mg qHS for psychosis ([id number]), level 416 on 24  - Prazosin 2mg qhs, monitor BP carefully.  - Abilify Maintena 400mg IM q3 weeks rather than 4, per collateral from ACT team.  Doses given 10/19, , , , , next due .  - Mirtazapine was switched to escitalopram, increased to 20 mg daily.  - Wellbutrin XL 150mg daily  - Gabapentin 300 mg BID for anxiety/chronic pain.  - PRN: haloperidol 5mg PO/IM q8hrs PRN for agitation, diphenhydramine 50mg PO/IM q6hrs PRN for EPS PPx, hydroxyzine 50mg PO q12hrs PRN for anxiety, lorazepam 2mg PO q8hrs PRN for anxiety or 2mg IM for assaultive behavior. Melatonin 5mg qHS PRN for insomnia  - Discussed ECT several times with pt who states she has been told in two hospitals in past that due to her obesity and asthma, she is not an ideal candidate.  Pt states that due to this she is not interested in receiving ECT at this time.  3.) Medical:   - Pt scheduled for dental extractions , however declined to attend on day prior when informed it would not be under general anesthesia.  - last clozapine labs on 10/23 (CBC with diff, troponins, CRP); CRP elevated at 15.8 likely due to dental infection/wisdom tooth issues  - pantoprazole 40mg PO before breakfast for GERD  - montelukast 10mg PO qHS for asthma  - budesonide 80mcg/formoterol 4.5mcg 2 puffs BID for asthma  - PRN: acetaminophen 650mg PO q6hrs PRN for pain, albuterol 90mcg 2puffs q6hrs PRN for dyspnea, ondansetron 4mg q6hrs PRN for nausea  - Discussed CRP with medicine service, given asymptomatic pt and normal troponin, no additional intervention/diagnostics at this time.  - Discontinued clobetasol as rash has resolved.  - Possible folliculitis/furunculosis, will give Keflex 500 mg TID x 7 days with warm compresses.  4.) Dispo planninnd housing interview pending.

## 2024-01-27 RX ADMIN — BUPROPION HYDROCHLORIDE 150 MILLIGRAM(S): 150 TABLET, EXTENDED RELEASE ORAL at 09:35

## 2024-01-27 RX ADMIN — GABAPENTIN 300 MILLIGRAM(S): 400 CAPSULE ORAL at 09:36

## 2024-01-27 RX ADMIN — ESCITALOPRAM OXALATE 20 MILLIGRAM(S): 10 TABLET, FILM COATED ORAL at 09:36

## 2024-01-27 RX ADMIN — BUDESONIDE AND FORMOTEROL FUMARATE DIHYDRATE 2 PUFF(S): 160; 4.5 AEROSOL RESPIRATORY (INHALATION) at 09:36

## 2024-01-27 RX ADMIN — PANTOPRAZOLE SODIUM 40 MILLIGRAM(S): 20 TABLET, DELAYED RELEASE ORAL at 09:36

## 2024-01-27 RX ADMIN — DESMOPRESSIN ACETATE 0.1 MILLIGRAM(S): 0.1 TABLET ORAL at 21:12

## 2024-01-27 RX ADMIN — LITHIUM CARBONATE 1200 MILLIGRAM(S): 300 TABLET, EXTENDED RELEASE ORAL at 21:13

## 2024-01-27 RX ADMIN — MONTELUKAST 10 MILLIGRAM(S): 4 TABLET, CHEWABLE ORAL at 21:13

## 2024-01-27 RX ADMIN — CLOZAPINE 300 MILLIGRAM(S): 150 TABLET, ORALLY DISINTEGRATING ORAL at 21:12

## 2024-01-27 RX ADMIN — GABAPENTIN 300 MILLIGRAM(S): 400 CAPSULE ORAL at 21:12

## 2024-01-27 RX ADMIN — Medication 2 MILLIGRAM(S): at 21:13

## 2024-01-28 RX ADMIN — DESMOPRESSIN ACETATE 0.1 MILLIGRAM(S): 0.1 TABLET ORAL at 20:35

## 2024-01-28 RX ADMIN — ESCITALOPRAM OXALATE 20 MILLIGRAM(S): 10 TABLET, FILM COATED ORAL at 09:02

## 2024-01-28 RX ADMIN — GABAPENTIN 300 MILLIGRAM(S): 400 CAPSULE ORAL at 20:34

## 2024-01-28 RX ADMIN — Medication 2 MILLIGRAM(S): at 20:34

## 2024-01-28 RX ADMIN — BUDESONIDE AND FORMOTEROL FUMARATE DIHYDRATE 2 PUFF(S): 160; 4.5 AEROSOL RESPIRATORY (INHALATION) at 09:03

## 2024-01-28 RX ADMIN — CLOZAPINE 300 MILLIGRAM(S): 150 TABLET, ORALLY DISINTEGRATING ORAL at 20:34

## 2024-01-28 RX ADMIN — PANTOPRAZOLE SODIUM 40 MILLIGRAM(S): 20 TABLET, DELAYED RELEASE ORAL at 09:03

## 2024-01-28 RX ADMIN — BUDESONIDE AND FORMOTEROL FUMARATE DIHYDRATE 2 PUFF(S): 160; 4.5 AEROSOL RESPIRATORY (INHALATION) at 20:37

## 2024-01-28 RX ADMIN — BUPROPION HYDROCHLORIDE 150 MILLIGRAM(S): 150 TABLET, EXTENDED RELEASE ORAL at 09:02

## 2024-01-28 RX ADMIN — MONTELUKAST 10 MILLIGRAM(S): 4 TABLET, CHEWABLE ORAL at 20:35

## 2024-01-28 RX ADMIN — LITHIUM CARBONATE 1200 MILLIGRAM(S): 300 TABLET, EXTENDED RELEASE ORAL at 20:33

## 2024-01-28 RX ADMIN — GABAPENTIN 300 MILLIGRAM(S): 400 CAPSULE ORAL at 09:02

## 2024-01-28 RX ADMIN — Medication 5 MILLIGRAM(S): at 20:35

## 2024-01-29 PROCEDURE — 99231 SBSQ HOSP IP/OBS SF/LOW 25: CPT

## 2024-01-29 RX ADMIN — ESCITALOPRAM OXALATE 20 MILLIGRAM(S): 10 TABLET, FILM COATED ORAL at 09:11

## 2024-01-29 RX ADMIN — DESMOPRESSIN ACETATE 0.1 MILLIGRAM(S): 0.1 TABLET ORAL at 20:42

## 2024-01-29 RX ADMIN — BUDESONIDE AND FORMOTEROL FUMARATE DIHYDRATE 2 PUFF(S): 160; 4.5 AEROSOL RESPIRATORY (INHALATION) at 20:42

## 2024-01-29 RX ADMIN — PANTOPRAZOLE SODIUM 40 MILLIGRAM(S): 20 TABLET, DELAYED RELEASE ORAL at 09:12

## 2024-01-29 RX ADMIN — BUPROPION HYDROCHLORIDE 150 MILLIGRAM(S): 150 TABLET, EXTENDED RELEASE ORAL at 09:11

## 2024-01-29 RX ADMIN — GABAPENTIN 300 MILLIGRAM(S): 400 CAPSULE ORAL at 09:11

## 2024-01-29 RX ADMIN — Medication 2 MILLIGRAM(S): at 20:42

## 2024-01-29 RX ADMIN — CLOZAPINE 300 MILLIGRAM(S): 150 TABLET, ORALLY DISINTEGRATING ORAL at 20:41

## 2024-01-29 RX ADMIN — BUDESONIDE AND FORMOTEROL FUMARATE DIHYDRATE 2 PUFF(S): 160; 4.5 AEROSOL RESPIRATORY (INHALATION) at 09:13

## 2024-01-29 RX ADMIN — LITHIUM CARBONATE 1200 MILLIGRAM(S): 300 TABLET, EXTENDED RELEASE ORAL at 20:42

## 2024-01-29 RX ADMIN — GABAPENTIN 300 MILLIGRAM(S): 400 CAPSULE ORAL at 20:42

## 2024-01-29 RX ADMIN — MONTELUKAST 10 MILLIGRAM(S): 4 TABLET, CHEWABLE ORAL at 20:41

## 2024-01-29 NOTE — BH INPATIENT PSYCHIATRY PROGRESS NOTE - NSBHMETABOLIC_PSY_ALL_CORE_FT
BMI: BMI (kg/m2): 57.8 (09-14-23 @ 14:30)  HbA1c: A1C with Estimated Average Glucose Result: 5.2 % (11-01-23 @ 09:04)    Glucose:   BP: --Vital Signs Last 24 Hrs  T(C): 36.8 (01-29-24 @ 08:15), Max: 36.8 (01-29-24 @ 08:15)  T(F): 98.3 (01-29-24 @ 08:15), Max: 98.3 (01-29-24 @ 08:15)  HR: --  BP: --  BP(mean): --  RR: --  SpO2: --    Orthostatic VS  01-29-24 @ 08:15  Lying BP: --/-- HR: --  Sitting BP: 143/82 HR: 94  Standing BP: 136/79 HR: 101  Site: --  Mode: --  Orthostatic VS  01-28-24 @ 20:36  Lying BP: --/-- HR: --  Sitting BP: 132/79 HR: 91  Standing BP: 139/89 HR: 98  Site: --  Mode: --  Orthostatic VS  01-28-24 @ 08:19  Lying BP: --/-- HR: --  Sitting BP: 113/67 HR: 76  Standing BP: 125/87 HR: 83  Site: --  Mode: --  Orthostatic VS  01-27-24 @ 20:28  Lying BP: --/-- HR: --  Sitting BP: 106/75 HR: 101  Standing BP: 105/88 HR: 100  Site: --  Mode: --    Lipid Panel: Date/Time: 11-01-23 @ 09:04  Cholesterol, Serum: 156  LDL Cholesterol Calculated: 82  HDL Cholesterol, Serum: 58  Total Cholesterol/HDL Ration Measurement: --  Triglycerides, Serum: 79

## 2024-01-29 NOTE — BH INPATIENT PSYCHIATRY PROGRESS NOTE - NSBHASSESSSUMMFT_PSY_ALL_CORE
Patient is 29yo single woman, no dependents, domiciled with her Grandmother, unemployed on disability/SSI, pphx of schizoaffective disorder, multiple past psychiatric admissions including state and recent discharge from [facility], in tx with [facility], with pmhx of asthma, HTN, GERD, and hypothyroidism and schizoaffective disorder, in tx with [facility], who self presented to the ED reporting abdominal pain and requesting readmission to psychiatric unit, reporting CAH, depressive symptoms, IOR, suicidality, admitted to [facility] for psychiatric care.  Depression and psychosis likely multifactorial at this time, schizoaffective vs. borderline personality disorder vs. complex grief vs. adjustment disorder.      Stable.  Awaiting housing.     1.) Obs: Continue routine checks.  No need for 1:1.  2.) Psychiatric medication management:  - Reviewed options for addressing nocturnal enuresis including reducing clozapine vs reducing lithium vs adding desmopressin.  Pt decided upon desmopressin as she continues to have depression and psychosis.  Desmopressin 0.1 mg QHS.  - Lithobid 1200 mg QHS.  Pt aware that she needs to stay well hydrated and avoid diuretics and NSAIDs. lithium level 0.7 on   - Discontinued olanzapine  - Increase Clozapine to 300mg qHS for psychosis ([id number]), level 416 on 24  - Prazosin 2mg qhs, monitor BP carefully.  - Abilify Maintena 400mg IM q3 weeks rather than 4, per collateral from ACT team.  Doses given 10/19, , , , , next due .  - Mirtazapine was switched to escitalopram, increased to 20 mg daily.  - Wellbutrin XL 150mg daily  - Gabapentin 300 mg BID for anxiety/chronic pain.  - PRN: haloperidol 5mg PO/IM q8hrs PRN for agitation, diphenhydramine 50mg PO/IM q6hrs PRN for EPS PPx, hydroxyzine 50mg PO q12hrs PRN for anxiety, lorazepam 2mg PO q8hrs PRN for anxiety or 2mg IM for assaultive behavior. Melatonin 5mg qHS PRN for insomnia  - Discussed ECT several times with pt who states she has been told in two hospitals in past that due to her obesity and asthma, she is not an ideal candidate.  Pt states that due to this she is not interested in receiving ECT at this time.  3.) Medical:   - Pt scheduled for dental extractions , however declined to attend on day prior when informed it would not be under general anesthesia.  - last clozapine labs on 10/23 (CBC with diff, troponins, CRP); CRP elevated at 15.8 likely due to dental infection/wisdom tooth issues  - pantoprazole 40mg PO before breakfast for GERD  - montelukast 10mg PO qHS for asthma  - budesonide 80mcg/formoterol 4.5mcg 2 puffs BID for asthma  - PRN: acetaminophen 650mg PO q6hrs PRN for pain, albuterol 90mcg 2puffs q6hrs PRN for dyspnea, ondansetron 4mg q6hrs PRN for nausea  - Discussed CRP with medicine service, given asymptomatic pt and normal troponin, no additional intervention/diagnostics at this time.  - Discontinued clobetasol as rash has resolved.  - Possible folliculitis/furunculosis, will give Keflex 500 mg TID x 7 days with warm compresses.  4.) Dispo planninnd housing interview pending.

## 2024-01-29 NOTE — BH INPATIENT PSYCHIATRY PROGRESS NOTE - MSE UNSTRUCTURED FT
Appearance: Dressed appropriately.  Good hygiene and grooming.   Behavior: Cooperative, calm, well related, good eye contact.  Found making phone calls in common areas.  Motor: No abnormal movements, no psychomotor slowing or activation.  Speech: Regular rate.  Mood: "good."  Affect: Pleasant, full range.  Thought Process: Linear.  Associations: Fair.  Thought Content: Future oriented. No AVH, SIIP, HIIP reported.   Insight: Fair.  Judgment: Fair on interview.  Attention: Fair.  Language: Fluent.  Gait: Intact.

## 2024-01-29 NOTE — BH INPATIENT PSYCHIATRY PROGRESS NOTE - NSBHCHARTREVIEWVS_PSY_A_CORE FT
Vital Signs Last 24 Hrs  T(C): 36.8 (01-29-24 @ 08:15), Max: 36.8 (01-29-24 @ 08:15)  T(F): 98.3 (01-29-24 @ 08:15), Max: 98.3 (01-29-24 @ 08:15)  HR: --  BP: --  BP(mean): --  RR: --  SpO2: --    Orthostatic VS  01-29-24 @ 08:15  Lying BP: --/-- HR: --  Sitting BP: 143/82 HR: 94  Standing BP: 136/79 HR: 101  Site: --  Mode: --  Orthostatic VS  01-28-24 @ 20:36  Lying BP: --/-- HR: --  Sitting BP: 132/79 HR: 91  Standing BP: 139/89 HR: 98  Site: --  Mode: --  Orthostatic VS  01-28-24 @ 08:19  Lying BP: --/-- HR: --  Sitting BP: 113/67 HR: 76  Standing BP: 125/87 HR: 83  Site: --  Mode: --  Orthostatic VS  01-27-24 @ 20:28  Lying BP: --/-- HR: --  Sitting BP: 106/75 HR: 101  Standing BP: 105/88 HR: 100  Site: --  Mode: --

## 2024-01-29 NOTE — BH INPATIENT PSYCHIATRY PROGRESS NOTE - NSBHFUPINTERVALHXFT_PSY_A_CORE
Pt looking forward to site visit tomorrow.  Denies all symptoms on psychiatric ROS.  Denies any side effects from meds.

## 2024-01-30 PROCEDURE — 99231 SBSQ HOSP IP/OBS SF/LOW 25: CPT

## 2024-01-30 RX ADMIN — BUPROPION HYDROCHLORIDE 150 MILLIGRAM(S): 150 TABLET, EXTENDED RELEASE ORAL at 09:03

## 2024-01-30 RX ADMIN — DESMOPRESSIN ACETATE 0.1 MILLIGRAM(S): 0.1 TABLET ORAL at 20:16

## 2024-01-30 RX ADMIN — GABAPENTIN 300 MILLIGRAM(S): 400 CAPSULE ORAL at 09:03

## 2024-01-30 RX ADMIN — ESCITALOPRAM OXALATE 20 MILLIGRAM(S): 10 TABLET, FILM COATED ORAL at 09:03

## 2024-01-30 RX ADMIN — GABAPENTIN 300 MILLIGRAM(S): 400 CAPSULE ORAL at 20:16

## 2024-01-30 RX ADMIN — MONTELUKAST 10 MILLIGRAM(S): 4 TABLET, CHEWABLE ORAL at 20:16

## 2024-01-30 RX ADMIN — PANTOPRAZOLE SODIUM 40 MILLIGRAM(S): 20 TABLET, DELAYED RELEASE ORAL at 09:02

## 2024-01-30 RX ADMIN — Medication 5 MILLIGRAM(S): at 20:17

## 2024-01-30 RX ADMIN — LITHIUM CARBONATE 1200 MILLIGRAM(S): 300 TABLET, EXTENDED RELEASE ORAL at 20:16

## 2024-01-30 RX ADMIN — Medication 650 MILLIGRAM(S): at 18:02

## 2024-01-30 RX ADMIN — Medication 2 MILLIGRAM(S): at 20:16

## 2024-01-30 RX ADMIN — CLOZAPINE 300 MILLIGRAM(S): 150 TABLET, ORALLY DISINTEGRATING ORAL at 20:16

## 2024-01-30 NOTE — BH INPATIENT PSYCHIATRY PROGRESS NOTE - MSE UNSTRUCTURED FT
Appearance: Dressed appropriately.  Good hygiene and grooming.   Behavior: Cooperative, calm, well related, good eye contact.   Motor: No abnormal movements, no psychomotor slowing or activation.  Speech: Regular rate.  Mood: "Good."  Affect: Pleasant, full range.  Thought Process: Linear.  Associations: Fair.  Thought Content: Future oriented. No AVH, SIIP, HIIP reported.   Insight: Fair.  Judgment: Fair on interview.  Attention: Fair.  Language: Fluent.  Gait: Intact.

## 2024-01-30 NOTE — BH INPATIENT PSYCHIATRY PROGRESS NOTE - NSBHFUPINTERVALHXFT_PSY_A_CORE
Pt today is excited for housing site visit and interview.  Denies any symptoms.  No side effects from meds.

## 2024-01-30 NOTE — BH INPATIENT PSYCHIATRY PROGRESS NOTE - NSBHMETABOLIC_PSY_ALL_CORE_FT
BMI: BMI (kg/m2): 57.8 (09-14-23 @ 14:30)  HbA1c: A1C with Estimated Average Glucose Result: 5.2 % (11-01-23 @ 09:04)    Glucose:   BP: --Vital Signs Last 24 Hrs  T(C): 36.4 (01-30-24 @ 08:19), Max: 36.8 (01-29-24 @ 20:33)  T(F): 97.5 (01-30-24 @ 08:19), Max: 98.3 (01-29-24 @ 20:33)  HR: --  BP: --  BP(mean): --  RR: --  SpO2: --    Orthostatic VS  01-30-24 @ 08:19  Lying BP: --/-- HR: --  Sitting BP: 107/75 HR: 92  Standing BP: 134/70 HR: 80  Site: --  Mode: --  Orthostatic VS  01-29-24 @ 20:33  Lying BP: --/-- HR: --  Sitting BP: 134/84 HR: 105  Standing BP: 136/74 HR: 110  Site: --  Mode: --  Orthostatic VS  01-29-24 @ 08:15  Lying BP: --/-- HR: --  Sitting BP: 143/82 HR: 94  Standing BP: 136/79 HR: 101  Site: --  Mode: --  Orthostatic VS  01-28-24 @ 20:36  Lying BP: --/-- HR: --  Sitting BP: 132/79 HR: 91  Standing BP: 139/89 HR: 98  Site: --  Mode: --    Lipid Panel: Date/Time: 11-01-23 @ 09:04  Cholesterol, Serum: 156  LDL Cholesterol Calculated: 82  HDL Cholesterol, Serum: 58  Total Cholesterol/HDL Ration Measurement: --  Triglycerides, Serum: 79

## 2024-01-30 NOTE — BH INPATIENT PSYCHIATRY PROGRESS NOTE - NSBHASSESSSUMMFT_PSY_ALL_CORE
Patient is 29yo single woman, no dependents, domiciled with her Grandmother, unemployed on disability/SSI, pphx of schizoaffective disorder, multiple past psychiatric admissions including state and recent discharge from [facility], in tx [facility], with pmhx of asthma, HTN, GERD, and hypothyroidism and schizoaffective disorder, in tx with [facility], who self presented to the ED reporting abdominal pain and requesting readmission to psychiatric unit, reporting CAH, depressive symptoms, IOR, suicidality, admitted to [facility] for psychiatric care.  Depression and psychosis likely multifactorial at this time, schizoaffective vs. borderline personality disorder vs. complex grief vs. adjustment disorder.      Continues to be stable.  Awaiting housing.     1.) Obs: Continue routine checks.  No need for 1:1.  2.) Psychiatric medication management:  - Reviewed options for addressing nocturnal enuresis including reducing clozapine vs reducing lithium vs adding desmopressin.  Pt decided upon desmopressin as she continues to have depression and psychosis.  Desmopressin 0.1 mg QHS.  - Lithobid 1200 mg QHS.  Pt aware that she needs to stay well hydrated and avoid diuretics and NSAIDs. lithium level 0.7 on   - Discontinued olanzapine  - Increase Clozapine to 300mg qHS for psychosis ([id number]), level 416 on 24  - Prazosin 2mg qhs, monitor BP carefully.  - Abilify Maintena 400mg IM q3 weeks rather than 4, per collateral from ACT team.  Doses given 10/19, , , , , next due .  - Mirtazapine was switched to escitalopram, increased to 20 mg daily.  - Wellbutrin XL 150mg daily  - Gabapentin 300 mg BID for anxiety/chronic pain.  - PRN: haloperidol 5mg PO/IM q8hrs PRN for agitation, diphenhydramine 50mg PO/IM q6hrs PRN for EPS PPx, hydroxyzine 50mg PO q12hrs PRN for anxiety, lorazepam 2mg PO q8hrs PRN for anxiety or 2mg IM for assaultive behavior. Melatonin 5mg qHS PRN for insomnia  - Discussed ECT several times with pt who states she has been told in two hospitals in past that due to her obesity and asthma, she is not an ideal candidate.  Pt states that due to this she is not interested in receiving ECT at this time.  3.) Medical:   - Pt scheduled for dental extractions , however declined to attend on day prior when informed it would not be under general anesthesia.  - last clozapine labs on 10/23 (CBC with diff, troponins, CRP); CRP elevated at 15.8 likely due to dental infection/wisdom tooth issues  - pantoprazole 40mg PO before breakfast for GERD  - montelukast 10mg PO qHS for asthma  - budesonide 80mcg/formoterol 4.5mcg 2 puffs BID for asthma  - PRN: acetaminophen 650mg PO q6hrs PRN for pain, albuterol 90mcg 2puffs q6hrs PRN for dyspnea, ondansetron 4mg q6hrs PRN for nausea  - Discussed CRP with medicine service, given asymptomatic pt and normal troponin, no additional intervention/diagnostics at this time.  - Discontinued clobetasol as rash has resolved.  - Possible folliculitis/furunculosis, will give Keflex 500 mg TID x 7 days with warm compresses.  4.) Dispo planninnd housing interview pending.

## 2024-01-30 NOTE — BH INPATIENT PSYCHIATRY PROGRESS NOTE - NSBHCHARTREVIEWVS_PSY_A_CORE FT
Vital Signs Last 24 Hrs  T(C): 36.4 (01-30-24 @ 08:19), Max: 36.8 (01-29-24 @ 20:33)  T(F): 97.5 (01-30-24 @ 08:19), Max: 98.3 (01-29-24 @ 20:33)  HR: --  BP: --  BP(mean): --  RR: --  SpO2: --    Orthostatic VS  01-30-24 @ 08:19  Lying BP: --/-- HR: --  Sitting BP: 107/75 HR: 92  Standing BP: 134/70 HR: 80  Site: --  Mode: --  Orthostatic VS  01-29-24 @ 20:33  Lying BP: --/-- HR: --  Sitting BP: 134/84 HR: 105  Standing BP: 136/74 HR: 110  Site: --  Mode: --  Orthostatic VS  01-29-24 @ 08:15  Lying BP: --/-- HR: --  Sitting BP: 143/82 HR: 94  Standing BP: 136/79 HR: 101  Site: --  Mode: --  Orthostatic VS  01-28-24 @ 20:36  Lying BP: --/-- HR: --  Sitting BP: 132/79 HR: 91  Standing BP: 139/89 HR: 98  Site: --  Mode: --

## 2024-01-31 RX ORDER — SULINDAC 200 MG/1
150 TABLET ORAL
Refills: 0 | Status: DISCONTINUED | OUTPATIENT
Start: 2024-01-31 | End: 2024-02-06

## 2024-01-31 RX ADMIN — BUDESONIDE AND FORMOTEROL FUMARATE DIHYDRATE 2 PUFF(S): 160; 4.5 AEROSOL RESPIRATORY (INHALATION) at 09:22

## 2024-01-31 RX ADMIN — MONTELUKAST 10 MILLIGRAM(S): 4 TABLET, CHEWABLE ORAL at 20:40

## 2024-01-31 RX ADMIN — GABAPENTIN 300 MILLIGRAM(S): 400 CAPSULE ORAL at 20:41

## 2024-01-31 RX ADMIN — DESMOPRESSIN ACETATE 0.1 MILLIGRAM(S): 0.1 TABLET ORAL at 20:40

## 2024-01-31 RX ADMIN — GABAPENTIN 300 MILLIGRAM(S): 400 CAPSULE ORAL at 09:21

## 2024-01-31 RX ADMIN — Medication 650 MILLIGRAM(S): at 15:27

## 2024-01-31 RX ADMIN — BUPROPION HYDROCHLORIDE 150 MILLIGRAM(S): 150 TABLET, EXTENDED RELEASE ORAL at 09:21

## 2024-01-31 RX ADMIN — BUDESONIDE AND FORMOTEROL FUMARATE DIHYDRATE 2 PUFF(S): 160; 4.5 AEROSOL RESPIRATORY (INHALATION) at 20:40

## 2024-01-31 RX ADMIN — PANTOPRAZOLE SODIUM 40 MILLIGRAM(S): 20 TABLET, DELAYED RELEASE ORAL at 09:21

## 2024-01-31 RX ADMIN — LITHIUM CARBONATE 1200 MILLIGRAM(S): 300 TABLET, EXTENDED RELEASE ORAL at 20:40

## 2024-01-31 RX ADMIN — CLOZAPINE 300 MILLIGRAM(S): 150 TABLET, ORALLY DISINTEGRATING ORAL at 20:41

## 2024-01-31 RX ADMIN — ESCITALOPRAM OXALATE 20 MILLIGRAM(S): 10 TABLET, FILM COATED ORAL at 09:21

## 2024-01-31 RX ADMIN — Medication 2 MILLIGRAM(S): at 20:42

## 2024-01-31 RX ADMIN — Medication 650 MILLIGRAM(S): at 20:40

## 2024-01-31 NOTE — BH INPATIENT PSYCHIATRY PROGRESS NOTE - NSBHMETABOLIC_PSY_ALL_CORE_FT
BMI: BMI (kg/m2): 57.8 (09-14-23 @ 14:30)  HbA1c: A1C with Estimated Average Glucose Result: 5.2 % (11-01-23 @ 09:04)    Glucose:   BP: --Vital Signs Last 24 Hrs  T(C): 36.8 (01-31-24 @ 08:14), Max: 36.8 (01-31-24 @ 08:14)  T(F): 98.3 (01-31-24 @ 08:14), Max: 98.3 (01-31-24 @ 08:14)  HR: --  BP: --  BP(mean): --  RR: 18 (01-30-24 @ 20:25) (18 - 18)  SpO2: --    Orthostatic VS  01-31-24 @ 08:14  Lying BP: --/-- HR: --  Sitting BP: 114/68 HR: 76  Standing BP: 127/73 HR: 90  Site: --  Mode: --  Orthostatic VS  01-30-24 @ 20:25  Lying BP: --/-- HR: --  Sitting BP: 144/78 HR: 102  Standing BP: 140/104 HR: --  Site: --  Mode: --  Orthostatic VS  01-30-24 @ 08:19  Lying BP: --/-- HR: --  Sitting BP: 107/75 HR: 92  Standing BP: 134/70 HR: 80  Site: --  Mode: --  Orthostatic VS  01-29-24 @ 20:33  Lying BP: --/-- HR: --  Sitting BP: 134/84 HR: 105  Standing BP: 136/74 HR: 110  Site: --  Mode: --    Lipid Panel: Date/Time: 11-01-23 @ 09:04  Cholesterol, Serum: 156  LDL Cholesterol Calculated: 82  HDL Cholesterol, Serum: 58  Total Cholesterol/HDL Ration Measurement: --  Triglycerides, Serum: 79   BMI: BMI (kg/m2): 57.8 (09-14-23 @ 14:30)  HbA1c: A1C with Estimated Average Glucose Result: 5.2 % (11-01-23 @ 09:04)    Glucose:   BP: --Vital Signs Last 24 Hrs  T(C): 36.6 (02-01-24 @ 07:49), Max: 36.6 (02-01-24 @ 07:49)  T(F): 97.9 (02-01-24 @ 07:49), Max: 97.9 (02-01-24 @ 07:49)  HR: --  BP: --  BP(mean): --  RR: --  SpO2: --    Orthostatic VS  02-01-24 @ 07:49  Lying BP: --/-- HR: --  Sitting BP: 125/73 HR: 89  Standing BP: 141/81 HR: 83  Site: --  Mode: --  Orthostatic VS  01-31-24 @ 20:26  Lying BP: --/-- HR: --  Sitting BP: 129/84 HR: 98  Standing BP: 145/91 HR: 101  Site: --  Mode: --  Orthostatic VS  01-31-24 @ 08:14  Lying BP: --/-- HR: --  Sitting BP: 114/68 HR: 76  Standing BP: 127/73 HR: 90  Site: --  Mode: --  Orthostatic VS  01-30-24 @ 20:25  Lying BP: --/-- HR: --  Sitting BP: 144/78 HR: 102  Standing BP: 140/104 HR: --  Site: --  Mode: --    Lipid Panel: Date/Time: 11-01-23 @ 09:04  Cholesterol, Serum: 156  LDL Cholesterol Calculated: 82  HDL Cholesterol, Serum: 58  Total Cholesterol/HDL Ration Measurement: --  Triglycerides, Serum: 79

## 2024-01-31 NOTE — BH INPATIENT PSYCHIATRY PROGRESS NOTE - PRN MEDS
MEDICATIONS  (PRN):  acetaminophen     Tablet .. 650 milliGRAM(s) Oral every 6 hours PRN Moderate Pain (4 - 6)  albuterol    90 MICROgram(s) HFA Inhaler 2 Puff(s) Inhalation every 6 hours PRN Shortness of Breath and/or Wheezing  aluminum hydroxide/magnesium hydroxide/simethicone Suspension 30 milliLiter(s) Oral every 4 hours PRN Dyspepsia  chlorproMAZINE    Injectable 50 milliGRAM(s) IntraMuscular once PRN severe agitation  chlorproMAZINE    Tablet 50 milliGRAM(s) Oral every 6 hours PRN agitation  hydrOXYzine hydrochloride 50 milliGRAM(s) Oral every 12 hours PRN Anxiety  lidocaine   4% Patch 1 Patch Transdermal daily PRN LBP  LORazepam     Tablet 2 milliGRAM(s) Oral every 8 hours PRN Anxiety  LORazepam   Injectable 2 milliGRAM(s) IntraMuscular once PRN Assaultive behavior  melatonin. 5 milliGRAM(s) Oral at bedtime PRN Insomnia  ondansetron    Tablet 4 milliGRAM(s) Oral every 6 hours PRN nausea  ondansetron    Tablet 4 milliGRAM(s) Oral every 6 hours PRN nausea  polyethylene glycol 3350 17 Gram(s) Oral daily PRN Constipation  simethicone 80 milliGRAM(s) Chew every 6 hours PRN gas   MEDICATIONS  (PRN):  acetaminophen     Tablet .. 650 milliGRAM(s) Oral every 6 hours PRN Moderate Pain (4 - 6)  albuterol    90 MICROgram(s) HFA Inhaler 2 Puff(s) Inhalation every 6 hours PRN Shortness of Breath and/or Wheezing  aluminum hydroxide/magnesium hydroxide/simethicone Suspension 30 milliLiter(s) Oral every 4 hours PRN Dyspepsia  chlorproMAZINE    Injectable 50 milliGRAM(s) IntraMuscular once PRN severe agitation  chlorproMAZINE    Tablet 50 milliGRAM(s) Oral every 6 hours PRN agitation  hydrOXYzine hydrochloride 50 milliGRAM(s) Oral every 12 hours PRN Anxiety  lidocaine   4% Patch 1 Patch Transdermal daily PRN LBP  LORazepam     Tablet 2 milliGRAM(s) Oral every 8 hours PRN Anxiety  LORazepam   Injectable 2 milliGRAM(s) IntraMuscular once PRN Assaultive behavior  melatonin. 5 milliGRAM(s) Oral at bedtime PRN Insomnia  ondansetron    Tablet 4 milliGRAM(s) Oral every 6 hours PRN nausea  ondansetron    Tablet 4 milliGRAM(s) Oral every 6 hours PRN nausea  polyethylene glycol 3350 17 Gram(s) Oral daily PRN Constipation  simethicone 80 milliGRAM(s) Chew every 6 hours PRN gas  sulindac 150 milliGRAM(s) Oral two times a day PRN pain

## 2024-01-31 NOTE — BH INPATIENT PSYCHIATRY PROGRESS NOTE - CURRENT MEDICATION
MEDICATIONS  (STANDING):  budesonide  80 MICROgram(s)/formoterol 4.5 MICROgram(s) Inhaler 2 Puff(s) Inhalation two times a day  buPROPion XL (24-Hour) 150 milliGRAM(s) Oral daily  cloZAPine 300 milliGRAM(s) Oral at bedtime  desmopressin 0.1 milliGRAM(s) Oral at bedtime  escitalopram 20 milliGRAM(s) Oral daily  gabapentin 300 milliGRAM(s) Oral two times a day  influenza   Vaccine 0.5 milliLiter(s) IntraMuscular once  lithium SR (LITHOBID) 1200 milliGRAM(s) Oral at bedtime  montelukast 10 milliGRAM(s) Oral at bedtime  pantoprazole    Tablet 40 milliGRAM(s) Oral before breakfast  prazosin. 2 milliGRAM(s) Oral at bedtime  sulindac 150 milliGRAM(s) Oral once    MEDICATIONS  (PRN):  acetaminophen     Tablet .. 650 milliGRAM(s) Oral every 6 hours PRN Moderate Pain (4 - 6)  albuterol    90 MICROgram(s) HFA Inhaler 2 Puff(s) Inhalation every 6 hours PRN Shortness of Breath and/or Wheezing  aluminum hydroxide/magnesium hydroxide/simethicone Suspension 30 milliLiter(s) Oral every 4 hours PRN Dyspepsia  chlorproMAZINE    Injectable 50 milliGRAM(s) IntraMuscular once PRN severe agitation  chlorproMAZINE    Tablet 50 milliGRAM(s) Oral every 6 hours PRN agitation  hydrOXYzine hydrochloride 50 milliGRAM(s) Oral every 12 hours PRN Anxiety  lidocaine   4% Patch 1 Patch Transdermal daily PRN LBP  LORazepam     Tablet 2 milliGRAM(s) Oral every 8 hours PRN Anxiety  LORazepam   Injectable 2 milliGRAM(s) IntraMuscular once PRN Assaultive behavior  melatonin. 5 milliGRAM(s) Oral at bedtime PRN Insomnia  ondansetron    Tablet 4 milliGRAM(s) Oral every 6 hours PRN nausea  ondansetron    Tablet 4 milliGRAM(s) Oral every 6 hours PRN nausea  polyethylene glycol 3350 17 Gram(s) Oral daily PRN Constipation  simethicone 80 milliGRAM(s) Chew every 6 hours PRN gas   MEDICATIONS  (STANDING):  budesonide  80 MICROgram(s)/formoterol 4.5 MICROgram(s) Inhaler 2 Puff(s) Inhalation two times a day  buPROPion XL (24-Hour) 150 milliGRAM(s) Oral daily  cloZAPine 300 milliGRAM(s) Oral at bedtime  desmopressin 0.1 milliGRAM(s) Oral at bedtime  escitalopram 20 milliGRAM(s) Oral daily  gabapentin 300 milliGRAM(s) Oral two times a day  influenza   Vaccine 0.5 milliLiter(s) IntraMuscular once  lithium SR (LITHOBID) 1200 milliGRAM(s) Oral at bedtime  montelukast 10 milliGRAM(s) Oral at bedtime  pantoprazole    Tablet 40 milliGRAM(s) Oral before breakfast  prazosin. 2 milliGRAM(s) Oral at bedtime    MEDICATIONS  (PRN):  acetaminophen     Tablet .. 650 milliGRAM(s) Oral every 6 hours PRN Moderate Pain (4 - 6)  albuterol    90 MICROgram(s) HFA Inhaler 2 Puff(s) Inhalation every 6 hours PRN Shortness of Breath and/or Wheezing  aluminum hydroxide/magnesium hydroxide/simethicone Suspension 30 milliLiter(s) Oral every 4 hours PRN Dyspepsia  chlorproMAZINE    Injectable 50 milliGRAM(s) IntraMuscular once PRN severe agitation  chlorproMAZINE    Tablet 50 milliGRAM(s) Oral every 6 hours PRN agitation  hydrOXYzine hydrochloride 50 milliGRAM(s) Oral every 12 hours PRN Anxiety  lidocaine   4% Patch 1 Patch Transdermal daily PRN LBP  LORazepam     Tablet 2 milliGRAM(s) Oral every 8 hours PRN Anxiety  LORazepam   Injectable 2 milliGRAM(s) IntraMuscular once PRN Assaultive behavior  melatonin. 5 milliGRAM(s) Oral at bedtime PRN Insomnia  ondansetron    Tablet 4 milliGRAM(s) Oral every 6 hours PRN nausea  ondansetron    Tablet 4 milliGRAM(s) Oral every 6 hours PRN nausea  polyethylene glycol 3350 17 Gram(s) Oral daily PRN Constipation  simethicone 80 milliGRAM(s) Chew every 6 hours PRN gas  sulindac 150 milliGRAM(s) Oral two times a day PRN pain

## 2024-01-31 NOTE — BH INPATIENT PSYCHIATRY PROGRESS NOTE - NSBHASSESSSUMMFT_PSY_ALL_CORE
Patient is 27yo single woman, no dependents, domiciled with her Grandmother, unemployed on disability/SSI, pphx of schizoaffective disorder, multiple past psychiatric admissions including state and recent discharge from [facility], in tx with [facility], with pmhx of asthma, HTN, GERD, and hypothyroidism and schizoaffective disorder, in tx with [facility], who self presented to the ED reporting abdominal pain and requesting readmission to psychiatric unit, reporting CAH, depressive symptoms, IOR, suicidality, admitted to [facility] for psychiatric care.  Depression and psychosis likely multifactorial at this time, schizoaffective vs. borderline personality disorder vs. complex grief vs. adjustment disorder.      Continues to be stable.  Awaiting housing.     1.) Obs: Continue routine checks.  No need for 1:1.  2.) Psychiatric medication management:  - Reviewed options for addressing nocturnal enuresis including reducing clozapine vs reducing lithium vs adding desmopressin.  Pt decided upon desmopressin as she continues to have depression and psychosis.  Desmopressin 0.1 mg QHS.  - Lithobid 1200 mg QHS.  Pt aware that she needs to stay well hydrated and avoid diuretics and NSAIDs. lithium level 0.7 on   - Discontinued olanzapine  - Increase Clozapine to 300mg qHS for psychosis ([id number]), level 416 on 24  - Prazosin 2mg qhs, monitor BP carefully.  - Abilify Maintena 400mg IM q3 weeks rather than 4, per collateral from ACT team.  Doses given 10/19, , , , , next due .  - Mirtazapine was switched to escitalopram, increased to 20 mg daily.  - Wellbutrin XL 150mg daily  - Gabapentin 300 mg BID for anxiety/chronic pain.  - PRN: haloperidol 5mg PO/IM q8hrs PRN for agitation, diphenhydramine 50mg PO/IM q6hrs PRN for EPS PPx, hydroxyzine 50mg PO q12hrs PRN for anxiety, lorazepam 2mg PO q8hrs PRN for anxiety or 2mg IM for assaultive behavior. Melatonin 5mg qHS PRN for insomnia  - Discussed ECT several times with pt who states she has been told in two hospitals in past that due to her obesity and asthma, she is not an ideal candidate.  Pt states that due to this she is not interested in receiving ECT at this time.  3.) Medical:   - Pt scheduled for dental extractions , however declined to attend on day prior when informed it would not be under general anesthesia.  - last clozapine labs on 10/23 (CBC with diff, troponins, CRP); CRP elevated at 15.8 likely due to dental infection/wisdom tooth issues  - pantoprazole 40mg PO before breakfast for GERD  - montelukast 10mg PO qHS for asthma  - budesonide 80mcg/formoterol 4.5mcg 2 puffs BID for asthma  - PRN: acetaminophen 650mg PO q6hrs PRN for pain, albuterol 90mcg 2puffs q6hrs PRN for dyspnea, ondansetron 4mg q6hrs PRN for nausea  - Discussed CRP with medicine service, given asymptomatic pt and normal troponin, no additional intervention/diagnostics at this time.  - Discontinued clobetasol as rash has resolved.  - Possible folliculitis/furunculosis, will give Keflex 500 mg TID x 7 days with warm compresses.  4.) Dispo planninnd housing interview pending.

## 2024-01-31 NOTE — BH INPATIENT PSYCHIATRY PROGRESS NOTE - MSE UNSTRUCTURED FT
Appearance: Dressed appropriately.  Good hygiene and grooming.   Behavior: Cooperative, calm, well related, good eye contact.   Motor: No abnormal movements, no psychomotor slowing or activation.  Speech: Regular rate, speed, and volume.   Mood: "Good."  Affect: Pleasant, full range.  Thought Process: Linear.  Associations: Fair.  Thought Content: Future oriented. No AVH, SIIP, HIIP reported.   Insight: Fair.  Judgment: Fair on interview.  Attention: Fair.  Language: Fluent.  Gait: Intact.

## 2024-01-31 NOTE — BH INPATIENT PSYCHIATRY PROGRESS NOTE - NSBHCHARTREVIEWVS_PSY_A_CORE FT
Vital Signs Last 24 Hrs  T(C): 36.8 (01-31-24 @ 08:14), Max: 36.8 (01-31-24 @ 08:14)  T(F): 98.3 (01-31-24 @ 08:14), Max: 98.3 (01-31-24 @ 08:14)  HR: --  BP: --  BP(mean): --  RR: 18 (01-30-24 @ 20:25) (18 - 18)  SpO2: --    Orthostatic VS  01-31-24 @ 08:14  Lying BP: --/-- HR: --  Sitting BP: 114/68 HR: 76  Standing BP: 127/73 HR: 90  Site: --  Mode: --  Orthostatic VS  01-30-24 @ 20:25  Lying BP: --/-- HR: --  Sitting BP: 144/78 HR: 102  Standing BP: 140/104 HR: --  Site: --  Mode: --  Orthostatic VS  01-30-24 @ 08:19  Lying BP: --/-- HR: --  Sitting BP: 107/75 HR: 92  Standing BP: 134/70 HR: 80  Site: --  Mode: --  Orthostatic VS  01-29-24 @ 20:33  Lying BP: --/-- HR: --  Sitting BP: 134/84 HR: 105  Standing BP: 136/74 HR: 110  Site: --  Mode: --   Vital Signs Last 24 Hrs  T(C): 36.6 (02-01-24 @ 07:49), Max: 36.6 (02-01-24 @ 07:49)  T(F): 97.9 (02-01-24 @ 07:49), Max: 97.9 (02-01-24 @ 07:49)  HR: --  BP: --  BP(mean): --  RR: --  SpO2: --    Orthostatic VS  02-01-24 @ 07:49  Lying BP: --/-- HR: --  Sitting BP: 125/73 HR: 89  Standing BP: 141/81 HR: 83  Site: --  Mode: --  Orthostatic VS  01-31-24 @ 20:26  Lying BP: --/-- HR: --  Sitting BP: 129/84 HR: 98  Standing BP: 145/91 HR: 101  Site: --  Mode: --  Orthostatic VS  01-31-24 @ 08:14  Lying BP: --/-- HR: --  Sitting BP: 114/68 HR: 76  Standing BP: 127/73 HR: 90  Site: --  Mode: --  Orthostatic VS  01-30-24 @ 20:25  Lying BP: --/-- HR: --  Sitting BP: 144/78 HR: 102  Standing BP: 140/104 HR: --  Site: --  Mode: --

## 2024-01-31 NOTE — BH INPATIENT PSYCHIATRY PROGRESS NOTE - NSBHFUPINTERVALHXFT_PSY_A_CORE
Patient reports doing well. Endorses pelvic pain due to menstruation. Sleeping well with normal appetite.  Denies any symptoms. Denies nocturnal enuresis.  No side effects from meds.

## 2024-02-01 LAB
BASOPHILS # BLD AUTO: 0.02 K/UL — SIGNIFICANT CHANGE UP (ref 0–0.2)
BASOPHILS NFR BLD AUTO: 0.2 % — SIGNIFICANT CHANGE UP (ref 0–2)
EOSINOPHIL # BLD AUTO: 0 K/UL — SIGNIFICANT CHANGE UP (ref 0–0.5)
EOSINOPHIL NFR BLD AUTO: 0 % — SIGNIFICANT CHANGE UP (ref 0–6)
HCT VFR BLD CALC: 38.7 % — SIGNIFICANT CHANGE UP (ref 34.5–45)
HGB BLD-MCNC: 11.9 G/DL — SIGNIFICANT CHANGE UP (ref 11.5–15.5)
IANC: 6.98 K/UL — SIGNIFICANT CHANGE UP (ref 1.8–7.4)
IMM GRANULOCYTES NFR BLD AUTO: 0.4 % — SIGNIFICANT CHANGE UP (ref 0–0.9)
LYMPHOCYTES # BLD AUTO: 2.79 K/UL — SIGNIFICANT CHANGE UP (ref 1–3.3)
LYMPHOCYTES # BLD AUTO: 27.1 % — SIGNIFICANT CHANGE UP (ref 13–44)
MCHC RBC-ENTMCNC: 24.3 PG — LOW (ref 27–34)
MCHC RBC-ENTMCNC: 30.7 GM/DL — LOW (ref 32–36)
MCV RBC AUTO: 79.1 FL — LOW (ref 80–100)
MONOCYTES # BLD AUTO: 0.45 K/UL — SIGNIFICANT CHANGE UP (ref 0–0.9)
MONOCYTES NFR BLD AUTO: 4.4 % — SIGNIFICANT CHANGE UP (ref 2–14)
NEUTROPHILS # BLD AUTO: 6.98 K/UL — SIGNIFICANT CHANGE UP (ref 1.8–7.4)
NEUTROPHILS NFR BLD AUTO: 67.9 % — SIGNIFICANT CHANGE UP (ref 43–77)
NRBC # BLD: 0 /100 WBCS — SIGNIFICANT CHANGE UP (ref 0–0)
NRBC # FLD: 0 K/UL — SIGNIFICANT CHANGE UP (ref 0–0)
PLATELET # BLD AUTO: 295 K/UL — SIGNIFICANT CHANGE UP (ref 150–400)
RBC # BLD: 4.89 M/UL — SIGNIFICANT CHANGE UP (ref 3.8–5.2)
RBC # FLD: 16.3 % — HIGH (ref 10.3–14.5)
WBC # BLD: 10.28 K/UL — SIGNIFICANT CHANGE UP (ref 3.8–10.5)
WBC # FLD AUTO: 10.28 K/UL — SIGNIFICANT CHANGE UP (ref 3.8–10.5)

## 2024-02-01 PROCEDURE — 90853 GROUP PSYCHOTHERAPY: CPT

## 2024-02-01 RX ADMIN — BUDESONIDE AND FORMOTEROL FUMARATE DIHYDRATE 2 PUFF(S): 160; 4.5 AEROSOL RESPIRATORY (INHALATION) at 09:48

## 2024-02-01 RX ADMIN — Medication 2 MILLIGRAM(S): at 20:42

## 2024-02-01 RX ADMIN — BUPROPION HYDROCHLORIDE 150 MILLIGRAM(S): 150 TABLET, EXTENDED RELEASE ORAL at 09:16

## 2024-02-01 RX ADMIN — MONTELUKAST 10 MILLIGRAM(S): 4 TABLET, CHEWABLE ORAL at 20:42

## 2024-02-01 RX ADMIN — DESMOPRESSIN ACETATE 0.1 MILLIGRAM(S): 0.1 TABLET ORAL at 20:42

## 2024-02-01 RX ADMIN — LITHIUM CARBONATE 1200 MILLIGRAM(S): 300 TABLET, EXTENDED RELEASE ORAL at 20:42

## 2024-02-01 RX ADMIN — BUDESONIDE AND FORMOTEROL FUMARATE DIHYDRATE 2 PUFF(S): 160; 4.5 AEROSOL RESPIRATORY (INHALATION) at 20:42

## 2024-02-01 RX ADMIN — PANTOPRAZOLE SODIUM 40 MILLIGRAM(S): 20 TABLET, DELAYED RELEASE ORAL at 08:27

## 2024-02-01 RX ADMIN — GABAPENTIN 300 MILLIGRAM(S): 400 CAPSULE ORAL at 09:15

## 2024-02-01 RX ADMIN — CLOZAPINE 300 MILLIGRAM(S): 150 TABLET, ORALLY DISINTEGRATING ORAL at 20:42

## 2024-02-01 RX ADMIN — GABAPENTIN 300 MILLIGRAM(S): 400 CAPSULE ORAL at 20:42

## 2024-02-01 RX ADMIN — ESCITALOPRAM OXALATE 20 MILLIGRAM(S): 10 TABLET, FILM COATED ORAL at 09:15

## 2024-02-01 NOTE — BH TREATMENT PLAN - NSTXIMPULSGOAL_PSY_ALL_CORE
Will be able to describe/demonstrate alternative ways of managing his/her emotional state on the unit
Will be able to describe/demonstrate alternative ways of managing his/her emotional state on the unit
Will be able to demonstrate the ability to pause before acting out negatively
Will be able to describe/demonstrate alternative ways of managing his/her emotional state on the unit
Will be able to demonstrate the ability to pause before acting out negatively
Will be able to describe/demonstrate alternative ways of managing his/her emotional state on the unit
Will be able to demonstrate the ability to pause before acting out negatively
Will be able to describe/demonstrate alternative ways of managing his/her emotional state on the unit
Will be able to describe/demonstrate alternative ways of managing his/her emotional state on the unit
Will be able to demonstrate the ability to pause before acting out negatively

## 2024-02-01 NOTE — BH INPATIENT PSYCHIATRY PROGRESS NOTE - CURRENT MEDICATION
MEDICATIONS  (STANDING):  budesonide  80 MICROgram(s)/formoterol 4.5 MICROgram(s) Inhaler 2 Puff(s) Inhalation two times a day  buPROPion XL (24-Hour) 150 milliGRAM(s) Oral daily  cloZAPine 300 milliGRAM(s) Oral at bedtime  desmopressin 0.1 milliGRAM(s) Oral at bedtime  escitalopram 20 milliGRAM(s) Oral daily  gabapentin 300 milliGRAM(s) Oral two times a day  influenza   Vaccine 0.5 milliLiter(s) IntraMuscular once  lithium SR (LITHOBID) 1200 milliGRAM(s) Oral at bedtime  montelukast 10 milliGRAM(s) Oral at bedtime  pantoprazole    Tablet 40 milliGRAM(s) Oral before breakfast  prazosin. 2 milliGRAM(s) Oral at bedtime    MEDICATIONS  (PRN):  acetaminophen     Tablet .. 650 milliGRAM(s) Oral every 6 hours PRN Moderate Pain (4 - 6)  albuterol    90 MICROgram(s) HFA Inhaler 2 Puff(s) Inhalation every 6 hours PRN Shortness of Breath and/or Wheezing  aluminum hydroxide/magnesium hydroxide/simethicone Suspension 30 milliLiter(s) Oral every 4 hours PRN Dyspepsia  chlorproMAZINE    Injectable 50 milliGRAM(s) IntraMuscular once PRN severe agitation  chlorproMAZINE    Tablet 50 milliGRAM(s) Oral every 6 hours PRN agitation  hydrOXYzine hydrochloride 50 milliGRAM(s) Oral every 12 hours PRN Anxiety  lidocaine   4% Patch 1 Patch Transdermal daily PRN LBP  LORazepam     Tablet 2 milliGRAM(s) Oral every 8 hours PRN Anxiety  LORazepam   Injectable 2 milliGRAM(s) IntraMuscular once PRN Assaultive behavior  melatonin. 5 milliGRAM(s) Oral at bedtime PRN Insomnia  ondansetron    Tablet 4 milliGRAM(s) Oral every 6 hours PRN nausea  ondansetron    Tablet 4 milliGRAM(s) Oral every 6 hours PRN nausea  polyethylene glycol 3350 17 Gram(s) Oral daily PRN Constipation  simethicone 80 milliGRAM(s) Chew every 6 hours PRN gas  sulindac 150 milliGRAM(s) Oral two times a day PRN pain

## 2024-02-01 NOTE — BH PSYCHOLOGY - CLINICIAN PSYCHOTHERAPY NOTE - NSTXSUICIDPROGRES_PSY_ALL_CORE
No Change
Improving
Met - goal discontinued
Met - goal discontinued
No Change
Improving
Met - goal discontinued
Met - goal discontinued
Improving
No Change
No Change
Improving
Met - goal discontinued
Improving
Improving
Met - goal discontinued

## 2024-02-01 NOTE — BH TREATMENT PLAN - NSTXPLANTYPE_PSY_ALL_CORE
Ongoing

## 2024-02-01 NOTE — BH PSYCHOLOGY - CLINICIAN PSYCHOTHERAPY NOTE - NSTXIMPULSGOAL_PSY_ALL_CORE
Will be able to describe/demonstrate alternative ways of managing his/her emotional state on the unit
Will be able to demonstrate the ability to pause before acting out negatively
Will be able to demonstrate the ability to pause before acting out negatively
Will be able to describe/demonstrate alternative ways of managing his/her emotional state on the unit
Will be able to demonstrate the ability to pause before acting out negatively
Will be able to describe/demonstrate alternative ways of managing his/her emotional state on the unit

## 2024-02-01 NOTE — BH TREATMENT PLAN - NSBHPRIMARYDX_PSY_ALL_CORE
Schizoaffective disorder    

## 2024-02-01 NOTE — BH PSYCHOLOGY - CLINICIAN PSYCHOTHERAPY NOTE - NSTXSUICIDGOAL_PSY_ALL_CORE
Will identify and utilize 2 coping skills
Will identify and utilize 2 coping skills
Be able to state 3 reasons for living
Will identify and utilize 2 coping skills
Be able to state 3 reasons for living
Will identify and utilize 2 coping skills
Be able to state 3 reasons for living
Will identify and utilize 2 coping skills

## 2024-02-01 NOTE — BH PSYCHOLOGY - CLINICIAN PSYCHOTHERAPY NOTE - NSTXDEPRESDATETRGT_PSY_ALL_CORE
11-Jan-2024
11-Jan-2024
15-Nov-2023
26-Oct-2023
03-Jan-2024
15-Nov-2023
15-Nov-2023
18-Jan-2024
24-Oct-2023
24-Oct-2023
26-Oct-2023
26-Oct-2023
15-Nov-2023
14-Feb-2024
15-Nov-2023
18-Jan-2024
15-Nov-2023

## 2024-02-01 NOTE — BH TREATMENT PLAN - NSTXSUICIDGOAL_PSY_ALL_CORE
Will identify and utilize 2 coping skills
Be able to state 3 reasons for living
Will identify and utilize 2 coping skills
Be able to state 3 reasons for living
Be able to state 3 reasons for living
Will identify and utilize 2 coping skills
Be able to state 3 reasons for living

## 2024-02-01 NOTE — BH PSYCHOLOGY - CLINICIAN PSYCHOTHERAPY NOTE - NSTXPROBIMPULS_PSY_ALL_CORE
IMPULSIVITY/AGITATION

## 2024-02-01 NOTE — BH PSYCHOLOGY - CLINICIAN PSYCHOTHERAPY NOTE - NSTXDEPRESPROGRES_PSY_ALL_CORE
No Change
No Change
Met - goal discontinued
Improving
No Change
No Change
Improving
No Change
Met - goal discontinued
No Change
Met - goal discontinued
No Change
No Change
Met - goal discontinued
No Change
Improving
Improving

## 2024-02-01 NOTE — BH PSYCHOLOGY - CLINICIAN PSYCHOTHERAPY NOTE - NSTXIMPULSDATETRGT_PSY_ALL_CORE
14-Feb-2024
24-Oct-2023
15-Nov-2023
15-Nov-2023
18-Jan-2024
15-Nov-2023
03-Jan-2024
15-Nov-2023
26-Oct-2023
15-Nov-2023
26-Oct-2023
26-Oct-2023
24-Oct-2023
11-Jan-2024
15-Nov-2023
18-Jan-2024
15-Nov-2023
11-Jan-2024
15-Nov-2023

## 2024-02-01 NOTE — BH TREATMENT PLAN - NSTXDEPRESINTERRN_PSY_ALL_CORE
RN will assess for symptoms opf depresion and provide education on healthy coping skills
RN will assist patient in identifying healthy coping skills
RN will will assist patient in identifying healthy coping skills and encourage her to use them when feeling depressed or sad.
Provide education on coping skills that aid in mitigating depressive thoughts; encoruage use of coping skills after education is provided by staff when pt is experiencing excess feelings of depression; redirect as needed/indicated
Provide education on different coping skills that aid in mitigating depressive thoughts; encourage use of coping skills after education is provided by staff when pt is experiencing feelings of depression; redirect as needed/indicated
RN will assess for symptoms opf depresion and provide education on healthy coping skills
Provide education on coping skills that aid in mitigating depressive symptoms; encourage use of coping skills after education is provided by staff when pt is experiencing feelings of depression; redirect as needed/indicated

## 2024-02-01 NOTE — BH TREATMENT PLAN - NSTXDCOTHRDATEEST_PSY_ALL_CORE
26-Dec-2023
17-Oct-2023
20-Dec-2023
26-Dec-2023
26-Dec-2023
17-Oct-2023
20-Dec-2023
04-Jan-2024
04-Jan-2024

## 2024-02-01 NOTE — BH TREATMENT PLAN - NSTXCOPEINTERMD_PSY_ALL_CORE
Psychotherapy and encourage engagement with psych rehab

## 2024-02-01 NOTE — BH TREATMENT PLAN - NSTXDEPRESPROGRES_PSY_ALL_CORE
Improving
Met - goal discontinued
No Change
Met - goal discontinued
Met - goal discontinued
No Change
Met - goal discontinued
No Change
Met - goal discontinued

## 2024-02-01 NOTE — BH TREATMENT PLAN - NSCMSPTSTRENGTHS_PSY_ALL_CORE
Compliance to treatment/Future/goal oriented/Highly motivated for treatment/Positive attitude/Supportive family
Compliance to treatment/Future/goal oriented/Highly motivated for treatment/Positive attitude/Supportive family
Compliance to treatment/Future/goal oriented/Motivated/Positive attitude
Compliance to treatment/Future/goal oriented/Motivated
Expressive of emotions/Future/goal oriented/Highly motivated for treatment
Assertive
Compliance to treatment/Future/goal oriented/Motivated
Compliance to treatment/Future/goal oriented/Highly motivated for treatment/Positive attitude/Supportive family
Expressive of emotions/Future/goal oriented/Highly motivated for treatment
Compliance to treatment/Future/goal oriented/Highly motivated for treatment/Positive attitude/Supportive family
Assertive/Expressive of emotions

## 2024-02-01 NOTE — BH PSYCHOLOGY - CLINICIAN PSYCHOTHERAPY NOTE - NSTXSUICIDDATENEW_PSY_ALL_CORE
16-Nov-2023
30-Nov-2023
30-Nov-2023
23-Nov-2023
30-Nov-2023

## 2024-02-01 NOTE — BH TREATMENT PLAN - NSTXIMPULSINTERMD_PSY_ALL_CORE
Detail Level: Zone
Include Location In Plan?: No
Detail Level: Simple
Detail Level: Generalized
Psychotherapy and encourage engagement with psych rehab, clozapine titration
Psychotherapy and encourage engagement with psych rehab
Psychotherapy and encourage engagement with psych rehab, clozapine titration

## 2024-02-01 NOTE — BH PSYCHOLOGY - CLINICIAN PSYCHOTHERAPY NOTE - NSTXPROBDCOTHR_PSY_ALL_CORE
DISCHARGE ISSUE - OTHER

## 2024-02-01 NOTE — BH PSYCHOLOGY - CLINICIAN PSYCHOTHERAPY NOTE - NSBHPSYCHOLDISCUSS_PSY_A_CORE
Discussion with collaborating staff took place since patient's last visit

## 2024-02-01 NOTE — BH TREATMENT PLAN - NSTXDEPRESDATENEW_PSY_ALL_CORE
30-Nov-2023
16-Nov-2023
30-Nov-2023
30-Nov-2023
23-Nov-2023
30-Nov-2023

## 2024-02-01 NOTE — BH TREATMENT PLAN - NSTXCOPEPROGRES_PSY_ALL_CORE
No Change
Improving
No Change
No Change
Improving
Improving
No Change
No Change
Improving
Met - goal discontinued
No Change
No Change
Improving
No Change
No Change
Improving

## 2024-02-01 NOTE — BH TREATMENT PLAN - NSTXSUICIDPROGRES_PSY_ALL_CORE
No Change
Improving
No Change
Met - goal discontinued
No Change
No Change
Met - goal discontinued
Met - goal discontinued
Improving
Met - goal discontinued
No Change
Met - goal discontinued
Improving

## 2024-02-01 NOTE — BH TREATMENT PLAN - NSTXDCOTHRINTERMD_PSY_ALL_CORE
State hospitalization application process initiated

## 2024-02-01 NOTE — BH TREATMENT PLAN - NSTXDCOTHRGOAL_PSY_ALL_CORE
Pt is requesting state hospitalization at this time.
Pt is requesting state hospitalization at this time.
Lack of appropriate housing
Lack of appropriate housing
Pt is requesting state hospitalization at this time.
Pt is requesting state hospitalization at this time.
Lack of appropriate housing.
Pt requesting state hospitalization at this time.
Pt is requesting state hospitalization at this time.
Lack of appropriate housing.
Pt is requesting state hospitalization at this time.
Pt is requesting state hospitalization at this time.
Lack of appropriate housing.
Pt is requesting state hospitalization at this time.
Pt is requesting state hospitalization or  housing at this time.
Lack of appropriate houssing. Pt is requesting state hospitalization or  housing at this time.

## 2024-02-01 NOTE — BH PSYCHOLOGY - CLINICIAN PSYCHOTHERAPY NOTE - NSTXIMPULSPROGRES_PSY_ALL_CORE
Met - goal discontinued
Improving
Met - goal discontinued
Improving
Met - goal discontinued
Improving
Met - goal discontinued
Improving
Met - goal discontinued
Met - goal discontinued

## 2024-02-01 NOTE — BH TREATMENT PLAN - NSTXIMPULSDATETRGT_PSY_ALL_CORE
03-Jan-2024
15-Nov-2023
26-Oct-2023
14-Feb-2024
15-Nov-2023
11-Feb-2024
18-Jan-2024
15-Nov-2023
26-Oct-2023
15-Nov-2023
18-Jan-2024
03-Jan-2024
11-Jan-2024

## 2024-02-01 NOTE — BH PSYCHOLOGY - CLINICIAN PSYCHOTHERAPY NOTE - NSTXSUICIDDATEEST_PSY_ALL_CORE
17-Oct-2023
11-Jan-2024
17-Oct-2023
18-Oct-2023
17-Oct-2023
11-Jan-2024
17-Oct-2023
11-Jan-2024
17-Oct-2023
04-Jan-2024
04-Jan-2024

## 2024-02-01 NOTE — BH PSYCHOLOGY - CLINICIAN PSYCHOTHERAPY NOTE - NSTXDEPRESGOAL_PSY_ALL_CORE
Report using a coping skill to overcome sadness and worry in order to socialize with peers daily

## 2024-02-01 NOTE — BH TREATMENT PLAN - NSTXDEPRESDATEEST_PSY_ALL_CORE
20-Dec-2023
17-Oct-2023
20-Dec-2023
17-Oct-2023
11-Jan-2024
17-Oct-2023
11-Jan-2024
11-Jan-2024
17-Oct-2023
17-Oct-2023
11-Jan-2024
17-Oct-2023
04-Jan-2024
17-Oct-2023

## 2024-02-01 NOTE — BH TREATMENT PLAN - NSTXSLFCREGOAL_PSY_ALL_CORE
Be able to demonstrate the ability to care for self in 2 areas such as feeding oneself and hygiene
Will perform ADLs without assistance/prompts daily

## 2024-02-01 NOTE — BH INPATIENT PSYCHIATRY PROGRESS NOTE - NSBHASSESSSUMMFT_PSY_ALL_CORE
Patient is 27yo single woman, no dependents, domiciled with her Grandmother, unemployed on disability/SSI, pphx of schizoaffective disorder, multiple past psychiatric admissions including state and recent discharge from [facility], in tx with [facility], with pmhx of asthma, HTN, GERD, and hypothyroidism and schizoaffective disorder, in tx with [facility], who self presented to the ED reporting abdominal pain and requesting readmission to psychiatric unit, reporting CAH, depressive symptoms, IOR, suicidality, admitted to [facility] for psychiatric care.  Depression and psychosis likely multifactorial at this time, schizoaffective vs. borderline personality disorder vs. complex grief vs. adjustment disorder.      Stable without any mood symptoms or psychosis.  Awaiting housing.     1.) Obs: Continue routine checks.  No need for 1:1.  2.) Psychiatric medication management:  - Reviewed options for addressing nocturnal enuresis including reducing clozapine vs reducing lithium vs adding desmopressin.  Pt decided upon desmopressin as she continues to have depression and psychosis.  Desmopressin 0.1 mg QHS.  - Lithobid 1200 mg QHS.  Pt aware that she needs to stay well hydrated and avoid diuretics and NSAIDs. lithium level 0.7 on   - Discontinued olanzapine  - Increase Clozapine to 300mg qHS for psychosis ([id number]), level 416 on 24  - Prazosin 2mg qhs, monitor BP carefully.  - Abilify Maintena 400mg IM q3 weeks rather than 4, per collateral from ACT team.  Doses given 10/19, , , , , next due .  - Mirtazapine was switched to escitalopram, increased to 20 mg daily.  - Wellbutrin XL 150mg daily  - Gabapentin 300 mg BID for anxiety/chronic pain.  - PRN: haloperidol 5mg PO/IM q8hrs PRN for agitation, diphenhydramine 50mg PO/IM q6hrs PRN for EPS PPx, hydroxyzine 50mg PO q12hrs PRN for anxiety, lorazepam 2mg PO q8hrs PRN for anxiety or 2mg IM for assaultive behavior. Melatonin 5mg qHS PRN for insomnia  - Discussed ECT several times with pt who states she has been told in two hospitals in past that due to her obesity and asthma, she is not an ideal candidate.  Pt states that due to this she is not interested in receiving ECT at this time.  3.) Medical:   - Pt scheduled for dental extractions , however declined to attend on day prior when informed it would not be under general anesthesia.  - last clozapine labs on 10/23 (CBC with diff, troponins, CRP); CRP elevated at 15.8 likely due to dental infection/wisdom tooth issues  - pantoprazole 40mg PO before breakfast for GERD  - montelukast 10mg PO qHS for asthma  - budesonide 80mcg/formoterol 4.5mcg 2 puffs BID for asthma  - PRN: acetaminophen 650mg PO q6hrs PRN for pain, albuterol 90mcg 2puffs q6hrs PRN for dyspnea, ondansetron 4mg q6hrs PRN for nausea  - Discussed CRP with medicine service, given asymptomatic pt and normal troponin, no additional intervention/diagnostics at this time.  - Discontinued clobetasol as rash has resolved.  - Possible folliculitis/furunculosis, will give Keflex 500 mg TID x 7 days with warm compresses.  4.) Dispo planninnd housing interview pending.

## 2024-02-01 NOTE — BH TREATMENT PLAN - NSTXCOPEDATETRGT_PSY_ALL_CORE
03-Jan-2024
26-Oct-2023
02-Jan-2024
26-Oct-2023
11-Jan-2024
26-Oct-2023
26-Oct-2023
16-Jan-2024
11-Feb-2024
26-Oct-2023
23-Jan-2024
14-Feb-2024
26-Oct-2023

## 2024-02-01 NOTE — BH PSYCHOLOGY - CLINICIAN PSYCHOTHERAPY NOTE - NSTXCOPEGOAL_PSY_ALL_CORE
Identify and utilize 2 coping skills that meet their needs

## 2024-02-01 NOTE — BH PSYCHOLOGY - CLINICIAN PSYCHOTHERAPY NOTE - NSTXDCOTHRGOAL_PSY_ALL_CORE
Pt is requesting state hospitalization at this time.
Pt is requesting state hospitalization at this time.
Lack of appropriate housing
Lack of appropriate housing.
Pt is requesting state hospitalization at this time.
Lack of appropriate houssing. Pt is requesting state hospitalization or  housing at this time.
Pt is requesting state hospitalization at this time.
Pt requesting state hospitalization at this time.
Pt is requesting state hospitalization at this time.
Lack of appropriate housing.
Pt is requesting state hospitalization at this time.
Pt is requesting state hospitalization at this time.
Pt is requesting state hospitalization or  housing at this time.
Lack of appropriate housing
Pt is requesting state hospitalization at this time.
Lack of appropriate housing

## 2024-02-01 NOTE — BH PSYCHOLOGY - CLINICIAN PSYCHOTHERAPY NOTE - TOKEN PULL-DIAGNOSIS
Primary Diagnosis:  Schizoaffective disorder [F25.9]        Problem Dx:   
Primary Diagnosis:    Problem Dx:   
Primary Diagnosis:  Schizoaffective disorder [F25.9]        Problem Dx:   

## 2024-02-01 NOTE — BH PSYCHOLOGY - CLINICIAN PSYCHOTHERAPY NOTE - NSTXIMPULSDATENEW_PSY_ALL_CORE
30-Nov-2023
23-Nov-2023
30-Nov-2023
16-Nov-2023
30-Nov-2023

## 2024-02-01 NOTE — BH PSYCHOLOGY - CLINICIAN PSYCHOTHERAPY NOTE - NSTXIMPULSDATEEST_PSY_ALL_CORE
04-Jan-2024
17-Oct-2023
04-Jan-2024
11-Jan-2024
17-Oct-2023
11-Jan-2024
17-Oct-2023
20-Dec-2023
17-Oct-2023
17-Oct-2023
11-Jan-2024

## 2024-02-01 NOTE — BH PSYCHOLOGY - CLINICIAN PSYCHOTHERAPY NOTE - NSTXDCOTHRDATEEST_PSY_ALL_CORE
04-Jan-2024
17-Oct-2023
17-Oct-2023
20-Dec-2023
04-Jan-2024
17-Oct-2023
26-Dec-2023
17-Oct-2023
26-Dec-2023
04-Jan-2024
17-Oct-2023

## 2024-02-01 NOTE — BH TREATMENT PLAN - NSTXDCOTHRPROGRES_PSY_ALL_CORE
No Change
Improving
No Change
Improving
Improving
No Change
Improving
No Change
No Change
Improving
No Change
Improving
Improving

## 2024-02-01 NOTE — BH PSYCHOLOGY - CLINICIAN PSYCHOTHERAPY NOTE - NSBHPSYCHOLASSESSPROV_PSY_A_CORE
Psychology Trainee only
Licensed Psychologist
Psychology Trainee only

## 2024-02-01 NOTE — BH INPATIENT PSYCHIATRY PROGRESS NOTE - NSBHMETABOLIC_PSY_ALL_CORE_FT
BMI: BMI (kg/m2): 57.8 (09-14-23 @ 14:30)  HbA1c: A1C with Estimated Average Glucose Result: 5.2 % (11-01-23 @ 09:04)    Glucose:   BP: --Vital Signs Last 24 Hrs  T(C): 36.6 (02-01-24 @ 07:49), Max: 36.6 (02-01-24 @ 07:49)  T(F): 97.9 (02-01-24 @ 07:49), Max: 97.9 (02-01-24 @ 07:49)  HR: --  BP: --  BP(mean): --  RR: --  SpO2: --    Orthostatic VS  02-01-24 @ 07:49  Lying BP: --/-- HR: --  Sitting BP: 125/73 HR: 89  Standing BP: 141/81 HR: 83  Site: --  Mode: --  Orthostatic VS  01-31-24 @ 20:26  Lying BP: --/-- HR: --  Sitting BP: 129/84 HR: 98  Standing BP: 145/91 HR: 101  Site: --  Mode: --  Orthostatic VS  01-31-24 @ 08:14  Lying BP: --/-- HR: --  Sitting BP: 114/68 HR: 76  Standing BP: 127/73 HR: 90  Site: --  Mode: --  Orthostatic VS  01-30-24 @ 20:25  Lying BP: --/-- HR: --  Sitting BP: 144/78 HR: 102  Standing BP: 140/104 HR: --  Site: --  Mode: --    Lipid Panel: Date/Time: 11-01-23 @ 09:04  Cholesterol, Serum: 156  LDL Cholesterol Calculated: 82  HDL Cholesterol, Serum: 58  Total Cholesterol/HDL Ration Measurement: --  Triglycerides, Serum: 79

## 2024-02-01 NOTE — BH PSYCHOLOGY - CLINICIAN PSYCHOTHERAPY NOTE - NSTXCOPEPROGRES_PSY_ALL_CORE
Improving
No Change
Met - goal discontinued
No Change
Improving
No Change
No Change
Improving
No Change
No Change
Improving
No Change
No Change

## 2024-02-01 NOTE — BH TREATMENT PLAN - NSTXDEPRESDATETRGT_PSY_ALL_CORE
15-Nov-2023
18-Jan-2024
15-Nov-2023
15-Nov-2023
14-Feb-2024
15-Nov-2023
26-Oct-2023
26-Oct-2023
03-Jan-2024
11-Feb-2024
18-Jan-2024
15-Nov-2023
03-Jan-2024
11-Jan-2024

## 2024-02-01 NOTE — BH TREATMENT PLAN - NSTXSUICIDDATEEST_PSY_ALL_CORE
17-Oct-2023
04-Jan-2024
17-Oct-2023
20-Dec-2023
17-Oct-2023
18-Oct-2023
17-Oct-2023
17-Oct-2023
11-Jan-2024
17-Oct-2023
17-Oct-2023
11-Jan-2024

## 2024-02-01 NOTE — BH TREATMENT PLAN - NSDCCRITERIA_PSY_ALL_CORE
When pt is no longer an acute or imminent risk of harm to self or others, and is able to care for self safely, pt may then be discharged. 
CGI < 3
When pt is no longer an acute or imminent risk of harm to self or others, and is able to care for self safely, pt may then be discharged. 

## 2024-02-01 NOTE — BH PSYCHOLOGY - CLINICIAN PSYCHOTHERAPY NOTE - NSTXSLFCREGOAL_PSY_ALL_CORE
Will perform ADLs without assistance/prompts daily
Be able to demonstrate the ability to care for self in 2 areas such as feeding oneself and hygiene

## 2024-02-01 NOTE — BH INPATIENT PSYCHIATRY PROGRESS NOTE - NSBHCHARTREVIEWVS_PSY_A_CORE FT
Vital Signs Last 24 Hrs  T(C): 36.6 (02-01-24 @ 07:49), Max: 36.6 (02-01-24 @ 07:49)  T(F): 97.9 (02-01-24 @ 07:49), Max: 97.9 (02-01-24 @ 07:49)  HR: --  BP: --  BP(mean): --  RR: --  SpO2: --    Orthostatic VS  02-01-24 @ 07:49  Lying BP: --/-- HR: --  Sitting BP: 125/73 HR: 89  Standing BP: 141/81 HR: 83  Site: --  Mode: --  Orthostatic VS  01-31-24 @ 20:26  Lying BP: --/-- HR: --  Sitting BP: 129/84 HR: 98  Standing BP: 145/91 HR: 101  Site: --  Mode: --  Orthostatic VS  01-31-24 @ 08:14  Lying BP: --/-- HR: --  Sitting BP: 114/68 HR: 76  Standing BP: 127/73 HR: 90  Site: --  Mode: --  Orthostatic VS  01-30-24 @ 20:25  Lying BP: --/-- HR: --  Sitting BP: 144/78 HR: 102  Standing BP: 140/104 HR: --  Site: --  Mode: --

## 2024-02-01 NOTE — BH TREATMENT PLAN - NSTXSUICIDDATENEW_PSY_ALL_CORE
30-Nov-2023
16-Nov-2023
30-Nov-2023
23-Nov-2023
30-Nov-2023

## 2024-02-01 NOTE — BH TREATMENT PLAN - NSTXPLANSTARTDATE_PSY_ALL_CORE
07-Dec-2023 11:34
28-Dec-2023
19-Oct-2023 12:00
04-Jan-2024
09-Nov-2023 16:34
18-Jan-2024 14:08
11-Jan-2024
30-Nov-2023 12:54
03-Nov-2023 15:59
16-Nov-2023 15:24
26-Oct-2023 11:01
24-Nov-2023 13:17
01-Feb-2024 10:36
14-Dec-2023 09:48
21-Dec-2023

## 2024-02-01 NOTE — BH TREATMENT PLAN - NSTXSUICIDINTERRN_PSY_ALL_CORE
Provide education on different coping skills that aid in mitigating suicidal thoughts; encourage use of coping skills after education is provided by staff when pt is experiencing urges to engage in self-injurious behavior; redirect as needed/indicated
Provide education on coping skills that aid in mitigating suicidal thoughts; encourage use of coping skills after education is provided by staff when pt is experiencing urges to engage in self-injurious behavior; redirect as needed/indicated
Assess for suicidal ideation and plan and self-injurious behavior per shift. Encourage verbalization of feelings. Encourage to notify staff when feeling suicidal or self-injurious. Reassure patient of supportive care.
Assess for suicidal ideation and plan and self-injurious behavior per shift. Encourage verbalization of feelings. Encourage to notify staff when feeling suicidal or self-injurious. Reassure patient of supportive care.
RN will assess pt for SI
Provide education on coping skills that aid in mitigating suicidal thoughts; encourage use of coping skills after education is provided by staff when pt is experiencing urges to engage in self-injurious behavior; redirect as needed/indicated
RN will assess for SI asnd assist patient in idenifying positive coping skills
RN will assist patient in identifying positive reasons for living
RN will assess for SI asnd assist patient in idenifying positive coping skills

## 2024-02-01 NOTE — BH TREATMENT PLAN - NSTXCOPEDATENEW_PSY_ALL_CORE
30-Nov-2023
30-Nov-2023
16-Nov-2023
30-Nov-2023
23-Nov-2023
30-Nov-2023

## 2024-02-01 NOTE — BH TREATMENT PLAN - NSTXCOPEINTERRN_PSY_ALL_CORE
RN will assist patient in identifying 2 healthy coping skills
Provide education on methods of coping effectively; encourage use of coping skills after education is provided by staff; redirect as needed/indicated
RN will assist patient in recognizing healthy coping skills
Provide education on methods of coping effectively; encourage use of coping skills after education is provided by staff; redirect as needed/indicated
Provide education on methods of coping effectively; encourage use of coping skills after education is provided by staff; redirect as needed/indicated
RN will assist patient in identifying 2 healthy coping skills
RN will assist patient in identifying healthy coping skills

## 2024-02-01 NOTE — BH PSYCHOLOGY - CLINICIAN PSYCHOTHERAPY NOTE - NSBHPSYCHOLPARTICIP_PSY_A_CORE
Fully engaged

## 2024-02-01 NOTE — BH PSYCHOLOGY - CLINICIAN PSYCHOTHERAPY NOTE - NSBHPSYCHOLNARRATIVE_PSY_A_CORE FT
Writer met with Pt for 16-minute individual therapy session. Pt was alert and presented with adequate hygiene and grooming. Pt reported feeling "good", affect full. Pt denied experiencing any A/V hallucinations. Her thought process was linear and goal directed. Pts’ speech was WNL, content was relevant to discussion. Pt denied passive or active SI, HI or NSSI. Oriented x3. Limited insight and judgement demonstrated.      Pt shared with Wr that she decided to return to grandmothers house and refused housing option due to worries of physical injury from stairs, "rooms being too small and messy". Explored feelings that came up during and after visit as well as in conversation with grandmother, with pt sharing that her initial excitement subsided as she thoughts more about the different factors she witnessed during the visit. Pt shared that she feels "excited and ready to go home", explored with pt goals she had achieved, what was most helpful to her as well as plans for how to maintain tx gains and barriers to such. Went over ACT next steps workbook and reinforced skills learned. Plan to check in with pt on Tuesday following week.

## 2024-02-01 NOTE — BH TREATMENT PLAN - NSTXIMPULSDATENEW_PSY_ALL_CORE
23-Nov-2023
30-Nov-2023
16-Nov-2023
30-Nov-2023

## 2024-02-01 NOTE — BH PSYCHOLOGY - CLINICIAN PSYCHOTHERAPY NOTE - NSBHPSYCHOLDURATION_PSY_A_CORE
other...
30 minutes
30 minutes
other...
30 minutes
30 minutes
other...
30 minutes
20 minutes
20 minutes
30 minutes
45 minutes
30 minutes

## 2024-02-01 NOTE — BH TREATMENT PLAN - NSTXSUICIDINTERMD_PSY_ALL_CORE
psychopharmacology

## 2024-02-01 NOTE — BH TREATMENT PLAN - NSTXIMPULSINTERRN_PSY_ALL_CORE
Provide education on various calming/self-soothing tactics; redirect as needed/indicated
RN will assist patient in identifying and utlizing coping skills when agitated
Provide education on methosd of self-soothing; encourage use of coping skills after education is provided by staff when pt is having urges to act impulsively or is feeling agitated; redirect as needed/indicated
RN will assist patient in indentifying coping skills to help deal with stress
RN will assist patient in identifying and utlizing coping skills when agitated
Provide education on coping skills that aid in mitigating impulsive behavior; redirect as needed/indicated
RN will assist patient in indentifying coping skills to help deal with stress

## 2024-02-01 NOTE — BH INPATIENT PSYCHIATRY PROGRESS NOTE - PRN MEDS
MEDICATIONS  (PRN):  acetaminophen     Tablet .. 650 milliGRAM(s) Oral every 6 hours PRN Moderate Pain (4 - 6)  albuterol    90 MICROgram(s) HFA Inhaler 2 Puff(s) Inhalation every 6 hours PRN Shortness of Breath and/or Wheezing  aluminum hydroxide/magnesium hydroxide/simethicone Suspension 30 milliLiter(s) Oral every 4 hours PRN Dyspepsia  chlorproMAZINE    Injectable 50 milliGRAM(s) IntraMuscular once PRN severe agitation  chlorproMAZINE    Tablet 50 milliGRAM(s) Oral every 6 hours PRN agitation  hydrOXYzine hydrochloride 50 milliGRAM(s) Oral every 12 hours PRN Anxiety  lidocaine   4% Patch 1 Patch Transdermal daily PRN LBP  LORazepam     Tablet 2 milliGRAM(s) Oral every 8 hours PRN Anxiety  LORazepam   Injectable 2 milliGRAM(s) IntraMuscular once PRN Assaultive behavior  melatonin. 5 milliGRAM(s) Oral at bedtime PRN Insomnia  ondansetron    Tablet 4 milliGRAM(s) Oral every 6 hours PRN nausea  ondansetron    Tablet 4 milliGRAM(s) Oral every 6 hours PRN nausea  polyethylene glycol 3350 17 Gram(s) Oral daily PRN Constipation  simethicone 80 milliGRAM(s) Chew every 6 hours PRN gas  sulindac 150 milliGRAM(s) Oral two times a day PRN pain

## 2024-02-01 NOTE — BH PSYCHOLOGY - CLINICIAN PSYCHOTHERAPY NOTE - NSTXCOPEDATEEST_PSY_ALL_CORE
17-Oct-2023
04-Jan-2024
04-Jan-2024
17-Oct-2023

## 2024-02-01 NOTE — BH TREATMENT PLAN - NSTXDCOTHRDATETRGT_PSY_ALL_CORE
07-Nov-2023
14-Feb-2024
03-Jan-2024
18-Dec-2023
31-Oct-2023
11-Dec-2023
21-Nov-2023
14-Nov-2023
28-Nov-2023
26-Oct-2023
23-Jan-2024
11-Jan-2024
02-Jan-2024
11-Feb-2024
05-Dec-2023
11-Jan-2024

## 2024-02-01 NOTE — BH TREATMENT PLAN - NSTXIMPULSDATEEST_PSY_ALL_CORE
17-Oct-2023
04-Jan-2024
17-Oct-2023
11-Jan-2024
17-Oct-2023
20-Dec-2023
11-Jan-2024
17-Oct-2023
11-Jan-2024
11-Jan-2024
20-Dec-2023

## 2024-02-01 NOTE — BH TREATMENT PLAN - NSTXSUICIDDATETRGT_PSY_ALL_CORE
31-Oct-2023
26-Dec-2023
12-Dec-2023
21-Nov-2023
18-Jan-2024
05-Dec-2023
26-Oct-2023
14-Feb-2024
14-Nov-2023
07-Nov-2023
19-Dec-2023
28-Nov-2023
03-Jan-2024
02-Feb-2024
11-Jan-2024
18-Jan-2024

## 2024-02-01 NOTE — BH TREATMENT PLAN - NSTXIMPULSPROGRES_PSY_ALL_CORE
Improving
Met - goal discontinued
Met - goal discontinued
Improving
Improving
Met - goal discontinued
Improving
Met - goal discontinued
Improving
Met - goal discontinued

## 2024-02-01 NOTE — BH TREATMENT PLAN - NSTXCOPEDATEEST_PSY_ALL_CORE
04-Jan-2024
17-Oct-2023
17-Oct-2023
26-Dec-2023
17-Oct-2023
20-Dec-2023
17-Oct-2023
17-Oct-2023

## 2024-02-01 NOTE — BH TREATMENT PLAN - NSTXSLFCREINTERRN_PSY_ALL_CORE
RN will encourage patient to perform ADL's and provide assistance as needed.
RN will prompt patient to peform ADL's and provide assistance when needed

## 2024-02-01 NOTE — BH PSYCHOLOGY - CLINICIAN PSYCHOTHERAPY NOTE - NSTXSUICIDDATETRGT_PSY_ALL_CORE
26-Oct-2023
26-Dec-2023
28-Nov-2023
24-Oct-2023
05-Dec-2023
14-Feb-2024
07-Nov-2023
18-Jan-2024
07-Nov-2023
19-Dec-2023
14-Nov-2023
24-Oct-2023
18-Jan-2024
31-Oct-2023
28-Nov-2023
21-Nov-2023
12-Dec-2023
11-Jan-2024
19-Dec-2023
11-Jan-2024
26-Dec-2023

## 2024-02-01 NOTE — BH TREATMENT PLAN - NSTXDEPRESINTERMD_PSY_ALL_CORE
Psychotherapy and encourage engagement with psych rehab, medication management including titration of clozapine 
Psychotherapy and encourage engagement with psych rehab
Psychotherapy and encourage engagement with psych rehab, medication management including titration of clozapine 

## 2024-02-01 NOTE — BH TREATMENT PLAN - NSTXPLANTHERAPYSESSIONSFT_PSY_ALL_CORE
12-19-23  Type of therapy: Peer advocate, Pet therapy, Psychoeducation, Safety planning  Type of session: Individual  Level of patient participation: Engaged  Duration of participation: 15 minutes  Therapy conducted by: Psych rehab  Therapy Summary: Upon approaching the patient, patient was willing to meet with writer and was calm throughout the session. When asked about overall mood, patient endorses doing well and states she's excited about supportive housing. Patient and writer also spoke in session about patient's progress over this past interval. Upon evaluating goal progress, patient has made no change towards goal as she continues to have difficulty providing writer with specific coping skills. The therapeutic focus on this session was providing patient with support and validation as well as a safe space to share feelings. Writer also provided patient with encouragement to continue exploring coping skills. Overall, throughout session, patient was calm, cooperative, and showed progress towards psychiatric goal. Patient’s appearance was kempt and she was observed to be dressed casually. Patient has been keeping up with daily grooming habits. Patient attends many groups and is seen frequently on the milieu. Currently denies SI/HI/AVH.  
  01-02-24  Type of therapy: Coping skills, Leisure development, Peer advocate, Psychoeducation  Type of session: Individual  Level of patient participation: Engaged  Duration of participation: 20 minutes  Therapy conducted by: Psych rehab  Therapy Summary: Writer met with patient to assess patients progress towards psychiatric rehabilitation goal over the past seven days. Patient has been medication compliant. Patient reports increased stress due to concerns regarding housing. Over the past seven days, patient has been working on the goal of identifying and utilizing 2 coping skills. Patient continues to demonstrate some progress towards this goal. Patient reports talking to peers and staff in order to distract herself. Writer and patient discussed patients concerns regarding housing and skills she can implement to help manage stress. Writer provided patient support, psychoeducation and validation. Patient would benefit from continuing to work on this goal for the next seven days. Patient has attended approximately 45 percent of psychiatric rehabilitation groups over the past seven days. Patient is able to tolerate group structure and engage meaningfully in group discussions/activities. Patient is visible on the milieu. Patient socializes with select peers. Patient demonstrates fair ADL’s. Psych rehab will continue to work with patient to provide support and psychoeducation.  
  11-09-23  Type of therapy: Psychoeducation, Relapse prevention  --  --  --  --  --    11-14-23  Type of therapy: Leisure development, Peer advocate, Psychoeducation  Type of session: Individual  Level of patient participation: Engaged  Duration of participation: 20 minutes  Therapy conducted by: Psych rehab  Therapy Summary: Writer met with patient to assess patients progress towards psychiatric rehabilitation goal over the past seven days. Patient has been medication compliant. Patient endorsed mood as “good” but does acknowledge her mood fluctuates. Patient reports intermittent SI and AH. Patient continues to maintain CO 1:1 due to SI. Over the past seven days, patient has been working on the goal of identifying and utilizing 2 coping skills. Patient continues to demonstrate some progress towards this goal. Patient reports going to groups, listening to music and talking to peers to be an effective coping skill. Patient would benefit from continuing to work on this goal for the next seven days. Patient has attended approximately 75 percent of psychiatric rehabilitation groups over the past seven days. Patient is able to tolerate group structure and engage meaningfully in group discussions/activities. Patient is visible on the milieu. Patient socializes with select peers. Patient demonstrates poor ADL’s. Psych rehab will continue to work with patient to provide support and psychoeducation.  
  11-07-23  Type of therapy: Leisure development, Psychoeducation  Type of session: Individual  Level of patient participation: Engaged  Duration of participation: 30 minutes  Therapy conducted by: Psych rehab  Therapy Summary: Upon approaching the patient, patient was willing to meet with writer and was tearful throughout the session. When asked about overall mood, patient expressed feeling upset due to recently breaking up with her boyfriend. Patient and writer also spoke in session about patient's progress over this past interval. Upon evaluating goal progress, patient has made no change towards goal. The therapeutic focus on this session was providing patient with support and validation as well as a safe space to share feelings. Overall, throughout session, patient was tearful, cooperative, and showed no change towards psychiatric goal. Patient’s appearance was kempt and she was observed to be dressed casually. Patient has been keeping up with daily grooming habits. Patient attends many groups and is seen frequently on the milieu. Endorses SI and AH, denies HI/VH.  
  11-28-23  Type of therapy: Leisure development, Peer advocate, Psychoeducation, Safety planning  Type of session: Individual  Level of patient participation: Engaged  Duration of participation: 30 minutes  Therapy conducted by: Psych rehab  Therapy Summary: Upon approaching the patient, patient was willing to meet with writer and was calm throughout the session. When asked about overall mood, patient expresses "feeling fine". Patient and writer spoke in session about patient's progress over this past interval. Upon evaluating goal progress, patient has made some change towards goal as she is off CO 1:1 and stated that a coping skill she utilizes is talking with others. The therapeutic focus on this session was providing patient with support and validation as well as a safe space to share feelings. Writer also provided patient with psychoeducation on coping skills. Overall, throughout session, patient was calm, cooperative, and showed progress towards psychiatric goal. Patient’s appearance was kempt and she was observed to be dressed casually. Patient has been keeping up with daily grooming habits. Patient attends many groups and is seen frequently on the milieu. Currently denies SI/HI/AVH.  
  01-16-24  Type of therapy: Leisure development, Psychoeducation  Type of session: Individual  Level of patient participation: Engaged  Duration of participation: 15 minutes  Therapy conducted by: Psych rehab  Therapy Summary: Upon approaching the patient, patient was willing to meet with writer and was calm throughout the session. When asked about overall mood, patient discussed some anxiety about her housing interview, but feels adequately prepared for it. Patient and writer also spoke in session about patient's progress over this past interval. Upon evaluating goal progress, patient still identifies talking to others as a coping skill. The therapeutic focus on this session was providing patient with support and validation as well as a safe space to share feelings. Writer also provided patient with encouragement to continue exploring coping skills. Overall, throughout session, patient was calm, cooperative, and made some progress towards psychiatric goal. Patient’s appearance was kempt and she was observed to be dressed casually. Patient has been keeping up with daily grooming habits. Patient attends some groups and is seen frequently on the milieu. Currently denies SI/HI/AVH.  
  11-21-23  Type of therapy: Leisure development, Psychoeducation  Type of session: Individual  Level of patient participation: Engaged  Duration of participation: 20 minutes  Therapy conducted by: Psych rehab  Therapy Summary: Upon approaching the patient, patient was willing to meet with writer and was calm throughout the session. When asked about overall mood, patient expressed that she feels stressed but doesn't know why. Writer asked patient if there has been any recent changes in her life, and patient states that she had a fight with her ex-boyfriend last night. Patient also expressed feeling worried that she hasn't been accepted to state yet. Patient then stated that she thinks she feels anxious from a combination of these recent events. Patient and writer also spoke in session about patient's progress over this past interval. Upon evaluating goal progress, patient has made no change towards goal. The therapeutic focus on this session was providing patient with support and validation as well as a safe space to share feelings. Writer also provided patient with psychoeducation on coping skills. Overall, throughout session, patient was calm, cooperative, and showed no change towards psychiatric goal. Patient’s appearance was kempt and she was observed to be dressed casually. Patient has been keeping up with daily grooming habits. Patient attends many groups and is seen frequently on the milieu. Currently denies SI/HI/AVH, reports experiencing SI and AH this morning.    11-21-23  Type of therapy: Psychoeducation, Relapse prevention  --  --  --  --  --  
  10-24-23  Type of therapy: Leisure development, Peer advocate, Psychoeducation, Safety planning  Type of session: Individual  Level of patient participation: Engaged  Duration of participation: 30 minutes  Therapy conducted by: Psych rehab  Therapy Summary: Writer met with patient to assess patients progress towards psychiatric rehabilitation goal over the past seven days. Patient has been medication compliant. Patient has been endorsing low mood and somatic pain but is currently “feeling good”. Patient is currently on CO 1:1 due to SI/SIB (patient wrote a suicide note over the weekend).  Over the past seven days, patient has been working on the goal of identifying and utilizing 2 coping skills in the context of SI/SIB. Patient has demonstrated some progress towards this goal. Patient endorsed talking to social supports as an effective coping skill. Writer and patient explored this skill as well as other coping skills patient can utilize when experiencing SI/urges to self-harm. Patient will continue to work on this goal for the next seven days. Patient has attended approximately 55 percent of psychiatric rehabilitation groups over the past seven days. Patient is able to tolerate group structure and engage meaningfully in group discussions. Patient is visible on the milieu, often seen talking on the unit phone. Patient socializes with select peers. Patient demonstrates fair ADL’s. Patient denies SI. Psych rehab will continue to work with patient to provide support and psychoeducation.  
  01-23-24  Type of therapy: Leisure development, Psychoeducation  Type of session: Individual  Level of patient participation: Engaged  Duration of participation: 15 minutes  Therapy conducted by: Psych rehab  Therapy Summary: Upon approaching the patient, patient was willing to meet with writer and was calm throughout the session. When asked about overall mood, patient was calm and expressed that she's looking forward to more housing interviews so she can eventually be discharged soon. Patient and writer also spoke in session about patient's progress over this past interval. Upon evaluating goal progress, patient identified making bracelets and talking to others as coping skills, therefore achieving her psychiatric goal. The therapeutic focus on this session was providing patient with support and validation as well as a safe space to share feelings. Overall, throughout session, patient was calm, cooperative, and achieved her psychiatric goal, therefore a new one will be established for her. Patient’s appearance was unkempt and she was observed to be dressed casually. Patient has been keeping up with daily grooming habits. Patient attends some groups and is seen frequently on the milieu. Currently denies SI/HI/AVH.  
  10-31-23  Type of therapy: Leisure development, Pet therapy, Psychoeducation, Safety planning  Type of session: Individual  Level of patient participation: Engaged  Duration of participation: 30 minutes  Therapy conducted by: Psych rehab  Therapy Summary: Upon approaching the patient, patient was willing to meet with writer and was somber throughout the session. When asked about overall mood, patient expressed that she’s still upset that peers on the unit know that she drank soap and expressed having visual hallucinations of her grandfather. Patient and writer also spoke about patient’s progress over this past interval. Upon discussing goal progress, patient discussed talking to others as a coping skill. The therapeutic focus on this session was providing support and validation as well as a safe space to share feelings. Writer also provided patient with psychoeducation on coping skills. Overall, throughout session, patient was somber but cooperative and showed progress towards psychiatric goal. Patient’s appearance was kempt and was observed to be dressed casually. Patient has been keeping up with daily grooming habits. Patient attends many groups and is seen frequently on the milieu. Endorses SI/VH, denies AH/HI.  
  12-12-23  Type of therapy: Leisure development, Pet therapy, Psychoeducation  Type of session: Individual  Level of patient participation: Engaged  Duration of participation: 15 minutes  Therapy conducted by: Psych rehab  Therapy Summary: Upon approaching the patient, patient was willing to meet with writer and was calm throughout the session. When asked about overall mood, patient endorses significant improvement in symptoms. Patient and writer also spoke in session about patient's progress over this past interval. Upon evaluating goal progress, patient has made some progress towards goal as she states she is not currently feeling suicidal and has been utilizing coping skills to cope with suicidal urges. Patient had difficulty providing writer with specific coping skills, stating she's "just adopting a more positive mindset". The therapeutic focus on this session was providing patient with support and validation as well as a safe space to share feelings. Writer also provided patient with encouragement to continue exploring coping skills. Overall, throughout session, patient was calm, cooperative, and showed progress towards psychiatric goal. Patient’s appearance was kempt and she was observed to be dressed casually. Patient has been keeping up with daily grooming habits. Patient attends many groups and is seen frequently on the milieu. Currently denies SI/HI/AVH.  
  01-30-24  Type of therapy: Leisure development, Peer advocate, Pet therapy, Psychoeducation  Type of session: Individual  Level of patient participation: Engaged  Duration of participation: 15 minutes  Therapy conducted by: Psych rehab  Therapy Summary: Upon approaching the patient, patient was willing to meet with writer and was calm throughout the session. When asked about overall mood, patient was calm and expressed that she hopes her housing interview goes well. Patient and writer also spoke in session about patient's progress over this past interval. Upon evaluating goal progress, patient has been attending to her ADLs over this past interval with staff prompting. Therefore, writer will establish a new goal of patient attending to ADLs without prompting. The therapeutic focus on this session was providing patient with support and validation as well as a safe space to share feelings. Overall, throughout session, patient was calm, cooperative, and achieved her psychiatric goal. Patient’s appearance was kempt and she was observed to be dressed casually. Patient has been keeping up with daily grooming habits. Patient attends some groups and is seen frequently on the milieu. Currently denies SI/HI/AVH.  
  01-09-24  Type of therapy: Leisure development, Psychoeducation  Type of session: Individual  Level of patient participation: Engaged  Duration of participation: 15 minutes  Therapy conducted by: Psych rehab  Therapy Summary: Upon approaching the patient, patient was willing to meet with writer and was calm throughout the session. When asked about overall mood, patient discussed some anxiety about her family dog being sick and that her family might be putting the dog down. Writer however does however express looking forward to housing. Patient and writer also spoke in session about patient's progress over this past interval. Upon evaluating goal progress, patient identified talking to others as a coping skill. The therapeutic focus on this session was providing patient with support and validation as well as a safe space to share feelings. Writer also provided patient with encouragement to continue exploring coping skills. Overall, throughout session, patient was calm, cooperative, and made some progress towards psychiatric goal. Patient’s appearance was kempt and she was observed to be dressed casually. Patient has been keeping up with daily grooming habits. Patient attends some groups and is seen frequently on the milieu. Currently denies SI/HI/AVH.  
  12-05-23  Type of therapy: Leisure development, Peer advocate, Pet therapy, Psychoeducation  Type of session: Individual  Level of patient participation: Engaged  Duration of participation: 20 minutes  Therapy conducted by: Psych rehab  Therapy Summary: Upon approaching the patient, patient was willing to meet with writer and was calm throughout the session. When asked about overall mood, patient expresses some anxiety about not hearing back from her state application yet. She does however state that if she does not get into state, the treatment team is looking into mental health housing for her, which she is content with. Patient and writer also spoke in session about patient's progress over this past interval. Upon evaluating goal progress, patient has made no change towards goal, as she had difficulty providing writer with any additional coping skills since previous sessions. The therapeutic focus on this session was providing patient with support and validation as well as a safe space to share feelings. Writer also provided patient with psychoeducation on coping skills. Overall, throughout session, patient was calm, cooperative, and showed progress towards psychiatric goal. Patient’s appearance was kempt and she was observed to be dressed casually. Patient has been keeping up with daily grooming habits. Patient attends many groups and is seen frequently on the milieu. Currently denies SI/HI/AVH.  
  12-26-23  Type of therapy: Leisure development, Psychoeducation  Type of session: Individual  Level of patient participation: Engaged  Duration of participation: 15 minutes  Therapy conducted by: Psych rehab  Therapy Summary: Upon approaching the patient, patient was willing to meet with writer and was calm throughout the session. When asked about overall mood, patient endorses doing well and continues to endorse excitement about supportive housing. Patient and writer also spoke in session about patient's progress over this past interval. Upon evaluating goal progress, patient identified talking with others and utilizing positive affirmations as coping skills, therefore achieving her psychiatric goal. The therapeutic focus on this session was providing patient with support and validation as well as a safe space to share feelings. Writer also provided patient with encouragement to continue exploring coping skills. Overall, throughout session, patient was calm, cooperative, and will have a new psychiatric goal established today due to achieving her goal. Patient’s appearance was kempt and she was observed to be dressed casually. Patient has been keeping up with daily grooming habits. Patient attends some groups and is seen frequently on the milieu. Currently denies SI/HI/AVH.

## 2024-02-01 NOTE — BH TREATMENT PLAN - NSTXSLFCREINTERPR_PSY_ALL_CORE
Psych rehab recommends that patient attend individual and group therapy for support, psychoeducation and skill-integration as well as to facilitate progress towards specified goal.
Psych rehab recommends that patient attend individual and group therapy for support, psychoeducation and skill-integration as well as to facilitate progress towards specified goal.

## 2024-02-01 NOTE — BH TREATMENT PLAN - NSTXPATIENTPARTICIPATE_PSY_ALL_CORE
Patient participated in identification of needs/problems/goals for treatment
Patient participated in identification of needs/problems/goals for treatment/Patient participated in defining interventions/Patient participated in development of after care plan
Patient participated in identification of needs/problems/goals for treatment
Patient participated in identification of needs/problems/goals for treatment/Patient participated in defining interventions/Patient participated in development of after care plan
Patient participated in identification of needs/problems/goals for treatment
Patient participated in identification of needs/problems/goals for treatment/Patient participated in defining interventions/Patient participated in development of after care plan
Patient participated in identification of needs/problems/goals for treatment

## 2024-02-01 NOTE — BH PSYCHOLOGY - CLINICIAN PSYCHOTHERAPY NOTE - NSTXDEPRESDATEEST_PSY_ALL_CORE
17-Oct-2023
11-Jan-2024
11-Jan-2024
17-Oct-2023
11-Jan-2024
04-Jan-2024
17-Oct-2023
04-Jan-2024
17-Oct-2023
20-Dec-2023
17-Oct-2023

## 2024-02-01 NOTE — BH TREATMENT PLAN - NSTXCOPEINTERPR_PSY_ALL_CORE
Patient has met this goal
Patient has met this goal
Psych rehab recommends that patient attend individual and group therapy for support, psychoeducation and skill-integration as well as to facilitate progress towards specified goal.
Psych rehab recommends that patient attend individual and group therapy for support, psychoeducation and skill-integration as well as to facilitate progress towards specified goal.
Patient continues to demonstrate progress towards this goal. Psych rehab recommends patient continues to engage in individual and group therapy sessions in order for patient to gain psychoeducation, psychotherapy and support.
Psych rehab recommends that patient attend individual and group therapy for support, psychoeducation and skill-integration as well as to facilitate progress towards specified goal.

## 2024-02-01 NOTE — BH PSYCHOLOGY - CLINICIAN PSYCHOTHERAPY NOTE - NSTXDEPRESDATENEW_PSY_ALL_CORE
30-Nov-2023
23-Nov-2023
30-Nov-2023
16-Nov-2023
30-Nov-2023

## 2024-02-01 NOTE — BH PSYCHOLOGY - CLINICIAN PSYCHOTHERAPY NOTE - NSTXCOPEDATENEW_PSY_ALL_CORE
30-Nov-2023
23-Nov-2023
16-Nov-2023
30-Nov-2023

## 2024-02-01 NOTE — BH PSYCHOLOGY - CLINICIAN PSYCHOTHERAPY NOTE - NSTXCOPEDATETRGT_PSY_ALL_CORE
26-Oct-2023
24-Oct-2023
24-Oct-2023
26-Oct-2023
23-Jan-2024
14-Feb-2024
11-Jan-2024
26-Oct-2023
09-Jan-2024
16-Jan-2024
11-Jan-2024

## 2024-02-01 NOTE — BH TREATMENT PLAN - NSTXDEPRESGOAL_PSY_ALL_CORE
Report using a coping skill to overcome sadness and worry in order to socialize with peers daily

## 2024-02-02 PROCEDURE — 99231 SBSQ HOSP IP/OBS SF/LOW 25: CPT

## 2024-02-02 RX ADMIN — PANTOPRAZOLE SODIUM 40 MILLIGRAM(S): 20 TABLET, DELAYED RELEASE ORAL at 09:51

## 2024-02-02 RX ADMIN — BUDESONIDE AND FORMOTEROL FUMARATE DIHYDRATE 2 PUFF(S): 160; 4.5 AEROSOL RESPIRATORY (INHALATION) at 09:51

## 2024-02-02 RX ADMIN — DESMOPRESSIN ACETATE 0.1 MILLIGRAM(S): 0.1 TABLET ORAL at 22:20

## 2024-02-02 RX ADMIN — LITHIUM CARBONATE 1200 MILLIGRAM(S): 300 TABLET, EXTENDED RELEASE ORAL at 20:32

## 2024-02-02 RX ADMIN — BUPROPION HYDROCHLORIDE 150 MILLIGRAM(S): 150 TABLET, EXTENDED RELEASE ORAL at 09:50

## 2024-02-02 RX ADMIN — GABAPENTIN 300 MILLIGRAM(S): 400 CAPSULE ORAL at 20:33

## 2024-02-02 RX ADMIN — BUDESONIDE AND FORMOTEROL FUMARATE DIHYDRATE 2 PUFF(S): 160; 4.5 AEROSOL RESPIRATORY (INHALATION) at 22:18

## 2024-02-02 RX ADMIN — ESCITALOPRAM OXALATE 20 MILLIGRAM(S): 10 TABLET, FILM COATED ORAL at 09:50

## 2024-02-02 RX ADMIN — Medication 2 MILLIGRAM(S): at 20:33

## 2024-02-02 RX ADMIN — MONTELUKAST 10 MILLIGRAM(S): 4 TABLET, CHEWABLE ORAL at 20:33

## 2024-02-02 RX ADMIN — CLOZAPINE 300 MILLIGRAM(S): 150 TABLET, ORALLY DISINTEGRATING ORAL at 20:33

## 2024-02-02 RX ADMIN — GABAPENTIN 300 MILLIGRAM(S): 400 CAPSULE ORAL at 09:50

## 2024-02-02 NOTE — BH INPATIENT PSYCHIATRY PROGRESS NOTE - MSE UNSTRUCTURED FT
Appearance: Dressed appropriately.  Good hygiene and grooming.   Behavior: Cooperative, calm, well related, good eye contact.   Motor: No abnormal movements, no psychomotor slowing or activation.  Speech: Regular rate, speed, and volume.   Mood: "pretty good," "stable"  Affect: Pleasant, full range.  Thought Process: Linear.  Associations: Fair.  Thought Content: Future oriented. No AVH, SIIP, HIIP reported.   Insight: Fair.  Judgment: Fair on interview.  Attention: Fair.  Language: Fluent.  Gait: Intact.

## 2024-02-02 NOTE — BH INPATIENT PSYCHIATRY PROGRESS NOTE - NSBHASSESSSUMMFT_PSY_ALL_CORE
Patient is 29yo single woman, no dependents, domiciled with her Grandmother, unemployed on disability/SSI, pphx of schizoaffective disorder, multiple past psychiatric admissions including state and recent discharge from [facility], in tx with [facility], with pmhx of asthma, HTN, GERD, and hypothyroidism and schizoaffective disorder, in tx with [facility], who self presented to the ED reporting abdominal pain and requesting readmission to psychiatric unit, reporting CAH, depressive symptoms, IOR, suicidality, admitted to [facility] for psychiatric care.  Depression and psychosis likely multifactorial at this time, schizoaffective vs. borderline personality disorder vs. complex grief vs. adjustment disorder.      Stable without any mood symptoms or psychosis.  Awaiting housing.     1.) Obs: Continue routine checks.  No need for 1:1.  2.) Psychiatric medication management:  - Reviewed options for addressing nocturnal enuresis including reducing clozapine vs reducing lithium vs adding desmopressin.  Pt decided upon desmopressin as she continues to have depression and psychosis.  Desmopressin 0.1 mg QHS.  - Lithobid 1200 mg QHS.  Pt aware that she needs to stay well hydrated and avoid diuretics and NSAIDs. lithium level 0.7 on 1/16  - Discontinued olanzapine  - Increase Clozapine to 300mg qHS for psychosis ([id number]), level 416 on 1/4/24  - Prazosin 2mg qhs, monitor BP carefully.  - Abilify Maintena 400mg IM q3 weeks rather than 4, per collateral from ACT team.  Doses given 10/19, 11/17, 12/8, 12/29, 1/19, next due 2/9.  - Mirtazapine was switched to escitalopram, increased to 20 mg daily.  - Wellbutrin XL 150mg daily  - Gabapentin 300 mg BID for anxiety/chronic pain.  - PRN: haloperidol 5mg PO/IM q8hrs PRN for agitation, diphenhydramine 50mg PO/IM q6hrs PRN for EPS PPx, hydroxyzine 50mg PO q12hrs PRN for anxiety, lorazepam 2mg PO q8hrs PRN for anxiety or 2mg IM for assaultive behavior. Melatonin 5mg qHS PRN for insomnia  - Discussed ECT several times with pt who states she has been told in two hospitals in past that due to her obesity and asthma, she is not an ideal candidate.  Pt states that due to this she is not interested in receiving ECT at this time.  3.) Medical:   - Pt scheduled for dental extractions 11/1, however declined to attend on day prior when informed it would not be under general anesthesia.  - last clozapine labs on 10/23 (CBC with diff, troponins, CRP); CRP elevated at 15.8 likely due to dental infection/wisdom tooth issues  - pantoprazole 40mg PO before breakfast for GERD  - montelukast 10mg PO qHS for asthma  - budesonide 80mcg/formoterol 4.5mcg 2 puffs BID for asthma  - PRN: acetaminophen 650mg PO q6hrs PRN for pain, albuterol 90mcg 2puffs q6hrs PRN for dyspnea, ondansetron 4mg q6hrs PRN for nausea  - Discussed CRP with medicine service, given asymptomatic pt and normal troponin, no additional intervention/diagnostics at this time.  - Discontinued clobetasol as rash has resolved.  - Possible folliculitis/furunculosis, will give Keflex 500 mg TID x 7 days with warm compresses.  4.) Dispo planning: Patient expressed interest in going home to live with her grandmother.  Patient is 27yo single woman, no dependents, domiciled with her Grandmother, unemployed on disability/SSI, pphx of schizoaffective disorder, multiple past psychiatric admissions including state and recent discharge from [facility], in tx with [facility], with pmhx of asthma, HTN, GERD, and hypothyroidism and schizoaffective disorder, in tx with [facility], who self presented to the ED reporting abdominal pain and requesting readmission to psychiatric unit, reporting CAH, depressive symptoms, IOR, suicidality, admitted to [facility] for psychiatric care.  Depression and psychosis likely multifactorial at this time, schizoaffective vs. borderline personality disorder vs. complex grief vs. adjustment disorder.      Stable without any mood symptoms or psychosis.    1.) Obs: Continue routine checks.  No need for 1:1.  2.) Psychiatric medication management:  - Reviewed options for addressing nocturnal enuresis including reducing clozapine vs reducing lithium vs adding desmopressin.  Pt decided upon desmopressin as she continues to have depression and psychosis.  Desmopressin 0.1 mg QHS.  - Lithobid 1200 mg QHS.  Pt aware that she needs to stay well hydrated and avoid diuretics and NSAIDs. lithium level 0.7 on 1/16  - Discontinued olanzapine  - Increase Clozapine to 300mg qHS for psychosis ([id number]), level 416 on 1/4/24  - Prazosin 2mg qhs, monitor BP carefully.  - Abilify Maintena 400mg IM q3 weeks rather than 4, per collateral from ACT team.  Doses given 10/19, 11/17, 12/8, 12/29, 1/19, next due 2/9.  - Mirtazapine was switched to escitalopram, increased to 20 mg daily.  - Wellbutrin XL 150mg daily  - Gabapentin 300 mg BID for anxiety/chronic pain.  - PRN: haloperidol 5mg PO/IM q8hrs PRN for agitation, diphenhydramine 50mg PO/IM q6hrs PRN for EPS PPx, hydroxyzine 50mg PO q12hrs PRN for anxiety, lorazepam 2mg PO q8hrs PRN for anxiety or 2mg IM for assaultive behavior. Melatonin 5mg qHS PRN for insomnia  - Discussed ECT several times with pt who states she has been told in two hospitals in past that due to her obesity and asthma, she is not an ideal candidate.  Pt states that due to this she is not interested in receiving ECT at this time.  3.) Medical:   - Pt scheduled for dental extractions 11/1, however declined to attend on day prior when informed it would not be under general anesthesia.  - last clozapine labs on 10/23 (CBC with diff, troponins, CRP); CRP elevated at 15.8 likely due to dental infection/wisdom tooth issues  - pantoprazole 40mg PO before breakfast for GERD  - montelukast 10mg PO qHS for asthma  - budesonide 80mcg/formoterol 4.5mcg 2 puffs BID for asthma  - PRN: acetaminophen 650mg PO q6hrs PRN for pain, albuterol 90mcg 2puffs q6hrs PRN for dyspnea, ondansetron 4mg q6hrs PRN for nausea  - Discussed CRP with medicine service, given asymptomatic pt and normal troponin, no additional intervention/diagnostics at this time.  - Discontinued clobetasol as rash has resolved.  - Possible folliculitis/furunculosis, will give Keflex 500 mg TID x 7 days with warm compresses.  4.) Dispo planning: Patient now expressed interest in going home to live with her grandmother, team confirmed with grandmother that this is acceptable.

## 2024-02-02 NOTE — BH INPATIENT PSYCHIATRY PROGRESS NOTE - NSBHMETABOLIC_PSY_ALL_CORE_FT
BMI: BMI (kg/m2): 57.8 (09-14-23 @ 14:30)  HbA1c: A1C with Estimated Average Glucose Result: 5.2 % (11-01-23 @ 09:04)    Glucose:   BP: --Vital Signs Last 24 Hrs  T(C): 36.9 (02-02-24 @ 08:37), Max: 36.9 (02-02-24 @ 08:37)  T(F): 98.4 (02-02-24 @ 08:37), Max: 98.4 (02-02-24 @ 08:37)  HR: --  BP: --  BP(mean): --  RR: --  SpO2: --    Orthostatic VS  02-02-24 @ 08:37  Lying BP: --/-- HR: --  Sitting BP: 135/79 HR: 82  Standing BP: 115/94 HR: 97  Site: --  Mode: --  Orthostatic VS  02-01-24 @ 20:46  Lying BP: --/-- HR: --  Sitting BP: 137/81 HR: 95  Standing BP: 140/79 HR: 89  Site: --  Mode: --  Orthostatic VS  02-01-24 @ 07:49  Lying BP: --/-- HR: --  Sitting BP: 125/73 HR: 89  Standing BP: 141/81 HR: 83  Site: --  Mode: --  Orthostatic VS  01-31-24 @ 20:26  Lying BP: --/-- HR: --  Sitting BP: 129/84 HR: 98  Standing BP: 145/91 HR: 101  Site: --  Mode: --    Lipid Panel: Date/Time: 11-01-23 @ 09:04  Cholesterol, Serum: 156  LDL Cholesterol Calculated: 82  HDL Cholesterol, Serum: 58  Total Cholesterol/HDL Ration Measurement: --  Triglycerides, Serum: 79

## 2024-02-02 NOTE — BH INPATIENT PSYCHIATRY PROGRESS NOTE - NSBHCHARTREVIEWVS_PSY_A_CORE FT
Vital Signs Last 24 Hrs  T(C): 36.9 (02-02-24 @ 08:37), Max: 36.9 (02-02-24 @ 08:37)  T(F): 98.4 (02-02-24 @ 08:37), Max: 98.4 (02-02-24 @ 08:37)  HR: --  BP: --  BP(mean): --  RR: --  SpO2: --    Orthostatic VS  02-02-24 @ 08:37  Lying BP: --/-- HR: --  Sitting BP: 135/79 HR: 82  Standing BP: 115/94 HR: 97  Site: --  Mode: --  Orthostatic VS  02-01-24 @ 20:46  Lying BP: --/-- HR: --  Sitting BP: 137/81 HR: 95  Standing BP: 140/79 HR: 89  Site: --  Mode: --  Orthostatic VS  02-01-24 @ 07:49  Lying BP: --/-- HR: --  Sitting BP: 125/73 HR: 89  Standing BP: 141/81 HR: 83  Site: --  Mode: --  Orthostatic VS  01-31-24 @ 20:26  Lying BP: --/-- HR: --  Sitting BP: 129/84 HR: 98  Standing BP: 145/91 HR: 101  Site: --  Mode: --

## 2024-02-02 NOTE — BH INPATIENT PSYCHIATRY PROGRESS NOTE - NSBHFUPINTERVALHXFT_PSY_A_CORE
Pt reports that she is still having aching in her hips. Otherwise denies all other symptoms. Sleeping well with good appetite.  Pt reports that she is still having aching in her hips despite Tylenol and it takes a few days to go away after her period ends. Otherwise denies all other symptoms. Sleeping well with good appetite.

## 2024-02-02 NOTE — BH PSYCHOLOGY - GROUP THERAPY NOTE - NSBHPSYCHOLRESPCOMMENT_PSY_A_CORE FT
The patient appeared adequately groomed and casually dressed. Pt appeared engaged in the group as evidenced by her willingness to independently verbally communicate during the discussion. Pt volunteered to read aloud from worksheet. Pt shared what has kept her from following up with doctors, noting that she doesn’t always feel listened to. Pt also noted the foods that make her feel lethargic. Speech WNL. PT was oriented X3. Pt was appropriate and supportive with peers.

## 2024-02-03 RX ADMIN — MONTELUKAST 10 MILLIGRAM(S): 4 TABLET, CHEWABLE ORAL at 20:21

## 2024-02-03 RX ADMIN — BUDESONIDE AND FORMOTEROL FUMARATE DIHYDRATE 2 PUFF(S): 160; 4.5 AEROSOL RESPIRATORY (INHALATION) at 20:20

## 2024-02-03 RX ADMIN — ESCITALOPRAM OXALATE 20 MILLIGRAM(S): 10 TABLET, FILM COATED ORAL at 09:31

## 2024-02-03 RX ADMIN — CLOZAPINE 300 MILLIGRAM(S): 150 TABLET, ORALLY DISINTEGRATING ORAL at 20:21

## 2024-02-03 RX ADMIN — GABAPENTIN 300 MILLIGRAM(S): 400 CAPSULE ORAL at 09:31

## 2024-02-03 RX ADMIN — GABAPENTIN 300 MILLIGRAM(S): 400 CAPSULE ORAL at 20:21

## 2024-02-03 RX ADMIN — Medication 2 MILLIGRAM(S): at 20:21

## 2024-02-03 RX ADMIN — DESMOPRESSIN ACETATE 0.1 MILLIGRAM(S): 0.1 TABLET ORAL at 20:22

## 2024-02-03 RX ADMIN — Medication 30 MILLILITER(S): at 10:45

## 2024-02-03 RX ADMIN — BUDESONIDE AND FORMOTEROL FUMARATE DIHYDRATE 2 PUFF(S): 160; 4.5 AEROSOL RESPIRATORY (INHALATION) at 09:32

## 2024-02-03 RX ADMIN — PANTOPRAZOLE SODIUM 40 MILLIGRAM(S): 20 TABLET, DELAYED RELEASE ORAL at 09:31

## 2024-02-03 RX ADMIN — LITHIUM CARBONATE 1200 MILLIGRAM(S): 300 TABLET, EXTENDED RELEASE ORAL at 20:21

## 2024-02-03 RX ADMIN — BUPROPION HYDROCHLORIDE 150 MILLIGRAM(S): 150 TABLET, EXTENDED RELEASE ORAL at 09:31

## 2024-02-04 RX ADMIN — LITHIUM CARBONATE 1200 MILLIGRAM(S): 300 TABLET, EXTENDED RELEASE ORAL at 20:40

## 2024-02-04 RX ADMIN — GABAPENTIN 300 MILLIGRAM(S): 400 CAPSULE ORAL at 08:15

## 2024-02-04 RX ADMIN — GABAPENTIN 300 MILLIGRAM(S): 400 CAPSULE ORAL at 20:40

## 2024-02-04 RX ADMIN — CLOZAPINE 300 MILLIGRAM(S): 150 TABLET, ORALLY DISINTEGRATING ORAL at 20:40

## 2024-02-04 RX ADMIN — MONTELUKAST 10 MILLIGRAM(S): 4 TABLET, CHEWABLE ORAL at 20:40

## 2024-02-04 RX ADMIN — Medication 2 MILLIGRAM(S): at 20:40

## 2024-02-04 RX ADMIN — ESCITALOPRAM OXALATE 20 MILLIGRAM(S): 10 TABLET, FILM COATED ORAL at 08:15

## 2024-02-04 RX ADMIN — BUPROPION HYDROCHLORIDE 150 MILLIGRAM(S): 150 TABLET, EXTENDED RELEASE ORAL at 08:15

## 2024-02-04 RX ADMIN — PANTOPRAZOLE SODIUM 40 MILLIGRAM(S): 20 TABLET, DELAYED RELEASE ORAL at 08:16

## 2024-02-04 RX ADMIN — DESMOPRESSIN ACETATE 0.1 MILLIGRAM(S): 0.1 TABLET ORAL at 20:40

## 2024-02-05 PROCEDURE — 90853 GROUP PSYCHOTHERAPY: CPT

## 2024-02-05 PROCEDURE — 99238 HOSP IP/OBS DSCHRG MGMT 30/<: CPT

## 2024-02-05 RX ORDER — MONTELUKAST 4 MG/1
1 TABLET, CHEWABLE ORAL
Qty: 14 | Refills: 0
Start: 2024-02-05 | End: 2024-02-18

## 2024-02-05 RX ORDER — BUPROPION HYDROCHLORIDE 150 MG/1
1 TABLET, EXTENDED RELEASE ORAL
Qty: 14 | Refills: 0
Start: 2024-02-05 | End: 2024-02-18

## 2024-02-05 RX ORDER — LITHIUM CARBONATE 300 MG/1
4 TABLET, EXTENDED RELEASE ORAL
Qty: 56 | Refills: 0
Start: 2024-02-05 | End: 2024-02-18

## 2024-02-05 RX ORDER — ARIPIPRAZOLE 15 MG/1
400 TABLET ORAL
Qty: 0 | Refills: 0 | DISCHARGE

## 2024-02-05 RX ORDER — CLOZAPINE 150 MG/1
3 TABLET, ORALLY DISINTEGRATING ORAL
Qty: 21 | Refills: 0
Start: 2024-02-05 | End: 2024-02-11

## 2024-02-05 RX ORDER — GABAPENTIN 400 MG/1
1 CAPSULE ORAL
Qty: 28 | Refills: 0
Start: 2024-02-05 | End: 2024-02-18

## 2024-02-05 RX ORDER — PRAZOSIN HCL 2 MG
1 CAPSULE ORAL
Qty: 14 | Refills: 0
Start: 2024-02-05 | End: 2024-02-18

## 2024-02-05 RX ORDER — ESCITALOPRAM OXALATE 10 MG/1
1 TABLET, FILM COATED ORAL
Qty: 14 | Refills: 0
Start: 2024-02-05 | End: 2024-02-18

## 2024-02-05 RX ORDER — BUDESONIDE AND FORMOTEROL FUMARATE DIHYDRATE 160; 4.5 UG/1; UG/1
2 AEROSOL RESPIRATORY (INHALATION)
Qty: 1 | Refills: 0
Start: 2024-02-05 | End: 2024-02-18

## 2024-02-05 RX ORDER — DESMOPRESSIN ACETATE 0.1 MG/1
1 TABLET ORAL
Qty: 14 | Refills: 0
Start: 2024-02-05 | End: 2024-02-18

## 2024-02-05 RX ORDER — PANTOPRAZOLE SODIUM 20 MG/1
1 TABLET, DELAYED RELEASE ORAL
Qty: 14 | Refills: 0
Start: 2024-02-05 | End: 2024-02-18

## 2024-02-05 RX ADMIN — Medication 2 MILLIGRAM(S): at 20:26

## 2024-02-05 RX ADMIN — PANTOPRAZOLE SODIUM 40 MILLIGRAM(S): 20 TABLET, DELAYED RELEASE ORAL at 09:08

## 2024-02-05 RX ADMIN — LITHIUM CARBONATE 1200 MILLIGRAM(S): 300 TABLET, EXTENDED RELEASE ORAL at 20:27

## 2024-02-05 RX ADMIN — BUPROPION HYDROCHLORIDE 150 MILLIGRAM(S): 150 TABLET, EXTENDED RELEASE ORAL at 09:08

## 2024-02-05 RX ADMIN — CLOZAPINE 300 MILLIGRAM(S): 150 TABLET, ORALLY DISINTEGRATING ORAL at 20:26

## 2024-02-05 RX ADMIN — ESCITALOPRAM OXALATE 20 MILLIGRAM(S): 10 TABLET, FILM COATED ORAL at 09:08

## 2024-02-05 RX ADMIN — DESMOPRESSIN ACETATE 0.1 MILLIGRAM(S): 0.1 TABLET ORAL at 20:27

## 2024-02-05 RX ADMIN — BUDESONIDE AND FORMOTEROL FUMARATE DIHYDRATE 2 PUFF(S): 160; 4.5 AEROSOL RESPIRATORY (INHALATION) at 09:10

## 2024-02-05 RX ADMIN — MONTELUKAST 10 MILLIGRAM(S): 4 TABLET, CHEWABLE ORAL at 20:27

## 2024-02-05 RX ADMIN — GABAPENTIN 300 MILLIGRAM(S): 400 CAPSULE ORAL at 09:08

## 2024-02-05 RX ADMIN — GABAPENTIN 300 MILLIGRAM(S): 400 CAPSULE ORAL at 20:27

## 2024-02-05 NOTE — BH INPATIENT PSYCHIATRY PROGRESS NOTE - NSTXPROBDEPRES_PSY_ALL_CORE
DEPRESSIVE SYMPTOMS

## 2024-02-05 NOTE — BH INPATIENT PSYCHIATRY DISCHARGE NOTE - NSBHDCMEDICALFT_PSY_A_CORE
Pt was sent to [facility] ER on 11/30/2023 and 12/9/2023 with abdominal pain - both times workup was unremarkable, pt was medically cleared, and returned to [facility].

## 2024-02-05 NOTE — BH INPATIENT PSYCHIATRY PROGRESS NOTE - NSBHLEGALSTATUSDATE_PSY_ALL_CORE
25-Oct-2023 11:00

## 2024-02-05 NOTE — BH INPATIENT PSYCHIATRY PROGRESS NOTE - NSTXIMPULSDATETRGT_PSY_ALL_CORE
15-Nov-2023
11-Jan-2024
15-Nov-2023
11-Feb-2024
15-Nov-2023
18-Jan-2024
18-Jan-2024
26-Oct-2023
15-Nov-2023
26-Oct-2023
15-Nov-2023
03-Jan-2024
15-Nov-2023
18-Jan-2024
18-Jan-2024
26-Oct-2023
15-Nov-2023
26-Oct-2023
26-Oct-2023
03-Jan-2024
15-Nov-2023
15-Nov-2023
03-Jan-2024
15-Nov-2023
26-Oct-2023
15-Nov-2023
26-Oct-2023
14-Feb-2024
15-Nov-2023
03-Jan-2024
15-Nov-2023
11-Feb-2024
15-Nov-2023
26-Oct-2023
15-Nov-2023
14-Feb-2024
15-Nov-2023
11-Feb-2024
26-Oct-2023
15-Nov-2023
11-Jan-2024
15-Nov-2023
24-Oct-2023
15-Nov-2023
26-Oct-2023
26-Oct-2023
11-Feb-2024
15-Nov-2023
14-Feb-2024
18-Jan-2024
15-Nov-2023
15-Nov-2023
11-Feb-2024
03-Jan-2024
15-Nov-2023
18-Jan-2024
18-Jan-2024
03-Jan-2024
15-Nov-2023
24-Oct-2023
15-Nov-2023
15-Nov-2023
18-Jan-2024
03-Jan-2024
15-Nov-2023
03-Jan-2024
18-Jan-2024
03-Jan-2024
15-Nov-2023
11-Jan-2024
15-Nov-2023
11-Jan-2024

## 2024-02-05 NOTE — BH INPATIENT PSYCHIATRY PROGRESS NOTE - NSBHMETABOLIC_PSY_ALL_CORE_FT
BMI: BMI (kg/m2): 57.8 (09-14-23 @ 14:30)  HbA1c: A1C with Estimated Average Glucose Result: 5.2 % (11-01-23 @ 09:04)    Glucose:   BP: 119/70 (02-03-24 @ 15:44) (119/70 - 119/70)Vital Signs Last 24 Hrs  T(C): 37.2 (02-05-24 @ 08:06), Max: 37.2 (02-05-24 @ 08:06)  T(F): 98.9 (02-05-24 @ 08:06), Max: 98.9 (02-05-24 @ 08:06)  HR: --  BP: --  BP(mean): --  RR: --  SpO2: --    Orthostatic VS  02-05-24 @ 08:06  Lying BP: --/-- HR: --  Sitting BP: 137/70 HR: 77  Standing BP: 140/77 HR: 96  Site: --  Mode: --  Orthostatic VS  02-04-24 @ 21:29  Lying BP: --/-- HR: --  Sitting BP: 148/74 HR: 98  Standing BP: 150/79 HR: 108  Site: --  Mode: --  Orthostatic VS  02-04-24 @ 08:22  Lying BP: --/-- HR: --  Sitting BP: 139/72 HR: 93  Standing BP: 130/66 HR: 103  Site: --  Mode: --  Orthostatic VS  02-03-24 @ 20:49  Lying BP: --/-- HR: --  Sitting BP: 120/78 HR: 101  Standing BP: 129/76 HR: 104  Site: --  Mode: --    Lipid Panel: Date/Time: 11-01-23 @ 09:04  Cholesterol, Serum: 156  LDL Cholesterol Calculated: 82  HDL Cholesterol, Serum: 58  Total Cholesterol/HDL Ration Measurement: --  Triglycerides, Serum: 79

## 2024-02-05 NOTE — BH PSYCHOLOGY - GROUP THERAPY NOTE - NSBHPSYCHOLGRPNAME_PSY_A_CORE
CBT (Cognitive Behavioral Therapy) Group

## 2024-02-05 NOTE — BH INPATIENT PSYCHIATRY PROGRESS NOTE - NSTXSLFCREDATEEST_PSY_ALL_CORE
23-Jan-2024
30-Jan-2024
23-Jan-2024
23-Jan-2024
30-Jan-2024
30-Jan-2024
23-Jan-2024
30-Jan-2024
30-Jan-2024

## 2024-02-05 NOTE — BH INPATIENT PSYCHIATRY DISCHARGE NOTE - NSDCCPCAREPLAN_GEN_ALL_CORE_FT
PRINCIPAL DISCHARGE DIAGNOSIS  Diagnosis: Schizoaffective disorder  Assessment and Plan of Treatment: Medication management and psychotherapy

## 2024-02-05 NOTE — BH PSYCHOLOGY - GROUP THERAPY NOTE - NSPSYCHOLGRPCOGPT_PSY_A_CORE FT
Patient attended Cognitive Behavioral Therapy Group incorporating ACT-based concepts. The group started with a brief check-in asking Pts to share what they wish others knew about mental illness. Members were then encouraged to reflect on an ACT chessboard metaphor about accepting all thoughts and feelings and the role of our “observing self.” Lastly, Group focused on engaging in a discussion about tips for improving personal boundaries through not only understanding how we react to others, but how we reach out and connect to others (identifying when to seek-help, exploring ways of communicating, and gaining an awareness of people-pleasing behaviors). Group facilitator explained concepts, reinforced participation, and engaged patients in the discussion.     
Patient attended Cognitive Behavioral Therapy Group incorporating ACT-based concepts. The group started with a brief check-in asking pts about their favorite Thanksgiving dish. The group was then engaged in a worksheet talking about sleep hygiene. Group facilitator explained concepts, reinforced participation, and engaged patients in the discussion. 
Patient attended Cognitive Behavioral Therapy Group incorporating ACT-based concepts. The group started with a brief check-in asking pts about their favorite candy. The group was then engaged in a progressive muscle relaxation exercise and a work sheet discussing protective factors. Group facilitator explained concepts, reinforced participation, and engaged patients in the discussion. 
Patient attended Cognitive Behavioral Therapy Group incorporating ACT-based concepts. The group started with a brief check-in asking pts about how their house would look like. The group was then engaged in a mindfulness exercise and a card game of self-affirmations, engaging in reflections, exercises and discussions of those etc. Group facilitator explained concepts, reinforced participation, and engaged patients in the discussion.   
Patient attended Cognitive Behavioral Therapy Group incorporating ACT-based concepts. The group started with a brief check-in asking pts about one thing they are thankful for. The group was then engaged in a mindfulness exercise and a worksheet discussing anger etc. Group facilitator explained concepts, reinforced participation, and engaged patients in the discussion. 
Patient attended Cognitive Behavioral Therapy Group incorporating ACT-based concepts. The group started with a brief check-in asking about their favorite movie/TV show. The group was then engaged in a mindfulness guided meditation, as well as an ACT worksheet (polar bear) and quote. Group facilitator explained concepts, reinforced participation, and engaged patients in the discussion. 
Patient attended Cognitive Behavioral Therapy Group incorporating ACT-based concepts. The group started with a brief check-in asking Pts to share their favorite trip or place to travel. Group members then engaged in a mindful breathing exercise and were encouraged to share their thoughts/reactions to this. Lastly, Group then focused on engaging in a discussion about taking care of both their mind and body, the challenges of this, and the importance of treating physical illness, avoiding mood-altering substances, having balanced sleeping and eating habits, and engaging in physical exercise. Group facilitator explained concepts, reinforced participation, and engaged patients in the discussion. 
Patient attended Cognitive Behavioral Therapy Group incorporating ACT-based concepts. The group started with a brief check-in asking to describe their current mood as weather.  The group then watched two [employer] videos on the struggle switch and values vs goals and were engaged in a discussion. Group facilitator explained concepts, reinforced participation, and engaged patients in the discussion
Patient attended Cognitive Behavioral Therapy Group incorporating ACT-based concepts. The group started with a brief check-in asking pts about how they ground themselves. The group was then engaged in a card game of asking each other questions, engaging in reflections, discussions of those etc. Group facilitator explained concepts, reinforced participation, and engaged patients in the discussion. 
Patient attended Cognitive Behavioral Therapy Group incorporating ACT-based concepts. The group started with a brief check-in asking pts about something they are proud of. The group was then engaged in a loving kindness mindfulness exercise, as well as a worksheet covering Radical Acceptance. Group facilitator explained concepts, reinforced participation, and engaged patients in the discussion. 
Patient attended Cognitive Behavioral Therapy Group incorporating ACT-based concepts. The group started with a brief check-in asking about three words that would describe themselves. The group was then engaged in a mindfulness guided meditation, and a discussion-based worksheet discussion of “opposite sides”. Group facilitator explained concepts, reinforced participation, and engaged patients in the discussion. 
Patient attended Cognitive Behavioral Therapy Group incorporating ACT-based concepts. The group started with a brief check-in asking pts about their favorite movie and TV show. The group was then engaged in a mindfulness exercise and a game asking each other questions related to emotions, coping skills etc. Group facilitator explained concepts, reinforced participation, and engaged patients in the discussion. 
Patient attended Cognitive Behavioral Therapy Group incorporating ACT-based concepts. The group started with a brief check-in asking Pts to share one piece of advice that they would give their younger self. Group members also engaged in a box breathing exercise, and lastly, focused on engaging in a discussion about ways to be more present in their daily lives (by journaling, eating more mindfully, listening more intently to others, and unplugging from distractions). Group facilitator explained concepts, reinforced participation, and engaged patients in the discussion. 
Patient attended Cognitive Behavioral Therapy Group incorporating ACT-based concepts. The group started with a brief check-in asking Pts to share the best gift they’ve ever given as well as received. Members were then encouraged to reflect on an ACT metaphor relating to values. Lastly, Group focused on continuing to engage in a discussion about improving personal boundaries through exploring how we connect with others and incorporate self-compassion. Members also shared what makes communicating difficult, sharing relevant personal examples. Group facilitator explained concepts, reinforced participation, and engaged patients in the discussion. 
Patient attended Cognitive Behavioral Therapy Group incorporating ACT-based concepts. The group started with a brief check-in asking Pts to share their favorite place to travel/visit during the summer. Group members then engaged in a body scan exercise. Lastly, Group focused on discussing several unhelpful thinking styles (all or nothing thinking, labeling, personalization, overgeneralizing, etc.). Members shared personal examples of times they’ve engaged in these patterns of thinking and explored how to practice cognitive defusion skills when noticing this. Group facilitator explained concepts, reinforced participation, and engaged patients in the discussion. 
Patient attended Cognitive Behavioral Therapy Group incorporating ACT-based concepts. The group started with a brief check-in, asking about a nice thing that others had done for them in recent days. Members then engaged in a discussion of two acceptance-based quotes. Afterward, the group focused on a discussion about mental health stigma, including social stigma and perceived stigma or self-stigma. Group members were also engaged in sharing their thoughts about how self-stigma impacts their treatment-seeking. Group facilitator explained concepts, reinforced participation, and engaged patients in the discussion. 
Patient attended Cognitive Behavioral Therapy Group incorporating ACT-based concepts. The group started with a brief check-in asking Pts’ decision of going back to past or into future if they have a time machine. Members then engaged in an imagination mindfulness exercise and were encouraged to share any thoughts/reactions. Then, group focused on engaging in a discussion about emotions and the labels (positive vs. negative) they give to these emotions. Group members were encouraged to accept the full spectrum of emotions as part of being human. Group facilitator explained concepts, reinforced participation, and engaged patients in the discussion. 
Patient attended Cognitive Behavioral Therapy Group incorporating ACT-based concepts. The group started with a brief check-in asking Pts to share one gift they’d like to give themselves. Group members then engaged in a Leaves on a Stream defusion exercise. Lastly, Group engaged in a discussion focused on exploring important considerations for addressing and resolving conflicts in relationships; exploring reflecting listening skills, compromising, using “I statements”, and knowing when to take a time out. Members were encouraged to share personal examples. Group facilitator explained concepts, reinforced participation, and engaged patients in the discussion. 
Patient attended Cognitive Behavioral Therapy Group incorporating ACT-based concepts. The group started with a brief check-in asking pts about a goal they had for the new year. The group was then engaged in mindfulness exercise, as well as a worksheet discussing sleep hygiene. Group facilitator explained concepts, reinforced participation, and engaged patients in the discussion.
Patient attended Cognitive Behavioral Therapy Group incorporating ACT-based concepts. The group started with a brief check-in asking pts about their favorite movie/TV show. The group was then engaged in mindfulness exercise, as well as an emotion awareness/regulation card game. Group facilitator explained concepts, reinforced participation, and engaged patients in the discussion.

## 2024-02-05 NOTE — BH INPATIENT PSYCHIATRY PROGRESS NOTE - NSTXCOPEMODTX_PSY_ALL_CORE
Yes

## 2024-02-05 NOTE — BH INPATIENT PSYCHIATRY PROGRESS NOTE - NSCGIIMPROVESX_PSY_ALL_CORE
1 - Very much improved - nearly all better; good level of functioning; minimal symptoms; represents a very substantial change
1 - Very much improved - nearly all better; good level of functioning; minimal symptoms; represents a very substantial change
2 = Much improved - notably better with signficant reduction of symptoms; increase in the level of functioning but some symptoms remain

## 2024-02-05 NOTE — BH INPATIENT PSYCHIATRY PROGRESS NOTE - NSBHADMITIPREASONDETAILS_PSY_A_CORE FT
Safe dispo planning.

## 2024-02-05 NOTE — BH INPATIENT PSYCHIATRY DISCHARGE NOTE - NSDCRECOMMENDACTIVITYFT_PSY_ALL_CORE
While taking lithium, please AVOID NSAIDs (e.g. ibuprofen, Advil, naproxen, Alleve, Motrin, etc), diuretics (e.g. hydrochlorothiazide, furosemide), ACE inhibitors (e.g. lisinopril), and aspirin.  For pain, you CAN take Tylenol (acetaminophen).  Please stay WELL HYDRATED.  Please let your primary medical doctor know that you are taking lithium.

## 2024-02-05 NOTE — BH PSYCHOLOGY - GROUP THERAPY NOTE - NSBHPSYCHOLGRPDUR_PSY_A_CORE
45 minutes

## 2024-02-05 NOTE — BH INPATIENT PSYCHIATRY PROGRESS NOTE - NSTXDEPRESDATEEST_PSY_ALL_CORE
17-Oct-2023
17-Oct-2023
04-Jan-2024
17-Oct-2023
20-Dec-2023
17-Oct-2023
20-Dec-2023
11-Jan-2024
17-Oct-2023
11-Jan-2024
17-Oct-2023
20-Dec-2023
17-Oct-2023
20-Dec-2023
11-Jan-2024
17-Oct-2023
20-Dec-2023
17-Oct-2023
11-Jan-2024
17-Oct-2023
11-Jan-2024
17-Oct-2023
20-Dec-2023
17-Oct-2023
17-Oct-2023
11-Jan-2024
17-Oct-2023
11-Jan-2024
17-Oct-2023
11-Jan-2024
17-Oct-2023
11-Jan-2024
17-Oct-2023
11-Jan-2024
17-Oct-2023
17-Oct-2023
11-Jan-2024
17-Oct-2023
11-Jan-2024
17-Oct-2023
04-Jan-2024
11-Jan-2024
17-Oct-2023
11-Jan-2024
04-Jan-2024
17-Oct-2023
20-Dec-2023
17-Oct-2023
04-Jan-2024
17-Oct-2023
20-Dec-2023
11-Jan-2024
17-Oct-2023
20-Dec-2023

## 2024-02-05 NOTE — BH INPATIENT PSYCHIATRY PROGRESS NOTE - NSTXDEPRESPROGRES_PSY_ALL_CORE
No Change
Met - goal discontinued
Improving
No Change
No Change
Improving
No Change
Met - goal discontinued
No Change
Met - goal discontinued
No Change
Improving
No Change
Met - goal discontinued
Met - goal discontinued
No Change
Met - goal discontinued
No Change
Improving
Met - goal discontinued
No Change
Improving
No Change
No Change
Improving
No Change
Improving
Met - goal discontinued
No Change
Improving
Improving
No Change
Improving
No Change
Improving
No Change
No Change
Met - goal discontinued
Met - goal discontinued
No Change
Improving
Met - goal discontinued
Met - goal discontinued
No Change
Met - goal discontinued
No Change
No Change
Met - goal discontinued
Met - goal discontinued
No Change
Improving
No Change
Met - goal discontinued
Met - goal discontinued
No Change
Met - goal discontinued
No Change
Improving
Met - goal discontinued
Improving
No Change
Improving
No Change
No Change
Improving

## 2024-02-05 NOTE — BH INPATIENT PSYCHIATRY PROGRESS NOTE - NSTXDCOTHRINTERMD_PSY_ALL_CORE
State hospitalization application process initiated

## 2024-02-05 NOTE — BH INPATIENT PSYCHIATRY PROGRESS NOTE - NSTXDEPRESGOAL_PSY_ALL_CORE
Report using a coping skill to overcome sadness and worry in order to socialize with peers daily

## 2024-02-05 NOTE — BH INPATIENT PSYCHIATRY PROGRESS NOTE - NSTXPROBDCOTHR_PSY_ALL_CORE
DISCHARGE ISSUE - OTHER

## 2024-02-05 NOTE — BH INPATIENT PSYCHIATRY PROGRESS NOTE - NSBHFUPINTERVALCCFT_PSY_A_CORE
Patient was seen for followup of schizoaffective disorder. 
Seen for follow up of schizoaffective disorder. 
Seen for follow up of schizoaffective disorder. 
Patient was seen for followup of schizoaffective disorder. 
Patient was seen for followup of schizoaffective disorder. 
follow up psychosis and mood 
Patient was seen for followup of schizoaffective disorder. 
reported feeling "pretty well". 
Patient was seen for followup of schizoaffective disorder. 
Patient was seen for followup of schizoaffective disorder. 
follow up psychosis and mood 
Patient was seen for followup of schizoaffective disorder. 
follow up psychosis and mood 
Patient was seen for followup of schizoaffective disorder. 
follow up mood 
Patient was seen for followup of schizoaffective disorder. 
Patient was seen for followup of schizoaffective disorder. 
Seen for follow up of schizoaffective disorder. 
Seen for follow up of schizoaffective disorder. 
follow up psychosis and mood 
reported feeling "pretty good". 
Patient seen for followup of schizoaffective disorder.
reported feeling "pretty good". 
follow up psychosis and mood 
Seen for follow up of schizoaffective disorder. 
follow up psychosis and mood 
Seen for follow up of schizoaffective disorder. 
follow up psychosis and mood 
follow up mood + psychosis 
follow up psychosis and mood 
reported feeling "good, I have an interview Thursday!"
follow up mood 
follow up psychosis and mood 
reported feeling "pretty good". 
Patient was seen for followup of schizoaffective disorder. 
follow up psychosis and mood 
Seen for follow up of schizoaffective disorder. 
Seen for follow up of schizoaffective disorder. 
follow up psychosis and mood 
Seen for follow up of schizoaffective disorder. 
follow up psychosis and mood 
follow up psychosis 
reported feeling "pretty good emotionally". 
Patient was seen for followup of schizoaffective disorder. 
follow up psychosis and mood 
Seen for follow up of schizoaffective disorder. 
follow up psychosis and mood 
follow up psychosis and mood 
reported feeling "pretty good". 
Patient seen for followup of schizoaffective disorder.
Patient was seen for followup of schizoaffective disorder. 
Seen for follow up of schizoaffective disorder. 
reported feeling "pretty good". 
follow up psychosis and mood 
Patient seen for followup of schizoaffective disorder.
reported feeling "pretty good". 
reported feeling "pretty good". 
reported feeling "okay". 
reported feeling "tired". 
follow up psychosis and mood 
reported feeling "pretty well". 
follow up psychosis and mood 
reported feeling "pretty good". 

## 2024-02-05 NOTE — BH INPATIENT PSYCHIATRY PROGRESS NOTE - NSTXDEPRESDATENEW_PSY_ALL_CORE
30-Nov-2023
16-Nov-2023
30-Nov-2023
16-Nov-2023
23-Nov-2023
30-Nov-2023
16-Nov-2023
30-Nov-2023
16-Nov-2023
23-Nov-2023
30-Nov-2023
30-Nov-2023
23-Nov-2023
23-Nov-2023
30-Nov-2023
23-Nov-2023
30-Nov-2023
16-Nov-2023
30-Nov-2023

## 2024-02-05 NOTE — BH INPATIENT PSYCHIATRY PROGRESS NOTE - NSTXCOPEDATENEW_PSY_ALL_CORE
30-Nov-2023
30-Nov-2023
16-Nov-2023
30-Nov-2023
23-Nov-2023
16-Nov-2023
30-Nov-2023
23-Nov-2023
30-Nov-2023
16-Nov-2023
23-Nov-2023
23-Nov-2023
30-Nov-2023
16-Nov-2023
30-Nov-2023
16-Nov-2023
30-Nov-2023
23-Nov-2023
23-Nov-2023
30-Nov-2023
23-Nov-2023
30-Nov-2023

## 2024-02-05 NOTE — BH INPATIENT PSYCHIATRY PROGRESS NOTE - NSTXDCOTHRGOAL_PSY_ALL_CORE
Lack of appropriate houssing. Pt is requesting state hospitalization or  housing at this time.
Pt is requesting state hospitalization at this time.
Lack of appropriate housing
Lack of appropriate housing
Lack of appropriate housing.
Pt is requesting state hospitalization at this time.
Pt requesting state hospitalization at this time.
Lack of appropriate housing.
Pt is requesting state hospitalization at this time.
Lack of appropriate housing
Pt is requesting state hospitalization at this time.
Lack of appropriate housing.
Lack of appropriate houssing. Pt is requesting state hospitalization or  housing at this time.
Pt is requesting state hospitalization at this time.
Pt is requesting state hospitalization or  housing at this time.
Pt requesting state hospitalization at this time.
Pt is requesting state hospitalization or  housing at this time.
Pt is requesting state hospitalization at this time.
Pt is requesting state hospitalization at this time.
Pt requesting state hospitalization at this time.
Pt is requesting state hospitalization at this time.
Lack of appropriate housing.
Pt is requesting state hospitalization at this time.
Lack of appropriate housing.
Pt requesting state hospitalization at this time.
Lack of appropriate housing.
Lack of appropriate housing.
Pt is requesting state hospitalization at this time.
Lack of appropriate housing.
Pt is requesting state hospitalization at this time.
Lack of appropriate housing
Pt is requesting state hospitalization at this time.
Lack of appropriate housing.
Pt is requesting state hospitalization at this time.
Pt is requesting state hospitalization at this time.
Lack of appropriate housing
Pt is requesting state hospitalization at this time.
Pt is requesting state hospitalization at this time.
Lack of appropriate housing.
Pt is requesting state hospitalization at this time.
Pt is requesting state hospitalization at this time.
Lack of appropriate housing
Lack of appropriate housing.
Pt is requesting state hospitalization at this time.
Pt is requesting state hospitalization at this time.
Pt requesting state hospitalization at this time.
Pt is requesting state hospitalization at this time.
Pt requesting state hospitalization at this time.
Pt is requesting state hospitalization at this time.
Lack of appropriate houssing. Pt is requesting state hospitalization or  housing at this time.
Pt is requesting state hospitalization at this time.
Pt is requesting state hospitalization at this time.
Lack of appropriate housing.
Pt is requesting state hospitalization or  housing at this time.
Lack of appropriate housing.
Lack of appropriate housing.
Pt is requesting state hospitalization at this time.
Pt is requesting state hospitalization or  housing at this time.
Pt requesting state hospitalization at this time.
Pt is requesting state hospitalization at this time.
Lack of appropriate housing
Lack of appropriate housing.
Lack of appropriate houssing. Pt is requesting state hospitalization or  housing at this time.
Pt is requesting state hospitalization at this time.
Pt is requesting state hospitalization at this time.
Lack of appropriate houssing. Pt is requesting state hospitalization or  housing at this time.
Lack of appropriate housing
Pt is requesting state hospitalization at this time.

## 2024-02-05 NOTE — BH INPATIENT PSYCHIATRY PROGRESS NOTE - NSBHFUPINTERVALHXFT_PSY_A_CORE
Pt reports that she is still having aching in her hips despite Tylenol and it takes a few days to go away after her period ends. Sleeping well with good appetite. Otherwise denies all other symptoms.  Pt states she is still looking to return to her grandmother's home.  States she will follow up with ACT.  Denies hearing any voices or having thoughts of harming self.  Denies any side effects from medications.  Still has some ache in hips.

## 2024-02-05 NOTE — BH INPATIENT PSYCHIATRY PROGRESS NOTE - NS ED [facility] REVIEW OF ED CHART VITAL SIGNS REVIEWED
Yes

## 2024-02-05 NOTE — BH PSYCHOLOGY - GROUP THERAPY NOTE - NSPSYCHOLGRPCOGGOAL_PSY_A_CORE
other...

## 2024-02-05 NOTE — BH INPATIENT PSYCHIATRY PROGRESS NOTE - NSTXCOPEGOAL_PSY_ALL_CORE
Identify and utilize 2 coping skills that meet their needs

## 2024-02-05 NOTE — BH INPATIENT PSYCHIATRY PROGRESS NOTE - NSTXIMPULSDATENEW_PSY_ALL_CORE
30-Nov-2023
23-Nov-2023
30-Nov-2023
23-Nov-2023
30-Nov-2023
30-Nov-2023
23-Nov-2023
30-Nov-2023
16-Nov-2023
30-Nov-2023
23-Nov-2023
23-Nov-2023
30-Nov-2023
16-Nov-2023
30-Nov-2023
16-Nov-2023
30-Nov-2023
16-Nov-2023
30-Nov-2023
16-Nov-2023
30-Nov-2023
16-Nov-2023
23-Nov-2023
30-Nov-2023
23-Nov-2023
16-Nov-2023
30-Nov-2023

## 2024-02-05 NOTE — BH INPATIENT PSYCHIATRY PROGRESS NOTE - NSTXPROBSLFCRE_PSY_ALL_CORE
SELF-CARE DEFICIT

## 2024-02-05 NOTE — BH INPATIENT PSYCHIATRY PROGRESS NOTE - NSTREATMENTCERT_PSY_ALL_CORE
.

## 2024-02-05 NOTE — BH INPATIENT PSYCHIATRY DISCHARGE NOTE - NSICDXPASTMEDICALHX_GEN_ALL_CORE_FT
PAST MEDICAL HISTORY:  Asthma     GERD (gastroesophageal reflux disease)     PTSD (post-traumatic stress disorder)     Schizoaffective disorder

## 2024-02-05 NOTE — BH INPATIENT PSYCHIATRY PROGRESS NOTE - NS ED BHA MED ROS PSYCHIATRIC
See HPI

## 2024-02-05 NOTE — BH INPATIENT PSYCHIATRY PROGRESS NOTE - NSBHFUPMEDSE_PSY_A_CORE
None known
Yes
None known
Yes
None known

## 2024-02-05 NOTE — BH PSYCHOLOGY - GROUP THERAPY NOTE - NSPSYCHOLGRPCOGGOAL_PSY_A_CORE FT
Decrease symptoms, Develop coping/emotion regulation skills, Psychoeducation    

## 2024-02-05 NOTE — BH INPATIENT PSYCHIATRY PROGRESS NOTE - MSE UNSTRUCTURED FT
Appearance: Dressed appropriately.  Good hygiene and grooming.   Behavior: Cooperative, calm, well related, good eye contact.   Motor: No abnormal movements, no psychomotor slowing or activation.  Speech: Regular rate, speed, and volume.   Mood: "good"  Affect: Pleasant, full range.  Thought Process: Linear.  Thought Content: No auditory or visual hallucinations. No suicidal or homicidal ideations/intent/plan.  Insight: Fair.  Judgment: Fair  Impulse control: Good  Language: Fluent.  Gait: Intact.

## 2024-02-05 NOTE — BH INPATIENT PSYCHIATRY PROGRESS NOTE - NSBHLEGALSTATUSNEW_PSY_ALL_CORE
9.27 (2PC)

## 2024-02-05 NOTE — BH INPATIENT PSYCHIATRY PROGRESS NOTE - NSTXDCOTHRDATETRGT_PSY_ALL_CORE
11-Dec-2023
25-Dec-2023
11-Jan-2024
11-Dec-2023
23-Jan-2024
02-Jan-2024
21-Nov-2023
31-Oct-2023
31-Oct-2023
07-Nov-2023
11-Feb-2024
21-Nov-2023
24-Oct-2023
28-Nov-2023
05-Dec-2023
23-Jan-2024
02-Jan-2024
09-Jan-2024
11-Dec-2023
26-Oct-2023
28-Nov-2023
07-Nov-2023
31-Oct-2023
26-Oct-2023
11-Dec-2023
11-Jan-2024
28-Nov-2023
14-Nov-2023
21-Nov-2023
26-Oct-2023
14-Nov-2023
31-Oct-2023
31-Oct-2023
03-Jan-2024
07-Nov-2023
31-Oct-2023
11-Jan-2024
11-Jan-2024
18-Dec-2023
25-Dec-2023
28-Nov-2023
02-Jan-2024
14-Feb-2024
14-Nov-2023
14-Nov-2023
18-Dec-2023
28-Nov-2023
21-Nov-2023
31-Oct-2023
30-Jan-2024
05-Dec-2023
26-Oct-2023
06-Feb-2024
21-Nov-2023
23-Jan-2024
14-Nov-2023
14-Nov-2023
11-Feb-2024
05-Dec-2023
21-Nov-2023
11-Feb-2024
11-Feb-2024
14-Feb-2024
23-Jan-2024
07-Nov-2023
14-Nov-2023
23-Jan-2024
28-Nov-2023
26-Oct-2023
09-Jan-2024
28-Nov-2023
07-Nov-2023
02-Jan-2024
11-Dec-2023
14-Feb-2024
23-Jan-2024
18-Dec-2023
24-Oct-2023
11-Jan-2024
18-Dec-2023
11-Jan-2024
03-Jan-2024
18-Dec-2023
11-Dec-2023
02-Jan-2024

## 2024-02-05 NOTE — BH INPATIENT PSYCHIATRY PROGRESS NOTE - NSBHASSESSSUMMFT_PSY_ALL_CORE
Patient is 27yo single woman, no dependents, domiciled with her Grandmother, unemployed on disability/SSI, pphx of schizoaffective disorder, multiple past psychiatric admissions including state and recent discharge from [facility], in tx with [facility], with pmhx of asthma, HTN, GERD, and hypothyroidism and schizoaffective disorder, in tx with [facility], who self presented to the ED reporting abdominal pain and requesting readmission to psychiatric unit, reporting CAH, depressive symptoms, IOR, suicidality, admitted to [facility] for psychiatric care.  Depression and psychosis likely multifactorial at this time, schizoaffective vs. borderline personality disorder vs. complex grief vs. adjustment disorder.      Stable without any mood symptoms or psychosis.    1.) Obs: Continue routine checks.  No need for 1:1.  2.) Psychiatric medication management:  - Reviewed options for addressing nocturnal enuresis including reducing clozapine vs reducing lithium vs adding desmopressin.  Pt decided upon desmopressin as she continues to have depression and psychosis.  Desmopressin 0.1 mg QHS.  - Lithobid 1200 mg QHS.  Pt aware that she needs to stay well hydrated and avoid diuretics and NSAIDs. lithium level 0.7 on 1/16  - Discontinued olanzapine  - Increase Clozapine to 300mg qHS for psychosis ([id number]), level 416 on 1/4/24  - Prazosin 2mg qhs, monitor BP carefully.  - Abilify Maintena 400mg IM q3 weeks rather than 4, per collateral from ACT team.  Doses given 10/19, 11/17, 12/8, 12/29, 1/19, next due 2/9.  - Mirtazapine was switched to escitalopram, increased to 20 mg daily.  - Wellbutrin XL 150mg daily  - Gabapentin 300 mg BID for anxiety/chronic pain.  - PRN: haloperidol 5mg PO/IM q8hrs PRN for agitation, diphenhydramine 50mg PO/IM q6hrs PRN for EPS PPx, hydroxyzine 50mg PO q12hrs PRN for anxiety, lorazepam 2mg PO q8hrs PRN for anxiety or 2mg IM for assaultive behavior. Melatonin 5mg qHS PRN for insomnia  - Discussed ECT several times with pt who states she has been told in two hospitals in past that due to her obesity and asthma, she is not an ideal candidate.  Pt states that due to this she is not interested in receiving ECT at this time.  3.) Medical:   - Pt scheduled for dental extractions 11/1, however declined to attend on day prior when informed it would not be under general anesthesia.  - last clozapine labs on 10/23 (CBC with diff, troponins, CRP); CRP elevated at 15.8 likely due to dental infection/wisdom tooth issues  - pantoprazole 40mg PO before breakfast for GERD  - montelukast 10mg PO qHS for asthma  - budesonide 80mcg/formoterol 4.5mcg 2 puffs BID for asthma  - PRN: acetaminophen 650mg PO q6hrs PRN for pain, albuterol 90mcg 2puffs q6hrs PRN for dyspnea, ondansetron 4mg q6hrs PRN for nausea  - Discussed CRP with medicine service, given asymptomatic pt and normal troponin, no additional intervention/diagnostics at this time.  - Discontinued clobetasol as rash has resolved.  - Possible folliculitis/furunculosis, will give Keflex 500 mg TID x 7 days with warm compresses.  4.) Dispo planning: Patient now expressed interest in going home to live with her grandmother, team confirmed with grandmother that this is acceptable. Patient is 29yo single woman, no dependents, domiciled with her Grandmother, unemployed on disability/SSI, pphx of schizoaffective disorder, multiple past psychiatric admissions including state and recent discharge from [facility], in tx with [facility], with pmhx of asthma, HTN, GERD, and hypothyroidism and schizoaffective disorder, in tx with [facility], who self presented to the ED reporting abdominal pain and requesting readmission to psychiatric unit, reporting CAH, depressive symptoms, IOR, suicidality, admitted to [facility] for psychiatric care.  Depression and psychosis likely multifactorial at this time, schizoaffective vs. borderline personality disorder vs. complex grief vs. adjustment disorder.      Pt continues to be stable without any mood symptoms or psychosis.    1.) Obs: Continue routine checks.  No need for 1:1.  2.) Psychiatric medication management:  - Reviewed options for addressing nocturnal enuresis including reducing clozapine vs reducing lithium vs adding desmopressin.  Pt decided upon desmopressin as she continues to have depression and psychosis.  Desmopressin 0.1 mg QHS.  - Lithobid 1200 mg QHS.  Pt aware that she needs to stay well hydrated and avoid diuretics and NSAIDs. lithium level 0.7 on 1/16.  Reiterated safe lithium guidelines today.  - Discontinued olanzapine  - Increase Clozapine to 300mg qHS for psychosis ([id number]), level 416 on 1/4/24  - Prazosin 2mg qhs, monitor BP carefully.  - Abilify Maintena 400mg IM q3 weeks rather than 4, per collateral from ACT team.  Doses given 10/19, 11/17, 12/8, 12/29, 1/19, next due 2/9.  - Mirtazapine was switched to escitalopram, increased to 20 mg daily.  - Wellbutrin XL 150mg daily  - Gabapentin 300 mg BID for anxiety/chronic pain.  - PRN: haloperidol 5mg PO/IM q8hrs PRN for agitation, diphenhydramine 50mg PO/IM q6hrs PRN for EPS PPx, hydroxyzine 50mg PO q12hrs PRN for anxiety, lorazepam 2mg PO q8hrs PRN for anxiety or 2mg IM for assaultive behavior. Melatonin 5mg qHS PRN for insomnia  - Discussed ECT several times with pt who states she has been told in two hospitals in past that due to her obesity and asthma, she is not an ideal candidate.  Pt states that due to this she is not interested in receiving ECT at this time.  3.) Medical:   - Pt scheduled for dental extractions 11/1, however declined to attend on day prior when informed it would not be under general anesthesia.  - last clozapine labs on 10/23 (CBC with diff, troponins, CRP); CRP elevated at 15.8 likely due to dental infection/wisdom tooth issues  - pantoprazole 40mg PO before breakfast for GERD  - montelukast 10mg PO qHS for asthma  - budesonide 80mcg/formoterol 4.5mcg 2 puffs BID for asthma  - PRN: acetaminophen 650mg PO q6hrs PRN for pain, albuterol 90mcg 2puffs q6hrs PRN for dyspnea, ondansetron 4mg q6hrs PRN for nausea  - Discussed CRP with medicine service, given asymptomatic pt and normal troponin, no additional intervention/diagnostics at this time.  - Discontinued clobetasol as rash has resolved.  - Possible folliculitis/furunculosis, will give Keflex 500 mg TID x 7 days with warm compresses.  4.) Dispo planning: Patient now expressed interest in going home to live with her grandmother, team confirmed with grandmother that this is acceptable.  D/c tomorrow.

## 2024-02-05 NOTE — BH INPATIENT PSYCHIATRY PROGRESS NOTE - NSTXIMPULSDATEEST_PSY_ALL_CORE
17-Oct-2023
17-Oct-2023
04-Jan-2024
20-Dec-2023
11-Jan-2024
17-Oct-2023
04-Jan-2024
17-Oct-2023
11-Jan-2024
17-Oct-2023
11-Jan-2024
17-Oct-2023
11-Jan-2024
17-Oct-2023
11-Jan-2024
20-Dec-2023
17-Oct-2023
17-Oct-2023
20-Dec-2023
04-Jan-2024
17-Oct-2023
11-Jan-2024
17-Oct-2023
11-Jan-2024
11-Jan-2024
17-Oct-2023
11-Jan-2024
17-Oct-2023
20-Dec-2023
11-Jan-2024
11-Jan-2024
17-Oct-2023
17-Oct-2023
11-Jan-2024
17-Oct-2023
04-Jan-2024
17-Oct-2023
20-Dec-2023
11-Jan-2024
17-Oct-2023
11-Jan-2024
20-Dec-2023
11-Jan-2024
17-Oct-2023
17-Oct-2023
20-Dec-2023
20-Dec-2023
17-Oct-2023
17-Oct-2023
20-Dec-2023

## 2024-02-05 NOTE — BH INPATIENT PSYCHIATRY PROGRESS NOTE - NSTXSUICIDDATENEW_PSY_ALL_CORE
30-Nov-2023
23-Nov-2023
30-Nov-2023
23-Nov-2023
30-Nov-2023
16-Nov-2023
30-Nov-2023
23-Nov-2023
30-Nov-2023
16-Nov-2023
30-Nov-2023
16-Nov-2023
16-Nov-2023
23-Nov-2023
30-Nov-2023
23-Nov-2023
30-Nov-2023
16-Nov-2023
30-Nov-2023
16-Nov-2023
23-Nov-2023
30-Nov-2023
23-Nov-2023
30-Nov-2023
16-Nov-2023
30-Nov-2023

## 2024-02-05 NOTE — BH INPATIENT PSYCHIATRY DISCHARGE NOTE - HPI (INCLUDE ILLNESS QUALITY, SEVERITY, DURATION, TIMING, CONTEXT, MODIFYING FACTORS, ASSOCIATED SIGNS AND SYMPTOMS)
As per ER Behavioral Health Assessment performed on 10/16/2024 at [facility]: "27yo single woman, no dependents, domiciled with her Grandmother, unemployed, medical hx of asthma, HTN, GERD, and hypothyroidism and schizoaffective disorder, in tx  with [facility], who self presented to the ED per triage with abdominal pain, but per ED ATtending, pt requested to be re-admitted to [facility]. Pt was there from 9/18-9/29.  On exam, pt is calm, cooperative, in no distress. She reports that she was discharged from [facility] recently, but does not recall when. She says that she left on a 3 day letter b/c she was feeling better, but in retrospect, she left too soon. She says that for the first week, she was doing okay but the started to feel depressed. She states that she feels lonely, that no one is there to support her; She reports that her appetite has decreased, and she is not sleeping. Pt lays in bed with racing thoughts surrounding feeling lonely. She has been isolating herself; has a persistent "low mood". Pt also describes feeling disassociated at times. She admits to suicidal thoughts, both passive (everyone would be better off if I were not around), and self aborted attempts where she will go to a knife, then walk away; or reach for pills, but stop herself. Pt sites her Grandmother as a protective factor. Pt says that she has tried to outreach the ACT team but has not been successful. She has seen them once since her discharge. She received her last Abilify ESQUEDA while at [facility]. She was discharged also on Lithium, but the ACT team stopped that. Pt reports she was hospitalized for a suicide attempt via overdose of Remeron. In terms of history, pt admits to prior suicide attempts via OD of which 2 lead to ICU admission.  She says she has had about 4 hospitalizations. Chart indicates last at [facility] in 2021 and she has had over 15 admissions at that point in her lifetime. Currently she reports her suicidal thoughts as "high". Pt also reports hearing voices, telling her she is going to be alone. She also admits that she was feeling paranoid while at [facility] earlier today. Pt felt that the ACT team was sending her hidden messages. She denies prior delusions in the past, but then also later on says she has had a h/o paranoia. Pt was at [facility] b/c she felt weakness on her L side. She reports MRI and workup which were negative. Symptoms have resolved." As per ER Behavioral Health Assessment performed on 10/16/2024 at [facility]: "29yo single woman, no dependents, domiciled with her Grandmother, unemployed, medical hx of asthma, HTN, GERD, and hypothyroidism and schizoaffective disorder, in tx  with [facility], who self presented to the ED per triage with abdominal pain, but per ED Attending, pt requested to be re-admitted to [facility]. Pt was there from 9/18-9/29.  On exam, pt is calm, cooperative, in no distress. She reports that she was discharged from [facility] recently, but does not recall when. She says that she left on a 3 day letter b/c she was feeling better, but in retrospect, she left too soon. She says that for the first week, she was doing okay but the started to feel depressed. She states that she feels lonely, that no one is there to support her; She reports that her appetite has decreased, and she is not sleeping. Pt lays in bed with racing thoughts surrounding feeling lonely. She has been isolating herself; has a persistent "low mood". Pt also describes feeling disassociated at times. She admits to suicidal thoughts, both passive (everyone would be better off if I were not around), and self aborted attempts where she will go to a knife, then walk away; or reach for pills, but stop herself. Pt sites her Grandmother as a protective factor. Pt says that she has tried to outreach the ACT team but has not been successful. She has seen them once since her discharge. She received her last Abilify ESQUEDA while at [facility]. She was discharged also on Lithium, but the ACT team stopped that. Pt reports she was hospitalized for a suicide attempt via overdose of Remeron. In terms of history, pt admits to prior suicide attempts via OD of which 2 lead to ICU admission.  She says she has had about 4 hospitalizations. Chart indicates last at [facility] in 2021 and she has had over 15 admissions at that point in her lifetime. Currently she reports her suicidal thoughts as "high". Pt also reports hearing voices, telling her she is going to be alone. She also admits that she was feeling paranoid while at [facility] earlier today. Pt felt that the ACT team was sending her hidden messages. She denies prior delusions in the past, but then also later on says she has had a h/o paranoia. Pt was at [facility] b/c she felt weakness on her L side. She reports MRI and workup which were negative. Symptoms have resolved."

## 2024-02-05 NOTE — BH INPATIENT PSYCHIATRY PROGRESS NOTE - NSTXCOPEPROGRES_PSY_ALL_CORE
Improving
Met - goal discontinued
Improving
No Change
Improving
Improving
No Change
Improving
No Change
No Change
Met - goal discontinued
No Change
Improving
No Change
no fever and no chills.
Improving
Improving
No Change
Improving
No Change
Improving
No Change
No Change
Improving
Met - goal discontinued
No Change
Improving
Met - goal discontinued
No Change
Improving
No Change
Improving
Met - goal discontinued
No Change
Improving
No Change
Improving
Improving
Met - goal discontinued
No Change
No Change
Met - goal discontinued
No Change
No Change
Improving
No Change
Improving
No Change
Improving
No Change
Improving
No Change
No Change
Improving

## 2024-02-05 NOTE — BH INPATIENT PSYCHIATRY PROGRESS NOTE - NSTXSLFCREDATETRGT_PSY_ALL_CORE
30-Jan-2024
14-Feb-2024
30-Jan-2024
02-Feb-2024
06-Feb-2024
06-Feb-2024
14-Feb-2024
14-Feb-2024
02-Feb-2024

## 2024-02-05 NOTE — BH INPATIENT PSYCHIATRY PROGRESS NOTE - NSTXDEPRESINTERMD_PSY_ALL_CORE
Psychotherapy and encourage engagement with psych rehab, medication management including titration of clozapine 
Psychotherapy and encourage engagement with psych rehab
Psychotherapy and encourage engagement with psych rehab, medication management including titration of clozapine 
Psychotherapy and encourage engagement with psych rehab
Psychotherapy and encourage engagement with psych rehab, medication management including titration of clozapine 
Psychotherapy and encourage engagement with psych rehab
Psychotherapy and encourage engagement with psych rehab, medication management including titration of clozapine 
Psychotherapy and encourage engagement with psych rehab
Psychotherapy and encourage engagement with psych rehab
Psychotherapy and encourage engagement with psych rehab, medication management including titration of clozapine 
Psychotherapy and encourage engagement with psych rehab
Psychotherapy and encourage engagement with psych rehab, medication management including titration of clozapine 
Psychotherapy and encourage engagement with psych rehab
Psychotherapy and encourage engagement with psych rehab, medication management including titration of clozapine 

## 2024-02-05 NOTE — BH INPATIENT PSYCHIATRY PROGRESS NOTE - NSTXSUICIDDATEEST_PSY_ALL_CORE
17-Oct-2023
04-Jan-2024
17-Oct-2023
18-Oct-2023
11-Jan-2024
11-Jan-2024
17-Oct-2023
17-Oct-2023
04-Jan-2024
11-Jan-2024
17-Oct-2023
18-Oct-2023
20-Dec-2023
11-Jan-2024
11-Jan-2024
17-Oct-2023
11-Jan-2024
17-Oct-2023
11-Jan-2024
17-Oct-2023
17-Oct-2023
18-Oct-2023
11-Jan-2024
17-Oct-2023
17-Oct-2023
11-Jan-2024
17-Oct-2023
17-Oct-2023
11-Jan-2024
17-Oct-2023
17-Oct-2023
18-Oct-2023
11-Jan-2024
11-Jan-2024
17-Oct-2023
17-Oct-2023
18-Oct-2023
04-Jan-2024
11-Jan-2024
18-Oct-2023
18-Oct-2023
17-Oct-2023
11-Jan-2024
17-Oct-2023
11-Jan-2024
17-Oct-2023
20-Dec-2023
17-Oct-2023
04-Jan-2024
17-Oct-2023
11-Jan-2024
11-Jan-2024

## 2024-02-05 NOTE — BH INPATIENT PSYCHIATRY PROGRESS NOTE - NSTXPROBCOPE_PSY_ALL_CORE
COPING, INEFFECTIVE

## 2024-02-05 NOTE — BH INPATIENT PSYCHIATRY PROGRESS NOTE - NSTXCOPEDATETRGT_PSY_ALL_CORE
14-Feb-2024
16-Jan-2024
16-Jan-2024
26-Oct-2023
24-Oct-2023
23-Jan-2024
26-Oct-2023
26-Oct-2023
24-Oct-2023
26-Oct-2023
23-Jan-2024
11-Feb-2024
26-Oct-2023
09-Jan-2024
23-Jan-2024
26-Oct-2023
11-Feb-2024
26-Oct-2023
11-Jan-2024
26-Oct-2023
23-Jan-2024
26-Oct-2023
11-Feb-2024
11-Feb-2024
26-Oct-2023
14-Feb-2024
23-Jan-2024
26-Oct-2023
26-Oct-2023
23-Jan-2024
26-Oct-2023
23-Jan-2024
26-Oct-2023
11-Feb-2024
14-Feb-2024
26-Oct-2023
16-Jan-2024
16-Jan-2024
26-Oct-2023
02-Jan-2024
02-Jan-2024
09-Jan-2024
26-Oct-2023
11-Jan-2024
26-Oct-2023
09-Jan-2024
26-Oct-2023
03-Jan-2024
26-Oct-2023
02-Jan-2024
03-Jan-2024
02-Jan-2024

## 2024-02-05 NOTE — BH INPATIENT PSYCHIATRY PROGRESS NOTE - NSTXCOPEDATEEST_PSY_ALL_CORE
17-Oct-2023
04-Jan-2024
17-Oct-2023
20-Dec-2023
17-Oct-2023
20-Dec-2023
17-Oct-2023
26-Dec-2023
17-Oct-2023
04-Jan-2024
17-Oct-2023
17-Oct-2023
26-Dec-2023
17-Oct-2023
17-Oct-2023
26-Dec-2023
17-Oct-2023
26-Dec-2023

## 2024-02-05 NOTE — BH INPATIENT PSYCHIATRY DISCHARGE NOTE - NSBHFUPINTERVALHXFT_PSY_A_CORE
No overnight events.  Pt reports she is sleeping well and has good appetite.  Denies hearing any voices or having thoughts of harming self.  Denies any side effects from medications.  Pt is looking forward to going home and seeing grandmother and their dogs.  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  Pt also plans on going to social security office tomorrow.

## 2024-02-05 NOTE — BH INPATIENT PSYCHIATRY DISCHARGE NOTE - NSBHASSESSSUMMFT_PSY_ALL_CORE
Pt is stable, without any psychiatric symptoms.  Denies suicidality and homicidality.  Pt is future oriented.    Risk assessment on discharge: Pt has chronic risk factors of hx schizophrenia, PTSD, prior hospitalizations, hx of NSSIB, hx of suicide attempts, hx chronic medical problems, family hx of SMI, hx of trauma, unemployment, with acute risk factors of recent depression, psychosis, suicidality, now resolved after receiving inpatient treatment consisting of medication management and psychotherapeutic interventions.  Protective factors of stable housing, supportive family, future orientation, ESQUEDA, ACT, therapeutic alliances.  Pt has consistently denied suicidality and homicidality, is emotionally regulated with good behavioral control, and is caring for ADLs independently.  Pt is CHRONIC risk of harm, but is NO longer an acute or imminent risk of harm to self or others, pt can be discharged home with outpatient follow up.     1. Will d/c home on current regimen.  Guidelines for safe lithium use were reviewed again with pt.  2. Psychoeducation provided regarding importance of compliance with outpatient appointments and medications.  3. Pt was advised to remain abstinent with substances.  4. Pt was advised to dial 911 or return to ER if they become danger to self or others.

## 2024-02-05 NOTE — BH PSYCHOLOGY - GROUP THERAPY NOTE - NSBHPSYCHOLRESPCOMMENT_PSY_A_CORE FT
The patient appeared adequately groomed and casually dressed. Pt appeared very engaged in the group as evidenced by her willingness to read the worksheet and verbally communicate during the discussion. She discussed how she internalized social stigma, incorporating it into her self-awareness. She commented on the adverse consequences of self-stigma, including feelings of isolation, unfair self-treatment, and the exacerbation of negative self-awareness. Speech was WNL. PT was oriented X3. Pt was appropriate with peers.

## 2024-02-05 NOTE — BH PSYCHOLOGY - GROUP THERAPY NOTE - TOKEN PULL-DIAGNOSIS
Primary Diagnosis:  Schizoaffective disorder [F25.9]        Problem Dx:   
Primary Diagnosis:  Schizoaffective disorder [F25.9]        Problem Dx:   
Primary Diagnosis:    Problem Dx:   
Primary Diagnosis:  Schizoaffective disorder [F25.9]        Problem Dx:   

## 2024-02-05 NOTE — BH INPATIENT PSYCHIATRY PROGRESS NOTE - NSBHCHARTREVIEWVS_PSY_A_CORE FT
Vital Signs Last 24 Hrs  T(C): 37.2 (02-05-24 @ 08:06), Max: 37.2 (02-05-24 @ 08:06)  T(F): 98.9 (02-05-24 @ 08:06), Max: 98.9 (02-05-24 @ 08:06)  HR: --  BP: --  BP(mean): --  RR: --  SpO2: --    Orthostatic VS  02-05-24 @ 08:06  Lying BP: --/-- HR: --  Sitting BP: 137/70 HR: 77  Standing BP: 140/77 HR: 96  Site: --  Mode: --  Orthostatic VS  02-04-24 @ 21:29  Lying BP: --/-- HR: --  Sitting BP: 148/74 HR: 98  Standing BP: 150/79 HR: 108  Site: --  Mode: --  Orthostatic VS  02-04-24 @ 08:22  Lying BP: --/-- HR: --  Sitting BP: 139/72 HR: 93  Standing BP: 130/66 HR: 103  Site: --  Mode: --  Orthostatic VS  02-03-24 @ 20:49  Lying BP: --/-- HR: --  Sitting BP: 120/78 HR: 101  Standing BP: 129/76 HR: 104  Site: --  Mode: --

## 2024-02-05 NOTE — BH INPATIENT PSYCHIATRY PROGRESS NOTE - NSICDXBHPRIMARYDX_PSY_ALL_CORE
Schizoaffective disorder   F25.9  

## 2024-02-05 NOTE — BH INPATIENT PSYCHIATRY PROGRESS NOTE - NSTXDCOTHRDATEEST_PSY_ALL_CORE
17-Oct-2023
17-Oct-2023
04-Jan-2024
17-Oct-2023
26-Dec-2023
17-Oct-2023
26-Dec-2023
17-Oct-2023
17-Oct-2023
20-Dec-2023
26-Dec-2023
04-Jan-2024
17-Oct-2023
04-Jan-2024
17-Oct-2023
17-Oct-2023
26-Dec-2023
17-Oct-2023
20-Dec-2023
17-Oct-2023
26-Dec-2023
17-Oct-2023
17-Oct-2023
20-Dec-2023
20-Dec-2023
17-Oct-2023
20-Dec-2023
17-Oct-2023
20-Dec-2023
17-Oct-2023
20-Dec-2023
26-Dec-2023
17-Oct-2023
26-Dec-2023
17-Oct-2023
17-Oct-2023
26-Dec-2023
17-Oct-2023
26-Dec-2023
17-Oct-2023
26-Dec-2023
17-Oct-2023
17-Oct-2023
04-Jan-2024
26-Dec-2023
17-Oct-2023
26-Dec-2023
26-Dec-2023
17-Oct-2023
26-Dec-2023
20-Dec-2023
17-Oct-2023
20-Dec-2023
17-Oct-2023
04-Jan-2024
26-Dec-2023
17-Oct-2023
04-Jan-2024

## 2024-02-05 NOTE — BH PSYCHOLOGY - GROUP THERAPY NOTE - NSBHPSYCHOLRESPONSE_PSY_A_CORE
Accepted support

## 2024-02-05 NOTE — BH INPATIENT PSYCHIATRY PROGRESS NOTE - NSTXSUICIDDATETRGT_PSY_ALL_CORE
12-Dec-2023
28-Nov-2023
11-Jan-2024
18-Jan-2024
14-Nov-2023
12-Dec-2023
19-Dec-2023
26-Oct-2023
19-Dec-2023
21-Nov-2023
31-Oct-2023
26-Dec-2023
31-Oct-2023
26-Dec-2023
02-Feb-2024
14-Nov-2023
28-Nov-2023
14-Feb-2024
07-Nov-2023
24-Oct-2023
26-Oct-2023
14-Feb-2024
18-Jan-2024
18-Jan-2024
28-Nov-2023
14-Nov-2023
18-Jan-2024
31-Oct-2023
07-Nov-2023
18-Jan-2024
21-Nov-2023
31-Oct-2023
14-Nov-2023
21-Nov-2023
18-Jan-2024
07-Nov-2023
18-Jan-2024
05-Dec-2023
28-Nov-2023
07-Nov-2023
14-Nov-2023
14-Nov-2023
18-Jan-2024
26-Oct-2023
28-Nov-2023
31-Oct-2023
12-Dec-2023
21-Nov-2023
31-Oct-2023
05-Dec-2023
14-Nov-2023
28-Nov-2023
07-Nov-2023
26-Oct-2023
02-Feb-2024
07-Nov-2023
05-Dec-2023
19-Dec-2023
21-Nov-2023
31-Oct-2023
02-Feb-2024
05-Dec-2023
24-Oct-2023
21-Nov-2023
18-Jan-2024
26-Dec-2023
12-Dec-2023
28-Nov-2023
26-Dec-2023
07-Nov-2023
14-Feb-2024
26-Dec-2023
19-Dec-2023
19-Dec-2023
03-Jan-2024
02-Feb-2024
03-Jan-2024
12-Dec-2023
26-Oct-2023
11-Jan-2024
11-Jan-2024
12-Dec-2023
19-Dec-2023
12-Dec-2023
11-Jan-2024
26-Dec-2023
18-Jan-2024
26-Dec-2023
26-Dec-2023

## 2024-02-05 NOTE — BH INPATIENT PSYCHIATRY PROGRESS NOTE - NSTXCOPEINTERMD_PSY_ALL_CORE
Psychotherapy and encourage engagement with psych rehab

## 2024-02-05 NOTE — BH INPATIENT PSYCHIATRY DISCHARGE NOTE - OTHER PAST PSYCHIATRIC HISTORY (INCLUDE DETAILS REGARDING ONSET, COURSE OF ILLNESS, INPATIENT/OUTPATIENT TREATMENT)
PPhx of schizoaffective disorder.   Multiple prior admissions starting at the age of 17 yo, last at [facility] 9/19/2023-9/29/2023.  Hx of [facility].  Outpatient care via ACT team.  Pt reports multiple suicide attempts, and h/o SIB by head banging.  Hx of various medication trials: klonopin, wellbutrin, geodon, prolixin, abilify, prozac, clonidine, lithium, cymbalta, vyvanse, risperdal, depakote, trazodone, clozaril (2019), zoloft, vistaril. PPhx of schizoaffective disorder.   Multiple prior admissions starting at the age of 17 yo, last at [facility] 9/19/2023-9/29/2023.  Hx of [facility].  Outpatient care via ACT team.  Pt reports multiple suicide attempts, and h/o SIB by head banging.  Hx of various medication trials: clonazepam, bupropion, ziprasidone, fluphenazine, aripiprazole, fluoxetine, clonidine, lithium, duloxetine, Vyvanse, risperidone, Depakote, trazodone, clozapine, sertraline, hydroxyzine.

## 2024-02-05 NOTE — BH INPATIENT PSYCHIATRY PROGRESS NOTE - NSTXPROBSUICID_PSY_ALL_CORE
SUICIDE/SELF-INJURIOUS BEHAVIOR

## 2024-02-05 NOTE — BH INPATIENT PSYCHIATRY PROGRESS NOTE - NSTXSUICIDGOAL_PSY_ALL_CORE
Will identify and utilize 2 coping skills
Be able to state 3 reasons for living
Will identify and utilize 2 coping skills
Will identify and utilize 2 coping skills
Be able to state 3 reasons for living
Will identify and utilize 2 coping skills
Be able to state 3 reasons for living
Will identify and utilize 2 coping skills
Be able to state 3 reasons for living
Will identify and utilize 2 coping skills
Be able to state 3 reasons for living
Be able to state 3 reasons for living
Will identify and utilize 2 coping skills
Be able to state 3 reasons for living
Will identify and utilize 2 coping skills
Be able to state 3 reasons for living
Will identify and utilize 2 coping skills
Be able to state 3 reasons for living
Will identify and utilize 2 coping skills
Be able to state 3 reasons for living
Will identify and utilize 2 coping skills
Be able to state 3 reasons for living
Be able to state 3 reasons for living
Will identify and utilize 2 coping skills
Be able to state 3 reasons for living
Will identify and utilize 2 coping skills
Be able to state 3 reasons for living
Will identify and utilize 2 coping skills
Be able to state 3 reasons for living
Will identify and utilize 2 coping skills
Be able to state 3 reasons for living
Will identify and utilize 2 coping skills
Be able to state 3 reasons for living
Will identify and utilize 2 coping skills

## 2024-02-05 NOTE — BH INPATIENT PSYCHIATRY PROGRESS NOTE - NSTXPROBIMPULS_PSY_ALL_CORE
IMPULSIVITY/AGITATION

## 2024-02-05 NOTE — BH INPATIENT PSYCHIATRY PROGRESS NOTE - NSTXIMPULSINTERMD_PSY_ALL_CORE
Psychotherapy and encourage engagement with psych rehab, clozapine titration
Psychotherapy and encourage engagement with psych rehab
Psychotherapy and encourage engagement with psych rehab, clozapine titration
Psychotherapy and encourage engagement with psych rehab
Psychotherapy and encourage engagement with psych rehab, clozapine titration
Psychotherapy and encourage engagement with psych rehab
Psychotherapy and encourage engagement with psych rehab
Psychotherapy and encourage engagement with psych rehab, clozapine titration
Psychotherapy and encourage engagement with psych rehab
Psychotherapy and encourage engagement with psych rehab, clozapine titration
Psychotherapy and encourage engagement with psych rehab
Psychotherapy and encourage engagement with psych rehab, clozapine titration
Psychotherapy and encourage engagement with psych rehab
Psychotherapy and encourage engagement with psych rehab, clozapine titration

## 2024-02-05 NOTE — BH INPATIENT PSYCHIATRY PROGRESS NOTE - NSTXSLFCREPROGRES_PSY_ALL_CORE
Improving
Met - goal discontinued
Improving
Met - goal discontinued
Met - goal discontinued

## 2024-02-05 NOTE — BH INPATIENT PSYCHIATRY PROGRESS NOTE - MSE OPTIONS
Unstructured MSE
Structured MSE
Unstructured MSE
Structured MSE
Unstructured MSE
Structured MSE
Unstructured MSE
Structured MSE
Unstructured MSE
Structured MSE
Unstructured MSE

## 2024-02-05 NOTE — BH INPATIENT PSYCHIATRY PROGRESS NOTE - NSTREATMENTCERTY_PSY_ALL_CORE

## 2024-02-05 NOTE — BH PSYCHOLOGY - GROUP THERAPY NOTE - NSBHPSYCHOLASSESSPROV_PSY_A_CORE
Licensed Psychologist and Psychology Trainee
Psychology Trainee only
Licensed Psychologist
Licensed Psychologist and Psychology Trainee
Licensed Psychologist
Licensed Psychologist and Psychology Trainee
Licensed Psychologist
Licensed Psychologist and Psychology Trainee
Licensed Psychologist
Licensed Psychologist and Psychology Trainee
Licensed Psychologist
Licensed Psychologist and Psychology Trainee
Licensed Psychologist and Psychology Trainee
Psychology Trainee only

## 2024-02-05 NOTE — BH PSYCHOLOGY - GROUP THERAPY NOTE - NSPSYCHOLGRPCOGINT_PSY_A_CORE FT
Cognitive/behavioral therapy, Acceptance and Commitment therapy, Emotion regulation/coping skills taught, Psychoed 

## 2024-02-05 NOTE — BH INPATIENT PSYCHIATRY DISCHARGE NOTE - HOSPITAL COURSE
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  Pt required constant observation after ingesting soap on unit as a suicidal gesture.  Etiology of presentation was likely multifactorial, with differential that included schizoaffective disorder exacerbation, PTSD, persistent depressive disorder, borderline traits vs full personality disorder, grief, and adjustment.  Given severity of reported symptoms, recent presentations and admissions to hospitals, team sought out application for state hospitalization.  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  Pt required constant observation after ingesting soap on unit as a suicidal gesture.  Etiology of presentation was likely multifactorial, with differential that included schizoaffective disorder exacerbation, PTSD, persistent depressive disorder, borderline traits vs full personality disorder, grief, and adjustment.  Given severity of reported symptoms, recent presentations and admissions to hospitals, team sought out application for state hospitalization.  In collaboration with outpatient [facility] as well as [facility] (who were familiar with pt from previous admissions), the following medication regimen was utilized: clozapine 300 mg QHS, lithium SR 1200 mg QHS, Abilify Maintena 400 mg IM every 3 weeks (doses given 10/19/23, 11/17/23, 12/8/23, 12/29/23, 1/19/24, next due 2/9/24 for mood stabilization; escitalopram 30 mg daily and bupropion  mg daily for depression (mirtazapine was discontinued); gabapentin 300 mg BID for anxiety and chronic pain; prazosin 2 mg QHS for PTSD nightmares.  Pt frequently attended groups and was seen for individual sessions by psychology.  ECT was offered, however, pt declined repeatedly stating that she had been told in the past that she would not be a good candidate due to her medical problems.  On this treatment plan, all reported psychiatric symptoms eventually resolved.  Pt no longer required constant observation.  Pt consistently denied suicidality and homicidality, and became future oriented.  Throughout admission, pt was visible on unit, social with peers, active in therapeutic milieu, compliant with meds and basic care, and attended to ADLs independently.  Disposition plan changed from state hospital transfer to community residence with outpatient services.  Treatment team submitted HRA and pursued mental health housing, however pt eventually declined this and requested to return home to grandmother.  Family was in agreement with this.  Given that pt was no longer an acute or imminent risk of harm to self or others, pt was discharged home with outpatient follow up. 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  Pt required constant observation after ingesting soap on unit as a suicidal gesture.  Etiology of presentation was likely multifactorial, with differential that included exacerbation of schizoaffective disorder, PTSD, persistent depressive disorder, borderline traits vs full personality disorder, grief, and adjustment.  Given severity of reported symptoms, recent presentations and admissions to hospitals, team sought out application for state hospitalization.  In collaboration with outpatient [facility] as well as [facility] (who were familiar with pt from previous admissions), the following medication regimen was utilized: clozapine 300 mg QHS, lithium SR 1200 mg QHS, Abilify Maintena 400 mg IM every 3 weeks (doses given 10/19/23, 11/17/23, 12/8/23, 12/29/23, 1/19/24, next due 2/9/24) for mood stabilization; escitalopram 30 mg daily and bupropion  mg daily for depression (mirtazapine was discontinued); gabapentin 300 mg BID for anxiety and chronic pain; prazosin 2 mg QHS for PTSD nightmares.  Pt frequently attended groups and was seen for individual sessions by psychology.  ECT was offered, however, pt declined repeatedly stating that she had been told in the past that she would not be a good candidate due to her chronic medical problems.  On this treatment plan, all reported psychiatric symptoms eventually resolved.  Pt no longer required constant observation.  Pt consistently denied suicidality and homicidality, and she became future oriented.  Throughout admission, pt was visible on unit, social with peers, active in therapeutic milieu, compliant with meds and basic care, and attended to ADLs independently.  Disposition plan changed from state hospital transfer to community residence with outpatient services.  Treatment team submitted HRA and pursued placement in mental health housing, however pt eventually declined to wait in hospital for it and requested to return home to grandmother and that she would continue to work with [facility] to secure mental health housing on outpatient basis.  Family was in agreement with this.  Given that pt was no longer an acute or imminent risk of harm to self or others, pt was discharged home with outpatient follow up.

## 2024-02-05 NOTE — BH INPATIENT PSYCHIATRY PROGRESS NOTE - NSBHATTESTCOMMENTATTENDFT_PSY_A_CORE
Continues to report mood symptoms.  Restart clozapine.  
Pt continues to report psychosis.  Continue CO.  Titrate clozapine.
Pt continues to have psychosis and passive thoughts of death.  Maintain CO.  Titrate clozapine this week.  Received Maintena.  Application for state transfer.
Pt continues to report multifactorial depression and psychosis.  Continue clozapine titration.  
Pt continues to deny all symptoms on psychiatric ROS.  Unclear dispo at this time.  Continue meds.
Pt continues to deny any symptoms on psychiatric ROS.  Denies side effects to medications.  Continue tx plan as ordered.  Awaiting housing.
Pt continues to be stable without symptoms or side effects.  Continue meds as ordered.  Discharge planned for tomorrow.
As per staff report, pt making inconsistent provocative suicidal/self harm thoughts yesterday afternoon (stating she would drown herself in shower, then later in sink, then stating she would eat soap).  No actual SIB or SA occurred.  Suspect this is related more to cluster B traits.      Pt today stating she is still hearing voices.  Continues to make provocative statements regarding self harm - this is chronic as per pt on initial interview.  Continue meds as ordered.
Pt continues to report psychosis and SI.  ESQUEDA today.  Tentative plan for state.
Pt continues to have AH, VH, SI.  On CO after suicidal gesture this AM (refer to chart note).  Received ESQUEDA yesterday.  Pt active in milieu, attending groups regularly.

## 2024-02-05 NOTE — BH PSYCHOLOGY - GROUP THERAPY NOTE - NSPSYCHOLGRPCOGPROB_PSY_A_CORE FT
Anxiety, Depression, Emotion dysregulation, Lack of coping skills 

## 2024-02-05 NOTE — BH PSYCHOLOGY - GROUP THERAPY NOTE - NSPSYCHOLGRPCOGINT_PSY_A_CORE
group members provided support/group members suggested positive behaviors/mindfulness skills taught/relaxation skills practiced/other..
group members suggested positive behaviors/assertiveness skills taught/mindfulness skills taught/relaxation skills practiced/other..

## 2024-02-05 NOTE — BH PSYCHOLOGY - GROUP THERAPY NOTE - NSBHPSYCHOLPARTICIPCOMMENT_PSY_A_CORE FT
Pt participated independently 

## 2024-02-05 NOTE — BH INPATIENT PSYCHIATRY DISCHARGE NOTE - NSBHDCHANDOFFFT_PSY_ALL_CORE
Clinical handoff including hospital course, diagnosis, and treatment was sent to: [facility] ([place], [phone]).

## 2024-02-05 NOTE — BH INPATIENT PSYCHIATRY PROGRESS NOTE - NSTXSUICIDINTERMD_PSY_ALL_CORE
psychopharmacology

## 2024-02-05 NOTE — BH PSYCHOLOGY - GROUP THERAPY NOTE - NSBHPSYCHOLGRPTYPE_PSY_A_CORE
Cognitive Behavioral Coping Skills

## 2024-02-05 NOTE — BH INPATIENT PSYCHIATRY PROGRESS NOTE - NSBHPROGSUIC_PSY_A_CORE
no

## 2024-02-05 NOTE — BH INPATIENT PSYCHIATRY DISCHARGE NOTE - NSDCMRMEDTOKEN_GEN_ALL_CORE_FT
Abilify Maintena 400 mg intramuscular injection, extended release: 400 milligram(s) intramuscularly every 3 weeks Last given 1/19/24, next due on or around 2/9/24  budesonide-formoterol 80 mcg-4.5 mcg/inh inhalation aerosol: 2 puff(s) inhaled 2 times a day  buPROPion 150 mg/24 hours (XL) oral tablet, extended release: 1 tab(s) orally once a day  cloZAPine 100 mg oral tablet: 3 tab(s) orally once a day (at bedtime)  desmopressin 0.1 mg oral tablet: 1 tab(s) orally once a day (at bedtime)  escitalopram 20 mg oral tablet: 1 tab(s) orally once a day  gabapentin 300 mg oral capsule: 1 cap(s) orally 2 times a day  lithium 300 mg oral tablet, extended release: 4 tab(s) orally once a day (at bedtime)  montelukast 10 mg oral tablet: 1 tab(s) orally once a day (at bedtime)  pantoprazole 40 mg oral delayed release tablet: 1 tab(s) orally once a day (before a meal)  prazosin 2 mg oral capsule: 1 cap(s) orally once a day (at bedtime)

## 2024-02-05 NOTE — BH INPATIENT PSYCHIATRY PROGRESS NOTE - NSTXDEPRESDATETRGT_PSY_ALL_CORE
15-Nov-2023
26-Oct-2023
15-Nov-2023
26-Oct-2023
15-Nov-2023
15-Nov-2023
26-Oct-2023
03-Jan-2024
15-Nov-2023
26-Oct-2023
15-Nov-2023
11-Feb-2024
11-Feb-2024
15-Nov-2023
26-Oct-2023
03-Jan-2024
15-Nov-2023
26-Oct-2023
24-Oct-2023
11-Jan-2024
15-Nov-2023
15-Nov-2023
26-Oct-2023
26-Oct-2023
15-Nov-2023
24-Oct-2023
15-Nov-2023
18-Jan-2024
18-Jan-2024
26-Oct-2023
11-Jan-2024
18-Jan-2024
15-Nov-2023
26-Oct-2023
18-Jan-2024
15-Nov-2023
15-Nov-2023
18-Jan-2024
15-Nov-2023
18-Jan-2024
15-Nov-2023
11-Feb-2024
15-Nov-2023
14-Feb-2024
03-Jan-2024
11-Feb-2024
15-Nov-2023
14-Feb-2024
14-Feb-2024
15-Nov-2023
15-Nov-2023
18-Jan-2024
11-Jan-2024
15-Nov-2023
03-Jan-2024
15-Nov-2023
11-Feb-2024
26-Oct-2023
18-Jan-2024
15-Nov-2023
15-Nov-2023
03-Jan-2024
15-Nov-2023
03-Jan-2024
11-Jan-2024
15-Nov-2023
03-Jan-2024
15-Nov-2023
15-Nov-2023
03-Jan-2024
15-Nov-2023
15-Nov-2023
18-Jan-2024
03-Jan-2024

## 2024-02-05 NOTE — BH INPATIENT PSYCHIATRY PROGRESS NOTE - NSBHROSSYSTEMS_PSY_ALL_CORE
Psychiatric

## 2024-02-05 NOTE — BH INPATIENT PSYCHIATRY PROGRESS NOTE - NSBHCONSULTIPREASON_PSY_A_CORE
danger to self; mental illness expected to respond to inpatient care
other reason
danger to self; mental illness expected to respond to inpatient care
other reason
danger to self; mental illness expected to respond to inpatient care
other reason
danger to self; mental illness expected to respond to inpatient care
danger to self; mental illness expected to respond to inpatient care
other reason
danger to self; mental illness expected to respond to inpatient care
other reason
danger to self; mental illness expected to respond to inpatient care
other reason
danger to self; mental illness expected to respond to inpatient care
other reason
danger to self; mental illness expected to respond to inpatient care
other reason
danger to self; mental illness expected to respond to inpatient care

## 2024-02-05 NOTE — BH INPATIENT PSYCHIATRY PROGRESS NOTE - NSTXSLFCREGOAL_PSY_ALL_CORE
Be able to demonstrate the ability to care for self in 2 areas such as feeding oneself and hygiene
Be able to demonstrate the ability to care for self in 2 areas such as feeding oneself and hygiene
Will perform ADLs without assistance/prompts daily
Will perform ADLs without assistance/prompts daily
Be able to demonstrate the ability to care for self in 2 areas such as feeding oneself and hygiene
Will perform ADLs without assistance/prompts daily
Be able to demonstrate the ability to care for self in 2 areas such as feeding oneself and hygiene

## 2024-02-05 NOTE — BH INPATIENT PSYCHIATRY PROGRESS NOTE - NSTXIMPULSGOAL_PSY_ALL_CORE
Will be able to describe/demonstrate alternative ways of managing his/her emotional state on the unit
Will be able to demonstrate the ability to pause before acting out negatively
Will be able to describe/demonstrate alternative ways of managing his/her emotional state on the unit
Will be able to demonstrate the ability to pause before acting out negatively
Will be able to demonstrate the ability to pause before acting out negatively
Will be able to describe/demonstrate alternative ways of managing his/her emotional state on the unit
Will be able to describe/demonstrate alternative ways of managing his/her emotional state on the unit
Will be able to demonstrate the ability to pause before acting out negatively
Will be able to describe/demonstrate alternative ways of managing his/her emotional state on the unit
Will be able to demonstrate the ability to pause before acting out negatively
Will be able to describe/demonstrate alternative ways of managing his/her emotional state on the unit
Will be able to demonstrate the ability to pause before acting out negatively
Will be able to describe/demonstrate alternative ways of managing his/her emotional state on the unit
Will be able to describe/demonstrate alternative ways of managing his/her emotional state on the unit
Will be able to demonstrate the ability to pause before acting out negatively
Will be able to describe/demonstrate alternative ways of managing his/her emotional state on the unit
Will be able to demonstrate the ability to pause before acting out negatively
Will be able to describe/demonstrate alternative ways of managing his/her emotional state on the unit
Will be able to demonstrate the ability to pause before acting out negatively
Will be able to describe/demonstrate alternative ways of managing his/her emotional state on the unit
Will be able to demonstrate the ability to pause before acting out negatively
Will be able to describe/demonstrate alternative ways of managing his/her emotional state on the unit
Will be able to demonstrate the ability to pause before acting out negatively
Will be able to describe/demonstrate alternative ways of managing his/her emotional state on the unit
Will be able to demonstrate the ability to pause before acting out negatively
Will be able to describe/demonstrate alternative ways of managing his/her emotional state on the unit
Will be able to demonstrate the ability to pause before acting out negatively
Will be able to describe/demonstrate alternative ways of managing his/her emotional state on the unit
Will be able to demonstrate the ability to pause before acting out negatively
Will be able to demonstrate the ability to pause before acting out negatively
Will be able to describe/demonstrate alternative ways of managing his/her emotional state on the unit
Will be able to demonstrate the ability to pause before acting out negatively
Will be able to describe/demonstrate alternative ways of managing his/her emotional state on the unit
Will be able to demonstrate the ability to pause before acting out negatively
Will be able to describe/demonstrate alternative ways of managing his/her emotional state on the unit
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
## 2024-02-05 NOTE — BH INPATIENT PSYCHIATRY PROGRESS NOTE - NSTXIMPULSPROGRES_PSY_ALL_CORE
Improving
Improving
Met - goal discontinued
Improving
Met - goal discontinued
Met - goal discontinued
Improving
Met - goal discontinued
Improving
Improving
Met - goal discontinued
Improving
Met - goal discontinued
Improving
Met - goal discontinued
Improving
Met - goal discontinued
Improving
Met - goal discontinued
Improving
Met - goal discontinued
Met - goal discontinued
Improving
Met - goal discontinued
Improving
Improving
Met - goal discontinued
Met - goal discontinued
Improving
Met - goal discontinued
Met - goal discontinued
Improving
Met - goal discontinued
Improving
Met - goal discontinued
Improving
Met - goal discontinued

## 2024-02-05 NOTE — BH INPATIENT PSYCHIATRY PROGRESS NOTE - NSTXSUICIDPROGRES_PSY_ALL_CORE
Met - goal discontinued
Improving
Improving
No Change
Improving
No Change
Met - goal discontinued
No Change
Met - goal discontinued
Improving
No Change
Met - goal discontinued
No Change
Improving
Met - goal discontinued
Improving
No Change
Met - goal discontinued
Improving
No Change
Met - goal discontinued
Improving
No Change
Improving
Met - goal discontinued
Met - goal discontinued
Improving
No Change
Improving
Met - goal discontinued
No Change
Improving
Met - goal discontinued
Met - goal discontinued
No Change
Improving
Met - goal discontinued
Met - goal discontinued
Improving
No Change
Met - goal discontinued
No Change
No Change
Met - goal discontinued
Improving
No Change
Improving
Improving
No Change
Improving
Improving
No Change
Met - goal discontinued
No Change
No Change
Improving
Met - goal discontinued
Improving
No Change
No Change
Met - goal discontinued
Improving
Met - goal discontinued
Improving
No Change
No Change
Improving
Met - goal discontinued

## 2024-02-05 NOTE — BH PSYCHOLOGY - GROUP THERAPY NOTE - PROVIDER ATTESTATION
I was present with the psychology trainee during the critical/key portions of the visit. I agree with the findings and plan as documented in the psychology trainee note unless noted below.

## 2024-02-06 VITALS — OXYGEN SATURATION: 97 % | TEMPERATURE: 98 F

## 2024-02-06 LAB
BASOPHILS # BLD AUTO: 0.02 K/UL — SIGNIFICANT CHANGE UP (ref 0–0.2)
BASOPHILS NFR BLD AUTO: 0.2 % — SIGNIFICANT CHANGE UP (ref 0–2)
CLOZAPINE SERPL-MCNC: 411 NG/ML — SIGNIFICANT CHANGE UP (ref 350–600)
EOSINOPHIL # BLD AUTO: 0 K/UL — SIGNIFICANT CHANGE UP (ref 0–0.5)
EOSINOPHIL NFR BLD AUTO: 0 % — SIGNIFICANT CHANGE UP (ref 0–6)
HCT VFR BLD CALC: 39.3 % — SIGNIFICANT CHANGE UP (ref 34.5–45)
HGB BLD-MCNC: 12 G/DL — SIGNIFICANT CHANGE UP (ref 11.5–15.5)
IANC: 5.44 K/UL — SIGNIFICANT CHANGE UP (ref 1.8–7.4)
IMM GRANULOCYTES NFR BLD AUTO: 0.3 % — SIGNIFICANT CHANGE UP (ref 0–0.9)
LITHIUM SERPL-MCNC: 0.8 MMOL/L — SIGNIFICANT CHANGE UP (ref 0.6–1.2)
LYMPHOCYTES # BLD AUTO: 2.84 K/UL — SIGNIFICANT CHANGE UP (ref 1–3.3)
LYMPHOCYTES # BLD AUTO: 32.3 % — SIGNIFICANT CHANGE UP (ref 13–44)
MCHC RBC-ENTMCNC: 23.8 PG — LOW (ref 27–34)
MCHC RBC-ENTMCNC: 30.5 GM/DL — LOW (ref 32–36)
MCV RBC AUTO: 78 FL — LOW (ref 80–100)
MONOCYTES # BLD AUTO: 0.47 K/UL — SIGNIFICANT CHANGE UP (ref 0–0.9)
MONOCYTES NFR BLD AUTO: 5.3 % — SIGNIFICANT CHANGE UP (ref 2–14)
NEUTROPHILS # BLD AUTO: 5.44 K/UL — SIGNIFICANT CHANGE UP (ref 1.8–7.4)
NEUTROPHILS NFR BLD AUTO: 61.9 % — SIGNIFICANT CHANGE UP (ref 43–77)
NRBC # BLD: 0 /100 WBCS — SIGNIFICANT CHANGE UP (ref 0–0)
NRBC # FLD: 0 K/UL — SIGNIFICANT CHANGE UP (ref 0–0)
PLATELET # BLD AUTO: 311 K/UL — SIGNIFICANT CHANGE UP (ref 150–400)
RBC # BLD: 5.04 M/UL — SIGNIFICANT CHANGE UP (ref 3.8–5.2)
RBC # FLD: 16.5 % — HIGH (ref 10.3–14.5)
WBC # BLD: 8.8 K/UL — SIGNIFICANT CHANGE UP (ref 3.8–10.5)
WBC # FLD AUTO: 8.8 K/UL — SIGNIFICANT CHANGE UP (ref 3.8–10.5)

## 2024-02-06 RX ADMIN — ESCITALOPRAM OXALATE 20 MILLIGRAM(S): 10 TABLET, FILM COATED ORAL at 09:21

## 2024-02-06 RX ADMIN — GABAPENTIN 300 MILLIGRAM(S): 400 CAPSULE ORAL at 09:19

## 2024-02-06 RX ADMIN — BUPROPION HYDROCHLORIDE 150 MILLIGRAM(S): 150 TABLET, EXTENDED RELEASE ORAL at 09:20

## 2024-02-06 RX ADMIN — PANTOPRAZOLE SODIUM 40 MILLIGRAM(S): 20 TABLET, DELAYED RELEASE ORAL at 09:20

## 2024-02-06 RX ADMIN — BUDESONIDE AND FORMOTEROL FUMARATE DIHYDRATE 2 PUFF(S): 160; 4.5 AEROSOL RESPIRATORY (INHALATION) at 09:21

## 2024-02-06 NOTE — BH CHART NOTE - NSPSYPRGNOTEFT_PSY_ALL_CORE
Met with pt for brief check in. Pt shared overwhelming incident over the weekend as well as during group which brought up feelings of anger and sadness. Shared that she had to get a PRN to manage feeling. Wr and Pt came up with coping and explored how pt could manage sitting with feelings for an additional 10min. before asking for PRN.
Pt expressed frustration around not being able to use group room for housing interview, redirected pt who was calm and cooperative, and offered to check in after interview to process. After interview pt shared feeling relieved and excited about possibility of housing. Plan to meet next week for individual session. No safety concerns noted.
Met with pt for 10 minutes to check in. Pt shared that she has been feeling more irritable and depressed due to her period, as well as her rash. Pt also shared that she was able to utilize coping skills practiced in session when breaking up with boyfriend and not ask for medication. Validated pts feelings as well as effort to utilize skills. No safety concerns noted. 
Met with pt to follow up on discharge. Validated pts engagement in tx, as well as tx gains, and instilled hope and resilience. Reviewed with pt what she was proud of accomplishing during tx. Pt and Wr ended session due to pt attending last community meeting. No safety concerns noted.

## 2024-02-06 NOTE — BH CHART NOTE - NSBHPTASSESSDT_PSY_A_CORE
06-Feb-2024 10:45
09-Dec-2023 12:05
09-Nov-2023 11:05
18-Oct-2023 15:12
20-Oct-2023 11:28
21-Oct-2023 15:02
22-Oct-2023 14:34
27-Nov-2023 11:59
06-Feb-2024 10:55
17-Oct-2023 15:06
31-Oct-2023 11:05
09-Dec-2023 15:30
18-Jan-2024 12:30
18-Oct-2023 15:04
30-Nov-2023 21:18
31-Dec-2023 18:52

## 2024-02-06 NOTE — BH CHART NOTE - NSNOTETYPE_PSY_ALL_CORE
Event Note
Psychology Progress Note
Psychology Progress Note
Event Note
Psychology Progress Note
Psychology Progress Note
Event Note
Event Note

## 2024-02-06 NOTE — BH CHART NOTE - NSEVENTNOTEFT_PSY_ALL_CORE
Pt seen and interviewed this morning.  Pt is future oriented, and denies SIIP or HIIP.  Discharge planned for today.  Refer to  Inpatient Psychiatry Discharge Note filed on 2/5/2024 for full final progress note for 2/6/2024.

## 2024-04-15 PROBLEM — Z00.00 ENCOUNTER FOR PREVENTIVE HEALTH EXAMINATION: Status: ACTIVE | Noted: 2024-04-15

## 2024-05-20 NOTE — BH INPATIENT PSYCHIATRY PROGRESS NOTE - MSE UNSTRUCTURED FT
Anjana Raymundo is a 42 year old female.  Chief Complaint   Patient presents with    Physical     Headaches x1.5 wks, worse when bending over, LUQ intermittent sharp pains for 2 wks        HPI:   Anjana Raymundo is a 42 year old female who presents for her annual physical examination.     Past medical history includes diabetes, dyslipidemia, obesity, GERD.   Follow with Dr. Balderas for her diabetes. Eye exam 7/2022.  Her GERD is well controlled.   She used to have asthma in high school but no longer has this.     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     -getting more headaches, 2 weeks ago, had a pain in the R side when bending. Happened again this past Monday.     Wt Readings from Last 6 Encounters:   05/20/24 247 lb (112 kg)   02/20/24 252 lb (114.3 kg)   12/22/23 258 lb (117 kg)   05/30/23 271 lb (122.9 kg)   03/31/23 267 lb 3.2 oz (121.2 kg)   01/31/23 271 lb 8 oz (123.2 kg)     Body mass index is 42.4 kg/m².       Current Outpatient Medications   Medication Sig Dispense Refill    MOUNJARO 7.5 MG/0.5ML Subcutaneous Solution Pen-injector Inject 7.5 mg into the skin once a week.      Multiple Vitamin (MULTIVITAMIN ADULT OR) Take 1 tablet by mouth daily.      lisinopril 2.5 MG Oral Tab TAKE 1 TABLET BY MOUTH EVERY DAY 90 tablet 0    dapagliflozin (FARXIGA) 10 MG Oral Tab Take 1 tablet (10 mg total) by mouth daily. 90 tablet 0    glimepiride 4 MG Oral Tab Take 2 tablets (8 mg total) by mouth before breakfast. 180 tablet 0    venlafaxine ER 75 MG Oral Capsule SR 24 Hr TAKE 1 CAPSULE BY MOUTH EVERY DAY 90 capsule 0    metFORMIN HCl 1000 MG Oral Tab TAKE 1 TABLET BY MOUTH TWICE A  tablet 1    atorvastatin 40 MG Oral Tab Take 1 tablet (40 mg total) by mouth nightly. 90 tablet 1    buPROPion  MG Oral Tablet 24 Hr Take 1 tablet (150 mg total) by mouth daily. 90 tablet 3    ALPRAZolam (XANAX) 0.25 MG Oral Tab Take 1 tablet (0.25 mg total) by  mouth nightly as needed. 30 tablet 0    omeprazole 20 MG Oral Capsule Delayed Release Take 1 capsule (20 mg total) by mouth daily. 90 capsule 3    Glucose Blood (ONETOUCH VERIO) In Vitro Strip Use to check blood sugar daily. 100 strip 2    ONETOUCH VERIO REFLECT w/Device Does not apply Kit USING TWICE DAILY 1 kit 0    Meloxicam 7.5 MG Oral Tab Take 1 tablet (7.5 mg total) by mouth daily. 30 tablet 1    diphenhydrAMINE HCl (ALLERGY MEDICINE OR) 1 tablet daily      BIOTIN 5000 OR Take 1 tablet by mouth daily.      aspirin 81 MG Oral Tab Take 1 tablet (81 mg total) by mouth daily.        Past Medical History:    Allergic rhinitis    Anesthesia complication    sedation didnt work. remembered entire procedure.    Anxiety    It just happens from time to time    Asthma (HCC)    in high school    Esophageal reflux    Learning disability    Obesity    Reflux    Type II or unspecified type diabetes mellitus without mention of complication, not stated as uncontrolled    Wears glasses      Past Surgical History:   Procedure Laterality Date    Ankle scope,extens debridemnt Right 9/16/2014    Procedure: ARTHROSCOPY ANKLE WITH DEBRIDEMENT;  Surgeon: Noah Morgan MD;  Location: [facility]    Gastrocnemius recession Right 9/16/2014    Procedure: GASTROC RECESSION FOOT;  Surgeon: Noah Morgan MD;  Location: [facility]    Hc implant ear tubes  1990    Molecular extraction (m11e.1)  2002    wisdom teeth.    Other surgical history      wisdom teeth removed    Other surgical history      bilateral tubes in ears    Other surgical history      R annkle surgery      Family History   Problem Relation Age of Onset    Diabetes Father 40    Hypertension Father     Diabetes Mother 40    Seizure Disorder Brother     Cancer Maternal Grandmother         unknown    Cancer Paternal Uncle         lung, smoker      Social History:   Social History     Socioeconomic History    Marital status: Single   Tobacco Use    Smoking status:  Former     Current packs/day: 0.00     Types: Cigarettes     Quit date: 3/25/2002     Years since quittin.1    Smokeless tobacco: Never    Tobacco comments:     0.5 pack q 2 wks   Vaping Use    Vaping status: Never Used   Substance and Sexual Activity    Alcohol use: Yes     Comment: Maybe 2 times a month    Drug use: Yes     Frequency: 7.0 times per week     Types: Cannabis   Other Topics Concern    Caffeine Concern Yes     Comment: Coffee - 1 cup per day           REVIEW OF SYSTEMS:   GENERAL: feels well otherwise  SKIN: denies any unusual skin lesions  EYES:denies blurred vision or double vision  HEENT: denies nasal congestion, sinus pain, ST, sore throat  LUNGS: denies shortness of breath with exertion, cough or wheezing  BREAST: denies masses or nipple discharge  CARDIOVASCULAR: denies chest pain, pressure, or palpitations  GI: denies abdominal pain, nausea, vomiting, diarrhea, constipation, hematochezia, or melena  : denies dysuria, urinary frequency, vaginal discharge, dyspareunia, heavy menses  NEURO: denies headaches, dizziness, focal deficits  PSYCHE: + anxiety. Denies depression  EXT: denies edema      EXAM:   /72   Pulse 98   Temp 98.1 °F (36.7 °C) (Tympanic)   Ht 5' 4\" (1.626 m)   Wt 247 lb (112 kg)   LMP 2024 (Exact Date)   SpO2 98%   BMI 42.40 kg/m²     GENERAL: well developed, well nourished, in no apparent distress  HEENT: normal oropharynx, normal TM's  EYES: PERRLA, EOMI, conjunctivae are pink  NECK: supple, no cervical or supraclavicular LAD, no carotic bruits, no thyromegaly  BREAST: no dominant or suspicious mass, no axillary LAD  LUNGS: clear to auscultation  CARDIO: RRR, normal S1S2, no gallops or murmurs  GI: soft, NT, ND, NABS, no HSM  GYNE: deferred  EXTREMITIES: no cyanosis, clubbing or edema, +2 radial and DP pulses bilaterally  NEURO: A&O x 3, moves all 4 extremities spontaneously      ASSESSMENT AND PLAN:     1. Annual physical exam  Pap done 10/2022  Had  updated HBV vaccines 2018  Given lab orders  Given mammogram order    2. Type 2 diabetes mellitus with microalbuminuria (HCC)  Follows with Dr. Balderas.  Lisinopril 2.5mg daily.   Eye exam due July  a1c 7.4    3. Essential hypertension with goal blood pressure less than 130/80  Lisinopril 2.5mg daily    4. Dyslipidemia  lipitor 40mg daily    5. GERD   pepcid bid  Omeprazole--will consider stopping after losing more weight    6. anxiety  effexor 75mg daily  Wellbutrin 150mg daily    7. Anemia  Hgb stable; try prenatal vitamin x 3 months, then regular MVI    8. Dizziness  Check blood pressures-update me in 2 weeks  Hydrate  Possible sinusitis-given augmentin    9. Obesity  Congratulated on weight loss    Gisel Swift DO     Appearance: Dressed appropriately.  Good hygiene and grooming.   Behavior: Cooperative, calm, well related, good eye contact.  Motor: No abnormal movements, no psychomotor slowing or activation.  Speech: Regular rate.  Mood: "Anxious."  Affect: Mildly anxious, full range.  Thought Process: Linear.  Associations: Fair.  Thought Content: Future oriented. No AVH, SIIP, HIIP reported.   Insight: Fair.  Judgment: Fair on interview.  Attention: Fair.  Language: Fluent.  Gait: Intact.

## 2024-09-18 ENCOUNTER — INPATIENT (INPATIENT)
Facility: HOSPITAL | Age: 29
LOS: 7 days | Discharge: HOME | DRG: 883 | End: 2024-09-26
Attending: PSYCHIATRY & NEUROLOGY | Admitting: PSYCHIATRY & NEUROLOGY
Payer: MEDICAID

## 2024-09-18 VITALS
SYSTOLIC BLOOD PRESSURE: 119 MMHG | WEIGHT: 293 LBS | OXYGEN SATURATION: 98 % | HEIGHT: 62 IN | RESPIRATION RATE: 16 BRPM | DIASTOLIC BLOOD PRESSURE: 84 MMHG | TEMPERATURE: 98 F | HEART RATE: 93 BPM

## 2024-09-18 DIAGNOSIS — F25.9 SCHIZOAFFECTIVE DISORDER, UNSPECIFIED: ICD-10-CM

## 2024-09-18 PROBLEM — K21.9 GASTRO-ESOPHAGEAL REFLUX DISEASE WITHOUT ESOPHAGITIS: Chronic | Status: ACTIVE | Noted: 2024-02-05

## 2024-09-18 LAB
AMPHET UR-MCNC: NEGATIVE — SIGNIFICANT CHANGE UP
ANION GAP SERPL CALC-SCNC: 11 MMOL/L — SIGNIFICANT CHANGE UP (ref 5–17)
APAP SERPL-MCNC: <5 UG/ML — LOW (ref 10–30)
APPEARANCE UR: ABNORMAL
BACTERIA # UR AUTO: ABNORMAL /HPF
BARBITURATES UR SCN-MCNC: NEGATIVE — SIGNIFICANT CHANGE UP
BASOPHILS # BLD AUTO: 0.02 K/UL — SIGNIFICANT CHANGE UP (ref 0–0.2)
BASOPHILS NFR BLD AUTO: 0.2 % — SIGNIFICANT CHANGE UP (ref 0–2)
BENZODIAZ UR-MCNC: NEGATIVE — SIGNIFICANT CHANGE UP
BILIRUB UR-MCNC: NEGATIVE — SIGNIFICANT CHANGE UP
BUN SERPL-MCNC: 18 MG/DL — SIGNIFICANT CHANGE UP (ref 7–23)
CALCIUM SERPL-MCNC: 9.1 MG/DL — SIGNIFICANT CHANGE UP (ref 8.4–10.5)
CAST: 1 /LPF — SIGNIFICANT CHANGE UP (ref 0–4)
CHLORIDE SERPL-SCNC: 108 MMOL/L — SIGNIFICANT CHANGE UP (ref 96–108)
CO2 SERPL-SCNC: 21 MMOL/L — LOW (ref 22–31)
COCAINE METAB.OTHER UR-MCNC: NEGATIVE — SIGNIFICANT CHANGE UP
COLOR SPEC: YELLOW — SIGNIFICANT CHANGE UP
CREAT SERPL-MCNC: 0.86 MG/DL — SIGNIFICANT CHANGE UP (ref 0.5–1.3)
DIFF PNL FLD: NEGATIVE — SIGNIFICANT CHANGE UP
EGFR: 95 ML/MIN/1.73M2 — SIGNIFICANT CHANGE UP
EOSINOPHIL # BLD AUTO: 0 K/UL — SIGNIFICANT CHANGE UP (ref 0–0.5)
EOSINOPHIL NFR BLD AUTO: 0 % — SIGNIFICANT CHANGE UP (ref 0–6)
ETHANOL SERPL-MCNC: <10 MG/DL — SIGNIFICANT CHANGE UP (ref 0–10)
FENTANYL UR QL SCN: NEGATIVE — SIGNIFICANT CHANGE UP
GLUCOSE SERPL-MCNC: 87 MG/DL — SIGNIFICANT CHANGE UP (ref 70–99)
GLUCOSE UR QL: NEGATIVE MG/DL — SIGNIFICANT CHANGE UP
HCG SERPL-ACNC: <1 MIU/ML — SIGNIFICANT CHANGE UP
HCT VFR BLD CALC: 37.5 % — SIGNIFICANT CHANGE UP (ref 34.5–45)
HGB BLD-MCNC: 11.4 G/DL — LOW (ref 11.5–15.5)
IMM GRANULOCYTES NFR BLD AUTO: 0.2 % — SIGNIFICANT CHANGE UP (ref 0–0.9)
KETONES UR-MCNC: ABNORMAL MG/DL
LEUKOCYTE ESTERASE UR-ACNC: ABNORMAL
LYMPHOCYTES # BLD AUTO: 2.67 K/UL — SIGNIFICANT CHANGE UP (ref 1–3.3)
LYMPHOCYTES # BLD AUTO: 30.8 % — SIGNIFICANT CHANGE UP (ref 13–44)
MCHC RBC-ENTMCNC: 24.2 PG — LOW (ref 27–34)
MCHC RBC-ENTMCNC: 30.4 GM/DL — LOW (ref 32–36)
MCV RBC AUTO: 79.6 FL — LOW (ref 80–100)
METHADONE UR-MCNC: NEGATIVE — SIGNIFICANT CHANGE UP
MONOCYTES # BLD AUTO: 0.57 K/UL — SIGNIFICANT CHANGE UP (ref 0–0.9)
MONOCYTES NFR BLD AUTO: 6.6 % — SIGNIFICANT CHANGE UP (ref 2–14)
NEUTROPHILS # BLD AUTO: 5.4 K/UL — SIGNIFICANT CHANGE UP (ref 1.8–7.4)
NEUTROPHILS NFR BLD AUTO: 62.2 % — SIGNIFICANT CHANGE UP (ref 43–77)
NITRITE UR-MCNC: NEGATIVE — SIGNIFICANT CHANGE UP
NRBC # BLD: 0 /100 WBCS — SIGNIFICANT CHANGE UP (ref 0–0)
OPIATES UR-MCNC: NEGATIVE — SIGNIFICANT CHANGE UP
PCP SPEC-MCNC: SIGNIFICANT CHANGE UP
PCP UR-MCNC: NEGATIVE — SIGNIFICANT CHANGE UP
PH UR: 5.5 — SIGNIFICANT CHANGE UP (ref 5–8)
PLATELET # BLD AUTO: 285 K/UL — SIGNIFICANT CHANGE UP (ref 150–400)
POTASSIUM SERPL-MCNC: 4.7 MMOL/L — SIGNIFICANT CHANGE UP (ref 3.5–5.3)
POTASSIUM SERPL-SCNC: 4.7 MMOL/L — SIGNIFICANT CHANGE UP (ref 3.5–5.3)
PROT UR-MCNC: NEGATIVE MG/DL — SIGNIFICANT CHANGE UP
RBC # BLD: 4.71 M/UL — SIGNIFICANT CHANGE UP (ref 3.8–5.2)
RBC # FLD: 17.1 % — HIGH (ref 10.3–14.5)
RBC CASTS # UR COMP ASSIST: 1 /HPF — SIGNIFICANT CHANGE UP (ref 0–4)
SALICYLATES SERPL-MCNC: <0.3 MG/DL — LOW (ref 2.8–20)
SARS-COV-2 RNA SPEC QL NAA+PROBE: SIGNIFICANT CHANGE UP
SODIUM SERPL-SCNC: 140 MMOL/L — SIGNIFICANT CHANGE UP (ref 135–145)
SP GR SPEC: 1.03 — SIGNIFICANT CHANGE UP (ref 1–1.03)
SQUAMOUS # UR AUTO: 16 /HPF — HIGH (ref 0–5)
THC UR QL: NEGATIVE — SIGNIFICANT CHANGE UP
UROBILINOGEN FLD QL: 1 MG/DL — SIGNIFICANT CHANGE UP (ref 0.2–1)
WBC # BLD: 8.68 K/UL — SIGNIFICANT CHANGE UP (ref 3.8–10.5)
WBC # FLD AUTO: 8.68 K/UL — SIGNIFICANT CHANGE UP (ref 3.8–10.5)
WBC UR QL: 8 /HPF — HIGH (ref 0–5)

## 2024-09-18 PROCEDURE — 99285 EMERGENCY DEPT VISIT HI MDM: CPT

## 2024-09-18 PROCEDURE — 93010 ELECTROCARDIOGRAM REPORT: CPT

## 2024-09-18 PROCEDURE — 90792 PSYCH DIAG EVAL W/MED SRVCS: CPT

## 2024-09-18 PROCEDURE — 72148 MRI LUMBAR SPINE W/O DYE: CPT | Mod: 26,MC

## 2024-09-18 RX ORDER — SERTRALINE HYDROCHLORIDE 100 MG/1
100 TABLET, FILM COATED ORAL DAILY
Refills: 0 | Status: DISCONTINUED | OUTPATIENT
Start: 2024-09-19 | End: 2024-09-21

## 2024-09-18 RX ORDER — ACETAMINOPHEN 325 MG
650 TABLET ORAL EVERY 6 HOURS
Refills: 0 | Status: DISCONTINUED | OUTPATIENT
Start: 2024-09-18 | End: 2024-09-26

## 2024-09-18 RX ORDER — MAG HYDROX/ALUMINUM HYD/SIMETH 200-200-20
30 SUSPENSION, ORAL (FINAL DOSE FORM) ORAL EVERY 6 HOURS
Refills: 0 | Status: DISCONTINUED | OUTPATIENT
Start: 2024-09-18 | End: 2024-09-26

## 2024-09-18 RX ORDER — CLOZAPINE 25 MG/1
75 TABLET ORAL AT BEDTIME
Refills: 0 | Status: DISCONTINUED | OUTPATIENT
Start: 2024-09-18 | End: 2024-09-20

## 2024-09-18 RX ORDER — OXYCODONE HYDROCHLORIDE 30 MG/1
5 TABLET, FILM COATED, EXTENDED RELEASE ORAL ONCE
Refills: 0 | Status: DISCONTINUED | OUTPATIENT
Start: 2024-09-18 | End: 2024-09-18

## 2024-09-18 RX ORDER — INFLUENZA VIRUS VACCINE 15; 15; 15; 15 UG/.5ML; UG/.5ML; UG/.5ML; UG/.5ML
0.5 SUSPENSION INTRAMUSCULAR ONCE
Refills: 0 | Status: COMPLETED | OUTPATIENT
Start: 2024-09-18 | End: 2024-09-18

## 2024-09-18 RX ORDER — OLANZAPINE 2.5 MG
10 TABLET ORAL EVERY 8 HOURS
Refills: 0 | Status: DISCONTINUED | OUTPATIENT
Start: 2024-09-18 | End: 2024-09-26

## 2024-09-18 RX ORDER — MAGNESIUM HYDROXIDE 400 MG/5ML
30 SUSPENSION, ORAL (FINAL DOSE FORM) ORAL DAILY
Refills: 0 | Status: DISCONTINUED | OUTPATIENT
Start: 2024-09-18 | End: 2024-09-26

## 2024-09-18 RX ADMIN — CLOZAPINE 75 MILLIGRAM(S): 25 TABLET ORAL at 22:15

## 2024-09-18 RX ADMIN — OXYCODONE HYDROCHLORIDE 5 MILLIGRAM(S): 30 TABLET, FILM COATED, EXTENDED RELEASE ORAL at 16:56

## 2024-09-18 NOTE — ED PROVIDER NOTE - INPATIENT RESIDENT/ACP NOTIFIED DATE
Group Topic:  Education    Date: 6/5/2020  Start Time:  1:00 PM  End Time:  1:50 PM  Facilitators: Alicia Allen LCSW    Focus: Patient discussed ways to love yourself.   Number in attendance: 15    Method: Group  Attendance: Present  Participation: Active  Patient Response: Appropriate feedback, Attentive and Demonstrated insight  Mood: Anxious  Affect: Type: Anxious   Range: Full (normal)   Congruency: Congruent   Stability: Stable  Behavior/Socialization: Appropriate to group, Cooperative and Engaged  Thought Process: Focused and Organized  Task Performance: Follows directions  Patient Evaluation: Independent - full participation   This visit was performed via live interactive two-way video.    Clinician Location: [facility]    Patient Location: Home  Patient verbally consented to video visit.      18-Sep-2024 19:25

## 2024-09-18 NOTE — BH PATIENT PROFILE - NSDYSPHAGSECTONE_PSY_ALL_CORE
Please have trusted family/friend/staff member initially supervise showers (be present in the home) upon return home while seated on shower chair  Kalin Ramirez should use the long handled adaptive equipment to complete all lower body dressing and bathing tasks, in order to comply with precautions (NO bending, lifting, or twisting "No BLT")  To put on shoes: re-lace shoes with elastic laces, and tie the laces  Then position the long-handled shoe horn at the heel and use the grabber to hold the tongue of the shoe in order to place the foot inside  The tied laces can stretch, so that you don't have to bend down to tie them  Shadi Haile has someone assist her in rearranging any frequently used items from bottom shelves to waist level or higher to prevent the need to bend  N/A

## 2024-09-18 NOTE — ED PROVIDER NOTE - CLINICAL SUMMARY MEDICAL DECISION MAKING FREE TEXT BOX
29 F pmh asthma, schizoaffective, bipolar p/w back pain and SI x 2 days. 29 F pmh asthma, schizoaffective, bipolar p/w back pain w/ RLE numbness and urinary incontinence and SI x 2 days.  on exam pt calm/cooperative, antalgic gait, no midline spinal ttp but + b/l lumbar paraspinal ttp, 4/5 strength RLE compared to 5/5 LLE and b/l UEs, diminished sensation RLE compared to LLE, distal pulses 2+.  ?radiculopathy vs msk pain but in setting incontinence and decreased strength/sensation RLE will r/o cord compression.  will also place on one to one and obtain psych clearance labs/consult psych

## 2024-09-18 NOTE — ED BEHAVIORAL HEALTH ASSESSMENT NOTE - NSBHATTESTBILLING_PSY_A_CORE
64 y/o F presents to ED c/o headache and L knee pain s/p mechanical fall yesterday.  Pt well appearing, VSS, NAD.  CT head, cervical spine and max face negative for trauma.  xray knee wet read negative for fracture.    Results and diagnosis discussed with patient.  Treatment plan discussed.  Return precautions discussed.  Pt verbalized understanding and given the opportunity to ask questions.  Patient advised to follow up with primary care provider.
[id number] diagnostic evaluation with medical services

## 2024-09-18 NOTE — ED BEHAVIORAL HEALTH ASSESSMENT NOTE - HPI (INCLUDE ILLNESS QUALITY, SEVERITY, DURATION, TIMING, CONTEXT, MODIFYING FACTORS, ASSOCIATED SIGNS AND SYMPTOMS)
Ms. Ramona Velasco is a 30 y/o woman, PMH asthma, past psych diagnoses per pt + chart of schizoaffective disorder vs bipolar disorder, ADHD, h/o 40 lifetime admissions most recent at [facility] for SA by gregg QUIÑONEZ (1.5 weeks, discharged on  ), h/o [facility] in  and  (For 3 and 4 months respectively), [facility] (Dr. Louis, [phone]), not connected with therapist, h/o multiple lifetime SA by OD, h/o NSSIB most recently yesterday, denies any substance use, presenting to ED as walk-in for depression, SI, and AVH. ED course unremarkable; Hgb 11.4, MRI lumbar spine (For LBP and L numbness) showing marrow reconversion and disc bulge L5-S1.     During ED evaluation, Ms. Greene presents as calm, cooperative, linear, with euthymic affect not congruent to mood or appropriate to content. She reports she is presenting to ED because she has dealt with depression, SI, and AVH for months, but over the last few days this has been more severe. Possible stressor includes receiving news from her supportive housing ([employer]) last night that she will no longer be staying with them due to being "in and out of hospital", pt states she had no plan for where to go alternatively as she has no external social support system. Regarding SI, pt describes h/o chronic SI for years, including h/o multiple SA by OD - most recent was on 15 pills of naproxen (Reports she finished bottle and had no other meds at home), states she presented to [facility] psych ED and was discharged, denies medical attention being needed. Prior to this, second most recent was OD on 12 pills of benadryl (Again reports she finished bottle and had no other meds at home) prompting [facility] admission, in beginning of September. She describes self-injurious behaviour over last few days including head banging, pinching and scratching herself, spraying air freshener in her mouth - initially states this was with intention to die, later states it was not. Reports ongoing current SI, and is unable to identify protective factors or describe future goals. Denies HI. Regarding depression, pt states she has low mood, anhedonia, tearfulness, increased sleep (10-11 hours), decreased appetite, and feels like a burden. Denies h/o manic symptoms. Pt also endorses AH of her real mother saying derogatory statements and instructing her to bang her head against a wall, and VH of her  grandfather ( 2 years ago) and shadows, which cause her anxiety and panic attacks. She states last time she felt well was months ago.     Follows with OP psychiatrist Dr. Louis on [facility], [phone], unable to reach or LVM at this time due to full mailbox. States she last saw them on Friday. Does not have therapist. Reports adherence with current regimen of clozapine 75 mg Qhs, zoloft 100 mg Qd, prazosin 1 mg Qd, and abilify maintena (Has received this for ~2 years, last dose possibly 9/10/24). Describes not feeling regimen is helping her, does not recall medications that have helped in the past.

## 2024-09-18 NOTE — ED ADULT NURSE NOTE - NS PRO PASSIVE SMOKE EXP
Notified Dr. Brock about patients low BP of 71/59, 68/49, and 81/58. Pt also stated to have a headache and dizziness. MD requested to start 500 mL/2-3 hours of LR and AM hemoglobin draw.     Brenda Nguyen RN on 10/28/2023 at 1:47 AM     Unknown

## 2024-09-18 NOTE — BH PATIENT PROFILE - FALL HARM RISK - UNIVERSAL INTERVENTIONS
Bed in lowest position, wheels locked, appropriate side rails in place/Call bell, personal items and telephone in reach/Instruct patient to call for assistance before getting out of bed or chair/Non-slip footwear when patient is out of bed/[place] to call system/Physically safe environment - no spills, clutter or unnecessary equipment/Purposeful Proactive Rounding/Room/bathroom lighting operational, light cord in reach

## 2024-09-18 NOTE — BH PATIENT PROFILE - NSPROIMPLANTSMEDDEV_GEN_A_NUR
Problem: Depressive Symptoms  Goal: Depressive Symptoms  Interdisciplinary Care Plan for patients with suicidal ideation/depression   Interventions will focus on reducing symptoms of depression and improving mood. Assist Bella with identifying, understanding and managing feelings, managing stress, developing healthy/adaptive coping skills, exercise, and self-care strategies (eg. sleep hygiene, nutrition education, drug education, and healthy use of media).   Outcome: Therapy, progress toward functional goals as expected     Attended second half of afternoon music therapy group.  Interventions focused on coping skills and self-expression.  Pt participated by listening to self-selected music on an ipod.  Pt appeared relaxed and at one point appeared to fall asleep on a beanbag.  Pleasant and cooperative.  Minimal interaction with peers but observed interactions were pleasant and appropriate.  Will continue to assess.        None

## 2024-09-18 NOTE — ED ADULT NURSE NOTE - OBJECTIVE STATEMENT
30yo female with hx of schizoaffective d/o presents with s/i with no plan or intent, as well as lower back pain onset 2 days. pain radiated down R leg with concurrent numbness and tingling. denies sensation to touch of RLE. noted ambulatory with a steady gait, per pt she has to "drag" her leg to walk. also c/o period of urinary incontinence. in CHOCO, unable to move RLE. calm and cooperative, denies recent etoh or drug use.

## 2024-09-18 NOTE — ED ADULT TRIAGE NOTE - CHIEF COMPLAINT QUOTE
Pt presents to ED C/O SI with auditory hallucinations, seven prior suicide attempts as per pt. Pt states, "I bang my head and pinch myself, I've also been having R leg numbness, I'm urinating on myself and I have lower back pain". Hx of schizoaffective disorder and depression. Pt ambulating well, denies drug/ alcohol use, denies HI. Security called for wanding and belongings. 1:1 initiated.

## 2024-09-18 NOTE — ED BEHAVIORAL HEALTH ASSESSMENT NOTE - DESCRIPTION
Domiciled in [facility] supportive housing, on [employer], not in contact with family, feels she has no friends Asthma ED course unremarkable; Hgb 11.4, MRI lumbar spine (For LBP and L numbness) showing marrow reconversion and disc bulge L5-S1.

## 2024-09-18 NOTE — ED BEHAVIORAL HEALTH ASSESSMENT NOTE - DIFFERENTIAL
Decompensation of schizoaffective disorder vs mood disorder with psychotic features. R/o underlying personality disorder.

## 2024-09-18 NOTE — BH PATIENT PROFILE - HOME MEDICATIONS
Abilify Maintena 400 mg intramuscular injection, extended release , 400 milligram(s) intramuscularly every 3 weeks Last given 1/19/24, next due on or around 2/9/24  budesonide-formoterol 80 mcg-4.5 mcg/inh inhalation aerosol , 2 puff(s) inhaled 2 times a day  pantoprazole 40 mg oral delayed release tablet , 1 tab(s) orally once a day (before a meal)  montelukast 10 mg oral tablet , 1 tab(s) orally once a day (at bedtime)  cloZAPine 100 mg oral tablet , 3 tab(s) orally once a day (at bedtime)  lithium 300 mg oral tablet, extended release , 4 tab(s) orally once a day (at bedtime)  desmopressin 0.1 mg oral tablet , 1 tab(s) orally once a day (at bedtime)  escitalopram 20 mg oral tablet , 1 tab(s) orally once a day  buPROPion 150 mg/24 hours (XL) oral tablet, extended release , 1 tab(s) orally once a day  gabapentin 300 mg oral capsule , 1 cap(s) orally 2 times a day  prazosin 2 mg oral capsule , 1 cap(s) orally once a day (at bedtime)

## 2024-09-18 NOTE — ED BEHAVIORAL HEALTH ASSESSMENT NOTE - NSBHATTESTCOMMENTATTENDFT_PSY_A_CORE
Pt seen with resident. I have treated her on 8U in 2021. Pt reporst hx of head-banging and was on constant observation during last hospitalization for purell ingestion. Right now she does not seem to need constant observation on the unit for suicidality, self-harm, aggresion, elopement, falls, or hypersexuality. Q15 min observation sufficient at this time. Need to contact [facility] for collateral. Pt discharged from [facility] last week. Hx of 2 prior [facility] hospital admissions.

## 2024-09-18 NOTE — ED ADULT NURSE NOTE - PAIN: RADIATION
R leg
Implemented All Universal Safety Interventions:  [place] to call system. Call bell, personal items and telephone within reach. Instruct patient to call for assistance. Room bathroom lighting operational. Non-slip footwear when patient is off stretcher. Physically safe environment: no spills, clutter or unnecessary equipment. Stretcher in lowest position, wheels locked, appropriate side rails in place.

## 2024-09-18 NOTE — ED PROVIDER NOTE - OBJECTIVE STATEMENT
29 F pmh asthma, schizoaffective, bipolar p/w back pain and SI x 2 days.  pt reports diffuse lower back pain w/ pain radiating down RLE and numbness in RLE.  feels like she is dragging RLE.  also reports 2 episodes of urinary incontinence today.  she has not taken anything for pain.  also reports SI w/o plan.  admitted to [facility] for suicide attempt by benadryl overdose approx 2 weeks ago.  compliant w/ her meds.  denies f/c, HA, dizziness, uri sxs, abd pain, dysuria, hematuria, saddle anesthesias, fall/trauma, h/o IVDA, HI, delusions, etoh/drug use.

## 2024-09-18 NOTE — ED BEHAVIORAL HEALTH ASSESSMENT NOTE - SUMMARY
Ms. Ramona Velasco is a 28 y/o woman, PMH asthma, past psych diagnoses per pt + chart of schizoaffective disorder vs bipolar disorder, ADHD, h/o 40 lifetime admissions most recent at [facility] for SA by gregg QUIÑONEZ (1.5 weeks, discharged on  9/11), h/o [facility] in 2020 and 2021 (For 3 and 4 months respectively), connected with [facility] (Dr. Louis, [phone]), not connected with therapist, h/o multiple lifetime SA by OLAMIDE, h/o NSSIB most recently yesterday, denies any substance use, presenting to ED as walk-in for depression, SI, and AVH. ED course unremarkable; Hgb 11.4, MRI lumbar spine (For LBP and L numbness) showing marrow reconversion and disc bulge L5-S1.     She reports symptoms of depression, current SI and multiple recent low lethality suicide attempts, and AH+VH. Denies h/o catina, denies substance use.

## 2024-09-18 NOTE — ED BEHAVIORAL HEALTH ASSESSMENT NOTE - RISK ASSESSMENT
Voluntary admission for risk to self given impulsivity, h/o multiple lifetime attempts (Static), and ongoing depression (Modifiable).

## 2024-09-18 NOTE — ED ADULT NURSE REASSESSMENT NOTE - NS ED NURSE REASSESS COMMENT FT1
Pt being brought to MRI with 1-to-1 still in place.
Received report from day shift RN. Pt resting in stretcher, resp even and unlabored. A&Ox4. Patient remains on constant observation remains in gown. All belongings secured. Tech at bedside. All ligature risks removed. All safety precautions maintained.

## 2024-09-18 NOTE — ED BEHAVIORAL HEALTH ASSESSMENT NOTE - CURRENT MEDICATION
Reports adherence with current regimen of clozapine 75 mg Qhs, zoloft 100 mg Qd, prazosin 1 mg Qd, and abilify maintena (Has received this for ~2 years, last dose possibly 9/10/24).

## 2024-09-18 NOTE — ED BEHAVIORAL HEALTH ASSESSMENT NOTE - PSYCHIATRIC ISSUES AND PLAN (INCLUDE STANDING AND PRN MEDICATION)
Will c/w home regimen of clozapine 75 mg Qhs, zoloft 100 mg Qd, prazosin 1 mg Qd - confirm last dose of maintena

## 2024-09-18 NOTE — ED BEHAVIORAL HEALTH ASSESSMENT NOTE - DETAILS
- [facility] admission for 1.5 weeks, discharged 9/11, for benadryl overdose See HPI Grandmother with dementia Will attempt to access medical records Self-referred Deferred due to pt being admitted Not attempted at this time To bang head against wall

## 2024-09-18 NOTE — ED BEHAVIORAL HEALTH ASSESSMENT NOTE - OTHER PAST PSYCHIATRIC HISTORY (INCLUDE DETAILS REGARDING ONSET, COURSE OF ILLNESS, INPATIENT/OUTPATIENT TREATMENT)
Follows with OP psychiatrist Dr. Louis on [facility], [phone], unable to reach or LVM at this time due to full mailbox. States she last saw them on Friday. Does not have therapist. Reports adherence with current regimen of clozapine 75 mg Qhs, zoloft 100 mg Qd, prazosin 1 mg Qd, and abilify maintena (Has received this for ~2 years, last dose possibly 9/10/24). Describes not feeling regimen is helping her, does not recall medications that have helped in the past.   Reports h/o >40 lifetime admissions since age 15 y/o, and 4 psych ED visits + 1 admission within the last month. Describes h/o [facility] admission in 2020 and 2021 for 3 and 4 months respectively, which she feels helped, as her longest period without hospital visits was after this.

## 2024-09-18 NOTE — ED BEHAVIORAL HEALTH ASSESSMENT NOTE - NSBHMSEGAIT_PSY_A_CORE
Patient : Inez Bain Age: 39 year old Sex: female   MRN: [id number] Encounter Date: 5/12/2018    E06/06    History     Chief Complaint   Patient presents with   • Chest Pain Adult     HPI 5/12/2018  11:27 PM Inez Bain is a 39 year old female who presents to the ED c/o left-sided CP that began at 10 AM. She rates the pain at a 4/10 currently, but states that it fluctuates in intensity and is sometimes 10/10. The pain is exacerbated by movement, and she describes the sensation as, \"my heart quivers, squeezes, and is slow to release.\" She further notes SOB secondary to pain, HA, congestion x 1 week, and two episodes of hematochezia last week (due to h/o IBD and not abnormal for the patient). Inez has a hx of heart murmur and notes an echo \"a long time ago.\" The pt took Afrin and OTC decongestion for her congestion yesterday and earlier this evening, but has not taken anything else for her symptoms. The pt notes increased BP today but denies hx of HTN. She reports hx of Crohn's and states that it has been well-managed without medication. A recent colonoscopy was negative for active colitis. She has a hx of tubal ligation and does not take any oral contraceptives. The pt notes rare EtOH use but denies smoking or illicit drug use. The pt denies recent fall, exertion, travel, hospitalization, or surgery. She has a family hx of HTN, hypothyroidism, hypercholesterolemia, and heart murmur. There are no further complaints or modifying factors at this time.    PCP: Tova Rosas MD      Allergies   Allergen Reactions   • Dust Other (See Comments)     sneezing       Prior to Admission Medications    CHOLECALCIFEROL (VITAMIN D PO)        CYANOCOBALAMIN (B-12 PO)        MULTIPLE VITAMINS-MINERALS (MULTIVITAMIN ADULT PO)        POLYETHYLENE GLYCOL (MIRALAX) POWDER    Mix with 64 oz Gatorade, drink 1- 8oz glass every 10 min as directed on colon prep instr.       Past Medical History:   Diagnosis  Date   • Abnormal Papanicolaou smear of cervix and cervical HPV 08/23/2005    Mild dysplasia/FLORENCIO I and cellular changes associated with HPV   • Abnormal Papanicolaou smear of cervix and cervical HPV 02/10/2005    Atypical squamous cells cannot exclude high grade squamous intraepithelial lesion   • Abnormal Papanicolaou smear of cervix and cervical HPV 01/31/2005    low grade squamous intraepithelial lesion (mild dysplasia/FLORENCIO I and cellular changes associated with HPV)   • Condyloma acuminatum     laser ablation 10/2008 and 12/2008   • Crohn's regional enteritis (CMS/HCC)    • Genital herpes, unspecified 08/18/2008   • History of ectopic pregnancy 5/2013    Tx MTX   • Inflammatory bowel disease     hx crohns   • Lichen sclerosus 2013   • Myalgia and myositis, unspecified    • Other abnormal Papanicolaou smear of cervix and cervical HPV(795.09)     multiple abnormal Pap smears, LEEP 2005   • Papanicolaou smear of vagina with atypical squamous cells of undetermined significance (ASC-US) 05/25/2007   • Papanicolaou smear of vagina with atypical squamous cells of undetermined significance (ASC-US) 12/20/2005   • Regional enteritis of small intestine with large intestine (CMS/HCC)     Crohn's; follows with Dr. Leonides Woods       Past Surgical History:   Procedure Laterality Date   • ABDOMINAL ADHESION SURGERY  09/22/2011    Dr. Samuel Bernard, Laparotomy, lysis of adhesions, repair of enterotomy   • COLON SURGERY     • COLONOSCOPY  01/01/2004   • COLONOSCOPY  12/15/2017   • COLPOSCOPY,LOOP ELECTRD CERVIX EXCIS     • CONIZATION CERVIX,LOOP ELECTRD  03/01/2005    LEEP   • LASER ABLATION OF CONDYLOMAS  10/06/2008    complex laser destruction of vulvar condylomata   • LASER ABLATION OF CONDYLOMAS  12/30/2008    Complex laser destruction of vulvar condylomata, Complex laser destruction of vaginal condylomata   • NASAL SEPTUM SURGERY     • PAP,LIQUID BASED  01/31/2005    Low grade squamous intraepithelial lesion (mild  dysplasia/FLORENCIO I and cellular changes assoc with HPV)   • PAP,LIQUID BASED  02/10/2005    atypical squamous cells cannot exclude high grade squamous intraepithelial lesion (ASC-H)   • PAP,LIQUID BASED  08/23/2005    low grade squamous intraepithelial lesion (mild dysplasia/FLORENCIO I and cellular changes assoc with HPV   • PAP,LIQUID BASED  05/25/2007    ASCUS   • PAP,THIN PREP W HPV(INC 61289)  05/07/2008    negative for intraepithelial lesion or malignancy; fungal organisms consistent with candida species   • SERVICE TO GASTROENTEROLOGY     • SMALL INTESTINE SURGERY  09/22/2011    resection of enteric fistula   • TYMPANOSTOMY TUBE PLACEMENT         Family History   Problem Relation Age of Onset   • Thyroid Mother    • Asthma Mother    • Other Father      no hx on biological father   • Allergies Sister      also has IBS   • Asthma Sister        Social History   Substance Use Topics   • Smoking status: Never Smoker   • Smokeless tobacco: Never Used   • Alcohol use 0.0 oz/week      Comment: rarely       Review of Systems   Constitutional: Negative for activity change, chills, fatigue and fever.   HENT: Positive for congestion (x 1 week). Negative for rhinorrhea and sore throat.    Eyes: Negative for pain and visual disturbance.   Respiratory: Positive for shortness of breath (Secondary to pain). Negative for cough and chest tightness.    Cardiovascular: Positive for chest pain (Left-sided \"squeezing\"; 4/10; fluctating for several seconds at a time;  exacerbated by movement; began 10 AM). Negative for palpitations.   Gastrointestinal: Negative for abdominal pain, diarrhea and vomiting.        Positive: Two episodes of hematochezia last week; resolved   Genitourinary: Negative for difficulty urinating, dysuria and frequency.   Musculoskeletal: Negative for arthralgias and myalgias.   Skin: Negative for color change and rash.   Neurological: Negative for dizziness, weakness and numbness.   Hematological: Negative for  adenopathy. Does not bruise/bleed easily.       Physical Exam     ED Triage Vitals   ED Triage Vitals Group      Temp 05/12/18 2317 98.4 °F (36.9 °C)      Pulse 05/12/18 2315 75      Resp 05/12/18 2317 18      BP 05/12/18 2315 (!) 198/121      SpO2 05/12/18 2315 100 %      EtCO2 mmHg --       Height 05/12/18 2317 5' 6\" (1.676 m)      Weight 05/12/18 2317 133 lb (60.3 kg)      Weight Scale Used 05/12/18 2317 ED Stated       Physical Exam   Constitutional: She is oriented to person, place, and time. She appears well-developed and well-nourished. She appears distressed (Mild; due to sx).   HENT:   Head: Normocephalic and atraumatic.   Right Ear: External ear normal.   Left Ear: External ear normal.   Mouth/Throat: Oropharynx is clear and moist and mucous membranes are normal.   Audibly congested   Eyes: Conjunctivae and EOM are normal. Pupils are equal, round, and reactive to light. No scleral icterus.   Neck: Normal range of motion. Neck supple. No JVD present.   Cardiovascular: Normal rate, regular rhythm, normal heart sounds and intact distal pulses.  Exam reveals no gallop and no friction rub.    No murmur heard.  Pulmonary/Chest: Effort normal and breath sounds normal. No respiratory distress. She has no wheezes. She has no rales.   No reproducible anterior chest wall TTP   Abdominal: Soft. Bowel sounds are normal. She exhibits no distension. There is no tenderness.   Musculoskeletal: Normal range of motion. She exhibits no edema or tenderness.   Lymphadenopathy:     She has no cervical adenopathy.   Neurological: She is alert and oriented to person, place, and time. She has normal strength. No sensory deficit. She exhibits normal muscle tone. Coordination normal.   Skin: Skin is warm and dry. No rash noted. No erythema. No pallor.   Psychiatric: She has a normal mood and affect. Her behavior is normal.   Nursing note and vitals reviewed.      ED Course     Procedures    Lab Results     Results for orders placed  or performed during the hospital encounter of 05/12/18   CBC & Auto Differential   Result Value Ref Range    WBC 11.2 (H) 4.2 - 11.0 K/mcL    RBC 4.36 4.00 - 5.20 mil/mcL    HGB 12.9 12.0 - 15.5 g/dL    HCT 38.7 36.0 - 46.5 %    MCV 88.8 78.0 - 100.0 fl    MCH 29.6 26.0 - 34.0 pg    MCHC 33.3 32.0 - 36.5 g/dL    RDW-CV 12.3 11.0 - 15.0 %     140 - 450 K/mcL    Percent NRBC 0 0 %    DIFF TYPE AUTOMATED DIFFERENTIAL     Neutrophil 67 %    LYMPH 26 %    MONO 6 %    EOSIN 1 %    BASO 0 %    PERCENT IMMATURE GRANULOCYTES 0 %    Absolute Neutrophil 7.4 1.8 - 7.7 K/mcL    Absolute Lymph 2.9 1.0 - 4.8 K/mcL    Absolute Mono 0.7 0.3 - 0.9 K/mcL    Absolute Eos 0.2 0.1 - 0.5 K/mcL    Absolute Baso 0.0 0.0 - 0.3 K/mcL    Absolute Immature Granulocytes 0.0 0 - 0.2 K/mcl   Prothrombin Time   Result Value Ref Range    PROTIME 10.6 9.7 - 11.8 sec    INR 1.0    Partial Thromboplastin Time   Result Value Ref Range    PTT 29 22 - 30 sec   D Dimer Quantitative   Result Value Ref Range    D Dimer Quantitative 0.31 <0.57 mg/L (FEU)   Chem 8 Panel - Point of Care   Result Value Ref Range    Sodium  135 - 145 mmol/L    Potassium POC 3.2 (L) 3.4 - 5.1 mmol/L    Chloride  98 - 107 mmol/L    CALCIUM IONIZED-POC 1.20 1.15 - 1.29 mmol/L    CO2 Total 25 (H) 19 - 24 mmol/L    GLUCOSE POC 99 65 - 99 mg/dL    BUN POC 6 6 - 20 mg/dL    HEMATOCRIT POC 39.0 36.0 - 46.5 %    Hemoglobin POC 13.3 12.0 - 15.5 g/dL    ANION GAP POC 17 mmol/L    Creatinine POC 0.50 (L) 0.51 - 0.95 mg/dL    Estimated GFR  (POC) >90     Estimated GFR Non- (POC) >90    Troponin I - Point of Care   Result Value Ref Range    Troponin I POC <0.10 <0.10 ng/mL   Urinalysis & Reflex Micro with Culture if Indicated   Result Value Ref Range    COLOR YELLOW YELLOW    APPEARANCE HAZY     GLUCOSE(URINE) NEGATIVE NEGATIVE mg/dL    BILIRUBIN NEGATIVE NEGATIVE    KETONES NEGATIVE NEGATIVE mg/dL    SPECIFIC GRAVITY 1.006 1.005 - 1.030     BLOOD NEGATIVE NEGATIVE    pH 7.0 5.0 - 7.0 Units    PROTEIN(URINE) NEGATIVE NEGATIVE mg/dL    UROBILINOGEN 0.2 0.0 - 1.0 mg/dL    NITRITE NEGATIVE NEGATIVE    LEUKOCYTE ESTERASE NEGATIVE NEGATIVE    SPECIMEN TYPE URINE, CLEAN CATCH/MIDSTREAM    Troponin I - Point of Care   Result Value Ref Range    Troponin I POC <0.10 <0.10 ng/mL       EKG Results     EKG Interpretation 23:18 on 5/12  Rate: 71  Rhythm: normal sinus rhythm   Abnormality: No  No previous ECGs for comparison    EKG Interpretation 1:55 on 5/13  Rate: 65  Rhythm: normal sinus rhythm   Abnormality: No  No change from prior EKG      EKG interpreted by ED physician    Radiology Results     Imaging Results          XR Chest AP or PA (Final result)  Result time 05/13/18 00:40:24    Final result                 Impression:    IMPRESSION: No active cardiopulmonary disease.                Narrative:    EXAM: XR CHEST AP OR PA    INDICATION: chest pain    COMPARISON: None available    FINDINGS: Cardiomediastinal silhouette and pulmonary vessels appear normal  in size and contour. Lungs are clear. Costophrenic angles are sharp. No  pneumothorax. Osseous structures are unremarkable.                                ED Medication Orders     Start Ordered     Status Ordering Provider    05/13/18 0143 05/13/18 0142  fentaNYL (SUBLIMAZE) injection 50 mcg  ONCE      Last MAR action:  Given DALLIN BLANTON    05/13/18 0057 05/13/18 0056  labetalol (NORMODYNE) injection 10 mg  ONCE      Last MAR action:  Given DALLIN BLANTON    05/12/18 2357 05/12/18 2357  ketorolac injection 30 mg  ONCE      Last MAR action:  Given DALLIN BLANTON    05/12/18 2339 05/12/18 2338    ONCE      Discontinued DALLIN BLANTON    05/12/18 2337 05/12/18 2338    PRN      Discontinued DALLIN BLANTON          MDM  Vitals  Vitals:    05/12/18 2315 05/12/18 2317 05/12/18 2320 05/12/18 2333   BP: (!) 198/121 (!) 198/121 (!) 182/133    Pulse: 75  78 72    Resp:  18 19    Temp:  98.4 °F (36.9 °C)     TempSrc:  Oral     SpO2: 100% 99% 100% 100%   Weight:  60.3 kg     Height:  5' 6\" (1.676 m)         ED Course    11:27 PM I performed the initial assessment and physical evaluation of the patient who presents with left side chest pain. No reproducible chest ttp on exam, but BP is elevated and pt appears in moderate distress. I updated the pt on her non-ischemic appearing ECG. DDx: NSTEMI, pericarditis, GERD, PE.  We discussed plan for further evaluation with labs and CXR. Pt agrees with the plan, declines intervention for pain.    11:53 PM Per RN, the pt c/o HA. I rechecked the pt, and we discussed the plan for Toradol. Will reassess shortly.    12:48 AM I rechecked the pt who was resting with the lights off reporting an episode of CP and persistent HA. I noted a BP of 161/92 and informed the pt that her cardiac monitor has been without events. I updated her on the unremarkable CXR, negative troponin, and negative D-dimer. We discussed that her persistent elevated BP may be contributing to her symptoms and agreed on plan for labetalol administration for BP control.     1:40 AM I rechecked the pt whose BP is 150/86. She noted decreased episodes of CP but persistent HA. We discussed the plan to administer pain medication and reassess. The patient is agreeable.    2:21 AM Per RN, the pt's BP and pain have improved slightly.    2:22 AM I rechecked the pt who reported intermittent CP but disclosed resolved HA. The pt's BP is now 140/87. I reiterated the non-ischemic ECG and uneventful telemetry. Discussed that the etiology of her symptoms is not yet clear.  Discussed further monitoring and cardiac evaluation beyond the ED, but pt thinks that she would like to go home. Will repeat EKG/trop and reassess.    3:09 AM I rechecked the pt who was resting comfortably with a BP of 133/79. I updated her on the negative troponin and repeat EKG, which continues to be without any  ischemic changes or arrhythmia. Symptoms are somewhat improved and would prefer to be discharged at this point. Will provide a referral for outpatient cardiology follow up. Encouraged activity as tolerated and a note was provided for work. Concerning signs/symptoms that should prompt return to ED were reviewed and pt indicated understanding. Any questions were answered.    MDM  HEART Score for Major Cardiac Events  History: Slightly suspicious   EKG Normal   AGE Less than or equal to 45   RISK FACTOR No risk factors known   TROPONIN: Equal or less than normal limit   TOTAL SCORE 0     Additional Information    Low Risk HEART Score Results:   Discussion and Patient Decision:     The patient's HEART Score is considered to be at low risk for a Major Adverse Cardiac Event (MACE).  The calculated risk is 1.7 % at 6 weeks.           Critical Care time spent on this patient outside of billable procedures:  None      Discharge  Clinical Impression  ED Diagnoses        Final diagnoses    Palpitations     Other chest pain     Nonintractable headache, unspecified chronicity pattern, unspecified headache type     Elevated blood pressure reading     Hypokalemia           The patient was provided with a recommendation to follow up with a primary care provider and obtain reassessment of his/her blood pressure within three months.    Follow Up:  Velma Fox MD  [address]  [address] [address]  [phone]    Schedule an appointment as soon as possible for a visit         New Prescriptions    No medications on file       Pt is discharged to home/self care in stable condition.      I have reviewed the information recorded by the scribe for accuracy and agree with its contents.    ____________________________________________________________________    Koby Andrews acting as a scribe for MD Dr. Diane Watts MD  Dictation # [id number]  Scribe: Koby Contreras  MARIO Schofield MD  05/13/18 2644     Normal gait / station

## 2024-09-18 NOTE — ED PROVIDER NOTE - PHYSICAL EXAMINATION
Vitals reviewed  Gen: comfortable appearing at rest, in nad, speaking in full sentences  Skin: wwp, no rash/lesions  HEENT: ncat, eomi, mmm  Back: no midline ttp/step off, + b/l lumbar paraspinal ttp, neg SLR, ROM not limited    CV: rrr, no audible m/r/g  Resp: symmetrical expansion, ctab, no w/r/r  Ext: FROM throughout, no peripheral edema, no muscle or joint ttp, 4/5 strength RLE compared to 5/5 LLE and b/l UEs, diminished sensation RLE compared to LLE, distal pulses 2+  Neuro: alert/oriented x3, no focal deficits, antalgic gait

## 2024-09-19 DIAGNOSIS — F32.9 MAJOR DEPRESSIVE DISORDER, SINGLE EPISODE, UNSPECIFIED: ICD-10-CM

## 2024-09-19 DIAGNOSIS — F50.2 BULIMIA NERVOSA: ICD-10-CM

## 2024-09-19 DIAGNOSIS — F43.10 POST-TRAUMATIC STRESS DISORDER, UNSPECIFIED: ICD-10-CM

## 2024-09-19 LAB
A1C WITH ESTIMATED AVERAGE GLUCOSE RESULT: 5.4 % — SIGNIFICANT CHANGE UP (ref 4–5.6)
CHOLEST SERPL-MCNC: 186 MG/DL — SIGNIFICANT CHANGE UP
ESTIMATED AVERAGE GLUCOSE: 108 MG/DL — SIGNIFICANT CHANGE UP (ref 68–114)
HDLC SERPL-MCNC: 55 MG/DL — SIGNIFICANT CHANGE UP
LIPID PNL WITH DIRECT LDL SERPL: 110 MG/DL — HIGH
NON HDL CHOLESTEROL: 131 MG/DL — HIGH
TRIGL SERPL-MCNC: 118 MG/DL — SIGNIFICANT CHANGE UP
TSH SERPL-MCNC: 1.61 UIU/ML — SIGNIFICANT CHANGE UP (ref 0.27–4.2)

## 2024-09-19 PROCEDURE — 99223 1ST HOSP IP/OBS HIGH 75: CPT

## 2024-09-19 RX ORDER — GABAPENTIN 800 MG/1
300 TABLET, FILM COATED ORAL
Refills: 0 | Status: DISCONTINUED | OUTPATIENT
Start: 2024-09-19 | End: 2024-09-20

## 2024-09-19 RX ORDER — PRAZOSIN HCL 5 MG
1 CAPSULE ORAL AT BEDTIME
Refills: 0 | Status: DISCONTINUED | OUTPATIENT
Start: 2024-09-19 | End: 2024-09-20

## 2024-09-19 RX ORDER — PANTOPRAZOLE SODIUM 40 MG/1
40 TABLET, DELAYED RELEASE ORAL
Refills: 0 | Status: DISCONTINUED | OUTPATIENT
Start: 2024-09-19 | End: 2024-09-26

## 2024-09-19 RX ORDER — SENNOSIDES 8.6 MG
2 TABLET ORAL AT BEDTIME
Refills: 0 | Status: DISCONTINUED | OUTPATIENT
Start: 2024-09-19 | End: 2024-09-26

## 2024-09-19 RX ADMIN — Medication 10 MILLIGRAM(S): at 13:24

## 2024-09-19 RX ADMIN — SERTRALINE HYDROCHLORIDE 100 MILLIGRAM(S): 100 TABLET, FILM COATED ORAL at 13:22

## 2024-09-19 NOTE — BH INPATIENT PSYCHIATRY ASSESSMENT NOTE - NSBHATTESTAPPBILLTIME_PSY_A_CORE
I attest my time as RADHA is greater than 50% of the total combined time spent on qualifying patient care activities. I have reviewed and verified the documentation.
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
## 2024-09-19 NOTE — BH INPATIENT PSYCHIATRY ASSESSMENT NOTE - NSBHPHYSICALEXAM_PSY_ALL_CORE
General: Pt sitting comfortably on bed, NAD  Skin: Bruises on b/l arms.  Eyes: PERRLA, EOMI. Sclera white, no icterus.  Ears: Gross hearing acuity intact by whisper test. No lesions, nodules or deformities.  Nose: Septum midline, nares patent b/l.  Mouth: Teeth with yellow/orange discoloration. Buccal mucosa pink and moist. Tonsils without exudates or erythema. Uvula left deviated.  Lungs: Vesicular breath sounds, no wheezes, rales or rhonchi.  Heart: Unable to assess heart sounds.  GI: +BS

## 2024-09-19 NOTE — BH INPATIENT PSYCHIATRY ASSESSMENT NOTE - NSBHATTESTBILLING_PSY_A_CORE
65861-Lyqjuwgxmdy diagnostic evaluation with medical services 99223-Initial OBS or IP - high complexity OR  mins

## 2024-09-19 NOTE — BH INPATIENT PSYCHIATRY ASSESSMENT NOTE - NSBHCHARTREVIEWVS_PSY_A_CORE FT
Okay to continue levetiracetam 1000 milligram twice daily and monitor closely for any recurrent seizure.   Vital Signs Last 24 Hrs  T(C): 36.6 (09-19-24 @ 08:58), Max: 36.9 (09-18-24 @ 20:22)  T(F): 97.8 (09-19-24 @ 08:58), Max: 98.4 (09-18-24 @ 20:22)  HR: 98 (09-19-24 @ 08:58) (85 - 98)  BP: 100/76 (09-19-24 @ 08:58) (97/69 - 119/84)  BP(mean): --  RR: 18 (09-19-24 @ 08:58) (16 - 18)  SpO2: 95% (09-19-24 @ 08:58) (95% - 98%)     Vital Signs Last 24 Hrs  T(C): 36.7 (09-19-24 @ 16:26), Max: 36.9 (09-18-24 @ 20:22)  T(F): 98.1 (09-19-24 @ 16:26), Max: 98.4 (09-18-24 @ 20:22)  HR: 87 (09-19-24 @ 16:26) (85 - 98)  BP: 144/79 (09-19-24 @ 16:26) (97/69 - 144/79)  BP(mean): --  RR: 18 (09-19-24 @ 16:26) (16 - 18)  SpO2: 96% (09-19-24 @ 16:26) (95% - 97%)     Vital Signs Last 24 Hrs  T(C): 36.9 (09-20-24 @ 09:54), Max: 36.9 (09-20-24 @ 09:54)  T(F): 98.4 (09-20-24 @ 09:54), Max: 98.4 (09-20-24 @ 09:54)  HR: 67 (09-20-24 @ 09:54) (67 - 67)  BP: 115/74 (09-20-24 @ 09:54) (115/74 - 115/74)  BP(mean): --  RR: 18 (09-20-24 @ 09:54) (18 - 18)  SpO2: 95% (09-20-24 @ 09:54) (95% - 95%)

## 2024-09-19 NOTE — BH SOCIAL WORK INITIAL PSYCHOSOCIAL EVALUATION - DETAILS
Per chart review: Grandmother with dementia.   Per chart review: Grandmother with dementia.  During this assessment, the patient stated that her mother has schizophrenia & her  father had MDD and committed suicide by jumping off an 8-story building.
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
## 2024-09-19 NOTE — BH SOCIAL WORK INITIAL PSYCHOSOCIAL EVALUATION - NSBHTREATHXDATEFT_PSY_ALL_CORE
Ongoing The patient reports she has been connected to them for 3 years. She was initially referred to them by [facility].

## 2024-09-19 NOTE — BH INPATIENT PSYCHIATRY ASSESSMENT NOTE - OTHER PAST PSYCHIATRIC HISTORY (INCLUDE DETAILS REGARDING ONSET, COURSE OF ILLNESS, INPATIENT/OUTPATIENT TREATMENT)
Follows with OP psychiatrist Dr. Louis on [facility], [phone], unable to reach or LVM at this time due to full mailbox. States she last saw them on Friday. Does not have therapist. Reports adherence with current regimen of clozapine 75 mg Qhs, zoloft 100 mg Qd, prazosin 1 mg Qd, and abilify maintena (Has received this for ~2 years, last dose possibly 9/10/24). Describes not feeling regimen is helping her, does not recall medications that have helped in the past.   Reports h/o >40 lifetime admissions since age 15 y/o, and 4 psych ED visits + 1 admission within the last month. Describes h/o [facility] admission in 2020 and 2021 for 3 and 4 months respectively, which she feels helped, as her longest period without hospital visits was after this.

## 2024-09-19 NOTE — BH INPATIENT PSYCHIATRY ASSESSMENT NOTE - NSBHMETABOLIC_PSY_ALL_CORE_FT
BMI: BMI (kg/m2): 57.6 (09-18-24 @ 21:53)  HbA1c: A1C with Estimated Average Glucose Result: 5.4 % (09-19-24 @ 08:20)    Glucose:   BP: 100/76 (09-19-24 @ 08:58) (97/69 - 119/84)Vital Signs Last 24 Hrs  T(C): 36.6 (09-19-24 @ 08:58), Max: 36.9 (09-18-24 @ 20:22)  T(F): 97.8 (09-19-24 @ 08:58), Max: 98.4 (09-18-24 @ 20:22)  HR: 98 (09-19-24 @ 08:58) (85 - 98)  BP: 100/76 (09-19-24 @ 08:58) (97/69 - 119/84)  BP(mean): --  RR: 18 (09-19-24 @ 08:58) (16 - 18)  SpO2: 95% (09-19-24 @ 08:58) (95% - 98%)      Lipid Panel: Date/Time: 09-19-24 @ 08:20  Cholesterol, Serum: 186  LDL Cholesterol Calculated: 110  HDL Cholesterol, Serum: 55  Total Cholesterol/HDL Ration Measurement: --  Triglycerides, Serum: 118   BMI: BMI (kg/m2): 57.6 (09-18-24 @ 21:53)  HbA1c: A1C with Estimated Average Glucose Result: 5.4 % (09-19-24 @ 08:20)    Glucose:   BP: 144/79 (09-19-24 @ 16:26) (97/69 - 144/79)Vital Signs Last 24 Hrs  T(C): 36.7 (09-19-24 @ 16:26), Max: 36.9 (09-18-24 @ 20:22)  T(F): 98.1 (09-19-24 @ 16:26), Max: 98.4 (09-18-24 @ 20:22)  HR: 87 (09-19-24 @ 16:26) (85 - 98)  BP: 144/79 (09-19-24 @ 16:26) (97/69 - 144/79)  BP(mean): --  RR: 18 (09-19-24 @ 16:26) (16 - 18)  SpO2: 96% (09-19-24 @ 16:26) (95% - 97%)      Lipid Panel: Date/Time: 09-19-24 @ 08:20  Cholesterol, Serum: 186  LDL Cholesterol Calculated: 110  HDL Cholesterol, Serum: 55  Total Cholesterol/HDL Ration Measurement: --  Triglycerides, Serum: 118   BMI: BMI (kg/m2): 57.6 (09-18-24 @ 21:53)  HbA1c: A1C with Estimated Average Glucose Result: 5.4 % (09-19-24 @ 08:20)    Glucose:   BP: 115/74 (09-20-24 @ 09:54) (97/69 - 144/79)Vital Signs Last 24 Hrs  T(C): 36.9 (09-20-24 @ 09:54), Max: 36.9 (09-20-24 @ 09:54)  T(F): 98.4 (09-20-24 @ 09:54), Max: 98.4 (09-20-24 @ 09:54)  HR: 67 (09-20-24 @ 09:54) (67 - 67)  BP: 115/74 (09-20-24 @ 09:54) (115/74 - 115/74)  BP(mean): --  RR: 18 (09-20-24 @ 09:54) (18 - 18)  SpO2: 95% (09-20-24 @ 09:54) (95% - 95%)      Lipid Panel: Date/Time: 09-19-24 @ 08:20  Cholesterol, Serum: 186  LDL Cholesterol Calculated: 110  HDL Cholesterol, Serum: 55  Total Cholesterol/HDL Ration Measurement: --  Triglycerides, Serum: 118
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
## 2024-09-19 NOTE — BH SOCIAL WORK INITIAL PSYCHOSOCIAL EVALUATION - NSBHHOUSECONTACTFT_PSY_ALL_CORE
Bry Ellis (Clinical Coordinator)  [phone]  [email or web address]
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
## 2024-09-19 NOTE — BH INPATIENT PSYCHIATRY ASSESSMENT NOTE - NSICDXBHSECONDARYDX_PSY_ALL_CORE
Major depression   F32.9  Post traumatic stress disorder (PTSD)   F43.10  Bulimia nervosa   F50.2   Schizoaffective disorder, unspecified type   F25.9  Major depression   F32.9  Post traumatic stress disorder (PTSD)   F43.10  Bulimia nervosa   F50.2

## 2024-09-19 NOTE — BH INPATIENT PSYCHIATRY ASSESSMENT NOTE - CURRENT MEDICATION
MEDICATIONS  (STANDING):  cloZAPine 75 milliGRAM(s) Oral at bedtime  influenza   Vaccine 0.5 milliLiter(s) IntraMuscular once  sertraline 100 milliGRAM(s) Oral daily    MEDICATIONS  (PRN):  acetaminophen     Tablet .. 650 milliGRAM(s) Oral every 6 hours PRN Moderate Pain (4 - 6)  aluminum hydroxide/magnesium hydroxide/simethicone Suspension 30 milliLiter(s) Oral every 6 hours PRN Dyspepsia  magnesium hydroxide Suspension 30 milliLiter(s) Oral daily PRN Constipation  OLANZapine Disintegrating Tablet 10 milliGRAM(s) Oral every 8 hours PRN agitation  traZODone 50 milliGRAM(s) Oral at bedtime PRN insomnia   MEDICATIONS  (STANDING):  cloZAPine 75 milliGRAM(s) Oral at bedtime  sertraline 100 milliGRAM(s) Oral daily    MEDICATIONS  (PRN):  acetaminophen     Tablet .. 650 milliGRAM(s) Oral every 6 hours PRN Moderate Pain (4 - 6)  aluminum hydroxide/magnesium hydroxide/simethicone Suspension 30 milliLiter(s) Oral every 6 hours PRN Dyspepsia  LORazepam     Tablet 2 milliGRAM(s) Oral every 6 hours PRN Agitation  magnesium hydroxide Suspension 30 milliLiter(s) Oral daily PRN Constipation  OLANZapine Disintegrating Tablet 10 milliGRAM(s) Oral every 8 hours PRN agitation  senna 2 Tablet(s) Oral at bedtime PRN Constipation  traZODone 50 milliGRAM(s) Oral at bedtime PRN insomnia   MEDICATIONS  (STANDING):  cloZAPine 100 milliGRAM(s) Oral at bedtime  gabapentin 300 milliGRAM(s) Oral two times a day  sertraline 100 milliGRAM(s) Oral daily    MEDICATIONS  (PRN):  acetaminophen     Tablet .. 650 milliGRAM(s) Oral every 6 hours PRN Moderate Pain (4 - 6)  aluminum hydroxide/magnesium hydroxide/simethicone Suspension 30 milliLiter(s) Oral every 6 hours PRN Dyspepsia  LORazepam     Tablet 2 milliGRAM(s) Oral every 6 hours PRN Agitation  magnesium hydroxide Suspension 30 milliLiter(s) Oral daily PRN Constipation  OLANZapine Disintegrating Tablet 10 milliGRAM(s) Oral every 8 hours PRN agitation  ondansetron    Tablet 4 milliGRAM(s) Oral every 6 hours PRN nausea  pantoprazole    Tablet 40 milliGRAM(s) Oral before breakfast PRN GERD  senna 2 Tablet(s) Oral at bedtime PRN Constipation  traZODone 50 milliGRAM(s) Oral at bedtime PRN insomnia

## 2024-09-19 NOTE — BH INPATIENT PSYCHIATRY ASSESSMENT NOTE - NSBHATTESTTYPEVISIT_PSY_A_CORE
Attending with Resident/Fellow/Student On-site Attending with Resident/Fellow/Student and RADHA (99XXX codes)

## 2024-09-19 NOTE — BH SOCIAL WORK INITIAL PSYCHOSOCIAL EVALUATION - NSBHHOUSECOMMENTFT_PSY_ALL_CORE
Per chart review:  Possible stressor includes receiving news from her supportive housing ([employer]) last night that she will no longer be staying with them due to being "in and out of hospital", pt states she had no plan for where to go alternatively as she has no external social support system. This SW spoke with [facility] Clinical Coordinator, Bry Ellis, on 9/19/24 and she confirmed the patient could return to their supportive housing upon hospital discharge. She noted that they have suggested the patient explore [facility] in the past due to her frequent hospitalizations, but she can ultimately return.

## 2024-09-19 NOTE — BH INPATIENT PSYCHIATRY ASSESSMENT NOTE - NSCURPASTPSYDX_PSY_ALL_CORE
Mood disorder/Psychotic disorder/ADHD/PTSD/Eating disorder Mood disorder/Psychotic disorder/ADHD/PTSD/Eating disorder/Cluster B Personality disorder/traits
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
## 2024-09-19 NOTE — BH SOCIAL WORK INITIAL PSYCHOSOCIAL EVALUATION - NSBHCHILDEVENTS_PSY_ALL_CORE
Unable to Obtain The patient stated that her grandparents adopted her at 3y/o. The patient remains close with her grandmother who presently has dementia. Her grandfather has since passed away./Adopted

## 2024-09-19 NOTE — BH SOCIAL WORK INITIAL PSYCHOSOCIAL EVALUATION - NSHIGHRISKBEHFT_PSY_ALL_CORE
Per chart: The patient was at [facility] for SA by gregg QUIÑONEZ (1.5 weeks, discharged on  9/11). H/o multiple lifetime SA by OD, h/o NSSIB most recently yesterday, denies any substance use, presenting to ED as walk-in for depression, SI.
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
## 2024-09-19 NOTE — BH INPATIENT PSYCHIATRY ASSESSMENT NOTE - NSBHCHARTREVIEWINVESTIGATE_PSY_A_CORE FT
MRI:  Normal conus position and appearance.    Diffusely decreased fatty marrow signal reflect marrow reconversion.   Correlate for anemia or smoking history.    Mild bilateral L5-S1 foraminal narrowing due to disc bulging.

## 2024-09-19 NOTE — BH INPATIENT PSYCHIATRY ASSESSMENT NOTE - NSPRESENTSXS_PSY_ALL_CORE
Depressed mood/Anhedonia/Psychosis/Impulsivity/Hopelessness or despair/Command hallucinations to hurt self

## 2024-09-19 NOTE — BH INPATIENT PSYCHIATRY ASSESSMENT NOTE - DESCRIPTION
Domiciled in [facility] supportive housing, on SSI, not in contact with family, feels she has no friends

## 2024-09-19 NOTE — BH SOCIAL WORK INITIAL PSYCHOSOCIAL EVALUATION - NSBHTREATHXNAMEFT_PSY_ALL_CORE
[facility]  Dr. Louis (Psychiatrist)  Phone: [phone] [facility]  Dr. Louis (Psychiatrist): Phone: [phone]  Dequan Luther (): [email or web address]
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
## 2024-09-20 PROCEDURE — 99232 SBSQ HOSP IP/OBS MODERATE 35: CPT

## 2024-09-20 RX ORDER — ONDANSETRON HCL/PF 4 MG/2 ML
4 VIAL (ML) INJECTION ONCE
Refills: 0 | Status: COMPLETED | OUTPATIENT
Start: 2024-09-20 | End: 2024-09-20

## 2024-09-20 RX ORDER — GABAPENTIN 800 MG/1
300 TABLET, FILM COATED ORAL
Refills: 0 | Status: DISCONTINUED | OUTPATIENT
Start: 2024-09-20 | End: 2024-09-26

## 2024-09-20 RX ORDER — CLOZAPINE 25 MG/1
100 TABLET ORAL AT BEDTIME
Refills: 0 | Status: DISCONTINUED | OUTPATIENT
Start: 2024-09-20 | End: 2024-09-21

## 2024-09-20 RX ORDER — ONDANSETRON HCL/PF 4 MG/2 ML
4 VIAL (ML) INJECTION EVERY 6 HOURS
Refills: 0 | Status: DISCONTINUED | OUTPATIENT
Start: 2024-09-20 | End: 2024-09-26

## 2024-09-20 RX ADMIN — Medication 50 MILLIGRAM(S): at 22:33

## 2024-09-20 RX ADMIN — Medication 2 MILLIGRAM(S): at 17:33

## 2024-09-20 RX ADMIN — Medication 4 MILLIGRAM(S): at 22:33

## 2024-09-20 RX ADMIN — Medication 10 MILLIGRAM(S): at 08:07

## 2024-09-20 RX ADMIN — SERTRALINE HYDROCHLORIDE 100 MILLIGRAM(S): 100 TABLET, FILM COATED ORAL at 13:11

## 2024-09-20 RX ADMIN — CLOZAPINE 100 MILLIGRAM(S): 25 TABLET ORAL at 22:33

## 2024-09-20 RX ADMIN — GABAPENTIN 300 MILLIGRAM(S): 800 TABLET, FILM COATED ORAL at 22:33

## 2024-09-20 NOTE — DIETITIAN INITIAL EVALUATION ADULT - OTHER CALCULATIONS
pounds, %%  Pt above % IBW thus ideal body weight used for all calculations. Needs adjusted for age, BMI.
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
## 2024-09-20 NOTE — BH INPATIENT PSYCHIATRY PROGRESS NOTE - NSBHMETABOLIC_PSY_ALL_CORE_FT
BMI: BMI (kg/m2): 57.6 (09-18-24 @ 21:53)  HbA1c: A1C with Estimated Average Glucose Result: 5.4 % (09-19-24 @ 08:20)    Glucose:   BP: 115/74 (09-20-24 @ 09:54) (97/69 - 144/79)Vital Signs Last 24 Hrs  T(C): 36.9 (09-20-24 @ 09:54), Max: 36.9 (09-20-24 @ 09:54)  T(F): 98.4 (09-20-24 @ 09:54), Max: 98.4 (09-20-24 @ 09:54)  HR: 67 (09-20-24 @ 09:54) (67 - 93)  BP: 115/74 (09-20-24 @ 09:54) (112/80 - 144/79)  BP(mean): --  RR: 18 (09-20-24 @ 09:54) (18 - 18)  SpO2: 95% (09-20-24 @ 09:54) (95% - 96%)      Lipid Panel: Date/Time: 09-19-24 @ 08:20  Cholesterol, Serum: 186  LDL Cholesterol Calculated: 110  HDL Cholesterol, Serum: 55  Total Cholesterol/HDL Ration Measurement: --  Triglycerides, Serum: 118   BMI: BMI (kg/m2): 57.6 (09-18-24 @ 21:53)  HbA1c: A1C with Estimated Average Glucose Result: 5.4 % (09-19-24 @ 08:20)    Glucose:   BP: 115/74 (09-20-24 @ 09:54) (97/69 - 144/79)Vital Signs Last 24 Hrs  T(C): 36.9 (09-20-24 @ 09:54), Max: 36.9 (09-20-24 @ 09:54)  T(F): 98.4 (09-20-24 @ 09:54), Max: 98.4 (09-20-24 @ 09:54)  HR: 67 (09-20-24 @ 09:54) (67 - 67)  BP: 115/74 (09-20-24 @ 09:54) (115/74 - 115/74)  BP(mean): --  RR: 18 (09-20-24 @ 09:54) (18 - 18)  SpO2: 95% (09-20-24 @ 09:54) (95% - 95%)      Lipid Panel: Date/Time: 09-19-24 @ 08:20  Cholesterol, Serum: 186  LDL Cholesterol Calculated: 110  HDL Cholesterol, Serum: 55  Total Cholesterol/HDL Ration Measurement: --  Triglycerides, Serum: 118

## 2024-09-20 NOTE — PROVIDER CONTACT NOTE (OTHER) - ACTION/TREATMENT ORDERED:
MD assessed patient immediately. MD assessed patient immediately. No other actions ordered at this time.

## 2024-09-20 NOTE — DIETITIAN INITIAL EVALUATION ADULT - PERTINENT LABORATORY DATA
09-18    140  |  108  |  18  ----------------------------<  87  4.7   |  21[L]  |  0.86    Ca    9.1      18 Sep 2024 16:59    A1C with Estimated Average Glucose Result: 5.4 % (09-19-24 @ 08:20)  A1C with Estimated Average Glucose Result: 5.2 % (11-01-23 @ 09:04)

## 2024-09-20 NOTE — DIETITIAN INITIAL EVALUATION ADULT - PERSON TAUGHT/METHOD
Reviewed general healthful diet including lean proteins, fruits and vegetables. Pt requesting for Ensure in view of decreased appetite, however RD encouraged small bites and sips at mealtimes rather than using Ensure as a meal replacement. Pt verbalized understanding./verbal instruction/patient instructed

## 2024-09-20 NOTE — PROVIDER CONTACT NOTE (OTHER) - SITUATION
Mental health tech brought nurse a note from patient. Patient reported she drank a bottle of soap. Patient reports feeling depressed and hopeless.

## 2024-09-20 NOTE — BH INPATIENT PSYCHIATRY PROGRESS NOTE - NSBHCHARTREVIEWVS_PSY_A_CORE FT
Vital Signs Last 24 Hrs  T(C): 36.9 (09-20-24 @ 09:54), Max: 36.9 (09-20-24 @ 09:54)  T(F): 98.4 (09-20-24 @ 09:54), Max: 98.4 (09-20-24 @ 09:54)  HR: 67 (09-20-24 @ 09:54) (67 - 93)  BP: 115/74 (09-20-24 @ 09:54) (112/80 - 144/79)  BP(mean): --  RR: 18 (09-20-24 @ 09:54) (18 - 18)  SpO2: 95% (09-20-24 @ 09:54) (95% - 96%)     Vital Signs Last 24 Hrs  T(C): 36.9 (09-20-24 @ 09:54), Max: 36.9 (09-20-24 @ 09:54)  T(F): 98.4 (09-20-24 @ 09:54), Max: 98.4 (09-20-24 @ 09:54)  HR: 67 (09-20-24 @ 09:54) (67 - 67)  BP: 115/74 (09-20-24 @ 09:54) (115/74 - 115/74)  BP(mean): --  RR: 18 (09-20-24 @ 09:54) (18 - 18)  SpO2: 95% (09-20-24 @ 09:54) (95% - 95%)

## 2024-09-20 NOTE — BH INPATIENT PSYCHIATRY PROGRESS NOTE - CURRENT MEDICATION
MEDICATIONS  (STANDING):  cloZAPine 75 milliGRAM(s) Oral at bedtime  sertraline 100 milliGRAM(s) Oral daily    MEDICATIONS  (PRN):  acetaminophen     Tablet .. 650 milliGRAM(s) Oral every 6 hours PRN Moderate Pain (4 - 6)  aluminum hydroxide/magnesium hydroxide/simethicone Suspension 30 milliLiter(s) Oral every 6 hours PRN Dyspepsia  gabapentin 300 milliGRAM(s) Oral two times a day PRN Bulging discs, back pain  LORazepam     Tablet 2 milliGRAM(s) Oral every 6 hours PRN Agitation  magnesium hydroxide Suspension 30 milliLiter(s) Oral daily PRN Constipation  OLANZapine Disintegrating Tablet 10 milliGRAM(s) Oral every 8 hours PRN agitation  pantoprazole    Tablet 40 milliGRAM(s) Oral before breakfast PRN GERD  prazosin. 1 milliGRAM(s) Oral at bedtime PRN PTSD nightmares  senna 2 Tablet(s) Oral at bedtime PRN Constipation  traZODone 50 milliGRAM(s) Oral at bedtime PRN insomnia   MEDICATIONS  (STANDING):  cloZAPine 100 milliGRAM(s) Oral at bedtime  gabapentin 300 milliGRAM(s) Oral two times a day  sertraline 100 milliGRAM(s) Oral daily    MEDICATIONS  (PRN):  acetaminophen     Tablet .. 650 milliGRAM(s) Oral every 6 hours PRN Moderate Pain (4 - 6)  aluminum hydroxide/magnesium hydroxide/simethicone Suspension 30 milliLiter(s) Oral every 6 hours PRN Dyspepsia  LORazepam     Tablet 2 milliGRAM(s) Oral every 6 hours PRN Agitation  magnesium hydroxide Suspension 30 milliLiter(s) Oral daily PRN Constipation  OLANZapine Disintegrating Tablet 10 milliGRAM(s) Oral every 8 hours PRN agitation  ondansetron    Tablet 4 milliGRAM(s) Oral every 6 hours PRN nausea  pantoprazole    Tablet 40 milliGRAM(s) Oral before breakfast PRN GERD  senna 2 Tablet(s) Oral at bedtime PRN Constipation  traZODone 50 milliGRAM(s) Oral at bedtime PRN insomnia
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
## 2024-09-20 NOTE — BH INPATIENT PSYCHIATRY PROGRESS NOTE - PRN MEDS
MEDICATIONS  (PRN):  acetaminophen     Tablet .. 650 milliGRAM(s) Oral every 6 hours PRN Moderate Pain (4 - 6)  aluminum hydroxide/magnesium hydroxide/simethicone Suspension 30 milliLiter(s) Oral every 6 hours PRN Dyspepsia  gabapentin 300 milliGRAM(s) Oral two times a day PRN Bulging discs, back pain  LORazepam     Tablet 2 milliGRAM(s) Oral every 6 hours PRN Agitation  magnesium hydroxide Suspension 30 milliLiter(s) Oral daily PRN Constipation  OLANZapine Disintegrating Tablet 10 milliGRAM(s) Oral every 8 hours PRN agitation  pantoprazole    Tablet 40 milliGRAM(s) Oral before breakfast PRN GERD  prazosin. 1 milliGRAM(s) Oral at bedtime PRN PTSD nightmares  senna 2 Tablet(s) Oral at bedtime PRN Constipation  traZODone 50 milliGRAM(s) Oral at bedtime PRN insomnia   MEDICATIONS  (PRN):  acetaminophen     Tablet .. 650 milliGRAM(s) Oral every 6 hours PRN Moderate Pain (4 - 6)  aluminum hydroxide/magnesium hydroxide/simethicone Suspension 30 milliLiter(s) Oral every 6 hours PRN Dyspepsia  LORazepam     Tablet 2 milliGRAM(s) Oral every 6 hours PRN Agitation  magnesium hydroxide Suspension 30 milliLiter(s) Oral daily PRN Constipation  OLANZapine Disintegrating Tablet 10 milliGRAM(s) Oral every 8 hours PRN agitation  ondansetron    Tablet 4 milliGRAM(s) Oral every 6 hours PRN nausea  pantoprazole    Tablet 40 milliGRAM(s) Oral before breakfast PRN GERD  senna 2 Tablet(s) Oral at bedtime PRN Constipation  traZODone 50 milliGRAM(s) Oral at bedtime PRN insomnia

## 2024-09-20 NOTE — BH INPATIENT PSYCHIATRY PROGRESS NOTE - NSBHATTESTBILLING_PSY_A_CORE
[id number] diagnostic evaluation with medical services [id number]-Uzbrwgepyn OBS or IP - moderate complexity OR 35-49 mins

## 2024-09-20 NOTE — DIETITIAN INITIAL EVALUATION ADULT - PERTINENT MEDS FT
MEDICATIONS  (STANDING):  cloZAPine 75 milliGRAM(s) Oral at bedtime  sertraline 100 milliGRAM(s) Oral daily    MEDICATIONS  (PRN):  acetaminophen     Tablet .. 650 milliGRAM(s) Oral every 6 hours PRN Moderate Pain (4 - 6)  aluminum hydroxide/magnesium hydroxide/simethicone Suspension 30 milliLiter(s) Oral every 6 hours PRN Dyspepsia  gabapentin 300 milliGRAM(s) Oral two times a day PRN Bulging discs, back pain  LORazepam     Tablet 2 milliGRAM(s) Oral every 6 hours PRN Agitation  magnesium hydroxide Suspension 30 milliLiter(s) Oral daily PRN Constipation  OLANZapine Disintegrating Tablet 10 milliGRAM(s) Oral every 8 hours PRN agitation  pantoprazole    Tablet 40 milliGRAM(s) Oral before breakfast PRN GERD  prazosin. 1 milliGRAM(s) Oral at bedtime PRN PTSD nightmares  senna 2 Tablet(s) Oral at bedtime PRN Constipation  traZODone 50 milliGRAM(s) Oral at bedtime PRN insomnia

## 2024-09-20 NOTE — DIETITIAN NUTRITION RISK NOTIFICATION - TREATMENT: THE FOLLOWING DIET HAS BEEN RECOMMENDED
1. Continue Regular diet   2. Encourage and monitor PO intake, honor preferences as able   3. Monitor labs, wt trends, GI function, and skin integrity   4. Pain and bowel regimen per team   5. RD to remain available for additional nutrition interventions and diet edu as needed  patient instructed; verbal instruction; Reviewed general healthful diet including lean proteins, fruits and vegetables. Pt requesting for Ensure in view of decreased appetite, however RD encouraged small bites and sips at mealtimes rather than using Ensure as a meal replacement. Pt verbalized understanding.

## 2024-09-20 NOTE — BH INPATIENT PSYCHIATRY PROGRESS NOTE - NSBHATTESTCOMMENTATTENDFT_PSY_A_CORE
Pt seen with resident. I have treated her on 8U in 2021. Pt reporst hx of head-banging and was on constant observation during last hospitalization for purell ingestion. Right now she does not seem to need constant observation on the unit for suicidality, self-harm, aggresion, elopement, falls, or hypersexuality. Q15 min observation sufficient at this time. Need to contact [facility] for collateral. Pt discharged from [facility] last week. Hx of 2 prior [facility] hospital admissions. I agree with notation and plan. I saw patient with SW. She reports future orientation, looking forward to pursuing second chance and hopes hat she is a good fit. She reports continued intrusive thoughts and has been staying seated by the nursing station observing the shannon on of the unit. She has been compliant with medication and is agreeable to titration of clozapine 100mg qhs. No si/hi/avh or PI reported. no acute concerns.

## 2024-09-20 NOTE — DIETITIAN INITIAL EVALUATION ADULT - OTHER INFO
30 yo unemployed, single female domiciled at [facility] since 6/2024 with history of schizoaffective disorder, MDD, and chronic PTSD admitted through ED for SI with plan.    Patient seen for nutrition assessment. Labs and medications reviewed. Electrolytes WNL, HgbA1c 5.4% WNL, <H>, non-<H>. Ordered for protonix, senna, milk of magnesia. No pressure injuries or edema documented, Aubrey score 22. No report of GI distress. Current diet order: Regular. Confirms NKFA. No difficulty chewing/swallowing reported. Reports decreased appetite in-house and for several days PTA. States lately she has only been eating 1 meal per day. Consumed 100% of lunch today which consisted of chicken and salad. Prior to this, patient with good appetite and intake at baseline, reports consuming 3 meals per day. Pt noted with history of bulimia, however pt states she in recovery for several years. States she has a good relationship with food at present. Dosing weight: 314 pounds, BMI 57.6, reports UBW of 310 pounds. Observed with no overt signs and symptoms of muscle or fat wasting. Based on ASPEN guidelines, pt does not meet criteria for malnutrition. Nutrition education provided. Reviewed general healthful diet including lean proteins, fruits and vegetables. Pt requesting for Ensure in view of decreased appetite, however RD encouraged small bites and sips at mealtimes rather than using Ensure as a meal replacement. Pt verbalized understanding. See nutrition recommendations below.

## 2024-09-20 NOTE — PROVIDER CONTACT NOTE (OTHER) - RECOMMENDATIONS
MD notified immediately and assessment required to decide if patient needs a 1:1 and ER admission. MD notified immediately and assessment required to decide if patient needs a 1:1 and ER admission. RN also notified techs to perform frequent rounding on patient.

## 2024-09-21 RX ORDER — SERTRALINE HYDROCHLORIDE 100 MG/1
100 TABLET, FILM COATED ORAL DAILY
Refills: 0 | Status: DISCONTINUED | OUTPATIENT
Start: 2024-09-21 | End: 2024-09-26

## 2024-09-21 RX ORDER — SERTRALINE HYDROCHLORIDE 100 MG/1
25 TABLET, FILM COATED ORAL DAILY
Refills: 0 | Status: DISCONTINUED | OUTPATIENT
Start: 2024-09-21 | End: 2024-09-26

## 2024-09-21 RX ORDER — CLOZAPINE 25 MG/1
75 TABLET ORAL AT BEDTIME
Refills: 0 | Status: DISCONTINUED | OUTPATIENT
Start: 2024-09-21 | End: 2024-09-26

## 2024-09-21 RX ADMIN — SERTRALINE HYDROCHLORIDE 100 MILLIGRAM(S): 100 TABLET, FILM COATED ORAL at 11:32

## 2024-09-21 RX ADMIN — GABAPENTIN 300 MILLIGRAM(S): 800 TABLET, FILM COATED ORAL at 22:30

## 2024-09-21 RX ADMIN — SERTRALINE HYDROCHLORIDE 25 MILLIGRAM(S): 100 TABLET, FILM COATED ORAL at 11:32

## 2024-09-21 RX ADMIN — Medication 2 MILLIGRAM(S): at 16:03

## 2024-09-21 RX ADMIN — GABAPENTIN 300 MILLIGRAM(S): 800 TABLET, FILM COATED ORAL at 11:32

## 2024-09-21 RX ADMIN — CLOZAPINE 75 MILLIGRAM(S): 25 TABLET ORAL at 22:36

## 2024-09-21 NOTE — BH INPATIENT PSYCHIATRY PROGRESS NOTE - PRN MEDS
MEDICATIONS  (PRN):  acetaminophen     Tablet .. 650 milliGRAM(s) Oral every 6 hours PRN Moderate Pain (4 - 6)  aluminum hydroxide/magnesium hydroxide/simethicone Suspension 30 milliLiter(s) Oral every 6 hours PRN Dyspepsia  LORazepam     Tablet 2 milliGRAM(s) Oral every 6 hours PRN Agitation  magnesium hydroxide Suspension 30 milliLiter(s) Oral daily PRN Constipation  OLANZapine Disintegrating Tablet 10 milliGRAM(s) Oral every 8 hours PRN agitation  ondansetron    Tablet 4 milliGRAM(s) Oral every 6 hours PRN nausea  pantoprazole    Tablet 40 milliGRAM(s) Oral before breakfast PRN GERD  senna 2 Tablet(s) Oral at bedtime PRN Constipation  traZODone 50 milliGRAM(s) Oral at bedtime PRN insomnia

## 2024-09-21 NOTE — BH INPATIENT PSYCHIATRY PROGRESS NOTE - CURRENT MEDICATION
MEDICATIONS  (STANDING):  cloZAPine 75 milliGRAM(s) Oral at bedtime  gabapentin 300 milliGRAM(s) Oral two times a day  sertraline 100 milliGRAM(s) Oral daily  sertraline 25 milliGRAM(s) Oral daily    MEDICATIONS  (PRN):  acetaminophen     Tablet .. 650 milliGRAM(s) Oral every 6 hours PRN Moderate Pain (4 - 6)  aluminum hydroxide/magnesium hydroxide/simethicone Suspension 30 milliLiter(s) Oral every 6 hours PRN Dyspepsia  LORazepam     Tablet 2 milliGRAM(s) Oral every 6 hours PRN Agitation  magnesium hydroxide Suspension 30 milliLiter(s) Oral daily PRN Constipation  OLANZapine Disintegrating Tablet 10 milliGRAM(s) Oral every 8 hours PRN agitation  ondansetron    Tablet 4 milliGRAM(s) Oral every 6 hours PRN nausea  pantoprazole    Tablet 40 milliGRAM(s) Oral before breakfast PRN GERD  senna 2 Tablet(s) Oral at bedtime PRN Constipation  traZODone 50 milliGRAM(s) Oral at bedtime PRN insomnia

## 2024-09-21 NOTE — BH INPATIENT PSYCHIATRY PROGRESS NOTE - NSBHCHARTREVIEWVS_PSY_A_CORE FT
Vital Signs Last 24 Hrs  T(C): 36.6 (09-21-24 @ 17:37), Max: 36.6 (09-21-24 @ 09:22)  T(F): 97.8 (09-21-24 @ 17:37), Max: 97.9 (09-21-24 @ 09:22)  HR: 81 (09-21-24 @ 17:37) (81 - 89)  BP: 117/83 (09-21-24 @ 17:37) (115/78 - 117/83)  BP(mean): --  RR: 18 (09-21-24 @ 17:37) (18 - 18)  SpO2: 99% (09-21-24 @ 17:37) (97% - 99%)
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
## 2024-09-21 NOTE — BH INPATIENT PSYCHIATRY PROGRESS NOTE - NSBHMETABOLIC_PSY_ALL_CORE_FT
BMI: BMI (kg/m2): 57.6 (09-18-24 @ 21:53)  HbA1c: A1C with Estimated Average Glucose Result: 5.4 % (09-19-24 @ 08:20)    Glucose:   BP: 117/83 (09-21-24 @ 17:37) (97/69 - 144/79)Vital Signs Last 24 Hrs  T(C): 36.6 (09-21-24 @ 17:37), Max: 36.6 (09-21-24 @ 09:22)  T(F): 97.8 (09-21-24 @ 17:37), Max: 97.9 (09-21-24 @ 09:22)  HR: 81 (09-21-24 @ 17:37) (81 - 89)  BP: 117/83 (09-21-24 @ 17:37) (115/78 - 117/83)  BP(mean): --  RR: 18 (09-21-24 @ 17:37) (18 - 18)  SpO2: 99% (09-21-24 @ 17:37) (97% - 99%)      Lipid Panel: Date/Time: 09-19-24 @ 08:20  Cholesterol, Serum: 186  LDL Cholesterol Calculated: 110  HDL Cholesterol, Serum: 55  Total Cholesterol/HDL Ration Measurement: --  Triglycerides, Serum: 118

## 2024-09-21 NOTE — BH INPATIENT PSYCHIATRY PROGRESS NOTE - NSBHFUPINTERVALHXFT_PSY_A_CORE
No acute events overnight.    On exam, patient is calm, cooperative, bright. She reports feeling "good." Denies SI/HI/AH/VH. Reports some mild pain in R side of abdomen, however, she denies constipation and had 2 regular BMs this morning. Denies nausea or vomiting, and tolerating diet. I encouraged her to try Tylenol first, and then let staff know if pain persisted. Denies any side effects to Clozapine and reports feeling "hopeful" about the medication. Reports good sleep last night.

## 2024-09-21 NOTE — BH INPATIENT PSYCHIATRY PROGRESS NOTE - NSICDXBHSECONDARYDX_PSY_ALL_CORE
Schizoaffective disorder, unspecified type   F25.9  Major depression   F32.9  Post traumatic stress disorder (PTSD)   F43.10  Bulimia nervosa   F50.2

## 2024-09-22 PROCEDURE — 99232 SBSQ HOSP IP/OBS MODERATE 35: CPT

## 2024-09-22 RX ADMIN — SERTRALINE HYDROCHLORIDE 100 MILLIGRAM(S): 100 TABLET, FILM COATED ORAL at 10:53

## 2024-09-22 RX ADMIN — Medication 2 MILLIGRAM(S): at 15:35

## 2024-09-22 RX ADMIN — GABAPENTIN 300 MILLIGRAM(S): 800 TABLET, FILM COATED ORAL at 10:53

## 2024-09-22 RX ADMIN — SERTRALINE HYDROCHLORIDE 25 MILLIGRAM(S): 100 TABLET, FILM COATED ORAL at 10:53

## 2024-09-22 RX ADMIN — Medication 50 MILLIGRAM(S): at 21:20

## 2024-09-22 RX ADMIN — GABAPENTIN 300 MILLIGRAM(S): 800 TABLET, FILM COATED ORAL at 21:19

## 2024-09-22 RX ADMIN — Medication 10 MILLIGRAM(S): at 19:56

## 2024-09-22 RX ADMIN — CLOZAPINE 75 MILLIGRAM(S): 25 TABLET ORAL at 21:20

## 2024-09-22 NOTE — BH INPATIENT PSYCHIATRY PROGRESS NOTE - NSBHMETABOLIC_PSY_ALL_CORE_FT
99 BMI: BMI (kg/m2): 57.6 (09-18-24 @ 21:53)  HbA1c: A1C with Estimated Average Glucose Result: 5.4 % (09-19-24 @ 08:20)    Glucose:   BP: 136/84 (09-22-24 @ 20:35) (115/74 - 165/92)Vital Signs Last 24 Hrs  T(C): 36.8 (09-22-24 @ 20:35), Max: 36.9 (09-22-24 @ 17:34)  T(F): 98.2 (09-22-24 @ 20:35), Max: 98.4 (09-22-24 @ 17:34)  HR: 101 (09-22-24 @ 20:35) (73 - 101)  BP: 136/84 (09-22-24 @ 20:35) (136/84 - 165/92)  BP(mean): --  RR: 18 (09-22-24 @ 17:34) (18 - 18)  SpO2: 97% (09-22-24 @ 20:35) (97% - 99%)      Lipid Panel: Date/Time: 09-19-24 @ 08:20  Cholesterol, Serum: 186  LDL Cholesterol Calculated: 110  HDL Cholesterol, Serum: 55  Total Cholesterol/HDL Ration Measurement: --  Triglycerides, Serum: 118

## 2024-09-22 NOTE — BH INPATIENT PSYCHIATRY PROGRESS NOTE - NSBHFUPINTERVALHXFT_PSY_A_CORE
No acute events overnight.    On exam, patient is calm, cooperative, but reports a "return of AH." She was not noticeably responding to internal stimuli. Reports that she wants to bang her head against the wall in order to silence the voices. I ask what voices are saying, she says they tell her to bang head against the wall. I ask if she can resist this, she says no. I ask if she wants to bang her head against the wall to silence voices, or because voices tell her to do it, she is unable to answer. Did not initiate requested 1:1 but promised we would look in on her occasionally. Also asked her to stay in the general area of the unit, around people. She agreed.  Reports good sleep last night.
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
## 2024-09-22 NOTE — BH CHART NOTE - NSEVENTNOTEFT_PSY_ALL_CORE
Writer was notified by nursing staff that patient had written a letter detailing suicidal ideation. On evaluation, patient states that she feels that she is at a breaking point and wrote these thoughts out because she truly felt that way. She states that writing them out has not made her feel any better. Writer asked what she meant by she will "do something tonight" and she states that she is thinking of smothering herself with a pillow at night. When asked why she would do so at night rather than now, she states that she plans to do it when nobody is watching. Patient to be placed on 1:1 from 11 PM to 7 AM due to elevated acute suicide risk during the night time; primary team to reassess tomorrow.

## 2024-09-22 NOTE — BH INPATIENT PSYCHIATRY PROGRESS NOTE - NSBHCHARTREVIEWVS_PSY_A_CORE FT
Vital Signs Last 24 Hrs  T(C): 36.8 (09-22-24 @ 20:35), Max: 36.9 (09-22-24 @ 17:34)  T(F): 98.2 (09-22-24 @ 20:35), Max: 98.4 (09-22-24 @ 17:34)  HR: 101 (09-22-24 @ 20:35) (73 - 101)  BP: 136/84 (09-22-24 @ 20:35) (136/84 - 165/92)  BP(mean): --  RR: 18 (09-22-24 @ 17:34) (18 - 18)  SpO2: 97% (09-22-24 @ 20:35) (97% - 99%)

## 2024-09-23 RX ADMIN — GABAPENTIN 300 MILLIGRAM(S): 800 TABLET, FILM COATED ORAL at 10:27

## 2024-09-23 RX ADMIN — SERTRALINE HYDROCHLORIDE 100 MILLIGRAM(S): 100 TABLET, FILM COATED ORAL at 10:27

## 2024-09-23 RX ADMIN — SERTRALINE HYDROCHLORIDE 25 MILLIGRAM(S): 100 TABLET, FILM COATED ORAL at 10:27

## 2024-09-23 RX ADMIN — CLOZAPINE 75 MILLIGRAM(S): 25 TABLET ORAL at 21:47

## 2024-09-23 RX ADMIN — Medication 10 MILLIGRAM(S): at 15:53

## 2024-09-23 RX ADMIN — GABAPENTIN 300 MILLIGRAM(S): 800 TABLET, FILM COATED ORAL at 21:47

## 2024-09-23 RX ADMIN — Medication 2 MILLIGRAM(S): at 15:53

## 2024-09-23 NOTE — BH TREATMENT PLAN - NSTXPATIENTAGREEMENT_PSY_ALL_CORE
DATE OF SURGERY:    11/07/2017    SURGEON:  Fan Mullins MD    PREOPERATIVE DIAGNOSIS:  Cervicalgia.    POSTOPERATIVE DIAGNOSIS:  Cervicalgia.    PROCEDURE:  Fluoroscopically guided intralaminar cervical epidural injection at C5-6.    EQUIPMENT USED:  Epidural tray.    DETAILED DESCRIPTION:  Following informed consent, the patient was prepped and draped in the usual sterile fashion.  I infiltrated the tissue overlying C5-6 with local anesthetic.  Under fluoroscopic guidance, I advanced a 22 gauge 3.5 inch BD spinal needle into the epidural space.  Positioning was confirmed with administration of contrast.  I then instilled a combination of 160 Depo-Medrol and 1 ml of 5% dextrose in water.  I removed the needle and applied a sterile dressing.  The patient tolerated the procedure well without apparent complication.    IMPRESSION:  Successful fluoroscopically guided intralaminar cervical epidural injection at C5-6.        ______________________________  MD MYRNA Grider/HARINDER  DD:  11/07/2017  Time:  01:31PM  DT:  11/07/2017  Time:  03:11PM  Job #:  [id number]     
Yes
Yes

## 2024-09-23 NOTE — BH INPATIENT PSYCHIATRY PROGRESS NOTE - NSBHMETABOLIC_PSY_ALL_CORE_FT
BMI: BMI (kg/m2): 57.6 (09-18-24 @ 21:53)  HbA1c: A1C with Estimated Average Glucose Result: 5.4 % (09-19-24 @ 08:20)    Glucose:   BP: 133/83 (09-23-24 @ 10:55) (115/78 - 165/92)Vital Signs Last 24 Hrs  T(C): 36.8 (09-23-24 @ 10:55), Max: 36.9 (09-22-24 @ 17:34)  T(F): 98.2 (09-23-24 @ 10:55), Max: 98.4 (09-22-24 @ 17:34)  HR: 104 (09-23-24 @ 10:55) (73 - 104)  BP: 133/83 (09-23-24 @ 10:55) (133/83 - 139/64)  BP(mean): --  RR: 18 (09-23-24 @ 10:55) (18 - 18)  SpO2: 98% (09-23-24 @ 10:55) (97% - 98%)      Lipid Panel: Date/Time: 09-19-24 @ 08:20  Cholesterol, Serum: 186  LDL Cholesterol Calculated: 110  HDL Cholesterol, Serum: 55  Total Cholesterol/HDL Ration Measurement: --  Triglycerides, Serum: 118

## 2024-09-23 NOTE — BH CHART NOTE - NSEVENTNOTEFT_PSY_ALL_CORE
Spoke to Bry (manager at pt's residence [facility]; [phone]) with SW    Prior to admission to this hospital, pt was on this medication regimen:  Sertraline 50mg qd  Pantoprazole 40mg qd  Prazosin 1mg qhs  Clozapine 25mg 3 tabs qhs  -Pt denies AE for any of these meds    Prior to this, pt has had:  Gabapentin 400mg qd  Haloperidol  Spoke to Bry (manager at pt's residence [facility]; [phone]) with SW    Prior to admission to this hospital, pt was on this medication regimen for about 2 weeks:  Sertraline 50mg qd  Pantoprazole 40mg qd  Prazosin 1mg qhs  Clozapine 25mg 3 tabs qhs  -No prior AE for these medications    Prior to this, pt started the below meds on 7/29/24. End date unknown due to pt not staying at residence consistently (has been in and out of hospitals):  Gabapentin 400mg qd  Haloperidol 5mg qd  Lamotrigene 200mg BID  -No prior AE for these medications    Bry was not able to give information regarding the pt's behavior in the residence due to the fact that she has not been able to stay at the residence for periods longer than 3 days at a time.    Attempted to call pt's ACT team (Dr. Louis, [phone]) and  Dequan Luther ([phone]). Left voicemail for Dr. Louis. Unable to leave voicemail for  Dolce. SW will try to email.

## 2024-09-23 NOTE — BH TREATMENT PLAN - NSTXPSYCHOINTERRN_PSY_ALL_CORE
patient appears on unit, alert and cooperative. patient makes all the needs known to staff. patient verbalzies SI at the time. No sign of distress. Safety measure in place. pt will be monitored closely for any changes in mood and behaviore. Constant observation initiated.

## 2024-09-23 NOTE — BH TREATMENT PLAN - NSTXPLANTHERAPYSESSIONSFT_PSY_ALL_CORE
09-19-24  Type of therapy: Other, Supportive therapy  Type of session: Individual  Level of patient participation: Engaged  Duration of participation: Less than 15 minutes  Therapy conducted by: Psych rehab  Therapy Summary: After open studio group, Pt requested to speak individually with writer. Pt shared writing done in open studio with writer and stated she wanted to hear writer's thoughts. Pt's text expressed feelings of emotional distress and wanting to die. Pt states she currently has thoughts of wanting to die and thoughts about dying on the unit. Pt denies means or plan at this time. Pt wants staff to know. Pt also made jewelry during the group and affect remained relatively flat during group and in this 1:1 interaction afterwards. Pt encouraged to reach out to staff when concerning thoughts are present or worsten, and she will continue to be encourgaged to come to groups.  
  09-19-24  Type of therapy: Other, Supportive therapy  Type of session: Individual  Level of patient participation: Engaged  Duration of participation: Less than 15 minutes  Therapy conducted by: Psych rehab  Therapy Summary: After open studio group, Pt requested to speak individually with writer. Pt shared writing done in open studio with writer and stated she wanted to hear writer's thoughts. Pt's text expressed feelings of emotional distress and wanting to die. Pt states she currently has thoughts of wanting to die and thoughts about dying on the unit. Pt denies means or plan at this time. Pt wants staff to know. Pt also made jewelry during the group and affect remained relatively flat during group and in this 1:1 interaction afterwards. Pt encouraged to reach out to staff when concerning thoughts are present or worsten, and she will continue to be encourgaged to come to groups.

## 2024-09-23 NOTE — BH INPATIENT PSYCHIATRY PROGRESS NOTE - NSBHATTESTBILLING_PSY_A_CORE
96408-[id number] OBS or IP - low complexity OR 25-34 mins [id number]-[id number] OBS or IP - moderate complexity OR 35-49 mins
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
## 2024-09-23 NOTE — BH TREATMENT PLAN - NSTXSUICIDINTERRN_PSY_ALL_CORE
patient was encouraged to verbalize feelings to staff ,comply with meds and join groups.

## 2024-09-23 NOTE — BH TREATMENT PLAN - NSTXSUICIDINTERSW_PSY_ALL_CORE
Patient will stabilize mood & alleviate sx of SI to engage with discharge planning.  to liaison with collateral contacts & family as needed.

## 2024-09-23 NOTE — BH TREATMENT PLAN - NSTXDEPRESINTERSW_PSY_ALL_CORE
Patient will stabilize mood & alleviate sx of depression to engage with discharge planning.  to liaison with collateral contacts & family as needed.

## 2024-09-23 NOTE — BH TREATMENT PLAN - NSDCCRITERIA_PSY_ALL_CORE
Improvement in mood symptoms, decrease in AH/VH and suicidal thoughts
Improvement in mood symptoms, decrease in AH/VH and suicidal thoughts

## 2024-09-23 NOTE — BH TREATMENT PLAN - ANXIETY/PANIC/FEAR NURSING INTERVENTIONS/RECOMMENDATIONS
Maintain a calm and reassuring environment; minimize noise; provide familiar items; cluster care; offer choices. Support expression and identification of feelings and worries; compassionately acknowledge and validate concerns. Utilize existing coping strategies and assist in developing new strategies (e.g., music, deep breathing, relaxation techniques, meditation). Identify thoughts and feelings that led to current anxiety onset to enhance understanding of triggers.
Maintain a calm and reassuring environment; minimize noise; provide familiar items; cluster care; offer choices. Support expression and identification of feelings and worries; compassionately acknowledge and validate concerns. Utilize existing coping strategies and assist in developing new strategies (e.g., music, deep breathing, relaxation techniques, meditation). Identify thoughts and feelings that led to current anxiety onset to enhance understanding of triggers.
Determine efficacy of current coping mechanisms. Administer medications as needed.  constant observation implemented for SI.

## 2024-09-23 NOTE — BH INPATIENT PSYCHIATRY PROGRESS NOTE - NSBHFUPINTERVALHXFT_PSY_A_CORE
Pt was seen on the phone, interviewed in pt's room. Pt was calm and cooperative, appears depressed. Pt was dressed in pt gown, her own dress is in the wash. Pt reports that her AH recurs about 4-5x/d every day this past weekend. She reports that they come randomly and there is no known trigger, usually consists of commands or insults. Reports that voices commanded her to "drink soap" on Friday. Pt reports VH, still sees grandpa laying down, last time this morning. Reports that she still feels paranoid about other people looking at and judging her. Pt was placed on 1:1 last night due to suicidal ideation, reports that she feels safer when someone is watching her. Pt has been participating in groups, she enjoys them and states that they help distract her. She is able to ignore AH when she is busy. Pt has been writing poetry, and states that writing out her thoughts helps her think about them less. Appetite has still been low. She reports improved sleep (7-8 hrs), denies flashbacks, still feels low in energy. Reports she had a nightmare this morning about her grandpa passing away. Reports feelings of guilt. Denies difficulty concentrating. Denies issues with bowel movements, last bowel movement this morning. Pt denies current SI/HI. Looking forward to grandma coming for visit at 2pm today. Will meet her with NP and SW to discuss plan. Nursing noted some periods of improved mood over the weekend, pt denied any improvement and reports that she was "putting on a mask" because she "doesn't want people to see me the way I see me".  She sees herself as "broken". Pt took clozaril last night, zoloft and gabapentin today. Med compliant, denies AE. Pt was seen on the phone, interviewed in pt's room. Pt was calm and cooperative, appears depressed. Pt was dressed in pt gown, her own dress is in the wash. Pt reports that her AH recurs about 4-5x/d every day this past weekend. She reports that they come randomly and there is no known trigger, usually consists of commands or insults. Reports that voices commanded her to "drink soap" on Friday. Pt reports VH, still sees grandpa laying down, last time this morning. Reports that she still feels paranoid about other people looking at and judging her. Pt was placed on 1:1 last night due to suicidal ideation, reports that she feels safer when someone is watching her. Pt has been participating in groups, she enjoys them and states that they help distract her. She is able to ignore AH when she is busy. Pt has been writing poetry, and states that writing out her thoughts helps her think about them less. Appetite has still been low. She reports improved sleep (7-8 hrs), denies flashbacks, still feels low in energy. Reports she had a nightmare this morning about her grandpa passing away. Reports feelings of guilt. Denies difficulty concentrating. Denies issues with bowel movements, last bowel movement this morning. Pt denies current SI/HI. Looking forward to grandma coming for visit at 2pm today. Will meet her with NP and SW to discuss plan. Nursing noted some periods of improved mood over the weekend, pt denied any improvement and reports that she was "putting on a mask" because she "doesn't want people to see me the way I see me".  She sees herself as "broken". Pt took clozaril last night, zoloft and gabapentin today. Med compliant, denies AE.    Update: pt put in 72 hour letter today. States that she "wants to be independent" and "can't keep going in and out of hospitals"

## 2024-09-23 NOTE — BH INPATIENT PSYCHIATRY PROGRESS NOTE - NSBHCHARTREVIEWVS_PSY_A_CORE FT
Vital Signs Last 24 Hrs  T(C): 36.8 (09-23-24 @ 10:55), Max: 36.9 (09-22-24 @ 17:34)  T(F): 98.2 (09-23-24 @ 10:55), Max: 98.4 (09-22-24 @ 17:34)  HR: 104 (09-23-24 @ 10:55) (73 - 104)  BP: 133/83 (09-23-24 @ 10:55) (133/83 - 139/64)  BP(mean): --  RR: 18 (09-23-24 @ 10:55) (18 - 18)  SpO2: 98% (09-23-24 @ 10:55) (97% - 98%)

## 2024-09-23 NOTE — BH TREATMENT PLAN - NSTXSUICIDINTERMD_PSY_ALL_CORE
[facility]    OB/GYNE Progress Note      Liz Kirkland Patient Status:  Inpatient    10/16/1980 MRN [id number]   Location [facility] 3SE Attending Ed Knott, [address] Day # 1 PCP Nel Thibodeaux        Assessment/Plan
HCT 31.9 (L) 01/31/2019    .0 (L) 01/31/2019    CREATSERUM 0.79 01/24/2019    BUN 9 01/24/2019     (L) 01/24/2019    K 3.7 01/24/2019     01/24/2019    CO2 21 (L) 01/24/2019    GLU 71 01/24/2019    CA 8.8 01/24/2019    ALB 3.3 (L) 01/24/
psychopharmacology x15 minutes
psychopharmacology x15 minutes

## 2024-09-23 NOTE — BH TREATMENT PLAN - NSTXDEPRESINTERRN_PSY_ALL_CORE
Encourage patient to adhere with prescribed medications and treatment. Encourage patient to attend groups, verbalize feelings and concerns.

## 2024-09-23 NOTE — BH TREATMENT PLAN - NSTXCOPEINTERRN_PSY_ALL_CORE
Provide opportunity for expression of feelings, perceptions and stressors. Assist with accurate evaluation of situation; encourage focus on the present. Discuss previous challenging situations that were mastered to enhance self-esteem and self-efficacy. Identify and utilize strengths; acknowledge progress and accomplishments. Identify positive coping strategies (e.g., music, spirituality, optimism, reframing, problem-solving).

## 2024-09-23 NOTE — BH TREATMENT PLAN - NSTXCOPEINTERSW_PSY_ALL_CORE
Patient will stabilize mood & alleviate sx of ineffective coping to engage with discharge planning.  to liaison with collateral contacts & family as needed.

## 2024-09-24 PROCEDURE — 99232 SBSQ HOSP IP/OBS MODERATE 35: CPT

## 2024-09-24 RX ADMIN — SERTRALINE HYDROCHLORIDE 100 MILLIGRAM(S): 100 TABLET, FILM COATED ORAL at 10:02

## 2024-09-24 RX ADMIN — SERTRALINE HYDROCHLORIDE 25 MILLIGRAM(S): 100 TABLET, FILM COATED ORAL at 10:02

## 2024-09-24 RX ADMIN — GABAPENTIN 300 MILLIGRAM(S): 800 TABLET, FILM COATED ORAL at 10:50

## 2024-09-24 NOTE — BH PSYCHOLOGY - CLINICIAN PSYCHOTHERAPY NOTE - NSBHPSYCHOLPROBS_PSY_ALL_CORE
Depression/Impulsivity/Self Injurious Behavior/Suicidality
Anxiety/Depression/Psychosis/Self Injurious Behavior/Suicidality
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
## 2024-09-24 NOTE — BH INPATIENT PSYCHIATRY PROGRESS NOTE - NSBHATTESTCOMMENTATTENDFT_PSY_A_CORE
Per collateral obtained from ACT pt appears to be at her baseline, will coordinate with ACT Team re disposition.

## 2024-09-24 NOTE — BH PSYCHOLOGY - CLINICIAN PSYCHOTHERAPY NOTE - NSBHPSYCHOLNARRATIVE_PSY_A_CORE FT
NARRATIVE:  Subjective: “I’m feeling very suicidal today.”  Objective: Ms. Yogi Henry and clinician met in her room for an individual psychotherapy session that lasted for 45 minutes. Patient reported that she is feeling “very suicidal” and rated her suicidality as 9/10. She described her suicidal ideation as gradually increasing since yesterday afternoon. Ms. Yogi Henry was unable to identify specific triggers for this increase and stated that it appeared to “come out of nowhere.” Patient does not think it is related to yesterday’s events, including our session, a visit from her grandmother (which went well), and a call from her housing supervisor. She also reported engaging in NSSI this morning via banging her head on the corner where two walls meet twice and punching the wall once. In addition, patient described informing nursing staff while taking her morning medication that she wanted to suffocate herself. Ms. Yogi Henry stated that she is “afraid to be alone” because she does not trust her ability to keep herself safe. Clinician stated intention to ask the team about constant observation and patient agreed to remain visible in the common areas.    Ms. Yogi Henry showed clinician a letter she had written about how she is feeling. In the letter, patient shared that she feels lonely and invisible, like no one can see her suffering. She described sometimes appearing fine on the outside, due to putting up walls, even when she is suffering internally. She also expressed a wish to have something physically wrong with her, so her pain would be easier to communicate, and it would be more readily believed by others. Ms. Yogi Henry stated that she finds it easier to communicate in writing, rather than verbally. While she did not feel better after writing the letter, patient hoped it would help the team understand her suffering. Ms. Yogi Henry kept the letter in her possession, in order to show it to the rest of the team.    Patient reported submitting a 72-hour letter after a phone call with her housing supervisor. She stated that she was told a case management meeting had been scheduled. Ms. Yogi Henry described experiencing anger due to a lack of agency, brought on by not knowing about the meeting and not feeling involved in her care. In addition to being motivated by feelings of anger, patient also expressed submitting the letter due to hopelessness. She shared that she feels like she does not deserve to be on the unit and receiving help. Ms. Yogi Henry also shared that she believes it is her fault that she is not getting better, despite receiving treatment for a long time. Clinician validated patient’s anger, hopelessness, and guilt. Patient stated her intention to retract her 72-hour letter today.    Following this session, clinician informed nursing staff and Ynes Cook, PA Student, about patient’s NSSI and her concern about her ability to keep herself safe. Clinician also spoke with patient’s , Dior Loomis, who informed clinician that no case conference has been scheduled.    MENTAL STATUS EXAM:  Appearance: [x] adequately groomed [] disheveled [] malodorous  Behavior: [x] cooperative [] uncooperative [] good EC [] poor EC [x] well related [] oddly related [] guarded [] PMA [] PMR [] abnormal movements  Speech: [x] normal rate/rhythm/volume [] loud [] quiet [] slow [] rapid [] pressured  Mood: [] euthymic [x] dysphoric (tearful) [] anxious [] irritable  Affect: [x] full [] expansive [] constricted [] blunted [] flat [] stable [] labile  Thought Process: [x] organized [] disorganized [] goal-directed [] concrete [x] logical [] illogical [] circumstantial [] tangential [] impoverished [] effusive [] repetitive  Thought Content: [] (-) delusions/SI/HI [x] (+) delusions/SI/HI  Consistent with CAMS on 24 patient continues to report chronic SI.  Perception: [] (-) AVH [x] (+) AVH  Patient reported a history of derogatory auditory hallucinations and visual hallucinations of her  grandfather.  Insight/Judgment: [x] good [] fair [] poor  Impulse Control: [] good [] fair [x] poor  Cognition: [x] AOx4 [] lacks orientation    DIAGNOSES: per chart  Schizoaffective Disorder (F25.9), MDD (F33.9), PTSD (F43.10), Bulimia Nervosa (F50.2)    PLAN:  [x] I/G/M therapy [x] psychopharmacology [] discharge planning [] family meeting [] collateral [] psychoeducation  Patient will continue to receive individual therapy from clinician and engage in groups and milieu treatment while on the unit.
SUB: “I don’t want to keep living like this.”  OBJ: Ms. Yogi Gaona+clinician met room for an individual psychotherapy session lasting for 45min. Pt+clinician completed Sections A+B of CAMS Suicide Status form (see paper chart). Pt rated Psychological Pain, Stress, Hopelessness+Self-Hate as 5/5, Agitation 4/5, w/Self-Hate+Hopelessness as main drivers of suicidality. She rated Self as 5/5 related to suicidality+Others 3/5 related. Ms. Yogi Henry rated Wish to Live as  + Wish to Die . Identified Reasons to Die are: “I fail a lot,” "I’m a burden,” “People I love keep dying.” Pt was unable to identify Reasons to Live. Clinician highlighted that she self-presented to hospital after multiple SAs+wondered if that indicates a part of her wants to live. Ms. Yogi Henry acknowledged there must be a small part that wants to live, though was unable to identify why. Pt was unable to independently identify things to decrease suicidality, though identified decreased self-hate+increased hope w/clinician encouragement. She was raised [place], though has gradually lost her katarina due to long-term suffering. Ms. Yogi Henry has 3 dogs but does not characterize them as a protective factor, as they currently live with the grandmother who raised her. Pt described an “on+off” relationship w/grandmother. They have been doing well recently+Pt is looking forward to her visit later today.   Pt reported active SI 3-4x/day for ~2hrs at a time. She began experiencing SI at 18+describes it worsening over time. She previously considered overdosing on prescribed medication, suffocation, head banging as potential methods. Ms. Yogi Henry endorsed 7 past SAs: 20–intentional overdose on lithium, did not tell anyone, woke up in the hospital; 22–intentional overdose on olanzapine, self-presented to hospital; 24–intentional ingestion of liquid air freshener, interrupted by a friend; 26–intentional overdose on quetiapine, family called ambulance due to medical sxs; 28–intentional overdose on lamotrigine; 29-intentional overdose on diphenhydramine, self-presented to hospital; 29–intentional overdose on naproxen, self-presented to hospital for back pain, elevated levels discovered via routine testing. As acts of furtherance on unit, Ms. Yogi Henry has written suicide letters. She stated that writing them has not reduced her suicidality.  Ms. Yogi Henry shared multiple significant losses, including grandfather’s death (23), uncle’s death (3/23/24), dogs’ deaths (, ). Her grandfather raised her+she identified him as an important father figure. Pt identified her birthday as a significant trigger, a reminder that she is still alive+unhappy w/life. She described experiencing shame, guilt, feeling like burden. Ms. Yogi Henry characterized herself as impulsive, as evidenced by engaging in NSSI, including pinching + scratching herself + head banging.  Pt reported being seen by ACT team 1x/week. She has a history of individual psychotherapy as an adolescent, [id number]. Ms. Yogi Henry stated that her grandmother suggested therapy because she was “quiet,” + she agreed to attend in order to “avoid an argument.” Pt reported not sharing her suicidality w/therapist at the time, w/tx mainly focused on academic performance. Ms. Yogi Henry reported she has “trust issues” stemming from childhood bullying, which has led to worry that others will treat her poorly. Pt reported a significant trauma history, which has reinforced difficulty trusting others. She shared the beginning of a written timeline of her trauma, which she started writing during this admission. Ms. Yogi Henry stated her experiences are “eating away at [her] brain.” While she did not endorse significant psychological relief, she reported writing helps her organize her thoughts.   MENTAL STATUS EXAM:  Appearance: [x] adequately groomed [] disheveled [] malodorous  Behavior: [x] cooperative [] uncooperative [x] good EC [] poor EC [x] well related [] oddly related [] guarded [] PMA [] PMR [] abnormal movements  Speech: [x] normal rate/rhythm/volume [] loud [] quiet [] slow [] rapid [] pressured  Mood: [x] euthymic [] dysphoric [] anxious [] irritable  Affect: [x] full [] expansive [] constricted [] blunted [] flat [] stable [] labile  Thought Process: [x] organized [] disorganized [] goal-directed [] concrete [x] logical [] illogical [] circumstantial [] tangential [] impoverished [] effusive [] repetitive  Thought Content: [] (-) delusions/SI/HI [x] (+) delusions/SI/HI  Please see initial CAMS assessment, described above.  Perception: [] (-) AVH [x] (+) AVH  Patient reported a history of derogatory auditory hallucinations and visual hallucinations of her  grandfather.  Insight/Judgment: [x] good [] fair [] poor  Impulse Control: [] good [x] fair [] poor  Cognition: [x] AOx4 [] lacks orientation

## 2024-09-24 NOTE — BH CHART NOTE - NSEVENTNOTEFT_PSY_ALL_CORE
Spoke to Nimo Huihi ([phone]),  of pt's ACT team, with SW    ACT team has been working with pt for about 3 years. Ms. Sullivan reported that pt has not been in the community for periods of longer than 3 days at a time, has had repeated hospital visits in the past 6 months to the point of taking a van from her hospital of discharge to the next hospital. She feels that pt is unstable in the community and has found it difficult to work on her coping skills. Pt is impulsive and verbalizes persistent SI, leading her to return to the hospital. She has a large support team, including her grandma, brother, ACT team, supportive residence, and [facility] program. States that pt has had a history of wanting and working on concrete referrals to different services and then backing out and requesting discharge. She states that pt's diagnosis was changed when she was hospitalized at [facility] (6/24-7/29) from schizoaffective disorder to borderline personality disorder, which the team feels fits the pt's presentation better. Pt was previously referred to [facility] through [facility] but was denied due to her history of impulsivity.    Pt has been on Clozaril 75mg qhs, Zoloft 50mg qd, and Prazosin 1mg qd since the beginning of September. Also on Abilify 400mg ESQUEDA q4wks, due for next dose next week. Team has been trying to work on the pt's coping skills and childhood trauma. She states that the pt has enjoyed talking with them and enjoys joining groups and mentoring others.    Ms. Sullivan would like to visit the pt and will likely schedule an appointment with her after discharge.

## 2024-09-24 NOTE — BH INPATIENT PSYCHIATRY PROGRESS NOTE - OTHER
Hears voices telling her to hurt herself; "bang your head through a wall"
Hears voices telling her to hurt herself
Hears voices telling her to hurt herself; "push your head through a wall"

## 2024-09-24 NOTE — BH INPATIENT PSYCHIATRY PROGRESS NOTE - NSBHFUPINTERVALHXFT_PSY_A_CORE
Pt seen sitting by the phones, prefers to sit where other people can see her. Pt appeared depressed, was calm and cooperative on interview, wearing her own clothes. Pt reports that she woke up feeling very suicidal, says that this was a mix of her own impulsivity and AH (mother's voice) tell her to bang her head against the wall. Pt reports punching the wall such that the paint on the wall chipped; no injuries seen on hands b/l. She reports that suicidal thoughts come randomly, are getting worse and she is not aware of a trigger. Nursing report stated that pt denied AH/VH, pt states that she was "biting the truth". Last VH this morning, saw late grandfather who passed away from a ruptured gallbladder. Pt reports that she saw her grandfather once during his hospitalization and then at his . She was not able to spend time with loved ones after his passing, which she states was traumatizing for her. Pt was placed on 1:1 last night due to suicidal ideation, reports that she feels safer when someone is watching her. Pt has been participating in creative groups, such as music listening and arts. She states that going to groups does not distract her from her impulsive thoughts. Appetite has been low. She reports improvement in amount of sleep (7-8 hrs) but woke up multiple times at night due to nightmares about late grandfather and stress about her suicidal thoughts. Pt told writer yesterday that she put in 72hr letter because she wanted to be more "independent", but today told psychology student that she was upset because she believed there was a case conference about her that she was not made aware of. Retracted letter today because she believes she needs inpatient help. When asked what pt thinks would help her, she says that she would like to be on 1:1. She cannot name anything else that she thinks would help her. Pt stopped conversation to go to music listening group. Pt asks about status of referrals to [facility] and [facility] multiple times in a day. Pt took clozaril, gabapentin, zoloft, ativan, and olanzapine yesterday. Med compliant, denies AE. Pt seen sitting by the phones, prefers to sit where other people can see her. Pt appeared depressed, was calm and cooperative on interview, wearing her own clothes. Pt reports that she woke up feeling very suicidal, says that this was a mix of her own impulsivity and AH (mother's voice) tell her to bang her head against the wall. Pt reports punching the wall such that the paint on the wall chipped; no injuries seen on hands b/l. She reports that suicidal thoughts come randomly, are getting worse and she is not aware of a trigger. Nursing report stated that pt denied AH/VH, pt states that she was "biting the truth". Last VH this morning, saw late grandfather who passed away from a ruptured gallbladder. Pt reports that she saw her grandfather once during his hospitalization and then at his . She was not able to spend time with loved ones after his passing, which she states was traumatizing for her. Pt was placed on 1:1 last night due to suicidal ideation, reports that she feels safer when someone is watching her. Pt has been participating in creative groups, such as music listening and arts. She states that going to groups does not distract her from her impulsive thoughts. Appetite has been low. She reports improvement in amount of sleep (7-8 hrs) but woke up multiple times at night due to nightmares about late grandfather and stress about her suicidal thoughts. Pt told writer yesterday that she put in 72hr letter because she wanted to be more "independent", but today told psychology student that she was upset because she believed there was a case conference about her that she was not made aware of. Pt states that she will retract letter today because she believes she needs inpatient help. She cannot name anything else that she thinks would help her. Pt stopped conversation to go to music listening group. Denies HI. Pt took clozaril, gabapentin, zoloft, ativan, and olanzapine yesterday. Med compliant, denies AE.     Update: Pt seen in conference room. Pt states that she feels better when she talks about her thoughts. She feels that OCD-like sx have not improved since decreasing dose of clozaril. Plan to increase clozaril from 75mg to 100mg. Continue zoloft 125mg.  Seen with MD and ANEL Pt seen alongside Attending, the pt is sitting by the phones, prefers to sit where other people can see her. Pt was calm and cooperative on interview, wearing her own clothes. Pt reports that she woke up feeling very suicidal, says that this was a mix of her own impulsivity and AH (mother's voice) tell her to bang her head against the wall. Pt reports punching the wall such that the paint on the wall chipped; no injuries seen on hands b/l. She reports that suicidal thoughts come randomly, are getting worse and she is not aware of a trigger. Nursing report stated that pt denied AH/VH, pt states that she was "biting the truth". Last VH this morning, saw late grandfather who passed away from a ruptured gallbladder. Pt reports that she saw her grandfather once during his hospitalization and then at his . She was not able to spend time with loved ones after his passing, which she states was traumatizing for her. Pt was placed on 1:1 last night due to suicidal ideation, reports that she feels safer when someone is watching her. Pt has been participating in creative groups, such as music listening and arts. She states that going to groups does not distract her from her impulsive thoughts. Appetite has been low. She reports improvement in amount of sleep (7-8 hrs) but woke up multiple times at night due to nightmares about late grandfather and stress about her suicidal thoughts. Pt told writer yesterday that she put in 72hr letter because she wanted to be more "independent", but today told psychology student that she was upset because she believed there was a case conference about her that she was not made aware of. Pt states that she will retract letter today because she believes she needs inpatient help. She cannot name anything else that she thinks would help her. Pt stopped conversation to go to music listening group. Denies HI. Pt took clozaril, gabapentin, zoloft, ativan, and olanzapine yesterday. Med compliant, denies AE.     Update: Pt seen in conference room. Pt states that she feels better when she talks about her thoughts. She feels that OCD-like sx have not improved since decreasing dose of clozaril. Plan to increase clozaril from 75mg to 100mg. Continue zoloft 125mg.  Seen with MD and ANEL Pt seen alongside Attending, the pt is sitting by the phones, prefers to sit where other people can see her. Pt was calm and cooperative on interview, wearing her own clothes. Pt reports that she woke up feeling very suicidal, says that this was a mix of her own impulsivity and AH (mother's voice) tell her to bang her head against the wall. Pt reports punching the wall such that the paint on the wall chipped; no injuries seen on hands b/l. She reports that suicidal thoughts come randomly, are getting worse and she is not aware of a trigger. Nursing report stated that pt denied AH/VH, pt states that she was "biting the truth". Last VH this morning, saw late grandfather who passed away from a ruptured gallbladder. Pt reports that she saw her grandfather once during his hospitalization and then at his . She was not able to spend time with loved ones after his passing, which she states was traumatizing for her. Pt was placed on 1:1 last night due to suicidal ideation, reports that she feels safer when someone is watching her. Pt has been participating in creative groups, such as music listening and arts. She states that going to groups does not distract her from her impulsive thoughts. Appetite has been low. She reports improvement in amount of sleep (7-8 hrs) but woke up multiple times at night due to nightmares about late grandfather and stress about her suicidal thoughts. Pt told writer yesterday that she put in 72hr letter because she wanted to be more "independent", but today told psychology student that she was upset because she believed there was a case conference about her that she was not made aware of. Pt states that she will retract letter today because she believes she needs inpatient help. She cannot name anything else that she thinks would help her. Pt stopped conversation to go to music listening group. Denies HI. Pt took clozaril, gabapentin, zoloft, ativan, and olanzapine yesterday. Med compliant, denies AE.     Update: Pt seen in conference room. Pt states that she feels better when she talks about her thoughts. She feels that OCD-like sx have not improved since decreasing dose of clozaril. Plan to increase clozaril from 75mg to 100mg. Continue zoloft 125mg.    Update 2: Pt put in 72 hour letter. Told SW that "I realize I matter" and that she would like to leave.    Seen with MD and ANEL

## 2024-09-24 NOTE — BH INPATIENT PSYCHIATRY PROGRESS NOTE - NSBHATTESTBILLING_PSY_A_CORE
01724-Qrlbdmmhmp OBS or IP - low complexity OR 25-34 mins 13411-Nfchtolkwf OBS or IP - moderate complexity OR 35-49 mins

## 2024-09-24 NOTE — BH INPATIENT PSYCHIATRY PROGRESS NOTE - NSBHMETABOLIC_PSY_ALL_CORE_FT
BMI: BMI (kg/m2): 57.6 (09-18-24 @ 21:53)  HbA1c: A1C with Estimated Average Glucose Result: 5.4 % (09-19-24 @ 08:20)    Glucose:   BP: 126/88 (09-24-24 @ 10:23) (113/73 - 165/92)Vital Signs Last 24 Hrs  T(C): 36.6 (09-24-24 @ 10:23), Max: 37.4 (09-23-24 @ 16:53)  T(F): 97.9 (09-24-24 @ 10:23), Max: 99.4 (09-23-24 @ 16:53)  HR: 92 (09-24-24 @ 10:23) (92 - 114)  BP: 126/88 (09-24-24 @ 10:23) (113/73 - 133/83)  BP(mean): --  RR: 18 (09-24-24 @ 10:23) (18 - 18)  SpO2: 97% (09-24-24 @ 10:23) (96% - 98%)      Lipid Panel: Date/Time: 09-19-24 @ 08:20  Cholesterol, Serum: 186  LDL Cholesterol Calculated: 110  HDL Cholesterol, Serum: 55  Total Cholesterol/HDL Ration Measurement: --  Triglycerides, Serum: 118   BMI: BMI (kg/m2): 57.6 (09-18-24 @ 21:53)  HbA1c: A1C with Estimated Average Glucose Result: 5.4 % (09-19-24 @ 08:20)    Glucose:   BP: 126/88 (09-24-24 @ 10:23) (113/73 - 165/92)Vital Signs Last 24 Hrs  T(C): 36.6 (09-24-24 @ 10:23), Max: 37.4 (09-23-24 @ 16:53)  T(F): 97.9 (09-24-24 @ 10:23), Max: 99.4 (09-23-24 @ 16:53)  HR: 92 (09-24-24 @ 10:23) (92 - 114)  BP: 126/88 (09-24-24 @ 10:23) (113/73 - 126/88)  BP(mean): --  RR: 18 (09-24-24 @ 10:23) (18 - 18)  SpO2: 97% (09-24-24 @ 10:23) (96% - 97%)      Lipid Panel: Date/Time: 09-19-24 @ 08:20  Cholesterol, Serum: 186  LDL Cholesterol Calculated: 110  HDL Cholesterol, Serum: 55  Total Cholesterol/HDL Ration Measurement: --  Triglycerides, Serum: 118

## 2024-09-24 NOTE — BH PSYCHOLOGY - CLINICIAN PSYCHOTHERAPY NOTE - NSBHPSYCHOLINT_PSY_A_CORE
Dynamic issues addressed/Supportive therapy/Treatment compliance encouraged
Dynamic issues addressed/Supportive therapy

## 2024-09-24 NOTE — BH PSYCHOLOGY - CLINICIAN PSYCHOTHERAPY NOTE - NSBHPSYCHOLADDL_PSY_A_CORE
ASSESSMENT:  Patient is 29-year-old, single female who voluntarily presented to the unit on 24 due to SI w/urges to run into traffic. Per chart review, she has PPH of schizoaffective disorder, MDD, PTSD, and bulimia, multiple previous psychiatric hospitalizations (most recently [facility] in 2024, following SA via intentional diphenhydramine ingestion), and 7 SAs (see  note by clinician for details), and PMH of urinary incontinence. Patient current resides in [facility] and is seen by an ACT team once weekly. Medications at time of admission were clozapine, sertraline, prazosin, aripiprazole injection. Pt has a significant past trauma history, including but not limited to childhood bullying. Relevant family history includes a mother diagnosed with schizophrenia, and a father with MDD who  by suicide.  It appears that patient’s significant trauma is contributing to her suicidality, and she would likely benefit from ongoing outpatient psychotherapy, particularly trauma-informed or trauma-specific treatment, such as Narrative Exposure Therapy or Trauma-Focused CBT. Patient reported difficulty trusting others due to her trauma history, though was readily forthcoming and able to engage meaningfully with clinician. In addition, patient has begun writing about her trauma history for the first time, as a way to begin processing her experiences.    RISK: [x] SI: Patient reports chronic SI as described in initial CAMS assessment described above. [] plan [] intent [] self-harm [] elopement [] aggression [x] prior incidences: 7 prior SAs via intentional ingestion of medications [x] family hx of suicide: father [] family hx of aggression  Static Risk Factors: Schizoaffective Disorder, MDD, PTSD, Bulimia Nervosa, family history  Modifiable Risk Factors: panic attacks, symptoms of PTSD (especially nightmares), symptoms of depression  Protective Factors: Unable to identify, though acknowledges a part of her must want to live, as evidenced by self-presenting to hospital following suicide attempts.    ACUTE RISK LEVEL - HIGH: Patient is at high risk for suicide due multiple suicide attempts, chronic SI, significant trauma history, auditory and visual hallucinations, family history, and difficulty identifying reasons to live.   Chronic Risk Level – High    CAMS completed 24, as described in detail above.    DIAGNOSES: per chart  Schizoaffective Disorder (F25.9), MDD (F33.9), PTSD (F43.10), Bulimia Nervosa (F50.2)    PLAN:  [x] I/G/M therapy [x] psychopharmacology [] discharge planning [] family meeting [] collateral [] psychoeducation  Patient will continue to receive individual therapy from clinician and engage in groups and milieu treatment while on the unit. 
ASSESSMENT:  Patient is 29-year-old, single female who voluntarily presented to the unit on 24 due to SI w/urges to run into traffic. Per chart review, she has PPH of schizoaffective disorder, MDD, PTSD, and bulimia, multiple previous psychiatric hospitalizations (most recently [facility] in 2024, following SA via intentional diphenhydramine ingestion), and 7 SAs (see  note by clinician for details), and PMH of urinary incontinence. Patient current resides in [facility] and is seen by an ACT team once weekly. Medications at time of admission were clozapine, sertraline, prazosin, aripiprazole injection. Pt has a significant past trauma history, including but not limited to childhood bullying. Relevant family history includes a mother diagnosed with schizophrenia, and a father with MDD who  by suicide.  It appears that patient’s significant trauma is contributing to her suicidality, and she would likely benefit from ongoing outpatient psychotherapy, particularly trauma-informed or trauma-specific treatment, such as Narrative Exposure Therapy or Trauma-Focused CBT. Patient reported difficulty trusting others due to her trauma history, though was readily forthcoming and able to engage meaningfully with clinician. In addition, patient has begun writing about her trauma history for the first time, as a way to begin processing her experiences. In addition, patient’s trauma history appears to have impacted her decision to submit a 72-hour letter, as a common trauma response to a situation that feels unsafe is flight.    RISK: [x] SI: Patient reports chronic SI as described in initial CAMS assessment described above. [] plan [] intent [] self-harm [] elopement [] aggression [x] prior incidences: 7 prior SAs via intentional ingestion of medications [x] family hx of suicide: father [] family hx of aggression  Static Risk Factors: Schizoaffective Disorder, MDD, PTSD, Bulimia Nervosa, family history  Modifiable Risk Factors: panic attacks, symptoms of PTSD (especially nightmares), symptoms of depression  Protective Factors: Unable to identify, though acknowledges a part of her must want to live, as evidenced by self-presenting to hospital following suicide attempts.    ACUTE RISK LEVEL - HIGH: Patient is at high risk for suicide due multiple suicide attempts, chronic SI, significant trauma history, auditory and visual hallucinations, family history, and difficulty identifying reasons to live. In addition, she reported engaging in NSSI on the unit and expressed fear of being alone due to urges to harm herself.  Chronic Risk Level – High    CAMS completed 24. Refer to clinician’s note on that date for details.

## 2024-09-24 NOTE — BH PSYCHOLOGY - CLINICIAN PSYCHOTHERAPY NOTE - NSTXPOTSDGOAL_PSY_ALL_CORE
Will express/identify the feeling connected to the trauma
Will express/identify the feeling connected to the trauma

## 2024-09-24 NOTE — BH INPATIENT PSYCHIATRY PROGRESS NOTE - NSBHCHARTREVIEWVS_PSY_A_CORE FT
Vital Signs Last 24 Hrs  T(C): 36.6 (09-24-24 @ 10:23), Max: 37.4 (09-23-24 @ 16:53)  T(F): 97.9 (09-24-24 @ 10:23), Max: 99.4 (09-23-24 @ 16:53)  HR: 92 (09-24-24 @ 10:23) (92 - 114)  BP: 126/88 (09-24-24 @ 10:23) (113/73 - 133/83)  BP(mean): --  RR: 18 (09-24-24 @ 10:23) (18 - 18)  SpO2: 97% (09-24-24 @ 10:23) (96% - 98%)     Vital Signs Last 24 Hrs  T(C): 36.6 (09-24-24 @ 10:23), Max: 37.4 (09-23-24 @ 16:53)  T(F): 97.9 (09-24-24 @ 10:23), Max: 99.4 (09-23-24 @ 16:53)  HR: 92 (09-24-24 @ 10:23) (92 - 114)  BP: 126/88 (09-24-24 @ 10:23) (113/73 - 126/88)  BP(mean): --  RR: 18 (09-24-24 @ 10:23) (18 - 18)  SpO2: 97% (09-24-24 @ 10:23) (96% - 97%)

## 2024-09-24 NOTE — BH PSYCHOLOGY - CLINICIAN PSYCHOTHERAPY NOTE - NSHPLANGLIMITEDENGLISH_GEN_A_CORE
s/p C4L   Continue with ASA 325mg daily   Continue with Ator 40mg daily   Titrate up Lop 50Q6 to Lop 100TID if rate not controlled   L Pleural -LWS   C/W [place] .6BID for pericarditis  [place] retention s/p reinsertion - Now removed for TOV- DTV by 5pm  Cough and deep breathe, Incentive Spirometry Q1h, Chest PT.  Ambulate 4x daily as tolerated and with PT.    C/W GI prophylaxis on protonix and DVT prophylaxis on SQ LVX
A1C 8.8%  Endo following   ACHS FS  Carb consistent diet   Continue and admelog as per endo  insulin teaching for home insulin use
No
No

## 2024-09-24 NOTE — BH PSYCHOLOGY - CLINICIAN PSYCHOTHERAPY NOTE - TOKEN PULL-DIAGNOSIS
Primary Diagnosis:  Schizoaffective disorder [F25.9]        Problem Dx:   Bulimia nervosa [F50.2]      Post traumatic stress disorder (PTSD) [F43.10]      Major depression [F32.9]      Schizoaffective disorder, unspecified type [F25.9]      
Primary Diagnosis:  Schizoaffective disorder [F25.9]        Problem Dx:   Bulimia nervosa [F50.2]      Post traumatic stress disorder (PTSD) [F43.10]      Major depression [F32.9]      Schizoaffective disorder, unspecified type [F25.9]

## 2024-09-24 NOTE — BH INPATIENT PSYCHIATRY PROGRESS NOTE - NSICDXBHTERTIARYDX_PSY_ALL_CORE
R/O Cluster B personality disorder in adult   F60.9  

## 2024-09-25 RX ADMIN — SERTRALINE HYDROCHLORIDE 100 MILLIGRAM(S): 100 TABLET, FILM COATED ORAL at 11:08

## 2024-09-25 RX ADMIN — CLOZAPINE 75 MILLIGRAM(S): 25 TABLET ORAL at 22:01

## 2024-09-25 RX ADMIN — GABAPENTIN 300 MILLIGRAM(S): 800 TABLET, FILM COATED ORAL at 11:08

## 2024-09-25 RX ADMIN — Medication 650 MILLIGRAM(S): at 22:40

## 2024-09-25 RX ADMIN — Medication 650 MILLIGRAM(S): at 22:01

## 2024-09-25 RX ADMIN — GABAPENTIN 300 MILLIGRAM(S): 800 TABLET, FILM COATED ORAL at 22:01

## 2024-09-25 RX ADMIN — SERTRALINE HYDROCHLORIDE 25 MILLIGRAM(S): 100 TABLET, FILM COATED ORAL at 11:07

## 2024-09-25 NOTE — BH DISCHARGE NOTE NURSING/SOCIAL WORK/PSYCH REHAB - NSDCCRNAME_GEN_ALL_CORE_FT
[facility]   Bry Ellis (Clinical Coordinator)   [facility]   Bry Ellis (Clinical Coordinator)

## 2024-09-25 NOTE — BH INPATIENT PSYCHIATRY PROGRESS NOTE - NSBHCHARTREVIEWINVESTIGATE_PSY_A_CORE FT
MRI:  Normal conus position and appearance.    Diffusely decreased fatty marrow signal reflect marrow reconversion.   Correlate for anemia or smoking history.    Mild bilateral L5-S1 foraminal narrowing due to disc bulging.    EKG: completed on 9/18  Ventilation rate: 91 BPM  AZ interval: 132 ms  QRS duration: 86 ms  QT/QTc-Baz: 354/435 ms  P-R-T axes: 36, 35, 23
MRI:  Normal conus position and appearance.    Diffusely decreased fatty marrow signal reflect marrow reconversion.   Correlate for anemia or smoking history.    Mild bilateral L5-S1 foraminal narrowing due to disc bulging.    EKG: completed on 9/18  Ventilation rate: 91 BPM  NC interval: 132 ms  QRS duration: 86 ms  QT/QTc-Baz: 354/435 ms  P-R-T axes: 36, 35, 23
MRI:  Normal conus position and appearance.    Diffusely decreased fatty marrow signal reflect marrow reconversion.   Correlate for anemia or smoking history.    Mild bilateral L5-S1 foraminal narrowing due to disc bulging.    EKG: completed on 9/18  Ventilation rate: 91 BPM  FL interval: 132 ms  QRS duration: 86 ms  QT/QTc-Baz: 354/435 ms  P-R-T axes: 36, 35, 23
MRI:  Normal conus position and appearance.    Diffusely decreased fatty marrow signal reflect marrow reconversion.   Correlate for anemia or smoking history.    Mild bilateral L5-S1 foraminal narrowing due to disc bulging.    EKG: completed on 9/18  Ventilation rate: 91 BPM  VA interval: 132 ms  QRS duration: 86 ms  QT/QTc-Baz: 354/435 ms  P-R-T axes: 36, 35, 23
MRI:  Normal conus position and appearance.    Diffusely decreased fatty marrow signal reflect marrow reconversion.   Correlate for anemia or smoking history.    Mild bilateral L5-S1 foraminal narrowing due to disc bulging.    EKG: completed on 9/18  Ventilation rate: 91 BPM  MI interval: 132 ms  QRS duration: 86 ms  QT/QTc-Baz: 354/435 ms  P-R-T axes: 36, 35, 23
MRI:  Normal conus position and appearance.    Diffusely decreased fatty marrow signal reflect marrow reconversion.   Correlate for anemia or smoking history.    Mild bilateral L5-S1 foraminal narrowing due to disc bulging.    EKG: completed on 9/18  Ventilation rate: 91 BPM  NM interval: 132 ms  QRS duration: 86 ms  QT/QTc-Baz: 354/435 ms  P-R-T axes: 36, 35, 23

## 2024-09-25 NOTE — BH INPATIENT PSYCHIATRY PROGRESS NOTE - NSBHATTESTCOMMENTATTENDFT_PSY_A_CORE
Per collateral obtained from ACT pt appears to be at her baseline, will coordinate with ACT Team re disposition.  I was present for interaction and agree with plan and notation. cbc with diff ordered for am. Plan to discharge patient tomorrow

## 2024-09-25 NOTE — BH INPATIENT PSYCHIATRY PROGRESS NOTE - NSBHCONSDANGERSELF_PSY_A_CORE
suicidal behavior/suicidal ideation with plan and means
suicidal behavior/suicidal ideation with plan and means
suicidal ideation with plan and means
suicidal behavior/suicidal ideation with plan and means

## 2024-09-25 NOTE — BH DISCHARGE NOTE NURSING/SOCIAL WORK/PSYCH REHAB - NSDCADDINFO2FT_PSY_ALL_CORE
You are scheduled to attend an IN-PERSON appointment with [facility] on 10/1/24 at 10:45AM.

## 2024-09-25 NOTE — BH DISCHARGE NOTE NURSING/SOCIAL WORK/PSYCH REHAB - NSDCADDINFO1FT_PSY_ALL_CORE
[facility] will meet with you on 9/27/24 at approximately 12PM at your residence. If you have any follow-up questions, please call them directly: [phone].

## 2024-09-25 NOTE — BH INPATIENT PSYCHIATRY PROGRESS NOTE - MSE OPTIONS
Structured MSE
(V5) oriented
Structured MSE

## 2024-09-25 NOTE — BH DISCHARGE NOTE NURSING/SOCIAL WORK/PSYCH REHAB - PATIENT PORTAL LINK FT
You can access the FollowMyHealth Patient Portal offered by [facility] by registering at the following website: [email or web address]. By joining [facility]’s FollowMyHealth portal, you will also be able to view your health information using other applications (apps) compatible with our system.

## 2024-09-25 NOTE — BH INPATIENT PSYCHIATRY PROGRESS NOTE - NSDCCRITERIA_PSY_ALL_CORE
Improvement in mood symptoms, decrease in AH/VH and suicidal thoughts

## 2024-09-25 NOTE — BH INPATIENT PSYCHIATRY PROGRESS NOTE - NSTXPOTSDGOAL_PSY_ALL_CORE
Will express/identify the feeling connected to the trauma

## 2024-09-25 NOTE — BH DISCHARGE NOTE NURSING/SOCIAL WORK/PSYCH REHAB - NSDCINSTRUCTIONS_PSY_ALL_CORE_FT
When discharged, take only medications prescribed as instructed by your hospital provider    Do not stop or change any medications until you discuss changes with your own prescriber    Do not take any other medications, including left over medications from before your admission, over the counter medications or herbal supplements, unless you discuss with your own provider 4 = No assist / stand by assistance

## 2024-09-25 NOTE — BH INPATIENT PSYCHIATRY PROGRESS NOTE - NSBHADMITIPREASONDETAILS_PSY_A_CORE FT
Pt drank soap on 9/20 after being commanded by a voice.

## 2024-09-25 NOTE — BH DISCHARGE NOTE NURSING/SOCIAL WORK/PSYCH REHAB - NSCDUDCCRISIS_PSY_A_CORE
[facility]  1 [phone]) [facility] ([phone])  Text "WELL" to 14712  Website: www.[employer]/.National Suicide Prevention Lifeline 1 (270) 767-6556/.  [employer]  1 (747) LIFENET (595-8019)/988 Suicide and Crisis Lifeline

## 2024-09-25 NOTE — BH INPATIENT PSYCHIATRY PROGRESS NOTE - NSBHFUPINTERVALCCFT_PSY_A_CORE
Feeling "impulsive"
"I'm not so good."
"I'm doing okay"
"I feel good."
"I don't feel well."
"I'm so-so."

## 2024-09-25 NOTE — BH INPATIENT PSYCHIATRY PROGRESS NOTE - NSTXPROBSUICID_PSY_ALL_CORE
normal...
SUICIDE/SELF-INJURIOUS BEHAVIOR

## 2024-09-25 NOTE — BH DISCHARGE NOTE NURSING/SOCIAL WORK/PSYCH REHAB - REASON FOR REFUSAL (REFER PATIENT TO HEALTHCARE PROVIDER FOR FOLLOW-UP):
Pt does not want to wait to take flu shot. Will take it at her supportive housing which is given to her for free.

## 2024-09-25 NOTE — BH INPATIENT PSYCHIATRY PROGRESS NOTE - NSBHCHARTREVIEWVS_PSY_A_CORE FT
Vital Signs Last 24 Hrs  T(C): 36.8 (09-25-24 @ 08:54), Max: 36.8 (09-24-24 @ 20:11)  T(F): 98.2 (09-25-24 @ 08:54), Max: 98.3 (09-24-24 @ 20:11)  HR: 96 (09-25-24 @ 08:54) (93 - 96)  BP: 133/97 (09-25-24 @ 08:54) (122/87 - 151/95)  BP(mean): --  RR: 18 (09-24-24 @ 20:11) (18 - 18)  SpO2: 94% (09-25-24 @ 08:54) (94% - 99%)

## 2024-09-25 NOTE — BH DISCHARGE NOTE NURSING/SOCIAL WORK/PSYCH REHAB - NSBHDCAGENCY1FT_PSY_A_CORE
[facility] / Dr. Louis (Psychiatrist) / Nimo Sullivan () [facility] / Dr. Louis (Psychiatrist) / Nimo Sullivan ()

## 2024-09-25 NOTE — BH INPATIENT PSYCHIATRY PROGRESS NOTE - NSBHMETABOLIC_PSY_ALL_CORE_FT
BMI: BMI (kg/m2): 57.6 (09-18-24 @ 21:53)  HbA1c: A1C with Estimated Average Glucose Result: 5.4 % (09-19-24 @ 08:20)    Glucose:   BP: 133/97 (09-25-24 @ 08:54) (113/73 - 151/95)Vital Signs Last 24 Hrs  T(C): 36.8 (09-25-24 @ 08:54), Max: 36.8 (09-24-24 @ 20:11)  T(F): 98.2 (09-25-24 @ 08:54), Max: 98.3 (09-24-24 @ 20:11)  HR: 96 (09-25-24 @ 08:54) (93 - 96)  BP: 133/97 (09-25-24 @ 08:54) (122/87 - 151/95)  BP(mean): --  RR: 18 (09-24-24 @ 20:11) (18 - 18)  SpO2: 94% (09-25-24 @ 08:54) (94% - 99%)      Lipid Panel: Date/Time: 09-19-24 @ 08:20  Cholesterol, Serum: 186  LDL Cholesterol Calculated: 110  HDL Cholesterol, Serum: 55  Total Cholesterol/HDL Ration Measurement: --  Triglycerides, Serum: 118

## 2024-09-25 NOTE — BH INPATIENT PSYCHIATRY PROGRESS NOTE - NSTXDEPRESGOAL_PSY_ALL_CORE
Will identify 2 coping skills that assist in improving mood
Will identify 2 coping skills that assist in improving mood
Exhibit improvements in self-grooming, hygiene, sleep and appetite
Will identify 2 coping skills that assist in improving mood
Exhibit improvements in self-grooming, hygiene, sleep and appetite
Exhibit improvements in self-grooming, hygiene, sleep and appetite

## 2024-09-25 NOTE — BH INPATIENT PSYCHIATRY PROGRESS NOTE - NSCGISEVERILLNESS_PSY_ALL_CORE
Reason for Disposition    [1] Vomited blood AND [2] more than a few streaks of blood    (Exception: few streaks and only occurred once)    (Exception: swallowed blood from a nosebleed or cut in the mouth)    Protocols used: VOMITING BLOOD-ADULT-    
4 = Moderately ill – overt symptoms causing noticeable, but modest, functional impairment or distress; symptom level may warrant medication

## 2024-09-25 NOTE — CHART NOTE - NSCHARTNOTEFT_GEN_A_CORE
Admitting Diagnosis:   Patient is a 29y old  Female who presents with a chief complaint of SUICIDAL IDEATION     (20 Sep 2024 15:41)      PAST MEDICAL & SURGICAL HISTORY:  Asthma      Schizoaffective disorder      PTSD (post-traumatic stress disorder)      GERD (gastroesophageal reflux disease)      No significant past surgical history          Current Nutrition Order: Regular       PO Intake: Good (%) [   ]  Fair (50-75%) [   ] Poor (<25%) [ x ]    GI Issues: no report of GI distress, last BM 9/23 per flowsheets     Skin Integrity: no pressure injuries or edema documented; Aubrey score 22         Labs: no new labs to assess; RD to monitor trends as able.         CAPILLARY BLOOD GLUCOSE        Medications:  MEDICATIONS  (STANDING):  cloZAPine 75 milliGRAM(s) Oral at bedtime  gabapentin 300 milliGRAM(s) Oral two times a day  sertraline 100 milliGRAM(s) Oral daily  sertraline 25 milliGRAM(s) Oral daily    MEDICATIONS  (PRN):  acetaminophen     Tablet .. 650 milliGRAM(s) Oral every 6 hours PRN Moderate Pain (4 - 6)  aluminum hydroxide/magnesium hydroxide/simethicone Suspension 30 milliLiter(s) Oral every 6 hours PRN Dyspepsia  LORazepam     Tablet 2 milliGRAM(s) Oral every 6 hours PRN Agitation  magnesium hydroxide Suspension 30 milliLiter(s) Oral daily PRN Constipation  OLANZapine Disintegrating Tablet 10 milliGRAM(s) Oral every 8 hours PRN agitation  ondansetron    Tablet 4 milliGRAM(s) Oral every 6 hours PRN nausea  pantoprazole    Tablet 40 milliGRAM(s) Oral before breakfast PRN GERD  senna 2 Tablet(s) Oral at bedtime PRN Constipation  traZODone 50 milliGRAM(s) Oral at bedtime PRN insomnia      Anthropometrics:  Dosing height: 62in  Dosing weight: 142.9kg/314 pounds  BMI 57.6    pounds, %%    Weight Change:   9/24 145.6kg/321 pounds    Estimated energy needs:   Weight used for calculations	IBW  Estimated Energy Needs Weight (lbs)	110 lb  Estimated Energy Needs Weight (kg)	49.8 kg  Estimated Energy Needs From (nellie/kg)	25  Estimated Energy Needs To (nellie/kg)	30  Estimated Energy Needs Calculated From (nellie/kg)	1245  Estimated Energy Needs Calculated To (nellie/kg)	1494  Weight used for calculations	IBW  Estimated Protein Needs Weight (lbs)	110 lb  Estimated Protein Needs Weight (kg)	49.8 kg  Estimated Protein Needs From (g/kg)	1.5  Estimated Protein Needs To (g/kg)	2.5  Estimated Protein Needs Calculated From (g/kg)	74.7  Estimated Protein Needs Calculated To (g/kg)	124.5  Estimated Fluid Needs Weight (lbs)	110 lb  Estimated Fluid Needs Weight (kg)	49.8 kg  Estimated Fluid Needs From (ml/kg)	25  Estimated Fluid Needs To (ml/kg)	30  Estimated Fluid Needs Calculated From (ml/kg)	1245  Estimated Fluid Needs Calculated To (ml/kg)	1494  Other Calculations	 pounds, %%  Pt above % IBW thus ideal body weight used for all calculations. Needs adjusted for age, BMI.    Subjective:   30 yo unemployed, single female domiciled at [facility] since 6/2024 with history of schizoaffective disorder, MDD, and chronic PTSD admitted through ED for SI with plan.    Pt seen for follow up. Continues to endorse poor appetite; skipped breakfast this AM and only had one chicken finger for dinner last night. Reports <25% intake of meals since admit. Requesting for Ensure to optimize intake- RD provided in depth education on drinking supplements between meals rather than using them as meal replacements. Pt verbalized understanding. No report of GI distress or other nutritional concerns at this time. Pt ordered for zofran, protonix, senna, milk of magnesia. RD to remain available for additional nutrition interventions as needed.     Previous Nutrition Diagnosis: Inadequate oral intake related to decreased appetite as evidenced by pt reported eating 1 meal per day     Active [ x ]  Resolved [   ]    Goal: Pt to meet at least 75% of nutritional needs consistently     Recommendations:  1. Continue Regular diet, add Ensure Max Protein 2x/day. Consider appetite stimulant if appetite does not improve.   2. Encourage and monitor PO intake, honor preferences as able.   3. Monitor labs, wt trends, GI function, and skin integrity.   4. Pain and bowel regimen per team.   5. RD to remain available for additional nutrition interventions and diet edu as needed.     Education: continued PO intake at meal times as tolerated; supplements between meals    Risk Level: High [ x ] Moderate [   ] Low [   ] Admitting Diagnosis:   Patient is a 29y old  Female who presents with a chief complaint of SUICIDAL IDEATION     (20 Sep 2024 15:41)      PAST MEDICAL & SURGICAL HISTORY:  Asthma      Schizoaffective disorder      PTSD (post-traumatic stress disorder)      GERD (gastroesophageal reflux disease)      No significant past surgical history          Current Nutrition Order: Regular       PO Intake: Good (%) [   ]  Fair (50-75%) [   ] Poor (<25%) [ x ]    GI Issues: no report of GI distress, last BM 9/23 per flowsheets     Skin Integrity: no pressure injuries or edema documented; Aubrey score 22         Labs: no new labs to assess; RD to monitor trends as able.         CAPILLARY BLOOD GLUCOSE        Medications:  MEDICATIONS  (STANDING):  cloZAPine 75 milliGRAM(s) Oral at bedtime  gabapentin 300 milliGRAM(s) Oral two times a day  sertraline 100 milliGRAM(s) Oral daily  sertraline 25 milliGRAM(s) Oral daily    MEDICATIONS  (PRN):  acetaminophen     Tablet .. 650 milliGRAM(s) Oral every 6 hours PRN Moderate Pain (4 - 6)  aluminum hydroxide/magnesium hydroxide/simethicone Suspension 30 milliLiter(s) Oral every 6 hours PRN Dyspepsia  LORazepam     Tablet 2 milliGRAM(s) Oral every 6 hours PRN Agitation  magnesium hydroxide Suspension 30 milliLiter(s) Oral daily PRN Constipation  OLANZapine Disintegrating Tablet 10 milliGRAM(s) Oral every 8 hours PRN agitation  ondansetron    Tablet 4 milliGRAM(s) Oral every 6 hours PRN nausea  pantoprazole    Tablet 40 milliGRAM(s) Oral before breakfast PRN GERD  senna 2 Tablet(s) Oral at bedtime PRN Constipation  traZODone 50 milliGRAM(s) Oral at bedtime PRN insomnia      Anthropometrics:  Dosing height: 62in  Dosing weight: 142.9kg/314 pounds  BMI 57.6    pounds, %%    Weight Change:   9/24 145.6kg/321 pounds    Estimated energy needs:   Weight used for calculations	IBW  Estimated Energy Needs Weight (lbs)	110 lb  Estimated Energy Needs Weight (kg)	49.8 kg  Estimated Energy Needs From (nellie/kg)	25  Estimated Energy Needs To (nellie/kg)	30  Estimated Energy Needs Calculated From (nellie/kg)	1245  Estimated Energy Needs Calculated To (nellie/kg)	1494  Weight used for calculations	IBW  Estimated Protein Needs Weight (lbs)	110 lb  Estimated Protein Needs Weight (kg)	49.8 kg  Estimated Protein Needs From (g/kg)	1.5  Estimated Protein Needs To (g/kg)	2.5  Estimated Protein Needs Calculated From (g/kg)	74.7  Estimated Protein Needs Calculated To (g/kg)	124.5  Estimated Fluid Needs Weight (lbs)	110 lb  Estimated Fluid Needs Weight (kg)	49.8 kg  Estimated Fluid Needs From (ml/kg)	25  Estimated Fluid Needs To (ml/kg)	30  Estimated Fluid Needs Calculated From (ml/kg)	1245  Estimated Fluid Needs Calculated To (ml/kg)	1494  Other Calculations	 pounds, %%  Pt above % IBW thus ideal body weight used for all calculations. Needs adjusted for age, BMI.    Subjective:   30 yo unemployed, single female domiciled at [facility] since 6/2024 with history of schizoaffective disorder, MDD, and chronic PTSD admitted through ED for SI with plan.    Pt seen for follow up. Continues to endorse poor appetite; skipped breakfast this AM and only had one chicken finger for dinner last night. Reports <25% intake of meals since admit. Requesting for Ensure to optimize intake- RD provided in depth education on drinking supplements between meals rather than using them as meal replacements. Pt verbalized understanding. No report of GI distress or other nutritional concerns at this time. Pt ordered for zofran, protonix, senna, milk of magnesia. RD to remain available for additional nutrition interventions as needed.     Previous Nutrition Diagnosis: Inadequate oral intake related to decreased appetite as evidenced by pt reported eating 1 meal per day     Active [ x ]  Resolved [   ]    Goal: Pt to meet at least 75% of nutritional needs consistently     Recommendations:  1. Continue Regular diet, add Ensure Plus High Protein 2x/day and MVI daily. Consider appetite stimulant if appetite does not improve.   2. Encourage and monitor PO intake, honor preferences as able.   3. Monitor labs, wt trends, GI function, and skin integrity.   4. Pain and bowel regimen per team.   5. RD to remain available for additional nutrition interventions and diet edu as needed.     Education: continued PO intake at meal times as tolerated; supplements between meals    Risk Level: High [ x ] Moderate [   ] Low [   ]

## 2024-09-25 NOTE — BH INPATIENT PSYCHIATRY PROGRESS NOTE - NSICDXBHPRIMARYDX_PSY_ALL_CORE
Schizoaffective disorder   F25.9  
Borderline personality disorder   F60.3  
Schizoaffective disorder   F25.9

## 2024-09-25 NOTE — BH INPATIENT PSYCHIATRY PROGRESS NOTE - NSBHFUPINTERVALHXFT_PSY_A_CORE
Pt seen in bed sleeping, wearing her own clothes, calm and cooperative. Pt appears less depressed today and is comparatively brighter. Pt states that she is feeling "okay", reports that she has not had any AH/VH today or yesterday and that has helped her a lot. She denies having any AH/VH yesterday at all. Pt reports that her appetite has improved and that she is sleeping well, did not wake up last night and denies nightmares. She states that she slept in today because she stayed up late watching a movie and playing cards with peers. She denies any SI/HI, paranoia, anxiety or depressed feelings. States that she is "glad" about discharge and is looking forward to seeing her boyfriend and grandma. She reports that she did not disclose being in a relationship prior to today because they were having communication issues. Boyfriend came to see her yesterday and she states that grandma told her that he is planning to propose, which she is happy about. Pt states that her coping mechanisms are writing and box breathing and that she will continue practicing these after leaving. Reports that grandma and boyfriend are good sources of social support for her. When asked what changed between today and yesterday, pt states that she is "trying to put myself first" and that she "owe[s] myself a second chance." Creative arts therapist stated that pt wrote positive statements during [employer] group. Pt did not take Clozaril or gabapentin yesterday night because she was sleeping. We will honor pt's 72 hour letter, plan for discharge tomorrow with ector. Pt will be picked up by her boyfriend. Pt seen in bed sleeping, wearing her own clothes, calm and cooperative. Pt appears less depressed today and is comparatively brighter. Pt states that she is feeling "okay", reports that she has not had any AH/VH today or yesterday and that has helped her a lot. She denies having any AH/VH yesterday at all. Pt reports that her appetite has improved and that she is sleeping well, did not wake up last night and denies nightmares. She states that she slept in today because she stayed up late watching a movie and playing cards with peers. She denies any SI/HI, paranoia, anxiety or depressed feelings. States that she is "glad" about discharge and is looking forward to seeing her boyfriend and grandma. She reports that she did not disclose being in a relationship prior to today because they were having communication issues. Boyfriend came to see her yesterday and she states that grandma told her that he is planning to propose, which she is happy about. Pt states that her coping mechanisms are writing and box breathing and that she will continue practicing these after leaving. Reports that grandma and boyfriend are good sources of social support for her. When asked what changed between today and yesterday, pt states that she is "trying to put myself first" and that she "owe[s] myself a second chance." Creative arts therapist stated that pt wrote positive statements during [employer] group. Pt did not take Clozaril or gabapentin yesterday night because she was sleeping. We will honor pt's 72 hour letter, plan for discharge tomorrow with ector. Pt will be picked up by her boyfriend.    Seen with NP and SW.

## 2024-09-25 NOTE — BH DISCHARGE NOTE NURSING/SOCIAL WORK/PSYCH REHAB - NSDCPRGOAL_PSY_ALL_CORE
Pt has been attending groups regularly over this hospitalization and has been demonstrating gains. Pt demonstrates brighter, lugo range of affect, and content of writing has shifted from focus on distressing feelings and SI to a series of positive affirmations. Pt has sought out unit staff to review writing regularly seeking and audience and validation. Pt reports feeling improvement in mood and outlook, and endorses readiness for discharge. Pt reports intention to utilize support of boyfriend and grandmother, as well as outpatient services of her residence and her ACT team. Pt reports that engaging in creative practices of writing and singing help her cope as well. Pt completed a safety plan and received a copy to take with her.
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
## 2024-09-26 VITALS
SYSTOLIC BLOOD PRESSURE: 100 MMHG | DIASTOLIC BLOOD PRESSURE: 74 MMHG | RESPIRATION RATE: 18 BRPM | HEART RATE: 106 BPM | OXYGEN SATURATION: 98 % | TEMPERATURE: 98 F

## 2024-09-26 LAB
ADD ON TEST-SPECIMEN IN LAB: SIGNIFICANT CHANGE UP
BASOPHILS # BLD AUTO: 0.02 K/UL — SIGNIFICANT CHANGE UP (ref 0–0.2)
BASOPHILS NFR BLD AUTO: 0.3 % — SIGNIFICANT CHANGE UP (ref 0–2)
EOSINOPHIL # BLD AUTO: 0.01 K/UL — SIGNIFICANT CHANGE UP (ref 0–0.5)
EOSINOPHIL NFR BLD AUTO: 0.1 % — SIGNIFICANT CHANGE UP (ref 0–6)
HCT VFR BLD CALC: 38.1 % — SIGNIFICANT CHANGE UP (ref 34.5–45)
HGB BLD-MCNC: 11.5 G/DL — SIGNIFICANT CHANGE UP (ref 11.5–15.5)
IMM GRANULOCYTES NFR BLD AUTO: 0.3 % — SIGNIFICANT CHANGE UP (ref 0–0.9)
LYMPHOCYTES # BLD AUTO: 2.9 K/UL — SIGNIFICANT CHANGE UP (ref 1–3.3)
LYMPHOCYTES # BLD AUTO: 36.3 % — SIGNIFICANT CHANGE UP (ref 13–44)
MCHC RBC-ENTMCNC: 24.3 PG — LOW (ref 27–34)
MCHC RBC-ENTMCNC: 30.2 GM/DL — LOW (ref 32–36)
MCV RBC AUTO: 80.4 FL — SIGNIFICANT CHANGE UP (ref 80–100)
MONOCYTES # BLD AUTO: 0.53 K/UL — SIGNIFICANT CHANGE UP (ref 0–0.9)
MONOCYTES NFR BLD AUTO: 6.6 % — SIGNIFICANT CHANGE UP (ref 2–14)
NEUTROPHILS # BLD AUTO: 4.52 K/UL — SIGNIFICANT CHANGE UP (ref 1.8–7.4)
NEUTROPHILS NFR BLD AUTO: 56.4 % — SIGNIFICANT CHANGE UP (ref 43–77)
NRBC # BLD: 0 /100 WBCS — SIGNIFICANT CHANGE UP (ref 0–0)
PLATELET # BLD AUTO: 257 K/UL — SIGNIFICANT CHANGE UP (ref 150–400)
RBC # BLD: 4.74 M/UL — SIGNIFICANT CHANGE UP (ref 3.8–5.2)
RBC # FLD: 16.8 % — HIGH (ref 10.3–14.5)
WBC # BLD: 7.54 K/UL — SIGNIFICANT CHANGE UP (ref 3.8–10.5)
WBC # FLD AUTO: 7.54 K/UL — SIGNIFICANT CHANGE UP (ref 3.8–10.5)

## 2024-09-26 PROCEDURE — 99285 EMERGENCY DEPT VISIT HI MDM: CPT

## 2024-09-26 PROCEDURE — 87635 SARS-COV-2 COVID-19 AMP PRB: CPT

## 2024-09-26 PROCEDURE — 80307 DRUG TEST PRSMV CHEM ANLYZR: CPT

## 2024-09-26 PROCEDURE — 85025 COMPLETE CBC W/AUTO DIFF WBC: CPT

## 2024-09-26 PROCEDURE — 83036 HEMOGLOBIN GLYCOSYLATED A1C: CPT

## 2024-09-26 PROCEDURE — 99238 HOSP IP/OBS DSCHRG MGMT 30/<: CPT

## 2024-09-26 PROCEDURE — 80048 BASIC METABOLIC PNL TOTAL CA: CPT

## 2024-09-26 PROCEDURE — 85027 COMPLETE CBC AUTOMATED: CPT

## 2024-09-26 PROCEDURE — 36415 COLL VENOUS BLD VENIPUNCTURE: CPT

## 2024-09-26 PROCEDURE — 84702 CHORIONIC GONADOTROPIN TEST: CPT

## 2024-09-26 PROCEDURE — 81001 URINALYSIS AUTO W/SCOPE: CPT

## 2024-09-26 PROCEDURE — 93005 ELECTROCARDIOGRAM TRACING: CPT

## 2024-09-26 PROCEDURE — 80061 LIPID PANEL: CPT

## 2024-09-26 PROCEDURE — 84443 ASSAY THYROID STIM HORMONE: CPT

## 2024-09-26 PROCEDURE — 72148 MRI LUMBAR SPINE W/O DYE: CPT | Mod: MC

## 2024-09-26 RX ORDER — CLOZAPINE 25 MG/1
3 TABLET ORAL
Qty: 42 | Refills: 0
Start: 2024-09-26 | End: 2024-10-09

## 2024-09-26 RX ORDER — SERTRALINE HYDROCHLORIDE 100 MG/1
1 TABLET, FILM COATED ORAL
Qty: 0 | Refills: 0 | DISCHARGE
Start: 2024-09-26

## 2024-09-26 RX ORDER — CLOZAPINE 25 MG/1
3 TABLET ORAL
Qty: 0 | Refills: 0 | DISCHARGE
Start: 2024-09-26

## 2024-09-26 RX ORDER — GABAPENTIN 800 MG/1
1 TABLET, FILM COATED ORAL
Qty: 28 | Refills: 0
Start: 2024-09-26 | End: 2024-10-09

## 2024-09-26 RX ORDER — SERTRALINE HYDROCHLORIDE 100 MG/1
1 TABLET, FILM COATED ORAL
Qty: 14 | Refills: 0
Start: 2024-09-26 | End: 2024-10-09

## 2024-09-26 RX ORDER — GABAPENTIN 800 MG/1
1 TABLET, FILM COATED ORAL
Qty: 0 | Refills: 0 | DISCHARGE
Start: 2024-09-26

## 2024-09-26 RX ADMIN — GABAPENTIN 300 MILLIGRAM(S): 800 TABLET, FILM COATED ORAL at 10:46

## 2024-09-26 RX ADMIN — SERTRALINE HYDROCHLORIDE 25 MILLIGRAM(S): 100 TABLET, FILM COATED ORAL at 10:47

## 2024-09-26 RX ADMIN — SERTRALINE HYDROCHLORIDE 100 MILLIGRAM(S): 100 TABLET, FILM COATED ORAL at 10:47

## 2024-09-26 NOTE — BH INPATIENT PSYCHIATRY DISCHARGE NOTE - DESCRIPTION
Domiciled in [facility] supportive housing, on SSI, not in contact with family, feels she has no friends

## 2024-09-26 NOTE — BH INPATIENT PSYCHIATRY DISCHARGE NOTE - NSICDXPASTMEDICALHX_GEN_ALL_CORE_FT
PAST MEDICAL HISTORY:  Asthma     GERD (gastroesophageal reflux disease)     PTSD (post-traumatic stress disorder)     Schizoaffective disorder      PAST MEDICAL HISTORY:  Asthma     Back pain     GERD (gastroesophageal reflux disease)     PTSD (post-traumatic stress disorder)     Schizoaffective disorder

## 2024-09-26 NOTE — BH INPATIENT PSYCHIATRY DISCHARGE NOTE - HOSPITAL COURSE
9/20 Pt reports that her urges to hurt herself are increasing due to AH and her own impulsivity. Pt reports that she has been punching walls in response to these urges. VH of late grandfather this morning. Reports hypersomnia, decreased appetite, feelings of guilt, and low energy. Had PTSD-related nightmare last night. Pt is interested in [facility] and will be given more information about it from . Pt took Zoloft and Olanzapine today, did not take clozapine last night.  9/21 Pt appears improved, now denying SI/HI/AH/VH. No side effects to clozapine thus far.  9/23 Pt reports no improvement to mood sx, still endorses AH/VH. Pt reports drinking soap on Friday after being commanded by a voice. She was put on 1:1 last night and reports that it makes her feel safer. Goes to groups, says that keeping busy allows her to ignore voices. Treatment team will meet with pt and pt's grandma later today. Pt took clozaril last night, zoloft and gabapentin today. Med compliant. Pt put in 72 hour letter today.  9/24 Pt's course today consistent with collateral obtained from ACT, earlier this AM pt stated she would like to stay and secure additional Outpatient services upon discharge, and then later stated she would like to be discharged as soon as possible. Pt reports she will inform staff if she is having thoughts of wanting to hurt herself, 1:1 not needed. Update: pt put in 72 hour letter.  9/25 Pt appears better, denies SI/HI/AH/VH. Improvement in appetite and sleep. Pt states she would like to try to "put myself first." Pt's 72 hour letter will be honored, plan for discharge tomorrow. Pt will be given metrocard and will be picked up by her boyfriend and going to grandma's house. Pt's ACT team will see her after discharge on Friday 9/27.    Overall, pt has been calm, cooperative and forthcoming throughout her stay here. Pt initially had AH that commanded her to hurt herself ("bang your head against the wall") and VH of her late grandfather. Voices were a great source of distress for her. Pt typically sat near nurses station because she wanted people to be able to see her. Pt has shown an improvement in mood symptoms over time and a decrease in AH/VH and SI. Denies ever having HI. Pt has been compliant with her medication and has endorsed improved appetite and sleep. She has been participating in groups and interacting with peers. Pt is help-seeking and makes her needs or concerns known. Pt's medications will be sent to her pharmacy and she will continue following up with her ACT team. Pt will be picked up by her boyfriend and they will be going to her grandma's house together. She is feeling more "positive" and would like to put herself first. She names her boyfriend and grandma as her support system. Pt will be given a metrocard. 9/20 Pt reports that her urges to hurt herself are increasing due to AH and her own impulsivity. Pt reports that she has been punching walls in response to these urges. VH of late grandfather this morning. Reports hypersomnia, decreased appetite, feelings of guilt, and low energy. Had PTSD-related nightmare last night. Pt is interested in [facility] and will be given more information about it from . Pt took Zoloft and Olanzapine today, did not take clozapine last night.  9/21 Pt appears improved, now denying SI/HI/AH/VH. No side effects to clozapine thus far.  9/23 Pt reports no improvement to mood sx, still endorses AH/VH. Pt reports drinking soap on Friday after being commanded by a voice. She was put on 1:1 last night and reports that it makes her feel safer. Goes to groups, says that keeping busy allows her to ignore voices. Treatment team will meet with pt and pt's grandma later today. Pt took clozaril last night, zoloft and gabapentin today. Med compliant. Pt put in 72 hour letter today.  9/24 Pt's course today consistent with collateral obtained from ACT, earlier this AM pt stated she would like to stay and secure additional Outpatient services upon discharge, and then later stated she would like to be discharged as soon as possible. Pt reports she will inform staff if she is having thoughts of wanting to hurt herself, 1:1 not needed. Update: pt put in 72 hour letter.  9/25 Pt appears better, denies SI/HI/AH/VH. Improvement in appetite and sleep. Pt states she would like to try to "put myself first." Pt's 72 hour letter will be honored, plan for discharge tomorrow. Pt will be given metrocard and will be picked up by her boyfriend and going to grandma's house. Pt's ACT team will see her after discharge on Friday 9/27.    Patient restarted on meds upon admission to the unit. She did require 1:1 on occasion after having expressed si at night. Patient seemed to view 1:1 as a . Pt initially reported having had AH that commanded her to hurt herself ("bang your head against the wall") and VH of her late grandfather. Reported voices were a great source of distress for her. Pt typically sat near nurses station because she wanted people to be able to see her. She was also noted to write long letters to the staff talking about suicide and remorse.  Pt has shown an improvement in mood symptoms over time and a decrease in reported AH/VH and SI. Denies ever having HI. Pt has been compliant with her medication and has endorsed improved appetite and sleep. Clozapine was initially increased due to report of worsening ah, however this was quickly tapered back to 75mg due to concern of OCD and her intrusive thoughts. Zoloft was increased to total of 125mg with good effect. She has been participating in groups and interacting with peers. Pt is help-seeking and makes her needs or concerns known. Pt's medications will be sent to her pharmacy and she will continue following up with her ACT team. Pt will be picked up by her boyfriend and they will be going to her grandma's house together. She is feeling more "positive" and would like to put herself first. She names her boyfriend and grandma as her support system. Pt will be given a metrocard upon discharge from unit with her boyfriend. Borderline personality disorder.

## 2024-09-26 NOTE — BH INPATIENT PSYCHIATRY DISCHARGE NOTE - NSDCMRMEDTOKEN_GEN_ALL_CORE_FT
cloZAPine 100 mg oral tablet: 3 tab(s) orally once a day (at bedtime)  gabapentin 300 mg oral capsule: 1 cap(s) orally 2 times a day  sertraline 100 mg oral tablet: 1 tab(s) orally once a day  sertraline 25 mg oral tablet: 1 tab(s) orally once a day   cloZAPine 25 mg oral tablet: 3 tab(s) orally once a day (at bedtime)  gabapentin 300 mg oral capsule: 1 cap(s) orally 2 times a day  sertraline 100 mg oral tablet: 1 tab(s) orally once a day  sertraline 25 mg oral tablet: 1 tab(s) orally once a day

## 2024-09-26 NOTE — BH INPATIENT PSYCHIATRY DISCHARGE NOTE - NSBHMSELANG_PSY_A_CORE
No abnormalities noted Hydroxyzine Counseling: Patient advised that the medication is sedating and not to drive a car after taking this medication.  Patient informed of potential adverse effects including but not limited to dry mouth, urinary retention, and blurry vision.  The patient verbalized understanding of the proper use and possible adverse effects of hydroxyzine.  All of the patient's questions and concerns were addressed.
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
## 2024-09-26 NOTE — BH INPATIENT PSYCHIATRY DISCHARGE NOTE - OTHER PAST PSYCHIATRIC HISTORY (INCLUDE DETAILS REGARDING ONSET, COURSE OF ILLNESS, INPATIENT/OUTPATIENT TREATMENT)
Follows with OP psychiatrist Dr. Louis on [facility], [phone], unable to reach or LVM at this time due to full mailbox. States she last saw them on Friday. Does not have therapist. Reports adherence with current regimen of clozapine 75 mg Qhs, zoloft 100 mg Qd, prazosin 1 mg Qd, and abilify maintena (Has received this for ~2 years, last dose possibly 9/10/24). Describes not feeling regimen is helping her, does not recall medications that have helped in the past.   Reports h/o >40 lifetime admissions since age 15 y/o, and 4 psych ED visits + 1 admission within the last month. Describes h/o [facility] admission in 2020 and 2021 for 3 and 4 months respectively, which she feels helped, as her longest period without hospital visits was after this.

## 2024-09-26 NOTE — BH INPATIENT PSYCHIATRY DISCHARGE NOTE - NSBHMSERECMEM_PSY_A_CORE
Podiatric Surgery - Clinic Note  Norma Bee : 1964 Sex: female MRN: [id number] 2024 3:10 PM    ASSESSMENT:  DM2 with autonomic peripheral neuropathy  Onychomycosis      PLAN:  All nails 1-5 dolores were debrided sharply and mechanically as medically necessary with nail nippers and rotary damaso to a level safe and comfortable for the patient to include all necrotic nail down to the nailbed.   I did advise the patient to continue to monitor for signs of infection.  Discussed DM foot care to include washing feet daily, checking feet daily, avoiding walking barefoot, checking shoes daily, and seeing the DPM when any redness or pain starts.  rtc 3 mo    CHIEF COMPLAINT:  Chief Complaint   Patient presents with   • Office Visit     Nail care / Diabetes/ PCP ; [place] [address]         SUBJECTIVE:  Norma Bee is a DM 60 year old female who presents to the clinic today complaining of long, thickened and painful toenails which are difficult to debride by the patient. Patient relates pain has been present for several months' duration.     Non-traumatic amputation: no  Claudication: no  Temp changes: yes  Edema: yes  Numbness: yes  Burning: yes        PHYSICAL EXAMINATIONS:  Vital Signs:  Visit Vitals  LMP  (LMP Unknown)     There is no height or weight on file to calculate BMI.    PHYSICAL EXAMINATION:   Gen: No apparent distress, pleasant.     INTEGUMENT: Nails are long, thickened, discolored, dystrophic, and friable with foul smelling subungual debris 1-5 bilaterally.   Skin is somewhat cool, mildly dry, and somewhat atrophic.   No current open lesions or signs of infection.   Pedal hair is Absent.     Vasc: DP 1/4 PT, 1/4 b/l.   Edema present bilateral  CFT < 3 seconds bilaterally.     Neuro: Decreased sensation to light touch and painful stimuli as well as Sells Sera monofilament bilaterally.     M/S: Dorsally contracted digits 2-5 and mildly laterally deviated hallux bilateral.  Limited AJ ROM dolores .      Last Lab A1C:  Hemoglobin A1C (%)   Date Value   07/30/2024 5.7 (H)       Last Point of Care A1C:  HEMOGLOBIN A1C - POINT OF CARE (%)   Date Value   05/06/2024 6.0 (H)     Hemoglobin A1C POC (%)   Date Value   12/21/2021 5.8 (A)           Primary care physician:   Mary Ann Rose MD Elizabeth A Vissat, DPM  [facility]  PGR: [phone]   Normal

## 2024-09-26 NOTE — BH INPATIENT PSYCHIATRY DISCHARGE NOTE - NSBHSUICIDESTATUS_PSY_ALL_CORE
Pt reports decrease in SI, no longer having AH/VH. Reconciled with boyfriend, boyfriend and grandma are her support system.

## 2024-09-26 NOTE — BH INPATIENT PSYCHIATRY DISCHARGE NOTE - NSBHFUPINTERVALHXFT_PSY_A_CORE
Pt seen in bed sleeping, wearing her own clothes, calm and cooperative. Pt appears less depressed today and is comparatively brighter. Pt states that she is feeling "okay", reports that she has not had any AH/VH today or yesterday and that has helped her a lot. She denies having any AH/VH yesterday at all. Pt reports that her appetite has improved and that she is sleeping well, did not wake up last night and denies nightmares. She states that she slept in today because she stayed up late watching a movie and playing cards with peers. She denies any SI/HI, paranoia, anxiety or depressed feelings. States that she is "glad" about discharge and is looking forward to seeing her boyfriend and grandma. She reports that she did not disclose being in a relationship prior to today because they were having communication issues. Boyfriend came to see her yesterday and she states that grandma told her that he is planning to propose, which she is happy about. Pt states that her coping mechanisms are writing and box breathing and that she will continue practicing these after leaving. Reports that grandma and boyfriend are good sources of social support for her. When asked what changed between today and yesterday, pt states that she is "trying to put myself first" and that she "owe[s] myself a second chance." Creative arts therapist stated that pt wrote positive statements during [employer] group. Pt did not take Clozaril or gabapentin yesterday night because she was sleeping. We will honor pt's 72 hour letter, plan for discharge tomorrow with ector. Pt will be picked up by her boyfriend.    Seen with NP and SW. Pt seen sleeping in bed, woke up to speak to us. Pt is calm and cooperative, wearing her own clothes, appears brighter. Pt states that she is "feeling great" and is looking forward to being discharged. She will be picked up by her boyfriend and they will be going to her grandma's house. She states that she slept well yesterday, denies any nightmares or flashbacks. Her appetite has improved since she has been here. Nutrition consult team saw pt yesterday and recommended that she continue regular diet, add Ensure Plus High Protein 2x/day and MVI daily. Pt denies any AH/VH, SI/HI/PI. Pt's medications will be ordered to her pharmacy, she does not recall which pharmacy it is, pending ACT team response regarding which pharmacy to send her medications. Overall, pt states that she feels "positive". Pt will be meeting with her ACT team tomorrow.    Seen with NP.

## 2024-09-26 NOTE — BH INPATIENT PSYCHIATRY DISCHARGE NOTE - REASON FOR ADMISSION
30 yo unemployed, single female domiciled at [facility] since 6/2024 with history of schizoaffective disorder, MDD, and chronic PTSD admitted through ED for SI with plan. Pt reports that she has AH/VH that tell her to hurt herself, resulting in NSSIB. Pt currently has an active ACT team referred to by [facility] 3 years ago. Currently taking Clozapine, Zoloft, Prazosin, and Abilify, denies any AEs. Last injection of Abilify 9/10 at [facility]. Pt denies any h/o substance use.  Pt is not currently employed, receiving SSI. PMH of urinary incontinence. Utox negative. Pt was admitted voluntarily.

## 2024-09-26 NOTE — BH INPATIENT PSYCHIATRY DISCHARGE NOTE - NSDCCPCAREPLAN_GEN_ALL_CORE_FT
PRINCIPAL DISCHARGE DIAGNOSIS  Diagnosis: Borderline personality disorder  Assessment and Plan of Treatment: Follow up with outpt team. Pt will f/u with her ACT team.      SECONDARY DISCHARGE DIAGNOSES  Diagnosis: Post traumatic stress disorder (PTSD)  Assessment and Plan of Treatment: Follow up with outpt team. Pt will f/u with her ACT team.    Diagnosis: Major depression  Assessment and Plan of Treatment: Follow up with outpt team. Pt will f/u with her ACT team.    Diagnosis: H/O bulimia nervosa  Assessment and Plan of Treatment: Follow up with outpt team. Pt will f/u with her ACT team.     PRINCIPAL DISCHARGE DIAGNOSIS  Diagnosis: Borderline personality disorder  Assessment and Plan of Treatment: Follow up with outpt team. Pt will f/u with her ACT team.      SECONDARY DISCHARGE DIAGNOSES  Diagnosis: Post traumatic stress disorder (PTSD)  Assessment and Plan of Treatment: Follow up with outpt team. Pt will f/u with her ACT team.    Diagnosis: Major depression  Assessment and Plan of Treatment: Follow up with outpt team. Pt will f/u with her ACT team.    Diagnosis: H/O bulimia nervosa  Assessment and Plan of Treatment: Follow up with outpt team. Pt will f/u with her ACT team.    Diagnosis: Back pain  Assessment and Plan of Treatment: Continue Gabapentin. Follow up with outpt team.     PRINCIPAL DISCHARGE DIAGNOSIS  Diagnosis: Borderline personality disorder  Assessment and Plan of Treatment: Follow up with outpt team. Pt will f/u with her ACT team.      SECONDARY DISCHARGE DIAGNOSES  Diagnosis: Major depression  Assessment and Plan of Treatment: Follow up with outpt team. Pt will f/u with her ACT team.    Diagnosis: Post traumatic stress disorder (PTSD)  Assessment and Plan of Treatment: Follow up with outpt team. Pt will f/u with her ACT team.    Diagnosis: H/O bulimia nervosa  Assessment and Plan of Treatment: Follow up with outpt team. Pt will f/u with her ACT team.    Diagnosis: Back pain  Assessment and Plan of Treatment: Continue Gabapentin. Follow up with outpt team.
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
## 2024-09-26 NOTE — BH INPATIENT PSYCHIATRY DISCHARGE NOTE - NSBHMETABOLIC_PSY_ALL_CORE_FT
BMI: BMI (kg/m2): 57.6 (09-18-24 @ 21:53)  HbA1c: A1C with Estimated Average Glucose Result: 5.4 % (09-19-24 @ 08:20)    Glucose:   BP: 111/76 (09-25-24 @ 20:01) (111/76 - 151/95)Vital Signs Last 24 Hrs  T(C): 36.8 (09-25-24 @ 20:01), Max: 36.9 (09-25-24 @ 16:09)  T(F): 98.2 (09-25-24 @ 20:01), Max: 98.5 (09-25-24 @ 16:09)  HR: 90 (09-25-24 @ 20:01) (90 - 99)  BP: 111/76 (09-25-24 @ 20:01) (111/76 - 139/89)  BP(mean): --  RR: 18 (09-25-24 @ 20:01) (18 - 18)  SpO2: 98% (09-25-24 @ 20:01) (98% - 98%)      Lipid Panel: Date/Time: 09-19-24 @ 08:20  Cholesterol, Serum: 186  LDL Cholesterol Calculated: 110  HDL Cholesterol, Serum: 55  Total Cholesterol/HDL Ration Measurement: --  Triglycerides, Serum: 118

## 2024-09-27 NOTE — BH SOCIAL WORK CONFIRMATION FOLLOW UP NOTE - NSCOMMENTS_PSY_ALL_CORE
Linkage Call: Chart reviewed on 9/27/24. This SW called [facility] SW Nimo Sullivan ([phone]) on 9/27 at 3:15PM. She reported that the patient went to [facility] CPE last night due to SI with thoughts to walk into traffic. She was discharged by [facility] last night. Ms. Sullivan called her today at approximately 10AM, and she informed them she was on her way back to [facility] to seek inpatient admission due to the same symptoms. They stated she did not wish to wait to meet with them or utilize coping skills. SW to remain available.     [facility]  27-Sep-2024 12:00  HOME VISIT  [phone]    Caring Call: Chart reviewed on 9/27/24. After calling the ACT Team, this SW left a VM for the patient ([phone]) at 3:31PM. Callback information provided. This SW followed up by emailing [facility] Clinical Coordinator, Bry ([email or web address]), on 9/27/24 to inquire if the patient was admitted at [facility] or discharged and returned home.

## 2024-09-30 ENCOUNTER — EMERGENCY (EMERGENCY)
Facility: HOSPITAL | Age: 29
LOS: 1 days | Discharge: HOME | End: 2024-09-30
Attending: EMERGENCY MEDICINE
Payer: MEDICAID

## 2024-09-30 VITALS
OXYGEN SATURATION: 99 % | WEIGHT: 293 LBS | SYSTOLIC BLOOD PRESSURE: 114 MMHG | DIASTOLIC BLOOD PRESSURE: 78 MMHG | HEIGHT: 62 IN | HEART RATE: 77 BPM | TEMPERATURE: 98 F | RESPIRATION RATE: 17 BRPM

## 2024-09-30 LAB
ALBUMIN SERPL ELPH-MCNC: 3 G/DL — LOW (ref 3.5–5)
ALP SERPL-CCNC: 105 U/L — SIGNIFICANT CHANGE UP (ref 40–120)
ALT FLD-CCNC: 28 U/L DA — SIGNIFICANT CHANGE UP (ref 10–60)
ANION GAP SERPL CALC-SCNC: 7 MMOL/L — SIGNIFICANT CHANGE UP (ref 5–17)
APAP SERPL-MCNC: <4 UG/ML — LOW (ref 10–30)
APPEARANCE UR: CLEAR — SIGNIFICANT CHANGE UP
AST SERPL-CCNC: 17 U/L — SIGNIFICANT CHANGE UP (ref 10–40)
BASOPHILS # BLD AUTO: 0.03 K/UL — SIGNIFICANT CHANGE UP (ref 0–0.2)
BASOPHILS NFR BLD AUTO: 0.3 % — SIGNIFICANT CHANGE UP (ref 0–2)
BILIRUB SERPL-MCNC: 0.2 MG/DL — SIGNIFICANT CHANGE UP (ref 0.2–1.2)
BILIRUB UR-MCNC: NEGATIVE — SIGNIFICANT CHANGE UP
BUN SERPL-MCNC: 19 MG/DL — HIGH (ref 7–18)
CALCIUM SERPL-MCNC: 8.6 MG/DL — SIGNIFICANT CHANGE UP (ref 8.4–10.5)
CHLORIDE SERPL-SCNC: 110 MMOL/L — HIGH (ref 96–108)
CK SERPL-CCNC: 73 U/L — SIGNIFICANT CHANGE UP (ref 21–215)
CO2 SERPL-SCNC: 22 MMOL/L — SIGNIFICANT CHANGE UP (ref 22–31)
COLOR SPEC: YELLOW — SIGNIFICANT CHANGE UP
CREAT SERPL-MCNC: 0.64 MG/DL — SIGNIFICANT CHANGE UP (ref 0.5–1.3)
DIFF PNL FLD: NEGATIVE — SIGNIFICANT CHANGE UP
EGFR: 124 ML/MIN/1.73M2 — SIGNIFICANT CHANGE UP
EOSINOPHIL # BLD AUTO: 0.02 K/UL — SIGNIFICANT CHANGE UP (ref 0–0.5)
EOSINOPHIL NFR BLD AUTO: 0.2 % — SIGNIFICANT CHANGE UP (ref 0–6)
ETHANOL SERPL-MCNC: <3 MG/DL — SIGNIFICANT CHANGE UP (ref 0–10)
GLUCOSE SERPL-MCNC: 99 MG/DL — SIGNIFICANT CHANGE UP (ref 70–99)
GLUCOSE UR QL: NEGATIVE MG/DL — SIGNIFICANT CHANGE UP
HCG UR QL: NEGATIVE — SIGNIFICANT CHANGE UP
HCT VFR BLD CALC: 34.1 % — LOW (ref 34.5–45)
HGB BLD-MCNC: 10.7 G/DL — LOW (ref 11.5–15.5)
IMM GRANULOCYTES NFR BLD AUTO: 0.3 % — SIGNIFICANT CHANGE UP (ref 0–0.9)
KETONES UR-MCNC: NEGATIVE MG/DL — SIGNIFICANT CHANGE UP
LEUKOCYTE ESTERASE UR-ACNC: NEGATIVE — SIGNIFICANT CHANGE UP
LYMPHOCYTES # BLD AUTO: 27.7 % — SIGNIFICANT CHANGE UP (ref 13–44)
LYMPHOCYTES # BLD AUTO: 3.05 K/UL — SIGNIFICANT CHANGE UP (ref 1–3.3)
MAGNESIUM SERPL-MCNC: 2.2 MG/DL — SIGNIFICANT CHANGE UP (ref 1.6–2.6)
MCHC RBC-ENTMCNC: 24.3 PG — LOW (ref 27–34)
MCHC RBC-ENTMCNC: 31.4 GM/DL — LOW (ref 32–36)
MCV RBC AUTO: 77.3 FL — LOW (ref 80–100)
MONOCYTES # BLD AUTO: 0.7 K/UL — SIGNIFICANT CHANGE UP (ref 0–0.9)
MONOCYTES NFR BLD AUTO: 6.4 % — SIGNIFICANT CHANGE UP (ref 2–14)
NEUTROPHILS # BLD AUTO: 7.19 K/UL — SIGNIFICANT CHANGE UP (ref 1.8–7.4)
NEUTROPHILS NFR BLD AUTO: 65.1 % — SIGNIFICANT CHANGE UP (ref 43–77)
NITRITE UR-MCNC: NEGATIVE — SIGNIFICANT CHANGE UP
NRBC # BLD: 0 /100 WBCS — SIGNIFICANT CHANGE UP (ref 0–0)
PCP SPEC-MCNC: SIGNIFICANT CHANGE UP
PH UR: 5.5 — SIGNIFICANT CHANGE UP (ref 5–8)
PLATELET # BLD AUTO: 256 K/UL — SIGNIFICANT CHANGE UP (ref 150–400)
POTASSIUM SERPL-MCNC: 4.1 MMOL/L — SIGNIFICANT CHANGE UP (ref 3.5–5.3)
POTASSIUM SERPL-SCNC: 4.1 MMOL/L — SIGNIFICANT CHANGE UP (ref 3.5–5.3)
PROT SERPL-MCNC: 7.1 G/DL — SIGNIFICANT CHANGE UP (ref 6–8.3)
PROT UR-MCNC: NEGATIVE MG/DL — SIGNIFICANT CHANGE UP
RBC # BLD: 4.41 M/UL — SIGNIFICANT CHANGE UP (ref 3.8–5.2)
RBC # FLD: 17 % — HIGH (ref 10.3–14.5)
SALICYLATES SERPL-MCNC: <1.7 MG/DL — LOW (ref 2.8–20)
SARS-COV-2 RNA SPEC QL NAA+PROBE: SIGNIFICANT CHANGE UP
SODIUM SERPL-SCNC: 139 MMOL/L — SIGNIFICANT CHANGE UP (ref 135–145)
SP GR SPEC: 1.02 — SIGNIFICANT CHANGE UP (ref 1–1.03)
UROBILINOGEN FLD QL: 0.2 MG/DL — SIGNIFICANT CHANGE UP (ref 0.2–1)
WBC # BLD: 11.02 K/UL — HIGH (ref 3.8–10.5)
WBC # FLD AUTO: 11.02 K/UL — HIGH (ref 3.8–10.5)

## 2024-09-30 PROCEDURE — 83735 ASSAY OF MAGNESIUM: CPT

## 2024-09-30 PROCEDURE — 87635 SARS-COV-2 COVID-19 AMP PRB: CPT

## 2024-09-30 PROCEDURE — 99285 EMERGENCY DEPT VISIT HI MDM: CPT | Mod: 25

## 2024-09-30 PROCEDURE — 85025 COMPLETE CBC W/AUTO DIFF WBC: CPT

## 2024-09-30 PROCEDURE — 81025 URINE PREGNANCY TEST: CPT

## 2024-09-30 PROCEDURE — 81003 URINALYSIS AUTO W/O SCOPE: CPT

## 2024-09-30 PROCEDURE — 99285 EMERGENCY DEPT VISIT HI MDM: CPT

## 2024-09-30 PROCEDURE — 36415 COLL VENOUS BLD VENIPUNCTURE: CPT

## 2024-09-30 PROCEDURE — 80053 COMPREHEN METABOLIC PANEL: CPT

## 2024-09-30 PROCEDURE — 80307 DRUG TEST PRSMV CHEM ANLYZR: CPT

## 2024-09-30 PROCEDURE — 93010 ELECTROCARDIOGRAM REPORT: CPT

## 2024-09-30 PROCEDURE — 93005 ELECTROCARDIOGRAM TRACING: CPT

## 2024-09-30 PROCEDURE — 82550 ASSAY OF CK (CPK): CPT

## 2024-09-30 NOTE — ED ADULT TRIAGE NOTE - CHIEF COMPLAINT QUOTE
Pt c/o depression and thought of hurting herself yesterday, auditory hallucination x this morning and reports discharged from psych facility x 3 weeks ago.

## 2024-09-30 NOTE — ED PROVIDER NOTE - OBJECTIVE STATEMENT
The patient is a 29y female domiciled at [facility] ([facility] [phone]) with a past psychiatric history significant for schizoaffective disorder, MDD, and PTSD, with a history of multiple suicide attempts (7 attempts all by prescription drug overdose), and multiple inpatient psychiatric hospitalizations brought in by self for persistent thoughts of not wanting to be alive, depressed mood, lack of sleep within the past 2 days, and auditory hallucinations. The patient reports that she was recently admitted to [facility] for similar symptoms and that 4 days ago she was feeling better and was discharged. However, she reports that after a day or so of being discharged, the auditory hallucinations, depression, and passive SI recurred and that she would like to be admitted (the patient asked specifically to be admitted to [facility] or [facility]) due to fear that she might hurt herself again. She reports that she has attempted suicide by taking large amounts of her prescribed medications 7 times in the past but that though she is having recurrent thoughts of not wanting to be alive, she does not currently have a plan. She reports that she takes prazosin for PTSD, and abilify maintena (which she reports she gets once per month and has been on for years), and that she was started on clozapine at [facility] roughly 1 month ago (she is currently on 75mg). She reports that she has not noticed any benefit from any of the meds including clozapine, and that if anything the voices that she has experienced for the past three days since discharge from [facility] have been worse than she's ever experienced. The patient reports that she also hasn't slept for 2 days due to racing thoughts, and that she feels tired and depressed. She denies any increased energy, elevated or irritable mood, or recent grandiosity/impulsivity. She denies any paranoia/delusions/recent substance use. She reports that she lives alone and isn't close with any friends or family. She denies HI.

## 2024-09-30 NOTE — ED PROVIDER NOTE - PHYSICAL EXAMINATION
On exam, afebrile, hemodynamically stable, saturating well on room air, NAD, well appearing, sitting comfortably in bed, no WOB, speaking full sentences, full eye contact, head NCAT, EOMI grossly, anicteric, MMM, no JVD, RRR, breathing comfortably on room air, abd soft, NT, nml BS, no rebound or guarding, AAO, CN's 3-12 grossly intact, MENDEZ spontaneously, no leg cyanosis or edema, skin warm, dry.

## 2024-09-30 NOTE — ED PROVIDER NOTE - CLINICAL SUMMARY MEDICAL DECISION MAKING FREE TEXT BOX
29-year-old female with history of schizoaffective disorder, MDD, chronic PTSD, on Clozaril, Zoloft, prazosin, Abilify, unemployed, has ACT team and lives at [facility], presents with report of suicidal ideation and hearing voices for the past few days, ever since being discharged from medical hospital. States these symptoms are identical to that of her presentation prior to her admission to [facility]. States she wants to be admitted, though specifically to [facility] or [facility]. Denies all other symptoms including all medical symptoms. Denies alcohol, drug use, smoking. On exam, afebrile, hemodynamically stable, saturating well on room air, NAD, well appearing, sitting comfortably in bed, no WOB, speaking full sentences, full eye contact, head NCAT, EOMI grossly, anicteric, MMM, no JVD, RRR, breathing comfortably on room air, abd soft, NT, nml BS, no rebound or guarding, AAO, CN's 3-12 grossly intact, MENDEZ spontaneously, no leg cyanosis or edema, skin warm, dry. Patient without organic symptoms. No fever or specific infectious symptoms, and UA unremarkable. Labs unremarkable as well. Patient with chronic psychiatric symptoms. 29-year-old female with history of schizoaffective disorder, MDD, chronic PTSD, on Clozaril, Zoloft, prazosin, Abilify, unemployed, has ACT team and lives at [facility], presents with report of suicidal ideation and hearing voices for the past few days, ever since being discharged from medical hospital. States these symptoms are identical to that of her presentation prior to her admission to [facility]. States she wants to be admitted, though specifically to [facility] or [facility]. Denies all other symptoms including all medical symptoms. Denies alcohol, drug use, smoking. On exam, afebrile, hemodynamically stable, saturating well on room air, NAD, well appearing, sitting comfortably in bed, no WOB, speaking full sentences, full eye contact, head NCAT, EOMI grossly, anicteric, MMM, no JVD, RRR, breathing comfortably on room air, abd soft, NT, nml BS, no rebound or guarding, AAO, CN's 3-12 grossly intact, MENDEZ spontaneously, no leg cyanosis or edema, skin warm, dry. Patient without organic symptoms. No fever or specific infectious symptoms, and UA unremarkable. Labs unremarkable as well. Patient with chronic psychiatric symptoms. Pending psych evaluation. NAD, well appearing. Signed off care to Dr. GALINA Menjivar, will f/u psych.

## 2024-09-30 NOTE — ED ADULT NURSE NOTE - COVID-19 RESULT DATE/TIME
MD2 called wanting to make sure patient is suppose to be on both verampril and amlodipine   
Yes, on both still.      Electronically signed by SUGEY Gillespie, 07/25/23, 12:58 PM CDT.    
Yoselin at [facility] drugs 2 notified   
18-Sep-2024 17:43

## 2024-09-30 NOTE — ED ADULT NURSE NOTE - HPI (INCLUDE ILLNESS QUALITY, SEVERITY, DURATION, TIMING, CONTEXT, MODIFYING FACTORS, ASSOCIATED SIGNS AND SYMPTOMS)
PT received GCS 15, resting in bed.  Denies CP, SOB, no SS of acute distress noted.  PT has sitter on 1:1 observation.  PT denies actively wanting to harm/kill themselves, stating feeling very depressed.  Otherwise no other issues, PT safety maintained at all times.

## 2024-09-30 NOTE — ED PROVIDER NOTE - PATIENT PORTAL LINK FT
You can access the FollowMyHealth Patient Portal offered by [facility] by registering at the following website: [email or web address]. By joining [employer]’s FollowMyHealth portal, you will also be able to view your health information using other applications (apps) compatible with our system.

## 2024-09-30 NOTE — ED PROVIDER NOTE - NSFOLLOWUPINSTRUCTIONS_ED_ALL_ED_FT
I am glad that you are feeling better.  You were cleared by psychiatry because you have an appointment at 1015 today and you are well settled and set up outpatient and you said you are feeling better.  Please come back if you feel suicidal again however please continue to go to outpatient treatments and keep in contact with your psychiatrist and psychologist.

## 2024-10-01 VITALS
DIASTOLIC BLOOD PRESSURE: 77 MMHG | SYSTOLIC BLOOD PRESSURE: 121 MMHG | OXYGEN SATURATION: 98 % | TEMPERATURE: 98 F | RESPIRATION RATE: 18 BRPM | HEART RATE: 70 BPM

## 2024-10-01 PROCEDURE — 90792 PSYCH DIAG EVAL W/MED SRVCS: CPT | Mod: 95

## 2024-10-01 NOTE — ED BEHAVIORAL HEALTH ASSESSMENT NOTE - CURRENT MEDICATION
Reports adherence with current regimen of clozapine 75 mg Qhs, zoloft 100 mg Qd, prazosin 1 mg Qd, and abilify maintena (Has received this for ~2 years, last dose possibly 9/10/24). Abilify Maintena 400 mg monthly, last received approx 9/10/24 (per note), clozapine 75 mg nightly, gabapentin 300 mg BID, Zoloft 125 mg daily

## 2024-10-01 NOTE — ED BEHAVIORAL HEALTH ASSESSMENT NOTE - OTHER
problems with housing [facility] ([address]), supportive housing lack of day structure; limited social supports chronically limited flattened chronically poor; hx impulsivity and suicidal behavior, denies recent dangerous behaviors
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
## 2024-10-01 NOTE — ED BEHAVIORAL HEALTH ASSESSMENT NOTE - OTHER PAST PSYCHIATRIC HISTORY (INCLUDE DETAILS REGARDING ONSET, COURSE OF ILLNESS, INPATIENT/OUTPATIENT TREATMENT)
Follows with OP psychiatrist Dr. Louis on [facility], [phone], unable to reach or LVM at this time due to full mailbox. States she last saw them on Friday. Does not have therapist. Reports adherence with current regimen of clozapine 75 mg Qhs, zoloft 100 mg Qd, prazosin 1 mg Qd, and abilify maintena (Has received this for ~2 years, last dose possibly 9/10/24). Describes not feeling regimen is helping her, does not recall medications that have helped in the past.   Reports h/o >40 lifetime admissions since age 17 y/o, and 4 psych ED visits + 1 admission within the last month. Describes h/o [facility] admission in 2020 and 2021 for 3 and 4 months respectively, which she feels helped, as her longest period without hospital visits was after this. numerous prior ED and inpatient hospitalizations, most recent hospitalization at [facility] from 9/18/24-9/26/24 for SI and AH, in current outpatient treatment with [facility] (Dr. Louis, [phone]), attends [facility] program, no current therapist. Reports h/o >40 lifetime admissions since age 15 y/o, h/o [facility] admission in 2020 and 2021 for 3 and 4 months respectively

## 2024-10-01 NOTE — ED BEHAVIORAL HEALTH ASSESSMENT NOTE - DIFFERENTIAL
Decompensation of schizoaffective disorder vs mood disorder with psychotic features. R/o underlying personality disorder. schizoaffective do vs mood disorder with psychotic features, borderline personality disorder, intellectual disability

## 2024-10-01 NOTE — ED BEHAVIORAL HEALTH ASSESSMENT NOTE - HPI (INCLUDE ILLNESS QUALITY, SEVERITY, DURATION, TIMING, CONTEXT, MODIFYING FACTORS, ASSOCIATED SIGNS AND SYMPTOMS)
Ms. Ramona Velasco is a 30 y/o woman, PMH asthma, past psych diagnoses per pt + chart of schizoaffective disorder vs bipolar disorder, ADHD, h/o 40 lifetime admissions most recent at [facility] for SA by gregg QUIÑONEZ (1.5 weeks, discharged on  ), h/o [facility] in  and  (For 3 and 4 months respectively), connected with [facility] (Dr. Louis, [phone]), not connected with therapist, h/o multiple lifetime SA by OD, h/o NSSIB most recently yesterday, denies any substance use, presenting to ED as walk-in for depression, SI, and AVH. ED course unremarkable; Hgb 11.4, MRI lumbar spine (For LBP and L numbness) showing marrow reconversion and disc bulge L5-S1.     During ED evaluation, Ms. Greene presents as calm, cooperative, linear, with euthymic affect not congruent to mood or appropriate to content. She reports she is presenting to ED because she has dealt with depression, SI, and AVH for months, but over the last few days this has been more severe. Possible stressor includes receiving news from her supportive housing ([employer]) last night that she will no longer be staying with them due to being "in and out of hospital", pt states she had no plan for where to go alternatively as she has no external social support system. Regarding SI, pt describes h/o chronic SI for years, including h/o multiple SA by OD - most recent was on 15 pills of naproxen (Reports she finished bottle and had no other meds at home), states she presented to [facility] psych ED and was discharged, denies medical attention being needed. Prior to this, second most recent was OD on 12 pills of benadryl (Again reports she finished bottle and had no other meds at home) prompting [facility] admission, in beginning of September. She describes self-injurious behaviour over last few days including head banging, pinching and scratching herself, spraying air freshener in her mouth - initially states this was with intention to die, later states it was not. Reports ongoing current SI, and is unable to identify protective factors or describe future goals. Denies HI. Regarding depression, pt states she has low mood, anhedonia, tearfulness, increased sleep (10-11 hours), decreased appetite, and feels like a burden. Denies h/o manic symptoms. Pt also endorses AH of her real mother saying derogatory statements and instructing her to bang her head against a wall, and VH of her  grandfather ( 2 years ago) and shadows, which cause her anxiety and panic attacks. She states last time she felt well was months ago.     Follows with OP psychiatrist Dr. Louis on [facility], [phone], unable to reach or LVM at this time due to full mailbox. States she last saw them on Friday. Does not have therapist. Reports adherence with current regimen of clozapine 75 mg Qhs, zoloft 100 mg Qd, prazosin 1 mg Qd, and abilify maintena (Has received this for ~2 years, last dose possibly 9/10/24). Describes not feeling regimen is helping her, does not recall medications that have helped in the past. Patient is a 29 year old female, domiciled at [facility] supportive housing, unemployed on SSDI, with PMH asthma, PPH multiple diagnoses incl schizoaffective do vs bipolar disorder, ADHD, PTSD, borderline personality disorder, intellectual disability, multiple ED presentations and inpatient admissions (>40 lifetime hospitalizations), prior state hospitalization, recent discharge from [facility]  in outpatient treatment          Collateral:   See SW note for collateral gathered from [facility]. Limited information from night staff, though possible med non-adherence d/t frequent ED presentations. Provider contacted [facility] at [phone] and overnight line, [phone], unable to reach, left voicemail regarding presentation    Spoke with ER psych resident at [facility] who reviewed note written by day team, patient presented earlier on 9/30 AM reporting suicidal ideation and auditory hallucinations, intrusive thoughts of harming herself but does not want to act on them. Had also reported presentation at [facility] psych ED the day prior. States that she was upset after ACT team stated she likes being in the hospital, which made her feel invalidated. Collateral was gathered from patient's boyfriend, Ifeanyi Lanier, and [facility], who denied safety concern or change from baseline. [facility] team confirm next appointment on 10/1 at [facility], 10:45am.     Chart reviewed. Per inpatient discharge summary from [facility], patient was admitted from 9/18/24-9/26/24 on voluntary basis due to ongoing SI. Stressors noted then incl possible loss of supportive housing from [facility] due to multiple inpatient admissions and breakup with boyfriend, patient had reported fluctuating mood, intermittent CAH (to bang her head,  patient  alsoreported drinking a bottle of soap d/t AH), would write long suicide notes to staff; trialed clozapine increase during hospital stay but with concern for contributing towards worsening OCD symptoms, clozapine was downtapered and Zoloft increased to address anxiety.Patient eventually signed 72 hr letter, citing improved relationship with boyfriend, no longer wishes to be in ED.     Discharge meds:  Abilify Maintena 400 mg monthly, last received approx 9/10/24 (per note)  clozapine 75 mg nightly  gabapentin 300 mg BID  Zoloft 125 mg daily Patient is a 29 year old female, domiciled at [facility], unemployed on SSDI, with PMH asthma, PPH multiple diagnoses incl schizoaffective do vs bipolar disorder, ADHD, PTSD, borderline personality disorder, intellectual disability, multiple ED presentations and inpatient admissions (>40 lifetime hospitalizations), prior state hospitalization ([facility], for 3 mo in 2020, 4 mo in 2021), recent discharge from [facility] (admitted from  9/18/24-9/26/24 due to SI and AH), hx suicidal behavior (most recently ingested Benadryl x 12 tabs prior to hospitalization at [facility]/ed on 9/11), in outpatient treatment with [facility] (Dr. Louis, [phone]), self-presenting to ED reporting ongoing SI and AH.    Patient was seen 12 hrs after initial presentation. On eval, patient with tired affect but fairly engaged in interview. Patient states that since her discharge from [facility] had felt well for a day, but started feeling depressed again and hearing voices, earlier yesterday had [facility], observed for several hours prior to discharge home. Patient had taken a bus to [place]. States that she has experienced low mood and AH since age 17 with brief improvements through hospital stays but ongoing recurrence. States that today voices are calling her bad names, hears voices at least once per hour. She denies current CAH. She states that she ruminates that 'people would be better off if I wasn't here', has had various thoughts of self-harm including strangling herself or banging her head, but no intent or plan. She denies specific triggering events, states that she feels worse when she is at home and notes improvement while in ED. Denies any SI currently, states that she is 'able to find positives' to living. Describes stressors including not having anythign to do during the day, is in a relationship with her boyfriend and speaks/texts with him daily but also feels talking is not helpful sometimes. She denies any substance use. Patient otherwise reports adherence to medications - one missed dose over the past week due to ED stay, incl missed dose last night. States that she is connected with her [facility], has upcoming [facility] appointment today at 10:45am which she plans to attend. Patient confirms medication list: clozapine 75 mg daily, Zoloft 100 mg qam, prazosin 1 mg daily, protonix 40 mg nightly, Abilify Maintena 400 mg monthly (last took in early Sept, next dose due next week).      Discussed safety plan with patient, patient describes her support system including her boyfriend and grandmother, discussed indications to return to ED. Reinforced med adherence (risk of missed doses during ED visits)    Collateral:   See SW note for collateral gathered from [facility]. Limited information from night staff, though possible med non-adherence d/t frequent ED presentations. Provider contacted [facility] at [phone] and overnight line, [phone], unable to reach, left voicemail regarding presentation    Spoke with ER psych [facility] at [facility] who reviewed note written by day team, patient presented earlier on 9/30 AM reporting suicidal ideation and auditory hallucinations, intrusive thoughts of harming herself but does not want to act on them. Had also reported presentation at [facility] psych ED the day prior. States that she was upset after ACT team stated she likes being in the hospital, which made her feel invalidated. Collateral was gathered from patient's boyfriend, Ifeanyi Lanier, and [facility], who denied safety concern or change from baseline. [facility] team confirm next appointment on 10/1 at [facility], 10:45am.     Chart reviewed. Per inpatient discharge summary from [facility], patient was admitted from 9/18/24-9/26/24 on voluntary basis due to ongoing SI. Stressors noted then incl possible loss of supportive housing from [facility] due to multiple inpatient admissions and breakup with boyfriend, patient had reported fluctuating mood, intermittent CAH (to bang her head,  patient  alsoreported drinking a bottle of soap d/t AH), would write long suicide notes to staff; trialed clozapine increase during hospital stay but with concern for contributing towards worsening OCD symptoms, clozapine was downtapered and Zoloft increased to address anxiety.Patient eventually signed 72 hr letter, citing improved relationship with boyfriend, no longer wishes to be in ED.     Discharge meds: Abilify Maintena 400 mg monthly, last received approx 9/10/24 (per note), clozapine 75 mg nightly, gabapentin 300 mg BID, Zoloft 125 mg daily

## 2024-10-01 NOTE — ED BEHAVIORAL HEALTH ASSESSMENT NOTE - SUMMARY
Ms. Ramona Velasco is a 30 y/o woman, PMH asthma, past psych diagnoses per pt + chart of schizoaffective disorder vs bipolar disorder, ADHD, h/o 40 lifetime admissions most recent at [facility] for SA by gregg QUIÑONEZ (1.5 weeks, discharged on  9/11), h/o [facility] in 2020 and 2021 (For 3 and 4 months respectively), connected with [facility] (Dr. Louis, [phone]), not connected with therapist, h/o multiple lifetime SA by OLAMIDE, h/o NSSIB most recently yesterday, denies any substance use, presenting to ED as walk-in for depression, SI, and AVH. ED course unremarkable; Hgb 11.4, MRI lumbar spine (For LBP and L numbness) showing marrow reconversion and disc bulge L5-S1.     She reports symptoms of depression, current SI and multiple recent low lethality suicide attempts, and AH+VH. Denies h/o catina, denies substance use. 29 year old female, domiciled at [facility], unemployed on SSDI, with PMH asthma, PPH multiple diagnoses incl schizoaffective do vs bipolar disorder, ADHD, PTSD, borderline personality disorder, intellectual disability, multiple ED presentations and inpatient admissions (>40 lifetime hospitalizations), prior state hospitalization ([facility], for 3 mo in 2020, 4 mo in 2021), recent discharge from [facility] (admitted from  9/18/24-9/26/24 due to SI and AH), hx suicidal behavior (most recently ingested Benadryl x 12 tabs prior to hospitalization at [facility], d/ced on 9/11), in outpatient treatment with [facility] (Dr. Louis, [phone]), self-presenting to ED reporting ongoing SI and AH.    Patient initially presented requesting hospital admission; through prolonged ED stay reported some improvement in mood, describing more positive outlook. Describes long history of suicidal thoughts and AH since age 17, feelings of purposelessness, some brief improvement through hospital stay from socializing in the milieu. On MSE, thought process linear, denies active SI/HI, denies current AH/CAH, not internally preoccupied. Limited collateral available overnight (unable to reach ACT team, night staff unfamiliar with current presentation), however further collateral was gathered earlier on 9/30 from [facility], patient's boyfriend and ACT team deny change in baseline, ongoing numerous presentations to EDs (presented at [facility] on 9/29, [facility] on 9/30) seeking admission. Impression is schizoaffective do vs mood disorder with psychotic features, borderline personality disorder, intellectual disability. Current risk does not appear elevated from baseline. No indication for inpatient psychiatric hospitalization at this time, nor would this likely serve to benefit patient given upcoming intake appointments. Pt expresses motivation to attend appointments. Next follow-up with [facility] on 10/1/24 at 10:45am.

## 2024-10-01 NOTE — ED BEHAVIORAL HEALTH ASSESSMENT NOTE - NSCURPASTPSYDX_PSY_ALL_CORE
Mood disorder/Psychotic disorder/ADHD Mood disorder/Psychotic disorder/ADHD/Eating disorder/Cluster B Personality disorder/traits

## 2024-10-01 NOTE — ED BEHAVIORAL HEALTH ASSESSMENT NOTE - SAFETY PLAN ADDT'L DETAILS
Patient educated on condition. Safety plan discussed with.../Education provided regarding environmental safety / lethal means restriction/Provision of National Suicide Prevention Lifeline 3-701-263-HNXA (4433)

## 2024-10-01 NOTE — ED BEHAVIORAL HEALTH NOTE - BEHAVIORAL HEALTH NOTE
[facility] attempted to obtain collateral from [facility], [phone].    Night staff member, Benjy, reports that pt has been "off meds for a while" when this writer inquired about active medications and adherence. Specific timeline and medication dosages are uncertain to collateral. Specific precipitants leading to today's ER visit are also unknown to collateral however staff member reports pt does frequent the ER. No acute safety concerns reported however night staff member states he is unable to determine if pt is authorized to return home as no supervisor is on duty to provide further history. Unable to obtain further meaningful collateral. Attending aware.

## 2024-10-01 NOTE — ED BEHAVIORAL HEALTH ASSESSMENT NOTE - DESCRIPTION
ED course unremarkable; Hgb 11.4, MRI lumbar spine (For LBP and L numbness) showing marrow reconversion and disc bulge L5-S1. Domiciled in [facility] supportive housing, on SSI, not in contact with family, feels she has no friends Asthma Domiciled in [facility] supportive housing, on SSI, describes supports incl maternal grandmother, boyfriend Patient in good behavioral control while in ED    Vital Signs Last 24 Hrs  T(C): 36.4 (01 Oct 2024 04:33), Max: 36.7 (01 Oct 2024 00:33)  T(F): 97.6 (01 Oct 2024 04:33), Max: 98 (01 Oct 2024 00:33)  HR: 70 (01 Oct 2024 04:33) (63 - 77)  BP: 121/77 (01 Oct 2024 04:33) (103/68 - 121/77)  BP(mean): 92 (01 Oct 2024 04:33) (92 - 92)  RR: 18 (01 Oct 2024 04:33) (17 - 18)  SpO2: 98% (01 Oct 2024 04:33) (98% - 99%)    Parameters below as of 01 Oct 2024 04:33  Patient On (Oxygen Delivery Method): room air

## 2024-10-01 NOTE — ED BEHAVIORAL HEALTH ASSESSMENT NOTE - NSACTIVEVENT_PSY_ALL_CORE
Triggering events leading to humiliation, shame, and/or despair (e.g., Loss of relationship, financial or health status) (real or anticipated)/Inadequate social supports Inadequate social supports

## 2024-10-01 NOTE — ED BEHAVIORAL HEALTH ASSESSMENT NOTE - RISK ASSESSMENT
Voluntary admission for risk to self given impulsivity, h/o multiple lifetime attempts (Static), and ongoing depression (Modifiable). chronic elevated risk of harm, static risk factors incl hx suicidal behavior (overuse of medications, ingesting hand , head-banging), impulsivity, genetic loading; modifiable risk factors incl limited social supports, lack of day structure/meaningful activities to do during the day, likely fluctuating med adherence d/t frequent ED presentations; protective factors incl help-seeking behavior, treatment adherence (per PSYCKES), supportive treatment team

## 2024-10-03 DIAGNOSIS — F90.9 ATTENTION-DEFICIT HYPERACTIVITY DISORDER, UNSPECIFIED TYPE: ICD-10-CM

## 2024-10-03 DIAGNOSIS — F50.2 BULIMIA NERVOSA: ICD-10-CM

## 2024-10-03 DIAGNOSIS — Z91.410 PERSONAL HISTORY OF ADULT PHYSICAL AND SEXUAL ABUSE: ICD-10-CM

## 2024-10-03 DIAGNOSIS — J45.909 UNSPECIFIED ASTHMA, UNCOMPLICATED: ICD-10-CM

## 2024-10-03 DIAGNOSIS — F60.3 BORDERLINE PERSONALITY DISORDER: ICD-10-CM

## 2024-10-03 DIAGNOSIS — R45.851 SUICIDAL IDEATIONS: ICD-10-CM

## 2024-10-03 DIAGNOSIS — F25.9 SCHIZOAFFECTIVE DISORDER, UNSPECIFIED: ICD-10-CM

## 2024-10-03 DIAGNOSIS — F32.9 MAJOR DEPRESSIVE DISORDER, SINGLE EPISODE, UNSPECIFIED: ICD-10-CM

## 2024-10-03 DIAGNOSIS — E66.3 OVERWEIGHT: ICD-10-CM

## 2025-01-06 ENCOUNTER — EMERGENCY (EMERGENCY)
Facility: HOSPITAL | Age: 30
LOS: 1 days | Discharge: HOME | End: 2025-01-06
Attending: STUDENT IN AN ORGANIZED HEALTH CARE EDUCATION/TRAINING PROGRAM
Payer: MEDICAID

## 2025-01-06 PROCEDURE — 99285 EMERGENCY DEPT VISIT HI MDM: CPT | Mod: 25

## 2025-01-07 VITALS
TEMPERATURE: 99 F | RESPIRATION RATE: 17 BRPM | HEART RATE: 113 BPM | SYSTOLIC BLOOD PRESSURE: 137 MMHG | OXYGEN SATURATION: 92 % | DIASTOLIC BLOOD PRESSURE: 87 MMHG

## 2025-01-07 VITALS
RESPIRATION RATE: 18 BRPM | TEMPERATURE: 97 F | HEIGHT: 62 IN | HEART RATE: 82 BPM | OXYGEN SATURATION: 98 % | DIASTOLIC BLOOD PRESSURE: 89 MMHG | SYSTOLIC BLOOD PRESSURE: 136 MMHG | WEIGHT: 293 LBS

## 2025-01-07 LAB
ALBUMIN SERPL ELPH-MCNC: 3.4 G/DL — LOW (ref 3.5–5)
ALP SERPL-CCNC: 125 U/L — HIGH (ref 40–120)
ALT FLD-CCNC: 18 U/L DA — SIGNIFICANT CHANGE UP (ref 10–60)
ANION GAP SERPL CALC-SCNC: 7 MMOL/L — SIGNIFICANT CHANGE UP (ref 5–17)
APAP SERPL-MCNC: <2 UG/ML — LOW (ref 10–30)
APPEARANCE UR: ABNORMAL
AST SERPL-CCNC: 17 U/L — SIGNIFICANT CHANGE UP (ref 10–40)
BACTERIA # UR AUTO: NEGATIVE /HPF — SIGNIFICANT CHANGE UP
BASOPHILS # BLD AUTO: 0.02 K/UL — SIGNIFICANT CHANGE UP (ref 0–0.2)
BASOPHILS NFR BLD AUTO: 0.2 % — SIGNIFICANT CHANGE UP (ref 0–2)
BILIRUB SERPL-MCNC: 0.2 MG/DL — SIGNIFICANT CHANGE UP (ref 0.2–1.2)
BILIRUB UR-MCNC: NEGATIVE — SIGNIFICANT CHANGE UP
BUN SERPL-MCNC: 19 MG/DL — HIGH (ref 7–18)
CALCIUM SERPL-MCNC: 8.9 MG/DL — SIGNIFICANT CHANGE UP (ref 8.4–10.5)
CHLORIDE SERPL-SCNC: 107 MMOL/L — SIGNIFICANT CHANGE UP (ref 96–108)
CO2 SERPL-SCNC: 24 MMOL/L — SIGNIFICANT CHANGE UP (ref 22–31)
COLOR SPEC: YELLOW — SIGNIFICANT CHANGE UP
CREAT SERPL-MCNC: 0.6 MG/DL — SIGNIFICANT CHANGE UP (ref 0.5–1.3)
DIFF PNL FLD: ABNORMAL
EGFR: 126 ML/MIN/1.73M2 — SIGNIFICANT CHANGE UP
EOSINOPHIL # BLD AUTO: 0 K/UL — SIGNIFICANT CHANGE UP (ref 0–0.5)
EOSINOPHIL NFR BLD AUTO: 0 % — SIGNIFICANT CHANGE UP (ref 0–6)
EPI CELLS # UR: PRESENT
ETHANOL SERPL-MCNC: 13 MG/DL — HIGH (ref 0–10)
GLUCOSE SERPL-MCNC: 112 MG/DL — HIGH (ref 70–99)
GLUCOSE UR QL: NEGATIVE MG/DL — SIGNIFICANT CHANGE UP
HCG SERPL-ACNC: <1 MIU/ML — SIGNIFICANT CHANGE UP
HCT VFR BLD CALC: 36.3 % — SIGNIFICANT CHANGE UP (ref 34.5–45)
HCV AB S/CO SERPL IA: 0.17 S/CO — SIGNIFICANT CHANGE UP (ref 0–0.99)
HCV AB SERPL-IMP: SIGNIFICANT CHANGE UP
HGB BLD-MCNC: 11.6 G/DL — SIGNIFICANT CHANGE UP (ref 11.5–15.5)
IMM GRANULOCYTES NFR BLD AUTO: 0.2 % — SIGNIFICANT CHANGE UP (ref 0–0.9)
KETONES UR-MCNC: NEGATIVE MG/DL — SIGNIFICANT CHANGE UP
LEUKOCYTE ESTERASE UR-ACNC: NEGATIVE — SIGNIFICANT CHANGE UP
LIDOCAIN IGE QN: 28 U/L — SIGNIFICANT CHANGE UP (ref 13–75)
LYMPHOCYTES # BLD AUTO: 2.83 K/UL — SIGNIFICANT CHANGE UP (ref 1–3.3)
LYMPHOCYTES # BLD AUTO: 31.4 % — SIGNIFICANT CHANGE UP (ref 13–44)
MCHC RBC-ENTMCNC: 24.5 PG — LOW (ref 27–34)
MCHC RBC-ENTMCNC: 32 G/DL — SIGNIFICANT CHANGE UP (ref 32–36)
MCV RBC AUTO: 76.7 FL — LOW (ref 80–100)
MONOCYTES # BLD AUTO: 0.61 K/UL — SIGNIFICANT CHANGE UP (ref 0–0.9)
MONOCYTES NFR BLD AUTO: 6.8 % — SIGNIFICANT CHANGE UP (ref 2–14)
NEUTROPHILS # BLD AUTO: 5.53 K/UL — SIGNIFICANT CHANGE UP (ref 1.8–7.4)
NEUTROPHILS NFR BLD AUTO: 61.4 % — SIGNIFICANT CHANGE UP (ref 43–77)
NITRITE UR-MCNC: NEGATIVE — SIGNIFICANT CHANGE UP
NRBC # BLD: 0 /100 WBCS — SIGNIFICANT CHANGE UP (ref 0–0)
PH UR: 5 — SIGNIFICANT CHANGE UP (ref 5–8)
PLATELET # BLD AUTO: 284 K/UL — SIGNIFICANT CHANGE UP (ref 150–400)
POTASSIUM SERPL-MCNC: 3.6 MMOL/L — SIGNIFICANT CHANGE UP (ref 3.5–5.3)
POTASSIUM SERPL-SCNC: 3.6 MMOL/L — SIGNIFICANT CHANGE UP (ref 3.5–5.3)
PROT SERPL-MCNC: 8 G/DL — SIGNIFICANT CHANGE UP (ref 6–8.3)
PROT UR-MCNC: NEGATIVE MG/DL — SIGNIFICANT CHANGE UP
RBC # BLD: 4.73 M/UL — SIGNIFICANT CHANGE UP (ref 3.8–5.2)
RBC # FLD: 16.6 % — HIGH (ref 10.3–14.5)
RBC CASTS # UR COMP ASSIST: SIGNIFICANT CHANGE UP /HPF (ref 0–4)
SALICYLATES SERPL-MCNC: <1.7 MG/DL — LOW (ref 2.8–20)
SODIUM SERPL-SCNC: 138 MMOL/L — SIGNIFICANT CHANGE UP (ref 135–145)
SP GR SPEC: 1.03 — SIGNIFICANT CHANGE UP (ref 1–1.03)
URATE CRY FLD QL MICRO: PRESENT
UROBILINOGEN FLD QL: 0.2 MG/DL — SIGNIFICANT CHANGE UP (ref 0.2–1)
WBC # BLD: 9.01 K/UL — SIGNIFICANT CHANGE UP (ref 3.8–10.5)
WBC # FLD AUTO: 9.01 K/UL — SIGNIFICANT CHANGE UP (ref 3.8–10.5)
WBC UR QL: 2 /HPF — SIGNIFICANT CHANGE UP (ref 0–5)

## 2025-01-07 PROCEDURE — 99285 EMERGENCY DEPT VISIT HI MDM: CPT | Mod: 25

## 2025-01-07 PROCEDURE — 36415 COLL VENOUS BLD VENIPUNCTURE: CPT

## 2025-01-07 PROCEDURE — 93005 ELECTROCARDIOGRAM TRACING: CPT

## 2025-01-07 PROCEDURE — 80053 COMPREHEN METABOLIC PANEL: CPT

## 2025-01-07 PROCEDURE — 84702 CHORIONIC GONADOTROPIN TEST: CPT

## 2025-01-07 PROCEDURE — 90792 PSYCH DIAG EVAL W/MED SRVCS: CPT | Mod: 95

## 2025-01-07 PROCEDURE — 83690 ASSAY OF LIPASE: CPT

## 2025-01-07 PROCEDURE — 81001 URINALYSIS AUTO W/SCOPE: CPT

## 2025-01-07 PROCEDURE — 86803 HEPATITIS C AB TEST: CPT

## 2025-01-07 PROCEDURE — 80307 DRUG TEST PRSMV CHEM ANLYZR: CPT

## 2025-01-07 PROCEDURE — 85025 COMPLETE CBC W/AUTO DIFF WBC: CPT

## 2025-01-07 RX ORDER — LIDOCAINE 50 MG/G
1 OINTMENT TOPICAL ONCE
Refills: 0 | Status: COMPLETED | OUTPATIENT
Start: 2025-01-07 | End: 2025-01-07

## 2025-01-07 RX ORDER — ACETAMINOPHEN 80 MG/.8ML
650 SOLUTION/ DROPS ORAL ONCE
Refills: 0 | Status: COMPLETED | OUTPATIENT
Start: 2025-01-07 | End: 2025-01-07

## 2025-01-07 RX ORDER — METHOCARBAMOL 500 MG
1500 TABLET ORAL ONCE
Refills: 0 | Status: COMPLETED | OUTPATIENT
Start: 2025-01-07 | End: 2025-01-07

## 2025-01-07 RX ORDER — KETOROLAC TROMETHAMINE 30 MG/ML
15 INJECTION INTRAMUSCULAR; INTRAVENOUS ONCE
Refills: 0 | Status: DISCONTINUED | OUTPATIENT
Start: 2025-01-07 | End: 2025-01-07

## 2025-01-07 RX ORDER — ONDANSETRON 4 MG/1
4 TABLET ORAL ONCE
Refills: 0 | Status: DISCONTINUED | OUTPATIENT
Start: 2025-01-07 | End: 2025-01-07

## 2025-01-07 RX ADMIN — LIDOCAINE 1 PATCH: 50 OINTMENT TOPICAL at 02:30

## 2025-01-07 RX ADMIN — Medication 1500 MILLIGRAM(S): at 02:30

## 2025-01-07 RX ADMIN — ACETAMINOPHEN 650 MILLIGRAM(S): 80 SOLUTION/ DROPS ORAL at 02:30

## 2025-01-07 RX ADMIN — ACETAMINOPHEN 650 MILLIGRAM(S): 80 SOLUTION/ DROPS ORAL at 03:13

## 2025-01-07 NOTE — ED BEHAVIORAL HEALTH ASSESSMENT NOTE - PAST PSYCHOTROPIC MEDICATION
abilify , wellbutrin, lamictal, trazodone, haldol, klonopin, seroquel, lexapro, lithium, ativan, mirtazpine, naltrexone, depakote, prozac, risperidone,

## 2025-01-07 NOTE — ED ADULT TRIAGE NOTE - NS_BH TRG QUESTION8_ED_ALL_ED
Depression (without Suicidality or Psychosis)/Natacha (includes Bipolar Disorder)/Anxiety (includes Panic, OCD)

## 2025-01-07 NOTE — ED BEHAVIORAL HEALTH NOTE - BEHAVIORAL HEALTH NOTE
LMSW attempted to obtain collateral from [facility] via [phone]. Overnight staff member states it is unclear how pt presented to the ER as they did not activate 911. Staff member reports that pt took her medication last night then stepped out of the residence but later did not return. Staff member reports that pt was discharged from [place] (pt reported ABILIO St[facility]) yesterday however staff member is unaware of the reason for admission and is also uncertain if any medication adjustments were implemented while inpatient. Staff member advised calling back at 8:30A (number above) to speak with supervisor Ms. Coley to get more comprehensive information as he is not as familiar with pt's clinical hx/baseline due to working the overnight shift. Attending psychiatrist aware of attempt.

## 2025-01-07 NOTE — ED ADULT NURSE NOTE - OBJECTIVE STATEMENT
Patient having suicidal thoughts. Denies acting out on these thoughts. Denies having a plan. Does not want to harm others.
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
## 2025-01-07 NOTE — ED PROVIDER NOTE - PATIENT PORTAL LINK FT
You can access the FollowMyHealth Patient Portal offered by [facility] by registering at the following website: [email or web address]. By joining [employer]’s FollowMyHealth portal, you will also be able to view your health information using other applications (apps) compatible with our system.

## 2025-01-07 NOTE — ED ADULT TRIAGE NOTE - CHIEF COMPLAINT QUOTE
Pt reports that she is Depressed  wants to Kill herself , suicidal Ideation x2 days ,  Lower back pain with right leg numbness x4 days  RUQ abdominal pain with nausea x4 days . Pt is on ZOLOFT 150 mg daily

## 2025-01-07 NOTE — ED BEHAVIORAL HEALTH ASSESSMENT NOTE - DESCRIPTION
Asthma Domiciled in [facility] supportive housing, on SSI Patient in good behavioral control while in ED     Vital Signs:  · BP Systolic     136 mm Hg  · BP Diastolic    89 mm Hg  · Heart Rate      82 /min  · Respiration Rate (breaths/min)    18 /min  · Temp (F)  97.4 Degrees F  · Temp (C)  36.3 Degrees C  · Temp site oral  · SpO2 (%)  98 %  · O2 Delivery/Oxygen Delivery Method      room air  · Temp at ED Arrival (C)      36.3 Degrees C

## 2025-01-07 NOTE — ED BEHAVIORAL HEALTH ASSESSMENT NOTE - DIFFERENTIAL
schizoaffective do vs mood disorder with psychotic features, borderline personality disorder, intellectual disability

## 2025-01-07 NOTE — ED ADULT NURSE NOTE - NSFALLUNIVINTERV_ED_ALL_ED
Bed/Stretcher in lowest position, wheels locked, appropriate side rails in place/Call bell, personal items and telephone in reach/Instruct patient to call for assistance before getting out of bed/chair/stretcher/Non-slip footwear applied when patient is off stretcher/[place] to call system/Physically safe environment - no spills, clutter or unnecessary equipment/Purposeful proactive rounding/Room/bathroom lighting operational, light cord in reach

## 2025-01-07 NOTE — ED ADULT NURSE NOTE - ED STAT RN HANDOFF DETAILS
Patient discharged to facility being transported via senior care via wheelchair. Patient discharged home as per MD order, IV access removed no redness, swelling or bleeding noted at site. All discharge instructions and F/U visits provided to patient. Patient verbalizes understanding leaving via wheelchair stable in no acute distress.

## 2025-01-07 NOTE — ED BEHAVIORAL HEALTH ASSESSMENT NOTE - SUMMARY
29 year old female, domiciled at [facility], unemployed on SSDI, with PMH asthma, PPH multiple diagnoses incl schizoaffective do vs bipolar disorder, ADHD, PTSD, borderline personality disorder, intellectual disability, multiple ED presentations and inpatient admissions (>40 lifetime hospitalizations), prior state hospitalization ([facility], for 3 mo in 2020, 4 mo in 2021), recent discharge from [facility], , hx suicidal behavior via overdoses , in outpatient treatment with [facility] (Dr. Louis, [phone]), self-presenting to ED reporting ongoing SI and depression    Patient presented requesting hospital admission at [facility] due to worsening SI with a plan to cut herself with a knife. She describes symptoms of worsening depression including low mood, numbness, hypersomnia and poor appetite. She also describes ongoing anxiety/panic  AVH and paranoia. Pt is unable to reprot any worsening psychosocial stressors that are contributing to her presentation.      Collateral from her Supportive Housing is limited overnight and will need to strengthen collateral regarding precipitating factors in the AM.  Input from the ACT team would also be helpful in informing disposition.  Will hodl for reassessment but maintain a low threshold for hospitalization.

## 2025-01-07 NOTE — ED PROVIDER NOTE - PROGRESS NOTE DETAILS
Meliza PHOENIX: pt endorsed to telepsych, pt pending eval. DO Jorje, EM Attending: Patient signed out to me pending telepsych recommendations.  Per telepsych patient is cleared, discussed with patient.  Will arrange transport to get her back to her group home.

## 2025-01-07 NOTE — ED PROVIDER NOTE - OBJECTIVE STATEMENT
29-year-old female, history of asthma, presenting with chief complaint of back pain and suicidal ideation.   Patient endorsing bilateral low back pain with radiation down the right lower extremity for the past 4 days, which she describes as stabbing in sensation.  She denies any numbness, weakness, saddle anesthesia, or urinary/bowel retention/incontinence.   Denies any preceding trauma or injury to the back.  Patient also endorsing SI, states that she wants to kill herself by overdosing.  Patient does endorse  similar presentations in the past.  She denies any homicidal ideations or hallucinations.   Triage note mentioned patient endorsing right upper quadrant pain and nausea, however patient currently denies these symptoms.   Denies any nausea, vomiting, fever or chills.

## 2025-01-07 NOTE — ED PROVIDER NOTE - CLINICAL SUMMARY MEDICAL DECISION MAKING FREE TEXT BOX
29-year-old female, history of asthma, presenting with chief complaint of back pain and suicidal ideation.    No midline spinal tenderness to palpation or history of injury to suggest fracture, also no red flag symptoms to suggest cord compression.   Neurovascularly intact. Most likely sciatica, will provide analgesia.  Patient denies any abdominal pain, no abdominal tenderness to palpation.  Will obtain psych labs and call psychiatrist on-call patient placed on constant observation.

## 2025-01-07 NOTE — ED BEHAVIORAL HEALTH ASSESSMENT NOTE - OTHER PAST PSYCHIATRIC HISTORY (INCLUDE DETAILS REGARDING ONSET, COURSE OF ILLNESS, INPATIENT/OUTPATIENT TREATMENT)
numerous prior ED and inpatient hospitalizations, most recent hospitalization at  [facility] d/ed yesterday. in current outpatient treatment with [facility] (Dr. Louis, [phone]),    Reports h/o >40 lifetime admissions since age 17 y/o, h/o [facility] admission in 2020 and 2021 for 3 and 4 months respectively

## 2025-01-07 NOTE — ED BEHAVIORAL HEALTH NOTE - BEHAVIORAL HEALTH NOTE
Chart reviewed, received signout, patient reassessed   - See [facility] note for collateral from group home staff, who note that patient's baseline is going to the ED every day or every other day, often reports cutting or suicide ideation, often reports that she made a suicide attempt when she did not (gave example of saying she overdosed but them finding that she took one pill), no actual suicide attempts since being there. They state that she has been to the hospital at least 16 times in the last 6 months but that it is likely more. They do not feel that there have been any significant changes    On interview today patient states that she feels the same, still feels "empty". She ultimately acknowledged that she has been in the hospital multiple times recently ([facility] until yesterday, reports other hospital two weeks prior) and understood that it did not make sense to continue to repeatedly hospitalize her without her giving outpatient treatment a try first. She asked that transportation be arranged for her to go to her group home.     MSE: Age appearing female dressed appropriately, did not appear to be internally preoccupied, psychomotor normal, good eye contact, well related. Mood "the same" affect congruent, full range, predominantly euthymic. Linear TP, did not endorse suicide ideation this morning. No auditory, visual, or tactile hallucinations endorsed. AAOx3.     A/P  29F, living in [facility] supportive housing, PMH of asthma, psych hx of SAD, bipolar, adhd, PTSD, borderline, possible ID, multiple hospitalizations (>40 lifetime), prior state hospital ([facility] for 3mo in 2020, 4 mo in 2021), hx of NSSIB, in outpt tx w [facility], discharged from [facility] yesterday now [facility] reporting SI and cutting, wanting admission.    Per collateral patient's baseline is going to the ED multiple times per week seeking admission for suicide ideation or NSSIB, she often lies about symptoms (reports overdoses when she did not) and they feel that she is at baseline without any changes that would escalate her risk. While patient wanted admission she accepted plan for discharge and was future oriented. Given multiple prior hospitalizations and likely cluster B contribution to presentation, it does not appear that additional hospitalization would significantly alter the course of her illness at this time and would likely reinforce maladaptive coping strategies. As such she is psychiatrically cleared for discharge     - Cleared for discharge   - Continue home medication recs   - Patient to be seen by team at residence today    Risk assessment: risk factors include report of suicide ideation, report of prior attempts, mood and psychotic diagnoses, borderline personality disorder, recent discharge, trauma. Protective factors include lack of known serious attempts, lack of access to firearms, history of being safely discharged despite suicidal thoughts, stable housing, adherence to medications, lack of current psychosis, sobriety. While patient risk factors place her at chronically elevated risk of suicide, this risk is mitigated by the aforementioned protective factors and it does not appear that she is at increased risk over her chronically elevated baseline. Hospitalization unlikely to help her as above.

## 2025-01-07 NOTE — ED BEHAVIORAL HEALTH ASSESSMENT NOTE - RISK ASSESSMENT
chronic elevated risk of harm, static risk factors incl hx suicidal behavior (overuse of medications, ingesting hand , head-banging), impulsivity, genetic loading; modifiable risk factors incl limited social supports, lack of day structure/meaningful activities to do during the day, likely fluctuating med adherence d/t frequent ED presentations; protective factors incl help-seeking behavior, treatment adherence (per PSYCKES), supportive treatment team

## 2025-01-07 NOTE — ED BEHAVIORAL HEALTH ASSESSMENT NOTE - HPI (INCLUDE ILLNESS QUALITY, SEVERITY, DURATION, TIMING, CONTEXT, MODIFYING FACTORS, ASSOCIATED SIGNS AND SYMPTOMS)
Patient is a 29 year old female, domiciled at [facility], unemployed on SSDI, with PMH asthma, PPH multiple diagnoses incl schizoaffective do vs bipolar disorder, ADHD, PTSD, borderline personality disorder, intellectual disability, multiple ED presentations and inpatient admissions (>40 lifetime hospitalizations), prior state hospitalization ([facility], for 3 mo in 2020, 4 mo in 2021), hx suicidal behavior, in outpatient treatment with [facility], self-presenting to ED reporting ongoing SI  and depression.    Pt  reports that she has been feeling worsening SI and depression x 1 week. She says she was discharged from [facility] earlier today. She said she disclosed how she was feeling but 'they said they couldn't do anything further for me and discharged me." Pt reports going home and then having a plan to cut herself with a knife so she came to the ED. She could not identify any protective factors that stopped her from hurting herself, now could she identify any triggers contributing to her worsening mood. She reports that her coping/distraction skills used to help her overcome suicidal thoughts but now the urges are getting more intense and frequently. She reports 8 prior suicide attempts via OD and a  history of SIB via scratching herself and head banging.     She also endorses AH calling her derogatory names, VH seeing her dead grandfather, feelings of paranoia and hypervigilance. She endorses depression, anxiety and panic, hopelessness. reports her appetite has been poor and her sleep has been excessive. She reports feeling empty.     Pt wants to be admitted and go to [facility] because she feels she is not judged there. she wants to also explore the possibility of a long term hosptialization.

## 2025-01-07 NOTE — ED PROVIDER NOTE - PHYSICAL EXAMINATION
Gen: well appearing  Resp: CTAB, no W/R/R  CV: RRR, +S1/S2, no M/R/G  GI: Abdomen soft non-distended, NTTP, no masses  MSK: No open wounds, no bruising, no lower extremity edema. no midline spinal ttp  Neuro: A&Ox4, sensation nl, MEx4, follows commands  Ext: no edema, no deformity, warm and well-perfused  Skin: no rash or bruising

## 2025-01-07 NOTE — ED PROVIDER NOTE - NSFOLLOWUPINSTRUCTIONS_ED_ALL_ED_FT
Please follow up with your primary care physician within 2-3 days.   Return to the ER for any new or concerning symptoms.   You may take 975 mg acetaminophen every 6 hours as needed for pain.    You were seen in the department for concerns of suicidal ideation.  You were assessed by our psychiatry team who at this time feels you are safe to be discharged back to her group home.  If you have any recurrent feelings or thoughts of hurting yourself or others please return to the emergency department to be reevaluated.    Please follow up with your outpatient psychiatry provider.

## 2025-01-13 ENCOUNTER — EMERGENCY (EMERGENCY)
Facility: HOSPITAL | Age: 30
LOS: 1 days | Discharge: HOME | End: 2025-01-13
Attending: EMERGENCY MEDICINE
Payer: MEDICAID

## 2025-01-13 VITALS
OXYGEN SATURATION: 99 % | WEIGHT: 293 LBS | HEART RATE: 93 BPM | DIASTOLIC BLOOD PRESSURE: 80 MMHG | HEIGHT: 62 IN | SYSTOLIC BLOOD PRESSURE: 132 MMHG | TEMPERATURE: 98 F | RESPIRATION RATE: 17 BRPM

## 2025-01-13 VITALS
DIASTOLIC BLOOD PRESSURE: 86 MMHG | HEART RATE: 90 BPM | SYSTOLIC BLOOD PRESSURE: 135 MMHG | TEMPERATURE: 98 F | RESPIRATION RATE: 18 BRPM | OXYGEN SATURATION: 98 %

## 2025-01-13 DIAGNOSIS — F79 UNSPECIFIED INTELLECTUAL DISABILITIES: ICD-10-CM

## 2025-01-13 LAB
ALBUMIN SERPL ELPH-MCNC: 3.2 G/DL — LOW (ref 3.5–5)
ALP SERPL-CCNC: 110 U/L — SIGNIFICANT CHANGE UP (ref 40–120)
ALT FLD-CCNC: 17 U/L DA — SIGNIFICANT CHANGE UP (ref 10–60)
ANION GAP SERPL CALC-SCNC: 6 MMOL/L — SIGNIFICANT CHANGE UP (ref 5–17)
APAP SERPL-MCNC: <2 UG/ML — LOW (ref 10–30)
APPEARANCE UR: CLEAR — SIGNIFICANT CHANGE UP
AST SERPL-CCNC: 16 U/L — SIGNIFICANT CHANGE UP (ref 10–40)
BASOPHILS # BLD AUTO: 0.02 K/UL — SIGNIFICANT CHANGE UP (ref 0–0.2)
BASOPHILS NFR BLD AUTO: 0.3 % — SIGNIFICANT CHANGE UP (ref 0–2)
BILIRUB SERPL-MCNC: 0.1 MG/DL — LOW (ref 0.2–1.2)
BILIRUB UR-MCNC: NEGATIVE — SIGNIFICANT CHANGE UP
BUN SERPL-MCNC: 18 MG/DL — SIGNIFICANT CHANGE UP (ref 7–18)
CALCIUM SERPL-MCNC: 8.9 MG/DL — SIGNIFICANT CHANGE UP (ref 8.4–10.5)
CHLORIDE SERPL-SCNC: 109 MMOL/L — HIGH (ref 96–108)
CK SERPL-CCNC: 84 U/L — SIGNIFICANT CHANGE UP (ref 21–215)
CO2 SERPL-SCNC: 25 MMOL/L — SIGNIFICANT CHANGE UP (ref 22–31)
COLOR SPEC: YELLOW — SIGNIFICANT CHANGE UP
CREAT SERPL-MCNC: 0.62 MG/DL — SIGNIFICANT CHANGE UP (ref 0.5–1.3)
DIFF PNL FLD: NEGATIVE — SIGNIFICANT CHANGE UP
EGFR: 125 ML/MIN/1.73M2 — SIGNIFICANT CHANGE UP
EOSINOPHIL # BLD AUTO: 0 K/UL — SIGNIFICANT CHANGE UP (ref 0–0.5)
EOSINOPHIL NFR BLD AUTO: 0 % — SIGNIFICANT CHANGE UP (ref 0–6)
ETHANOL SERPL-MCNC: 4 MG/DL — SIGNIFICANT CHANGE UP (ref 0–10)
GLUCOSE SERPL-MCNC: 83 MG/DL — SIGNIFICANT CHANGE UP (ref 70–99)
GLUCOSE UR QL: NEGATIVE MG/DL — SIGNIFICANT CHANGE UP
HCT VFR BLD CALC: 36.3 % — SIGNIFICANT CHANGE UP (ref 34.5–45)
HGB BLD-MCNC: 11.2 G/DL — LOW (ref 11.5–15.5)
IMM GRANULOCYTES NFR BLD AUTO: 0.4 % — SIGNIFICANT CHANGE UP (ref 0–0.9)
KETONES UR-MCNC: NEGATIVE MG/DL — SIGNIFICANT CHANGE UP
LEUKOCYTE ESTERASE UR-ACNC: NEGATIVE — SIGNIFICANT CHANGE UP
LYMPHOCYTES # BLD AUTO: 2.76 K/UL — SIGNIFICANT CHANGE UP (ref 1–3.3)
LYMPHOCYTES # BLD AUTO: 35.6 % — SIGNIFICANT CHANGE UP (ref 13–44)
MCHC RBC-ENTMCNC: 24 PG — LOW (ref 27–34)
MCHC RBC-ENTMCNC: 30.9 G/DL — LOW (ref 32–36)
MCV RBC AUTO: 77.9 FL — LOW (ref 80–100)
MONOCYTES # BLD AUTO: 0.4 K/UL — SIGNIFICANT CHANGE UP (ref 0–0.9)
MONOCYTES NFR BLD AUTO: 5.2 % — SIGNIFICANT CHANGE UP (ref 2–14)
NEUTROPHILS # BLD AUTO: 4.54 K/UL — SIGNIFICANT CHANGE UP (ref 1.8–7.4)
NEUTROPHILS NFR BLD AUTO: 58.5 % — SIGNIFICANT CHANGE UP (ref 43–77)
NITRITE UR-MCNC: NEGATIVE — SIGNIFICANT CHANGE UP
NRBC # BLD: 0 /100 WBCS — SIGNIFICANT CHANGE UP (ref 0–0)
PCP SPEC-MCNC: SIGNIFICANT CHANGE UP
PH UR: 5 — SIGNIFICANT CHANGE UP (ref 5–8)
PLATELET # BLD AUTO: 294 K/UL — SIGNIFICANT CHANGE UP (ref 150–400)
POTASSIUM SERPL-MCNC: 4.2 MMOL/L — SIGNIFICANT CHANGE UP (ref 3.5–5.3)
POTASSIUM SERPL-SCNC: 4.2 MMOL/L — SIGNIFICANT CHANGE UP (ref 3.5–5.3)
PROT SERPL-MCNC: 7.7 G/DL — SIGNIFICANT CHANGE UP (ref 6–8.3)
PROT UR-MCNC: NEGATIVE MG/DL — SIGNIFICANT CHANGE UP
RBC # BLD: 4.66 M/UL — SIGNIFICANT CHANGE UP (ref 3.8–5.2)
RBC # FLD: 16.6 % — HIGH (ref 10.3–14.5)
SALICYLATES SERPL-MCNC: <1.7 MG/DL — LOW (ref 2.8–20)
SODIUM SERPL-SCNC: 140 MMOL/L — SIGNIFICANT CHANGE UP (ref 135–145)
SP GR SPEC: 1.02 — SIGNIFICANT CHANGE UP (ref 1–1.03)
UROBILINOGEN FLD QL: 0.2 MG/DL — SIGNIFICANT CHANGE UP (ref 0.2–1)
WBC # BLD: 7.75 K/UL — SIGNIFICANT CHANGE UP (ref 3.8–10.5)
WBC # FLD AUTO: 7.75 K/UL — SIGNIFICANT CHANGE UP (ref 3.8–10.5)

## 2025-01-13 PROCEDURE — 80307 DRUG TEST PRSMV CHEM ANLYZR: CPT

## 2025-01-13 PROCEDURE — 93005 ELECTROCARDIOGRAM TRACING: CPT

## 2025-01-13 PROCEDURE — 99284 EMERGENCY DEPT VISIT MOD MDM: CPT

## 2025-01-13 PROCEDURE — 80053 COMPREHEN METABOLIC PANEL: CPT

## 2025-01-13 PROCEDURE — 81003 URINALYSIS AUTO W/O SCOPE: CPT

## 2025-01-13 PROCEDURE — 82550 ASSAY OF CK (CPK): CPT

## 2025-01-13 PROCEDURE — 84702 CHORIONIC GONADOTROPIN TEST: CPT

## 2025-01-13 PROCEDURE — 99285 EMERGENCY DEPT VISIT HI MDM: CPT | Mod: 25

## 2025-01-13 PROCEDURE — 36415 COLL VENOUS BLD VENIPUNCTURE: CPT

## 2025-01-13 PROCEDURE — 85025 COMPLETE CBC W/AUTO DIFF WBC: CPT

## 2025-01-13 PROCEDURE — 93010 ELECTROCARDIOGRAM REPORT: CPT

## 2025-01-13 RX ORDER — SODIUM CHLORIDE 9 MG/ML
1000 INJECTION, SOLUTION INTRAMUSCULAR; INTRAVENOUS; SUBCUTANEOUS ONCE
Refills: 0 | Status: COMPLETED | OUTPATIENT
Start: 2025-01-13 | End: 2025-01-13

## 2025-01-13 RX ORDER — LIDOCAINE 50 MG/G
1 OINTMENT TOPICAL ONCE
Refills: 0 | Status: COMPLETED | OUTPATIENT
Start: 2025-01-13 | End: 2025-01-13

## 2025-01-13 RX ADMIN — SODIUM CHLORIDE 1000 MILLILITER(S): 9 INJECTION, SOLUTION INTRAMUSCULAR; INTRAVENOUS; SUBCUTANEOUS at 13:39

## 2025-01-13 RX ADMIN — LIDOCAINE 1 PATCH: 50 OINTMENT TOPICAL at 13:38

## 2025-01-13 NOTE — ED BEHAVIORAL HEALTH ASSESSMENT NOTE - SUMMARY
This is a 28 yo female domiciled in supportive housing, with hx of intellectual disability, PTSD, Borderline Personality, schizoaffective disorder, extensive psychiatric hx including hospitalizations, many ED visits, and current treatment w/ [facility] team, who presents after reported overdose on 15 pills of Benadryl 25mg with SI.    The patient has a documented history of stating she engaged in certain self-harm behaviors that she did not, or exaggerating the severity of what actually took place. In line with his, today patient shows no clinical signs of Benadryl toxicity, and she states to me that she did not have any intention of harming herself or of wanting to die. Whether she actually took the overdose is questionable, but even if she had, it was a non-lethal act not motivated by a desire to die, and very much linked to pt's chronic impulsivity which itself is a feature of her intellectual disability that cannot be modified through psychiatric admission. At this time, she has already returned to a stable baseline mood. Her boyfriend corroborates the patient has not shown any signs of acute depression or emotional distress and has recently been affectively stable. Pt does not present a substantial risk at this time and can continue her outpatient management.

## 2025-01-13 NOTE — ED BEHAVIORAL HEALTH ASSESSMENT NOTE - NS ED BHA REVIEW OF ED CHART VITAL SIGNS REVIEWED
Patient ID: Augusto Castro is a 5 y.o. female who presents for Nausea. She is accompanied by her mother who provided the history.    Subjective   HPI  Augusto is a 5 y.o. 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 She began complaining of nausea yesterday. Mother notes she has also been more sleepy than usual yesterday as well. Her appetite was decreased 4-5 days ago. She is still able to drink liquids, although less than she normally does. Her highest temperature is 99.8 F this morning, but  told mother that she was sweaty yesterday. Her stool is more runny than normal, but is not pure diarrhea. She has been potty trained since January of this year, but she has been urinating and stooling into her pull up today. She does not have cough, congestion, runny nose, pulling at eats, or rashes. She has not eaten any suspicious foods, and she has not been around anybody who has been sick.    Review of Systems   Constitutional:  Positive for activity change (more sleepy than usual), appetite change (decreased) and diaphoresis. Negative for fever.   HENT:  Negative for congestion, ear pain and sore throat.    Eyes:  Negative for pain and redness.   Respiratory:  Negative for cough and wheezing.    Cardiovascular:  Negative for chest pain and cyanosis.   Gastrointestinal:  Positive for nausea. Negative for blood in stool, diarrhea and vomiting.   Genitourinary:  Negative for dysuria and hematuria.   Skin:  Negative for rash and wound.   Neurological:  Negative for weakness and headaches.     Objective   Visit Vitals  BP (!) 88/52   Pulse 110   Temp 36.1 °C (97 °F)   Resp 30     Physical Exam  Vitals reviewed.   Constitutional:       General: She is awake. She is not in acute distress.  HENT:      Head: Normocephalic and atraumatic.      Right Ear: Tympanic membrane normal.       Left Ear: Tympanic membrane normal.      Nose: Nose normal. No congestion.      Mouth/Throat:      Mouth: Mucous membranes are moist.   Eyes:      Extraocular Movements: Extraocular movements intact.      Pupils: Pupils are equal, round, and reactive to light.   Cardiovascular:      Rate and Rhythm: Normal rate and regular rhythm.      Pulses: Normal pulses.      Heart sounds: Normal heart sounds.   Pulmonary:      Effort: Pulmonary effort is normal. No respiratory distress.      Breath sounds: Normal breath sounds.   Abdominal:      General: There is no distension.      Palpations: Abdomen is soft.      Tenderness: There is no abdominal tenderness.   Skin:     General: Skin is warm and dry.      Capillary Refill: Capillary refill takes less than 2 seconds.      Findings: No rash.   Neurological:      Mental Status: She is alert.      Cranial Nerves: No facial asymmetry.      Sensory: No sensory deficit.      Motor: No abnormal muscle tone.       Assessment & Plan  Viral syndrome  5 y.o. 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 She currently has nausea without vomiting, decreased appetite, and increased sleepiness, most consistent with viral illness. Her exam is reassuring without evidence of bacterial infection. She has not had a fever at home and is afebrile in our office today. I recommended supportive care with Tylenol or Motrin as needed, oral hydration, and rest. Due to her cyclic neutropenia, I recommended mother have a low threshold to return for medical care if she develops a fever or her symptoms worsen.    Sage Stafford, DO   Yes

## 2025-01-13 NOTE — ED PROVIDER NOTE - PATIENT PORTAL LINK FT
You can access the FollowMyHealth Patient Portal offered by [facility] by registering at the following website: [email or web address]. By joining [facility]’s FollowMyHealth portal, you will also be able to view your health information using other applications (apps) compatible with our system.

## 2025-01-13 NOTE — ED PROVIDER NOTE - PROGRESS NOTE DETAILS
Priscila Duque M.D. (Resident Physician): Discussed with tox. They will put in formal recs. Pt will need to be monitored for 6 hours post-ingestion (till 5pm) for signs of anticholinergic toxicity. Priscila Duque M.D. (Resident Physician): Discussed with psych. They will see pt. Patient evaluated and cleared by psychiatry otherwise well-appearing neurovascular intact no signs of toxidrome. patient otherwise clinically stable well-appearing on discharge neurovascular intact on discharge

## 2025-01-13 NOTE — ED ADULT NURSE NOTE - NSFALLRISKINTERV_ED_ALL_ED

## 2025-01-13 NOTE — ED ADULT TRIAGE NOTE - CHIEF COMPLAINT QUOTE
Pt reports SA, took Benadryl 15 pills at 11AM x this morning and also c/o Rt leg pain x 2 days. Pt placed on 1:1 observation for SA.

## 2025-01-13 NOTE — ED ADULT NURSE NOTE - OBJECTIVE STATEMENT
Pt reports s/p SI attempt. pt reports overdosing on benadryl (15 pills) x 11am today. pt reports bringing herself to hospital via cab. pt reports back pain. pt placed on 1:1 constant observation for safety measures. Pt reports s/p SI attempt. pt reports overdosing on benadryl (15 pills) x 11am today. pt reports back pain. pt placed on 1:1 constant observation for safety measures.

## 2025-01-13 NOTE — ED BEHAVIORAL HEALTH ASSESSMENT NOTE - NSBHMSESPEECH_PSY_A_CORE
INFECTIOUS DISEASE PROGRESS NOTE    Patient: Rudy Lopez Date: 2023   : 1948 Attending: Vinay Avalos MD   75 year old male Hospital Day: 19     Chief Complaint/Reason for Consultation: bilateral osteomyelitis, UTI    Interval History/Subjective:   -No acute events overnight   -S/P right BKA  pain is more this morning   -Toe improving still  -Evaluated by PM&R         Reviewed Pertinent: Allergies, Medications, Medical History, Surgical History and Social History    Current Facility-Administered Medications   Medication   • sodium chloride 0.9% infusion   • HYDROcodone-acetaminophen (NORCO) 5-325 MG per tablet 1 tablet    Or   • HYDROcodone-acetaminophen (NORCO) 5-325 MG per tablet 2 tablet   • metoPROLOL succinate (TOPROL-XL) ER tablet 12.5 mg   • collagenase (SANTYL) ointment   • acetaminophen (TYLENOL) tablet 650 mg    Or   • acetaminophen (TYLENOL) suppository 650 mg   • sodium chloride 0.9 % flush bag 25 mL   • sodium chloride 0.9 % injection 2 mL   • aspirin (ECOTRIN) enteric coated tablet 81 mg   • cefepime (MAXIPIME) 1,000 mg in sodium chloride 0.9 % 100 mL IVPB   • metroNIDAZOLE (FLAGYL) premix IVPB 500 mg   • sodium chloride 0.9 % injection 10 mL   • fluticasone (FLONASE) 50 MCG/ACT nasal spray 1 spray   • apixaBAN (ELIQUIS) tablet 5 mg   • pantoprazole (PROTONIX) EC tablet 40 mg   • rosuvastatin (CRESTOR) tablet 20 mg   • tamsulosin (FLOMAX) capsule 0.8 mg   • Magnesium Standard Replacement Protocol   • Phosphorus Standard Replacement Protocol   • ondansetron (ZOFRAN) injection 4 mg   • docusate sodium-sennosides (SENOKOT S) 50-8.6 MG 2 tablet   • bisacodyl (DULCOLAX) suppository 10 mg   • sodium chloride 0.9 % flush bag 25 mL   • sodium chloride (NORMAL SALINE) 0.9 % bolus 500 mL   • dextrose 50 % injection 25 g   • dextrose 50 % injection 12.5 g   • glucagon (GLUCAGEN) injection 1 mg   • dextrose (GLUTOSE) 40 % gel 15 g   • dextrose (GLUTOSE) 40 % gel 30 g   • Potassium  Standard Replacement Protocol (Levels 3.5 and lower)   • insulin lispro (ADMELOG,HumaLOG) - Correction Dose   • sodium hypochlorite (DAKIN'S) 0.062 % (1/8 strength) irrigation solution 1 application.   • sodium hypochlorite (DAKIN'S) 0.062 % (1/8 strength) irrigation solution 1 application.   • lidocaine (XYLOCAINE) 2 % gel   • lidocaine (XYLOCAINE) 2 % gel   • lidocaine 2% urethral (UROJET) 2 % jelly 10 mL   • sodium chloride 0.9 % flush bag 25 mL   • sodium chloride 0.9 % injection 2 mL   • linezolid (ZYVOX) IVPB 600 mg       Vital Last Value 24 Hour Range   Temperature 98.4 °F (36.9 °C) Temp  Min: 97.8 °F (36.6 °C)  Max: 99 °F (37.2 °C)   Pulse 76 Pulse  Min: 66  Max: 76   Respiratory 16 Resp  Min: 16  Max: 18   Blood Pressure 113/67 BP  Min: 96/62  Max: 115/69   Pulse Oximetry 97 % SpO2  Min: 96 %  Max: 98 %   Art BP   No data recorded     Vital Today Admit   Weight 95.5 kg (210 lb 9.6 oz) Weight: 87.2 kg (192 lb 3.9 oz)   Height N/A Height: 5' 10\" (177.8 cm)   BMI N/A BMI (Calculated): 30.28     Weight over the past 48 Hours:  Patient Vitals for the past 48 hrs:   Weight   11/26/23 0553 96.4 kg (212 lb 8.4 oz)   11/27/23 0418 95.5 kg (210 lb 9.6 oz)        Intake/Output:  Last Stool Occurrence: 1 (11/26/23 1000)    No intake/output data recorded.    I/O last 3 completed shifts:  In: 6054.1 [P.O.:380; I.V.:2; Blood:300; IV Piggyback:5372.1]  Out: 2675 [Urine:2675]    Physical Exam:  -GENERAL: resting comfortably in bed in no acute distress  -HEENT: MMM, tongue midline  -NECK: supple, trachea midline   -LUNGS: good inspiratory effort, lungs CTAB  -HEART: RRR, no murmurs appreciated   -ABDOMINAL: Soft, non-tender, non-distended  -NEURO: motor and sensory function intact on all 4 extremities  -EXTREMITIES: crusted wounds on tips of toes on LLE and RLE, R heel wound in dressing, dressing on 2nd L toe, no erythema or edema, pulses difficult to palpate      Laboratory Results:    Recent Labs   Lab 11/26/23  6935  11/25/23  0353 11/24/23  1306 11/24/23  0355   SODIUM 140 142  --  141   POTASSIUM 3.7 3.8 3.7 3.4   CHLORIDE 110 113*  --  114*   CO2 24 22  --  20*   ANIONGAP 10 11  --  10   BUN 13 14  --  19   CREATININE 1.09 1.00  --  1.09   GLUCOSE 102* 129*  --  156*       Recent Labs   Lab 11/27/23  0444 11/26/23  1355 11/26/23  0657 11/26/23  0402 11/25/23  0353   WBC 7.7  --   --  7.8 8.2   HGB 7.7* 8.0* 6.7* 6.8* 7.0*   HCT 25.1*  --   --  22.8* 23.2*     --   --  199 202       Recent Labs   Lab 11/26/23  0402 11/25/23  0353 11/24/23  0355   CALCIUM 8.3* 8.2* 8.2*       Urinalysis:  Lab Results   Component Value Date    USPG 1.011 11/09/2023    UPROT 30 (A) 11/09/2023    UWBC Large (A) 11/09/2023    RBC 3.08 (L) 11/27/2023    URBC Small (A) 11/09/2023    UBILI Negative 11/09/2023    UPH 8.0 (H) 11/09/2023    UROB 0.2 11/09/2023         Microbiology:    Antimicrobials:  Linezolid, cefepime, flagyl    Imaging: Reviewed    Assessment:     Acute on chronic osteomyelitis of R calcaneus with cellulitis and pressure ulcer, MRSA negative  -S/P BKA      Suspected L 2nd PIP and 5th MTP joint osteomyelitis  -Looks to be improving    Proteus mirabilis bacteremia, recurrent  Acute complicated UTI/CAUTI  Sepsis 2/2 above- resolved  Chronic Indwelling tellez catheter 2/2 urinary retention in the setting of BPH  H/o MSSA bacteremia  H/o R acetabular joint osteomyelitis s/p right hip arthrotomy and lavage 07/2022  Chronic diarrhea with h/o C. Diff infection  Vancomycin, oxacillin allergy  Others/non-ID: DM type 2, CKD, nonischemic CM, HFrecEF 50%, moderate CAD, paroxysmal Afib on Eliquis    Plan:  - Currently on linezolid, cefepime, flagyl   - second toe remains -Switch to PO today EOT 12/21/23---> linezolid to PO, flagyl to PO and then add ciprofloxacin   - Baseline EKG today  - Baseline CRP tomorrow    Discussed with: patient, Dr. Robbie Gracia, DO  Infectious Disease   Pager: [phone]   Normal volume, rate, productivity, spontaneity and articulation

## 2025-01-13 NOTE — CONSULT NOTE ADULT - SUBJECTIVE AND OBJECTIVE BOX
MEDICAL TOXICOLOGY CONSULT    HPI:  29 year old female with past medical history of schizoaffective disorder presenting to  ED for suicide attempt that occurred at 1100 today. As per report, she took 15 tablets of 25 mg Diphenhydramine in a suicide attempt. Denies taking any other substances.  Endorsing some drowsiness.  No fever, abdominal pain, nausea, vomiting, chest pain, shortness of breath.  Pt does not have access to her prescribed sertraline, clozapine, prazosin, and gabapentin. Vitals nonactionable.  On exam, she is reported to be sitting comfortably in bed.  Pupils 3 mm, equal round and reactive to light bilaterally.  Heart regular rate.  Lungs clear to auscultation bilaterally.  Abdomen soft and nontender.  EKG is NSR at 88 with a WRS 86 ms and QTc 469 ms.       PAST MEDICAL & SURGICAL HISTORY:  Asthma      Schizoaffective disorder      PTSD (post-traumatic stress disorder)      GERD (gastroesophageal reflux disease)      Back pain      No significant past surgical history          MEDICATION HISTORY:      FAMILY HISTORY:      REVIEW OF SYSTEMS:   As per ED provider      Vital Signs Last 24 Hrs  T(C): 36.5 (2025 16:09), Max: 36.8 (2025 12:53)  T(F): 97.7 (2025 16:09), Max: 98.2 (2025 12:53)  HR: 90 (2025 16:09) (90 - 93)  BP: 135/86 (2025 16:09) (132/80 - 135/86)  BP(mean): --  RR: 18 (2025 16:09) (17 - 18)  SpO2: 98% (2025 16:09) (98% - 99%)    Parameters below as of 2025 12:53  Patient On (Oxygen Delivery Method): room air        SIGNIFICANT LABORATORY STUDIES:                        11.2   7.75  )-----------( 294      ( 2025 13:46 )             36.3       01-13    140  |  109[H]  |  18  ----------------------------<  83  4.2   |  25  |  0.62    Ca    8.9      2025 13:46    TPro  7.7  /  Alb  3.2[L]  /  TBili  0.1[L]  /  DBili  x   /  AST  16  /  ALT  17  /  AlkPhos  110            Urinalysis Basic - ( 2025 13:46 )    Color: Yellow / Appearance: Clear / S.019 / pH: x  Gluc: 83 mg/dL / Ketone: Negative mg/dL  / Bili: Negative / Urobili: 0.2 mg/dL   Blood: x / Protein: Negative mg/dL / Nitrite: Negative   Leuk Esterase: Negative / RBC: x / WBC x   Sq Epi: x / Non Sq Epi: x / Bacteria: x        Anion Gap: 6  @ 13:46  CK: --  @ 13:46  Troponin:  --   @ 13:46  Pro-BNP:  --   @ 13:46  VBG:  --   @ 13:46  Carboxyhemoglobin %:  --   @ 13:46  Methemoglobin %:  --   @ 13:46  Osmolality Serum:  --   @ 13:46  Aspirin Level: <1.7[L]   13:46  Acetaminophen Level:  <2[L]   @ 13:46  Ethanol Level:  --   @ 13:46  Digoxin Level:  --   @ 13:46  Phenytoin Level:  --   @ 13:46  Carbamazepine level:  --   @ 13:46  Lamotrigine level:  --   @ 13:46

## 2025-01-13 NOTE — ED BEHAVIORAL HEALTH ASSESSMENT NOTE - DESCRIPTION
supportive housing in [place] calm, no signs of acute toxicity, medically and behaviorally stable Asthma

## 2025-01-13 NOTE — ED PROVIDER NOTE - NSFOLLOWUPINSTRUCTIONS_ED_ALL_ED_FT
Suicidal Feelings: How to Help Yourself  Suicide is when you end your own life. Suicidal ideation includes expressing thoughts about, or a preoccupation with, ending your own life. There are many things you can do to help yourself feel better when struggling with these feelings. Many services and people are available to support you and others who struggle with similar feelings.    If you ever feel like you may hurt yourself or others, or have thoughts about taking your own life, get help right away. To get help:  Go to your nearest emergency room.  Call 911.  Call the [facility] services helpline (211 in the [place]  Call or text a suicide hotline to speak with a trained counselor. The following suicide hotlines are available in the [place]:  The Suicide & Crisis Lifeline (free and confidential):  Call 1-858.187.5454 or 988.  Text 563736.  1-962-HPXOILE (1-122.244.4279).  If you're a :  Call 988 and press 1.  Text the Veterans Crisis Line at 445336.  1-464.878.3504. This is a hotline for Mongolian speakers.  1-157.345.7653. This is a hotline for TTY users.  7-456-2-U-MOHAMUD (1-522.240.6546). This is a hotline for lesbian, kim, bisexual, transgender, or questioning youth.  For a list of hotlines in [place], visit suicide.org/hotlines/international/pttspe-thrzldz-lrxjymxi.html  Contact a crisis center or a local suicide prevention center. To find a crisis center or suicide prevention center:  Call your local hospital, clinic, community service organization, mental health center, social service provider, or health department. Ask for help with connecting to a crisis center.  For a list of crisis centers in the [place], visit: suicidepreventionlifeline.org  For a list of crisis centers in [place], visit: suicideprevention.ca  How to help yourself feel better  A teen talking with an adult.  Promise yourself that you will not do anything bad or extreme when you have suicidal feelings. Remember the times you have felt hopeful.  Many people have gotten through suicidal thoughts and feelings, and you can too.  If you have had these feelings before, remind yourself that you can get through them again.  Let family, friends, teachers, or counselors know how you are feeling. Do not separate yourself from those who care about you and want to help you.  Talk with someone every day, even if you do not feel like talking to anyone or being with other people.  Face-to-face conversation is best to help them understand your feelings.  Contact a mental health care provider and work with this person regularly.  Make a safety plan that you can follow during a crisis.  Include phone numbers of suicide prevention hotlines, mental health professionals, and trusted friends and family members you can call during an emergency.  Save these numbers on your phone.  If you are thinking of taking a lot of medicine, give your medicine to someone who can give it to you as prescribed.  If you are on antidepressants and are concerned you will overdose, tell your health care provider so that he or she can give you safer medicines.  Try to stick to your routines and follow a schedule every day. Make self-care a priority.  Make a list of realistic goals, and cross them off when you achieve them. Accomplishments can give you a sense of worth.  Wait until you are feeling better before doing things that you find difficult or unpleasant.  Do things that you have always enjoyed to take your mind off your feelings.  Try reading a book, or listening to or playing music.  Spending time outside, in nature, may help you feel better.  Follow these instructions at home:  A sign asking the reader not to drink beer, wine, or hard liquor.   Visit your primary health care provider every year for a physical and a mental health checkup.  Take over-the-counter and prescription medicines only as told by your health care provider.  Ask your health care provider about the possible side effects of any medicines you are taking.  Ask your health care provider about whether suicidal ideation is a possible side effect of any of your medicines.  Learn about suicidal ideation and what increases the risk for the development of suicidal thoughts.  Eat a well-balanced diet, and eat regular meals.  Get plenty of rest.  Exercise if you are able. Just 30 minutes of exercise each day can help you feel better.  Keep your living space well lit.  Do not use alcohol or drugs. Remove these substances from your home.  General recommendations  Remove weapons, poisons, knives, and other deadly items from your home.  Work with a mental health care provider as needed.  When you are feeling well, write yourself a letter with tips and support that you can read when you are not feeling well.  Remember that life's difficulties can be sorted out with help. Conditions can be treated, and you can learn behaviors and ways of thinking that will help you.  Work with your health care provider or counselor to learn ways of coping with your thoughts and feelings.  Where to find more information  National Suicide Prevention Lifeline: www.suicidepreventionlifeline.org  Hopeline: www.hopeline.[employer]  American Foundation for Suicide Prevention: www.afsp.org  The Mohamud Project (for lesbian, kim, bisexual, transgender, or questioning youth): www.thetrevorproject.org  National [place] of Mental Health: www.nimh.nih.gov/health/topics/suicide-prevention  Suicide Prevention Resources: afsp.org/suicide-prevention-resources  Contact a health care provider if:  You feel as though you are a burden to others.  You feel agitated, angry, vengeful, or have extreme mood swings.  You have withdrawn from family and friends.  You are frequently using drugs or alcohol.  Get help right away if:  You are talking about suicide or wishing to die.  You start making plans for how to commit suicide.  You feel that you have no reason to live.  You start making plans for putting your affairs in order, saying goodbye, or giving your possessions away.  You feel guilt, shame, or unbearable pain, and it seems like there is no way out.  You are engaging in risky behaviors that could lead to death.  If you have any of these thoughts or symptoms, get help right away:  Go to your nearest emergency department or crisis center.  Call emergency services (911 in the [place]  Call or text a suicide crisis helpline.  Summary  Suicide is when you take your own life. Suicidal feelings are thoughts about ending your own life.  Promise yourself that you will not do anything bad or extreme when you have suicidal feelings.  Let family, friends, teachers, or counselors know how you are feeling.  Get help right away if you start making plans for how to commit suicide.  This information is not intended to replace advice given to you by your health care provider. Make sure you discuss any questions you have with your health care provider.
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
## 2025-01-13 NOTE — ED BEHAVIORAL HEALTH ASSESSMENT NOTE - HPI (INCLUDE ILLNESS QUALITY, SEVERITY, DURATION, TIMING, CONTEXT, MODIFYING FACTORS, ASSOCIATED SIGNS AND SYMPTOMS)
30 yo female, domiciled in supportive housing, with hx schizoaffective d/o, borderline personality disorder, PTSD, intellectual disability, many ED presentations including seen on 1/7/25 for SI, in treatment with [facility], who presents to the ED after reportedly taking 15 pills of Benadryl 25mg with SI.    The patient shows no clinical signs of toxicity. Of note, when the patient was assessed by Dr. Muhammad on 1/7/25 and collateral was obtained from the group home, group home staff   "note that patient's baseline is going to the ED every day or every other day, often reports cutting or suicide ideation, often reports that she made a suicide attempt when she did not (gave example of saying she overdosed but them finding that she took one pill), no actual suicide attempts since being there. They state that she has been to the hospital at least 16 times in the last 6 months but that it is likely more. They do not feel that there have been any significant changes"    I spoke to the patient's fiance today whom the patient has been messaging while in the ER, (Donell [phone]), who also corroborates that the patient has been doing well from his perspective, and her mood has been good and stable. He has not noticed any psychiatric concerns and patient has not expressed any SI. Even today, the patient was talking to him about staying with him the next few days. He has no safety concerns with her being discharged, and confirms patient can stay with him.    On my interview, the patient is sitting up, very alert, very pleasant in a jovial mood and not exhibiting any distress. She immediately says she thinks it would be best if she were discharged and goes over to stay with her fiance for some time to calm herself down further, which she says she is allowed to do if she notifies the staff at her residence. She confirms she plans to do this. She says she doesn't know what triggered her to be suddenly depressed several days ago but now feels better. When confronted about the possible overdose, she would not say whether she was exaggerating or not being fully forthcoming, but she says confidently that she was not trying to kill herself, but explains her behavior as a response to feeling "overwhelmed."

## 2025-01-13 NOTE — ED PROVIDER NOTE - CLINICAL SUMMARY MEDICAL DECISION MAKING FREE TEXT BOX
29 female coming from group home with past medical history schizoaffective versus bipolar disorder, ADHD, PTSD, borderline personality disorder, intellectual disability, asthma, chronic lower back pain presenting with suicide attempt and lower back pain.  Patient says that at 11 AM she took 15 tablets of Benadryl in a suicide attempt.  She believes they were 25 mg each.  Denies taking any other substances.  Endorsing some drowsiness.  No fever, abdominal pain, nausea, vomiting, chest pain, shortness of breath.  Pt does not have access to her prescribed sertraline, clozapine, prazosin, and gabapentin. Has had some decreased urinary output over the past couple of days.  Also endorsing lower back pain that radiates down her right leg..  Patient says that the back pain is not worse than normal but that it has persisted.  Has not taken anything for the pain today.  Denies any saddle anesthesia, urinary retention or incontinence, fecal incontinence.  Vitals nonactionable.  On exam: Patient sitting comfortably in bed.  Pupils 3 mm, equal round and reactive to light bilaterally.  Heart regular rate.  Lungs clear to auscultation bilaterally.  Abdomen soft and nontender.  No midline spinal tenderness to palpation.  Right paraspinal tenderness to palpation greater then left.  Differential diagnosis includes but not limited to: Concern for Benadryl overdose causing anticholinergic syndrome, not concern for cord compression -likely musculoskeletal.  Plan: Toxicology consult, psych consult, blood work, EKG, urinalysis and urine drug screen, IV fluids, lidocaine patch.  Will reassess.

## 2025-01-13 NOTE — ED PROVIDER NOTE - ATTENDING CONTRIBUTION TO CARE
I have seen patient and agree with management.  Patient consumed 15-25 mg tablets of Benadryl at approximately 11 AM this morning in a attempt to take her life.  She presents with fatigue, sleepiness.  Discussed case with toxicology who recommends observation period of 6 hours.  EKG and laboratories unremarkable.  Plan for discussion with psychiatry for their recommendations.  Patient otherwise appears well and is tolerating p.o.

## 2025-01-13 NOTE — ED BEHAVIORAL HEALTH ASSESSMENT NOTE - RISK ASSESSMENT
low acute risk as pt now denying SI, compliant with treatment, has future goals, has good relationship with staff at her residence and outpatient treatment team, is able to engage in safety planning  chronic risk elevated due to impulsivity, and underlying diagnoses

## 2025-01-14 ENCOUNTER — EMERGENCY (EMERGENCY)
Facility: HOSPITAL | Age: 30
LOS: 1 days | Discharge: ANOTHER HOSPITAL | End: 2025-01-14
Attending: STUDENT IN AN ORGANIZED HEALTH CARE EDUCATION/TRAINING PROGRAM
Payer: MEDICAID

## 2025-01-14 ENCOUNTER — INPATIENT (INPATIENT)
Facility: HOSPITAL | Age: 30
LOS: 2 days | Discharge: HOME | End: 2025-01-17
Attending: PSYCHIATRY & NEUROLOGY | Admitting: PSYCHIATRY & NEUROLOGY
Payer: MEDICAID

## 2025-01-14 VITALS
RESPIRATION RATE: 18 BRPM | HEART RATE: 114 BPM | OXYGEN SATURATION: 98 % | DIASTOLIC BLOOD PRESSURE: 71 MMHG | SYSTOLIC BLOOD PRESSURE: 100 MMHG | TEMPERATURE: 97 F | WEIGHT: 293 LBS | HEIGHT: 62 IN

## 2025-01-14 VITALS
DIASTOLIC BLOOD PRESSURE: 75 MMHG | TEMPERATURE: 98 F | SYSTOLIC BLOOD PRESSURE: 106 MMHG | OXYGEN SATURATION: 96 % | RESPIRATION RATE: 18 BRPM | HEART RATE: 97 BPM

## 2025-01-14 VITALS — HEIGHT: 62 IN | WEIGHT: 293 LBS

## 2025-01-14 DIAGNOSIS — F60.3 BORDERLINE PERSONALITY DISORDER: ICD-10-CM

## 2025-01-14 DIAGNOSIS — F32.9 MAJOR DEPRESSIVE DISORDER, SINGLE EPISODE, UNSPECIFIED: ICD-10-CM

## 2025-01-14 LAB
ANION GAP SERPL CALC-SCNC: 8 MMOL/L — SIGNIFICANT CHANGE UP (ref 5–17)
APAP SERPL-MCNC: <2 UG/ML — LOW (ref 10–30)
BUN SERPL-MCNC: 23 MG/DL — HIGH (ref 7–18)
CALCIUM SERPL-MCNC: 8.7 MG/DL — SIGNIFICANT CHANGE UP (ref 8.4–10.5)
CHLORIDE SERPL-SCNC: 109 MMOL/L — HIGH (ref 96–108)
CO2 SERPL-SCNC: 21 MMOL/L — LOW (ref 22–31)
CREAT SERPL-MCNC: 0.55 MG/DL — SIGNIFICANT CHANGE UP (ref 0.5–1.3)
EGFR: 129 ML/MIN/1.73M2 — SIGNIFICANT CHANGE UP
ETHANOL SERPL-MCNC: 7 MG/DL — SIGNIFICANT CHANGE UP (ref 0–10)
FLUAV AG NPH QL: SIGNIFICANT CHANGE UP
FLUBV AG NPH QL: SIGNIFICANT CHANGE UP
GLUCOSE SERPL-MCNC: 90 MG/DL — SIGNIFICANT CHANGE UP (ref 70–99)
HCT VFR BLD CALC: 35.6 % — SIGNIFICANT CHANGE UP (ref 34.5–45)
HGB BLD-MCNC: 10.9 G/DL — LOW (ref 11.5–15.5)
MCHC RBC-ENTMCNC: 24.1 PG — LOW (ref 27–34)
MCHC RBC-ENTMCNC: 30.6 G/DL — LOW (ref 32–36)
MCV RBC AUTO: 78.6 FL — LOW (ref 80–100)
NRBC # BLD: 0 /100 WBCS — SIGNIFICANT CHANGE UP (ref 0–0)
PLATELET # BLD AUTO: 278 K/UL — SIGNIFICANT CHANGE UP (ref 150–400)
POTASSIUM SERPL-MCNC: 3.8 MMOL/L — SIGNIFICANT CHANGE UP (ref 3.5–5.3)
POTASSIUM SERPL-SCNC: 3.8 MMOL/L — SIGNIFICANT CHANGE UP (ref 3.5–5.3)
RBC # BLD: 4.53 M/UL — SIGNIFICANT CHANGE UP (ref 3.8–5.2)
RBC # FLD: 16.4 % — HIGH (ref 10.3–14.5)
RSV RNA NPH QL NAA+NON-PROBE: SIGNIFICANT CHANGE UP
SALICYLATES SERPL-MCNC: <1.7 MG/DL — LOW (ref 2.8–20)
SARS-COV-2 RNA SPEC QL NAA+PROBE: SIGNIFICANT CHANGE UP
SODIUM SERPL-SCNC: 138 MMOL/L — SIGNIFICANT CHANGE UP (ref 135–145)
WBC # BLD: 10.13 K/UL — SIGNIFICANT CHANGE UP (ref 3.8–10.5)
WBC # FLD AUTO: 10.13 K/UL — SIGNIFICANT CHANGE UP (ref 3.8–10.5)

## 2025-01-14 PROCEDURE — 99285 EMERGENCY DEPT VISIT HI MDM: CPT | Mod: 25

## 2025-01-14 PROCEDURE — 80076 HEPATIC FUNCTION PANEL: CPT

## 2025-01-14 PROCEDURE — 80048 BASIC METABOLIC PNL TOTAL CA: CPT

## 2025-01-14 PROCEDURE — 87637 SARSCOV2&INF A&B&RSV AMP PRB: CPT

## 2025-01-14 PROCEDURE — 99221 1ST HOSP IP/OBS SF/LOW 40: CPT

## 2025-01-14 PROCEDURE — 80307 DRUG TEST PRSMV CHEM ANLYZR: CPT

## 2025-01-14 PROCEDURE — 36415 COLL VENOUS BLD VENIPUNCTURE: CPT

## 2025-01-14 PROCEDURE — 85027 COMPLETE CBC AUTOMATED: CPT

## 2025-01-14 PROCEDURE — 93005 ELECTROCARDIOGRAM TRACING: CPT

## 2025-01-14 PROCEDURE — 90792 PSYCH DIAG EVAL W/MED SRVCS: CPT | Mod: 95

## 2025-01-14 RX ORDER — ALBUTEROL SULFATE 90 UG/1
1 INHALANT RESPIRATORY (INHALATION) EVERY 4 HOURS
Refills: 0 | Status: DISCONTINUED | OUTPATIENT
Start: 2025-01-14 | End: 2025-01-17

## 2025-01-14 RX ORDER — ACETAMINOPHEN 80 MG/.8ML
650 SOLUTION/ DROPS ORAL EVERY 6 HOURS
Refills: 0 | Status: DISCONTINUED | OUTPATIENT
Start: 2025-01-14 | End: 2025-01-17

## 2025-01-14 RX ORDER — GABAPENTIN 300 MG/1
300 CAPSULE ORAL THREE TIMES A DAY
Refills: 0 | Status: DISCONTINUED | OUTPATIENT
Start: 2025-01-14 | End: 2025-01-17

## 2025-01-14 RX ORDER — SERTRALINE HYDROCHLORIDE 25 MG/1
150 TABLET ORAL DAILY
Refills: 0 | Status: DISCONTINUED | OUTPATIENT
Start: 2025-01-14 | End: 2025-01-17

## 2025-01-14 RX ORDER — PRAZOSIN HYDROCHLORIDE 5 MG/1
2 CAPSULE ORAL AT BEDTIME
Refills: 0 | Status: DISCONTINUED | OUTPATIENT
Start: 2025-01-14 | End: 2025-01-17

## 2025-01-14 RX ORDER — LORAZEPAM 1 MG/1
2 TABLET ORAL EVERY 6 HOURS
Refills: 0 | Status: DISCONTINUED | OUTPATIENT
Start: 2025-01-14 | End: 2025-01-17

## 2025-01-14 RX ORDER — PANTOPRAZOLE 40 MG/1
40 TABLET, DELAYED RELEASE ORAL
Refills: 0 | Status: DISCONTINUED | OUTPATIENT
Start: 2025-01-14 | End: 2025-01-17

## 2025-01-14 RX ORDER — HALOPERIDOL DECANOATE 50 MG/ML
5 INJECTION INTRAMUSCULAR ONCE
Refills: 0 | Status: DISCONTINUED | OUTPATIENT
Start: 2025-01-14 | End: 2025-01-17

## 2025-01-14 RX ORDER — LORAZEPAM 1 MG/1
2 TABLET ORAL ONCE
Refills: 0 | Status: DISCONTINUED | OUTPATIENT
Start: 2025-01-14 | End: 2025-01-17

## 2025-01-14 RX ORDER — HALOPERIDOL DECANOATE 50 MG/ML
5 INJECTION INTRAMUSCULAR EVERY 6 HOURS
Refills: 0 | Status: DISCONTINUED | OUTPATIENT
Start: 2025-01-14 | End: 2025-01-17

## 2025-01-14 RX ORDER — CLOZAPINE 25 MG/1
350 TABLET ORAL AT BEDTIME
Refills: 0 | Status: DISCONTINUED | OUTPATIENT
Start: 2025-01-14 | End: 2025-01-17

## 2025-01-14 RX ADMIN — CLOZAPINE 350 MILLIGRAM(S): 25 TABLET ORAL at 21:56

## 2025-01-14 RX ADMIN — PRAZOSIN HYDROCHLORIDE 2 MILLIGRAM(S): 5 CAPSULE ORAL at 21:56

## 2025-01-14 RX ADMIN — GABAPENTIN 300 MILLIGRAM(S): 300 CAPSULE ORAL at 21:56

## 2025-01-14 NOTE — ED PROVIDER NOTE - OBJECTIVE STATEMENT
29 female presents reporting persistent depression.  Patient states that she was seen yesterday for potential overdose and depressive symptoms.  Patient states that she was discharged but told if she felt unsafe or uncomfortable to come back to the ER.  Patient states that she is not sure what happened but told her fiancé at home shortly after discharge that she "did not feel safe ".  Patient states she was in a depressed mood and "blacked out ".  And came to the ED because she was unsure why she said these things and overall is concerned that she is still depressed.  Denies suicidal ideation homicidal ideation or repeat overdose. 29 female presents reporting persistent depression.  Patient states that she was seen yesterday for potential overdose and depressive symptoms.  Patient states that she was discharged but told if she felt unsafe or uncomfortable to come back to the ER.  Patient states that she is not sure what happened but told her fiancé at home shortly after discharge that she "did not feel safe ".  Patient states she was in a depressed mood and "blacked out ".  And came to the ED because she was unsure why she said these things and overall is concerned that she is still depressed.  Denies suicidal ideation homicidal ideation or repeat overdose.      General: NAD. Obese.  HEENT: EOMI. Anicteric sclera. PERRLA. NCAT. No JVD.  Heart/Lungs: RRR, CTAB.  Chest/Back: No asymmetry. No CVAT.  Abd: Soft, NT,ND.  Neuro: No focal deficits.  Skin: No rashes. Normal color for race.  Psych: Calm.

## 2025-01-14 NOTE — ED PROVIDER NOTE - CLINICAL SUMMARY MEDICAL DECISION MAKING FREE TEXT BOX
34 F presents with extensive past psychaitric history, seen yesterday for overdose and suicidal ideation.  Presented shortly after discharge stating she felt "unsafe", no SI or HI. Endorsing associated depression with feeling "unsafe".   Patient signed out pending psychiatric consultation.

## 2025-01-14 NOTE — ED BEHAVIORAL HEALTH ASSESSMENT NOTE - CURRENT MEDICATION
clozapine 350mg qhs, sertraline 150mg daily, gabapentin 300mg TID, prazosin 2mg qhs  albuterol PRN, Protonix daily
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
## 2025-01-14 NOTE — BH INPATIENT PSYCHIATRY ASSESSMENT NOTE - NSBHCHARTREVIEWVS_PSY_A_CORE FT
Vital Signs Last 24 Hrs  T(C): 36.7 (01-14-25 @ 18:47), Max: 37.1 (01-14-25 @ 07:40)  T(F): 98 (01-14-25 @ 18:47), Max: 98.7 (01-14-25 @ 07:40)  HR: 97 (01-14-25 @ 18:47) (91 - 114)  BP: 106/75 (01-14-25 @ 18:47) (98/65 - 121/87)  BP(mean): 91 (01-14-25 @ 15:28) (91 - 91)  RR: 18 (01-14-25 @ 18:47) (18 - 18)  SpO2: 96% (01-14-25 @ 18:47) (96% - 98%)

## 2025-01-14 NOTE — BH INPATIENT PSYCHIATRY ASSESSMENT NOTE - NSICDXPASTMEDICALHX_GEN_ALL_CORE_FT
PAST MEDICAL HISTORY:  Asthma     Back pain     GERD (gastroesophageal reflux disease)     PTSD (post-traumatic stress disorder)     Schizoaffective disorder

## 2025-01-14 NOTE — BH INPATIENT PSYCHIATRY ASSESSMENT NOTE - NSBHMETABOLIC_PSY_ALL_CORE_FT
BMI: BMI (kg/m2): 57.6 (01-14-25 @ 04:37)  HbA1c: A1C with Estimated Average Glucose Result: 5.4 % (09-19-24 @ 08:20)    Glucose:   BP: --Vital Signs Last 24 Hrs  T(C): 36.7 (01-14-25 @ 18:47), Max: 37.1 (01-14-25 @ 07:40)  T(F): 98 (01-14-25 @ 18:47), Max: 98.7 (01-14-25 @ 07:40)  HR: 97 (01-14-25 @ 18:47) (91 - 114)  BP: 106/75 (01-14-25 @ 18:47) (98/65 - 121/87)  BP(mean): 91 (01-14-25 @ 15:28) (91 - 91)  RR: 18 (01-14-25 @ 18:47) (18 - 18)  SpO2: 96% (01-14-25 @ 18:47) (96% - 98%)      Lipid Panel: Date/Time: 09-19-24 @ 08:20  Cholesterol, Serum: 186  LDL Cholesterol Calculated: 110  HDL Cholesterol, Serum: 55  Total Cholesterol/HDL Ration Measurement: --  Triglycerides, Serum: 118   BMI: BMI (kg/m2): 59.5 (01-14-25 @ 20:07)  HbA1c: A1C with Estimated Average Glucose Result: 5.4 % (09-19-24 @ 08:20)    Glucose:   BP: --Vital Signs Last 24 Hrs  T(C): 36.7 (01-14-25 @ 18:47), Max: 37.1 (01-14-25 @ 07:40)  T(F): 98 (01-14-25 @ 18:47), Max: 98.7 (01-14-25 @ 07:40)  HR: 97 (01-14-25 @ 18:47) (91 - 114)  BP: 106/75 (01-14-25 @ 18:47) (98/65 - 121/87)  BP(mean): 91 (01-14-25 @ 15:28) (91 - 91)  RR: 18 (01-14-25 @ 18:47) (18 - 18)  SpO2: 96% (01-14-25 @ 18:47) (96% - 98%)      Lipid Panel: Date/Time: 09-19-24 @ 08:20  Cholesterol, Serum: 186  LDL Cholesterol Calculated: 110  HDL Cholesterol, Serum: 55  Total Cholesterol/HDL Ration Measurement: --  Triglycerides, Serum: 118
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
## 2025-01-14 NOTE — ED ADULT NURSE NOTE - OBJECTIVE STATEMENT
pt c/o persistent depression and feeling uneasy/unsafe. pt denies suicidal ideations and homicidal ideations. pt denies pain/trauma.

## 2025-01-14 NOTE — ED PROVIDER NOTE - PROGRESS NOTE DETAILS
Patient calm and cooperative.  Labs unremarkable.  Discussed case with tele-psych who agreed to see the patient.  Patient currently pending psychiatric evaluation. Received from Dr Lazarus w lab results & TelePsych pending.  Electrolytes, renal function, & LFT ok. ASA/APAP/EtOH negative.  Nonfocal neuro and stable gait. Patient well-appearing.  Patient medically cleared for TelePsych evaluation. Patient accepted for Psych admit  Care signed out to Dr Ramey pending transport for Psych admit.

## 2025-01-14 NOTE — BH INPATIENT PSYCHIATRY ASSESSMENT NOTE - NSCOMMENTSUICRISKFACT_PSY_ALL_CORE
Roxanna Guzmán   3/10/2017 2:45 PM   Gynecology Visit   MRN: [id number]    Department:  [facility]   Dept Phone:  [phone]    Description:  Female : 1982   Provider:  Nani Mendoza M.D.           Reason for Visit     Procedure           Allergies as of 3/10/2017     No Known Allergies      You were diagnosed with     Encounter for contraceptive management, unspecified contraceptive encounter type   [[id number]]         Vital Signs     Blood Pressure Last Menstrual Period Breastfeeding? Smoking Status          120/80 mmHg 02/15/2017 No Never Smoker         Basic Information     Date Of Birth Sex Race Ethnicity Preferred Language    1982 Female White Non- English      Your appointments     May 01, 2017 10:40 AM   BOTOX with CRIS Jiang   [facility] (--)    [address] [facility], [address]  [facility] [id number] [phone]              Problem List              ICD-10-CM Priority Class Noted - Resolved    Chronic migraine G43.709   2015 - Present    Seizure disorder (CMS-HCC) G40.909   10/17/2016 - Present      Health Maintenance        Date Due Completion Dates    IMM DTaP/Tdap/Td Vaccine (1 - Tdap) 2003 ---    IMM INFLUENZA (1) 2016 ---    PAP SMEAR 10/17/2019 10/17/2016 (Done), 2015 (Done)    Override on 10/17/2016: Done    Override on 2015: Done (ascus neg hpv)            Current Immunizations     No immunizations on file.      Below and/or attached are the medications your provider expects you to take. Review all of your home medications and newly ordered medications with your provider and/or pharmacist. Follow medication instructions as directed by your provider and/or pharmacist. Please keep your medication list with you and share with your provider. Update the information when medications are discontinued, doses are changed, or new medications (including over-the-counter products) are added; and carry medication  information at all times in the event of emergency situations     Allergies:  No Known Allergies          Medications  Valid as of: March 10, 2017 -  3:41 PM    Generic Name Brand Name Tablet Size Instructions for use    ALPRAZolam (Tab) XANAX 0.5 MG Take 0.5 mg by mouth at bedtime as needed for Sleep.        ARIPiprazole (Tab) ABILIFY 2 MG Take 2 mg by mouth every day.        ClonazePAM (Tab) KLONOPIN 0.5 MG Take 1 Tab by mouth every bedtime.        Escitalopram Oxalate (Tab) LEXAPRO 20 MG Take 20 mg by mouth every day.        Ketorolac Tromethamine (Solution) TORADOL 30 MG/ML 1 mL by Intramuscular route every 6 hours as needed.        Lacosamide (Tab) VIMPAT 200 MG Take 200 mg by mouth 2 Times a Day.        Lacosamide (Tab) VIMPAT 150 MG TAKE 1 TABLET BY MOUTH TWICE A DAY        Norgestim-Eth Estrad Triphasic (Tab) Norgestim-Eth Estrad Triphasic 0.18/0.215/0.25 MG-35 MCG Take 1 Tab by mouth every day.        Rizatriptan Benzoate (TABLET DISPERSIBLE) MAXALT-MLT 10 MG Take 1/2-1 tablet po at onset of headache, may repeat dose in 2 hours if unrelieved.  Do not exceed more than 2 tablets in 24 hours.        Zolpidem Tartrate (Tab) AMBIEN 10 MG Take 10 mg by mouth at bedtime as needed for Sleep.        .                 Medicines prescribed today were sent to:     [address] [facility]    [address] [facility] [address]    Phone: [phone] Fax: [phone]    Open 24 Hours?: No      Medication refill instructions:       If your prescription bottle indicates you have medication refills left, it is not necessary to call your provider’s office. Please contact your pharmacy and they will refill your medication.    If your prescription bottle indicates you do not have any refills left, you may request refills at any time through one of the following ways: The online [employer] system (except Urgent Care), by calling your provider’s office, or by asking your pharmacy to contact your  provider’s office with a refill request. Medication refills are processed only during regular business hours and may not be available until the next business day. Your provider may request additional information or to have a follow-up visit with you prior to refilling your medication.   *Please Note: Medication refills are assigned a new Rx number when refilled electronically. Your pharmacy may indicate that no refills were authorized even though a new prescription for the same medication is available at the pharmacy. Please request the medicine by name with the pharmacy before contacting your provider for a refill.           Wantster Access Code: [email or web address]  Expires: 4/9/2017  2:42 PM    Wantster  A secure, online tool to manage your health information     [employer]’s Wantster® is a secure, online tool that connects you to your personalized health information from the privacy of your home -- day or night - making it very easy for you to manage your healthcare. Once the activation process is completed, you can even access your medical information using the Wantster galen, which is available for free in the Apple Galen store or Google Play store.     Wantster provides the following levels of access (as shown below):   My Chart Features   [facility] Doctor [facility]  Specialists [facility]  Urgent  Care Non-[facility]  Primary Care  Doctor   Email your healthcare team securely and privately 24/7 X X X    Manage appointments: schedule your next appointment; view details of past/upcoming appointments X      Request prescription refills. X      View recent personal medical records, including lab and immunizations X X X X   View health record, including health history, allergies, medications X X X X   Read reports about your outpatient visits, procedures, consult and ER notes X X X X   See your discharge summary, which is a recap of your hospital and/or ER visit that includes your diagnosis, lab results, and care plan. X  X       How to register for [employer]:  1. Go to  [email or web address].  2. Click on the Sign Up Now box, which takes you to the New Member Sign Up page. You will need to provide the following information:  a. Enter your [employer] Access Code exactly as it appears at the top of this page. (You will not need to use this code after you’ve completed the sign-up process. If you do not sign up before the expiration date, you must request a new code.)   b. Enter your date of birth.   c. Enter your home email address.   d. Click Submit, and follow the next screen’s instructions.  3. Create a H-art (WPP)t ID. This will be your [employer] login ID and cannot be changed, so think of one that is secure and easy to remember.  4. Create a H-art (WPP)t password. You can change your password at any time.  5. Enter your Password Reset Question and Answer. This can be used at a later time if you forget your password.   6. Enter your e-mail address. This allows you to receive e-mail notifications when new information is available in [employer].  7. Click Sign Up. You can now view your health information.    For assistance activating your [employer] account, call [phone]         as above

## 2025-01-14 NOTE — ED BEHAVIORAL HEALTH ASSESSMENT NOTE - SUMMARY
This is 28 yo female, domiciled in supportive housing, with diagnoses primarily of Borderline Personality Disorder and PTSD, but also listed Schizoaffective Disorder, many psychiatric hospitalizations including state, most recently discharged from [facility] about 1 month ago, hx of multiple suicide attempts vs impulsive parasuicidal behavior, who had presented yesterday to the ED as well after reportedly taking 15 pills of Benadryl with vague SI and in the context of feeling overwhelmed, now back after another episode of emotional dysregulation. This is 28 yo female, domiciled in supportive housing, with diagnoses primarily of Borderline Personality Disorder and PTSD, but also listed Schizoaffective Disorder, many psychiatric hospitalizations including state, most recently discharged from [facility] about 1 month ago, hx of multiple suicide attempts vs impulsive parasuicidal behavior, who had presented yesterday to the ED as well after reportedly taking 15 pills of Benadryl with vague SI and in the context of feeling overwhelmed, now back after another episode of emotional dysregulation.    While the patient's affective instability appears to be stemming mostly from her hx of trauma and borderline personality traits, and it is questionable whether she actually overdosed on multiple Benadryl which she had similarly reported prior to her hospitalization at [facility], her need for frequent hospitalization recently likely indicates some destabilization from her more stable baseline, in the context of the patient consistently expressing anhedonia, frustration with her own mental health treatment and lack of progress, and recurring bouts of hopelessness and suicidal thinking.

## 2025-01-14 NOTE — ED ADULT NURSE NOTE - NSFALLUNIVINTERV_ED_ALL_ED
Bed/Stretcher in lowest position, wheels locked, appropriate side rails in place/Call bell, personal items and telephone in reach/Instruct patient to call for assistance before getting out of bed/chair/stretcher/Non-slip footwear applied when patient is off stretcher/Sikes to call system/Physically safe environment - no spills, clutter or unnecessary equipment/Purposeful proactive rounding/Room/bathroom lighting operational, light cord in reach

## 2025-01-14 NOTE — ED ADULT NURSE NOTE - NS ED PATIENT SAFETY CONCERN
Ambar Holt is an 44 year old female. NOE Young requested a consultation for evaluation of snoring and fatigue. She has a history of hypertension. She states that \"I am always tired\" and she will nap after work. She has had more symptoms since 2020 after her hysterectomy. She complains of anergia and is sleepy during the day. 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 She works 5-6 days per week. If she gets 8 hours of sleep, she will wake up with a headache. On off days, she goes to bed at 10 PM and awakens at 5:30-6 AM. She will want to sleep more on certain days. She has gotten drowsy with driving but not as much lately. She will have to listen to music or eat to stay alert on longer drives. She has lost time and road hypnosis on longer periods. She denies car accidents. She can get sleepy in work meetings. She is falling asleep unintentionally at home. She has low ambition when she has free time. She denies symptoms of depression. 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     She is not told that she snores but she will \"breathe heavy\" and she will awaken to minor environmental noises. She is a side sleeper. She can snort awake but is not thought to be frequent. She is not told that she has apneas. She denies gasping, choking, or dyspnea. She has damp night sweats following surgical menopause in 2021 on some nights. She will toss in bed from shoulder pain. She has occasional nocturia and awakens with headaches 2-3 times per month. She denies heartburn.    There is no history of hypnagogic hallucinations, but has had occasional sleep paralysis in the past. She denies cataplexy. There is a history of sleep  initiation if she is under stress but this is thought to be uncommon. She denies sleep maintenance insomnia. There is no history of restless legs or nocturnal limb movements. There is no history of somnambulism, somniloquy, or dream enacting behavior. There is no bruxism or jaw clenching. The sleeping environment is kept dark and quiet.    .  Current Outpatient Medications   Medication Sig Dispense Refill    estradiol (ESTRACE) 2 MG tablet Take 1 tablet by mouth daily. 90 tablet 3    lisinopril (ZESTRIL) 40 MG tablet Take 1 tablet by mouth daily. 90 tablet 3    Ferrous Sulfate (Iron) 325 (65 Fe) MG Tab Take 1 tablet by mouth daily.      cetirizine (ZyrTEC) 10 MG tablet Take 1 tablet by mouth daily as needed for Allergies.      diclofenac (CATAFLAM) 50 MG tablet Take 1 tablet by mouth in the morning and 1 tablet in the evening. 180 tablet 3     No current facility-administered medications for this visit.         Past Medical History:   Diagnosis Date    Abnormal Pap smear of cervix     BRCA2 negative     Endometriosis     Fibrocystic breast disease     Hypertension     X 1yr    PONV (postoperative nausea and vomiting)     with c section     Past Surgical History:   Procedure Laterality Date    Appendectomy      with pregnancy    Breast biopsy      Bunionectomy  2013    right     section, classic      Cholecystectomy  2011    Cryotherapy  2006    cryo of the cervix for abnormal pap    Excise breast lesion Left 2023    Cyst excisional in office    Foot surgery Right 2016  and 2014    excision of Quarles's neuroma     Foot surgery Left     bunion/hammertoe    Hysterectomy  2021    Insert intrauterine device  2011    Mirena    Laparoscopy unlisted proc  2000    endometriosis    Liver biopsy  2011    benign, 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    Oophorectomy  2021    Right Oophorectomy    Tonsillectomy and adenoidectomy       Us guide breast biopsy single lesion right Right 2008    benign     Family History   Problem Relation Age of Onset    Musculoskeletal Mother         fibromyalgia    Hypertension Mother     Cancer, Breast Mother 74    Hypertension Father     Stroke/TIA Father         born with hydrocephalus    Cancer, Skin Melanoma Father     Hypertension Brother     Diabetes Maternal Grandmother     Leukemia Maternal Grandmother     Heart disease Maternal Grandfather     Dementia/Alzheimers Paternal Grandmother     Diabetes Paternal Grandfather     Cancer, Prostate Paternal Grandfather 60    Cancer, Ovarian Paternal Aunt 43        unsure if primary or metastases    Cancer Paternal Aunt         cervical    Cancer Maternal Aunt 3        leukemia    Other Maternal Uncle 7        Polio    Genetic Disorder Neg Hx         cousin asperger's     Social History     Tobacco Use    Smoking status: Never    Smokeless tobacco: Never   Substance Use Topics    Alcohol use: No       Prior to Admission Meds:(Not in a hospital admission)    Scheduled Meds:  No current facility-administered medications for this visit.     Continuous Infusions:  No current facility-administered medications for this visit.     PRN Meds:  No current facility-administered medications for this visit.       Allergies:   ALLERGIES:   Allergen Reactions    Lobster   (Food) SWELLING and SHORTNESS OF BREATH    Penicillins RASH     Tolerated 3 doses of cefazolin with no adverse effects noted.    Sulfa Antibiotics RASH    Labetalol HEADACHES and Other (See Comments)     Generalized feelings of illness, increased scalp sensitivity.    Penicillin G RASH     Entire body.    Tolerated 3 doses of cefazolin with no adverse effects noted.       Active Problems:    * No active hospital problems. *    Pulse 86, last menstrual period 02/01/2020, SpO2 99%, not currently breastfeeding.    Review of Systems   Constitutional:  Positive for diaphoresis and fatigue. Negative for unexpected  weight change.   HENT:  Negative for congestion and sore throat.    Respiratory:  Negative for apnea, cough and wheezing.    Cardiovascular:  Negative for chest pain and palpitations.   Gastrointestinal:  Negative for abdominal pain.   Musculoskeletal:  Positive for arthralgias and back pain. Negative for myalgias and neck pain.   Allergic/Immunologic: Negative for environmental allergies.   Neurological:  Positive for headaches. Negative for seizures (mild concussion 6 months ago).   Psychiatric/Behavioral:  Negative for dysphoric mood. The patient is not nervous/anxious.         Physical Exam  Constitutional: The patient is an overweight female, alert and oriented to person place and time.  Ears: The patient has normal tympanic membranes, normal external canals, and pinnae.   Eyes: Equal and reactive, extraocular movements are intact.   Nose: Nasopharyngeal passages are patent, nasal valves are normal, there is no septal deviation. Steven maneuver is negative.  Oropharynx: The patient has a Mallampati 3 configuration to the posterior pharynx. Dentition is significant for a few missing molars. There is no mandibular retrognathia. Tonsils are absent.  Neck: The neck circumference is 37 cm. There's no cervical adenopathy, thyromegaly, bruits, or supraclavicular adenopathy.   Lungs: Clear to auscultation and percussion.  Heart: Regular rate and rhythm, no S3 or S4, no murmurs, rubs or gallops.   Abdomen: The patient has positive bowel sounds, the abdomen is soft and nontender, there is no enlargement of the liver or spleen.   Extremities: There is no evidence of cyanosis, clubbing, or edema. Distal pulses are intact.     Assessment:  Excessive daytime sleepiness.  The patient appears to have chronic sleep deprivation as the main cause of her symptoms.  Her work schedule forces her to go to bed earlier than her natural bedtime and wake up hours before a normal wake time.  She frequently gets only about 6 hours of sleep  and working 6 days/week has insufficient time to obtain recovery sleep.  She has daytime sleepiness in a number of different situations which tends to vary based on whether or not she is working.  Some of these problems could be overcome by having a later work start time or taking supplemental naps when she gets home but has a working mother, the opportunities to do this appear to be limited.  She has a history of snoring that does not sound disruptive but has occasional nocturnal snorting and a history of comorbid hypertension.  It may be worthwhile having a polysomnogram to determine whether sleep apnea is another cause of her symptoms.  Lastly, other factors such as perimenopausal symptoms or orthopedic pain can cause additional sleep fragmentation but these appear to be more minor influences.    Plan:  The patient was referred to the [facility] for home sleep study.  I will contact her with these results when they are available.  She was encouraged to try to obtain at least 7 hours of sleep per night and make room on her off days for afternoon napping.  We will evaluate her symptoms at some point in the future following her sleep study.    Jackson Wilkins MD  12/5/2024   No

## 2025-01-14 NOTE — BH INPATIENT PSYCHIATRY ASSESSMENT NOTE - NSBHASSESSSUMMFT_PSY_ALL_CORE
This is 28 yo female, domiciled in supportive housing, with diagnoses primarily of Borderline Personality Disorder and PTSD,  Schizoaffective Disorder,  hx of asthma many psychiatric hospitalizations including state, most recently at [facility] 12/21/24-12/27/24 and 12/31/24-1/6/25,, hx of multiple suicide attempts vs impulsive parasuicidal behavior, treated with clozapine with dose missed yesterday (she reports adherence otherwise, was in the ED), no known substance misuse who had presented yesterday to the ED as well after reportedly taking 15 pills of Benadryl with vague SI and in the context of feeling overwhelmed, presented again today back after another episode of emotional dysregulation with suicidal thoughts, requested admission, signed voluntaries and was admitted on 9.13  Routine checks, will not place on constant observation, voluntary, no active suicidal thoughts  Continue clozapine 350 mg, took last dose less than 48 hours ago (she reports- no treatment break, only missed dose last night), CBC wnl  Continue gabapentin  PRNS
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
## 2025-01-14 NOTE — ED BEHAVIORAL HEALTH ASSESSMENT NOTE - RISK ASSESSMENT
pt is at elevated risk as she endorses unstable mood, recently had an impulsive ingestion of Benadryl, is currently dysphoric and anxious and reporting feelings of hopelessness

## 2025-01-14 NOTE — ED ADULT TRIAGE NOTE - CHIEF COMPLAINT QUOTE
Pt reports that she feels depressed and  impulsive thoughts  for 2 hours . Pt denies suicidal thoughts or Ideations. H/O Major Depressive disorder ,

## 2025-01-14 NOTE — BH INPATIENT PSYCHIATRY ASSESSMENT NOTE - PREPARATORY ACTS:
.Refill Request     CONFIRM preferred pharmacy with the patient. If Mail Order Rx - Pend for 90 day refill. Last Seen: Last Seen Department: 8/2/2022  Last Seen by PCP: 8/2/2022    Last Written: 8-3-22 30 with 5     If no future appointment scheduled:  Review the last OV with PCP and review information for follow-up visit,  Route STAFF MESSAGE with patient name to the [facility] for scheduling with the following information:            -  Timing of next visit           -  Visit type ie Physical, OV, etc           -  Diagnoses/Reason ie. COPD, HTN - Do not use MEDICATION, Follow-up or CHECK UP - Give reason for visit      Next Appointment:   No future appointments. Message sent to [address] [place] [address] to schedule appt with patient? YES  Return in about 3 months (around 11/2/2022) for ADHD.        Requested Prescriptions     Pending Prescriptions Disp Refills    hydroCHLOROthiazide (HYDRODIURIL) 25 MG tablet [Pharmacy Med Name: hydroCHLOROthiazide 25 MG TABLET] 90 tablet 1     Sig: TAKE ONE TABLET BY MOUTH EVERY MORNING None known
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
## 2025-01-14 NOTE — BH INPATIENT PSYCHIATRY ASSESSMENT NOTE - FAMILY HISTORY OF PSYCHIATRIC ILLNESS / SUICIDALITY
Spoke to anesthesia, patient takes 10,000 units in the morning and 10,000 units at night. Patient is not able to receive an epidural until 24 hours after last dose at 2230 on 2/17/23. Yes

## 2025-01-14 NOTE — BH INPATIENT PSYCHIATRY ASSESSMENT NOTE - MSE UNSTRUCTURED FT
Patient is awake and alert. Affect is neutral, reports "I am ok, have not been doing well." Speech fluent. TP logical and coherent. No delusions. Endorses depression. No hallucinations. No motor retardation. Fair impulse control. Reports "I had suicidal thoughts recently but not now" Denies any thoughts of hurting herself on the unit, is relieved to be here.  Patient is awake and alert. Affect is neutral, reports "I am ok, have not been doing well." Calm. Cooperative. Speech fluent. TP logical and coherent. No delusions. Endorses depression. No hallucinations. No motor retardation. Fair impulse control. Reports "I had suicidal thoughts recently but not now" Denies any thoughts of hurting herself on the unit, is relieved to be here.

## 2025-01-14 NOTE — BH INPATIENT PSYCHIATRY ASSESSMENT NOTE - NSBHCONSDANGERSELF_PSY_A_CORE
Pam Nguyen may [facility] called requesting a refill on the following medications:  Requested Prescriptions     Pending Prescriptions Disp Refills    divalproex (DEPAKOTE) 125 MG DR tablet 60 tablet 1     Sig: Take 1 tablet by mouth every 12 hours    QUEtiapine (SEROQUEL) 25 MG tablet 45 tablet      Sig: Take 0.5 tablets by mouth daily (with breakfast) AND 1 tablet nightly.     He does report patient has been unable to start the Ingrezza due to cost. Staff will send the patient assistance form for them to complete to see if patient may qualify for coverage.     Date of last visit: 11/11/2024  Date of next visit (if applicable):12/11/2024  Pharmacy Name: Fany Castillo MA      suicidal ideation with plan and means

## 2025-01-14 NOTE — ED ADULT NURSE NOTE - NS_NURSE_TRANSFERRED_ED_ALL_ED_DT
[facility] Call Center    Phone Message    Name of Caller: Radha    Phone Number: Cell number on file:    Telephone Information:   Mobile [phone]       Best time to return call: any    May a detailed message be left on voicemail: yes    Relation to patient: Self    Reason for Call: Other: Patient referred by Dr Medeiros for incontinence to [facility] specifically.  Booked out until 2/12/18, please contact if she can be fit in sooner.     Action Taken: Message routed to:  Adult Clinics: Urology p [id number]    
The patient was contacted and informed that at this time Dr. Baltazar is full but that we can add her to the waitlist in the event of a cancellation. Patient verbalized understanding.    Jacqui Patel CMA     
14-Jan-2025 18:47

## 2025-01-15 PROCEDURE — 99232 SBSQ HOSP IP/OBS MODERATE 35: CPT

## 2025-01-15 RX ORDER — INFLUENZA A VIRUS A/WISCONSIN/588/2019 (H1N1) RECOMBINANT HEMAGGLUTININ ANTIGEN, INFLUENZA A VIRUS A/DARWIN/6/2021 (H3N2) RECOMBINANT HEMAGGLUTININ ANTIGEN, INFLUENZA B VIRUS B/AUSTRIA/1359417/2021 RECOMBINANT HEMAGGLUTININ ANTIGEN, AND INFLUENZA B VIRUS B/PHUKET/3073/2013 RECOMBINANT HEMAGGLUTININ ANTIGEN 45; 45; 45; 45 UG/.5ML; UG/.5ML; UG/.5ML; UG/.5ML
0.5 INJECTION INTRAMUSCULAR ONCE
Refills: 0 | Status: COMPLETED | OUTPATIENT
Start: 2025-01-15 | End: 2025-01-16

## 2025-01-15 RX ORDER — MAG HYDROX/ALUMINUM HYD/SIMETH 200-200-20
30 SUSPENSION, ORAL (FINAL DOSE FORM) ORAL EVERY 6 HOURS
Refills: 0 | Status: DISCONTINUED | OUTPATIENT
Start: 2025-01-15 | End: 2025-01-17

## 2025-01-15 RX ORDER — MAGNESIUM HYDROXIDE 400 MG/5ML
30 SUSPENSION, ORAL (FINAL DOSE FORM) ORAL DAILY
Refills: 0 | Status: DISCONTINUED | OUTPATIENT
Start: 2025-01-15 | End: 2025-01-17

## 2025-01-15 RX ADMIN — GABAPENTIN 300 MILLIGRAM(S): 300 CAPSULE ORAL at 20:55

## 2025-01-15 RX ADMIN — PRAZOSIN HYDROCHLORIDE 2 MILLIGRAM(S): 5 CAPSULE ORAL at 20:55

## 2025-01-15 RX ADMIN — GABAPENTIN 300 MILLIGRAM(S): 300 CAPSULE ORAL at 09:13

## 2025-01-15 RX ADMIN — CLOZAPINE 350 MILLIGRAM(S): 25 TABLET ORAL at 20:55

## 2025-01-15 RX ADMIN — PANTOPRAZOLE 40 MILLIGRAM(S): 40 TABLET, DELAYED RELEASE ORAL at 09:13

## 2025-01-15 RX ADMIN — GABAPENTIN 300 MILLIGRAM(S): 300 CAPSULE ORAL at 13:15

## 2025-01-15 RX ADMIN — SERTRALINE HYDROCHLORIDE 150 MILLIGRAM(S): 25 TABLET ORAL at 09:13

## 2025-01-15 RX ADMIN — LORAZEPAM 2 MILLIGRAM(S): 1 TABLET ORAL at 12:15

## 2025-01-15 NOTE — PSYCHIATRIC REHAB INITIAL EVALUATION - NSBHPRRECOMMEND_PSY_ALL_CORE
The patient engaged appropriately with the writer during the initial session. The patient was able to endure the orienting session, oriented to the unit 2W, as well as the role/ function of  and Inpatient Psychiatric rehabilitation programming. Per the patient’s chart, the patient presented a day prior to her initial admission to the ED after reportedly taking 15 pills of Benadryl with vague SI and in the context of feeling overwhelmed and then presented again back after another episode of emotional dysregulation. Writer identified an apt goal for the patient defined in the  plan of care. The patient’s Psychiatric Rehabilitation goal is to identify and utilize 2 coping skills for her suicide/ self-injurious behavior goal. Psychiatric Rehabilitation staff will meet with patient weekly to review progress towards identified goal.

## 2025-01-15 NOTE — BH TREATMENT PLAN - NSTXCOPEINTERRN_PSY_ALL_CORE
Nurse will assist patient in identifying her triggers and   encourage the use of effective coping.   Nurse will provide emotional support and medication as prescribed and as needed.

## 2025-01-15 NOTE — BH INPATIENT PSYCHIATRY PROGRESS NOTE - CURRENT MEDICATION
MEDICATIONS  (STANDING):  cloZAPine 350 milliGRAM(s) Oral at bedtime  gabapentin 300 milliGRAM(s) Oral three times a day  influenza   Vaccine 0.5 milliLiter(s) IntraMuscular once  pantoprazole    Tablet 40 milliGRAM(s) Oral before breakfast  prazosin. 2 milliGRAM(s) Oral at bedtime  sertraline 150 milliGRAM(s) Oral daily    MEDICATIONS  (PRN):  acetaminophen     Tablet .. 650 milliGRAM(s) Oral every 6 hours PRN Temp greater or equal to 38C (100.4F), Mild Pain (1 - 3)  albuterol    90 MICROgram(s) HFA Inhaler 1 Puff(s) Inhalation every 4 hours PRN Asthma  aluminum hydroxide/magnesium hydroxide/simethicone Suspension 30 milliLiter(s) Oral every 6 hours PRN Dyspepsia  haloperidol     Tablet 5 milliGRAM(s) Oral every 6 hours PRN Anxiety/Agitation  haloperidol    Injectable 5 milliGRAM(s) IntraMuscular once PRN Severe agitation  LORazepam     Tablet 2 milliGRAM(s) Oral every 6 hours PRN Anxiety/Agitation  LORazepam   Injectable 2 milliGRAM(s) IntraMuscular once PRN Severe agitation/anxiety  magnesium hydroxide Suspension 30 milliLiter(s) Oral daily PRN Constipation   MEDICATIONS  (STANDING):  cloZAPine 350 milliGRAM(s) Oral at bedtime  gabapentin 300 milliGRAM(s) Oral three times a day  pantoprazole    Tablet 40 milliGRAM(s) Oral before breakfast  prazosin. 2 milliGRAM(s) Oral at bedtime  sertraline 150 milliGRAM(s) Oral daily    MEDICATIONS  (PRN):  acetaminophen     Tablet .. 650 milliGRAM(s) Oral every 6 hours PRN Temp greater or equal to 38C (100.4F), Mild Pain (1 - 3)  albuterol    90 MICROgram(s) HFA Inhaler 1 Puff(s) Inhalation every 4 hours PRN Asthma  aluminum hydroxide/magnesium hydroxide/simethicone Suspension 30 milliLiter(s) Oral every 6 hours PRN Dyspepsia  haloperidol     Tablet 5 milliGRAM(s) Oral every 6 hours PRN Anxiety/Agitation  haloperidol    Injectable 5 milliGRAM(s) IntraMuscular once PRN Severe agitation  LORazepam     Tablet 2 milliGRAM(s) Oral every 6 hours PRN Anxiety/Agitation  LORazepam   Injectable 2 milliGRAM(s) IntraMuscular once PRN Severe agitation/anxiety  magnesium hydroxide Suspension 30 milliLiter(s) Oral daily PRN Constipation

## 2025-01-15 NOTE — BH INPATIENT PSYCHIATRY PROGRESS NOTE - NSBHMETABOLIC_PSY_ALL_CORE_FT
BMI: BMI (kg/m2): 59.5 (01-14-25 @ 20:07)  HbA1c: A1C with Estimated Average Glucose Result: 5.4 % (09-19-24 @ 08:20)    Glucose:   BP: --Vital Signs Last 24 Hrs  T(C): 36.9 (01-14-25 @ 20:41), Max: 36.9 (01-14-25 @ 20:41)  T(F): 98.4 (01-14-25 @ 20:41), Max: 98.4 (01-14-25 @ 20:41)  HR: --  BP: --  BP(mean): --  RR: --  SpO2: --    Orthostatic VS  01-14-25 @ 20:41  Lying BP: --/-- HR: --  Sitting BP: 116/80 HR: 103  Standing BP: 113/71 HR: 116  Site: --  Mode: --    Lipid Panel: Date/Time: 09-19-24 @ 08:20  Cholesterol, Serum: 186  LDL Cholesterol Calculated: 110  HDL Cholesterol, Serum: 55  Total Cholesterol/HDL Ration Measurement: --  Triglycerides, Serum: 118   BMI: BMI (kg/m2): 59.5 (01-14-25 @ 20:07)  HbA1c: A1C with Estimated Average Glucose Result: 5.4 % (09-19-24 @ 08:20)    Glucose:   BP: --Vital Signs Last 24 Hrs  T(C): 36.7 (01-16-25 @ 08:39), Max: 36.7 (01-16-25 @ 08:39)  T(F): 98 (01-16-25 @ 08:39), Max: 98 (01-16-25 @ 08:39)  HR: --  BP: --  BP(mean): --  RR: 18 (01-16-25 @ 08:39) (18 - 18)  SpO2: --    Orthostatic VS  01-16-25 @ 08:39  Lying BP: --/-- HR: --  Sitting BP: 114/62 HR: 103  Standing BP: 114/73 HR: 101  Site: --  Mode: --  Orthostatic VS  01-14-25 @ 20:41  Lying BP: --/-- HR: --  Sitting BP: 116/80 HR: 103  Standing BP: 113/71 HR: 116  Site: --  Mode: --    Lipid Panel: Date/Time: 09-19-24 @ 08:20  Cholesterol, Serum: 186  LDL Cholesterol Calculated: 110  HDL Cholesterol, Serum: 55  Total Cholesterol/HDL Ration Measurement: --  Triglycerides, Serum: 118

## 2025-01-15 NOTE — BH PATIENT PROFILE - HOME MEDICATIONS
sertraline 25 mg oral tablet , 1 tab(s) orally once a day  sertraline 100 mg oral tablet , 1 tab(s) orally once a day  gabapentin 300 mg oral capsule , 1 cap(s) orally 2 times a day  cloZAPine 25 mg oral tablet , 3 tab(s) orally once a day (at bedtime)

## 2025-01-15 NOTE — BH SOCIAL WORK INITIAL PSYCHOSOCIAL EVALUATION - DETAILS
As per ED note, pt's mother has MDD and pt's grandmother has dementia. Pt reports her mother has schizophrenia.

## 2025-01-15 NOTE — BH SOCIAL WORK INITIAL PSYCHOSOCIAL EVALUATION - OTHER PAST PSYCHIATRIC HISTORY (INCLUDE DETAILS REGARDING ONSET, COURSE OF ILLNESS, INPATIENT/OUTPATIENT TREATMENT)
Pt has hx of multiple hospitalizations, was last at [facility] - 12/21 to 12/27 and again from 12/31 to 01/06/2025. Pt also has been admitted to [facility] for state hospitalization; was last in state in 2021.

## 2025-01-15 NOTE — BH PATIENT PROFILE - FALL HARM RISK - UNIVERSAL INTERVENTIONS
Bed in lowest position, wheels locked, appropriate side rails in place/Call bell, personal items and telephone in reach/Instruct patient to call for assistance before getting out of bed or chair/Non-slip footwear when patient is out of bed/[place] to call system/Physically safe environment - no spills, clutter or unnecessary equipment/Purposeful Proactive Rounding/Room/bathroom lighting operational, light cord in reach

## 2025-01-15 NOTE — BH PATIENT PROFILE - HAS THE PATIENT EXPERIENCED ANY OF THE FOLLOWING WITHIN THE WEEK PRIOR TO ADMISSION?
SUBJECTIVE:                                                    Kristal Nicole is a 53 year old female who presents to clinic today for the following health issues:    Here with concerns about pinched nerve in spine - shooting down right arm.  Fingers tingling in the right hand. Sx started in the last week.      Pinched nerve many years ago in lower back with herniated disc - 15 years ago.  Did Pilates and sx resolved.    Pt is right handed  Denies any hx of trauma to the neck.      Pt is new to our clinic.    I have reviewed the following histories: Past Medical History, Past Surgical History, Social History, Family History, Problem List, Medication List and Allergies          BP Readings from Last 3 Encounters:   05/31/18 108/62   06/23/08 100/64   05/08/07 110/72    Wt Readings from Last 3 Encounters:   05/31/18 83.5 kg (184 lb)   06/23/08 86.2 kg (190 lb)   05/08/07 83.5 kg (184 lb)            Patient Active Problem List   Diagnosis     CARDIOVASCULAR SCREENING; LDL GOAL LESS THAN 160     Health Care Home     Past Surgical History:   Procedure Laterality Date     D & C  6/09    miscarriage       Social History   Substance Use Topics     Smoking status: Former Smoker     Quit date: 5/1/2000     Smokeless tobacco: Never Used     Alcohol use Yes      Comment: 3x week     Family History   Problem Relation Age of Onset     Breast Cancer Mother      dx'd at 56     Cardiovascular Mother       blood clot leg after prolonged bedrest     Cardiovascular Father      blood clots, legs     CANCER Father      skin          Current Outpatient Prescriptions   Medication Sig Dispense Refill     levothyroxine (SYNTHROID/LEVOTHROID) 112 MCG tablet TAKE ONE TABLET BY MOUTH ONE TIME DAILY FOR 90 DAYS  4       Allergies   Allergen Reactions     Penicillin [Esters]        OBJECTIVE:                                                    /62 (BP Location: Left arm, Patient Position: Chair, Cuff Size: Adult Regular)  Pulse 65  Temp 
"98.2  F (36.8  C) (Oral)  Ht 1.778 m (5' 10\")  Wt 83.5 kg (184 lb)  SpO2 99%  Breastfeeding? No  BMI 26.4 kg/m2 Body mass index is 26.4 kg/(m^2).     C-spine  Full ROM and strength  Spinous processes non-tender  There is mild tenderness to the right paracervical muscles on the right  Full ROM and strength bilateral upper extremities.  Normal sensation to light touch bilaterally               ASSESSMENT/PLAN:                                                      1. Cervical radiculopathy  - methylPREDNISolone (MEDROL DOSEPAK) 4 MG tablet; Follow package instructions  Dispense: 21 tablet; Refill: 0    Care of the Neck handout given with exercises.    Steroid dose pack  I reviewed the patient information handout from Up To Date on this medication including side effects with the patient.  Pt to Call me with update next week  MRI if not improving        Jada Dyer PA-C  [facility]    "
Patient/Caregiver provided printed discharge information.
no

## 2025-01-15 NOTE — BH INPATIENT PSYCHIATRY PROGRESS NOTE - ATTENDING COMMENTS
Care was discussed and reviewed in the interdisciplinary treatment team.  I, Lia Parsons MD, have reviewed and verified the documentation.  I independently performed the documented medical decision making.      Patient was seen and evaluated with the team present during the assessment. Patient was malodorous and disheveled. She reported having urges of harming her self and at times feeling out of control. Denies that any medications have been helpful. Tells me that in the community has limited supports. She is in supportive housing and has an ACT Team.

## 2025-01-15 NOTE — BH INPATIENT PSYCHIATRY PROGRESS NOTE - NSBHCHARTREVIEWVS_PSY_A_CORE FT
Vital Signs Last 24 Hrs  T(C): 36.9 (01-14-25 @ 20:41), Max: 36.9 (01-14-25 @ 20:41)  T(F): 98.4 (01-14-25 @ 20:41), Max: 98.4 (01-14-25 @ 20:41)  HR: --  BP: --  BP(mean): --  RR: --  SpO2: --    Orthostatic VS  01-14-25 @ 20:41  Lying BP: --/-- HR: --  Sitting BP: 116/80 HR: 103  Standing BP: 113/71 HR: 116  Site: --  Mode: --   Vital Signs Last 24 Hrs  T(C): 36.7 (01-16-25 @ 08:39), Max: 36.7 (01-16-25 @ 08:39)  T(F): 98 (01-16-25 @ 08:39), Max: 98 (01-16-25 @ 08:39)  HR: --  BP: --  BP(mean): --  RR: 18 (01-16-25 @ 08:39) (18 - 18)  SpO2: --    Orthostatic VS  01-16-25 @ 08:39  Lying BP: --/-- HR: --  Sitting BP: 114/62 HR: 103  Standing BP: 114/73 HR: 101  Site: --  Mode: --  Orthostatic VS  01-14-25 @ 20:41  Lying BP: --/-- HR: --  Sitting BP: 116/80 HR: 103  Standing BP: 113/71 HR: 116  Site: --  Mode: --

## 2025-01-15 NOTE — BH PATIENT PROFILE - FUNCTIONAL ASSESSMENT - BASIC MOBILITY 5.
Quality 226: Preventive Care And Screening: Tobacco Use: Screening And Cessation Intervention: Tobacco Screening not Performed
Detail Level: Detailed
Quality 110: Preventive Care And Screening: Influenza Immunization: Influenza immunization was not ordered or administered, reason not given
Quality 130: Documentation Of Current Medications In The Medical Record: Current Medications Documented
Quality 431: Preventive Care And Screening: Unhealthy Alcohol Use - Screening: Patient not screened for unhealthy alcohol use using a systematic screening method
4 = No assist / stand by assistance

## 2025-01-15 NOTE — BH TREATMENT PLAN - NSTXSUICIDINTERPR_PSY_ALL_CORE
Writer identified an apt goal for the patient defined in the  plan of care. The patient’s Psychiatric Rehabilitation goal is to identify and utilize 2 coping skills for her suicide/ self-injurious behavior goal. Psychiatric Rehabilitation staff will meet with patient weekly to review progress towards identified goal.

## 2025-01-16 VITALS — RESPIRATION RATE: 17 BRPM | TEMPERATURE: 97 F | OXYGEN SATURATION: 98 %

## 2025-01-16 PROCEDURE — 99232 SBSQ HOSP IP/OBS MODERATE 35: CPT

## 2025-01-16 RX ADMIN — GABAPENTIN 300 MILLIGRAM(S): 300 CAPSULE ORAL at 20:24

## 2025-01-16 RX ADMIN — SERTRALINE HYDROCHLORIDE 150 MILLIGRAM(S): 25 TABLET ORAL at 09:09

## 2025-01-16 RX ADMIN — PANTOPRAZOLE 40 MILLIGRAM(S): 40 TABLET, DELAYED RELEASE ORAL at 09:09

## 2025-01-16 RX ADMIN — CLOZAPINE 350 MILLIGRAM(S): 25 TABLET ORAL at 20:24

## 2025-01-16 RX ADMIN — Medication 30 MILLILITER(S): at 10:59

## 2025-01-16 RX ADMIN — GABAPENTIN 300 MILLIGRAM(S): 300 CAPSULE ORAL at 09:08

## 2025-01-16 RX ADMIN — GABAPENTIN 300 MILLIGRAM(S): 300 CAPSULE ORAL at 13:13

## 2025-01-16 RX ADMIN — INFLUENZA A VIRUS A/WISCONSIN/588/2019 (H1N1) RECOMBINANT HEMAGGLUTININ ANTIGEN, INFLUENZA A VIRUS A/DARWIN/6/2021 (H3N2) RECOMBINANT HEMAGGLUTININ ANTIGEN, INFLUENZA B VIRUS B/AUSTRIA/1359417/2021 RECOMBINANT HEMAGGLUTININ ANTIGEN, AND INFLUENZA B VIRUS B/PHUKET/3073/2013 RECOMBINANT HEMAGGLUTININ ANTIGEN 0.5 MILLILITER(S): 45; 45; 45; 45 INJECTION INTRAMUSCULAR at 10:59

## 2025-01-16 RX ADMIN — PRAZOSIN HYDROCHLORIDE 2 MILLIGRAM(S): 5 CAPSULE ORAL at 20:25

## 2025-01-16 NOTE — BH INPATIENT PSYCHIATRY PROGRESS NOTE - NSTXCOPEINTERMD_PSY_ALL_CORE
Approved and signed.    
Med management; group and milieu therapy 
Med management; group and milieu therapy

## 2025-01-16 NOTE — BH DISCHARGE NOTE NURSING/SOCIAL WORK/PSYCH REHAB - PATIENT PORTAL LINK FT
You can access the FollowMyHealth Patient Portal offered by [facility] by registering at the following website: [email or web address]. By joining [employer]’s FollowMyHealth portal, you will also be able to view your health information using other applications (apps) compatible with our system.

## 2025-01-16 NOTE — BH INPATIENT PSYCHIATRY PROGRESS NOTE - NSBHCHARTREVIEWVS_PSY_A_CORE FT
Vital Signs Last 24 Hrs  T(C): 36.7 (01-16-25 @ 08:39), Max: 36.7 (01-16-25 @ 08:39)  T(F): 98 (01-16-25 @ 08:39), Max: 98 (01-16-25 @ 08:39)  HR: --  BP: --  BP(mean): --  RR: 18 (01-16-25 @ 08:39) (18 - 18)  SpO2: --    Orthostatic VS  01-16-25 @ 08:39  Lying BP: --/-- HR: --  Sitting BP: 114/62 HR: 103  Standing BP: 114/73 HR: 101  Site: --  Mode: --  Orthostatic VS  01-14-25 @ 20:41  Lying BP: --/-- HR: --  Sitting BP: 116/80 HR: 103  Standing BP: 113/71 HR: 116  Site: --  Mode: --

## 2025-01-16 NOTE — BH INPATIENT PSYCHIATRY PROGRESS NOTE - CURRENT MEDICATION
MEDICATIONS  (STANDING):  cloZAPine 350 milliGRAM(s) Oral at bedtime  gabapentin 300 milliGRAM(s) Oral three times a day  pantoprazole    Tablet 40 milliGRAM(s) Oral before breakfast  prazosin. 2 milliGRAM(s) Oral at bedtime  sertraline 150 milliGRAM(s) Oral daily    MEDICATIONS  (PRN):  acetaminophen     Tablet .. 650 milliGRAM(s) Oral every 6 hours PRN Temp greater or equal to 38C (100.4F), Mild Pain (1 - 3)  albuterol    90 MICROgram(s) HFA Inhaler 1 Puff(s) Inhalation every 4 hours PRN Asthma  aluminum hydroxide/magnesium hydroxide/simethicone Suspension 30 milliLiter(s) Oral every 6 hours PRN Dyspepsia  haloperidol     Tablet 5 milliGRAM(s) Oral every 6 hours PRN Anxiety/Agitation  haloperidol    Injectable 5 milliGRAM(s) IntraMuscular once PRN Severe agitation  LORazepam     Tablet 2 milliGRAM(s) Oral every 6 hours PRN Anxiety/Agitation  LORazepam   Injectable 2 milliGRAM(s) IntraMuscular once PRN Severe agitation/anxiety  magnesium hydroxide Suspension 30 milliLiter(s) Oral daily PRN Constipation

## 2025-01-16 NOTE — BH DISCHARGE NOTE NURSING/SOCIAL WORK/PSYCH REHAB - NSDCPRGOAL_PSY_ALL_CORE
The patient met her specified goal to identify and utilize 2 coping skills for her suicide/ self-injurious behavior goal. Progress was evidenced by an improved ability to tolerate safety planning and engaging in appropriate and supportive dialogue with peers during communal engagement. The patient attended 1 of the Psychiatric Rehabilitation groups throughout her stay. The patient completed her safety plan prior to discharge.

## 2025-01-16 NOTE — BH INPATIENT PSYCHIATRY PROGRESS NOTE - ATTENDING COMMENTS
Care was discussed and reviewed in the interdisciplinary treatment team.  I, Lia Parsons MD, have reviewed and verified the documentation.  I independently performed the documented medical decision making.      Patient was evaluated with the team. Patient inquire about a referral for state or a residential facility. We discuss with the patient that she is not a candidate for this services at this time and encourage her to work with her outpatient provider. Patient verbalized good understanding of this. She has been able to contract for safety and CO was discontinued. Patient later on put a 3DL and she is schedule to discharge tomorrow.

## 2025-01-16 NOTE — BH INPATIENT PSYCHIATRY PROGRESS NOTE - NSBHMETABOLIC_PSY_ALL_CORE_FT
BMI: BMI (kg/m2): 59.5 (01-14-25 @ 20:07)  HbA1c: A1C with Estimated Average Glucose Result: 5.4 % (09-19-24 @ 08:20)    Glucose:   BP: --Vital Signs Last 24 Hrs  T(C): 36.7 (01-16-25 @ 08:39), Max: 36.7 (01-16-25 @ 08:39)  T(F): 98 (01-16-25 @ 08:39), Max: 98 (01-16-25 @ 08:39)  HR: --  BP: --  BP(mean): --  RR: 18 (01-16-25 @ 08:39) (18 - 18)  SpO2: --    Orthostatic VS  01-16-25 @ 08:39  Lying BP: --/-- HR: --  Sitting BP: 114/62 HR: 103  Standing BP: 114/73 HR: 101  Site: --  Mode: --  Orthostatic VS  01-14-25 @ 20:41  Lying BP: --/-- HR: --  Sitting BP: 116/80 HR: 103  Standing BP: 113/71 HR: 116  Site: --  Mode: --    Lipid Panel: Date/Time: 09-19-24 @ 08:20  Cholesterol, Serum: 186  LDL Cholesterol Calculated: 110  HDL Cholesterol, Serum: 55  Total Cholesterol/HDL Ration Measurement: --  Triglycerides, Serum: 118

## 2025-01-16 NOTE — BH INPATIENT PSYCHIATRY PROGRESS NOTE - NSBHFUPINTERVALHXFT_PSY_A_CORE
Patient was seen for f/u for Schizoaffective Disorder, BPD. Patient was malodorous. Patient reported feeling depressed, with SI and urges to self harm, was thinking about banging head when team approached. Patient was able to contract for safety. Patient later reported to urges SI to hang herself, she was given PRN Ativan and was able to contract for safety. Patient later then reported SI with urges to hang self in room, was scared urges was to overwhelming to refrain from acting on it. Patient was unable to say what has helped in the past. Patient reported her mood improved when the switch is flipped. Patient was placed on CO1:1 for safety.    
Patient was seen for f/u for Schizoaffective Disorder, BPD. Patient remains malodorous. reported she can't shower because she doesn't have a change of clothes. Patient reported having a lot of emotions and not knowing how to express them. Patient initially was unable to say what helps except going to State hospital or a residential facility. She was able to say groups and being out in the community is helpful. Patient was able to contract for safety, agreed to seek staff for help or worsening symptoms. CO was discontinued. Patient denied SIIP currently. Denied AVH.

## 2025-01-16 NOTE — BH INPATIENT PSYCHIATRY PROGRESS NOTE - NSBHATTESTAPPBILLTIME_PSY_A_CORE
I attest my time as RADHA is greater than 50% of the total combined time spent on qualifying patient care activities. I have reviewed and verified the documentation.
I attest my time as RADHA is greater than 50% of the total combined time spent on qualifying patient care activities. I have reviewed and verified the documentation.

## 2025-01-16 NOTE — BH DISCHARGE NOTE NURSING/SOCIAL WORK/PSYCH REHAB - NSDCVIVACCINE_GEN_ALL_CORE_FT
Influenza, split virus, trivalent, preservative free; 16-Jan-2025 10:59; Amanda Arevalo (RN); [employer]; [id number] (Exp. Date: 01-Jun-2025); IntraMuscular; Deltoid Right.; 0.5 milliLiter(s); VIS (VIS Published: 06-Aug-2021, VIS Presented: 16-Jan-2025);

## 2025-01-16 NOTE — BH INPATIENT PSYCHIATRY PROGRESS NOTE - NSBHASSESSSUMMFT_PSY_ALL_CORE
This is 30 yo female, domiciled in supportive housing, with diagnoses primarily of Borderline Personality Disorder and PTSD,  Schizoaffective Disorder,  hx of asthma many psychiatric hospitalizations including state, most recently at [facility] 12/21/24-12/27/24 and 12/31/24-1/6/25,, hx of multiple suicide attempts vs impulsive parasuicidal behavior, treated with clozapine with dose missed yesterday (she reports adherence otherwise, was in the ED), no known substance misuse who had presented yesterday to the ED as well after reportedly taking 15 pills of Benadryl with vague SI and in the context of feeling overwhelmed, presented again today back after another episode of emotional dysregulation with suicidal thoughts, requested admission, signed voluntaries and was admitted on 9.13  Routine checks, will not place on constant observation, voluntary, no active suicidal thoughts  Continue clozapine 350 mg, took last dose less than 48 hours ago (she reports- no treatment break, only missed dose last night), CBC wnl  Continue gabapentin  PRNS
This is 30 yo female, domiciled in supportive housing, with diagnoses primarily of Borderline Personality Disorder and PTSD,  Schizoaffective Disorder,  hx of asthma many psychiatric hospitalizations including state, most recently at [facility] 12/21/24-12/27/24 and 12/31/24-1/6/25,, hx of multiple suicide attempts vs impulsive parasuicidal behavior, treated with clozapine with dose missed yesterday (she reports adherence otherwise, was in the ED), no known substance misuse who had presented yesterday to the ED as well after reportedly taking 15 pills of Benadryl with vague SI and in the context of feeling overwhelmed, presented again today back after another episode of emotional dysregulation with suicidal thoughts, requested admission, signed voluntaries and was admitted on 9.13  Routine checks, will not place on constant observation, voluntary, no active suicidal thoughts  Continue clozapine 350 mg, took last dose less than 48 hours ago (she reports- no treatment break, only missed dose last night), CBC wnl  Continue gabapentin  PRNS

## 2025-01-16 NOTE — BH INPATIENT PSYCHIATRY PROGRESS NOTE - NSBHATTESTBILLING_PSY_A_CORE
87602-[id number] OBS or IP - moderate complexity OR 35-49 mins
67634-Aqczwkfxxf OBS or IP - moderate complexity OR 35-49 mins

## 2025-01-16 NOTE — BH DISCHARGE NOTE NURSING/SOCIAL WORK/PSYCH REHAB - DISCHARGE INSTRUCTIONS AFTERCARE APPOINTMENTS
In order to check the location, date, or time of your aftercare appointment, please refer to your Discharge Instructions Document given to you upon leaving the hospital.  If you have lost the instructions please call [phone]

## 2025-01-16 NOTE — BH DISCHARGE NOTE NURSING/SOCIAL WORK/PSYCH REHAB - NSCDUDCCRISIS_PSY_A_CORE
[facility]  1 (593) [facility]-WELL ([phone])  Text "WELL" to [phone]  Website: www.[employer]/.[employer] [facility] 1 (427) 621-ZJZI (3687) Website: www.safehorizon.org/.National Suicide Prevention Lifeline 9 (876) 757-8934/.  [facility]  1 (971) LIFENET (671-6258)/.  [facility]  [phone] [place] [phone]   Hours:  Monday through Friday from 9 AM to 3 PM/988 Suicide and Crisis Lifeline

## 2025-01-17 LAB
A1C WITH ESTIMATED AVERAGE GLUCOSE RESULT: 5.5 % — SIGNIFICANT CHANGE UP (ref 4–5.6)
CHOLEST SERPL-MCNC: 194 MG/DL — SIGNIFICANT CHANGE UP
ESTIMATED AVERAGE GLUCOSE: 111 — SIGNIFICANT CHANGE UP
HDLC SERPL-MCNC: 63 MG/DL — SIGNIFICANT CHANGE UP
LIPID PNL WITH DIRECT LDL SERPL: 112 MG/DL — HIGH
NON HDL CHOLESTEROL: 131 MG/DL — HIGH
TRIGL SERPL-MCNC: 103 MG/DL — SIGNIFICANT CHANGE UP

## 2025-01-17 PROCEDURE — 99238 HOSP IP/OBS DSCHRG MGMT 30/<: CPT

## 2025-01-17 RX ORDER — SERTRALINE HYDROCHLORIDE 25 MG/1
3 TABLET ORAL
Qty: 0 | Refills: 0 | DISCHARGE
Start: 2025-01-17

## 2025-01-17 RX ORDER — PANTOPRAZOLE 40 MG/1
1 TABLET, DELAYED RELEASE ORAL
Qty: 0 | Refills: 0 | DISCHARGE
Start: 2025-01-17

## 2025-01-17 RX ORDER — GABAPENTIN 300 MG/1
1 CAPSULE ORAL
Qty: 0 | Refills: 0 | DISCHARGE
Start: 2025-01-17

## 2025-01-17 RX ORDER — CLOZAPINE 25 MG/1
7 TABLET ORAL
Qty: 0 | Refills: 0 | DISCHARGE
Start: 2025-01-17

## 2025-01-17 RX ORDER — ALBUTEROL SULFATE 90 UG/1
1 INHALANT RESPIRATORY (INHALATION)
Qty: 0 | Refills: 0 | DISCHARGE
Start: 2025-01-17

## 2025-01-17 RX ORDER — PRAZOSIN HYDROCHLORIDE 5 MG/1
1 CAPSULE ORAL
Qty: 0 | Refills: 0 | DISCHARGE
Start: 2025-01-17

## 2025-01-17 RX ADMIN — PANTOPRAZOLE 40 MILLIGRAM(S): 40 TABLET, DELAYED RELEASE ORAL at 08:55

## 2025-01-17 RX ADMIN — GABAPENTIN 300 MILLIGRAM(S): 300 CAPSULE ORAL at 10:10

## 2025-01-17 RX ADMIN — SERTRALINE HYDROCHLORIDE 150 MILLIGRAM(S): 25 TABLET ORAL at 10:10
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
## 2025-01-17 NOTE — BH INPATIENT PSYCHIATRY DISCHARGE NOTE - MSE UNSTRUCTURED FT
Patient is awake and alert. Affect is neutral, reports "I am ok, have not been doing well." Calm. Cooperative. Speech fluent. TP logical and coherent. No delusions. Endorses depression. No hallucinations. No motor retardation. Fair impulse control. Reports "I had suicidal thoughts recently but not now" Denies any thoughts of hurting herself on the unit, is relieved to be here.
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
## 2025-01-17 NOTE — BH INPATIENT PSYCHIATRY DISCHARGE NOTE - NSBHSUICIDESTATUS_PSY_ALL_CORE
Patient denied SIIP currently. Endorsed not engaging adequately with housing team and plans to engage better.

## 2025-01-17 NOTE — BH INPATIENT PSYCHIATRY DISCHARGE NOTE - NSDCMRMEDTOKEN_GEN_ALL_CORE_FT
albuterol 90 mcg/inh inhalation aerosol: 1 puff(s) inhaled every 4 hours As needed Asthma  cloZAPine 50 mg oral tablet: 7 tab(s) orally once a day (at bedtime)  gabapentin 300 mg oral capsule: 1 cap(s) orally 3 times a day  pantoprazole 40 mg oral delayed release tablet: 1 tab(s) orally once a day (before a meal)  prazosin 2 mg oral capsule: 1 cap(s) orally once a day (at bedtime)  sertraline 50 mg oral tablet: 3 tab(s) orally once a day

## 2025-01-17 NOTE — BH INPATIENT PSYCHIATRY DISCHARGE NOTE - NSBHASSESSSUMMFT_PSY_ALL_CORE
This is 30 yo female, domiciled in supportive housing, with diagnoses primarily of Borderline Personality Disorder and PTSD,  Schizoaffective Disorder,  hx of asthma many psychiatric hospitalizations including state, most recently at [facility] 12/21/24-12/27/24 and 12/31/24-1/6/25,, hx of multiple suicide attempts vs impulsive parasuicidal behavior, treated with clozapine with dose missed yesterday (she reports adherence otherwise, was in the ED), no known substance misuse who had presented yesterday to the ED as well after reportedly taking 15 pills of Benadryl with vague SI and in the context of feeling overwhelmed, presented again today back after another episode of emotional dysregulation with suicidal thoughts, requested admission, signed voluntaries and was admitted on 9.13  Routine checks, will not place on constant observation, voluntary, no active suicidal thoughts  Continue clozapine 350 mg, took last dose less than 48 hours ago (she reports- no treatment break, only missed dose last night), CBC wnl  Continue gabapentin  PRNS

## 2025-01-17 NOTE — BH INPATIENT PSYCHIATRY DISCHARGE NOTE - ATTENDING DISCHARGE PHYSICAL EXAMINATION:
Care was discussed and reviewed in the interdisciplinary treatment team.  I, Lia Parsons MD, have reviewed and verified the documentation.  I independently performed the documented medical decision making.      Patient was seen and evaluated today by this writer. Patient submitted a 3DL. Patient reported that she is feeling much better and is looking forward to being able to leave the hospital. She understands and is in agreement with continuing to work with her ACT team. Patient is denying any SI and has been able to contract for safety. Denies any HI or hallucinations and no delusions have been elicited. The patient has been educated about the safety plan and understands that if she feels like harming herself or others, she can call 911 or go to any ER. During the hospital stay, the patient has not demonstrated any aggressive or self-injurious behaviors, and this has been consistent with my observations and the observations of the staff.

## 2025-01-17 NOTE — BH INPATIENT PSYCHIATRY DISCHARGE NOTE - OTHER PAST PSYCHIATRIC HISTORY (INCLUDE DETAILS REGARDING ONSET, COURSE OF ILLNESS, INPATIENT/OUTPATIENT TREATMENT)
Pt has hx of multiple hospitalizations, was last at [facility] - 12/21 to 12/27 and again from 12/31 to 01/06/2025. Pt also has been admitted to [facility] for state hospitalization; was last in state in 2021.

## 2025-01-17 NOTE — BH INPATIENT PSYCHIATRY DISCHARGE NOTE - NSBHDCHANDOFFFT_PSY_ALL_CORE
Discharge summary faxed to (provider/clinic). Discussed treatment plan and meds included my contact information Zulema Mercado NP, [phone].

## 2025-01-17 NOTE — BH INPATIENT PSYCHIATRY DISCHARGE NOTE - NSBHMETABOLIC_PSY_ALL_CORE_FT
BMI: BMI (kg/m2): 59.5 (01-14-25 @ 20:07)  HbA1c: A1C with Estimated Average Glucose Result: 5.4 % (09-19-24 @ 08:20)    Glucose:   BP: --Vital Signs Last 24 Hrs  T(C): 36.1 (01-16-25 @ 20:16), Max: 36.1 (01-16-25 @ 20:16)  T(F): 97 (01-16-25 @ 20:16), Max: 97 (01-16-25 @ 20:16)  HR: --  BP: --  BP(mean): --  RR: 17 (01-16-25 @ 20:16) (17 - 17)  SpO2: 98% (01-16-25 @ 20:16) (98% - 98%)    Orthostatic VS  01-16-25 @ 20:40  Lying BP: --/-- HR: --  Sitting BP: 116/61 HR: 89  Standing BP: 121/91 HR: 97  Site: --  Mode: --  Orthostatic VS  01-16-25 @ 20:16  Lying BP: --/-- HR: --  Sitting BP: 116/61 HR: 89  Standing BP: 121/91 HR: 97  Site: --  Mode: --  Orthostatic VS  01-16-25 @ 08:39  Lying BP: --/-- HR: --  Sitting BP: 114/62 HR: 103  Standing BP: 114/73 HR: 101  Site: --  Mode: --    Lipid Panel: Date/Time: 09-19-24 @ 08:20  Cholesterol, Serum: 186  LDL Cholesterol Calculated: 110  HDL Cholesterol, Serum: 55  Total Cholesterol/HDL Ration Measurement: --  Triglycerides, Serum: 118
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
## 2025-01-17 NOTE — BH INPATIENT PSYCHIATRY DISCHARGE NOTE - NSDCCPCAREPLAN_GEN_ALL_CORE_FT
PRINCIPAL DISCHARGE DIAGNOSIS  Diagnosis: Schizoaffective disorder  Assessment and Plan of Treatment:       SECONDARY DISCHARGE DIAGNOSES  Diagnosis: Anxiety  Assessment and Plan of Treatment:     Diagnosis: Asthma  Assessment and Plan of Treatment:

## 2025-01-17 NOTE — BH INPATIENT PSYCHIATRY DISCHARGE NOTE - NSBHFUPINTERVALHXFT_PSY_A_CORE
Patient was seen for f/u for Schizoaffective Disorder, BPD. Patient remains malodorous. reported she can't shower because she doesn't have a change of clothes. Patient reported having a lot of emotions and not knowing how to express them. Patient initially was unable to say what helps except going to [facility] or a residential facility. She was able to say groups and being out in the community is helpful. Patient was able to contract for safety, agreed to seek staff for help or worsening symptoms. CO was discontinued. Patient denied SIIP currently. Denied AVH.   Patient was seen for f/u for Schizoaffective Disorder, BPD. patient reported feeling better, is committed to working housing program and ACT team. Patient endorsed some medications adherence schedule but was vague about specifics. Patient plans to meet with  upon return to housing to check in then plans to go pay her phone bill. Patient was in agreement with continuing treatment and attending outpatient appointment. Educated patient about crisis center as a resource and calling 911 or going to the emergency room if there is a safety concern. Patient denied SIIP currently. Denied AVH.
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
## 2025-03-25 ENCOUNTER — EMERGENCY (EMERGENCY)
Facility: HOSPITAL | Age: 30
LOS: 1 days | Discharge: HOME | End: 2025-03-25
Attending: EMERGENCY MEDICINE | Admitting: EMERGENCY MEDICINE
Payer: MEDICAID

## 2025-03-25 VITALS
TEMPERATURE: 98 F | SYSTOLIC BLOOD PRESSURE: 110 MMHG | RESPIRATION RATE: 18 BRPM | OXYGEN SATURATION: 97 % | HEART RATE: 96 BPM | DIASTOLIC BLOOD PRESSURE: 78 MMHG

## 2025-03-25 VITALS
SYSTOLIC BLOOD PRESSURE: 135 MMHG | HEART RATE: 106 BPM | OXYGEN SATURATION: 97 % | HEIGHT: 62 IN | WEIGHT: 293 LBS | RESPIRATION RATE: 18 BRPM | DIASTOLIC BLOOD PRESSURE: 88 MMHG | TEMPERATURE: 98 F

## 2025-03-25 DIAGNOSIS — J45.909 UNSPECIFIED ASTHMA, UNCOMPLICATED: ICD-10-CM

## 2025-03-25 DIAGNOSIS — E66.9 OBESITY, UNSPECIFIED: ICD-10-CM

## 2025-03-25 DIAGNOSIS — F32.A DEPRESSION, UNSPECIFIED: ICD-10-CM

## 2025-03-25 DIAGNOSIS — F60.3 BORDERLINE PERSONALITY DISORDER: ICD-10-CM

## 2025-03-25 DIAGNOSIS — Z91.410 PERSONAL HISTORY OF ADULT PHYSICAL AND SEXUAL ABUSE: ICD-10-CM

## 2025-03-25 DIAGNOSIS — Z56.0 UNEMPLOYMENT, UNSPECIFIED: ICD-10-CM

## 2025-03-25 DIAGNOSIS — R00.0 TACHYCARDIA, UNSPECIFIED: ICD-10-CM

## 2025-03-25 DIAGNOSIS — Z81.8 FAMILY HISTORY OF OTHER MENTAL AND BEHAVIORAL DISORDERS: ICD-10-CM

## 2025-03-25 DIAGNOSIS — F25.9 SCHIZOAFFECTIVE DISORDER, UNSPECIFIED: ICD-10-CM

## 2025-03-25 DIAGNOSIS — Z62.810 PERSONAL HISTORY OF PHYSICAL AND SEXUAL ABUSE IN CHILDHOOD: ICD-10-CM

## 2025-03-25 DIAGNOSIS — M54.50 LOW BACK PAIN, UNSPECIFIED: ICD-10-CM

## 2025-03-25 DIAGNOSIS — F90.9 ATTENTION-DEFICIT HYPERACTIVITY DISORDER, UNSPECIFIED TYPE: ICD-10-CM

## 2025-03-25 LAB
ANION GAP SERPL CALC-SCNC: 12 MMOL/L — SIGNIFICANT CHANGE UP (ref 5–17)
APAP SERPL-MCNC: <5 UG/ML — LOW (ref 10–30)
APPEARANCE UR: CLEAR — SIGNIFICANT CHANGE UP
BASOPHILS # BLD AUTO: 0 K/UL — SIGNIFICANT CHANGE UP (ref 0–0.2)
BASOPHILS NFR BLD AUTO: 0 % — SIGNIFICANT CHANGE UP (ref 0–2)
BILIRUB UR-MCNC: NEGATIVE — SIGNIFICANT CHANGE UP
BUN SERPL-MCNC: 17 MG/DL — SIGNIFICANT CHANGE UP (ref 7–23)
CALCIUM SERPL-MCNC: 9 MG/DL — SIGNIFICANT CHANGE UP (ref 8.4–10.5)
CHLORIDE SERPL-SCNC: 103 MMOL/L — SIGNIFICANT CHANGE UP (ref 96–108)
CO2 SERPL-SCNC: 22 MMOL/L — SIGNIFICANT CHANGE UP (ref 22–31)
COLOR SPEC: YELLOW — SIGNIFICANT CHANGE UP
CREAT SERPL-MCNC: 0.8 MG/DL — SIGNIFICANT CHANGE UP (ref 0.5–1.3)
DIFF PNL FLD: NEGATIVE — SIGNIFICANT CHANGE UP
EGFR: 104 ML/MIN/1.73M2 — SIGNIFICANT CHANGE UP
EGFR: 104 ML/MIN/1.73M2 — SIGNIFICANT CHANGE UP
EOSINOPHIL # BLD AUTO: 0 K/UL — SIGNIFICANT CHANGE UP (ref 0–0.5)
EOSINOPHIL NFR BLD AUTO: 0 % — SIGNIFICANT CHANGE UP (ref 0–6)
ETHANOL SERPL-MCNC: <10 MG/DL — SIGNIFICANT CHANGE UP (ref 0–10)
GLUCOSE SERPL-MCNC: 100 MG/DL — HIGH (ref 70–99)
GLUCOSE UR QL: NEGATIVE MG/DL — SIGNIFICANT CHANGE UP
HCG SERPL-ACNC: <1 MIU/ML — SIGNIFICANT CHANGE UP
HCT VFR BLD CALC: 36.8 % — SIGNIFICANT CHANGE UP (ref 34.5–45)
HGB BLD-MCNC: 11.3 G/DL — LOW (ref 11.5–15.5)
KETONES UR-MCNC: ABNORMAL MG/DL
LEUKOCYTE ESTERASE UR-ACNC: NEGATIVE — SIGNIFICANT CHANGE UP
LYMPHOCYTES # BLD AUTO: 29.2 % — SIGNIFICANT CHANGE UP (ref 13–44)
LYMPHOCYTES # BLD AUTO: 3.31 K/UL — HIGH (ref 1–3.3)
MCHC RBC-ENTMCNC: 23.3 PG — LOW (ref 27–34)
MCHC RBC-ENTMCNC: 30.7 G/DL — LOW (ref 32–36)
MCV RBC AUTO: 75.9 FL — LOW (ref 80–100)
MONOCYTES # BLD AUTO: 0.6 K/UL — SIGNIFICANT CHANGE UP (ref 0–0.9)
MONOCYTES NFR BLD AUTO: 5.3 % — SIGNIFICANT CHANGE UP (ref 2–14)
NEUTROPHILS # BLD AUTO: 7.41 K/UL — HIGH (ref 1.8–7.4)
NEUTROPHILS NFR BLD AUTO: 65.5 % — SIGNIFICANT CHANGE UP (ref 43–77)
NITRITE UR-MCNC: NEGATIVE — SIGNIFICANT CHANGE UP
PCP SPEC-MCNC: SIGNIFICANT CHANGE UP
PH UR: 5.5 — SIGNIFICANT CHANGE UP (ref 5–8)
PLATELET # BLD AUTO: 318 K/UL — SIGNIFICANT CHANGE UP (ref 150–400)
POTASSIUM SERPL-MCNC: 3.7 MMOL/L — SIGNIFICANT CHANGE UP (ref 3.5–5.3)
POTASSIUM SERPL-SCNC: 3.7 MMOL/L — SIGNIFICANT CHANGE UP (ref 3.5–5.3)
PROT UR-MCNC: NEGATIVE MG/DL — SIGNIFICANT CHANGE UP
RBC # BLD: 4.85 M/UL — SIGNIFICANT CHANGE UP (ref 3.8–5.2)
RBC # FLD: 16.9 % — HIGH (ref 10.3–14.5)
SALICYLATES SERPL-MCNC: <0.3 MG/DL — LOW (ref 2.8–20)
SARS-COV-2 RNA SPEC QL NAA+PROBE: SIGNIFICANT CHANGE UP
SODIUM SERPL-SCNC: 137 MMOL/L — SIGNIFICANT CHANGE UP (ref 135–145)
SP GR SPEC: 1.03 — SIGNIFICANT CHANGE UP (ref 1–1.03)
TSH SERPL-MCNC: 5.1 UIU/ML — HIGH (ref 0.27–4.2)
UROBILINOGEN FLD QL: 1 MG/DL — SIGNIFICANT CHANGE UP (ref 0.2–1)
WBC # BLD: 11.32 K/UL — HIGH (ref 3.8–10.5)
WBC # FLD AUTO: 11.32 K/UL — HIGH (ref 3.8–10.5)

## 2025-03-25 PROCEDURE — 84443 ASSAY THYROID STIM HORMONE: CPT

## 2025-03-25 PROCEDURE — 93010 ELECTROCARDIOGRAM REPORT: CPT

## 2025-03-25 PROCEDURE — 36415 COLL VENOUS BLD VENIPUNCTURE: CPT

## 2025-03-25 PROCEDURE — 93005 ELECTROCARDIOGRAM TRACING: CPT

## 2025-03-25 PROCEDURE — 80048 BASIC METABOLIC PNL TOTAL CA: CPT

## 2025-03-25 PROCEDURE — 99284 EMERGENCY DEPT VISIT MOD MDM: CPT | Mod: 25

## 2025-03-25 PROCEDURE — 99285 EMERGENCY DEPT VISIT HI MDM: CPT | Mod: 25

## 2025-03-25 PROCEDURE — 85025 COMPLETE CBC W/AUTO DIFF WBC: CPT

## 2025-03-25 PROCEDURE — 90792 PSYCH DIAG EVAL W/MED SRVCS: CPT

## 2025-03-25 PROCEDURE — 81003 URINALYSIS AUTO W/O SCOPE: CPT

## 2025-03-25 PROCEDURE — 80307 DRUG TEST PRSMV CHEM ANLYZR: CPT

## 2025-03-25 PROCEDURE — 87635 SARS-COV-2 COVID-19 AMP PRB: CPT

## 2025-03-25 PROCEDURE — 84702 CHORIONIC GONADOTROPIN TEST: CPT

## 2025-03-25 SDOH — ECONOMIC STABILITY - INCOME SECURITY: UNEMPLOYMENT, UNSPECIFIED: Z56.0

## 2025-03-25 NOTE — ED PROVIDER NOTE - OBJECTIVE STATEMENT
29 yr old female, history of borderline personality disorder, PTSD, schizoaffective disorder, presents to the Emergency Department w depression. multiple psych admissions in past. most recent at [facility] 1/13-1/17/2025. had been doing ok since discharge. now in last few days feeling depressed. no obvious trigger. admits to si but denies plan. complaint w psych meds - clozaril and zoloft. no drug / alcohol use. no hi, hallucinations.   also complains of mild low back pain. no injury or trauma. no radiation to abd or lower extremities. no extremity weakness / numbness. no fever, bladder / bowel dysfunction. no onc hx, no hx ivdu.

## 2025-03-25 NOTE — ED BEHAVIORAL HEALTH ASSESSMENT NOTE - SUMMARY
29F, never  currently engaged, domiciled in supportive housing, pph Borderline Personality Disorder, ADHD, PTSD, also carrying a dx of Schizoaffective Disorder, currently under the care of an ACT Team (Angela [phone]), multiple past psychiatric hospitalizations including state, most recently at [facility] Jan 2025 (no changes made to medications), and before that [facility] 12/21/24-12/27/24 and 12/31/24-1/6/25, hx of multiple suicide attempts ("like six") vs impulsive parasuicidal behavior - most recently Jan 2025 by overdosing on 15tabs of Benadryl, pmh Asthma, BIBS accompanied by fiance endorsing depression and vague SI; Psychiatry consulted for SI. No dangerousness evident. Pt does not at this time represent an imminent threat of danger to self or others and does not meet criteria for Involuntary Psychiatric Hospitalization.     Plan:   Pt is to be discharged and transportation will be provided to pt's ACT Team office on [address] so that pt can meet with her Care Team to discuss possible changes to her medications.

## 2025-03-25 NOTE — ED ADULT TRIAGE NOTE - HEART RATE (BEATS/MIN)
Spoke with Phyllis Benitez at Home   Patient's heart rate: 39  Blood pressure: 152/48  Patient  Complains of being tired and weak   Please give Phyllis MAHMOOD a call back at [phone]   106
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
## 2025-03-25 NOTE — ED BEHAVIORAL HEALTH ASSESSMENT NOTE - DESCRIPTION
Asthma supportive housing in [place]. 1 year of college. Engaged. No children. calm, cooperative, no behavioral issues

## 2025-03-25 NOTE — ED ADULT NURSE NOTE - OBJECTIVE STATEMENT
29 yr old female c/o depression. Pt states she has been depressed for days. Pt endorses SI, denies plan. Denies HI and auditory/visual/tactile hallucinations. Pt denies alcohol and substance use. Hx of depression. Also c/o lower back pain for days. Respirations spontaneous and unlabored. A&oX4. NAD. MENDEZ.

## 2025-03-25 NOTE — ED PROVIDER NOTE - CLINICAL SUMMARY MEDICAL DECISION MAKING FREE TEXT BOX
history of borderline personality disorder, PTSD, schizoaffective disorder, multiple psych admissions in past. most recent at [facility] 1/13-1/17/2025. in last few days feeling depressed. no obvious trigger. admits to si but denies plan. also complains of atraumatic low back pain w/o associated infectious or neuro sx.   pt nontoxic appearing, tachy to 110s, vitals otherwise ok, exam unremarkable  1. low back pain w/o red sx.   no indication for imaging given no trauma, normal exam and no concern for cord compression / cauda equina. can give tylenol / nsaids prn but pt notes primarily here for psych eval  2. depression, ?passive si  placed on 1:1, will check clearance labs w tox labs and elaine, ecg.   once medically cleared psych eval for dispo

## 2025-03-25 NOTE — ED PROVIDER NOTE - PATIENT PORTAL LINK FT
You can access the FollowMyHealth Patient Portal offered by [facility] by registering at the following website: [email or web address]. By joining [facility]’s FollowMyHealth portal, you will also be able to view your health information using other applications (apps) compatible with our system.
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
## 2025-03-25 NOTE — ED PROVIDER NOTE - PROGRESS NOTE DETAILS
itzel - wbc count 11.32. mild baseline anemia. remainder of labs ok. ecg sinus tach w qtc 471. drug screen neg. tox labs negative  pt medically cleared. telepsych consulted. likely will be seen by day team due to length consult list.  pt aware. resting comfortably. stable vitals, HR has improved. remains on 1:1.   signed out to day team pending psych eval / recs. joanne by psych and cleared to f/u with ACT team this morning.  pt given metrocard to get to her ACT team.  discussed strict return parameters

## 2025-03-25 NOTE — ED PROVIDER NOTE - NSFOLLOWUPINSTRUCTIONS_ED_ALL_ED_FT
Continue all medications as previously prescribed.     Follow up with your Psychiatry team within 1 week for continued evaluation and management.     If you do not have insurance or to see a psychiatrist on the same day, you could go to a [facility] walk-in clinic, for example, to the one at [facility]:  [facility] Walk-in-clinic [address], at [address] Walk straight down the main [address] and take your first left.  Enter at room 1A2. 8:00am for Walk-in-Clinic [phone]    If you start to feel worse, especially if you are afraid you might hurt yourself or someone else, please call 911, return here, or go to your nearest emergency room.     2-535-[facility] ([phone]) is a free, confidential support, crisis intervention, and referral service for [facility] residents. You can call 24 hours per day/7 days per week. The hotline's staff of trained mental health professionals help callers find mental health and substance abuse services.

## 2025-03-25 NOTE — ED ADULT NURSE NOTE - CAS DISCH CONDITION
Pt A&Ox4, respirations even and unlabored, skin color WDL warm and dry, pt is ambulatory with a steady gait. No acute distress observed.  Pt denies SI, HI at time of d/c 1:1 maintained throughout shift. Personal belongings  returned to pt./Improved

## 2025-03-25 NOTE — ED BEHAVIORAL HEALTH ASSESSMENT NOTE - SAFETY PLAN ADDT'L DETAILS
Safety plan discussed with.../Provision of National Suicide Prevention Lifeline 2-514-536-TALK (8374)

## 2025-03-25 NOTE — ED BEHAVIORAL HEALTH ASSESSMENT NOTE - RISK ASSESSMENT
Static: hx of attempt  Protective: Help seeking  Modifiable: Medication optimization  Current Risk: LOW  Current risk will be mitigated by tending to pt needs.

## 2025-03-25 NOTE — ED ADULT TRIAGE NOTE - CHIEF COMPLAINT QUOTE
"I'd been feeling depress for the past 2 days" ; denies SI or HI at time of triage;  denies drug/ETOH use;   compliant with Zoloft and Clozaril;  also with low back pain for days

## 2025-04-14 NOTE — ED BEHAVIORAL HEALTH ASSESSMENT NOTE - NSBHMSEJUDGE_PSY_A_CORE
[Time Spent: ___ minutes] : I have spent [unfilled] minutes of time on the encounter which excludes teaching and separately reported services.
Fair

## 2025-05-29 NOTE — ED BEHAVIORAL HEALTH ASSESSMENT NOTE - ABUSE / TRAUMA HISTORY
[de-identified] : Acupuncture treatment: Baldry technique with multiple needle placement to the cervical paraspinal, lumbar paraspinal and gluteal musculature with UV lamp x 45 minutes Paralumbar chain (bladder meridian) with ES x 45 minutes K3-HT3 SI4-BL60 with ES x 45 minutes BL 10, SP 6, ST 36, LR 3, LI 4, BL 59, omega 2, Cooper men Patient tolerated procedure well Yes

## 2025-06-20 NOTE — BH DISCHARGE NOTE NURSING/SOCIAL WORK/PSYCH REHAB - NSBHREFERPURPOSE1_PSY_ALL_CORE
Group Topic: BH Check-in/Symptom Rating    Date: 6/20/2025  Start Time: 0945  End Time: 1000  Facilitators: Jennifer Bhandari [facility]    Focus: Morning Information / Goals  Number in attendance: 7    Method: Group  Attendance: Present  Participation: Moderate  Patient Response: Able to return demonstration, Demonstrated insight, and Select interactions  Mood: Normal  Affect: Type: Euthymic (normal mood)   Range: Full (normal)   Congruency: Congruent   Stability: Stable  Behavior/Socialization: Appropriate to group  Thought Process: Demonstrated insight  Task Performance: Follows directions  Patient Evaluation: Encouragement - needs prompts     Mental Health Treatment/Assertive Community Treatment (ACT)